# Patient Record
Sex: FEMALE | Race: WHITE | NOT HISPANIC OR LATINO | Employment: OTHER | URBAN - METROPOLITAN AREA
[De-identification: names, ages, dates, MRNs, and addresses within clinical notes are randomized per-mention and may not be internally consistent; named-entity substitution may affect disease eponyms.]

---

## 2021-05-27 ENCOUNTER — RECORDS - HEALTHEAST (OUTPATIENT)
Dept: ADMINISTRATIVE | Facility: CLINIC | Age: 76
End: 2021-05-27

## 2021-05-28 ENCOUNTER — RECORDS - HEALTHEAST (OUTPATIENT)
Dept: ADMINISTRATIVE | Facility: CLINIC | Age: 76
End: 2021-05-28

## 2021-05-29 ENCOUNTER — RECORDS - HEALTHEAST (OUTPATIENT)
Dept: ADMINISTRATIVE | Facility: CLINIC | Age: 76
End: 2021-05-29

## 2021-05-30 ENCOUNTER — RECORDS - HEALTHEAST (OUTPATIENT)
Dept: ADMINISTRATIVE | Facility: CLINIC | Age: 76
End: 2021-05-30

## 2021-05-31 ENCOUNTER — RECORDS - HEALTHEAST (OUTPATIENT)
Dept: ADMINISTRATIVE | Facility: CLINIC | Age: 76
End: 2021-05-31

## 2021-06-01 ENCOUNTER — RECORDS - HEALTHEAST (OUTPATIENT)
Dept: ADMINISTRATIVE | Facility: CLINIC | Age: 76
End: 2021-06-01

## 2022-11-26 ENCOUNTER — APPOINTMENT (OUTPATIENT)
Dept: CT IMAGING | Facility: HOSPITAL | Age: 77
DRG: 385 | End: 2022-11-26
Attending: EMERGENCY MEDICINE
Payer: COMMERCIAL

## 2022-11-26 ENCOUNTER — HOSPITAL ENCOUNTER (INPATIENT)
Facility: HOSPITAL | Age: 77
LOS: 2 days | Discharge: LEFT BEFORE DISCHARGE | DRG: 385 | End: 2022-11-28
Attending: EMERGENCY MEDICINE | Admitting: HOSPITALIST
Payer: COMMERCIAL

## 2022-11-26 DIAGNOSIS — K52.9 NON-SPECIFIC COLITIS: ICD-10-CM

## 2022-11-26 DIAGNOSIS — R07.9 CHEST PAIN, UNSPECIFIED TYPE: ICD-10-CM

## 2022-11-26 DIAGNOSIS — U07.1 INFECTION DUE TO 2019 NOVEL CORONAVIRUS: ICD-10-CM

## 2022-11-26 LAB
ALBUMIN SERPL BCG-MCNC: 4.1 G/DL (ref 3.5–5.2)
ALP SERPL-CCNC: 79 U/L (ref 35–104)
ALT SERPL W P-5'-P-CCNC: 14 U/L (ref 10–35)
ANION GAP SERPL CALCULATED.3IONS-SCNC: 14 MMOL/L (ref 7–15)
AST SERPL W P-5'-P-CCNC: 18 U/L (ref 10–35)
BILIRUB DIRECT SERPL-MCNC: 0.22 MG/DL (ref 0–0.3)
BILIRUB SERPL-MCNC: 0.7 MG/DL
BUN SERPL-MCNC: 24.8 MG/DL (ref 8–23)
CALCIUM SERPL-MCNC: 9.8 MG/DL (ref 8.8–10.2)
CHLORIDE SERPL-SCNC: 98 MMOL/L (ref 98–107)
CREAT SERPL-MCNC: 0.88 MG/DL (ref 0.51–0.95)
DEPRECATED HCO3 PLAS-SCNC: 24 MMOL/L (ref 22–29)
ERYTHROCYTE [DISTWIDTH] IN BLOOD BY AUTOMATED COUNT: 13.6 % (ref 10–15)
GFR SERPL CREATININE-BSD FRML MDRD: 68 ML/MIN/1.73M2
GLUCOSE BLDC GLUCOMTR-MCNC: 180 MG/DL (ref 70–99)
GLUCOSE SERPL-MCNC: 157 MG/DL (ref 70–99)
HCT VFR BLD AUTO: 38.5 % (ref 35–47)
HGB BLD-MCNC: 12 G/DL (ref 11.7–15.7)
LACTATE SERPL-SCNC: 0.8 MMOL/L (ref 0.7–2)
LIPASE SERPL-CCNC: 32 U/L (ref 13–60)
MCH RBC QN AUTO: 29.6 PG (ref 26.5–33)
MCHC RBC AUTO-ENTMCNC: 31.2 G/DL (ref 31.5–36.5)
MCV RBC AUTO: 95 FL (ref 78–100)
PLATELET # BLD AUTO: 268 10E3/UL (ref 150–450)
POTASSIUM SERPL-SCNC: 4 MMOL/L (ref 3.4–5.3)
PROT SERPL-MCNC: 7.6 G/DL (ref 6.4–8.3)
RBC # BLD AUTO: 4.05 10E6/UL (ref 3.8–5.2)
SARS-COV-2 RNA RESP QL NAA+PROBE: POSITIVE
SODIUM SERPL-SCNC: 136 MMOL/L (ref 136–145)
TROPONIN T SERPL HS-MCNC: 22 NG/L
WBC # BLD AUTO: 19.3 10E3/UL (ref 4–11)

## 2022-11-26 PROCEDURE — U0005 INFEC AGEN DETEC AMPLI PROBE: HCPCS | Performed by: EMERGENCY MEDICINE

## 2022-11-26 PROCEDURE — 74174 CTA ABD&PLVS W/CONTRAST: CPT

## 2022-11-26 PROCEDURE — 82248 BILIRUBIN DIRECT: CPT | Performed by: EMERGENCY MEDICINE

## 2022-11-26 PROCEDURE — 250N000011 HC RX IP 250 OP 636: Performed by: EMERGENCY MEDICINE

## 2022-11-26 PROCEDURE — 250N000013 HC RX MED GY IP 250 OP 250 PS 637: Performed by: HOSPITALIST

## 2022-11-26 PROCEDURE — 84484 ASSAY OF TROPONIN QUANT: CPT | Performed by: EMERGENCY MEDICINE

## 2022-11-26 PROCEDURE — 80053 COMPREHEN METABOLIC PANEL: CPT | Performed by: EMERGENCY MEDICINE

## 2022-11-26 PROCEDURE — 250N000013 HC RX MED GY IP 250 OP 250 PS 637: Performed by: EMERGENCY MEDICINE

## 2022-11-26 PROCEDURE — 83605 ASSAY OF LACTIC ACID: CPT | Performed by: EMERGENCY MEDICINE

## 2022-11-26 PROCEDURE — 99285 EMERGENCY DEPT VISIT HI MDM: CPT | Mod: 25

## 2022-11-26 PROCEDURE — 258N000003 HC RX IP 258 OP 636: Performed by: EMERGENCY MEDICINE

## 2022-11-26 PROCEDURE — 85027 COMPLETE CBC AUTOMATED: CPT | Performed by: EMERGENCY MEDICINE

## 2022-11-26 PROCEDURE — 96374 THER/PROPH/DIAG INJ IV PUSH: CPT

## 2022-11-26 PROCEDURE — 93005 ELECTROCARDIOGRAM TRACING: CPT | Performed by: EMERGENCY MEDICINE

## 2022-11-26 PROCEDURE — 71275 CT ANGIOGRAPHY CHEST: CPT

## 2022-11-26 PROCEDURE — 250N000011 HC RX IP 250 OP 636: Performed by: HOSPITALIST

## 2022-11-26 PROCEDURE — C9803 HOPD COVID-19 SPEC COLLECT: HCPCS

## 2022-11-26 PROCEDURE — 83690 ASSAY OF LIPASE: CPT | Performed by: EMERGENCY MEDICINE

## 2022-11-26 PROCEDURE — 120N000001 HC R&B MED SURG/OB

## 2022-11-26 PROCEDURE — 99223 1ST HOSP IP/OBS HIGH 75: CPT | Performed by: HOSPITALIST

## 2022-11-26 PROCEDURE — 36415 COLL VENOUS BLD VENIPUNCTURE: CPT | Performed by: EMERGENCY MEDICINE

## 2022-11-26 RX ORDER — ACETAMINOPHEN 325 MG/1
650 TABLET ORAL EVERY 6 HOURS PRN
Status: DISCONTINUED | OUTPATIENT
Start: 2022-11-26 | End: 2022-11-28 | Stop reason: HOSPADM

## 2022-11-26 RX ORDER — ONDANSETRON 2 MG/ML
4 INJECTION INTRAMUSCULAR; INTRAVENOUS EVERY 6 HOURS PRN
Status: DISCONTINUED | OUTPATIENT
Start: 2022-11-26 | End: 2022-11-28 | Stop reason: HOSPADM

## 2022-11-26 RX ORDER — PIPERACILLIN SODIUM, TAZOBACTAM SODIUM 3; .375 G/15ML; G/15ML
3.38 INJECTION, POWDER, LYOPHILIZED, FOR SOLUTION INTRAVENOUS ONCE
Status: COMPLETED | OUTPATIENT
Start: 2022-11-26 | End: 2022-11-27

## 2022-11-26 RX ORDER — PRAVASTATIN SODIUM 80 MG/1
TABLET ORAL EVERY 24 HOURS
COMMUNITY
Start: 2021-06-04 | End: 2022-11-26

## 2022-11-26 RX ORDER — GLIPIZIDE 10 MG/1
10 TABLET ORAL
Status: ON HOLD | COMMUNITY
Start: 2022-09-26 | End: 2023-02-18

## 2022-11-26 RX ORDER — ACETAMINOPHEN 650 MG/1
650 SUPPOSITORY RECTAL EVERY 6 HOURS PRN
Status: DISCONTINUED | OUTPATIENT
Start: 2022-11-26 | End: 2022-11-28 | Stop reason: HOSPADM

## 2022-11-26 RX ORDER — FLUTICASONE PROPIONATE 50 MCG
1-2 SPRAY, SUSPENSION (ML) NASAL EVERY 24 HOURS
COMMUNITY
Start: 2021-06-04 | End: 2022-11-26

## 2022-11-26 RX ORDER — LOSARTAN POTASSIUM 50 MG/1
50 TABLET ORAL 2 TIMES DAILY
Status: ON HOLD | COMMUNITY
Start: 2021-06-04 | End: 2023-02-18

## 2022-11-26 RX ORDER — OMEGA-3 FATTY ACIDS/FISH OIL 300-1000MG
200-400 CAPSULE ORAL EVERY 8 HOURS PRN
Status: ON HOLD | COMMUNITY
End: 2023-02-18

## 2022-11-26 RX ORDER — OXYBUTYNIN CHLORIDE 10 MG/1
20 TABLET, EXTENDED RELEASE ORAL DAILY
Status: DISCONTINUED | OUTPATIENT
Start: 2022-11-27 | End: 2022-11-26

## 2022-11-26 RX ORDER — ASPIRIN 81 MG/1
81 TABLET ORAL DAILY
Status: DISCONTINUED | OUTPATIENT
Start: 2022-11-27 | End: 2022-11-28 | Stop reason: HOSPADM

## 2022-11-26 RX ORDER — VENLAFAXINE HYDROCHLORIDE 75 MG/1
75 CAPSULE, EXTENDED RELEASE ORAL DAILY
Status: DISCONTINUED | OUTPATIENT
Start: 2022-11-27 | End: 2022-11-28 | Stop reason: HOSPADM

## 2022-11-26 RX ORDER — OXYBUTYNIN CHLORIDE 10 MG/1
10 TABLET, EXTENDED RELEASE ORAL AT BEDTIME
Status: DISCONTINUED | OUTPATIENT
Start: 2022-11-26 | End: 2022-11-26

## 2022-11-26 RX ORDER — LIDOCAINE 40 MG/G
CREAM TOPICAL
Status: DISCONTINUED | OUTPATIENT
Start: 2022-11-26 | End: 2022-11-28 | Stop reason: HOSPADM

## 2022-11-26 RX ORDER — AMLODIPINE BESYLATE 2.5 MG/1
2.5 TABLET ORAL DAILY
Status: DISCONTINUED | OUTPATIENT
Start: 2022-11-27 | End: 2022-11-28 | Stop reason: HOSPADM

## 2022-11-26 RX ORDER — FUROSEMIDE 20 MG
20 TABLET ORAL DAILY
Status: ON HOLD | COMMUNITY
Start: 2021-06-04 | End: 2023-01-29

## 2022-11-26 RX ORDER — ONDANSETRON 4 MG/1
4 TABLET, ORALLY DISINTEGRATING ORAL EVERY 6 HOURS PRN
Status: DISCONTINUED | OUTPATIENT
Start: 2022-11-26 | End: 2022-11-28 | Stop reason: HOSPADM

## 2022-11-26 RX ORDER — METFORMIN HCL 500 MG
1000 TABLET, EXTENDED RELEASE 24 HR ORAL 2 TIMES DAILY WITH MEALS
COMMUNITY
Start: 2021-06-04

## 2022-11-26 RX ORDER — IOPAMIDOL 755 MG/ML
100 INJECTION, SOLUTION INTRAVASCULAR ONCE
Status: COMPLETED | OUTPATIENT
Start: 2022-11-26 | End: 2022-11-26

## 2022-11-26 RX ORDER — ROSUVASTATIN CALCIUM 10 MG/1
20 TABLET, COATED ORAL DAILY
Status: DISCONTINUED | OUTPATIENT
Start: 2022-11-27 | End: 2022-11-28 | Stop reason: HOSPADM

## 2022-11-26 RX ORDER — ENOXAPARIN SODIUM 100 MG/ML
40 INJECTION SUBCUTANEOUS EVERY 24 HOURS
Status: DISCONTINUED | OUTPATIENT
Start: 2022-11-27 | End: 2022-11-28 | Stop reason: HOSPADM

## 2022-11-26 RX ORDER — DEXTROSE MONOHYDRATE 25 G/50ML
25-50 INJECTION, SOLUTION INTRAVENOUS
Status: DISCONTINUED | OUTPATIENT
Start: 2022-11-26 | End: 2022-11-28 | Stop reason: HOSPADM

## 2022-11-26 RX ORDER — LOSARTAN POTASSIUM 50 MG/1
50 TABLET ORAL 2 TIMES DAILY
Status: DISCONTINUED | OUTPATIENT
Start: 2022-11-26 | End: 2022-11-28 | Stop reason: HOSPADM

## 2022-11-26 RX ORDER — LEVOTHYROXINE SODIUM 137 UG/1
137 TABLET ORAL
COMMUNITY
Start: 2022-11-09

## 2022-11-26 RX ORDER — OXYBUTYNIN CHLORIDE 10 MG/1
10 TABLET, EXTENDED RELEASE ORAL DAILY
Status: DISCONTINUED | OUTPATIENT
Start: 2022-11-27 | End: 2022-11-28 | Stop reason: HOSPADM

## 2022-11-26 RX ORDER — AMLODIPINE BESYLATE 2.5 MG/1
2.5 TABLET ORAL DAILY
COMMUNITY
Start: 2022-09-03

## 2022-11-26 RX ORDER — HYDROMORPHONE HYDROCHLORIDE 1 MG/ML
0.2 INJECTION, SOLUTION INTRAMUSCULAR; INTRAVENOUS; SUBCUTANEOUS
Status: DISCONTINUED | OUTPATIENT
Start: 2022-11-26 | End: 2022-11-28 | Stop reason: HOSPADM

## 2022-11-26 RX ORDER — ASPIRIN 81 MG/1
162 TABLET, CHEWABLE ORAL ONCE
Status: COMPLETED | OUTPATIENT
Start: 2022-11-26 | End: 2022-11-26

## 2022-11-26 RX ORDER — MORPHINE SULFATE 2 MG/ML
2 INJECTION, SOLUTION INTRAMUSCULAR; INTRAVENOUS ONCE
Status: COMPLETED | OUTPATIENT
Start: 2022-11-26 | End: 2022-11-26

## 2022-11-26 RX ORDER — SODIUM CHLORIDE 9 MG/ML
INJECTION, SOLUTION INTRAVENOUS ONCE
Status: COMPLETED | OUTPATIENT
Start: 2022-11-26 | End: 2022-11-26

## 2022-11-26 RX ORDER — LOSARTAN POTASSIUM 25 MG/1
25 TABLET ORAL DAILY
Status: DISCONTINUED | OUTPATIENT
Start: 2022-11-27 | End: 2022-11-26

## 2022-11-26 RX ORDER — SODIUM CHLORIDE 9 MG/ML
INJECTION, SOLUTION INTRAVENOUS CONTINUOUS
Status: DISCONTINUED | OUTPATIENT
Start: 2022-11-26 | End: 2022-11-28 | Stop reason: HOSPADM

## 2022-11-26 RX ORDER — EMPAGLIFLOZIN 25 MG/1
25 TABLET, FILM COATED ORAL DAILY
Status: ON HOLD | COMMUNITY
Start: 2022-09-26 | End: 2023-01-29

## 2022-11-26 RX ORDER — LEVOTHYROXINE SODIUM 125 UG/1
125 TABLET ORAL
Status: DISCONTINUED | OUTPATIENT
Start: 2022-11-27 | End: 2022-11-28 | Stop reason: HOSPADM

## 2022-11-26 RX ORDER — VENLAFAXINE HYDROCHLORIDE 75 MG/1
75 CAPSULE, EXTENDED RELEASE ORAL DAILY
Status: ON HOLD | COMMUNITY
Start: 2021-06-04 | End: 2023-02-18

## 2022-11-26 RX ORDER — OXYBUTYNIN CHLORIDE 10 MG/1
20 TABLET, EXTENDED RELEASE ORAL DAILY
Status: ON HOLD | COMMUNITY
Start: 2022-08-27 | End: 2023-04-05

## 2022-11-26 RX ORDER — ROSUVASTATIN CALCIUM 20 MG/1
20 TABLET, COATED ORAL AT BEDTIME
COMMUNITY
Start: 2022-09-16

## 2022-11-26 RX ORDER — NICOTINE POLACRILEX 4 MG
15-30 LOZENGE BUCCAL
Status: DISCONTINUED | OUTPATIENT
Start: 2022-11-26 | End: 2022-11-28 | Stop reason: HOSPADM

## 2022-11-26 RX ORDER — FUROSEMIDE 20 MG
20 TABLET ORAL DAILY
Status: DISCONTINUED | OUTPATIENT
Start: 2022-11-27 | End: 2022-11-28 | Stop reason: HOSPADM

## 2022-11-26 RX ORDER — PIPERACILLIN SODIUM, TAZOBACTAM SODIUM 3; .375 G/15ML; G/15ML
3.38 INJECTION, POWDER, LYOPHILIZED, FOR SOLUTION INTRAVENOUS EVERY 8 HOURS
Status: DISCONTINUED | OUTPATIENT
Start: 2022-11-27 | End: 2022-11-28 | Stop reason: HOSPADM

## 2022-11-26 RX ADMIN — PIPERACILLIN AND TAZOBACTAM 3.38 G: 3; .375 INJECTION, POWDER, LYOPHILIZED, FOR SOLUTION INTRAVENOUS at 21:02

## 2022-11-26 RX ADMIN — IOPAMIDOL 100 ML: 755 INJECTION, SOLUTION INTRAVENOUS at 18:49

## 2022-11-26 RX ADMIN — MORPHINE SULFATE 2 MG: 2 INJECTION, SOLUTION INTRAMUSCULAR; INTRAVENOUS at 18:37

## 2022-11-26 RX ADMIN — ASPIRIN 81 MG CHEWABLE TABLET 162 MG: 81 TABLET CHEWABLE at 21:01

## 2022-11-26 RX ADMIN — LOSARTAN POTASSIUM 50 MG: 50 TABLET, FILM COATED ORAL at 22:07

## 2022-11-26 RX ADMIN — SODIUM CHLORIDE: 9 INJECTION, SOLUTION INTRAVENOUS at 20:19

## 2022-11-26 ASSESSMENT — ENCOUNTER SYMPTOMS
FOCAL WEAKNESS: 0
PSYCHIATRIC NEGATIVE: 1
CONSTITUTIONAL NEGATIVE: 1
EYES NEGATIVE: 1
COUGH: 0
VOMITING: 0
PALPITATIONS: 0
RESPIRATORY NEGATIVE: 1
DYSURIA: 0
FEVER: 0
CONSTIPATION: 1
ABDOMINAL PAIN: 1
MUSCULOSKELETAL NEGATIVE: 1
WEAKNESS: 0
NEUROLOGICAL NEGATIVE: 1
CHILLS: 0
ORTHOPNEA: 0
NAUSEA: 1

## 2022-11-26 ASSESSMENT — ACTIVITIES OF DAILY LIVING (ADL)
ADLS_ACUITY_SCORE: 35

## 2022-11-26 NOTE — ED PROVIDER NOTES
Emergency Department Encounter     Evaluation Date & Time:   No admission date for patient encounter.    CHIEF COMPLAINT:  Abdominal Pain      Triage Note:Patient arrives with complaints of abdominal pain. Unsure when last BM was,  reports about 3-4 days ago. Patient reports passing gas. No abdominal hx. Also reports tender upper back, denies trauma. No neck or chest pain.             Impression and Plan       FINAL IMPRESSION:    ICD-10-CM    1. Non-specific colitis  K52.9     with possible stricture      2. Chest pain, unspecified type  R07.9       3. Infection due to 2019 novel coronavirus  U07.1             ED COURSE & MEDICAL DECISION MAKIN:15 PM I met with the patient for an interview and initial exam. Plans for care were discussed.  8:01 PM I reassessed the patient and updated her regarding lab results. Plans for care were further discussed.   8:30 PM I discussed with Dr. Fournier, Hospitalist, regarding admitting the patient.      77 year old female, history of diabetes and s/p appendectomy, who presents for evaluation of cramping mid-abdominal pain radiating to her chest and mid-upper back pain for the past couple of days. No N/V/D, but no BM for 2-3 days despite OTC medications. No fevers.      On exam, abdomen is soft, non-distended with moderate tenderness to palpation diffusely; no peritoneal signs.    Cardiac etiology considered for which EKG was performed and demonstrated NSR with no ischemic changes.  Troponin slightly above reference range for her sex (22).    Labs otherwise remarkable for leukocytosis (WBC 19.3) with no anemia (Hb 12.0).  No electrolyte derangements or renal impairment.  No laboratory evidence of hepatitis, biliary obstruction or pancreatitis.  UA negative for infection and hematuria.    Given pain radiating to her chest and back, aortic dissection considered.  CTA chest / abdomen / pelvis performed and demonstrated:  1.  No thoracic or abdominal aortic dissection.  2.   Distal colitis, which may be infectious or inflammatory. Possible distal colonic stricture. Consider GI consultation.     Patient without COVID symptoms, however her asymptomatic COVID test returned positive. Patient did have some hypoxia (O2 sats 88% on RA) for which she was placed on 2L via NC with O2 sats remaining >92%.    Patient admitted to hospitalist service for further evaluation and management.  Patient hemodynamically stable throughout ED course.      At the conclusion of the encounter I discussed the results of all the tests and the disposition. The questions were answered. The patient and family acknowledged understanding and were agreeable with the care plan.      MEDICATIONS GIVEN IN THE EMERGENCY DEPARTMENT:  Medications   piperacillin-tazobactam (ZOSYN) 3.375 g vial to attach to  mL bag (0 g Intravenous Stopped 11/27/22 0023)     Followed by   piperacillin-tazobactam (ZOSYN) 3.375 g vial to attach to  mL bag (3.375 g Intravenous Given 11/27/22 0300)   amLODIPine (NORVASC) tablet 2.5 mg (2.5 mg Oral Given 11/27/22 0752)   aspirin EC tablet 81 mg (81 mg Oral Given 11/27/22 0751)   levothyroxine (SYNTHROID/LEVOTHROID) tablet 125 mcg (125 mcg Oral Given 11/27/22 0751)   rosuvastatin (CRESTOR) tablet 20 mg (20 mg Oral Given 11/27/22 0752)   lidocaine 1 % 0.1-1 mL (has no administration in time range)   lidocaine (LMX4) cream (has no administration in time range)   sodium chloride (PF) 0.9% PF flush 3 mL (3 mLs Intracatheter Not Given 11/27/22 0501)   sodium chloride (PF) 0.9% PF flush 3 mL (has no administration in time range)   melatonin tablet 1 mg (has no administration in time range)   glucose gel 15-30 g (has no administration in time range)     Or   dextrose 50 % injection 25-50 mL (has no administration in time range)     Or   glucagon injection 1 mg (has no administration in time range)   enoxaparin ANTICOAGULANT (LOVENOX) injection 40 mg (40 mg Subcutaneous Given 11/27/22 0752)    sodium chloride 0.9% infusion ( Intravenous New Bag 11/27/22 0742)   acetaminophen (TYLENOL) tablet 650 mg (650 mg Oral Given 11/27/22 0459)     Or   acetaminophen (TYLENOL) Suppository 650 mg ( Rectal See Alternative 11/27/22 0459)   HYDROmorphone (PF) (DILAUDID) injection 0.2 mg (has no administration in time range)   ondansetron (ZOFRAN ODT) ODT tab 4 mg (has no administration in time range)     Or   ondansetron (ZOFRAN) injection 4 mg (has no administration in time range)   insulin aspart (NovoLOG) injection (RAPID ACTING) (1 Units Subcutaneous Given 11/27/22 0749)   furosemide (LASIX) tablet 20 mg (has no administration in time range)   losartan (COZAAR) tablet 50 mg (50 mg Oral Given 11/27/22 0751)   venlafaxine (EFFEXOR XR) 24 hr capsule 75 mg (75 mg Oral Given 11/27/22 0752)   oxybutynin ER (DITROPAN XL) 24 hr tablet 10 mg (10 mg Oral Given 11/27/22 0750)   morphine (PF) injection 2 mg (2 mg Intravenous Given 11/26/22 1837)   iopamidol (ISOVUE-370) solution 100 mL (100 mLs Intravenous Given 11/26/22 1849)   sodium chloride 0.9% infusion (0 mLs Intravenous Stopped 11/26/22 2214)   aspirin (ASA) chewable tablet 162 mg (162 mg Oral Given 11/26/22 2101)       NEW PRESCRIPTIONS STARTED AT TODAY'S ED VISIT:  New Prescriptions    No medications on file       HPI     HPI     Suzy Gómez is a 77 year old female with a history of diabetes and s/p appendectomy, who presents to this ED with her family for evaluation of abdominal pain.     The pain started a couple of days ago and has been constant since onset. The pain is located to the mid-abdomen with some radiation to her chest. She also reports pain in the mid-upper back. The pain is cramping in nature without provocative features. No associated N/V/D. She estimates her last bowel movement to be around 2-3 days ago. Denies urinary symptoms and fevers.    Otherwise, she denies shortness of breath, cough or other concerns.    REVIEW OF SYSTEMS:  All other systems  reviewed and are negative.      Medical History     Past Medical History:   Diagnosis Date     Diabetes (H)      Dyslipidemia      Heart disease      Hypertension      KARYN (obstructive sleep apnea)      Thyroid disease        No past surgical history on file.    No family history on file.    Social History     Tobacco Use     Smoking status: Former     Types: Cigarettes     Quit date:      Years since quittin.9     Smokeless tobacco: Never   Vaping Use     Vaping Use: Never used   Substance Use Topics     Alcohol use: Not Currently     Drug use: Never       amLODIPine (NORVASC) 2.5 MG tablet  aspirin (ASA) 81 MG EC tablet  furosemide (LASIX) 20 MG tablet  glipiZIDE (GLUCOTROL) 10 MG tablet  ibuprofen (ADVIL/MOTRIN) 200 MG capsule  JARDIANCE 25 MG TABS tablet  levothyroxine (SYNTHROID/LEVOTHROID) 125 MCG tablet  linagliptin (TRADJENTA) 5 MG TABS tablet  losartan (COZAAR) 50 MG tablet  metFORMIN (GLUCOPHAGE XR) 500 MG 24 hr tablet  oxybutynin ER (DITROPAN XL) 10 MG 24 hr tablet  rosuvastatin (CRESTOR) 20 MG tablet  venlafaxine (EFFEXOR XR) 75 MG 24 hr capsule        Physical Exam     First Vitals:  Patient Vitals for the past 24 hrs:   BP Temp Temp src Pulse Resp SpO2 Height Weight   22 0345 (!) 169/74 -- -- 73 22 94 % -- --   22 0321 (!) 143/65 -- -- 75 23 92 % -- --   22 0306 (!) 154/67 -- -- 75 23 92 % -- --   22 0251 (!) 159/68 -- -- 74 (!) 31 94 % -- --   22 0236 (!) 160/65 -- -- 72 (!) 36 94 % -- --   22 0221 -- -- -- 70 21 95 % -- --   22 0212 127/58 -- -- 68 19 94 % -- --   22 0206 -- -- -- 70 21 96 % -- --   22 0157 (!) 147/64 -- -- 71 22 94 % -- --   22 0146 130/63 -- -- 69 19 95 % -- --   22 0142 -- -- -- 68 21 95 % -- --   22 0127 120/55 -- -- 69 25 94 % -- --   22 0112 126/60 -- -- 69 19 94 % -- --   22 2315 109/54 -- -- 75 22 93 % -- --   22 2205 124/61 -- -- 70 23 92 % -- --   22 -- -- -- 81 21  "96 % -- --   11/26/22 1900 (!) 145/65 -- -- 86 19 97 % -- --   11/26/22 1831 (!) 141/63 -- -- 81 9 (!) 88 % -- --   11/26/22 1726 (!) 142/61 97.8  F (36.6  C) Temporal 87 22 92 % 1.651 m (5' 5\") 99.8 kg (220 lb)       PHYSICAL EXAM:   Physical Exam    GENERAL: Awake, alert.  In mild acute distress.   HEENT: Normocephalic, atraumatic. Pupils equal, round and reactive. Conjunctiva normal.  NECK: No stridor.  PULMONARY: Symmetrical breath sounds without distress.  Lungs clear to auscultation bilaterally without wheezes, rhonchi or rales.  CARDIO: Regular rate and rhythm.  No significant murmur, rub or gallop.  Radial pulses strong and symmetrical.  ABDOMINAL: Abdomen soft, non-distended with moderate tenderness to palpation diffusely; no rebound tenderness or guarding.    EXTREMITIES: No lower extremity swelling or edema.      NEURO: Alert and oriented to person, place and time.  Cranial nerves grossly intact.  No focal motor deficit.  PSYCH: Normal mood and affect.  SKIN: No rashes.     Results     LAB:  All pertinent labs reviewed and interpreted  Labs Ordered and Resulted from Time of ED Arrival to Time of ED Departure   CBC WITH PLATELETS - Abnormal       Result Value    WBC Count 19.3 (*)     RBC Count 4.05      Hemoglobin 12.0      Hematocrit 38.5      MCV 95      MCH 29.6      MCHC 31.2 (*)     RDW 13.6      Platelet Count 268     BASIC METABOLIC PANEL - Abnormal    Sodium 136      Potassium 4.0      Chloride 98      Carbon Dioxide (CO2) 24      Anion Gap 14      Urea Nitrogen 24.8 (*)     Creatinine 0.88      Calcium 9.8      Glucose 157 (*)     GFR Estimate 68     ROUTINE UA WITH MICROSCOPIC REFLEX TO CULTURE - Abnormal    Color Urine Light Yellow      Appearance Urine Clear      Glucose Urine >1000 (*)     Bilirubin Urine Negative      Ketones Urine Negative      Specific Gravity Urine >1.050 (*)     Blood Urine Negative      pH Urine 5.5      Protein Albumin Urine 20 (*)     Urobilinogen Urine <2.0      " Nitrite Urine Negative      Leukocyte Esterase Urine Negative      Bacteria Urine Few (*)     RBC Urine <1      WBC Urine <1      Squamous Epithelials Urine <1     TROPONIN T, HIGH SENSITIVITY - Abnormal    Troponin T, High Sensitivity 22 (*)    COVID-19 VIRUS (CORONAVIRUS) BY PCR - Abnormal    SARS CoV2 PCR Positive (*)    GLUCOSE BY METER - Abnormal    GLUCOSE BY METER POCT 180 (*)    GLUCOSE BY METER - Abnormal    GLUCOSE BY METER POCT 135 (*)    GLUCOSE BY METER - Abnormal    GLUCOSE BY METER POCT 156 (*)    HEPATIC FUNCTION PANEL - Normal    Protein Total 7.6      Albumin 4.1      Bilirubin Total 0.7      Alkaline Phosphatase 79      AST 18      ALT 14      Bilirubin Direct 0.22     LIPASE - Normal    Lipase 32     LACTIC ACID WHOLE BLOOD - Normal    Lactic Acid 0.8         RADIOLOGY:  CTA Chest Abdomen Pelvis w Contrast   Final Result   IMPRESSION:   1.  No thoracic or abdominal aortic dissection.   2.  Distal colitis, which may be infectious or inflammatory. Possible distal colonic stricture. Consider follow-up GI consultation.             EC2022, 18:30; NSR with rate of 89 bpm; normal intervals; normal conduction; no ST-T wave changes consistent with ACS or pericarditis; no previous EKG available for comparison    EKG independently reviewed and interpreted by Brittney Zazueta MD      I, Abiodun Ko, am serving as a scribe to document services personally performed by Brittney Zazueta MD based on my observation and the provider's statements to me. I, Brittney Zazueta MD attest that Abiodun Ko is acting in a scribe capacity, has observed my performance of the services and has documented them in accordance with my direction.    Brittney Zazueta MD  Emergency Medicine  [facility] EMERGENCY DEPARTMENT           Brittney Zazueta MD  22 0751

## 2022-11-26 NOTE — ED TRIAGE NOTES
Patient arrives with complaints of abdominal pain. Unsure when last BM was,  reports about 3-4 days ago. Patient reports passing gas. No abdominal hx. Also reports tender upper back, denies trauma. No neck or chest pain.

## 2022-11-27 VITALS
HEART RATE: 74 BPM | WEIGHT: 220 LBS | OXYGEN SATURATION: 94 % | BODY MASS INDEX: 36.65 KG/M2 | DIASTOLIC BLOOD PRESSURE: 64 MMHG | SYSTOLIC BLOOD PRESSURE: 138 MMHG | HEIGHT: 65 IN | RESPIRATION RATE: 20 BRPM | TEMPERATURE: 98.2 F

## 2022-11-27 LAB
ALBUMIN UR-MCNC: 20 MG/DL
APPEARANCE UR: CLEAR
BACTERIA #/AREA URNS HPF: ABNORMAL /HPF
BILIRUB UR QL STRIP: NEGATIVE
COLOR UR AUTO: ABNORMAL
GLUCOSE BLDC GLUCOMTR-MCNC: 135 MG/DL (ref 70–99)
GLUCOSE BLDC GLUCOMTR-MCNC: 156 MG/DL (ref 70–99)
GLUCOSE BLDC GLUCOMTR-MCNC: 164 MG/DL (ref 70–99)
GLUCOSE UR STRIP-MCNC: >1000 MG/DL
HGB UR QL STRIP: NEGATIVE
KETONES UR STRIP-MCNC: NEGATIVE MG/DL
LEUKOCYTE ESTERASE UR QL STRIP: NEGATIVE
NITRATE UR QL: NEGATIVE
PH UR STRIP: 5.5 [PH] (ref 5–7)
POTASSIUM SERPL-SCNC: 3.7 MMOL/L (ref 3.4–5.3)
RBC URINE: <1 /HPF
SP GR UR STRIP: >1.05 (ref 1–1.03)
SQUAMOUS EPITHELIAL: <1 /HPF
UROBILINOGEN UR STRIP-MCNC: <2 MG/DL
WBC URINE: <1 /HPF

## 2022-11-27 PROCEDURE — 258N000003 HC RX IP 258 OP 636: Performed by: HOSPITALIST

## 2022-11-27 PROCEDURE — 250N000013 HC RX MED GY IP 250 OP 250 PS 637: Performed by: HOSPITALIST

## 2022-11-27 PROCEDURE — 84132 ASSAY OF SERUM POTASSIUM: CPT | Performed by: INTERNAL MEDICINE

## 2022-11-27 PROCEDURE — 250N000012 HC RX MED GY IP 250 OP 636 PS 637: Performed by: HOSPITALIST

## 2022-11-27 PROCEDURE — 99207 PR APP CREDIT; MD BILLING SHARED VISIT: CPT | Performed by: INTERNAL MEDICINE

## 2022-11-27 PROCEDURE — 250N000011 HC RX IP 250 OP 636: Performed by: HOSPITALIST

## 2022-11-27 PROCEDURE — 81001 URINALYSIS AUTO W/SCOPE: CPT | Performed by: EMERGENCY MEDICINE

## 2022-11-27 PROCEDURE — 36415 COLL VENOUS BLD VENIPUNCTURE: CPT | Performed by: INTERNAL MEDICINE

## 2022-11-27 PROCEDURE — 120N000001 HC R&B MED SURG/OB

## 2022-11-27 RX ORDER — LACTULOSE 10 G/15ML
10 SOLUTION ORAL 2 TIMES DAILY
Status: DISCONTINUED | OUTPATIENT
Start: 2022-11-27 | End: 2022-11-28 | Stop reason: HOSPADM

## 2022-11-27 RX ADMIN — ACETAMINOPHEN 650 MG: 325 TABLET, FILM COATED ORAL at 10:05

## 2022-11-27 RX ADMIN — ENOXAPARIN SODIUM 40 MG: 40 INJECTION SUBCUTANEOUS at 07:52

## 2022-11-27 RX ADMIN — LEVOTHYROXINE SODIUM 125 MCG: 0.12 TABLET ORAL at 07:51

## 2022-11-27 RX ADMIN — INSULIN ASPART 1 UNITS: 100 INJECTION, SOLUTION INTRAVENOUS; SUBCUTANEOUS at 12:23

## 2022-11-27 RX ADMIN — AMLODIPINE BESYLATE 2.5 MG: 2.5 TABLET ORAL at 07:52

## 2022-11-27 RX ADMIN — PIPERACILLIN AND TAZOBACTAM 3.38 G: 3; .375 INJECTION, POWDER, LYOPHILIZED, FOR SOLUTION INTRAVENOUS at 03:00

## 2022-11-27 RX ADMIN — ACETAMINOPHEN 650 MG: 325 TABLET, FILM COATED ORAL at 04:59

## 2022-11-27 RX ADMIN — OXYBUTYNIN CHLORIDE 10 MG: 10 TABLET, EXTENDED RELEASE ORAL at 07:50

## 2022-11-27 RX ADMIN — PIPERACILLIN AND TAZOBACTAM 3.38 G: 3; .375 INJECTION, POWDER, LYOPHILIZED, FOR SOLUTION INTRAVENOUS at 10:05

## 2022-11-27 RX ADMIN — Medication 81 MG: at 07:51

## 2022-11-27 RX ADMIN — VENLAFAXINE HYDROCHLORIDE 75 MG: 75 CAPSULE, EXTENDED RELEASE ORAL at 07:52

## 2022-11-27 RX ADMIN — FUROSEMIDE 20 MG: 20 TABLET ORAL at 07:58

## 2022-11-27 RX ADMIN — ROSUVASTATIN CALCIUM 20 MG: 10 TABLET, FILM COATED ORAL at 07:52

## 2022-11-27 RX ADMIN — SODIUM CHLORIDE: 9 INJECTION, SOLUTION INTRAVENOUS at 07:42

## 2022-11-27 RX ADMIN — INSULIN ASPART 1 UNITS: 100 INJECTION, SOLUTION INTRAVENOUS; SUBCUTANEOUS at 07:49

## 2022-11-27 RX ADMIN — LOSARTAN POTASSIUM 50 MG: 50 TABLET, FILM COATED ORAL at 07:51

## 2022-11-27 ASSESSMENT — ACTIVITIES OF DAILY LIVING (ADL)
ADLS_ACUITY_SCORE: 35

## 2022-11-27 NOTE — DISCHARGE SUMMARY
Patient had a big bowel movement and felt better.  She signed AMA papers and left, before I could see her.

## 2022-11-27 NOTE — PHARMACY-ADMISSION MEDICATION HISTORY
"Pharmacy Note - Admission Medication History  Pertinent Provider Information: \"rotigotine 2 mg/24 hr (NEUPRO) 2 mg/24 hour patch Apply 1 Patch on dry, clean, hairless skin once daily. \"  Was on recent clinic list but not fill hx and pt reports not using any patches.    11/3/22 Keflex for 7 days (pt could not recall what she was on it for, appears to be cellulitis from clinic notes).     ______________________________________________________________________    Prior To Admission (PTA) med list completed and updated in EMR.       PTA Med List   Medication Sig Last Dose     amLODIPine (NORVASC) 2.5 MG tablet Take 2.5 mg by mouth daily 11/26/2022     aspirin (ASA) 81 MG EC tablet Take 81 mg by mouth daily 11/26/2022     furosemide (LASIX) 20 MG tablet Take 20 mg by mouth daily 11/26/2022     glipiZIDE (GLUCOTROL) 10 MG tablet Take 10 mg by mouth 2 times daily (before meals) 11/26/2022 at am     ibuprofen (ADVIL/MOTRIN) 200 MG capsule Take 200-400 mg by mouth every 6 hours as needed for fever, inflammatory pain or moderate pain (4-6) 11/26/2022     JARDIANCE 25 MG TABS tablet Take 25 mg by mouth daily 11/26/2022     levothyroxine (SYNTHROID/LEVOTHROID) 125 MCG tablet Take 125 mcg by mouth daily before breakfast 11/26/2022     linagliptin (TRADJENTA) 5 MG TABS tablet Take 5 mg by mouth daily 11/26/2022     losartan (COZAAR) 50 MG tablet Take 50 mg by mouth 2 times daily 11/26/2022 at am     metFORMIN (GLUCOPHAGE XR) 500 MG 24 hr tablet Take 1,000 mg by mouth 2 times daily (with meals) 11/26/2022     oxybutynin ER (DITROPAN XL) 10 MG 24 hr tablet Take 20 mg by mouth daily 11/26/2022     rosuvastatin (CRESTOR) 20 MG tablet Take 20 mg by mouth At Bedtime 11/25/2022     venlafaxine (EFFEXOR XR) 75 MG 24 hr capsule Take 75 mg by mouth daily 11/26/2022       Information source(s): Patient, [facility] [place]  Method of interview communication: in-person    Summary of Changes to PTA Med List  New: " entire list added to [facility] record but only change from clinic list was add Ibu and remove Neupro patch    Patient was asked about OTC/herbal products specifically.  PTA med list reflects this.    In the past week, patient estimated taking medication this percent of the time:  greater than 90%.    Allergies were reviewed, assessed, and updated with the patient.      Patient does not use any multi-dose medications prior to admission.    The information provided in this note is only as accurate as the sources available at the time of the update(s).    Thank you for the opportunity to participate in the care of this patient.    Destiney Sheikh [facility]  11/26/2022 9:10 PM

## 2022-11-27 NOTE — PLAN OF CARE
Had a Bowel Movement and pain has completely resolved. Requesting that we contact the provider. Wants to be released.   Claudia Cobian RN      Problem: Constipation  Goal: Effective Bowel Elimination  11/27/2022 1345 by Claudia Cobian RN  Outcome: Progressing  11/27/2022 1151 by Claudia Cobian RN  Outcome: Progressing

## 2022-11-27 NOTE — H&P
"[facility]    History and Physical - Hospitalist Service       Date of Admission:  11/26/2022    Assessment & Plan     ADMITTING DIAGNOSIS  1. Acute Distal Colitis  2.Possibel Colonic Stricture  3.Hypertension  4.Hyperlipidemia.  5.DM2  6.HYpothyroidism  7.KARYN  8.CAD  9.COVID positive      PLAN  Admit to  Vital signs q 4  Telemetry monitoring  Continuous Pulse OX  Monitor I/O  Weight on admission    1.Principal diagnosis plan  IV Zosyn for colitis q 8  Iv fluids for hydration  Keep NPO  Gi consult    2. CVS  Monitor weight  HTN- continue home medications  Cozaar and Norvasc  CAD-daily ASa    3.GI- continue home PPI  Antiemetics as needed    4.Pulmonary/KARYN  Cpap per home use  Respiratory care per protocol    5.ENDO  Thyroid-continue Levothyroxine 125mcg  DM- Accuchecks Ac/Hs  Novolog sliding scale  Hold on oral agents while NPO  TSH/HGBa1c in am    6. COVID positive  Asymptomatic  No meds needed at this time            Diet: NPO per Anesthesia Guidelines for Procedure/Surgery Except for: MedsNPO  DVT Prophylaxis: Enoxaparin (Lovenox) SQ  Box Catheter: Not present  Central Lines: None  Cardiac Monitoring: None  Code Status:   FULL    Clinically Significant Risk Factors Present on Admission                  # Hypertension: home medication list includes antihypertensive(s)     # Obesity: Estimated body mass index is 36.61 kg/m  as calculated from the following:    Height as of this encounter: 1.651 m (5' 5\").    Weight as of this encounter: 99.8 kg (220 lb).           Disposition Plan   The patient's care was discussed with the Patient and Patient's Family.    Axel Fournier MD  Hospitalist Service  [facility]  Securely message with the Vocera Web Console (learn more here)  Text page via [facility] Paging/Directory         ______________________________________________________________________    Chief Complaint   Abdominal Pain    History is obtained from the " patient    History of Present Illness   Suzy Gómez is a 77 year old female with a hx of Hyperlipidemia,HTN,Arthritis, DM2, Hypothyroidism, KARYN, CAD who comes in with abdominal pain and constipation for 3 days.  She has not been able to have a good Bm in 3 days last stool was small and stringy.  She has pain especially lower abdomen , cramping radiating up to her chest.  No nausea or vomiting.  Her last colonoscopy was about 5yrs ago spouse states a 9mm polyp was removed.  She has not had one since.  She has no prior hx of colitis.    Review of Systems      Review of Systems   Constitutional: Negative.  Negative for chills, fever and malaise/fatigue.   HENT: Negative.    Eyes: Negative.    Respiratory: Negative.  Negative for cough.    Cardiovascular: Positive for chest pain. Negative for palpitations and orthopnea.   Gastrointestinal: Positive for abdominal pain, constipation and nausea. Negative for vomiting.   Genitourinary: Negative.  Negative for dysuria and urgency.   Musculoskeletal: Negative.    Skin: Negative.    Neurological: Negative.  Negative for focal weakness and weakness.   Endo/Heme/Allergies: Negative.    Psychiatric/Behavioral: Negative.       Past Medical History    I have reviewed this patient's medical history and updated it with pertinent information if needed.   Past Medical History:   Diagnosis Date     Diabetes (H)      Dyslipidemia      Heart disease      Hypertension      KARYN (obstructive sleep apnea)      Thyroid disease        Past Surgical History   I have reviewed this patient's surgical history and updated it with pertinent information if needed.  No past surgical history on file.    Social History   I have reviewed this patient's social history and updated it with pertinent information if needed.  Social History     Tobacco Use     Smoking status: Former     Types: Cigarettes     Quit date:      Years since quittin.9     Smokeless tobacco: Never   Vaping Use     Vaping  Use: Never used   Substance Use Topics     Alcohol use: Not Currently     Drug use: Never       Family History       Prior to Admission Medications   Prior to Admission Medications   Prescriptions Last Dose Informant Patient Reported? Taking?   JARDIANCE 25 MG TABS tablet   Yes No   amLODIPine (NORVASC) 2.5 MG tablet   Yes No   Sig: Take 2.5 mg by mouth daily   aspirin (ASA) 81 MG EC tablet   Yes Yes   Sig: Take 81 mg by mouth   fluticasone (FLONASE ALLERGY RELIEF) 50 MCG/ACT nasal spray   Yes Yes   Sig: Spray 1-2 sprays in nostril every 24 hours   furosemide (LASIX) 20 MG tablet   Yes Yes   Sig: Take 20 mg by mouth   glipiZIDE (GLUCOTROL) 10 MG tablet   Yes Yes   Sig: TAKE 1 TABLET BY MOUTH TWICE DAILY BEFORE MEAL(S)   levothyroxine (SYNTHROID/LEVOTHROID) 125 MCG tablet   Yes No   Sig: TAKE 1 TABLET BY MOUTH ONCE DAILY BEFORE BREAKFAST   linagliptin (TRADJENTA) 5 MG TABS tablet   Yes Yes   Sig: Take 5 mg by mouth   losartan (COZAAR) 50 MG tablet   Yes Yes   Sig: Take 1 tablet by mouth 2 times daily   metFORMIN (GLUCOPHAGE XR) 500 MG 24 hr tablet   Yes Yes   Sig: Take 1,000 mg by mouth 2 times daily (with meals)   oxybutynin ER (DITROPAN XL) 10 MG 24 hr tablet   Yes No   Sig: Take 20 mg by mouth daily   pravastatin (PRAVACHOL) 80 MG tablet   Yes Yes   Sig: Take by mouth every 24 hours   rosuvastatin (CRESTOR) 20 MG tablet   Yes No   Sig: Take 20 mg by mouth At Bedtime   rotigotine (NEUPRO) 2 MG/24HR 24 hr patch   Yes Yes   Sig: Place 1 patch onto the skin   venlafaxine (EFFEXOR XR) 75 MG 24 hr capsule   Yes Yes   Sig: Take 75 mg by mouth      Facility-Administered Medications: None     Allergies   Allergies   Allergen Reactions     Macrodantin [Nitrofurantoin] Unknown       Physical Exam   Vital Signs: Temp: 97.8  F (36.6  C) Temp src: Temporal BP: (!) 145/65 Pulse: 81   Resp: 21 SpO2: 96 % O2 Device: None (Room air)    Weight: 220 lbs 0 oz    Physical Exam  Constitutional:       Appearance: Normal appearance. She  is obese. She is not ill-appearing.   HENT:      Head: Normocephalic and atraumatic.      Right Ear: Tympanic membrane normal.      Left Ear: Tympanic membrane normal.      Nose: Nose normal.      Mouth/Throat:      Mouth: Mucous membranes are moist.   Eyes:      Extraocular Movements: Extraocular movements intact.      Conjunctiva/sclera: Conjunctivae normal.      Pupils: Pupils are equal, round, and reactive to light.   Cardiovascular:      Rate and Rhythm: Normal rate and regular rhythm.      Pulses: Normal pulses.      Heart sounds: Normal heart sounds. No murmur heard.  Pulmonary:      Effort: Pulmonary effort is normal.      Breath sounds: Normal breath sounds.   Abdominal:      General: Bowel sounds are normal. There is distension.      Palpations: Abdomen is soft. There is no mass.      Tenderness: There is abdominal tenderness. There is no rebound.      Comments: Tender  LLQ , mild tenderness RLQ   Musculoskeletal:         General: No swelling or tenderness. Normal range of motion.      Cervical back: Normal range of motion and neck supple. No tenderness.   Skin:     General: Skin is warm and dry.      Capillary Refill: Capillary refill takes less than 2 seconds.      Coloration: Skin is not pale.   Neurological:      General: No focal deficit present.      Mental Status: She is alert and oriented to person, place, and time. Mental status is at baseline.   Psychiatric:         Mood and Affect: Mood normal.         Behavior: Behavior normal.            Data   Data reviewed today: I reviewed all medications, new labs and imaging results over the last 24 hours. I personally reviewed the chest CT image(s) showing colitis.    Recent Labs   Lab 11/26/22  1810   WBC 19.3*   HGB 12.0   MCV 95         POTASSIUM 4.0   CHLORIDE 98   CO2 24   BUN 24.8*   CR 0.88   ANIONGAP 14   ALTON 9.8   *   ALBUMIN 4.1   PROTTOTAL 7.6   BILITOTAL 0.7   ALKPHOS 79   ALT 14   AST 18   LIPASE 32     Recent Results  (from the past 24 hour(s))   CTA Chest Abdomen Pelvis w Contrast    Narrative    EXAM: CTA CHEST ABDOMEN PELVIS W CONTRAST  LOCATION: [facility]  DATE/TIME: 11/26/2022 6:59 PM    INDICATION: chest pain, abd pain, pain in upper back  COMPARISON: CT chest 09/09/2020  TECHNIQUE: CT angiogram chest abdomen pelvis during arterial phase of injection of IV contrast. 2D and 3D MIP reconstructions were performed by the CT technologist. Dose reduction techniques were used.   CONTRAST: isovue 370 100ml    FINDINGS:   CT ANGIOGRAM CHEST, ABDOMEN, AND PELVIS: No thoracic aortic dissection. Thoracic aorta normal in caliber. Mild scattered atherosclerotic calcification. Normal three-vessel aortic arch. No pulmonary embolism. Pulmonary arteries are mildly dilated.    Abdominal aorta normal in caliber. No abdominal aortic dissection. Celiac, superior mesenteric, bilateral renal, and inferior mesenteric arteries are patent. There is diffuse atherosclerotic calcification, but no stenosis. Bilateral common, internal,   external, and common femoral arteries are normal in caliber and widely patent.    LUNGS AND PLEURA: No pulmonary mass, consolidation, or suspicious pulmonary nodule. Mild emphysema. Small fat-containing left posterior diaphragmatic hernia incidentally noted. No pleural effusion or pneumothorax.    MEDIASTINUM/AXILLAE: No abnormally enlarged lymph nodes. Cardiac chambers unremarkable. No pericardial effusion.    CORONARY ARTERY CALCIFICATION: Severe.    HEPATOBILIARY: Moderate hepatomegaly. Prior cholecystectomy. No suspicious liver lesion.    PANCREAS: Normal.    SPLEEN: Normal.    ADRENAL GLANDS: Normal.    KIDNEYS/BLADDER: Normal.    BOWEL: Abnormal appearance of the sigmoid colon, with moderate wall thickening and prominent pericolonic inflammatory change. The distal sigmoid and rectum are relatively collapsed. Colonic transition point is suggested on sagittal image 97. There are a   few  scattered distal colonic diverticula. Large amount of stool in the sigmoid colon. No free air or significant free fluid. No obstruction.     LYMPH NODES: Normal.    PELVIC ORGANS: Prior hysterectomy.    MUSCULOSKELETAL: Severe degenerative change in the lumbar spine.      Impression    IMPRESSION:  1.  No thoracic or abdominal aortic dissection.  2.  Distal colitis, which may be infectious or inflammatory. Possible distal colonic stricture. Consider follow-up GI consultation.         Axel Fournier MD on 11/26/2022 at 8:23 PM

## 2022-11-27 NOTE — PLAN OF CARE
"Abdominal pain \"about the same\" as at home - rates it 3-4 with movement.  No BM- thinks it has been at least 4 days.  Has been refusing to get up to commode- states it is hard to get on and off the cart.  Complaining that she is hungry.  Family notified no visitors for COVID positive patients. Standard falls and pressure ulcer prevention plans in place. Claudia Cobian RN      Problem: Pain Acute  Goal: Optimal Pain Control and Function  Outcome: Progressing     Problem: Constipation  Goal: Effective Bowel Elimination  Outcome: Progressing     "

## 2022-11-28 ASSESSMENT — ACTIVITIES OF DAILY LIVING (ADL): ADLS_ACUITY_SCORE: 35

## 2023-01-25 ENCOUNTER — APPOINTMENT (OUTPATIENT)
Dept: CT IMAGING | Facility: HOSPITAL | Age: 78
End: 2023-01-25
Attending: EMERGENCY MEDICINE
Payer: COMMERCIAL

## 2023-01-25 ENCOUNTER — HOSPITAL ENCOUNTER (EMERGENCY)
Facility: HOSPITAL | Age: 78
Discharge: HOME | End: 2023-01-26
Attending: EMERGENCY MEDICINE | Admitting: EMERGENCY MEDICINE
Payer: COMMERCIAL

## 2023-01-25 DIAGNOSIS — K59.00 CONSTIPATION, UNSPECIFIED CONSTIPATION TYPE: ICD-10-CM

## 2023-01-25 DIAGNOSIS — K52.9 COLITIS: ICD-10-CM

## 2023-01-25 LAB
ALBUMIN SERPL BCG-MCNC: 3.8 G/DL (ref 3.5–5.2)
ALBUMIN UR-MCNC: 70 MG/DL
ALP SERPL-CCNC: 87 U/L (ref 35–104)
ALT SERPL W P-5'-P-CCNC: 11 U/L (ref 10–35)
ANION GAP SERPL CALCULATED.3IONS-SCNC: 13 MMOL/L (ref 7–15)
APPEARANCE UR: CLEAR
AST SERPL W P-5'-P-CCNC: 19 U/L (ref 10–35)
BACTERIA #/AREA URNS HPF: ABNORMAL /HPF
BASOPHILS # BLD AUTO: 0 10E3/UL (ref 0–0.2)
BASOPHILS NFR BLD AUTO: 0 %
BILIRUB SERPL-MCNC: 0.4 MG/DL
BILIRUB UR QL STRIP: NEGATIVE
BUN SERPL-MCNC: 20.8 MG/DL (ref 8–23)
CALCIUM SERPL-MCNC: 9.8 MG/DL (ref 8.8–10.2)
CHLORIDE SERPL-SCNC: 101 MMOL/L (ref 98–107)
COLOR UR AUTO: ABNORMAL
CREAT SERPL-MCNC: 0.8 MG/DL (ref 0.51–0.95)
DEPRECATED HCO3 PLAS-SCNC: 20 MMOL/L (ref 22–29)
EOSINOPHIL # BLD AUTO: 0 10E3/UL (ref 0–0.7)
EOSINOPHIL NFR BLD AUTO: 0 %
ERYTHROCYTE [DISTWIDTH] IN BLOOD BY AUTOMATED COUNT: 14.1 % (ref 10–15)
GFR SERPL CREATININE-BSD FRML MDRD: 76 ML/MIN/1.73M2
GLUCOSE SERPL-MCNC: 165 MG/DL (ref 70–99)
GLUCOSE UR STRIP-MCNC: >1000 MG/DL
HCT VFR BLD AUTO: 36.1 % (ref 35–47)
HGB BLD-MCNC: 11.3 G/DL (ref 11.7–15.7)
HGB UR QL STRIP: NEGATIVE
HOLD SPECIMEN: NORMAL
IMM GRANULOCYTES # BLD: 0.1 10E3/UL
IMM GRANULOCYTES NFR BLD: 0 %
KETONES UR STRIP-MCNC: 10 MG/DL
LACTATE SERPL-SCNC: 0.8 MMOL/L (ref 0.7–2)
LEUKOCYTE ESTERASE UR QL STRIP: NEGATIVE
LIPASE SERPL-CCNC: 22 U/L (ref 13–60)
LYMPHOCYTES # BLD AUTO: 0.9 10E3/UL (ref 0.8–5.3)
LYMPHOCYTES NFR BLD AUTO: 6 %
MCH RBC QN AUTO: 29.2 PG (ref 26.5–33)
MCHC RBC AUTO-ENTMCNC: 31.3 G/DL (ref 31.5–36.5)
MCV RBC AUTO: 93 FL (ref 78–100)
MONOCYTES # BLD AUTO: 1.1 10E3/UL (ref 0–1.3)
MONOCYTES NFR BLD AUTO: 8 %
MUCOUS THREADS #/AREA URNS LPF: PRESENT /LPF
NEUTROPHILS # BLD AUTO: 12 10E3/UL (ref 1.6–8.3)
NEUTROPHILS NFR BLD AUTO: 86 %
NITRATE UR QL: NEGATIVE
NRBC # BLD AUTO: 0 10E3/UL
NRBC BLD AUTO-RTO: 0 /100
PH UR STRIP: 5.5 [PH] (ref 5–7)
PLATELET # BLD AUTO: 326 10E3/UL (ref 150–450)
POTASSIUM SERPL-SCNC: 3.7 MMOL/L (ref 3.4–5.3)
PROT SERPL-MCNC: 7.3 G/DL (ref 6.4–8.3)
RBC # BLD AUTO: 3.87 10E6/UL (ref 3.8–5.2)
RBC URINE: 1 /HPF
SODIUM SERPL-SCNC: 134 MMOL/L (ref 136–145)
SP GR UR STRIP: 1.03 (ref 1–1.03)
SQUAMOUS EPITHELIAL: 2 /HPF
TRANSITIONAL EPI: <1 /HPF
UROBILINOGEN UR STRIP-MCNC: <2 MG/DL
WBC # BLD AUTO: 14.1 10E3/UL (ref 4–11)
WBC URINE: 1 /HPF

## 2023-01-25 PROCEDURE — 96361 HYDRATE IV INFUSION ADD-ON: CPT

## 2023-01-25 PROCEDURE — 85004 AUTOMATED DIFF WBC COUNT: CPT | Performed by: EMERGENCY MEDICINE

## 2023-01-25 PROCEDURE — 99285 EMERGENCY DEPT VISIT HI MDM: CPT | Mod: 25

## 2023-01-25 PROCEDURE — 36415 COLL VENOUS BLD VENIPUNCTURE: CPT | Performed by: EMERGENCY MEDICINE

## 2023-01-25 PROCEDURE — 96374 THER/PROPH/DIAG INJ IV PUSH: CPT | Mod: 59

## 2023-01-25 PROCEDURE — 83605 ASSAY OF LACTIC ACID: CPT | Performed by: EMERGENCY MEDICINE

## 2023-01-25 PROCEDURE — 80053 COMPREHEN METABOLIC PANEL: CPT | Performed by: EMERGENCY MEDICINE

## 2023-01-25 PROCEDURE — 83690 ASSAY OF LIPASE: CPT | Performed by: EMERGENCY MEDICINE

## 2023-01-25 PROCEDURE — 96375 TX/PRO/DX INJ NEW DRUG ADDON: CPT

## 2023-01-25 PROCEDURE — 250N000011 HC RX IP 250 OP 636: Performed by: EMERGENCY MEDICINE

## 2023-01-25 PROCEDURE — 258N000003 HC RX IP 258 OP 636: Performed by: EMERGENCY MEDICINE

## 2023-01-25 PROCEDURE — 81001 URINALYSIS AUTO W/SCOPE: CPT | Performed by: EMERGENCY MEDICINE

## 2023-01-25 PROCEDURE — 74177 CT ABD & PELVIS W/CONTRAST: CPT

## 2023-01-25 RX ORDER — ONDANSETRON 2 MG/ML
4 INJECTION INTRAMUSCULAR; INTRAVENOUS ONCE
Status: COMPLETED | OUTPATIENT
Start: 2023-01-25 | End: 2023-01-25

## 2023-01-25 RX ORDER — SODIUM CHLORIDE 9 MG/ML
INJECTION, SOLUTION INTRAVENOUS CONTINUOUS
Status: DISCONTINUED | OUTPATIENT
Start: 2023-01-25 | End: 2023-01-26 | Stop reason: HOSPADM

## 2023-01-25 RX ORDER — HYDROMORPHONE HYDROCHLORIDE 1 MG/ML
0.5 INJECTION, SOLUTION INTRAMUSCULAR; INTRAVENOUS; SUBCUTANEOUS ONCE
Status: COMPLETED | OUTPATIENT
Start: 2023-01-25 | End: 2023-01-25

## 2023-01-25 RX ORDER — IOPAMIDOL 755 MG/ML
100 INJECTION, SOLUTION INTRAVASCULAR ONCE
Status: COMPLETED | OUTPATIENT
Start: 2023-01-25 | End: 2023-01-25

## 2023-01-25 RX ADMIN — IOPAMIDOL 100 ML: 755 INJECTION, SOLUTION INTRAVENOUS at 23:21

## 2023-01-25 RX ADMIN — ONDANSETRON 4 MG: 2 INJECTION INTRAMUSCULAR; INTRAVENOUS at 22:30

## 2023-01-25 RX ADMIN — HYDROMORPHONE HYDROCHLORIDE 0.5 MG: 1 INJECTION, SOLUTION INTRAMUSCULAR; INTRAVENOUS; SUBCUTANEOUS at 22:31

## 2023-01-25 RX ADMIN — SODIUM CHLORIDE 1000 ML: 9 INJECTION, SOLUTION INTRAVENOUS at 21:30

## 2023-01-25 ASSESSMENT — ACTIVITIES OF DAILY LIVING (ADL): ADLS_ACUITY_SCORE: 35

## 2023-01-26 VITALS
DIASTOLIC BLOOD PRESSURE: 56 MMHG | WEIGHT: 220 LBS | SYSTOLIC BLOOD PRESSURE: 117 MMHG | BODY MASS INDEX: 36.65 KG/M2 | HEIGHT: 65 IN | RESPIRATION RATE: 16 BRPM | OXYGEN SATURATION: 91 % | HEART RATE: 84 BPM | TEMPERATURE: 97.6 F

## 2023-01-26 PROCEDURE — 96375 TX/PRO/DX INJ NEW DRUG ADDON: CPT

## 2023-01-26 PROCEDURE — 250N000011 HC RX IP 250 OP 636: Performed by: EMERGENCY MEDICINE

## 2023-01-26 RX ORDER — KETOROLAC TROMETHAMINE 15 MG/ML
15 INJECTION, SOLUTION INTRAMUSCULAR; INTRAVENOUS ONCE
Status: COMPLETED | OUTPATIENT
Start: 2023-01-26 | End: 2023-01-26

## 2023-01-26 RX ORDER — DOCUSATE SODIUM 100 MG/1
100 CAPSULE, LIQUID FILLED ORAL 2 TIMES DAILY
Qty: 30 CAPSULE | Refills: 0 | Status: SHIPPED | OUTPATIENT
Start: 2023-01-26

## 2023-01-26 RX ADMIN — KETOROLAC TROMETHAMINE 15 MG: 15 INJECTION, SOLUTION INTRAMUSCULAR; INTRAVENOUS at 01:02

## 2023-01-26 ASSESSMENT — ACTIVITIES OF DAILY LIVING (ADL): ADLS_ACUITY_SCORE: 35

## 2023-01-26 NOTE — DISCHARGE INSTRUCTIONS
Follow-up with your primary care provider for reevaluation.  Also should follow-up with a GI physician to have colonoscopy to evaluate for possible stricture.    Take 1 capful of MiraLAX daily.  Can also use docusate as prescribed as a stool softener.  Can also take Metamucil.    Return to the ER for any new or worsening concerns.

## 2023-01-26 NOTE — ED NOTES
"Waiting room comments: Pt endorsing that she has cramping abdominal pain 6/10. W/ palpation to abdomen pt very tender. Abdomen appears distended but endorses that this is normal for here. Endorses Some mild constipation. Pt endorsing decreased intake of food and water \"somewhat\" less than normal. Pt vague in descriptors and symptoms and history. Appears uncomfortable  "

## 2023-01-26 NOTE — ED TRIAGE NOTES
Pt comes in with mid to lower abdominal pain. Pt has not had a normal bowel movement for a couple days. Only small partial BM's.  Denies nausea and fevers. Pt last ibuprofen and tylenol appox 3 hours ago.

## 2023-01-26 NOTE — ED PROVIDER NOTES
EMERGENCY DEPARTMENT ENCOUNTER      NAME: Suzy Gómez  AGE: 77 year old female  YOB: 1945  MRN: [id number]  EVALUATION DATE & TIME: 1/25/2023 10:02 PM    PCP: Ko, [facility]    ED PROVIDER: Tiffany Brar MD      Chief Complaint   Patient presents with     Abdominal Pain         FINAL IMPRESSION:  1. Colitis    2. Constipation, unspecified constipation type          ED COURSE & MEDICAL DECISION MAKING:    Pertinent Labs & Imaging studies reviewed. (See chart for details)    10:11 PM I introduced myself to the patient, obtained patient history, performed a physical exam, and discussed plan for ED workup including potential diagnostic laboratory/imaging studies and interventions.  1:20 AM Patient ready for discharge.    77 year old female presents to the Emergency Department for evaluation of lower abdominal pain.  Patient was seen here on 26 November with similar symptoms and was found to have nonspecific colitis in the sigmoid colon at that time as well as a COVID-19 infection.  She had been admitted however left AMA after having a large bowel movement and feeling improved.  It sounds as though she has been having intermittent symptoms similar to current over many months.  She has had issues with constipation.  She did try MiraLAX and has now had small loose bowel movements.  No watery diarrhea.  On exam, she has tenderness more so on the left lower quadrant but with some mild tenderness in the right lower quadrant.  She may be slightly distended however she has an obese abdomen.  No signs of peritonitis.  Vital signs are within normal limits and she is afebrile.  Differential diagnosis includes but is not limited to constipation, bowel obstruction, colitis, diverticulitis, appendicitis unlikely as the patient states she had this removed when she was 7, mesenteric ischemia, UTI, perforation, etc.  She has not had any vomiting.  Will order CT of the abdomen pelvis with  contrast for further evaluation.  Also ordered laboratory studies and urinalysis.  She was given IV Dilaudid with associated IV Zofran to prevent secondary nausea from the pain medication.  After this she did develop some hypoxia to 89% as she was now resting comfortably and sleeping.  She was thus placed on nasal cannula oxygen with improvement.  With her body habitus do question the potential of sleep apnea as well and per chart does have KARYN.  Do feel that this is likely related to the pain medication and her KARYN as she has no chest pain or shortness of breath or cough or fever.  Thus did not feel that this required further imaging of the chest at this time.  She was given IV fluids as well.  She was also later given IV Toradol.    CMP is largely unremarkable.  Creatinine and liver function within normal limits.  CBC does reveal white blood cell count of 14.1.  Hemoglobin 11.3 which is near her baseline and she denies any melena or hematochezia.  White blood cell count was previously 19 during her last visit at the end of November.  This is nonspecific as she is also currently being treated for cellulitis of her left great toe.  I did evaluate the toe and she is missing most of her nail plate which is baseline for her.  She does follow with podiatry.  She is currently on antibiotics.  This is improved per discussion with her  and she only has mild erythema over this without any sign of significant worsening infection currently.  Her white blood cell count may be elevated related to this.  Also possible this has been downtrending since November.  Lipase is within normal limits.  Lactic acid within normal limits.  Her vital signs are within normal limits other than the hypoxia which recovered after the opioid pain medication.  No signs of sepsis at this time.  Urinalysis without evidence of UTI or hematuria.    CT of the abdomen and pelvis reveals sigmoid colitis which is again seen and similar to the prior  CT from the end of November.  She does have collapse of the rectosigmoid junction and rectum distal to the inflamed segment of colon.  Underlying stricture not excluded.  No signs of perforation.  She does have cholelithiasis but no right upper quadrant abdominal tenderness.  She does also have a large amount of colonic stool. Considered C. difficile as a cause of colitis however this is not diffuse and she is not having any watery diarrhea making this less likely.  Also ischemic colitis less likely as lactic acid is within normal limits and she does not have pain out of proportion to exam.    Did reevaluate the patient and she states she is feeling much improved.  We discussed the lab studies and imaging results. In discussion about the results, we discussed admission for further management and GI consultation as we discussed the concern for possible stricture as the cause of her ongoing issues.  She has not had a colonoscopy in 5 years and had a polyp removed at that time.  Patient stated she would very much like to go home and follow-up with GI as an outpatient.  Did discuss the significant importance of close follow-up with her primary care provider as well as the GI doctors.  Did place a referral for her.  We also had a discussion about indications for return to the emergency department with the patient and her  including return or worsening of the abdominal pain, lack of bowel movement, blood in the stool or black tarry stools, fever, vomiting, or any new or worsening concerns. Discussed it was very important she return immediately to the ER if any of these occur. Oxygen had been removed and she is satting at 96% on room air.  She otherwise remained vitally stable.  Again at this time she is requesting discharge.  Discussed the importance of good bowel regimen for the constipation issues as well.  She was prescribed Colace and also advised to continue the MiraLAX they have at home.  Patient and her   were in agreement with this plan.  She was discharged per her request in stable condition.       At the conclusion of the encounter I discussed the results of all of the tests and the disposition. The questions were answered. The patient or family acknowledged understanding and was agreeable with the care plan.         Medical Decision Making    History:    Supplemental history from: Documented in chart, if applicable and Family Member/Significant Other    External Record(s) reviewed: Documented in chart, if applicable.    Work Up:    Chart documentation includes differential considered and any EKGs or imaging independently interpreted by provider.    In additional to work up documented, I considered the following work up: Documented in chart, if applicable.    External consultation:    Discussion of management with another provider: Documented in chart, if applicable    Complicating factors:    Care impacted by chronic illness: Diabetes, Heart Disease, Hyperlipidemia and Hypertension    Care affected by social determinants of health: N/A    Disposition considerations: Discharge. I prescribed additional prescription strength medication(s) as charted. See documentation for any additional details.      MEDICATIONS GIVEN IN THE EMERGENCY:  Medications   sodium chloride (PF) 0.9% PF flush 3 mL (has no administration in time range)   0.9% sodium chloride BOLUS (0 mLs Intravenous Stopped 1/26/23 0002)     Followed by   sodium chloride 0.9% infusion ( Intravenous Not Given 1/26/23 0103)   HYDROmorphone (PF) (DILAUDID) injection 0.5 mg (0.5 mg Intravenous Given 1/25/23 2231)   ondansetron (ZOFRAN) injection 4 mg (4 mg Intravenous Given 1/25/23 2230)   iopamidol (ISOVUE-370) solution 100 mL (100 mLs Intravenous Given 1/25/23 2321)   ketorolac (TORADOL) injection 15 mg (15 mg Intravenous Given 1/26/23 0102)       NEW PRESCRIPTIONS STARTED AT TODAY'S ER VISIT  New Prescriptions    DOCUSATE SODIUM (COLACE) 100 MG  CAPSULE    Take 1 capsule (100 mg) by mouth 2 times daily          =================================================================    HPI    Patient information was obtained from: Patient    Use of : N/A          Suzy Gómez is a 77 year old female with a pertinent history of GERD, HTN, hyperlipidemia, type 2 DM, non specific colitis who presents to this ED for evaluation of abdominal pain.    Patient reports abdominal pain starting 4 days that was initially mild and then progressively worsened into a cramping 6/10 pain today. Pain is across her lower abdomen and is worse with movement. Patient denies experiencing pain like this before however was then reminded by her  that she was seen here a few months ago for similar symptoms and she then states this has occurred intermittently for months. She denies a history of kidney stones and is uncertain about a prior bladder infection. She denies any urinary complaints, chest pain, shortness of breath, cough, fever, nausea, or vomiting. Patient notes some chills and has been having loose stool fairly often she states. She denies a prior bowel obstruction, and her only abdominal surgery was an appendectomy when she was 7.  notes the patient was here for something similar 2-3 months ago, she had a big bowel movement and then asked to be discharged because she felt better. He endorses giving the patient miralax yesterday and tylenol.  also notes the patient has been on antibiotics for three days for cellulitis in her left foot. This is improving per . No numbness, tingling, weakness. No melena or hematochezia. She did have a loose small bowel movement today. Bowel movements aren't watery. No other current complaints. She has not seen a GI doctor recently but had a colonoscopy 5 years ago with polyp removed per . She has had issues with constipation in the past.     Per record review, patient received a course of keflex  initially for left great toe infection and has now been on doxycycline course for the past 3 days. She also was seen in the ER on 23 with similar presentation and was found to have non specific sigmoid colitis and COVID 19 infection with hypoxia and was admitted for further management. At that time, she left AMA after having a large bowel movement and feeling improved.           REVIEW OF SYSTEMS   Review of Systems      Pertinent positives and negatives are documented in the HPI. All other systems reviewed and are negative.      PAST MEDICAL HISTORY:  Past Medical History:   Diagnosis Date     Diabetes (H)      Dyslipidemia      Heart disease      Hypertension      KARYN (obstructive sleep apnea)      Thyroid disease        PAST SURGICAL HISTORY:  No past surgical history on file.        CURRENT MEDICATIONS:    docusate sodium (COLACE) 100 MG capsule  amLODIPine (NORVASC) 2.5 MG tablet  aspirin (ASA) 81 MG EC tablet  furosemide (LASIX) 20 MG tablet  glipiZIDE (GLUCOTROL) 10 MG tablet  ibuprofen (ADVIL/MOTRIN) 200 MG capsule  JARDIANCE 25 MG TABS tablet  levothyroxine (SYNTHROID/LEVOTHROID) 125 MCG tablet  linagliptin (TRADJENTA) 5 MG TABS tablet  losartan (COZAAR) 50 MG tablet  metFORMIN (GLUCOPHAGE XR) 500 MG 24 hr tablet  oxybutynin ER (DITROPAN XL) 10 MG 24 hr tablet  rosuvastatin (CRESTOR) 20 MG tablet  venlafaxine (EFFEXOR XR) 75 MG 24 hr capsule        ALLERGIES:  Allergies   Allergen Reactions     Macrodantin [Nitrofurantoin] Unknown     Pt could not recall       FAMILY HISTORY:  No family history on file.    SOCIAL HISTORY:   Social History     Socioeconomic History     Marital status:    Tobacco Use     Smoking status: Former     Types: Cigarettes     Quit date:      Years since quittin.0     Smokeless tobacco: Never   Vaping Use     Vaping Use: Never used   Substance and Sexual Activity     Alcohol use: Not Currently     Drug use: Never       VITALS:  /56   Pulse 84   Temp 97.6  " F (36.4  C) (Temporal)   Resp 16   Ht 1.651 m (5' 5\")   Wt 99.8 kg (220 lb)   SpO2 91%   BMI 36.61 kg/m      PHYSICAL EXAM    Physical Exam  Vitals reviewed.   Constitutional:       General: She is not in acute distress.     Appearance: She is well-developed.   HENT:      Head: Normocephalic and atraumatic.   Eyes:      Extraocular Movements: Extraocular movements intact.      Conjunctiva/sclera: Conjunctivae normal.      Pupils: Pupils are equal, round, and reactive to light.   Cardiovascular:      Rate and Rhythm: Normal rate and regular rhythm.      Pulses: Normal pulses.      Heart sounds: Normal heart sounds. No murmur heard.  Pulmonary:      Effort: Pulmonary effort is normal. No respiratory distress.      Breath sounds: Normal breath sounds. No wheezing or rales.   Abdominal:      General: Bowel sounds are normal.      Palpations: Abdomen is soft. There is no mass.      Tenderness: There is abdominal tenderness (LLQ, mild in the RLQ). There is no right CVA tenderness, left CVA tenderness, guarding or rebound.      Comments: Slight distention noted, patient reports this is baseline for her. Obese abdomen   Musculoskeletal:         General: Normal range of motion.      Cervical back: Normal range of motion and neck supple. No rigidity.      Comments: Left great toe with nail plate mostly absent. Mild erythema of the distal left great toe ( reports this is improve). No tenderness of the left great toe to palpation and normal range of motion. No induration or fluctuance. No wound. No drainage.    Skin:     General: Skin is warm and dry.      Capillary Refill: Capillary refill takes less than 2 seconds.      Findings: No rash.   Neurological:      General: No focal deficit present.      Mental Status: She is alert and oriented to person, place, and time.      GCS: GCS eye subscore is 4. GCS verbal subscore is 5. GCS motor subscore is 6.      Cranial Nerves: No cranial nerve deficit.      Sensory: No " sensory deficit.      Motor: No weakness.   Psychiatric:         Mood and Affect: Mood normal.            LAB:  All pertinent labs reviewed and interpreted.  Results for orders placed or performed during the hospital encounter of 01/25/23   CT Abdomen Pelvis w Contrast    Impression    IMPRESSION:   1.  Sigmoid colitis is again seen. Collapse of the rectosigmoid junction and rectum distal to the inflamed segment of colon. Underlying stricture not excluded.  2.  Small amount of adjacent free fluid.  3.  Cholelithiasis.   Extra Green Top (Lithium Heparin) Tube   Result Value Ref Range    Hold Specimen JIC    Extra Purple Top Tube   Result Value Ref Range    Hold Specimen JIC    Extra Green Top (Lithium Heparin) ON ICE   Result Value Ref Range    Hold Specimen JIC    UA with Microscopic reflex to Culture    Specimen: Urine, Clean Catch   Result Value Ref Range    Color Urine Light Yellow Colorless, Straw, Light Yellow, Yellow    Appearance Urine Clear Clear    Glucose Urine >1000 (A) Negative mg/dL    Bilirubin Urine Negative Negative    Ketones Urine 10 (A) Negative mg/dL    Specific Gravity Urine 1.027 1.001 - 1.030    Blood Urine Negative Negative    pH Urine 5.5 5.0 - 7.0    Protein Albumin Urine 70 (A) Negative mg/dL    Urobilinogen Urine <2.0 <2.0 mg/dL    Nitrite Urine Negative Negative    Leukocyte Esterase Urine Negative Negative    Bacteria Urine Few (A) None Seen /HPF    Mucus Urine Present (A) None Seen /LPF    RBC Urine 1 <=2 /HPF    WBC Urine 1 <=5 /HPF    Squamous Epithelials Urine 2 (H) <=1 /HPF    Transitional Epithelials Urine <1 <=1 /HPF   Comprehensive metabolic panel   Result Value Ref Range    Sodium 134 (L) 136 - 145 mmol/L    Potassium 3.7 3.4 - 5.3 mmol/L    Chloride 101 98 - 107 mmol/L    Carbon Dioxide (CO2) 20 (L) 22 - 29 mmol/L    Anion Gap 13 7 - 15 mmol/L    Urea Nitrogen 20.8 8.0 - 23.0 mg/dL    Creatinine 0.80 0.51 - 0.95 mg/dL    Calcium 9.8 8.8 - 10.2 mg/dL    Glucose 165 (H) 70 - 99  mg/dL    Alkaline Phosphatase 87 35 - 104 U/L    AST 19 10 - 35 U/L    ALT 11 10 - 35 U/L    Protein Total 7.3 6.4 - 8.3 g/dL    Albumin 3.8 3.5 - 5.2 g/dL    Bilirubin Total 0.4 <=1.2 mg/dL    GFR Estimate 76 >60 mL/min/1.73m2   Result Value Ref Range    Lipase 22 13 - 60 U/L   Lactic acid whole blood   Result Value Ref Range    Lactic Acid 0.8 0.7 - 2.0 mmol/L   CBC with platelets and differential   Result Value Ref Range    WBC Count 14.1 (H) 4.0 - 11.0 10e3/uL    RBC Count 3.87 3.80 - 5.20 10e6/uL    Hemoglobin 11.3 (L) 11.7 - 15.7 g/dL    Hematocrit 36.1 35.0 - 47.0 %    MCV 93 78 - 100 fL    MCH 29.2 26.5 - 33.0 pg    MCHC 31.3 (L) 31.5 - 36.5 g/dL    RDW 14.1 10.0 - 15.0 %    Platelet Count 326 150 - 450 10e3/uL    % Neutrophils 86 %    % Lymphocytes 6 %    % Monocytes 8 %    % Eosinophils 0 %    % Basophils 0 %    % Immature Granulocytes 0 %    NRBCs per 100 WBC 0 <1 /100    Absolute Neutrophils 12.0 (H) 1.6 - 8.3 10e3/uL    Absolute Lymphocytes 0.9 0.8 - 5.3 10e3/uL    Absolute Monocytes 1.1 0.0 - 1.3 10e3/uL    Absolute Eosinophils 0.0 0.0 - 0.7 10e3/uL    Absolute Basophils 0.0 0.0 - 0.2 10e3/uL    Absolute Immature Granulocytes 0.1 <=0.4 10e3/uL    Absolute NRBCs 0.0 10e3/uL       RADIOLOGY:  Reviewed all pertinent imaging. Please see official radiology report.  CT Abdomen Pelvis w Contrast   Final Result   IMPRESSION:    1.  Sigmoid colitis is again seen. Collapse of the rectosigmoid junction and rectum distal to the inflamed segment of colon. Underlying stricture not excluded.   2.  Small amount of adjacent free fluid.   3.  Cholelithiasis.            [facility] Admittor System Documentation:   CMS Diagnoses:               Geneva ARTEAGA, am serving as a scribe to document services personally performed by Tiffany Brar MD based on my observation and the provider's statements to me. I, Tiffany Brar MD, attest that Geneva Grullon is acting in a scribe capacity, has observed my performance of the  services and has documented them in accordance with my direction.    Tiffany Brar MD  [facility] EMERGENCY DEPARTMENT  [facility] [facility] [address] [phone]     Tiffany Brar MD  02/23/23 [id number]

## 2023-01-29 ENCOUNTER — APPOINTMENT (OUTPATIENT)
Dept: CT IMAGING | Facility: HOSPITAL | Age: 78
DRG: 853 | End: 2023-01-29
Attending: FAMILY MEDICINE
Payer: COMMERCIAL

## 2023-01-29 ENCOUNTER — HOSPITAL ENCOUNTER (INPATIENT)
Facility: HOSPITAL | Age: 78
Setting detail: SURGERY ADMIT
End: 2023-01-29
Attending: SURGERY | Admitting: SURGERY
Payer: COMMERCIAL

## 2023-01-29 ENCOUNTER — ANESTHESIA EVENT (OUTPATIENT)
Dept: SURGERY | Facility: HOSPITAL | Age: 78
DRG: 853 | End: 2023-01-29
Payer: COMMERCIAL

## 2023-01-29 ENCOUNTER — HOSPITAL ENCOUNTER (INPATIENT)
Facility: HOSPITAL | Age: 78
LOS: 20 days | Discharge: NURSING, REHAB OR LONG-TERM CARE | DRG: 853 | End: 2023-02-18
Attending: FAMILY MEDICINE | Admitting: INTERNAL MEDICINE
Payer: COMMERCIAL

## 2023-01-29 ENCOUNTER — APPOINTMENT (OUTPATIENT)
Dept: CT IMAGING | Facility: HOSPITAL | Age: 78
DRG: 853 | End: 2023-01-29
Attending: EMERGENCY MEDICINE
Payer: COMMERCIAL

## 2023-01-29 ENCOUNTER — ANESTHESIA (OUTPATIENT)
Dept: SURGERY | Facility: HOSPITAL | Age: 78
DRG: 853 | End: 2023-01-29
Payer: COMMERCIAL

## 2023-01-29 DIAGNOSIS — K63.1 PERFORATION OF COLON (H): ICD-10-CM

## 2023-01-29 DIAGNOSIS — F32.A DEPRESSION, UNSPECIFIED DEPRESSION TYPE: Primary | ICD-10-CM

## 2023-01-29 DIAGNOSIS — G47.33 OSA (OBSTRUCTIVE SLEEP APNEA): ICD-10-CM

## 2023-01-29 PROBLEM — E66.01 OBESITY, MORBID (H): Status: ACTIVE | Noted: 2022-11-23

## 2023-01-29 PROBLEM — K21.9 GERD (GASTROESOPHAGEAL REFLUX DISEASE): Status: ACTIVE | Noted: 2023-01-29

## 2023-01-29 PROBLEM — D12.6 COLON ADENOMA: Status: ACTIVE | Noted: 2021-06-10

## 2023-01-29 PROBLEM — E21.3 HYPERPARATHYROIDISM (H): Status: ACTIVE | Noted: 2021-04-08

## 2023-01-29 PROBLEM — E83.52 HYPERCALCEMIA: Status: ACTIVE | Noted: 2020-11-02

## 2023-01-29 PROBLEM — K55.20 ANGIODYSPLASIA OF COLON: Status: ACTIVE | Noted: 2021-06-08

## 2023-01-29 PROBLEM — R80.9 MICROALBUMINURIA: Status: ACTIVE | Noted: 2022-08-25

## 2023-01-29 PROBLEM — R80.9 TYPE 2 DIABETES MELLITUS WITH MICROALBUMINURIA, WITHOUT LONG-TERM CURRENT USE OF INSULIN (H): Status: ACTIVE | Noted: 2023-01-29

## 2023-01-29 PROBLEM — R89.9 ABNORMAL LABORATORY TEST RESULT: Status: ACTIVE | Noted: 2020-11-03

## 2023-01-29 PROBLEM — E11.29 TYPE 2 DIABETES MELLITUS WITH MICROALBUMINURIA, WITHOUT LONG-TERM CURRENT USE OF INSULIN (H): Status: ACTIVE | Noted: 2023-01-29

## 2023-01-29 PROBLEM — E04.2 MULTINODULAR GOITER: Status: ACTIVE | Noted: 2023-01-29

## 2023-01-29 PROBLEM — H26.9 CATARACT: Status: ACTIVE | Noted: 2023-01-29

## 2023-01-29 PROBLEM — E78.5 HYPERLIPIDEMIA: Status: ACTIVE | Noted: 2023-01-29

## 2023-01-29 PROBLEM — E89.0 POSTABLATIVE HYPOTHYROIDISM: Status: ACTIVE | Noted: 2023-01-29

## 2023-01-29 PROBLEM — I10 HTN (HYPERTENSION): Status: ACTIVE | Noted: 2023-01-29

## 2023-01-29 PROBLEM — M19.90 OA (OSTEOARTHRITIS): Status: ACTIVE | Noted: 2023-01-29

## 2023-01-29 LAB
ALBUMIN SERPL BCG-MCNC: 3.3 G/DL (ref 3.5–5.2)
ALP SERPL-CCNC: 145 U/L (ref 35–104)
ALT SERPL W P-5'-P-CCNC: 18 U/L (ref 10–35)
ANION GAP SERPL CALCULATED.3IONS-SCNC: 14 MMOL/L (ref 7–15)
AST SERPL W P-5'-P-CCNC: 28 U/L (ref 10–35)
B-OH-BUTYR SERPL-MCNC: 0.7 MMOL/L
BASOPHILS # BLD AUTO: 0 10E3/UL (ref 0–0.2)
BASOPHILS NFR BLD AUTO: 0 %
BILIRUB SERPL-MCNC: 0.3 MG/DL
BUN SERPL-MCNC: 19.1 MG/DL (ref 8–23)
CALCIUM SERPL-MCNC: 9.7 MG/DL (ref 8.8–10.2)
CHLORIDE SERPL-SCNC: 103 MMOL/L (ref 98–107)
CREAT SERPL-MCNC: 0.75 MG/DL (ref 0.51–0.95)
CREAT SERPL-MCNC: 0.87 MG/DL (ref 0.51–0.95)
DEPRECATED HCO3 PLAS-SCNC: 19 MMOL/L (ref 22–29)
EOSINOPHIL # BLD AUTO: 0 10E3/UL (ref 0–0.7)
EOSINOPHIL NFR BLD AUTO: 0 %
ERYTHROCYTE [DISTWIDTH] IN BLOOD BY AUTOMATED COUNT: 14.1 % (ref 10–15)
FLUAV RNA SPEC QL NAA+PROBE: NEGATIVE
FLUBV RNA RESP QL NAA+PROBE: NEGATIVE
GFR SERPL CREATININE-BSD FRML MDRD: 69 ML/MIN/1.73M2
GFR SERPL CREATININE-BSD FRML MDRD: 83 ML/MIN/1.73M2
GLUCOSE BLDC GLUCOMTR-MCNC: 168 MG/DL (ref 70–99)
GLUCOSE BLDC GLUCOMTR-MCNC: 204 MG/DL (ref 70–99)
GLUCOSE BLDC GLUCOMTR-MCNC: 224 MG/DL (ref 70–99)
GLUCOSE SERPL-MCNC: 202 MG/DL (ref 70–99)
HCT VFR BLD AUTO: 37.3 % (ref 35–47)
HGB BLD-MCNC: 11.6 G/DL (ref 11.7–15.7)
HOLD SPECIMEN: NORMAL
IMM GRANULOCYTES # BLD: 0.2 10E3/UL
IMM GRANULOCYTES NFR BLD: 2 %
LACTATE SERPL-SCNC: 1.7 MMOL/L (ref 0.7–2)
LIPASE SERPL-CCNC: 45 U/L (ref 13–60)
LYMPHOCYTES # BLD AUTO: 1.9 10E3/UL (ref 0.8–5.3)
LYMPHOCYTES NFR BLD AUTO: 17 %
MCH RBC QN AUTO: 28.7 PG (ref 26.5–33)
MCHC RBC AUTO-ENTMCNC: 31.1 G/DL (ref 31.5–36.5)
MCV RBC AUTO: 92 FL (ref 78–100)
MONOCYTES # BLD AUTO: 1 10E3/UL (ref 0–1.3)
MONOCYTES NFR BLD AUTO: 9 %
NEUTROPHILS # BLD AUTO: 8.2 10E3/UL (ref 1.6–8.3)
NEUTROPHILS NFR BLD AUTO: 72 %
NRBC # BLD AUTO: 0 10E3/UL
NRBC BLD AUTO-RTO: 0 /100
PLATELET # BLD AUTO: 478 10E3/UL (ref 150–450)
POTASSIUM SERPL-SCNC: 3.8 MMOL/L (ref 3.4–5.3)
PROT SERPL-MCNC: 7 G/DL (ref 6.4–8.3)
RADIOLOGIST FLAGS: ABNORMAL
RBC # BLD AUTO: 4.04 10E6/UL (ref 3.8–5.2)
RSV RNA SPEC NAA+PROBE: NEGATIVE
SARS-COV-2 RNA RESP QL NAA+PROBE: NEGATIVE
SODIUM SERPL-SCNC: 136 MMOL/L (ref 136–145)
WBC # BLD AUTO: 11.1 10E3/UL (ref 4–11)

## 2023-01-29 PROCEDURE — 36415 COLL VENOUS BLD VENIPUNCTURE: CPT | Performed by: SURGERY

## 2023-01-29 PROCEDURE — 83605 ASSAY OF LACTIC ACID: CPT | Performed by: EMERGENCY MEDICINE

## 2023-01-29 PROCEDURE — 250N000011 HC RX IP 250 OP 636: Performed by: NURSE ANESTHETIST, CERTIFIED REGISTERED

## 2023-01-29 PROCEDURE — 258N000003 HC RX IP 258 OP 636: Performed by: SURGERY

## 2023-01-29 PROCEDURE — 710N000009 HC RECOVERY PHASE 1, LEVEL 1, PER MIN: Performed by: SURGERY

## 2023-01-29 PROCEDURE — 250N000013 HC RX MED GY IP 250 OP 250 PS 637: Performed by: EMERGENCY MEDICINE

## 2023-01-29 PROCEDURE — 258N000003 HC RX IP 258 OP 636: Performed by: NURSE ANESTHETIST, CERTIFIED REGISTERED

## 2023-01-29 PROCEDURE — 250N000025 HC SEVOFLURANE, PER MIN: Performed by: SURGERY

## 2023-01-29 PROCEDURE — 999N000141 HC STATISTIC PRE-PROCEDURE NURSING ASSESSMENT: Performed by: SURGERY

## 2023-01-29 PROCEDURE — 99221 1ST HOSP IP/OBS SF/LOW 40: CPT | Mod: 57 | Performed by: NURSE PRACTITIONER

## 2023-01-29 PROCEDURE — 250N000011 HC RX IP 250 OP 636: Performed by: INTERNAL MEDICINE

## 2023-01-29 PROCEDURE — 250N000011 HC RX IP 250 OP 636: Performed by: FAMILY MEDICINE

## 2023-01-29 PROCEDURE — 360N000077 HC SURGERY LEVEL 4, PER MIN: Performed by: SURGERY

## 2023-01-29 PROCEDURE — 250N000011 HC RX IP 250 OP 636: Performed by: EMERGENCY MEDICINE

## 2023-01-29 PROCEDURE — C9803 HOPD COVID-19 SPEC COLLECT: HCPCS

## 2023-01-29 PROCEDURE — 87149 DNA/RNA DIRECT PROBE: CPT | Performed by: EMERGENCY MEDICINE

## 2023-01-29 PROCEDURE — 250N000009 HC RX 250: Performed by: NURSE ANESTHETIST, CERTIFIED REGISTERED

## 2023-01-29 PROCEDURE — 82565 ASSAY OF CREATININE: CPT | Performed by: SURGERY

## 2023-01-29 PROCEDURE — 88307 TISSUE EXAM BY PATHOLOGIST: CPT | Mod: 26 | Performed by: PATHOLOGY

## 2023-01-29 PROCEDURE — 99285 EMERGENCY DEPT VISIT HI MDM: CPT | Mod: 25

## 2023-01-29 PROCEDURE — 370N000017 HC ANESTHESIA TECHNICAL FEE, PER MIN: Performed by: SURGERY

## 2023-01-29 PROCEDURE — 74174 CTA ABD&PLVS W/CONTRAST: CPT

## 2023-01-29 PROCEDURE — 250N000012 HC RX MED GY IP 250 OP 636 PS 637: Performed by: SURGERY

## 2023-01-29 PROCEDURE — 71250 CT THORAX DX C-: CPT

## 2023-01-29 PROCEDURE — 120N000001 HC R&B MED SURG/OB

## 2023-01-29 PROCEDURE — 99222 1ST HOSP IP/OBS MODERATE 55: CPT | Mod: AI | Performed by: INTERNAL MEDICINE

## 2023-01-29 PROCEDURE — 82962 GLUCOSE BLOOD TEST: CPT

## 2023-01-29 PROCEDURE — 80053 COMPREHEN METABOLIC PANEL: CPT | Performed by: EMERGENCY MEDICINE

## 2023-01-29 PROCEDURE — 258N000003 HC RX IP 258 OP 636: Performed by: FAMILY MEDICINE

## 2023-01-29 PROCEDURE — 999N000157 HC STATISTIC RCP TIME EA 10 MIN

## 2023-01-29 PROCEDURE — 272N000001 HC OR GENERAL SUPPLY STERILE: Performed by: SURGERY

## 2023-01-29 PROCEDURE — 85048 AUTOMATED LEUKOCYTE COUNT: CPT | Performed by: EMERGENCY MEDICINE

## 2023-01-29 PROCEDURE — 87637 SARSCOV2&INF A&B&RSV AMP PRB: CPT | Performed by: FAMILY MEDICINE

## 2023-01-29 PROCEDURE — 87040 BLOOD CULTURE FOR BACTERIA: CPT | Performed by: EMERGENCY MEDICINE

## 2023-01-29 PROCEDURE — 250N000011 HC RX IP 250 OP 636: Performed by: ANESTHESIOLOGY

## 2023-01-29 PROCEDURE — 88307 TISSUE EXAM BY PATHOLOGIST: CPT | Mod: TC | Performed by: SURGERY

## 2023-01-29 PROCEDURE — 93005 ELECTROCARDIOGRAM TRACING: CPT | Performed by: FAMILY MEDICINE

## 2023-01-29 PROCEDURE — 36415 COLL VENOUS BLD VENIPUNCTURE: CPT | Performed by: EMERGENCY MEDICINE

## 2023-01-29 PROCEDURE — 250N000011 HC RX IP 250 OP 636: Performed by: SURGERY

## 2023-01-29 PROCEDURE — 82010 KETONE BODYS QUAN: CPT | Performed by: EMERGENCY MEDICINE

## 2023-01-29 PROCEDURE — 44207 L COLECTOMY/COLOPROCTOSTOMY: CPT | Performed by: SURGERY

## 2023-01-29 PROCEDURE — 96374 THER/PROPH/DIAG INJ IV PUSH: CPT | Mod: 59

## 2023-01-29 PROCEDURE — 83690 ASSAY OF LIPASE: CPT | Performed by: EMERGENCY MEDICINE

## 2023-01-29 PROCEDURE — 0DTN4ZZ RESECTION OF SIGMOID COLON, PERCUTANEOUS ENDOSCOPIC APPROACH: ICD-10-PCS | Performed by: SURGERY

## 2023-01-29 PROCEDURE — 44207 L COLECTOMY/COLOPROCTOSTOMY: CPT | Mod: AS | Performed by: NURSE PRACTITIONER

## 2023-01-29 RX ORDER — LIDOCAINE HYDROCHLORIDE 20 MG/ML
INJECTION, SOLUTION INFILTRATION; PERINEURAL PRN
Status: DISCONTINUED | OUTPATIENT
Start: 2023-01-29 | End: 2023-01-29

## 2023-01-29 RX ORDER — NALOXONE HYDROCHLORIDE 0.4 MG/ML
0.4 INJECTION, SOLUTION INTRAMUSCULAR; INTRAVENOUS; SUBCUTANEOUS
Status: DISCONTINUED | OUTPATIENT
Start: 2023-01-29 | End: 2023-02-18 | Stop reason: HOSPADM

## 2023-01-29 RX ORDER — DEXTROSE MONOHYDRATE 25 G/50ML
25-50 INJECTION, SOLUTION INTRAVENOUS
Status: DISCONTINUED | OUTPATIENT
Start: 2023-01-29 | End: 2023-02-18 | Stop reason: HOSPADM

## 2023-01-29 RX ORDER — LIDOCAINE 40 MG/G
CREAM TOPICAL
Status: DISCONTINUED | OUTPATIENT
Start: 2023-01-29 | End: 2023-02-02

## 2023-01-29 RX ORDER — IPRATROPIUM BROMIDE AND ALBUTEROL SULFATE 2.5; .5 MG/3ML; MG/3ML
3 SOLUTION RESPIRATORY (INHALATION) ONCE
Status: DISCONTINUED | OUTPATIENT
Start: 2023-01-29 | End: 2023-01-29

## 2023-01-29 RX ORDER — HYDROMORPHONE HYDROCHLORIDE 1 MG/ML
0.2 INJECTION, SOLUTION INTRAMUSCULAR; INTRAVENOUS; SUBCUTANEOUS EVERY 5 MIN PRN
Status: DISCONTINUED | OUTPATIENT
Start: 2023-01-29 | End: 2023-01-29 | Stop reason: HOSPADM

## 2023-01-29 RX ORDER — ONDANSETRON 2 MG/ML
4 INJECTION INTRAMUSCULAR; INTRAVENOUS EVERY 6 HOURS PRN
Status: DISCONTINUED | OUTPATIENT
Start: 2023-01-29 | End: 2023-02-18 | Stop reason: HOSPADM

## 2023-01-29 RX ORDER — DEXAMETHASONE SODIUM PHOSPHATE 10 MG/ML
INJECTION, SOLUTION INTRAMUSCULAR; INTRAVENOUS PRN
Status: DISCONTINUED | OUTPATIENT
Start: 2023-01-29 | End: 2023-01-29

## 2023-01-29 RX ORDER — HYDROMORPHONE HYDROCHLORIDE 1 MG/ML
0.5 INJECTION, SOLUTION INTRAMUSCULAR; INTRAVENOUS; SUBCUTANEOUS
Status: DISCONTINUED | OUTPATIENT
Start: 2023-01-29 | End: 2023-01-29

## 2023-01-29 RX ORDER — FENTANYL CITRATE 50 UG/ML
INJECTION, SOLUTION INTRAMUSCULAR; INTRAVENOUS PRN
Status: DISCONTINUED | OUTPATIENT
Start: 2023-01-29 | End: 2023-01-29

## 2023-01-29 RX ORDER — NICOTINE POLACRILEX 4 MG
15-30 LOZENGE BUCCAL
Status: DISCONTINUED | OUTPATIENT
Start: 2023-01-29 | End: 2023-02-18 | Stop reason: HOSPADM

## 2023-01-29 RX ORDER — HYDROMORPHONE HYDROCHLORIDE 1 MG/ML
0.5 INJECTION, SOLUTION INTRAMUSCULAR; INTRAVENOUS; SUBCUTANEOUS
Status: DISCONTINUED | OUTPATIENT
Start: 2023-01-29 | End: 2023-01-30

## 2023-01-29 RX ORDER — ONDANSETRON 4 MG/1
4 TABLET, ORALLY DISINTEGRATING ORAL EVERY 30 MIN PRN
Status: DISCONTINUED | OUTPATIENT
Start: 2023-01-29 | End: 2023-01-29 | Stop reason: HOSPADM

## 2023-01-29 RX ORDER — ENOXAPARIN SODIUM 100 MG/ML
40 INJECTION SUBCUTANEOUS EVERY 24 HOURS
Status: DISCONTINUED | OUTPATIENT
Start: 2023-01-30 | End: 2023-02-18 | Stop reason: HOSPADM

## 2023-01-29 RX ORDER — FENTANYL CITRATE 50 UG/ML
25 INJECTION, SOLUTION INTRAMUSCULAR; INTRAVENOUS EVERY 5 MIN PRN
Status: DISCONTINUED | OUTPATIENT
Start: 2023-01-29 | End: 2023-01-29 | Stop reason: HOSPADM

## 2023-01-29 RX ORDER — ACETAMINOPHEN 650 MG/1
650 SUPPOSITORY RECTAL ONCE
Status: DISCONTINUED | OUTPATIENT
Start: 2023-01-29 | End: 2023-01-29

## 2023-01-29 RX ORDER — SODIUM CHLORIDE, SODIUM LACTATE, POTASSIUM CHLORIDE, AND CALCIUM CHLORIDE .6; .31; .03; .02 G/100ML; G/100ML; G/100ML; G/100ML
IRRIGANT IRRIGATION PRN
Status: DISCONTINUED | OUTPATIENT
Start: 2023-01-29 | End: 2023-01-29 | Stop reason: HOSPADM

## 2023-01-29 RX ORDER — SODIUM CHLORIDE, SODIUM LACTATE, POTASSIUM CHLORIDE, CALCIUM CHLORIDE 600; 310; 30; 20 MG/100ML; MG/100ML; MG/100ML; MG/100ML
INJECTION, SOLUTION INTRAVENOUS CONTINUOUS PRN
Status: DISCONTINUED | OUTPATIENT
Start: 2023-01-29 | End: 2023-01-29

## 2023-01-29 RX ORDER — PROPOFOL 10 MG/ML
INJECTION, EMULSION INTRAVENOUS CONTINUOUS PRN
Status: DISCONTINUED | OUTPATIENT
Start: 2023-01-29 | End: 2023-01-29

## 2023-01-29 RX ORDER — PIPERACILLIN SODIUM, TAZOBACTAM SODIUM 3; .375 G/15ML; G/15ML
3.38 INJECTION, POWDER, LYOPHILIZED, FOR SOLUTION INTRAVENOUS EVERY 8 HOURS
Status: DISCONTINUED | OUTPATIENT
Start: 2023-01-29 | End: 2023-02-04

## 2023-01-29 RX ORDER — CEFAZOLIN SODIUM/WATER 2 G/20 ML
2 SYRINGE (ML) INTRAVENOUS SEE ADMIN INSTRUCTIONS
Status: DISCONTINUED | OUTPATIENT
Start: 2023-01-29 | End: 2023-01-29 | Stop reason: HOSPADM

## 2023-01-29 RX ORDER — ACETAMINOPHEN 325 MG/1
975 TABLET ORAL ONCE
Status: DISCONTINUED | OUTPATIENT
Start: 2023-01-29 | End: 2023-01-29 | Stop reason: HOSPADM

## 2023-01-29 RX ORDER — NALOXONE HYDROCHLORIDE 0.4 MG/ML
0.2 INJECTION, SOLUTION INTRAMUSCULAR; INTRAVENOUS; SUBCUTANEOUS
Status: DISCONTINUED | OUTPATIENT
Start: 2023-01-29 | End: 2023-02-18 | Stop reason: HOSPADM

## 2023-01-29 RX ORDER — CEFAZOLIN SODIUM/WATER 2 G/20 ML
2 SYRINGE (ML) INTRAVENOUS
Status: DISCONTINUED | OUTPATIENT
Start: 2023-01-29 | End: 2023-01-29 | Stop reason: HOSPADM

## 2023-01-29 RX ORDER — HYDROMORPHONE HYDROCHLORIDE 1 MG/ML
0.3 INJECTION, SOLUTION INTRAMUSCULAR; INTRAVENOUS; SUBCUTANEOUS
Status: DISCONTINUED | OUTPATIENT
Start: 2023-01-29 | End: 2023-01-30

## 2023-01-29 RX ORDER — HYDROMORPHONE HYDROCHLORIDE 1 MG/ML
0.5 INJECTION, SOLUTION INTRAMUSCULAR; INTRAVENOUS; SUBCUTANEOUS ONCE
Status: COMPLETED | OUTPATIENT
Start: 2023-01-29 | End: 2023-01-29

## 2023-01-29 RX ORDER — PROPOFOL 10 MG/ML
INJECTION, EMULSION INTRAVENOUS PRN
Status: DISCONTINUED | OUTPATIENT
Start: 2023-01-29 | End: 2023-01-29

## 2023-01-29 RX ORDER — FENTANYL CITRATE 50 UG/ML
50 INJECTION, SOLUTION INTRAMUSCULAR; INTRAVENOUS EVERY 5 MIN PRN
Status: DISCONTINUED | OUTPATIENT
Start: 2023-01-29 | End: 2023-01-29 | Stop reason: HOSPADM

## 2023-01-29 RX ORDER — IOPAMIDOL 755 MG/ML
100 INJECTION, SOLUTION INTRAVASCULAR ONCE
Status: COMPLETED | OUTPATIENT
Start: 2023-01-29 | End: 2023-01-29

## 2023-01-29 RX ORDER — ASPIRIN 81 MG/1
81 TABLET ORAL DAILY
Status: DISCONTINUED | OUTPATIENT
Start: 2023-01-30 | End: 2023-02-18 | Stop reason: HOSPADM

## 2023-01-29 RX ORDER — GLYCOPYRROLATE 0.2 MG/ML
INJECTION, SOLUTION INTRAMUSCULAR; INTRAVENOUS PRN
Status: DISCONTINUED | OUTPATIENT
Start: 2023-01-29 | End: 2023-01-29

## 2023-01-29 RX ORDER — SODIUM CHLORIDE, SODIUM LACTATE, POTASSIUM CHLORIDE, CALCIUM CHLORIDE 600; 310; 30; 20 MG/100ML; MG/100ML; MG/100ML; MG/100ML
INJECTION, SOLUTION INTRAVENOUS CONTINUOUS
Status: DISCONTINUED | OUTPATIENT
Start: 2023-01-29 | End: 2023-01-29 | Stop reason: HOSPADM

## 2023-01-29 RX ORDER — PIPERACILLIN SODIUM, TAZOBACTAM SODIUM 3; .375 G/15ML; G/15ML
3.38 INJECTION, POWDER, LYOPHILIZED, FOR SOLUTION INTRAVENOUS EVERY 8 HOURS
Status: DISCONTINUED | OUTPATIENT
Start: 2023-01-29 | End: 2023-01-29

## 2023-01-29 RX ORDER — HYDROMORPHONE HYDROCHLORIDE 1 MG/ML
0.3 INJECTION, SOLUTION INTRAMUSCULAR; INTRAVENOUS; SUBCUTANEOUS
Status: DISCONTINUED | OUTPATIENT
Start: 2023-01-29 | End: 2023-01-29

## 2023-01-29 RX ORDER — ONDANSETRON 4 MG/1
4 TABLET, ORALLY DISINTEGRATING ORAL EVERY 6 HOURS PRN
Status: DISCONTINUED | OUTPATIENT
Start: 2023-01-29 | End: 2023-02-18 | Stop reason: HOSPADM

## 2023-01-29 RX ORDER — AMLODIPINE BESYLATE 2.5 MG/1
2.5 TABLET ORAL DAILY
Status: DISCONTINUED | OUTPATIENT
Start: 2023-01-30 | End: 2023-02-03

## 2023-01-29 RX ORDER — CEFAZOLIN SODIUM 1 G/50ML
2000 SOLUTION INTRAVENOUS ONCE
Status: COMPLETED | OUTPATIENT
Start: 2023-01-29 | End: 2023-01-29

## 2023-01-29 RX ORDER — SODIUM CHLORIDE, SODIUM LACTATE, POTASSIUM CHLORIDE, CALCIUM CHLORIDE 600; 310; 30; 20 MG/100ML; MG/100ML; MG/100ML; MG/100ML
INJECTION, SOLUTION INTRAVENOUS CONTINUOUS
Status: DISCONTINUED | OUTPATIENT
Start: 2023-01-29 | End: 2023-01-31

## 2023-01-29 RX ORDER — PIPERACILLIN SODIUM, TAZOBACTAM SODIUM 3; .375 G/15ML; G/15ML
3.38 INJECTION, POWDER, LYOPHILIZED, FOR SOLUTION INTRAVENOUS ONCE
Status: DISCONTINUED | OUTPATIENT
Start: 2023-01-29 | End: 2023-01-29

## 2023-01-29 RX ORDER — LIDOCAINE 40 MG/G
CREAM TOPICAL
Status: DISCONTINUED | OUTPATIENT
Start: 2023-01-29 | End: 2023-02-18 | Stop reason: HOSPADM

## 2023-01-29 RX ORDER — PIPERACILLIN SODIUM, TAZOBACTAM SODIUM 3; .375 G/15ML; G/15ML
3.38 INJECTION, POWDER, LYOPHILIZED, FOR SOLUTION INTRAVENOUS ONCE
Status: COMPLETED | OUTPATIENT
Start: 2023-01-29 | End: 2023-01-29

## 2023-01-29 RX ORDER — ACETAMINOPHEN 325 MG/1
650 TABLET ORAL ONCE
Status: COMPLETED | OUTPATIENT
Start: 2023-01-29 | End: 2023-01-29

## 2023-01-29 RX ORDER — ACETAMINOPHEN 325 MG/1
975 TABLET ORAL ONCE
Status: DISCONTINUED | OUTPATIENT
Start: 2023-01-29 | End: 2023-01-29

## 2023-01-29 RX ORDER — ONDANSETRON 2 MG/ML
4 INJECTION INTRAMUSCULAR; INTRAVENOUS EVERY 30 MIN PRN
Status: DISCONTINUED | OUTPATIENT
Start: 2023-01-29 | End: 2023-01-29 | Stop reason: HOSPADM

## 2023-01-29 RX ORDER — HYDROMORPHONE HYDROCHLORIDE 1 MG/ML
0.4 INJECTION, SOLUTION INTRAMUSCULAR; INTRAVENOUS; SUBCUTANEOUS EVERY 5 MIN PRN
Status: DISCONTINUED | OUTPATIENT
Start: 2023-01-29 | End: 2023-01-29 | Stop reason: HOSPADM

## 2023-01-29 RX ORDER — LIDOCAINE 40 MG/G
CREAM TOPICAL
Status: DISCONTINUED | OUTPATIENT
Start: 2023-01-29 | End: 2023-01-29 | Stop reason: HOSPADM

## 2023-01-29 RX ORDER — BUPIVACAINE HYDROCHLORIDE 2.5 MG/ML
INJECTION, SOLUTION INFILTRATION; PERINEURAL PRN
Status: DISCONTINUED | OUTPATIENT
Start: 2023-01-29 | End: 2023-01-29 | Stop reason: HOSPADM

## 2023-01-29 RX ORDER — AMMONIUM LACTATE 12 G/100G
CREAM TOPICAL AT BEDTIME
Status: ON HOLD | COMMUNITY
End: 2023-02-18

## 2023-01-29 RX ADMIN — FENTANYL CITRATE 100 MCG: 50 INJECTION, SOLUTION INTRAMUSCULAR; INTRAVENOUS at 11:38

## 2023-01-29 RX ADMIN — PROPOFOL 70 MCG/KG/MIN: 10 INJECTION, EMULSION INTRAVENOUS at 12:03

## 2023-01-29 RX ADMIN — PIPERACILLIN AND TAZOBACTAM 3.38 G: 3; .375 INJECTION, POWDER, LYOPHILIZED, FOR SOLUTION INTRAVENOUS at 17:11

## 2023-01-29 RX ADMIN — SODIUM CHLORIDE, POTASSIUM CHLORIDE, SODIUM LACTATE AND CALCIUM CHLORIDE: 600; 310; 30; 20 INJECTION, SOLUTION INTRAVENOUS at 16:57

## 2023-01-29 RX ADMIN — IOPAMIDOL 100 ML: 755 INJECTION, SOLUTION INTRAVENOUS at 06:48

## 2023-01-29 RX ADMIN — PHENYLEPHRINE HYDROCHLORIDE 0.5 MCG/KG/MIN: 10 INJECTION INTRAVENOUS at 12:05

## 2023-01-29 RX ADMIN — SODIUM CHLORIDE, POTASSIUM CHLORIDE, SODIUM LACTATE AND CALCIUM CHLORIDE: 600; 310; 30; 20 INJECTION, SOLUTION INTRAVENOUS at 11:38

## 2023-01-29 RX ADMIN — PHENYLEPHRINE HYDROCHLORIDE 100 MCG: 10 INJECTION INTRAVENOUS at 11:49

## 2023-01-29 RX ADMIN — HYDROMORPHONE HYDROCHLORIDE 1 MG: 1 INJECTION, SOLUTION INTRAMUSCULAR; INTRAVENOUS; SUBCUTANEOUS at 12:25

## 2023-01-29 RX ADMIN — FENTANYL CITRATE 25 MCG: 50 INJECTION, SOLUTION INTRAMUSCULAR; INTRAVENOUS at 15:46

## 2023-01-29 RX ADMIN — GLYCOPYRROLATE 0.2 MG: 0.2 INJECTION, SOLUTION INTRAMUSCULAR; INTRAVENOUS at 12:09

## 2023-01-29 RX ADMIN — FENTANYL CITRATE 25 MCG: 50 INJECTION, SOLUTION INTRAMUSCULAR; INTRAVENOUS at 15:31

## 2023-01-29 RX ADMIN — HYDROMORPHONE HYDROCHLORIDE 0.4 MG: 1 INJECTION, SOLUTION INTRAMUSCULAR; INTRAVENOUS; SUBCUTANEOUS at 15:59

## 2023-01-29 RX ADMIN — PHENYLEPHRINE HYDROCHLORIDE 100 MCG: 10 INJECTION INTRAVENOUS at 14:14

## 2023-01-29 RX ADMIN — DEXAMETHASONE SODIUM PHOSPHATE 10 MG: 10 INJECTION, SOLUTION INTRAMUSCULAR; INTRAVENOUS at 11:49

## 2023-01-29 RX ADMIN — PHENYLEPHRINE HYDROCHLORIDE 100 MCG: 10 INJECTION INTRAVENOUS at 12:04

## 2023-01-29 RX ADMIN — FENTANYL CITRATE 25 MCG: 50 INJECTION, SOLUTION INTRAMUSCULAR; INTRAVENOUS at 15:41

## 2023-01-29 RX ADMIN — ROCURONIUM BROMIDE 20 MG: 50 INJECTION, SOLUTION INTRAVENOUS at 12:26

## 2023-01-29 RX ADMIN — SODIUM CHLORIDE 1000 ML: 9 INJECTION, SOLUTION INTRAVENOUS at 08:49

## 2023-01-29 RX ADMIN — HYDROMORPHONE HYDROCHLORIDE 0.5 MG: 1 INJECTION, SOLUTION INTRAMUSCULAR; INTRAVENOUS; SUBCUTANEOUS at 14:48

## 2023-01-29 RX ADMIN — HYDROMORPHONE HYDROCHLORIDE 0.5 MG: 1 INJECTION, SOLUTION INTRAMUSCULAR; INTRAVENOUS; SUBCUTANEOUS at 06:19

## 2023-01-29 RX ADMIN — HYDROMORPHONE HYDROCHLORIDE 0.3 MG: 1 INJECTION, SOLUTION INTRAMUSCULAR; INTRAVENOUS; SUBCUTANEOUS at 08:48

## 2023-01-29 RX ADMIN — ROCURONIUM BROMIDE 30 MG: 50 INJECTION, SOLUTION INTRAVENOUS at 12:00

## 2023-01-29 RX ADMIN — SUCCINYLCHOLINE CHLORIDE 140 MG: 20 INJECTION, SOLUTION INTRAMUSCULAR; INTRAVENOUS at 11:49

## 2023-01-29 RX ADMIN — LIDOCAINE HYDROCHLORIDE 30 MG: 20 INJECTION, SOLUTION INFILTRATION; PERINEURAL at 11:49

## 2023-01-29 RX ADMIN — ACETAMINOPHEN 650 MG: 325 TABLET ORAL at 06:22

## 2023-01-29 RX ADMIN — PROPOFOL 200 MG: 10 INJECTION, EMULSION INTRAVENOUS at 11:49

## 2023-01-29 RX ADMIN — ROCURONIUM BROMIDE 20 MG: 50 INJECTION, SOLUTION INTRAVENOUS at 13:31

## 2023-01-29 RX ADMIN — FENTANYL CITRATE 25 MCG: 50 INJECTION, SOLUTION INTRAMUSCULAR; INTRAVENOUS at 15:20

## 2023-01-29 RX ADMIN — PHENYLEPHRINE HYDROCHLORIDE 100 MCG: 10 INJECTION INTRAVENOUS at 11:58

## 2023-01-29 RX ADMIN — HYDROMORPHONE HYDROCHLORIDE 0.5 MG: 1 INJECTION, SOLUTION INTRAMUSCULAR; INTRAVENOUS; SUBCUTANEOUS at 10:28

## 2023-01-29 RX ADMIN — HYDROMORPHONE HYDROCHLORIDE 0.5 MG: 1 INJECTION, SOLUTION INTRAMUSCULAR; INTRAVENOUS; SUBCUTANEOUS at 21:48

## 2023-01-29 RX ADMIN — SODIUM CHLORIDE, POTASSIUM CHLORIDE, SODIUM LACTATE AND CALCIUM CHLORIDE: 600; 310; 30; 20 INJECTION, SOLUTION INTRAVENOUS at 12:56

## 2023-01-29 RX ADMIN — PIPERACILLIN AND TAZOBACTAM 3.38 G: 3; .375 INJECTION, POWDER, LYOPHILIZED, FOR SOLUTION INTRAVENOUS at 08:19

## 2023-01-29 RX ADMIN — INSULIN ASPART 1 UNITS: 100 INJECTION, SOLUTION INTRAVENOUS; SUBCUTANEOUS at 20:42

## 2023-01-29 RX ADMIN — HYDROMORPHONE HYDROCHLORIDE 0.3 MG: 1 INJECTION, SOLUTION INTRAMUSCULAR; INTRAVENOUS; SUBCUTANEOUS at 19:53

## 2023-01-29 RX ADMIN — HYDROMORPHONE HYDROCHLORIDE 0.5 MG: 1 INJECTION, SOLUTION INTRAMUSCULAR; INTRAVENOUS; SUBCUTANEOUS at 14:40

## 2023-01-29 RX ADMIN — VANCOMYCIN HYDROCHLORIDE 2000 MG: 5 INJECTION, POWDER, LYOPHILIZED, FOR SOLUTION INTRAVENOUS at 08:16

## 2023-01-29 ASSESSMENT — ENCOUNTER SYMPTOMS
VOMITING: 0
DYSURIA: 0
HEMATURIA: 0
COUGH: 1
SEIZURES: 0
ABDOMINAL PAIN: 1
ABDOMINAL DISTENTION: 1

## 2023-01-29 ASSESSMENT — ACTIVITIES OF DAILY LIVING (ADL)
ADLS_ACUITY_SCORE: 35

## 2023-01-29 NOTE — ED PROVIDER NOTES
EMERGENCY DEPARTMENT ENCOUNTER      NAME: Suzy Gómez  AGE: 77 year old female  YOB: 1945  MRN: [id number]  EVALUATION DATE & TIME: 1/29/2023  5:49 AM    PCP: [facility]    ED PROVIDER: Julio Snell M.D.    Chief Complaint   Patient presents with     Abdominal Pain     FINAL IMPRESSION:  1. Perforation of colon (H)    2. Refusal of blood transfusions as patient is Zoroastrian      ED COURSE & MEDICAL DECISION MAKING:    Pertinent Labs & Imaging studies independently interpreted by me. (See chart for details)  6:12 AM  Patient seen and examined, external records reviewed.  Report from EMS that patient was seen in the emergency department for and diagnosed with bowel obstruction, left AMA.  However review of chart shows the patient was seen for sigmoid colitis and was discharged.  Differential diagnosis includes but not limited to gastritis, cholecystitis, pancreatitis, colitis, enteritis, diverticulitis, urinary tract infection, myocardial infarction, pneumonia appendicitis, AAA, cholelithiasis, ischemic bowel.  Patient returns today with abdominal pain and distention.  On exam, she is febrile with diffuse abdominal tenderness, short shallow respirations possibly due to abdominal distention but also could be secondary to pain, tachycardia.  Concern for possible intra-abdominal infection including cholecystitis, worsening colitis, diverticulitis, or perforation.  Small bowel obstruction also possible but would not be source of fever.  Consider also urinary retention although patient says she has been urinating.  CTA of the abdomen full CT scan of the chest ordered to evaluate for abdominal pain and source of fever.  Labs are ordered.  Consider ultrasound of the abdomen.  CT scan does not demonstrate any acute findings or if there is gallbladder abnormality on CT.  Dilaudid IV ordered for pain.  6:55 AM Labs independently interpreted by me with normal white  blood cell count, normal lactate.  CT scan of the chest independently interpreted by me shows free air in upper abdomen.  CT scan of the abdomen independently interpreted by me demonstrates colonic distention with stool, continued sigmoid diverticulitis with free fluid and small amount of free air, no evidence for obstruction.  Radiology interpretation pending.  Antibiotics initiated.  Surgery paged.  7:01 AM  Care discussed with Dr. Anderson, radiology- free air in upper abdomen, no acute chest findings.  7:05 AM discussed diagnosis and plan with patient, family is now in the room.  Family did tell me the patient is a Moravian and does not want blood products including blood fractions, plasma, platelets.  7:08 AM Care discussed with Dr. Ceballos, radiology- persistent sigmoid colonic inflammation now with extraluminal gas adjacent.  CTA without large vessel occlusion.  7:21 AM Care discussed with Dr. Palencia, general surgery, plan to go to the OR this morning.  7:28 AM care discussed with Dr. Whelan, hospitalist for admission after surgery.  8:11 AM patient remains tachycardic but otherwise stable in the emergency department waiting for transfer to OR.    At the conclusion of the encounter I discussed the results of all of the tests and the disposition. The questions were answered. The patient or family acknowledged understanding and was agreeable with the care plan.     Medical Decision Making    History:    Supplemental history from: EMS    External Record(s) reviewed: Documented in chart, if applicable.    Work Up:    Chart documentation includes differential considered and any EKGs or imaging independently interpreted by provider, where specified.    In additional to work up documented, I considered the following work up: Documented in chart, if applicable.    External consultation:    Discussion of management with another provider: Documented in chart, if applicable    Complicating factors:    Care  impacted by chronic illness: Diabetes and Hyperlipidemia    Care affected by social determinants of health: N/A    Disposition considerations: Admit.    PROCEDURES:     Critical Care   Performed by: Dr Julio Snell  Total critical care time: 100 minutes  Critical care was necessary to treat or prevent imminent or life-threatening deterioration of the following conditions:  Intra-abdominal perforation  Critical care was time spent personally by me on the following activities: development of treatment plan with patient or surrogate, discussions with consultants, examination of patient, evaluation of patient's response to treatment, obtaining history from patient or surrogate, ordering and performing treatments and interventions, ordering and review of laboratory studies, ordering and review of radiographic studies, re-evaluation of patient's condition and monitoring for potential decompensation.  Critical care time was exclusive of separately billable procedures and treating other patients.      MEDICATIONS GIVEN IN THE EMERGENCY:  Medications   piperacillin-tazobactam (ZOSYN) 3.375 g vial to attach to  mL bag (has no administration in time range)   vancomycin (VANCOCIN) 2,000 mg in sodium chloride 0.9 % 500 mL intermittent infusion (has no administration in time range)   0.9% sodium chloride BOLUS (has no administration in time range)   lidocaine 1 % 0.1-1 mL (has no administration in time range)   lidocaine (LMX4) cream (has no administration in time range)   sodium chloride (PF) 0.9% PF flush 3 mL (has no administration in time range)   sodium chloride (PF) 0.9% PF flush 3 mL (has no administration in time range)   melatonin tablet 1 mg (has no administration in time range)   ondansetron (ZOFRAN ODT) ODT tab 4 mg (has no administration in time range)     Or   ondansetron (ZOFRAN) injection 4 mg (has no administration in time range)   piperacillin-tazobactam (ZOSYN) 4.5 g vial to attach to  mL bag  "(has no administration in time range)   glucose gel 15-30 g (has no administration in time range)     Or   dextrose 50 % injection 25-50 mL (has no administration in time range)     Or   glucagon injection 1 mg (has no administration in time range)   insulin aspart (NovoLOG) injection (RAPID ACTING) (has no administration in time range)   HYDROmorphone (PF) (DILAUDID) injection 0.5 mg (0.5 mg Intravenous Given 1/29/23 0619)   acetaminophen (TYLENOL) tablet 650 mg (650 mg Oral Given 1/29/23 0622)   iopamidol (ISOVUE-370) solution 100 mL (100 mLs Intravenous Given 1/29/23 0648)       NEW PRESCRIPTIONS STARTED AT TODAY'S ER VISIT  New Prescriptions    No medications on file       =================================================================    HPI    Patient information was obtained from: patient       Suzy Gómez is a 77 year old female with a pertinent history of colitis, angiodysplasia of colon, GERD, hypertension, hyperparathyroidism, and DM2 who presents to this ED via EMS for evaluation of abdominal pain.    Chart Review:  (1/29/2023): Per chart review, the patient was seen in the [facility] ED on 1/25 for evaluation of abdominal pain. She presented with 4 days of worsening \"cramping\" abdominal pain that was 6/10 upon arrival. CT imaging showed sigmoid colitis and collapse of the rectosigmoid junction and rectum distal to the inflamed segment of colon with adjacent free fluid and cholelithiasis. She was prescribed 100 mg Docusate sodium capsules to take twice daily. She was discharged after 3 hours in the ED on 1/26/2023.     The patient presents via EMS with abdominal pain and distention for the last couple of hours. She also has a slight cough. She had an appendectomy when she was 7 years old and does not have her gall bladder either. She was here 4 days ago and diagnosed with colitis but her  noted at that encounter that a few months ago she was in the ED with similar symptoms and asked to be " discharged after having a large bowel movement that relieved all of her pain.      She denies vomiting, urinary symptoms, or any other complaints at this time.     REVIEW OF SYSTEMS   Review of Systems   Respiratory: Positive for cough.    Gastrointestinal: Positive for abdominal distention and abdominal pain. Negative for vomiting.   Genitourinary: Negative for dysuria, hematuria and urgency.   All other systems reviewed and are negative.     All other systems reviewed and negative    PAST MEDICAL HISTORY:  Past Medical History:   Diagnosis Date     Diabetes (H)      Dyslipidemia      Heart disease      Hypertension      KARYN (obstructive sleep apnea)      Thyroid disease        PAST SURGICAL HISTORY:  History reviewed. No pertinent surgical history.    CURRENT MEDICATIONS:    Current Facility-Administered Medications   Medication     0.9% sodium chloride BOLUS     glucose gel 15-30 g    Or     dextrose 50 % injection 25-50 mL    Or     glucagon injection 1 mg     insulin aspart (NovoLOG) injection (RAPID ACTING)     lidocaine (LMX4) cream     lidocaine 1 % 0.1-1 mL     melatonin tablet 1 mg     ondansetron (ZOFRAN ODT) ODT tab 4 mg    Or     ondansetron (ZOFRAN) injection 4 mg     piperacillin-tazobactam (ZOSYN) 3.375 g vial to attach to  mL bag     piperacillin-tazobactam (ZOSYN) 4.5 g vial to attach to  mL bag     sodium chloride (PF) 0.9% PF flush 3 mL     sodium chloride (PF) 0.9% PF flush 3 mL     vancomycin (VANCOCIN) 2,000 mg in sodium chloride 0.9 % 500 mL intermittent infusion     Current Outpatient Medications   Medication     amLODIPine (NORVASC) 2.5 MG tablet     aspirin (ASA) 81 MG EC tablet     docusate sodium (COLACE) 100 MG capsule     furosemide (LASIX) 20 MG tablet     glipiZIDE (GLUCOTROL) 10 MG tablet     ibuprofen (ADVIL/MOTRIN) 200 MG capsule     JARDIANCE 25 MG TABS tablet     levothyroxine (SYNTHROID/LEVOTHROID) 125 MCG tablet     linagliptin (TRADJENTA) 5 MG TABS tablet      losartan (COZAAR) 50 MG tablet     metFORMIN (GLUCOPHAGE XR) 500 MG 24 hr tablet     oxybutynin ER (DITROPAN XL) 10 MG 24 hr tablet     rosuvastatin (CRESTOR) 20 MG tablet     venlafaxine (EFFEXOR XR) 75 MG 24 hr capsule       ALLERGIES:  Allergies   Allergen Reactions     Macrodantin [Nitrofurantoin] Unknown     Pt could not recall       FAMILY HISTORY:  History reviewed. No pertinent family history.    SOCIAL HISTORY:   Social History     Socioeconomic History     Marital status:    Tobacco Use     Smoking status: Former     Types: Cigarettes     Quit date:      Years since quittin.0     Smokeless tobacco: Never   Vaping Use     Vaping Use: Never used   Substance and Sexual Activity     Alcohol use: Not Currently     Drug use: Never       VITALS:  BP (!) 147/60   Pulse 105   Temp (!) 103.1  F (39.5  C) (Oral)   Resp 28   Wt 99.8 kg (220 lb 0.3 oz)   SpO2 100%   BMI 36.61 kg/m      PHYSICAL EXAM:  Physical Exam  Vitals and nursing note reviewed.   Constitutional:       Appearance: Normal appearance.   HENT:      Head: Normocephalic and atraumatic.      Right Ear: External ear normal.      Left Ear: External ear normal.      Nose: Nose normal.      Mouth/Throat:      Mouth: Mucous membranes are moist.   Eyes:      Extraocular Movements: Extraocular movements intact.      Conjunctiva/sclera: Conjunctivae normal.      Pupils: Pupils are equal, round, and reactive to light.   Cardiovascular:      Rate and Rhythm: Normal rate and regular rhythm.   Pulmonary:      Effort: Pulmonary effort is normal.      Breath sounds: Normal breath sounds. No wheezing or rales.   Abdominal:      General: Abdomen is flat. Bowel sounds are absent. There is distension.      Palpations: Abdomen is soft.      Tenderness: There is generalized abdominal tenderness. There is no guarding.   Musculoskeletal:         General: Normal range of motion.      Cervical back: Normal range of motion and neck supple.      Right lower  leg: No edema.      Left lower leg: No edema.   Lymphadenopathy:      Cervical: No cervical adenopathy.   Skin:     General: Skin is warm and dry.   Neurological:      General: No focal deficit present.      Mental Status: She is alert and oriented to person, place, and time. Mental status is at baseline.      Comments: No gross focal neurologic deficits   Psychiatric:         Mood and Affect: Mood normal.         Behavior: Behavior normal.         Thought Content: Thought content normal.          LAB:  All pertinent labs reviewed and interpreted.  Results for orders placed or performed during the hospital encounter of 01/29/23   CTA Abdomen Pelvis with Contrast   Result Value Ref Range    Radiologist flags (AA)      Sigmoid colonic inflammation with areas of extraluminal gas, new from 1/25/2023 concerning for sigmoid colonic perforation    Impression    IMPRESSION:  1.  Mural thickening of the sigmoid colon with pericolonic inflammation and stranding, slightly worsened from prior exam with punctate foci of gas seen adjacent to the inflamed colon as well as tracking in the anterior abdominal wall concerning for   sigmoid colonic perforation. No organized intra-abdominal fluid collection such as abscess is seen at this time. Given the long-standing inflammation in this region an underlying neoplastic process cannot be excluded.  2.  Scattered air-fluid levels and mildly dilated loops of proximal jejunum which decompresses gradually without a transition point consistent favored to reflect reactive ileus   3.  Hepatic steatosis  4.  Cholelithiasis      [Critical Result: Sigmoid colonic inflammation with areas of extraluminal gas, new from 1/25/2023 concerning for sigmoid colonic perforation]    Finding was identified on 1/29/2023 6:56 AM.     DR. Snell was contacted by me on 1/29/2023 7:08 AM and verbalized understanding of the critical result.    CT Chest w/o Contrast    Impression    IMPRESSION:   1.  Slight  interstitial thickening in bilateral lung bases. This is nonspecific, but most likely relates to interstitial pulmonary edema.  2.  No other findings suspicious for acute pulmonary disease.  3.  A few foci of extraluminal gas scattered within the nondependent aspect of the upper abdomen. In the absence of recent surgery, this is consistent with a bowel perforation. Refer to the report from the CT scan of the abdomen and pelvis performed in   conjunction with this study for additional details.    The findings were called to Dr. Snell by Dr. Anderson on 01/29/2023 at 0700 hours.       Comprehensive metabolic panel   Result Value Ref Range    Sodium 136 136 - 145 mmol/L    Potassium 3.8 3.4 - 5.3 mmol/L    Chloride 103 98 - 107 mmol/L    Carbon Dioxide (CO2) 19 (L) 22 - 29 mmol/L    Anion Gap 14 7 - 15 mmol/L    Urea Nitrogen 19.1 8.0 - 23.0 mg/dL    Creatinine 0.75 0.51 - 0.95 mg/dL    Calcium 9.7 8.8 - 10.2 mg/dL    Glucose 202 (H) 70 - 99 mg/dL    Alkaline Phosphatase 145 (H) 35 - 104 U/L    AST 28 10 - 35 U/L    ALT 18 10 - 35 U/L    Protein Total 7.0 6.4 - 8.3 g/dL    Albumin 3.3 (L) 3.5 - 5.2 g/dL    Bilirubin Total 0.3 <=1.2 mg/dL    GFR Estimate 83 >60 mL/min/1.73m2   Lactic acid whole blood   Result Value Ref Range    Lactic Acid 1.7 0.7 - 2.0 mmol/L   CBC with platelets and differential   Result Value Ref Range    WBC Count 11.1 (H) 4.0 - 11.0 10e3/uL    RBC Count 4.04 3.80 - 5.20 10e6/uL    Hemoglobin 11.6 (L) 11.7 - 15.7 g/dL    Hematocrit 37.3 35.0 - 47.0 %    MCV 92 78 - 100 fL    MCH 28.7 26.5 - 33.0 pg    MCHC 31.1 (L) 31.5 - 36.5 g/dL    RDW 14.1 10.0 - 15.0 %    Platelet Count 478 (H) 150 - 450 10e3/uL    % Neutrophils 72 %    % Lymphocytes 17 %    % Monocytes 9 %    % Eosinophils 0 %    % Basophils 0 %    % Immature Granulocytes 2 %    NRBCs per 100 WBC 0 <1 /100    Absolute Neutrophils 8.2 1.6 - 8.3 10e3/uL    Absolute Lymphocytes 1.9 0.8 - 5.3 10e3/uL    Absolute Monocytes 1.0 0.0 - 1.3 10e3/uL     Absolute Eosinophils 0.0 0.0 - 0.7 10e3/uL    Absolute Basophils 0.0 0.0 - 0.2 10e3/uL    Absolute Immature Granulocytes 0.2 <=0.4 10e3/uL    Absolute NRBCs 0.0 10e3/uL   Result Value Ref Range    Lipase 45 13 - 60 U/L   Extra Red Top Tube   Result Value Ref Range    Hold Specimen JIC    Ketone Beta-Hydroxybutyrate Quantitative   Result Value Ref Range    Ketone (Beta-Hydroxybutyrate) Quantitative 0.70 (H) <=0.30 mmol/L   Asymptomatic Influenza A/B & SARS-CoV2 (COVID-19) Virus PCR Multiplex Nasopharyngeal    Specimen: Nasopharyngeal; Swab   Result Value Ref Range    Influenza A PCR Negative Negative    Influenza B PCR Negative Negative    RSV PCR Negative Negative    SARS CoV2 PCR Negative Negative   Extra Heparinized Syringe   Result Value Ref Range    Hold Specimen JIC    Extra Green Top (Lithium Heparin) ON ICE   Result Value Ref Range    Hold Specimen JIC        RADIOLOGY:  Reviewed all pertinent imaging. Please see official radiology report.  CTA Abdomen Pelvis with Contrast   Final Result   Abnormal   IMPRESSION:   1.  Mural thickening of the sigmoid colon with pericolonic inflammation and stranding, slightly worsened from prior exam with punctate foci of gas seen adjacent to the inflamed colon as well as tracking in the anterior abdominal wall concerning for    sigmoid colonic perforation. No organized intra-abdominal fluid collection such as abscess is seen at this time. Given the long-standing inflammation in this region an underlying neoplastic process cannot be excluded.   2.  Scattered air-fluid levels and mildly dilated loops of proximal jejunum which decompresses gradually without a transition point consistent favored to reflect reactive ileus    3.  Hepatic steatosis   4.  Cholelithiasis         [Critical Result: Sigmoid colonic inflammation with areas of extraluminal gas, new from 1/25/2023 concerning for sigmoid colonic perforation]      Finding was identified on 1/29/2023 6:56 AM.         Alis was contacted by me on 1/29/2023 7:08 AM and verbalized understanding of the critical result.       CT Chest w/o Contrast   Final Result   IMPRESSION:    1.  Slight interstitial thickening in bilateral lung bases. This is nonspecific, but most likely relates to interstitial pulmonary edema.   2.  No other findings suspicious for acute pulmonary disease.   3.  A few foci of extraluminal gas scattered within the nondependent aspect of the upper abdomen. In the absence of recent surgery, this is consistent with a bowel perforation. Refer to the report from the CT scan of the abdomen and pelvis performed in    conjunction with this study for additional details.      The findings were called to Dr. Snell by Dr. Anderson on 01/29/2023 at 0700 hours.                EKG:    Performed at: 7:31 AM  Impression: Sinus tachycardia, no acute ischemic changes  Rate: 109  Rhythm: Sinus tachycardia  Axis: Normal  SC Interval: 182  QRS Interval: 92  QTc Interval: 444  ST Changes: No acute ischemic changes  Comparison: November 2022, no acute changes    I have independently reviewed and interpreted the EKG(s) documented above.    I, Fredrick Chapa, am serving as a scribe to document services personally performed by Dr. Snell based on my observation and the provider's statements to me. I, Julio Snell MD attest that Fredrick Chapa is acting in a scribe capacity, has observed my performance of the services and has documented them in accordance with my direction.    Julio Snell M.D.  Emergency Medicine  [facility] EMERGENCY DEPARTMENT  [address] [place] [id number]  [phone]  Dept: [phone]     Julio Snell MD  01/29/23 0813

## 2023-01-29 NOTE — ANESTHESIA PROCEDURE NOTES
Airway       Patient location during procedure: OR       Procedure Start/Stop Times: 1/29/2023 11:50 AM  Staff -        Anesthesiologist:  Domingo Hsu MD       CRNA: Sanju Rothman APRN CRNA       Performed By: CRNAIndications and Patient Condition       Indications for airway management: andreia-procedural       Induction type:RSI       Mask difficulty assessment: 0 - not attempted    Final Airway Details       Final airway type: endotracheal airway       Successful airway: ETT - single  Endotracheal Airway Details        ETT size (mm): 7.5       Cuffed: yes       Successful intubation technique: direct laryngoscopy       DL Blade Type: Tesfaye 3       Grade View of Cords: 1       Adjucts: stylet       Position: Right       Measured from: lips       Secured at (cm): 23       Bite block used: None    Post intubation assessment        Placement verified by: capnometry, equal breath sounds and chest rise        Number of attempts at approach: 1       Number of other approaches attempted: 0       Secured with: silk tape       Ease of procedure: easy       Dentition: Intact and Unchanged       Dental guard used and removed. Dental Guard Type: Proguard Red.    Medication(s) Administered   Medication Administration Time: 1/29/2023 11:50 AM

## 2023-01-29 NOTE — ED NOTES
Bed: JNED-09  Expected date: 1/29/23  Expected time: 5:43 AM  Means of arrival: Ambulance  Comments:  WBL- 77yof abd pain x 1 week, seen here diagnosed with SBO and prescribed mirilax, not helping

## 2023-01-29 NOTE — ANESTHESIA CARE TRANSFER NOTE
Patient: Suzy Gómez    Procedure: Procedure(s):  SIGMOID COLECTOMY, LAPAROSCOPIC       Diagnosis: Perforation of colon (H) [K63.1]  Diagnosis Additional Information: No value filed.    Anesthesia Type:   General     Note:    Oropharynx: oropharynx clear of all foreign objects and spontaneously breathing  Level of Consciousness: drowsy  Oxygen Supplementation: face mask  Level of Supplemental Oxygen (L/min / FiO2): 6  Independent Airway: airway patency satisfactory and stable  Dentition: dentition unchanged  Vital Signs Stable: post-procedure vital signs reviewed and stable  Report to RN Given: handoff report given  Patient transferred to: PACU    Handoff Report: Identifed the Patient, Identified the Reponsible Provider, Reviewed the pertinent medical history, Discussed the surgical course, Reviewed Intra-OP anesthesia mangement and issues during anesthesia, Set expectations for post-procedure period and Allowed opportunity for questions and acknowledgement of understanding      Vitals:  Vitals Value Taken Time   /72 01/29/23 1448   Temp 97.9 01/29/23 1448   Pulse 88 01/29/23 1448   Resp 21 01/29/23 1448   SpO2 96 % 01/29/23 1448   Vitals shown include unvalidated device data.    Electronically Signed By: HERBIE Youngblood CRNA  January 29, 2023  2:50 PM

## 2023-01-29 NOTE — OP NOTE
[facility]  Operative Note    Pre-operative diagnosis: Perforation of colon (H) [K63.1]   Post-operative diagnosis Perforated stercolic ulcer, gross peritonitis   Procedure: Procedure(s):  SIGMOID COLECTOMY, LAPAROSCOPIC   Surgeon: Wilfredo Palencia MD   Assistants(s):  The assistance of Michela Hoyt CNP was necessary for performance of the stapled colorectal anastomosis   Anesthesia: General    Estimated blood loss: Less than 100 ml    Total IV fluids: (See anesthesia record)   Blood transfusion: No transfusion was given during surgery   Total urine output: (See anesthesia record)   Drains: Sam-Graff   Specimens:  Sigmoid colon   Implants: None   Findings: Feculent peritonitis with gross peritonitis throughout the abdomen..  Modifier 22 secondary to patient body habitus with morbid obesity, gross peritonitis, and significant colonic distention rendering the surgery markedly more difficult than a typical elective laparoscopic sigmoid colectomy   Complications: None.   Condition: Stable       Description of procedure:  The patient was brought to the operating room where after induction of general anesthesia with endotracheal intubation she was positioned in lithotomy with both arms tucked.  She was then prepped and draped in sterile fashion.  After procedural timeout, we began by accessing the abdomen adjacent to the umbilicus with a 5 mm optical trocar.  After insufflation 3 additional trochars were placed across the right side of the abdomen.  On initial survey of the abdomen, we could see purulent fluid throughout the abdomen.  Examining the colon, we are able to see a perforated*colic ulcer on the sigmoid colon which is markedly distended and impacted with stool.  Due to the distention of the colon and small bowel from the colonic obstruction, we were unable to see clearly into the pelvis.  The sigmoid colon was far too large with impacted stool to maneuver this successfully with  laparoscopic instruments.  We therefore elected to place an Asad wound retractor and GelPort in the low midline for a hand-assist.      With the GelPort in place, we were able to pull the sigmoid colon out of the pelvis and feel distal to the impacted stool, we could feel slightly edematous but otherwise healthy rectosigmoid junction.  There was no gross mass to suggest an underlying malignancy.  We proceeded to divide the peritoneum along the white line of Toldt on the left side with electrocautery.  The sigmoid mesentery was then dissected off the underlying retroperitoneum, Gerota's fascia and left ureter with a combination of blunt dissection and LigaSure.  This dissection was carried up to the level of the splenic flexure, again mobilizing the descending colon mesentery off the underlying Gerota's fascia.  Inferiorly this dissection was carried down to the peritoneal reflection, bluntly sweeping posterior to the superior rectum and distal sigmoid colon in the avascular plane until we reached the right side.  With the colonic mesentery this mobilized, we then reflected the colon to the left.  The peritoneum along the right side of the sigmoid mesocolon was divided with electrocautery and then LigaSure extending down into the pelvis.  A site at the level of the peritoneal reflection was isolated, and the adjoining mesocolon was divided with LigaSure.  We then proceeded to divide the proximal rectum at this location with 2 firings of the blue load 60 mm laparoscopic stapler.  With the rectum this divided, we proceeded to mobilize and divide the sigmoid colon, taking care to isolate the splenic artery and vein which were doubly clipped before being divided.  A site on the descending colon was selected that appeared well vascularized and devoid of significant inflammatory changes.  The mesentery to this site was divided intracorporeally with the LigaSure.  At this point we proceeded to size the rectum, with a 33  mm EEA stapler sizer easily making it to the staple line.  At this point we proceeded to towel off around our Asad GelPort.  The GelPort portion was removed and the surgical specimen was removed through the Asad wound retractor.  A colotomy was made just distal to our preselected site on the descending colon for division.  A 31 mm EEA stapler anvil was advanced proximally into the descending colon through the colotomy, and the colon was then stapled with 2 firings of the 60 mm blue load laparoscopic stapler proximal to the colotomy.  The stem of the anvil was then brought out through the anterior tenia 3 cm proximal to the staple line.  A 3-0 silk pursestring was placed around the anvil to keep it from moving.  The colon was then reduced back into the intra-abdominal space and the GelPort reattached and abdomen insufflated.  The EEA stapler was brought in the rectum and the spike advanced through the rectal wall anterior to the staple line.  This was then fitted with the anvil and after ensuring appropriate orientation of the colon the stapler was fired.  A leak test was then performed which was negative.  A 19 round DAVIS drain was then brought in through the right upper quadrant port site and placed posterior to the colorectal anastomosis.  We then proceeded to irrigate the abdomen in all 4 quadrants and pelvis with 2 L of normal saline until the effluent was coming back clear.  The fascia at the right lower quadrant port site was then closed with an 0 Vicryl suture.  All trochars were then removed.  At this point we exchanged instruments and gowns for closing tray and remove the Asad GelPort, and proceeded to close the fascia with 2 running 0 PDS sutures.  The subcutaneous fatty tissues were then irrigated and the skin closed with staples over a blue vessel loop.    Wilfredo Palencia MD, FACS  [phone]  [facility]  General and Bariatric Surgery

## 2023-01-29 NOTE — CONSULTS
"General Surgery Consultation  Suzy Gómez MRN# [id number]   Age/Sex: 77 year old female YOB: 1945     Reason for consult: 1. Perforation of colon (H)    2. Refusal of blood transfusions as patient is [employer]            Requesting physician: Alis                   Assessment and Plan:   Assessment:  Abdominal pain with CT evidence of sigmoid colon perforation. Laparoscopic colectomy scheduled for this afternoon. Family and patient are amendable to the surgical plan. We are aware that patient will not accept any blood components.    I have seen and examined the patient, and agree with or have addended the assessment and plan as outlined above.  I discussed with the patient and her family the different potential options for outcome from surgery including resection with end colostomy, resection with primary anastomosis and diverting ileostomy, and right resection and primary anastomosis.  The latter 2 are less likely given the amount of colonic distention and likely contamination in her abdomen.  They all agree to proceed.    Wilfredo Palencia MD, FACS  [phone]  [facility]  General and Bariatric Surgery    Plan:  NPO  Surgery later today  Please notify surgery immediately if patient worsens or hemodynamically declines          Chief Complaint:     Chief Complaint   Patient presents with     Abdominal Pain        History is obtained from the patient and family    HPI:   Suzy Gómez is a 77 year old female who presents with severe abdominal pain that has been intermittent for \"months\" but increasing for the past few days.  Past medical history significant for colitis, angiodysplasia of colon, GERD, hypertension, hyperparathyroidism and DM2.  Patient was seen in the ED 4 days ago, diagnosed with colitis and went home per patient request after having a large BM. She states that this was her only BM in the last week. She can not remember when she last was able to pass gas.  " Patient states she has been eating normally and denies nausea and vomiting.  She also denies fever and chills at home.  Patient unable to answer further questions as she states the pain is too severe at this time.     CT of the abdomen was performed and shows extraluminal gas that is tracking proximal indicative of an uncontained perforation.  Some free fluid is also present.            Past Medical History:     Past Medical History:   Diagnosis Date     Diabetes (H)      Dyslipidemia      Heart disease      Hypertension      KARYN (obstructive sleep apnea)      Thyroid disease               Past Surgical History:   History reviewed. No pertinent surgical history.          Social History:    reports that she quit smoking about 13 years ago. Her smoking use included cigarettes. She has never used smokeless tobacco. She reports that she does not currently use alcohol. She reports that she does not use drugs.           Family History:   History reviewed. No pertinent family history.           Allergies:     Allergies   Allergen Reactions     Macrodantin [Nitrofurantoin] Unknown     Pt could not recall              Medications:     Prior to Admission medications    Medication Sig Start Date End Date Taking? Authorizing Provider   amLODIPine (NORVASC) 2.5 MG tablet Take 2.5 mg by mouth daily 9/3/22   Reported, Patient   aspirin (ASA) 81 MG EC tablet Take 81 mg by mouth daily 7/28/22   Reported, Patient   docusate sodium (COLACE) 100 MG capsule Take 1 capsule (100 mg) by mouth 2 times daily 1/26/23   Tiffany Brar MD   furosemide (LASIX) 20 MG tablet Take 20 mg by mouth daily 6/4/21   Reported, Patient   glipiZIDE (GLUCOTROL) 10 MG tablet Take 10 mg by mouth 2 times daily (before meals) 9/26/22   Reported, Patient   ibuprofen (ADVIL/MOTRIN) 200 MG capsule Take 200-400 mg by mouth every 8 hours as needed for fever, inflammatory pain or moderate pain (4-6)    Unknown, Entered By History   JARDIANCE 25 MG TABS tablet  Take 25 mg by mouth daily 9/26/22   Reported, Patient   levothyroxine (SYNTHROID/LEVOTHROID) 125 MCG tablet Take 125 mcg by mouth daily before breakfast 11/9/22   Reported, Patient   linagliptin (TRADJENTA) 5 MG TABS tablet Take 5 mg by mouth daily 6/4/21   Reported, Patient   losartan (COZAAR) 50 MG tablet Take 50 mg by mouth 2 times daily 6/4/21   Reported, Patient   metFORMIN (GLUCOPHAGE XR) 500 MG 24 hr tablet Take 1,000 mg by mouth 2 times daily (with meals) 6/4/21   Reported, Patient   oxybutynin ER (DITROPAN XL) 10 MG 24 hr tablet Take 20 mg by mouth daily 8/27/22   Reported, Patient   rosuvastatin (CRESTOR) 20 MG tablet Take 20 mg by mouth At Bedtime 9/16/22   Reported, Patient   venlafaxine (EFFEXOR XR) 75 MG 24 hr capsule Take 75 mg by mouth daily 6/4/21   Reported, Patient              Review of Systems:   The Review of Systems is negative other than noted in the HPI            Physical Exam:     Patient Vitals for the past 24 hrs:   BP Temp Temp src Pulse Resp SpO2 Weight   01/29/23 0800 (!) 148/106 -- -- 103 26 95 % --   01/29/23 0700 (!) 147/60 -- -- 105 28 100 % --   01/29/23 0646 138/61 -- -- -- -- -- --   01/29/23 0600 (!) 171/68 -- -- 111 -- 91 % --   01/29/23 0554 (!) 187/84 (!) 103.1  F (39.5  C) Oral 117 24 91 % 99.8 kg (220 lb 0.3 oz)        No intake or output data in the 24 hours ending 01/29/23 0918   Constitutional:   awake, confused and distressed, visibly uncomfortable, obese       Eyes:   Refuses to open eyes       ENT:   Normocephalic, without obvious abnormality, atraumatic, Lips, mucosa, and tongue dry        Lungs:   Normal respiratory effort, no accessory muscle use, requiring 2 L via nasal cannula with shallow respirations; lung sounds clear bilaterally       Cardiovascular:   S1-S2 with no murmur; sinus tachycardia       Abdomen:   Obese, soft, distended versus baseline body habitus, exquisite tenderness throughout abdomen with voluntary guarding with minimal palpation, this is  increased in the suprapubic and periumbilical regions; no rebound tenderness noted       Musculoskeletal:   No obvious swelling, bruising or deformity       Skin:   Skin color and texture normal for patient, no rashes or lesions              Data:         All imaging studies reviewed by me.  Discussed with Dr. Palencia    Results for orders placed or performed during the hospital encounter of 01/29/23 (from the past 24 hour(s))   CBC with platelets + differential    Narrative    The following orders were created for panel order CBC with platelets + differential.  Procedure                               Abnormality         Status                     ---------                               -----------         ------                     CBC with platelets and d...[[id number]]  Abnormal            Final result                 Please view results for these tests on the individual orders.   Comprehensive metabolic panel   Result Value Ref Range    Sodium 136 136 - 145 mmol/L    Potassium 3.8 3.4 - 5.3 mmol/L    Chloride 103 98 - 107 mmol/L    Carbon Dioxide (CO2) 19 (L) 22 - 29 mmol/L    Anion Gap 14 7 - 15 mmol/L    Urea Nitrogen 19.1 8.0 - 23.0 mg/dL    Creatinine 0.75 0.51 - 0.95 mg/dL    Calcium 9.7 8.8 - 10.2 mg/dL    Glucose 202 (H) 70 - 99 mg/dL    Alkaline Phosphatase 145 (H) 35 - 104 U/L    AST 28 10 - 35 U/L    ALT 18 10 - 35 U/L    Protein Total 7.0 6.4 - 8.3 g/dL    Albumin 3.3 (L) 3.5 - 5.2 g/dL    Bilirubin Total 0.3 <=1.2 mg/dL    GFR Estimate 83 >60 mL/min/1.73m2   Lactic acid whole blood   Result Value Ref Range    Lactic Acid 1.7 0.7 - 2.0 mmol/L   CBC with platelets and differential   Result Value Ref Range    WBC Count 11.1 (H) 4.0 - 11.0 10e3/uL    RBC Count 4.04 3.80 - 5.20 10e6/uL    Hemoglobin 11.6 (L) 11.7 - 15.7 g/dL    Hematocrit 37.3 35.0 - 47.0 %    MCV 92 78 - 100 fL    MCH 28.7 26.5 - 33.0 pg    MCHC 31.1 (L) 31.5 - 36.5 g/dL    RDW 14.1 10.0 - 15.0 %    Platelet Count 478 (H) 150 - 450 10e3/uL     % Neutrophils 72 %    % Lymphocytes 17 %    % Monocytes 9 %    % Eosinophils 0 %    % Basophils 0 %    % Immature Granulocytes 2 %    NRBCs per 100 WBC 0 <1 /100    Absolute Neutrophils 8.2 1.6 - 8.3 10e3/uL    Absolute Lymphocytes 1.9 0.8 - 5.3 10e3/uL    Absolute Monocytes 1.0 0.0 - 1.3 10e3/uL    Absolute Eosinophils 0.0 0.0 - 0.7 10e3/uL    Absolute Basophils 0.0 0.0 - 0.2 10e3/uL    Absolute Immature Granulocytes 0.2 <=0.4 10e3/uL    Absolute NRBCs 0.0 10e3/uL   Lipase   Result Value Ref Range    Lipase 45 13 - 60 U/L   [place] Draw    Narrative    The following orders were created for panel order [place] Draw.  Procedure                               Abnormality         Status                     ---------                               -----------         ------                     Extra Blue Top Tube[[id number]]                              Final result               Extra Red Top Tube[[id number]]                               Final result                 Please view results for these tests on the individual orders.   Extra Blue Top Tube   Result Value Ref Range    Hold Specimen JIC    Extra Red Top Tube   Result Value Ref Range    Hold Specimen JIC    Ketone Beta-Hydroxybutyrate Quantitative   Result Value Ref Range    Ketone (Beta-Hydroxybutyrate) Quantitative 0.70 (H) <=0.30 mmol/L   Extra Tube    Narrative    The following orders were created for panel order Extra Tube.  Procedure                               Abnormality         Status                     ---------                               -----------         ------                     Extra Heparinized Syringe[[id number]]                        Final result               Extra Green Top (Lithium...[[id number]]                      Final result                 Please view results for these tests on the individual orders.   Extra Heparinized Syringe   Result Value Ref Range    Hold Specimen JIC    Extra Green Top (Lithium Heparin) ON ICE   Result  Value Ref Range    Hold Specimen [facility]    CT Chest w/o Contrast    Narrative    EXAM: CT CHEST WITHOUT CONTRAST  LOCATION: [facility]  DATE/TIME: 01/29/2023, 6:46 AM    INDICATION: Fever and cough.  COMPARISON: 11/26/2022 - CT chest, abdomen and pelvis.    TECHNIQUE: Note that this study was performed in conjunction with a CT scan of the abdomen and pelvis. Refer to a separate report for findings from that study. CT chest without IV contrast. Multiplanar reformats were obtained. Dose reduction techniques   were used.  CONTRAST: None.    FINDINGS:    LUNGS AND PLEURA: Minimal emphysematous changes in the lungs. Slight interstitial thickening in bilateral lung bases. A few curvilinear opacities in the periphery of both lungs likely represent atelectasis and/or scarring. The lungs are otherwise clear.   Small left Bochdalek hernia containing fat    MEDIASTINUM/AXILLAE: Atherosclerotic calcification in the thoracic aorta.    CORONARY ARTERY CALCIFICATION: Present.    MUSCULOSKELETAL: No acute findings.    UPPER ABDOMEN: A few foci of extraluminal gas are present in the upper abdomen.      Impression    IMPRESSION:   1.  Slight interstitial thickening in bilateral lung bases. This is nonspecific, but most likely relates to interstitial pulmonary edema.  2.  No other findings suspicious for acute pulmonary disease.  3.  A few foci of extraluminal gas scattered within the nondependent aspect of the upper abdomen. In the absence of recent surgery, this is consistent with a bowel perforation. Refer to the report from the CT scan of the abdomen and pelvis performed in   conjunction with this study for additional details.    The findings were called to Dr. Snell by Dr. Anderson on 01/29/2023 at 0700 hours.       CTA Abdomen Pelvis with Contrast   Result Value Ref Range    Radiologist flags (AA)      Sigmoid colonic inflammation with areas of extraluminal gas, new from 1/25/2023 concerning for sigmoid  colonic perforation    Narrative    EXAM: CTA ABDOMEN PELVIS WITH CONTRAST  LOCATION: [facility]  DATE/TIME: 1/29/2023 6:48 AM    INDICATION: recurrent colitis, distended abdomen pain out of proportion.  COMPARISON: There are study dated 1/25/  TECHNIQUE: CT angiogram abdomen pelvis during arterial phase of injection of IV contrast. 2D and 3D MIP reconstructions were performed by the CT technologist. Dose reduction techniques were used.  CONTRAST: isovue 370 100ml    FINDINGS:  ANGIOGRAM ABDOMEN/PELVIS: The abdominal aorta is normal in size and caliber throughout with scattered atherosclerotic calcifications noted. The iliac and femoral vessels are normal in size and caliber and well-opacified. Celiac and SMA and TONY arise   normally and are well opacified at this time    LOWER CHEST: Lung bases are clear. Heart is enlarged, similar prior exam.    HEPATOBILIARY: Diffuse hepatic steatosis. No significant mass. No bile duct dilatation. Calcified gallstones are again seen within the gallbladder.    PANCREAS: No significant mass, duct dilatation, or inflammatory change.    SPLEEN: Normal size.    ADRENAL GLANDS: No significant nodules.    KIDNEYS/BLADDER: No significant mass, stone, or hydronephrosis.    BOWEL: Mural thickening and pericolonic inflammation of the sigmoid colon is again noted, similar in appearance to prior exam with increased inflammation seen in the proximal sigmoid colon. No pericolonic abscess seen at this time. Stool is seen   throughout the inflamed sigmoid colon as well as scattered throughout the large bowel without evidence of large bowel obstruction. Adjacent to the proximal sigmoid colon is free fluid with foci of gas which are extraluminal and new from the prior study   (image 1:15, series 5). Additional foci of extraluminal gas are seen within the mesenteric fat anteriorly in the upper abdomen (image 57, series 5). There are scattered diverticula seen throughout the  colon. Air-fluid levels are seen scattered through   multiple loops of small bowel with mild dilatation seen in the proximal jejunum in the upper abdomen, these mildly dilated loops of small bowel decompressed gradually distally without a clear transition point.    LYMPH NODES: Increased Number of pericolonic lymph nodes are again seen adjacent to the sigmoid colon.    PELVIC ORGANS: No pelvic masses.    MUSCULOSKELETAL: Unchanged      Impression    IMPRESSION:  1.  Mural thickening of the sigmoid colon with pericolonic inflammation and stranding, slightly worsened from prior exam with punctate foci of gas seen adjacent to the inflamed colon as well as tracking in the anterior abdominal wall concerning for   sigmoid colonic perforation. No organized intra-abdominal fluid collection such as abscess is seen at this time. Given the long-standing inflammation in this region an underlying neoplastic process cannot be excluded.  2.  Scattered air-fluid levels and mildly dilated loops of proximal jejunum which decompresses gradually without a transition point consistent favored to reflect reactive ileus   3.  Hepatic steatosis  4.  Cholelithiasis      [Critical Result: Sigmoid colonic inflammation with areas of extraluminal gas, new from 1/25/2023 concerning for sigmoid colonic perforation]    Finding was identified on 1/29/2023 6:56 AM.     DR. Snlel was contacted by me on 1/29/2023 7:08 AM and verbalized understanding of the critical result.    Asymptomatic Influenza A/B & SARS-CoV2 (COVID-19) Virus PCR Multiplex Nasopharyngeal    Specimen: Nasopharyngeal; Swab   Result Value Ref Range    Influenza A PCR Negative Negative    Influenza B PCR Negative Negative    RSV PCR Negative Negative    SARS CoV2 PCR Negative Negative    Narrative    Testing was performed using the Xpert Xpress CoV2/Flu/RSV Assay on the [employer] GeneXpert Instrument. This test should be ordered for the detection of SARS-CoV-2 and influenza viruses  in individuals who meet clinical and/or epidemiological criteria. Test performance is unknown in asymptomatic patients. This test is for in vitro diagnostic use under the FDA EUA for laboratories certified under CLIA to perform high or moderate complexity testing. This test has not been FDA cleared or approved. A negative result does not rule out the presence of PCR inhibitors in the specimen or target RNA in concentration below the limit of detection for the assay. If only one viral target is positive but coinfection with multiple targets is suspected, the sample should be re-tested with another FDA cleared, approved, or authorized test, if coinfection would change clinical management. This test was validated by the [facility]. These laboratories are certified under the Clinical Laboratory Improvement Amendments of 1988 (CLIA-88) as qualified to perform high complexity laboratory testing.        HERBIE Briggs CNP

## 2023-01-29 NOTE — ED NOTES
"Pt presents NAD. SKIN: NWD.   HEENT: normocephalic, PERRL, clear x 3.   CHEST: symmetrical rise, CEBBS, no CP, pt appears tachypneic.  ABD: Pt c/o generalized abdominal pain, rated \"6\" out of 10, and bloating; denies nausea, vomiting, and diarrhea.  Pt states she hasn't had a BM for \"a long time.\"  EXT: LESTER x 4  Rest of exam unremarkable.     "

## 2023-01-29 NOTE — ANESTHESIA PREPROCEDURE EVALUATION
Anesthesia Pre-Procedure Evaluation    Patient: Suzy Gómez   MRN: [id number] : 1945        Procedure : Procedure(s):  COLECTOMY, LAPAROSCOPIC          Past Medical History:   Diagnosis Date     Diabetes (H)      Dyslipidemia      Heart disease      Hypertension      KARYN (obstructive sleep apnea)      Thyroid disease       History reviewed. No pertinent surgical history.   Allergies   Allergen Reactions     Macrodantin [Nitrofurantoin] Unknown     Pt could not recall      Social History     Tobacco Use     Smoking status: Former     Types: Cigarettes     Quit date:      Years since quittin.0     Smokeless tobacco: Never   Substance Use Topics     Alcohol use: Not Currently      Wt Readings from Last 1 Encounters:   23 99.8 kg (220 lb 0.3 oz)        Anesthesia Evaluation            ROS/MED HX  ENT/Pulmonary:     (+) sleep apnea,     Neurologic:  - neg neurologic ROS  (-) no seizures and no CVA   Cardiovascular:     (+) hypertension-----    METS/Exercise Tolerance:     Hematologic:     (+) anemia,     Musculoskeletal:  - neg musculoskeletal ROS     GI/Hepatic:     (+) GERD, bowel prep,     Renal/Genitourinary:  - neg Renal ROS     Endo:     (+) type II DM, thyroid problem, hypothyroidism, Obesity,     Psychiatric/Substance Use:  - neg psychiatric ROS     Infectious Disease:  - neg infectious disease ROS     Malignancy:       Other:            Physical Exam    Airway  airway exam normal      Mallampati: II   TM distance: > 3 FB   Neck ROM: full   Mouth opening: > 3 cm    Respiratory Devices and Support         Dental           Cardiovascular   cardiovascular exam normal       Rhythm and rate: regular and normal     Pulmonary   pulmonary exam normal        breath sounds clear to auscultation           OUTSIDE LABS:  CBC:   Lab Results   Component Value Date    WBC 11.1 (H) 2023    WBC 14.1 (H) 2023    HGB 11.6 (L) 2023    HGB 11.3 (L) 2023    HCT 37.3 2023    HCT  36.1 01/25/2023     (H) 01/29/2023     01/25/2023     BMP:   Lab Results   Component Value Date     01/29/2023     (L) 01/25/2023    POTASSIUM 3.8 01/29/2023    POTASSIUM 3.7 01/25/2023    CHLORIDE 103 01/29/2023    CHLORIDE 101 01/25/2023    CO2 19 (L) 01/29/2023    CO2 20 (L) 01/25/2023    BUN 19.1 01/29/2023    BUN 20.8 01/25/2023    CR 0.75 01/29/2023    CR 0.80 01/25/2023     (H) 01/29/2023     (H) 01/29/2023     COAGS: No results found for: PTT, INR, FIBR  POC: No results found for: BGM, HCG, HCGS  HEPATIC:   Lab Results   Component Value Date    ALBUMIN 3.3 (L) 01/29/2023    PROTTOTAL 7.0 01/29/2023    ALT 18 01/29/2023    AST 28 01/29/2023    ALKPHOS 145 (H) 01/29/2023    BILITOTAL 0.3 01/29/2023     OTHER:   Lab Results   Component Value Date    LACT 1.7 01/29/2023    ALTON 9.7 01/29/2023    LIPASE 45 01/29/2023       Anesthesia Plan    ASA Status:  3, emergent       Anesthesia Type: General.     - Airway: ETT   Induction: RSI.           Consents    Anesthesia Plan(s) and associated risks, benefits, and realistic alternatives discussed. Questions answered and patient/representative(s) expressed understanding.    - Discussed:     - Discussed with:  Patient         Postoperative Care    Pain management: Multi-modal analgesia.   PONV prophylaxis: Ondansetron (or other 5HT-3), Dexamethasone or Solumedrol     Comments:                Domingo Hsu MD

## 2023-01-29 NOTE — ANESTHESIA POSTPROCEDURE EVALUATION
Patient: Suzy Gómez    Procedure: Procedure(s):  SIGMOID COLECTOMY, LAPAROSCOPIC       Anesthesia Type:  General    Note:  Disposition: Inpatient   Postop Pain Control: Uneventful            Sign Out: Well controlled pain   PONV: No   Neuro/Psych: Uneventful            Sign Out: Acceptable/Baseline neuro status   Airway/Respiratory: Uneventful            Sign Out: Acceptable/Baseline resp. status   CV/Hemodynamics: Uneventful            Sign Out: Acceptable CV status; No obvious hypovolemia; No obvious fluid overload   Other NRE: NONE   DID A NON-ROUTINE EVENT OCCUR? No           Last vitals:  Vitals Value Taken Time   /59 01/29/23 1530   Temp 36.8  C (98.2  F) 01/29/23 1520   Pulse 94 01/29/23 1537   Resp 19 01/29/23 1537   SpO2 93 % 01/29/23 1537   Vitals shown include unvalidated device data.    Electronically Signed By: Domingo Hsu MD  January 29, 2023  3:38 PM

## 2023-01-29 NOTE — H&P
[facility]    History and Physical       Date of Admission:  1/29/2023    Assessment & Plan: SL    Suzy Gómez is a 77 year old female admitted on 1/29/2023. She has history of hypertension, hyperlipidemia, type 2 diabetes, and hypothyroidism.  Patient presented with abdominal pain and distention for the past 2 to 3 days.  She was diagnosed with sigmoid colonic perforation.  Patient was admitted to the hospital for further evaluation/management.    #.  Sepsis 2/2 sigmoid colonic perforation  - CT showed punctate foci of gas seen adjacent to the inflamed colon as well as tracking in the anterior abdominal wall concerning for sigmoid colonic perforation.  - Patient presented to on 11/26/2022, where she was diagnosed with acute distal colitis.  At that time she left AMA before evaluation could be finished.  She presented again on 1/25/2023.  At that time she was diagnosed with colitis and was discharged home.   - General surgery consulted from the ED.  - NPO for now.  - Continue Zosyn every 8 hours.   - Blood cultures collected.  - Telemetry monitoring  - Continuous Pulse OX  - Monitor I/O  - IV fluids for hydration  - No blood products as patient is Taoist (confirmed with the patient).    #.  Type 2 diabetes  - Sliding scale insulin  - Monitor blood glucose every 4 hours  - Hypoglycemia protocol in place    #.  Hypertension  - Hold home meds, given sepsis.    #.  Hypothyroidism  - Continue levothyroxine    #.  Hyperlipidemia  - Continue PTA med, once med rec is complete.      Diet: NPO per Anesthesia Guidelines for Procedure/Surgery Except for: Meds    DVT Prophylaxis: Pneumatic Compression Devices  Box Catheter: Not present  Cardiac Monitoring: None  Code Status: Full Code          Clinically Significant Risk Factors Present on Admission          # Hypercalcemia: corrected calcium is >10.1, will monitor as appropriate    # Hypoalbuminemia: Lowest albumin = 3.3 g/dL at  "1/29/2023  6:02 AM, will monitor as appropriate      # Obesity: Estimated body mass index is 36.61 kg/m  as calculated from the following:    Height as of 1/25/23: 1.651 m (5' 5\").    Weight as of this encounter: 99.8 kg (220 lb 0.3 oz).       Disposition Plan   Expected discharge: 4 - 7 days, recommended to prior living arrangement once abdominal pain improves.     Guerline Whelan MD  [facility]    ______________________________________________________________________    Chief Complaint   Abdominal pain    History of Present Illness   Suzy Gómez is a 77 year old female admitted on 1/29/2023. She has history of hypertension, hyperlipidemia, type 2 diabetes, and hypothyroidism.  Patient presented with abdominal pain and distention for the past 2 to 3 days. Of note, patient presented to on 11/26/2022 with abdominal pain, where she was diagnosed with acute distal colitis.  At that time she left AMA before evaluation could be finished.  She presented again on 1/25/2023 for abdominal pain. She was again diagnosed with colitis and was discharged home.     She presented today with acute severe abdominal pain and distention. No BM for 1 week. No nausea or vomiting. She reported fevers and chills. No chest pain or sob.     In the ED, she had CT abdomen/pelvis which showed punctate foci of gas seen adjacent to the inflamed colon as well as tracking in the anterior abdominal wall concerning for sigmoid colonic perforation.    She was given Vanc/Zosyn. 1L of IV fluids. Pain medications. Gen Surgery was contacted.      Past Medical History    Past Medical History:   Diagnosis Date     Diabetes (H)      Dyslipidemia      Heart disease      Hypertension      KARYN (obstructive sleep apnea)      Thyroid disease        Past Surgical History   History reviewed. No pertinent surgical history.    Prior to Admission Medications   Prior to Admission Medications   Prescriptions Last Dose Informant Patient " Reported? Taking?   JARDIANCE 25 MG TABS tablet   Yes No   Sig: Take 25 mg by mouth daily   amLODIPine (NORVASC) 2.5 MG tablet   Yes No   Sig: Take 2.5 mg by mouth daily   aspirin (ASA) 81 MG EC tablet   Yes No   Sig: Take 81 mg by mouth daily   docusate sodium (COLACE) 100 MG capsule   No No   Sig: Take 1 capsule (100 mg) by mouth 2 times daily   furosemide (LASIX) 20 MG tablet   Yes No   Sig: Take 20 mg by mouth daily   glipiZIDE (GLUCOTROL) 10 MG tablet   Yes No   Sig: Take 10 mg by mouth 2 times daily (before meals)   ibuprofen (ADVIL/MOTRIN) 200 MG capsule   Yes No   Sig: Take 200-400 mg by mouth every 8 hours as needed for fever, inflammatory pain or moderate pain (4-6)   levothyroxine (SYNTHROID/LEVOTHROID) 125 MCG tablet   Yes No   Sig: Take 125 mcg by mouth daily before breakfast   linagliptin (TRADJENTA) 5 MG TABS tablet   Yes No   Sig: Take 5 mg by mouth daily   losartan (COZAAR) 50 MG tablet   Yes No   Sig: Take 50 mg by mouth 2 times daily   metFORMIN (GLUCOPHAGE XR) 500 MG 24 hr tablet   Yes No   Sig: Take 1,000 mg by mouth 2 times daily (with meals)   oxybutynin ER (DITROPAN XL) 10 MG 24 hr tablet   Yes No   Sig: Take 20 mg by mouth daily   rosuvastatin (CRESTOR) 20 MG tablet   Yes No   Sig: Take 20 mg by mouth At Bedtime   venlafaxine (EFFEXOR XR) 75 MG 24 hr capsule   Yes No   Sig: Take 75 mg by mouth daily      Facility-Administered Medications: None        Review of Systems    The 10 point Review of Systems is negative other than noted in the HPI or here.      Physical Exam   Vital Signs: Temp: (!) 103.1  F (39.5  C) Temp src: Oral BP: (!) 147/60 Pulse: 105   Resp: 28 SpO2: 100 % O2 Device: Nasal cannula Oxygen Delivery: 2 LPM  Weight: 220 lbs .31 oz    Constitutional: Awake, alert, cooperative. Mild distress.  HENT: Normocephalic.   Neck: ROM normal.  Pulmonary: No increased work of breathing, good air exchange, clear to auscultation bilaterally.  Cardiovascular: Regular rate and rhythm, normal  S1 and S2.  Abdominal: Soft, Moderate distention. Tenderness to touch.   Musculoskletal: Full range of motion noted.  No LE edema.  Skin: warm.   Neurological: Awake, alert, oriented to name, place and time.     Psych: Appropriate mood and affect.    Data     I have personally reviewed the following data over the past 24 hrs:    11.1 (H)  \   11.6 (L)   / 478 (H)     136 103 19.1 /  202 (H)   3.8 19 (L) 0.75 \       ALT: 18 AST: 28 AP: 145 (H) TBILI: 0.3   ALB: 3.3 (L) TOT PROTEIN: 7.0 LIPASE: 45       Procal: N/A CRP: N/A Lactic Acid: 1.7

## 2023-01-30 LAB
ALBUMIN SERPL BCG-MCNC: 2.5 G/DL (ref 3.5–5.2)
ALP SERPL-CCNC: 92 U/L (ref 35–104)
ALT SERPL W P-5'-P-CCNC: 13 U/L (ref 10–35)
ANION GAP SERPL CALCULATED.3IONS-SCNC: 12 MMOL/L (ref 7–15)
AST SERPL W P-5'-P-CCNC: 20 U/L (ref 10–35)
BASOPHILS # BLD AUTO: 0 10E3/UL (ref 0–0.2)
BASOPHILS NFR BLD AUTO: 0 %
BILIRUB SERPL-MCNC: 0.3 MG/DL
BUN SERPL-MCNC: 21.4 MG/DL (ref 8–23)
CALCIUM SERPL-MCNC: 8.7 MG/DL (ref 8.8–10.2)
CHLORIDE SERPL-SCNC: 107 MMOL/L (ref 98–107)
CREAT SERPL-MCNC: 1.1 MG/DL (ref 0.51–0.95)
DEPRECATED HCO3 PLAS-SCNC: 20 MMOL/L (ref 22–29)
ENTEROCOCCUS FAECALIS: NOT DETECTED
ENTEROCOCCUS FAECIUM: NOT DETECTED
EOSINOPHIL # BLD AUTO: 0 10E3/UL (ref 0–0.7)
EOSINOPHIL NFR BLD AUTO: 0 %
ERYTHROCYTE [DISTWIDTH] IN BLOOD BY AUTOMATED COUNT: 14.8 % (ref 10–15)
GFR SERPL CREATININE-BSD FRML MDRD: 52 ML/MIN/1.73M2
GLUCOSE BLDC GLUCOMTR-MCNC: 163 MG/DL (ref 70–99)
GLUCOSE BLDC GLUCOMTR-MCNC: 166 MG/DL (ref 70–99)
GLUCOSE BLDC GLUCOMTR-MCNC: 182 MG/DL (ref 70–99)
GLUCOSE BLDC GLUCOMTR-MCNC: 190 MG/DL (ref 70–99)
GLUCOSE BLDC GLUCOMTR-MCNC: 197 MG/DL (ref 70–99)
GLUCOSE BLDC GLUCOMTR-MCNC: 200 MG/DL (ref 70–99)
GLUCOSE BLDC GLUCOMTR-MCNC: 231 MG/DL (ref 70–99)
GLUCOSE SERPL-MCNC: 199 MG/DL (ref 70–99)
HCT VFR BLD AUTO: 38.8 % (ref 35–47)
HGB BLD-MCNC: 11.7 G/DL (ref 11.7–15.7)
IMM GRANULOCYTES # BLD: 0.2 10E3/UL
IMM GRANULOCYTES NFR BLD: 1 %
LISTERIA SPECIES (DETECTED/NOT DETECTED): NOT DETECTED
LYMPHOCYTES # BLD AUTO: 0.9 10E3/UL (ref 0.8–5.3)
LYMPHOCYTES NFR BLD AUTO: 6 %
MCH RBC QN AUTO: 28.5 PG (ref 26.5–33)
MCHC RBC AUTO-ENTMCNC: 30.2 G/DL (ref 31.5–36.5)
MCV RBC AUTO: 94 FL (ref 78–100)
MONOCYTES # BLD AUTO: 1 10E3/UL (ref 0–1.3)
MONOCYTES NFR BLD AUTO: 6 %
NEUTROPHILS # BLD AUTO: 13 10E3/UL (ref 1.6–8.3)
NEUTROPHILS NFR BLD AUTO: 87 %
NRBC # BLD AUTO: 0 10E3/UL
NRBC BLD AUTO-RTO: 0 /100
PLATELET # BLD AUTO: 459 10E3/UL (ref 150–450)
POTASSIUM SERPL-SCNC: 4.3 MMOL/L (ref 3.4–5.3)
PROT SERPL-MCNC: 5.7 G/DL (ref 6.4–8.3)
RBC # BLD AUTO: 4.11 10E6/UL (ref 3.8–5.2)
SODIUM SERPL-SCNC: 139 MMOL/L (ref 136–145)
STAPHYLOCOCCUS AUREUS: NOT DETECTED
STAPHYLOCOCCUS EPIDERMIDIS: NOT DETECTED
STAPHYLOCOCCUS LUGDUNENSIS: NOT DETECTED
STAPHYLOCOCCUS SPECIES: NOT DETECTED
STREPTOCOCCUS AGALACTIAE: NOT DETECTED
STREPTOCOCCUS ANGINOSUS GROUP: NOT DETECTED
STREPTOCOCCUS PNEUMONIAE: NOT DETECTED
STREPTOCOCCUS PYOGENES: NOT DETECTED
STREPTOCOCCUS SPECIES: NOT DETECTED
WBC # BLD AUTO: 14.9 10E3/UL (ref 4–11)

## 2023-01-30 PROCEDURE — 258N000003 HC RX IP 258 OP 636: Performed by: SURGERY

## 2023-01-30 PROCEDURE — 36415 COLL VENOUS BLD VENIPUNCTURE: CPT | Performed by: SURGERY

## 2023-01-30 PROCEDURE — 80053 COMPREHEN METABOLIC PANEL: CPT | Performed by: SURGERY

## 2023-01-30 PROCEDURE — 120N000001 HC R&B MED SURG/OB

## 2023-01-30 PROCEDURE — 85004 AUTOMATED DIFF WBC COUNT: CPT | Performed by: SURGERY

## 2023-01-30 PROCEDURE — 250N000011 HC RX IP 250 OP 636: Performed by: SURGERY

## 2023-01-30 PROCEDURE — 250N000013 HC RX MED GY IP 250 OP 250 PS 637: Performed by: INTERNAL MEDICINE

## 2023-01-30 PROCEDURE — 250N000013 HC RX MED GY IP 250 OP 250 PS 637: Performed by: HOSPITALIST

## 2023-01-30 PROCEDURE — 250N000011 HC RX IP 250 OP 636: Performed by: HOSPITALIST

## 2023-01-30 PROCEDURE — 250N000013 HC RX MED GY IP 250 OP 250 PS 637: Performed by: SURGERY

## 2023-01-30 PROCEDURE — 99231 SBSQ HOSP IP/OBS SF/LOW 25: CPT | Performed by: INTERNAL MEDICINE

## 2023-01-30 RX ORDER — HYDROXYZINE HYDROCHLORIDE 10 MG/1
10 TABLET, FILM COATED ORAL 3 TIMES DAILY PRN
Status: DISCONTINUED | OUTPATIENT
Start: 2023-01-30 | End: 2023-02-18 | Stop reason: HOSPADM

## 2023-01-30 RX ORDER — HYDROMORPHONE HYDROCHLORIDE 1 MG/ML
0.5 INJECTION, SOLUTION INTRAMUSCULAR; INTRAVENOUS; SUBCUTANEOUS
Status: DISCONTINUED | OUTPATIENT
Start: 2023-01-30 | End: 2023-02-02

## 2023-01-30 RX ADMIN — INSULIN ASPART 1 UNITS: 100 INJECTION, SOLUTION INTRAVENOUS; SUBCUTANEOUS at 19:59

## 2023-01-30 RX ADMIN — Medication 1 MG: at 01:52

## 2023-01-30 RX ADMIN — HYDROMORPHONE HYDROCHLORIDE 1 MG: 1 INJECTION, SOLUTION INTRAMUSCULAR; INTRAVENOUS; SUBCUTANEOUS at 08:25

## 2023-01-30 RX ADMIN — SODIUM CHLORIDE 1000 ML: 9 INJECTION, SOLUTION INTRAVENOUS at 15:46

## 2023-01-30 RX ADMIN — INSULIN ASPART 1 UNITS: 100 INJECTION, SOLUTION INTRAVENOUS; SUBCUTANEOUS at 16:56

## 2023-01-30 RX ADMIN — PIPERACILLIN AND TAZOBACTAM 3.38 G: 3; .375 INJECTION, POWDER, LYOPHILIZED, FOR SOLUTION INTRAVENOUS at 17:55

## 2023-01-30 RX ADMIN — HYDROMORPHONE HYDROCHLORIDE 0.5 MG: 1 INJECTION, SOLUTION INTRAMUSCULAR; INTRAVENOUS; SUBCUTANEOUS at 02:46

## 2023-01-30 RX ADMIN — INSULIN ASPART 1 UNITS: 100 INJECTION, SOLUTION INTRAVENOUS; SUBCUTANEOUS at 00:33

## 2023-01-30 RX ADMIN — INSULIN ASPART 1 UNITS: 100 INJECTION, SOLUTION INTRAVENOUS; SUBCUTANEOUS at 08:33

## 2023-01-30 RX ADMIN — HYDROMORPHONE HYDROCHLORIDE 1 MG: 1 INJECTION, SOLUTION INTRAMUSCULAR; INTRAVENOUS; SUBCUTANEOUS at 06:31

## 2023-01-30 RX ADMIN — SODIUM CHLORIDE, POTASSIUM CHLORIDE, SODIUM LACTATE AND CALCIUM CHLORIDE: 600; 310; 30; 20 INJECTION, SOLUTION INTRAVENOUS at 08:26

## 2023-01-30 RX ADMIN — ENOXAPARIN SODIUM 40 MG: 40 INJECTION SUBCUTANEOUS at 08:25

## 2023-01-30 RX ADMIN — HYDROMORPHONE HYDROCHLORIDE 1 MG: 1 INJECTION, SOLUTION INTRAMUSCULAR; INTRAVENOUS; SUBCUTANEOUS at 10:28

## 2023-01-30 RX ADMIN — PIPERACILLIN AND TAZOBACTAM 3.38 G: 3; .375 INJECTION, POWDER, LYOPHILIZED, FOR SOLUTION INTRAVENOUS at 08:23

## 2023-01-30 RX ADMIN — ASPIRIN 81 MG: 81 TABLET, COATED ORAL at 08:24

## 2023-01-30 RX ADMIN — HYDROMORPHONE HYDROCHLORIDE 0.5 MG: 1 INJECTION, SOLUTION INTRAMUSCULAR; INTRAVENOUS; SUBCUTANEOUS at 14:45

## 2023-01-30 RX ADMIN — HYDROMORPHONE HYDROCHLORIDE 0.5 MG: 1 INJECTION, SOLUTION INTRAMUSCULAR; INTRAVENOUS; SUBCUTANEOUS at 23:00

## 2023-01-30 RX ADMIN — LEVOTHYROXINE SODIUM 137 MCG: 0.11 TABLET ORAL at 06:31

## 2023-01-30 RX ADMIN — HYDROMORPHONE HYDROCHLORIDE 0.5 MG: 1 INJECTION, SOLUTION INTRAMUSCULAR; INTRAVENOUS; SUBCUTANEOUS at 03:50

## 2023-01-30 RX ADMIN — PIPERACILLIN AND TAZOBACTAM 3.38 G: 3; .375 INJECTION, POWDER, LYOPHILIZED, FOR SOLUTION INTRAVENOUS at 00:37

## 2023-01-30 RX ADMIN — HYDROMORPHONE HYDROCHLORIDE 0.5 MG: 1 INJECTION, SOLUTION INTRAMUSCULAR; INTRAVENOUS; SUBCUTANEOUS at 21:03

## 2023-01-30 RX ADMIN — HYDROMORPHONE HYDROCHLORIDE 0.5 MG: 1 INJECTION, SOLUTION INTRAMUSCULAR; INTRAVENOUS; SUBCUTANEOUS at 00:31

## 2023-01-30 RX ADMIN — HYDROMORPHONE HYDROCHLORIDE 0.5 MG: 1 INJECTION, SOLUTION INTRAMUSCULAR; INTRAVENOUS; SUBCUTANEOUS at 17:42

## 2023-01-30 RX ADMIN — HYDROXYZINE HYDROCHLORIDE 10 MG: 10 TABLET ORAL at 05:00

## 2023-01-30 RX ADMIN — AMLODIPINE BESYLATE 2.5 MG: 2.5 TABLET ORAL at 08:24

## 2023-01-30 RX ADMIN — SODIUM CHLORIDE, POTASSIUM CHLORIDE, SODIUM LACTATE AND CALCIUM CHLORIDE: 600; 310; 30; 20 INJECTION, SOLUTION INTRAVENOUS at 17:55

## 2023-01-30 RX ADMIN — INSULIN ASPART 1 UNITS: 100 INJECTION, SOLUTION INTRAVENOUS; SUBCUTANEOUS at 04:19

## 2023-01-30 RX ADMIN — SODIUM CHLORIDE, POTASSIUM CHLORIDE, SODIUM LACTATE AND CALCIUM CHLORIDE: 600; 310; 30; 20 INJECTION, SOLUTION INTRAVENOUS at 08:24

## 2023-01-30 ASSESSMENT — ACTIVITIES OF DAILY LIVING (ADL)
ADLS_ACUITY_SCORE: 22
ADLS_ACUITY_SCORE: 39
ADLS_ACUITY_SCORE: 35
WEAR_GLASSES_OR_BLIND: NO
FALL_HISTORY_WITHIN_LAST_SIX_MONTHS: NO
DIFFICULTY_EATING/SWALLOWING: NO
ADLS_ACUITY_SCORE: 22
CHANGE_IN_FUNCTIONAL_STATUS_SINCE_ONSET_OF_CURRENT_ILLNESS/INJURY: NO
ADLS_ACUITY_SCORE: 39
DOING_ERRANDS_INDEPENDENTLY_DIFFICULTY: NO
ADLS_ACUITY_SCORE: 39
ADLS_ACUITY_SCORE: 39
ADLS_ACUITY_SCORE: 22
TOILETING_ISSUES: NO
CONCENTRATING,_REMEMBERING_OR_MAKING_DECISIONS_DIFFICULTY: NO
DRESSING/BATHING_DIFFICULTY: NO
ADLS_ACUITY_SCORE: 22
ADLS_ACUITY_SCORE: 22
WALKING_OR_CLIMBING_STAIRS_DIFFICULTY: NO
ADLS_ACUITY_SCORE: 35
ADLS_ACUITY_SCORE: 22

## 2023-01-30 NOTE — PROGRESS NOTES
ASSESSMENT:  1. Perforation of colon (H)    2. Refusal of blood transfusions as patient is Hoahaoism        Suzy Gómez is a 77 year old female who is s/p laparoscopic sigmoid colectomy and washout on January 29, 2023 for perforated stercoral ulcer secondary to a distal sigmoid stricture.  Host of medical issues that are going to delay her recovery.  It was a heredia to put her together primarily without a diversion.  We will require a lot of motivation and encouragement to get moving.  I have a high suspicion that she will need a SNF or TCU upon discharge.    PLAN:  1.  N.p.o. except for ice chips and meds  2.  IV antibiotic coverage for likely 7 to 10 days postoperatively, presuming no complications  3.  As much ambulation as we can muster  4.  Avoiding NSAIDs for analgesia to minimize risk of anastomotic leak      SUBJECTIVE:   No adverse events overnight.  Patient is alternating between what she describes as extreme pain and being unconscious after receiving narcotic medication.  Having some serous oozing from her left laparoscopic trocar site.  Requiring significant nursing and aide support to get out of bed.      Patient Vitals for the past 24 hrs:   BP Temp Temp src Pulse Resp SpO2   01/30/23 0737 129/58 98.2  F (36.8  C) Axillary 99 20 92 %   01/30/23 0448 126/58 97.8  F (36.6  C) Oral 61 22 93 %   01/29/23 2337 126/61 98.2  F (36.8  C) Axillary 99 22 94 %   01/29/23 2257 -- -- -- 99 -- 92 %   01/29/23 2148 (!) 141/65 100.2  F (37.9  C) Axillary 99 22 91 %   01/29/23 2045 -- -- -- -- -- 94 %   01/29/23 2009 -- 99.6  F (37.6  C) Axillary 98 20 92 %   01/29/23 1922 129/57 99  F (37.2  C) Axillary 95 24 95 %   01/29/23 1759 131/61 99.5  F (37.5  C) Axillary 89 24 94 %   01/29/23 1730 126/56 99  F (37.2  C) Axillary 88 24 94 %   01/29/23 1700 129/60 -- -- 90 24 94 %   01/29/23 1630 128/60 97.9  F (36.6  C) -- 93 24 91 %   01/29/23 1615 138/64 -- -- 93 14 93 %   01/29/23 1600 139/63 -- -- 92 16 93 %    01/29/23 1559 139/63 -- -- 93 16 93 %   01/29/23 1546 (!) 149/68 -- -- 95 15 92 %   01/29/23 1545 (!) 149/68 98.2  F (36.8  C) Temporal 95 15 93 %   01/29/23 1541 -- -- -- 96 17 93 %   01/29/23 1530 (!) 155/59 -- -- 94 18 95 %   01/29/23 1520 (!) 142/65 98.2  F (36.8  C) Temporal 92 23 98 %         PHYSICAL EXAM:  GEN: Appears uncomfortable,  LUNGS: CTA bilaterally  CV:RRR  ABD: Obese, not appreciably distended compared to yesterday.  Incision with dressings in place, some serous appearing leakage from the left periumbilical port site.  Drains: DAVIS drain with serosanguineous output, fairly minimal output since placement yesterday.  Output by Drain (mL) 01/28/23 0700 - 01/28/23 1459 01/28/23 1500 - 01/28/23 2259 01/28/23 2300 - 01/29/23 0659 01/29/23 0700 - 01/29/23 1459 01/29/23 1500 - 01/29/23 2259 01/29/23 2300 - 01/30/23 0659 01/30/23 0700 - 01/30/23 1100   Closed/Suction Drain 1 Right RUQ Bulb 19 [place] citizen     45 20       EXT:No cyanosis, edema or obvious abnormalities    01/29 0700 - 01/30 0659  In: 1707 [I.V.:1707]  Out: 1215 [Urine:1050; Drains:65]    Admission on 01/29/2023   Component Date Value     Culture 01/29/2023 No growth after 1 day      Culture 01/29/2023 No growth after 1 day      Sodium 01/29/2023 136      Potassium 01/29/2023 3.8      Chloride 01/29/2023 103      Carbon Dioxide (CO2) 01/29/2023 19 (L)      Anion Gap 01/29/2023 14      Urea Nitrogen 01/29/2023 19.1      Creatinine 01/29/2023 0.75      Calcium 01/29/2023 9.7      Glucose 01/29/2023 202 (H)      Alkaline Phosphatase 01/29/2023 145 (H)      AST 01/29/2023 28      ALT 01/29/2023 18      Protein Total 01/29/2023 7.0      Albumin 01/29/2023 3.3 (L)      Bilirubin Total 01/29/2023 0.3      GFR Estimate 01/29/2023 83      Lactic Acid 01/29/2023 1.7      WBC Count 01/29/2023 11.1 (H)      RBC Count 01/29/2023 4.04      Hemoglobin 01/29/2023 11.6 (L)      Hematocrit 01/29/2023 37.3      MCV 01/29/2023 92      MCH 01/29/2023 28.7      MCHC  01/29/2023 31.1 (L)      RDW 01/29/2023 14.1      Platelet Count 01/29/2023 478 (H)      % Neutrophils 01/29/2023 72      % Lymphocytes 01/29/2023 17      % Monocytes 01/29/2023 9      % Eosinophils 01/29/2023 0      % Basophils 01/29/2023 0      % Immature Granulocytes 01/29/2023 2      NRBCs per 100 WBC 01/29/2023 0      Absolute Neutrophils 01/29/2023 8.2      Absolute Lymphocytes 01/29/2023 1.9      Absolute Monocytes 01/29/2023 1.0      Absolute Eosinophils 01/29/2023 0.0      Absolute Basophils 01/29/2023 0.0      Absolute Immature Granul* 01/29/2023 0.2      Absolute NRBCs 01/29/2023 0.0      Lipase 01/29/2023 45      Hold Specimen 01/29/2023 JIC      Hold Specimen 01/29/2023 JIC      Ketone (Beta-Hydroxybuty* 01/29/2023 0.70 (H)      Radiologist flags 01/29/2023 Sigmoid colonic inflammation with areas of extraluminal gas, new from 1/25/2023 concerning for sigmoid colonic perforation (AA)      Influenza A PCR 01/29/2023 Negative      Influenza B PCR 01/29/2023 Negative      RSV PCR 01/29/2023 Negative      SARS CoV2 PCR 01/29/2023 Negative      Hold Specimen 01/29/2023 JIC      Hold Specimen 01/29/2023 JIC      GLUCOSE BY METER POCT 01/29/2023 168 (H)      GLUCOSE BY METER POCT 01/29/2023 204 (H)      Creatinine 01/29/2023 0.87      GFR Estimate 01/29/2023 69      GLUCOSE BY METER POCT 01/29/2023 224 (H)      Sodium 01/30/2023 139      Potassium 01/30/2023 4.3      Chloride 01/30/2023 107      Carbon Dioxide (CO2) 01/30/2023 20 (L)      Anion Gap 01/30/2023 12      Urea Nitrogen 01/30/2023 21.4      Creatinine 01/30/2023 1.10 (H)      Calcium 01/30/2023 8.7 (L)      Glucose 01/30/2023 199 (H)      Alkaline Phosphatase 01/30/2023 92      AST 01/30/2023 20      ALT 01/30/2023 13      Protein Total 01/30/2023 5.7 (L)      Albumin 01/30/2023 2.5 (L)      Bilirubin Total 01/30/2023 0.3      GFR Estimate 01/30/2023 52 (L)      GLUCOSE BY METER POCT 01/30/2023 231 (H)      GLUCOSE BY METER POCT 01/30/2023 197 (H)       WBC Count 01/30/2023 14.9 (H)      RBC Count 01/30/2023 4.11      Hemoglobin 01/30/2023 11.7      Hematocrit 01/30/2023 38.8      MCV 01/30/2023 94      MCH 01/30/2023 28.5      MCHC 01/30/2023 30.2 (L)      RDW 01/30/2023 14.8      Platelet Count 01/30/2023 459 (H)      % Neutrophils 01/30/2023 87      % Lymphocytes 01/30/2023 6      % Monocytes 01/30/2023 6      % Eosinophils 01/30/2023 0      % Basophils 01/30/2023 0      % Immature Granulocytes 01/30/2023 1      NRBCs per 100 WBC 01/30/2023 0      Absolute Neutrophils 01/30/2023 13.0 (H)      Absolute Lymphocytes 01/30/2023 0.9      Absolute Monocytes 01/30/2023 1.0      Absolute Eosinophils 01/30/2023 0.0      Absolute Basophils 01/30/2023 0.0      Absolute Immature Granul* 01/30/2023 0.2      Absolute NRBCs 01/30/2023 0.0      GLUCOSE BY METER POCT 01/30/2023 182 (H)           Wilfredo Palencia MD

## 2023-01-30 NOTE — PHARMACY-ADMISSION MEDICATION HISTORY
Pharmacy Note - Admission Medication History    Pertinent Provider Information:     -Med list updated best as able at this time based on review of clinic records and pharmacy fill history. Multiple attempts have been made to interview the patient in-person, but she remains somnolent following anesthesia and is unable to provide medication history information.  -Of note, a 90-day supply of empagliflozin and rosuvastatin was last filled in September 2022. Both of these medications remain on her clinic list, but it is unclear if they are being taken routinely due to gap in fill history. All other prescription medications have been filled recently and are consistent with her primary clinic list.   -When patient status improves, pharmacy can follow-up if necessary to ensure accuracy of list.   ______________________________________________________________________    Prior To Admission (PTA) med list completed and updated in EMR.       PTA Med List   Medication Sig Last Dose     amLODIPine (NORVASC) 2.5 MG tablet Take 2.5 mg by mouth daily 1/28/2023     ammonium lactate (AMLACTIN) 12 % external cream Apply topically At Bedtime To bottom of feet Unknown     aspirin (ASA) 81 MG EC tablet Take 81 mg by mouth daily 1/28/2023     docusate sodium (COLACE) 100 MG capsule Take 1 capsule (100 mg) by mouth 2 times daily Unknown     empagliflozin (JARDIANCE) 25 MG TABS tablet Take 25 mg by mouth daily Unknown     glipiZIDE (GLUCOTROL) 10 MG tablet Take 10 mg by mouth 2 times daily (before meals) 1/28/2023     ibuprofen (ADVIL/MOTRIN) 200 MG capsule Take 200-400 mg by mouth every 8 hours as needed for fever, inflammatory pain or moderate pain (4-6)      levothyroxine (SYNTHROID/LEVOTHROID) 137 MCG tablet Take 137 mcg by mouth daily before breakfast 1/28/2023     linagliptin (TRADJENTA) 5 MG TABS tablet Take 5 mg by mouth daily 1/28/2023     losartan (COZAAR) 50 MG tablet Take 50 mg by mouth 2 times daily 1/28/2023     metFORMIN  (GLUCOPHAGE XR) 500 MG 24 hr tablet Take 1,000 mg by mouth 2 times daily (with meals) Past Week     oxybutynin ER (DITROPAN XL) 10 MG 24 hr tablet Take 20 mg by mouth daily 1/28/2023     rosuvastatin (CRESTOR) 20 MG tablet Take 20 mg by mouth At Bedtime Unknown     venlafaxine (EFFEXOR XR) 75 MG 24 hr capsule Take 75 mg by mouth daily 1/28/2023       Information source(s): Clinic records, Hospital records and [facility]  Method of interview communication: N/A    Summary of Changes to PTA Med List  New: Ammonium lactate cream  Discontinued: Furosemide  Changed: Levothyroxine increased 125 mcg -> 137 mcg    Patient was asked about OTC/herbal products specifically.  PTA med list reflects this.    In the past week, patient estimated taking medication this percent of the time:  Unable to assess.    Medication Affordability: Unable to assess.    Patient does not anticipate needing any multi-use medications during admission.    The information provided in this note is only as accurate as the sources available at the time of the update(s).    Thank you for the opportunity to participate in the care of this patient.    Estrella Montemayor [facility]  1/29/2023 8:40 PM

## 2023-01-30 NOTE — PLAN OF CARE
Problem: Plan of Care - These are the overarching goals to be used throughout the patient stay.    Goal: Plan of Care Review  Description: The Plan of Care Review/Shift note should be completed every shift.  The Outcome Evaluation is a brief statement about your assessment that the patient is improving, declining, or no change.  This information will be displayed automatically on your shift note.  Outcome: Progressing  Flowsheets (Taken 1/30/2023 1417)  Plan of Care Reviewed With: patient  Goal: Absence of Hospital-Acquired Illness or Injury  Intervention: Identify and Manage Fall Risk  Recent Flowsheet Documentation  Taken 1/30/2023 0800 by Rhea Vale RN  Safety Promotion/Fall Prevention:   activity supervised   patient and family education   bed alarm on   clutter free environment maintained  Intervention: Prevent Skin Injury  Recent Flowsheet Documentation  Taken 1/30/2023 0900 by Rhea Vale RN  Body Position: semi-prone  Taken 1/30/2023 0800 by Rhea Vale RN  Body Position: semi-prone  Goal: Optimal Comfort and Wellbeing  Intervention: Monitor Pain and Promote Comfort  Recent Flowsheet Documentation  Taken 1/30/2023 1300 by Rhea Vale RN  Pain Management Interventions: repositioned  Taken 1/30/2023 1028 by Rhea Vale RN  Pain Management Interventions: medication (see MAR)  Taken 1/30/2023 0825 by Rhea Vale RN  Pain Management Interventions: medication (see MAR)  Goal: Readiness for Transition of Care  Intervention: Mutually Develop Transition Plan  Recent Flowsheet Documentation  Taken 1/30/2023 1100 by Rhea Vale RN  Equipment Currently Used at Home: none   Goal Outcome Evaluation:      Plan of Care Reviewed With: patient    Patient reporting a lot of pain throughout am.  Dilaudid given every 2 hours.  Some improvement later in the shift and patient declined pain med in the afternoon.  Pain reported was located abdomen surgical area.     Patient heavy assist  of 2 when up to chair with walker and gait belt.  Patient's mobility impacted by pain levels.  Patient unable to continue with further activity.  Kalani placed under patient in chair in case unable to get back to bed.  Discussed patient trying to slowly increase mobility.      Lap site left side oozing large amounts of serous fluid.  Dr. Palencia updated in am when rounding. Abd and 4x4 dressings saturated changed x2 this shift.  Will continue to monitor.  Site WNL except amount of serous drainage.  Continue to monitor site.

## 2023-01-30 NOTE — PLAN OF CARE
Goal Outcome Evaluation:      Plan of Care Reviewed With: patient    Overall Patient Progress: no changeOverall Patient Progress: no change    Outcome Evaluation: increasing O2 needs post op, currently on 5L oxymask to keep sats greater than 90%, pain moderate 7/10, difficult to balnce pain controll with sedation.  Shallow resp, cough is weak and congested.

## 2023-01-30 NOTE — SIGNIFICANT EVENT
Dr. Hylton updated with patient's low urine output 75 ml, DAVIS drain 20 ml.  VS stable.  MD reported will bolus see new orders.  Lap site still draining large amount of serous fluid.  Dressing changed again.  Dr. hylton updated.

## 2023-01-30 NOTE — PLAN OF CARE
Problem: Plan of Care - These are the overarching goals to be used throughout the patient stay.    Goal: Optimal Comfort and Wellbeing  Outcome: Progressing  Intervention: Monitor Pain and Promote Comfort  Recent Flowsheet Documentation  Taken 1/30/2023 0031 by Melissa Zhao RN  Pain Management Interventions:    medication (see MAR)    cold applied   Goal Outcome Evaluation:               Main shift complain has been pain. MD was paged for uncontrol pain. IV Dilaudid order was increased to 1mg q2 hrs and Atarax for anxiety. Blood sugar checks q4hrs were 231 and 197, pt got insulin coverage, Pt complained of feeling hungry, writer explained she is NPO and she did not like it.   Dressing was soiled and ch changed. Will cont to monitor.     Writer spoke to Dr. Palencia from general surgery to update how pt spent the night and regarding to heavy drainage from lap sites. No new orders. Pt will be seen during morning rounds.       O2 fluctuating from 97% to 88% on 4 L per oxymask.Pt constantly removing her mask and keeps complaining of the pulse-oxymeter box beeping.    NSR on telemetry.

## 2023-01-30 NOTE — PROGRESS NOTES
[facility]    Hospitalist Progress Note    Assessment & Plan   77 year old female who was admitted on 1/29/2023 with perf colon.     Impression:   Principal Problem:    Perforation of Colonic Stericolic Ulcer -- S/P Sigmoid Colectomy 1/29/30  Active Problems:    HTN (hypertension)    DM type 2, Hgb A1C 7.6 on 11/23/22    Refusal of blood transfusions as patient is [employer]      Plan:  Continue postop  Care, discussed with surgery, advance diet slowly (?NPO for 2-3 days).     DVT Prophylaxis: Pneumatic Compression Devices  Code Status: Full Code    Disposition: Expected discharge in several days.     Brian Ojeda MD  Pager [phone]  Cell Phone [phone]  Text Page (7am to 6pm)    Interval History   No flatus yet.  Feels distended. Rates her abd pain 2-3 out of 10.     Physical Exam   Temp: 98.2  F (36.8  C) Temp src: Axillary BP: 129/58 Pulse: 99   Resp: 20 SpO2: 92 % O2 Device: Oxymask Oxygen Delivery: 4 LPM  Vitals:    01/29/23 0554   Weight: 99.8 kg (220 lb 0.3 oz)     Vital Signs with Ranges  Temp:  [97.8  F (36.6  C)-100.2  F (37.9  C)] 98.2  F (36.8  C)  Pulse:  [61-99] 99  Resp:  [14-24] 20  BP: (126-155)/(56-68) 129/58  SpO2:  [91 %-98 %] 92 %  I/O last 3 completed shifts:  In: 1707 [I.V.:1707]  Out: 1215 [Urine:1050; Drains:65; Blood:100]    # Pain Assessment:  Current Pain Score 1/30/2023   Patient currently in pain? yes   - Suzy is experiencing pain due to surgery.. Pain management was discussed and the plan was created in a collaborative fashion.  Suzy's response to the current recommendations: engaged  - Please see the plan for pain management as documented above      Constitutional: Awake, alert, cooperative, no apparent distress  Respiratory: Clear to auscultation bilaterally, no crackles or wheezing  Cardiovascular: Regular rate and rhythm, normal S1 and S2, and no murmur noted  GI: No bowel sounds, soft, distended, mild tenderness  Extrem: No calf  tenderness, no ankle edema  Neuro: Ox3, no focal motor or sensory deficits    Medications     lactated ringers 125 mL/hr at 01/30/23 0826       amLODIPine  2.5 mg Oral Daily     aspirin  81 mg Oral Daily     enoxaparin ANTICOAGULANT  40 mg Subcutaneous Q24H     insulin aspart  1-4 Units Subcutaneous Q4H     levothyroxine  137 mcg Oral QAM AC     piperacillin-tazobactam  3.375 g Intravenous Q8H     sodium chloride (PF)  3 mL Intracatheter Q8H     sodium chloride (PF)  3 mL Intracatheter Q8H       Data   Recent Labs   Lab 01/30/23  0832 01/30/23  0730 01/30/23  0417 01/29/23  2020 01/29/23  1702 01/29/23  0943 01/29/23  0602 01/25/23  2102   WBC  --  14.9*  --   --   --   --  11.1* 14.1*   HGB  --  11.7  --   --   --   --  11.6* 11.3*   MCV  --  94  --   --   --   --  92 93   PLT  --  459*  --   --   --   --  478* 326   NA  --  139  --   --   --   --  136 134*   POTASSIUM  --  4.3  --   --   --   --  3.8 3.7   CHLORIDE  --  107  --   --   --   --  103 101   CO2  --  20*  --   --   --   --  19* 20*   BUN  --  21.4  --   --   --   --  19.1 20.8   CR  --  1.10*  --   --  0.87  --  0.75 0.80   ANIONGAP  --  12  --   --   --   --  14 13   ALTON  --  8.7*  --   --   --   --  9.7 9.8   * 199* 197*   < >  --    < > 202* 165*   ALBUMIN  --  2.5*  --   --   --   --  3.3* 3.8   PROTTOTAL  --  5.7*  --   --   --   --  7.0 7.3   BILITOTAL  --  0.3  --   --   --   --  0.3 0.4   ALKPHOS  --  92  --   --   --   --  145* 87   ALT  --  13  --   --   --   --  18 11   AST  --  20  --   --   --   --  28 19   LIPASE  --   --   --   --   --   --  45 22    < > = values in this interval not displayed.       Imaging:   No results found for this or any previous visit (from the past 24 hour(s)).

## 2023-01-31 PROBLEM — G47.33 OSA (OBSTRUCTIVE SLEEP APNEA): Status: ACTIVE | Noted: 2023-01-31

## 2023-01-31 LAB
ACINETOBACTER SPECIES: NOT DETECTED
ANION GAP SERPL CALCULATED.3IONS-SCNC: 12 MMOL/L (ref 7–15)
BUN SERPL-MCNC: 30 MG/DL (ref 8–23)
CALCIUM SERPL-MCNC: 8.8 MG/DL (ref 8.8–10.2)
CHLORIDE SERPL-SCNC: 106 MMOL/L (ref 98–107)
CITROBACTER SPECIES: NOT DETECTED
CREAT SERPL-MCNC: 1.07 MG/DL (ref 0.51–0.95)
CTX-M: NORMAL
DEPRECATED HCO3 PLAS-SCNC: 19 MMOL/L (ref 22–29)
ENTEROBACTER SPECIES: NOT DETECTED
ERYTHROCYTE [DISTWIDTH] IN BLOOD BY AUTOMATED COUNT: 15 % (ref 10–15)
ESCHERICHIA COLI: NOT DETECTED
GFR SERPL CREATININE-BSD FRML MDRD: 54 ML/MIN/1.73M2
GLUCOSE BLDC GLUCOMTR-MCNC: 160 MG/DL (ref 70–99)
GLUCOSE BLDC GLUCOMTR-MCNC: 173 MG/DL (ref 70–99)
GLUCOSE BLDC GLUCOMTR-MCNC: 177 MG/DL (ref 70–99)
GLUCOSE BLDC GLUCOMTR-MCNC: 182 MG/DL (ref 70–99)
GLUCOSE BLDC GLUCOMTR-MCNC: 187 MG/DL (ref 70–99)
GLUCOSE BLDC GLUCOMTR-MCNC: 192 MG/DL (ref 70–99)
GLUCOSE SERPL-MCNC: 191 MG/DL (ref 70–99)
GLUCOSE SERPL-MCNC: 191 MG/DL (ref 70–99)
HCT VFR BLD AUTO: 37.8 % (ref 35–47)
HGB BLD-MCNC: 11.1 G/DL (ref 11.7–15.7)
IMP: NORMAL
KLEBSIELLA OXYTOCA: NOT DETECTED
KLEBSIELLA PNEUMONIAE: NOT DETECTED
KPC: NORMAL
MCH RBC QN AUTO: 28.6 PG (ref 26.5–33)
MCHC RBC AUTO-ENTMCNC: 29.4 G/DL (ref 31.5–36.5)
MCV RBC AUTO: 97 FL (ref 78–100)
NDM: NORMAL
OXA (DETECTED/NOT DETECTED): NORMAL
PLATELET # BLD AUTO: 451 10E3/UL (ref 150–450)
POTASSIUM SERPL-SCNC: 4 MMOL/L (ref 3.4–5.3)
PROTEUS SPECIES: NOT DETECTED
PSEUDOMONAS AERUGINOSA: NOT DETECTED
RBC # BLD AUTO: 3.88 10E6/UL (ref 3.8–5.2)
SODIUM SERPL-SCNC: 137 MMOL/L (ref 136–145)
VIM: NORMAL
WBC # BLD AUTO: 21 10E3/UL (ref 4–11)

## 2023-01-31 PROCEDURE — 250N000013 HC RX MED GY IP 250 OP 250 PS 637: Performed by: SURGERY

## 2023-01-31 PROCEDURE — 250N000013 HC RX MED GY IP 250 OP 250 PS 637: Performed by: INTERNAL MEDICINE

## 2023-01-31 PROCEDURE — 85027 COMPLETE CBC AUTOMATED: CPT | Performed by: INTERNAL MEDICINE

## 2023-01-31 PROCEDURE — 99231 SBSQ HOSP IP/OBS SF/LOW 25: CPT | Performed by: INTERNAL MEDICINE

## 2023-01-31 PROCEDURE — 258N000003 HC RX IP 258 OP 636: Performed by: INTERNAL MEDICINE

## 2023-01-31 PROCEDURE — 250N000013 HC RX MED GY IP 250 OP 250 PS 637

## 2023-01-31 PROCEDURE — 80048 BASIC METABOLIC PNL TOTAL CA: CPT | Performed by: INTERNAL MEDICINE

## 2023-01-31 PROCEDURE — 36415 COLL VENOUS BLD VENIPUNCTURE: CPT | Performed by: INTERNAL MEDICINE

## 2023-01-31 PROCEDURE — 250N000011 HC RX IP 250 OP 636: Performed by: HOSPITALIST

## 2023-01-31 PROCEDURE — 120N000001 HC R&B MED SURG/OB

## 2023-01-31 PROCEDURE — 250N000013 HC RX MED GY IP 250 OP 250 PS 637: Performed by: HOSPITALIST

## 2023-01-31 PROCEDURE — 250N000011 HC RX IP 250 OP 636: Performed by: SURGERY

## 2023-01-31 PROCEDURE — 258N000003 HC RX IP 258 OP 636: Performed by: SURGERY

## 2023-01-31 RX ORDER — SODIUM CHLORIDE 9 MG/ML
INJECTION, SOLUTION INTRAVENOUS CONTINUOUS
Status: DISCONTINUED | OUTPATIENT
Start: 2023-01-31 | End: 2023-02-03

## 2023-01-31 RX ORDER — VENLAFAXINE HYDROCHLORIDE 75 MG/1
75 CAPSULE, EXTENDED RELEASE ORAL DAILY
Status: DISCONTINUED | OUTPATIENT
Start: 2023-01-31 | End: 2023-02-14

## 2023-01-31 RX ADMIN — HYDROXYZINE HYDROCHLORIDE 10 MG: 10 TABLET ORAL at 20:46

## 2023-01-31 RX ADMIN — HYDROXYZINE HYDROCHLORIDE 10 MG: 10 TABLET ORAL at 00:37

## 2023-01-31 RX ADMIN — HYDROMORPHONE HYDROCHLORIDE 0.5 MG: 1 INJECTION, SOLUTION INTRAMUSCULAR; INTRAVENOUS; SUBCUTANEOUS at 04:23

## 2023-01-31 RX ADMIN — Medication 1 MG: at 00:37

## 2023-01-31 RX ADMIN — HYDROMORPHONE HYDROCHLORIDE 0.5 MG: 1 INJECTION, SOLUTION INTRAMUSCULAR; INTRAVENOUS; SUBCUTANEOUS at 18:59

## 2023-01-31 RX ADMIN — PIPERACILLIN AND TAZOBACTAM 3.38 G: 3; .375 INJECTION, POWDER, LYOPHILIZED, FOR SOLUTION INTRAVENOUS at 09:30

## 2023-01-31 RX ADMIN — INSULIN ASPART 1 UNITS: 100 INJECTION, SOLUTION INTRAVENOUS; SUBCUTANEOUS at 00:28

## 2023-01-31 RX ADMIN — PIPERACILLIN AND TAZOBACTAM 3.38 G: 3; .375 INJECTION, POWDER, LYOPHILIZED, FOR SOLUTION INTRAVENOUS at 17:05

## 2023-01-31 RX ADMIN — SODIUM CHLORIDE: 9 INJECTION, SOLUTION INTRAVENOUS at 06:48

## 2023-01-31 RX ADMIN — ASPIRIN 81 MG: 81 TABLET, COATED ORAL at 09:36

## 2023-01-31 RX ADMIN — PIPERACILLIN AND TAZOBACTAM 3.38 G: 3; .375 INJECTION, POWDER, LYOPHILIZED, FOR SOLUTION INTRAVENOUS at 00:18

## 2023-01-31 RX ADMIN — ONDANSETRON 4 MG: 2 INJECTION INTRAMUSCULAR; INTRAVENOUS at 04:42

## 2023-01-31 RX ADMIN — LEVOTHYROXINE SODIUM 137 MCG: 0.11 TABLET ORAL at 06:51

## 2023-01-31 RX ADMIN — ENOXAPARIN SODIUM 40 MG: 40 INJECTION SUBCUTANEOUS at 09:35

## 2023-01-31 RX ADMIN — HYDROMORPHONE HYDROCHLORIDE 0.5 MG: 1 INJECTION, SOLUTION INTRAMUSCULAR; INTRAVENOUS; SUBCUTANEOUS at 02:08

## 2023-01-31 RX ADMIN — INSULIN ASPART 1 UNITS: 100 INJECTION, SOLUTION INTRAVENOUS; SUBCUTANEOUS at 04:27

## 2023-01-31 RX ADMIN — HYDROMORPHONE HYDROCHLORIDE 0.5 MG: 1 INJECTION, SOLUTION INTRAMUSCULAR; INTRAVENOUS; SUBCUTANEOUS at 23:28

## 2023-01-31 RX ADMIN — VENLAFAXINE HYDROCHLORIDE 75 MG: 75 CAPSULE, EXTENDED RELEASE ORAL at 09:36

## 2023-01-31 RX ADMIN — AMLODIPINE BESYLATE 2.5 MG: 2.5 TABLET ORAL at 09:36

## 2023-01-31 RX ADMIN — HYDROMORPHONE HYDROCHLORIDE 0.5 MG: 1 INJECTION, SOLUTION INTRAMUSCULAR; INTRAVENOUS; SUBCUTANEOUS at 16:58

## 2023-01-31 RX ADMIN — HYDROMORPHONE HYDROCHLORIDE 0.5 MG: 1 INJECTION, SOLUTION INTRAMUSCULAR; INTRAVENOUS; SUBCUTANEOUS at 06:50

## 2023-01-31 RX ADMIN — SODIUM CHLORIDE, POTASSIUM CHLORIDE, SODIUM LACTATE AND CALCIUM CHLORIDE: 600; 310; 30; 20 INJECTION, SOLUTION INTRAVENOUS at 02:08

## 2023-01-31 RX ADMIN — SODIUM CHLORIDE: 9 INJECTION, SOLUTION INTRAVENOUS at 22:52

## 2023-01-31 RX ADMIN — SODIUM CHLORIDE: 9 INJECTION, SOLUTION INTRAVENOUS at 14:33

## 2023-01-31 RX ADMIN — HYDROMORPHONE HYDROCHLORIDE 0.5 MG: 1 INJECTION, SOLUTION INTRAMUSCULAR; INTRAVENOUS; SUBCUTANEOUS at 09:30

## 2023-01-31 RX ADMIN — Medication 1 MG: at 23:28

## 2023-01-31 ASSESSMENT — ACTIVITIES OF DAILY LIVING (ADL)
ADLS_ACUITY_SCORE: 24
ADLS_ACUITY_SCORE: 22
ADLS_ACUITY_SCORE: 24
ADLS_ACUITY_SCORE: 22
ADLS_ACUITY_SCORE: 24

## 2023-01-31 NOTE — PROGRESS NOTES
Patient having severe pain. Restless. Given PRN dilaudid. Abdomen is round and distended. Bowel sounds hypoactive.    Box patent. Had 175cc out between 1400 and 2000 after fluid bolus.    Patient on 1L of O2 nasal cannula, goal >88%.    Patient only oriented to self and time. Orientation fluctuates. Able to re-orient patient briefly. Patient is able to verbalize needs. Intermittent disorganized thinking.    Patient frustrated that she cannot eat or drink anything. Frequent requests for food. Educated patient on plan of care and reasons for remaining NPO. Will re-educate as needed. On IVF.

## 2023-01-31 NOTE — PLAN OF CARE
"Goal Outcome Evaluation:      Problem: Bowel Resection  Goal: Absence of Bleeding  Outcome: Progressing  Goal: Effective Bowel Motility and Elimination  Outcome: Progressing  Goal: Fluid and Electrolyte Balance  Outcome: Progressing  Goal: Absence of Infection Signs and Symptoms  Outcome: Progressing  Goal: Fluid, Electrolyte and Nutrition Balance  Outcome: Progressing  Goal: Anesthesia/Sedation Recovery  Outcome: Progressing  Intervention: Optimize Anesthesia Recovery  Recent Flowsheet Documentation  Taken 1/30/2023 1600 by Tiffany Ziegler RN  Safety Promotion/Fall Prevention:   activity supervised   patient and family education   bed alarm on   clutter free environment maintained  Goal: Acceptable Pain Control  Outcome: Progressing  Goal: Nausea and Vomiting Relief  Outcome: Progressing  Goal: Effective Urinary Elimination  Outcome: Progressing    10/10 pain reported. PRN 0.5 mg of dilaudid given. Per report 1mg made patient very confused. Patient remains very confused on 0.5mg. Patient is unable to follow most directions and continually asks why we are \"standing in\" her \"driveway pooping\". Reorientation was attempted numerous times but almost immediately patient was stuck in that mindset again. Dr. Palencia was updated. Scheduled zoysn given. New dressing placed on IV. Kalani lifted from chair to bed.     Tiffany Ziegler RN   "

## 2023-01-31 NOTE — PROVIDER NOTIFICATION
Dr. Ha notified of positive blood cultures for gram positive bacilli. Cultures were from 1/29 @0611. No new orders, pt already on IV abx.

## 2023-01-31 NOTE — PLAN OF CARE
Problem: Bowel Resection  Goal: Effective Bowel Motility and Elimination  Outcome: Progressing  Bowel sounds hypoactive. Not passing flatus. Abdomen is round and distended.    Left lower incision is draining large amounts of serous drainage. Dressing changed x1.     Right DAVIS drain site is draining moderate amount of serous drainage as well, dress changed x1.      Problem: Bowel Resection  Goal: Acceptable Pain Control  Outcome: Progressing  Patient reporting moderate to severe pain consistently overnight. Given PRN dilaudid multiple times. Given PRN hydroxyzine and melatonin as well. Patient restless and anxious. Patient did not sleep overnight. Patient's breaths are shallow due to abdominal pain.     On 1L of O2 nasal cannula, otherwise VSS. Attempted to wean to room air, dropped to 86%, unable to tolerate.    Problem: Bowel Resection  Goal: Nausea and Vomiting Relief  Outcome: Progressing  Intervention: Prevent or Manage Nausea and Vomiting  Recent Flowsheet Documentation  Taken 1/31/2023 0423 by Brynn Britton RN  Nausea/Vomiting Interventions:    antiemetic    cool cloth applied    nausea triggers minimized    slow deep breathing encouraged    stimuli minimized  Patient reported nausea x1 overnight. Gagging but no emesis. Given PRN zofran.    Problem: Risk for Delirium  Goal: Improved Attention and Thought Clarity  Outcome: Progressing  Patient's orientation fluctuates. Intermittent moments of disorganized thinking and confusion but once re-oriented patient is able to verbalize needs. Disoriented to place and situation. Continuing to monitor.

## 2023-01-31 NOTE — PROVIDER NOTIFICATION
Dr. Ha notified of further positive blood cultures from second set yesterday morning (1/29 @0602). Resulted as gram negative bacilli. No new orders, zosyn will cover per provider.

## 2023-01-31 NOTE — PLAN OF CARE
Problem: Plan of Care - These are the overarching goals to be used throughout the patient stay.    Goal: Plan of Care Review  Description: The Plan of Care Review/Shift note should be completed every shift.  The Outcome Evaluation is a brief statement about your assessment that the patient is improving, declining, or no change.  This information will be displayed automatically on your shift note.  Outcome: Progressing   Goal Outcome Evaluation:      Plan of Care Reviewed With: patient      Bowel function slow to improve.  Patient's bowel sounds hypoactive, no flatus or BM.  Patient verbalizing irritation she is NPO, throughout the shift repeatedly asking random staff members to get her food or a menu.  Patient frequently explained why she is NPO.    Patient's pain and mobility improved today.  Patient ambulating with assist of 2 with walker and gait belt.  Steady gait. Tolerated small walk and up to chair most of shift.  Tolerated fairly well, mild SOB with activity.  O2 saturation maintained above 88%.  Poor use of IS.  Needs frequent encouragement to complete activity and IS.      Lap sites still oozing serous fluid.  Dressings changed ABD's to sites.  Surgical sites WNL except drainage.  No significant changes noted.  ABD binder placed on patient with activity.

## 2023-01-31 NOTE — PROGRESS NOTES
ASSESSMENT:  1. Perforation of colon (H)    2. 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  Host of medical issues that are going to delay her recovery.  It was a heredia to put her together primarily without a diversion.  We will require a lot of motivation and encouragement to get moving.  I have a high suspicion that she will need a SNF or TCU upon discharge.    PLAN:  1.  N.p.o. except for ice chips and meds until demonstrating evidence of bowel function  2.  IV antibiotic coverage for likely 7 to 10 days postoperatively, presuming no complications  3.  As much ambulation as we can muster  4.  Avoiding NSAIDs for analgesia to minimize risk of anastomotic leak      SUBJECTIVE:   No adverse events overnight.  Urine output responded appropriately to IV fluid bolus.      Patient Vitals for the past 24 hrs:   BP Temp Temp src Pulse Resp SpO2   01/31/23 0755 132/83 98.8  F (37.1  C) Oral 83 20 92 %   01/31/23 0422 139/63 98.6  F (37  C) Oral 99 20 95 %   01/30/23 2304 130/61 99.6  F (37.6  C) Oral 100 20 93 %   01/30/23 2108 -- -- -- -- -- 90 %   01/30/23 2103 -- -- -- -- -- (!) 89 %   01/30/23 2040 (!) 151/102 98.5  F (36.9  C) Oral 104 22 95 %   01/30/23 1523 135/60 98.3  F (36.8  C) Oral 105 19 93 %   01/30/23 1300 -- -- -- -- -- 92 %   01/30/23 1100 (!) 142/63 99.6  F (37.6  C) Oral 106 20 92 %         PHYSICAL EXAM:  GEN: Appears uncomfortable but less so than yesterday  LUNGS: CTA bilaterally, increased work of breathing at baseline  CV:RRR  ABD: Obese, mild distention not appreciably more so compared to yesterday.  Incision with dressings in place, saturated with serous drainage.    Drains: DAVIS drain with serosanguineous output, scant output  Output by Drain (mL) 01/29/23 0700 - 01/29/23 1459 01/29/23 1500 -  01/29/23 2259 01/29/23 2300 - 01/30/23 0659 01/30/23 0700 - 01/30/23 1459 01/30/23 1500 - 01/30/23 2259 01/30/23 2300 - 01/31/23 0659 01/31/23 0700 - 01/31/23 1036   Closed/Suction Drain 1 Right RUQ Bulb 19 [place]  45 20 20  20       EXT:No cyanosis, edema or obvious abnormalities    01/30 0700 - 01/31 0659  In: 4724.25 [I.V.:4724.25]  Out: 765 [Urine:725; Drains:40]    Admission on 01/29/2023   Component Date Value     Culture 01/29/2023 No growth after 1 day      Culture 01/29/2023 No growth after 1 day      Sodium 01/29/2023 136      Potassium 01/29/2023 3.8      Chloride 01/29/2023 103      Carbon Dioxide (CO2) 01/29/2023 19 (L)      Anion Gap 01/29/2023 14      Urea Nitrogen 01/29/2023 19.1      Creatinine 01/29/2023 0.75      Calcium 01/29/2023 9.7      Glucose 01/29/2023 202 (H)      Alkaline Phosphatase 01/29/2023 145 (H)      AST 01/29/2023 28      ALT 01/29/2023 18      Protein Total 01/29/2023 7.0      Albumin 01/29/2023 3.3 (L)      Bilirubin Total 01/29/2023 0.3      GFR Estimate 01/29/2023 83      Lactic Acid 01/29/2023 1.7      WBC Count 01/29/2023 11.1 (H)      RBC Count 01/29/2023 4.04      Hemoglobin 01/29/2023 11.6 (L)      Hematocrit 01/29/2023 37.3      MCV 01/29/2023 92      MCH 01/29/2023 28.7      MCHC 01/29/2023 31.1 (L)      RDW 01/29/2023 14.1      Platelet Count 01/29/2023 478 (H)      % Neutrophils 01/29/2023 72      % Lymphocytes 01/29/2023 17      % Monocytes 01/29/2023 9      % Eosinophils 01/29/2023 0      % Basophils 01/29/2023 0      % Immature Granulocytes 01/29/2023 2      NRBCs per 100 WBC 01/29/2023 0      Absolute Neutrophils 01/29/2023 8.2      Absolute Lymphocytes 01/29/2023 1.9      Absolute Monocytes 01/29/2023 1.0      Absolute Eosinophils 01/29/2023 0.0      Absolute Basophils 01/29/2023 0.0      Absolute Immature Granul* 01/29/2023 0.2      Absolute NRBCs 01/29/2023 0.0      Lipase 01/29/2023 45      Hold Specimen 01/29/2023 [facility]      Hold Specimen 01/29/2023 [facility]       Ketone (Beta-Hydroxybuty* 01/29/2023 0.70 (H)      Radiologist flags 01/29/2023 Sigmoid colonic inflammation with areas of extraluminal gas, new from 1/25/2023 concerning for sigmoid colonic perforation (AA)      Influenza A PCR 01/29/2023 Negative      Influenza B PCR 01/29/2023 Negative      RSV PCR 01/29/2023 Negative      SARS CoV2 PCR 01/29/2023 Negative      Hold Specimen 01/29/2023 [facility]      Hold Specimen 01/29/2023 [facility]      GLUCOSE BY METER POCT 01/29/2023 168 (H)      GLUCOSE BY METER POCT 01/29/2023 204 (H)      Creatinine 01/29/2023 0.87      GFR Estimate 01/29/2023 69      GLUCOSE BY METER POCT 01/29/2023 224 (H)      Sodium 01/30/2023 139      Potassium 01/30/2023 4.3      Chloride 01/30/2023 107      Carbon Dioxide (CO2) 01/30/2023 20 (L)      Anion Gap 01/30/2023 12      Urea Nitrogen 01/30/2023 21.4      Creatinine 01/30/2023 1.10 (H)      Calcium 01/30/2023 8.7 (L)      Glucose 01/30/2023 199 (H)      Alkaline Phosphatase 01/30/2023 92      AST 01/30/2023 20      ALT 01/30/2023 13      Protein Total 01/30/2023 5.7 (L)      Albumin 01/30/2023 2.5 (L)      Bilirubin Total 01/30/2023 0.3      GFR Estimate 01/30/2023 52 (L)      GLUCOSE BY METER POCT 01/30/2023 231 (H)      GLUCOSE BY METER POCT 01/30/2023 197 (H)      WBC Count 01/30/2023 14.9 (H)      RBC Count 01/30/2023 4.11      Hemoglobin 01/30/2023 11.7      Hematocrit 01/30/2023 38.8      MCV 01/30/2023 94      MCH 01/30/2023 28.5      MCHC 01/30/2023 30.2 (L)      RDW 01/30/2023 14.8      Platelet Count 01/30/2023 459 (H)      % Neutrophils 01/30/2023 87      % Lymphocytes 01/30/2023 6      % Monocytes 01/30/2023 6      % Eosinophils 01/30/2023 0      % Basophils 01/30/2023 0      % Immature Granulocytes 01/30/2023 1      NRBCs per 100 WBC 01/30/2023 0      Absolute Neutrophils 01/30/2023 13.0 (H)      Absolute Lymphocytes 01/30/2023 0.9      Absolute Monocytes 01/30/2023 1.0      Absolute Eosinophils 01/30/2023 0.0      Absolute Basophils  01/30/2023 0.0      Absolute Immature Granul* 01/30/2023 0.2      Absolute NRBCs 01/30/2023 0.0      GLUCOSE BY METER POCT 01/30/2023 182 (H)           Wilfredo Palencia MD

## 2023-01-31 NOTE — PROGRESS NOTES
[facility]    Hospitalist Progress Note    Assessment & Plan   77 year old female who was admitted on 1/29/2023 with perf colon.     Impression:   Principal Problem:    Perforation of Colonic Stericolic Ulcer -- S/P Sigmoid Colectomy 1/29/30  Active Problems:    HTN (hypertension)    DM type 2, Hgb A1C 7.6 on 11/23/22    Refusal of blood transfusions as patient is [employer]    KARYN (obstructive sleep apnea)      Plan:  Continue postop  Care, discussed with surgery, advance diet slowly (?NPO for 2-3 days).     DVT Prophylaxis: Pneumatic Compression Devices  Code Status: Full Code    Disposition: Expected discharge in several days.     Brian Ojeda MD  Pager [phone]  Cell Phone [phone]  Text Page (7am to 6pm)    Interval History   No flatus yet.  Feels distended. Rates her abd pain 2-3 out of 10.     Physical Exam   Temp: 98.6  F (37  C) Temp src: Oral BP: 139/63 Pulse: 99   Resp: 20 SpO2: 95 % O2 Device: Nasal cannula Oxygen Delivery: 1 LPM  Vitals:    01/29/23 0554   Weight: 99.8 kg (220 lb 0.3 oz)     Vital Signs with Ranges  Temp:  [98.2  F (36.8  C)-99.6  F (37.6  C)] 98.6  F (37  C)  Pulse:  [] 99  Resp:  [19-22] 20  BP: (129-151)/() 139/63  SpO2:  [89 %-95 %] 95 %  I/O last 3 completed shifts:  In: 2736.25 [I.V.:2736.25]  Out: 590 [Urine:550; Drains:40]    # Pain Assessment:  Current Pain Score 1/31/2023   Patient currently in pain? yes   - Suzy is experiencing pain due to surgery.. Pain management was discussed and the plan was created in a collaborative fashion.  Suzy's response to the current recommendations: engaged  - Please see the plan for pain management as documented above      Constitutional: Awake, alert, cooperative, no apparent distress  Respiratory: Clear to auscultation bilaterally, no crackles or wheezing  Cardiovascular: Regular rate and rhythm, normal S1 and S2, and no murmur noted  GI: No bowel sounds, soft, distended, mild  tenderness  Extrem: No calf tenderness, no ankle edema  Neuro: Ox3, no focal motor or sensory deficits    Medications     sodium chloride         amLODIPine  2.5 mg Oral Daily     aspirin  81 mg Oral Daily     enoxaparin ANTICOAGULANT  40 mg Subcutaneous Q24H     insulin aspart  2-6 Units Subcutaneous Q8H     levothyroxine  137 mcg Oral QAM AC     piperacillin-tazobactam  3.375 g Intravenous Q8H     sodium chloride (PF)  3 mL Intracatheter Q8H     sodium chloride (PF)  3 mL Intracatheter Q8H     venlafaxine  75 mg Oral Daily       Data   Recent Labs   Lab 01/31/23  0427 01/31/23  0028 01/30/23  1958 01/30/23  0832 01/30/23  0730 01/29/23  2020 01/29/23  1702 01/29/23  0943 01/29/23  0602 01/25/23  2102   WBC  --   --   --   --  14.9*  --   --   --  11.1* 14.1*   HGB  --   --   --   --  11.7  --   --   --  11.6* 11.3*   MCV  --   --   --   --  94  --   --   --  92 93   PLT  --   --   --   --  459*  --   --   --  478* 326   NA  --   --   --   --  139  --   --   --  136 134*   POTASSIUM  --   --   --   --  4.3  --   --   --  3.8 3.7   CHLORIDE  --   --   --   --  107  --   --   --  103 101   CO2  --   --   --   --  20*  --   --   --  19* 20*   BUN  --   --   --   --  21.4  --   --   --  19.1 20.8   CR  --   --   --   --  1.10*  --  0.87  --  0.75 0.80   ANIONGAP  --   --   --   --  12  --   --   --  14 13   ALTON  --   --   --   --  8.7*  --   --   --  9.7 9.8   * 187* 166*   < > 199*   < >  --    < > 202* 165*   ALBUMIN  --   --   --   --  2.5*  --   --   --  3.3* 3.8   PROTTOTAL  --   --   --   --  5.7*  --   --   --  7.0 7.3   BILITOTAL  --   --   --   --  0.3  --   --   --  0.3 0.4   ALKPHOS  --   --   --   --  92  --   --   --  145* 87   ALT  --   --   --   --  13  --   --   --  18 11   AST  --   --   --   --  20  --   --   --  28 19   LIPASE  --   --   --   --   --   --   --   --  45 22    < > = values in this interval not displayed.       Imaging:   No results found for this or any previous visit (from the  past 24 hour(s)).

## 2023-02-01 LAB
ANION GAP SERPL CALCULATED.3IONS-SCNC: 8 MMOL/L (ref 7–15)
BUN SERPL-MCNC: 27.1 MG/DL (ref 8–23)
CALCIUM SERPL-MCNC: 8.6 MG/DL (ref 8.8–10.2)
CHLORIDE SERPL-SCNC: 109 MMOL/L (ref 98–107)
CREAT SERPL-MCNC: 0.83 MG/DL (ref 0.51–0.95)
DEPRECATED HCO3 PLAS-SCNC: 21 MMOL/L (ref 22–29)
ERYTHROCYTE [DISTWIDTH] IN BLOOD BY AUTOMATED COUNT: 15.3 % (ref 10–15)
GFR SERPL CREATININE-BSD FRML MDRD: 73 ML/MIN/1.73M2
GLUCOSE BLDC GLUCOMTR-MCNC: 124 MG/DL (ref 70–99)
GLUCOSE BLDC GLUCOMTR-MCNC: 135 MG/DL (ref 70–99)
GLUCOSE BLDC GLUCOMTR-MCNC: 147 MG/DL (ref 70–99)
GLUCOSE SERPL-MCNC: 139 MG/DL (ref 70–99)
HCT VFR BLD AUTO: 34.5 % (ref 35–47)
HGB BLD-MCNC: 9.9 G/DL (ref 11.7–15.7)
HOLD SPECIMEN: NORMAL
MCH RBC QN AUTO: 28.3 PG (ref 26.5–33)
MCHC RBC AUTO-ENTMCNC: 28.7 G/DL (ref 31.5–36.5)
MCV RBC AUTO: 99 FL (ref 78–100)
PLATELET # BLD AUTO: 461 10E3/UL (ref 150–450)
PLATELET # BLD AUTO: 461 10E3/UL (ref 150–450)
POTASSIUM SERPL-SCNC: 4 MMOL/L (ref 3.4–5.3)
RBC # BLD AUTO: 3.5 10E6/UL (ref 3.8–5.2)
SODIUM SERPL-SCNC: 138 MMOL/L (ref 136–145)
WBC # BLD AUTO: 20.3 10E3/UL (ref 4–11)

## 2023-02-01 PROCEDURE — 120N000001 HC R&B MED SURG/OB

## 2023-02-01 PROCEDURE — 250N000013 HC RX MED GY IP 250 OP 250 PS 637: Performed by: SURGERY

## 2023-02-01 PROCEDURE — 250N000013 HC RX MED GY IP 250 OP 250 PS 637: Performed by: HOSPITALIST

## 2023-02-01 PROCEDURE — 82310 ASSAY OF CALCIUM: CPT | Performed by: SURGERY

## 2023-02-01 PROCEDURE — 258N000003 HC RX IP 258 OP 636: Performed by: INTERNAL MEDICINE

## 2023-02-01 PROCEDURE — 85049 AUTOMATED PLATELET COUNT: CPT | Performed by: SURGERY

## 2023-02-01 PROCEDURE — 250N000011 HC RX IP 250 OP 636: Performed by: SURGERY

## 2023-02-01 PROCEDURE — 99233 SBSQ HOSP IP/OBS HIGH 50: CPT | Performed by: INTERNAL MEDICINE

## 2023-02-01 PROCEDURE — 250N000013 HC RX MED GY IP 250 OP 250 PS 637: Performed by: INTERNAL MEDICINE

## 2023-02-01 PROCEDURE — 99222 1ST HOSP IP/OBS MODERATE 55: CPT | Performed by: INTERNAL MEDICINE

## 2023-02-01 PROCEDURE — 36415 COLL VENOUS BLD VENIPUNCTURE: CPT | Performed by: SURGERY

## 2023-02-01 PROCEDURE — 85027 COMPLETE CBC AUTOMATED: CPT | Performed by: SURGERY

## 2023-02-01 PROCEDURE — 250N000011 HC RX IP 250 OP 636: Performed by: HOSPITALIST

## 2023-02-01 PROCEDURE — 250N000013 HC RX MED GY IP 250 OP 250 PS 637

## 2023-02-01 RX ADMIN — LEVOTHYROXINE SODIUM 137 MCG: 0.11 TABLET ORAL at 08:10

## 2023-02-01 RX ADMIN — PIPERACILLIN AND TAZOBACTAM 3.38 G: 3; .375 INJECTION, POWDER, LYOPHILIZED, FOR SOLUTION INTRAVENOUS at 01:20

## 2023-02-01 RX ADMIN — AMLODIPINE BESYLATE 2.5 MG: 2.5 TABLET ORAL at 09:20

## 2023-02-01 RX ADMIN — PIPERACILLIN AND TAZOBACTAM 3.38 G: 3; .375 INJECTION, POWDER, LYOPHILIZED, FOR SOLUTION INTRAVENOUS at 08:18

## 2023-02-01 RX ADMIN — HYDROMORPHONE HYDROCHLORIDE 0.5 MG: 1 INJECTION, SOLUTION INTRAMUSCULAR; INTRAVENOUS; SUBCUTANEOUS at 20:19

## 2023-02-01 RX ADMIN — SODIUM CHLORIDE: 9 INJECTION, SOLUTION INTRAVENOUS at 16:41

## 2023-02-01 RX ADMIN — HYDROMORPHONE HYDROCHLORIDE 0.5 MG: 1 INJECTION, SOLUTION INTRAMUSCULAR; INTRAVENOUS; SUBCUTANEOUS at 04:30

## 2023-02-01 RX ADMIN — ASPIRIN 81 MG: 81 TABLET, COATED ORAL at 09:20

## 2023-02-01 RX ADMIN — HYDROMORPHONE HYDROCHLORIDE 0.5 MG: 1 INJECTION, SOLUTION INTRAMUSCULAR; INTRAVENOUS; SUBCUTANEOUS at 11:22

## 2023-02-01 RX ADMIN — PIPERACILLIN AND TAZOBACTAM 3.38 G: 3; .375 INJECTION, POWDER, LYOPHILIZED, FOR SOLUTION INTRAVENOUS at 16:38

## 2023-02-01 RX ADMIN — HYDROXYZINE HYDROCHLORIDE 10 MG: 10 TABLET ORAL at 20:12

## 2023-02-01 RX ADMIN — VENLAFAXINE HYDROCHLORIDE 75 MG: 75 CAPSULE, EXTENDED RELEASE ORAL at 09:20

## 2023-02-01 RX ADMIN — Medication 1 MG: at 20:12

## 2023-02-01 RX ADMIN — ENOXAPARIN SODIUM 40 MG: 40 INJECTION SUBCUTANEOUS at 09:19

## 2023-02-01 RX ADMIN — SODIUM CHLORIDE: 9 INJECTION, SOLUTION INTRAVENOUS at 08:18

## 2023-02-01 ASSESSMENT — ACTIVITIES OF DAILY LIVING (ADL)
ADLS_ACUITY_SCORE: 24
ADLS_ACUITY_SCORE: 24
ADLS_ACUITY_SCORE: 28
ADLS_ACUITY_SCORE: 24
ADLS_ACUITY_SCORE: 28
ADLS_ACUITY_SCORE: 24
ADLS_ACUITY_SCORE: 28
ADLS_ACUITY_SCORE: 24
ADLS_ACUITY_SCORE: 28
ADLS_ACUITY_SCORE: 28

## 2023-02-01 NOTE — PLAN OF CARE
Goal Outcome Evaluation:               Moderately oriented. Reported year to be, 1975 or 2075, but was aware of location and procedure. Surgical sites intact with staples, and DAVIS scant drainage in bulb but dressing around site soaking one time per shift serous fluid. Pt did briefly wheeze and appear short of breath, RT was called, lungs clear, RT recommending elevated head of bed for ventilation. BG controlled. Hypertensive in morning but better after meds still 155 sbp though. Box in place and draining. Pt. Did not attempt to remove IV or put self in any danger or take any risks to warrant a 1:1 supervision this shift. Asked for pain med when incisional pain 8/10 and IV dilaudid brought it down to 6/10.

## 2023-02-01 NOTE — PROGRESS NOTES
ASSESSMENT:  1. Perforation of colon (H)    2. Refusal of blood transfusions as patient is Evangel[employer]        Suzy Gómez is a 77 year old female who is s/p laparoscopic sigmoid colectomy and washout on January 29, 2023 for perforated stercoral ulcer secondary to a distal sigmoid stricture.  Host of medical issues that are going to delay her recovery.  It was a heredia to put her together primarily without a diversion.  Does appear to be making progress over the past 48 hours without overt evidence of complication... yet.     PLAN:  1.  Ok for oral meds, ice chips and water, and protein drinks.  Otherwise waiting for resumption of bowel function.   2.  IV antibiotic coverage for likely 7 to 10 days postoperatively, presuming no complications  3.  As much ambulation as we can muster  4.  Avoiding NSAIDs for analgesia to minimize risk of anastomotic leak  5.  Box catheter to remain at least another 24 hours  6.  Strong likelihood of repeating a CT scan of the abdomen/pelvis on POD 4 if no return of bowel function       SUBJECTIVE:   No adverse events overnight.  Actually ambulated a bit yesterday.  Less abdominal pain reported.    Patient Vitals for the past 24 hrs:   BP Temp Temp src Pulse Resp SpO2   02/01/23 0700 127/58 97.8  F (36.6  C) Oral 94 19 93 %   02/01/23 0345 (!) 164/72 97.6  F (36.4  C) Oral 99 20 93 %   01/31/23 2338 (!) 162/70 98.1  F (36.7  C) Oral 102 19 93 %   01/31/23 1548 (!) 147/68 97.8  F (36.6  C) Oral 104 26 92 %   01/31/23 1534 (!) 159/69 98.1  F (36.7  C) Axillary 104 18 95 %   01/31/23 1137 132/61 99  F (37.2  C) Oral 99 18 92 %         PHYSICAL EXAM:  GEN: No acute distress.    LUNGS: CTA bilaterally, work of breathing decreased compared to yesterday.   CV:RRR  ABD: Obese, mild distention not appreciably different from yesterday.  Incision with dressings in place, saturated with serous drainage.  No erythema  Drains: DAVIS drain with serosanguineous output, minimal output.   Output  by Drain (mL) 01/30/23 0700 - 01/30/23 1459 01/30/23 1500 - 01/30/23 2259 01/30/23 2300 - 01/31/23 0659 01/31/23 0700 - 01/31/23 1459 01/31/23 1500 - 01/31/23 2259 01/31/23 2300 - 02/01/23 0659 02/01/23 0700 - 02/01/23 0814   Closed/Suction Drain 1 Right RUQ Bulb 19 Japanese 20  20 5  5       EXT:No cyanosis, edema or obvious abnormalities    01/31 0700 - 02/01 0659  In: 2969.75 [I.V.:2969.75]  Out: 1835 [Urine:1825; Drains:10]    Admission on 01/29/2023   Component Date Value     Culture 01/29/2023 No growth after 1 day      Culture 01/29/2023 No growth after 1 day      Sodium 01/29/2023 136      Potassium 01/29/2023 3.8      Chloride 01/29/2023 103      Carbon Dioxide (CO2) 01/29/2023 19 (L)      Anion Gap 01/29/2023 14      Urea Nitrogen 01/29/2023 19.1      Creatinine 01/29/2023 0.75      Calcium 01/29/2023 9.7      Glucose 01/29/2023 202 (H)      Alkaline Phosphatase 01/29/2023 145 (H)      AST 01/29/2023 28      ALT 01/29/2023 18      Protein Total 01/29/2023 7.0      Albumin 01/29/2023 3.3 (L)      Bilirubin Total 01/29/2023 0.3      GFR Estimate 01/29/2023 83      Lactic Acid 01/29/2023 1.7      WBC Count 01/29/2023 11.1 (H)      RBC Count 01/29/2023 4.04      Hemoglobin 01/29/2023 11.6 (L)      Hematocrit 01/29/2023 37.3      MCV 01/29/2023 92      MCH 01/29/2023 28.7      MCHC 01/29/2023 31.1 (L)      RDW 01/29/2023 14.1      Platelet Count 01/29/2023 478 (H)      % Neutrophils 01/29/2023 72      % Lymphocytes 01/29/2023 17      % Monocytes 01/29/2023 9      % Eosinophils 01/29/2023 0      % Basophils 01/29/2023 0      % Immature Granulocytes 01/29/2023 2      NRBCs per 100 WBC 01/29/2023 0      Absolute Neutrophils 01/29/2023 8.2      Absolute Lymphocytes 01/29/2023 1.9      Absolute Monocytes 01/29/2023 1.0      Absolute Eosinophils 01/29/2023 0.0      Absolute Basophils 01/29/2023 0.0      Absolute Immature Granul* 01/29/2023 0.2      Absolute NRBCs 01/29/2023 0.0      Lipase 01/29/2023 45      Hold  Specimen 01/29/2023 JIC      Hold Specimen 01/29/2023 JIC      Ketone (Beta-Hydroxybuty* 01/29/2023 0.70 (H)      Radiologist flags 01/29/2023 Sigmoid colonic inflammation with areas of extraluminal gas, new from 1/25/2023 concerning for sigmoid colonic perforation (AA)      Influenza A PCR 01/29/2023 Negative      Influenza B PCR 01/29/2023 Negative      RSV PCR 01/29/2023 Negative      SARS CoV2 PCR 01/29/2023 Negative      Hold Specimen 01/29/2023 JIC      Hold Specimen 01/29/2023 JIC      GLUCOSE BY METER POCT 01/29/2023 168 (H)      GLUCOSE BY METER POCT 01/29/2023 204 (H)      Creatinine 01/29/2023 0.87      GFR Estimate 01/29/2023 69      GLUCOSE BY METER POCT 01/29/2023 224 (H)      Sodium 01/30/2023 139      Potassium 01/30/2023 4.3      Chloride 01/30/2023 107      Carbon Dioxide (CO2) 01/30/2023 20 (L)      Anion Gap 01/30/2023 12      Urea Nitrogen 01/30/2023 21.4      Creatinine 01/30/2023 1.10 (H)      Calcium 01/30/2023 8.7 (L)      Glucose 01/30/2023 199 (H)      Alkaline Phosphatase 01/30/2023 92      AST 01/30/2023 20      ALT 01/30/2023 13      Protein Total 01/30/2023 5.7 (L)      Albumin 01/30/2023 2.5 (L)      Bilirubin Total 01/30/2023 0.3      GFR Estimate 01/30/2023 52 (L)      GLUCOSE BY METER POCT 01/30/2023 231 (H)      GLUCOSE BY METER POCT 01/30/2023 197 (H)      WBC Count 01/30/2023 14.9 (H)      RBC Count 01/30/2023 4.11      Hemoglobin 01/30/2023 11.7      Hematocrit 01/30/2023 38.8      MCV 01/30/2023 94      MCH 01/30/2023 28.5      MCHC 01/30/2023 30.2 (L)      RDW 01/30/2023 14.8      Platelet Count 01/30/2023 459 (H)      % Neutrophils 01/30/2023 87      % Lymphocytes 01/30/2023 6      % Monocytes 01/30/2023 6      % Eosinophils 01/30/2023 0      % Basophils 01/30/2023 0      % Immature Granulocytes 01/30/2023 1      NRBCs per 100 WBC 01/30/2023 0      Absolute Neutrophils 01/30/2023 13.0 (H)      Absolute Lymphocytes 01/30/2023 0.9      Absolute Monocytes 01/30/2023 1.0       Absolute Eosinophils 01/30/2023 0.0      Absolute Basophils 01/30/2023 0.0      Absolute Immature Granul* 01/30/2023 0.2      Absolute NRBCs 01/30/2023 0.0      GLUCOSE BY METER POCT 01/30/2023 182 (H)           Wilfredo Palencia MD

## 2023-02-01 NOTE — CONSULTS
Consultation - Infectious Disease  Suzy Alonso,  1945, MRN [id number]    Admitting Dx: Perforation of colon (H) [K63.1]  Refusal of blood transfusions as patient is Worship [Z53.1]    PCP: Ko, [facility], [phone]   Code status:  Full Code       Extended Emergency Contact Information  Primary Emergency Contact: JS ALONSO  Mobile Phone: [phone]  Relation: Spouse  Secondary Emergency Contact: ANJUM ALONSO  Mobile Phone: [phone]  Relation: Daughter       ASSESSMENT   1. Intra-abdominal infection presenting with perforated stercolic ulcer of sigmoid colon, underwent laparoscopic sigmoid colectomy with anastomosis, washout. Findings of diffuse feculent peritonitis. Ongoing pain and leukocytosis.   2. Bacteremia with clostridium species, also gram negative bacteremia. Generally covered with pip/tazo  3. DM  4. Left great toe infection for many months. Has seen podiatry and primary clinic for this. Recent doxycycline. Had MRI negative for osteomyelitis in September. Seems improved.   5. Mosque per chart refusing blood products.     Principal Problem:    Perforation of Colonic Stericolic Ulcer -- S/P Sigmoid Colectomy 30  Active Problems:    HTN (hypertension)    DM type 2, Hgb A1C 7.6 on 22    Refusal of blood transfusions as patient is Worship    KARYN (obstructive sleep apnea)       PLAN   Continue pip/tazo  Agree with plans for CT abd/pelvis over the next day, as there is risk for abscess formation based on extensive infection initially.     Drew Trinh MD  [facility]  [phone] [facility]  [employer] paging  ______________________________________________________________________        Reason For Consult: Clostridium bacteremia     HPI    We have been requested by Dr. Padilla to evaluate Suzy Alonso who is a 77 year old year old female for the above.  She has history of hypertension, hyperlipidemia, type  2 diabetes, and hypothyroidism.  She presented 1/29/23 with abdominal pain and distention for 2 to 3 days. Of note, she had presented to on 11/26/2022 with abdominal pain, where she was diagnosed with acute distal colitis.  At that time she left AMA before evaluation could be finished, as she felt better after a bowel movement.  She presented again on 1/25/2023 for abdominal pain. She was again diagnosed with colitis and was discharged home.      Came back 1/29/23 with acute severe abdominal pain and distention. No BM for 1 week. No nausea or vomiting. She reported fevers and chills.      In the ED, she had CT abdomen/pelvis which showed punctate foci of gas seen adjacent to the inflamed colon as well as tracking in the anterior abdominal wall concerning for sigmoid colonic perforation.     She was given Vanc/Zosyn. Taken to OR 1/29:  Perforated stercolic ulcer, gross peritonitis; findings: Feculent peritonitis with gross peritonitis throughout the abdomen.     Currently has abdominal pain, feels worse compared to yesterday, center of abdomen, feels like gas. Temps better. No BM yet post-op. Passing minimal flatus.     Has had trouble with L great toe for over a year. Had been on antibiotics recently, also topical steroid, last antibiotic was doxycycline shortly prior to admission. Had seen Dr. Kat, podiatry, last in September, and was trying to get seen again. Toe is currently improved prior to admission. MRI 9/2/22 negative for osteomyelitis. Seemed like ingrown toenail at some point.      Medical History  Past Medical History:   Diagnosis Date     Diabetes (H)      Dyslipidemia      Heart disease      Hypertension      KARYN (obstructive sleep apnea)      Thyroid disease     Surgical History  She  has a past surgical history that includes Laparoscopic assisted colectomy (N/A, 1/29/2023).   Social History  Reviewed, and she  reports that she quit smoking about 13 years ago. Her smoking use included cigarettes. She  has never used smokeless tobacco. She reports that she does not currently use alcohol. She reports that she does not use drugs.   Allergies  Allergies   Allergen Reactions     Macrodantin [Nitrofurantoin] Unknown     Pt could not recall   rash Family History  Problem Relation Age of Onset     Stroke Mother     Alzheimer's disease Mother     Cancer Father   lung, prostate    Psychosocial Needs  Social History     Social History Narrative     Not on file     Additional psychosocial needs reviewed per nursing assessment.     Lives with  and daughter    Immunization History   Administered Date(s) Administered     COVID-19 Vaccine 18+ (Moderna) 04/05/2022     COVID-19 Vaccine Bivalent Booster 12+ (Pfizer) 09/28/2022     Influenza Vaccine 65+ (Fluzone HD) 12/01/2022        Prior to Admission Medications   Medications Prior to Admission   Medication Sig Dispense Refill Last Dose     amLODIPine (NORVASC) 2.5 MG tablet Take 2.5 mg by mouth daily   1/28/2023     ammonium lactate (AMLACTIN) 12 % external cream Apply topically At Bedtime To bottom of feet   Unknown     aspirin (ASA) 81 MG EC tablet Take 81 mg by mouth daily   1/28/2023     docusate sodium (COLACE) 100 MG capsule Take 1 capsule (100 mg) by mouth 2 times daily 30 capsule 0 Unknown     empagliflozin (JARDIANCE) 25 MG TABS tablet Take 25 mg by mouth daily   Unknown     glipiZIDE (GLUCOTROL) 10 MG tablet Take 10 mg by mouth 2 times daily (before meals)   1/28/2023     ibuprofen (ADVIL/MOTRIN) 200 MG capsule Take 200-400 mg by mouth every 8 hours as needed for fever, inflammatory pain or moderate pain (4-6)        levothyroxine (SYNTHROID/LEVOTHROID) 137 MCG tablet Take 137 mcg by mouth daily before breakfast   1/28/2023     linagliptin (TRADJENTA) 5 MG TABS tablet Take 5 mg by mouth daily   1/28/2023     losartan (COZAAR) 50 MG tablet Take 50 mg by mouth 2 times daily   1/28/2023     metFORMIN (GLUCOPHAGE XR) 500 MG 24 hr tablet Take 1,000 mg by mouth 2 times  daily (with meals)   Past Week     oxybutynin ER (DITROPAN XL) 10 MG 24 hr tablet Take 20 mg by mouth daily   1/28/2023     rosuvastatin (CRESTOR) 20 MG tablet Take 20 mg by mouth At Bedtime   Unknown     venlafaxine (EFFEXOR XR) 75 MG 24 hr capsule Take 75 mg by mouth daily   1/28/2023          Anti-infectives: pip/tazo 1/29-  Vancomycin 1/29 x1  Cultures: 1/29 one set clostridium one bottle, GNR one bottle (not ID'd by Anjuke), 2nd set anaerobic GPR, one bottle  Imaging: reviewed images          Review of Systems:  All other systems negative in detail except what is noted above. Physical Exam:  Temp:  [97.6  F (36.4  C)-98.1  F (36.7  C)] 98.1  F (36.7  C)  Pulse:  [] 97  Resp:  [18-26] 19  BP: (127-173)/(58-79) 155/79  SpO2:  [90 %-95 %] 90 %    GENERAL:  In mild distress from pain.  EYES: No conjunctival injection, pupils equally reactive to light, extra-ocular movement intact  HEAD, EARS, NOSE, MOUTH, AND THROAT: Nontraumatic, mouth without oral ulcers.   RESPIRATORY: Clear anterior  CARDIOVASCULAR: tachycardia, normal S1 and S2, no murmurs, rubs, or gallops.  ABDOMEN: Soft, distended, lower abd incision covered. DAVIS on right with serous fluid.  Decreased bowel sounds. Tender midline without rebound.   MUSCULOSKELETAL: No synovitis.  LYMPHATIC: No inguinal lymphadenopathy.  SKIN/HAIR/NAILS: No rashes, no signs of peripheral emboli. L great toe erythema, macerated skin, absent toenail, not significantly tender.   NEUROLOGIC: Grossly intact.       Pertinent Labs         Recent Labs   Lab 02/01/23  0856 01/31/23  0632 01/30/23  0730    137 139   CO2 21* 19* 20*   BUN 27.1* 30.0* 21.4       Lab Results   Component Value Date    ALT 13 01/30/2023    AST 20 01/30/2023    ALKPHOS 92 01/30/2023        No results found for: CRP   [unfilled]     Pertinent Radiology  Radiology Results: Reviewed  CT Abdomen Pelvis w Contrast    Result Date: 1/25/2023  EXAM: CT ABDOMEN PELVIS W CONTRAST LOCATION: [facility]  [facility] DATE/TIME: 1/25/2023 11:28 PM INDICATION: abdominal pain worst in lower quadrants, leukocytosis eval for pathology COMPARISON: 11/26/2022 TECHNIQUE: CT scan of the abdomen and pelvis was performed following injection of IV contrast. Multiplanar reformats were obtained. Dose reduction techniques were used. CONTRAST: isovue 370 100ml FINDINGS: LOWER CHEST: Small left Bochdalek hernia. Basilar atelectasis. HEPATOBILIARY: Cholelithiasis and hepatic steatosis. PANCREAS: Normal. SPLEEN: Normal. ADRENAL GLANDS: Normal. KIDNEYS/BLADDER: Normal. BOWEL: Small bowel is normal caliber. Large amount of colonic stool. Sigmoid colonic wall thickening with adjacent inflammation is again seen. The inflamed segment is distended with stool. Small amount of adjacent free fluid. Caliber change with collapse  of the rectosigmoid junction and rectum. Diverticulosis.  LYMPH NODES: Subcentimeter retroperitoneal lymph nodes. VASCULATURE: Atherosclerotic vascular calcification. PELVIC ORGANS: Absent uterus. MUSCULOSKELETAL: Degenerative change osseous structures. Mild compression L4. Slight anterolisthesis L4 on L5.     IMPRESSION: 1.  Sigmoid colitis is again seen. Collapse of the rectosigmoid junction and rectum distal to the inflamed segment of colon. Underlying stricture not excluded. 2.  Small amount of adjacent free fluid. 3.  Cholelithiasis.    CTA Abdomen Pelvis with Contrast    Result Date: 1/29/2023  EXAM: CTA ABDOMEN PELVIS WITH CONTRAST LOCATION: [facility] DATE/TIME: 1/29/2023 6:48 AM INDICATION: recurrent colitis, distended abdomen pain out of proportion. COMPARISON: There are study dated 1/25/ TECHNIQUE: CT angiogram abdomen pelvis during arterial phase of injection of IV contrast. 2D and 3D MIP reconstructions were performed by the CT technologist. Dose reduction techniques were used. CONTRAST: isovue 370 100ml FINDINGS: ANGIOGRAM ABDOMEN/PELVIS: The abdominal aorta is normal  in size and caliber throughout with scattered atherosclerotic calcifications noted. The iliac and femoral vessels are normal in size and caliber and well-opacified. Celiac and SMA and TONY arise normally and are well opacified at this time LOWER CHEST: Lung bases are clear. Heart is enlarged, similar prior exam. HEPATOBILIARY: Diffuse hepatic steatosis. No significant mass. No bile duct dilatation. Calcified gallstones are again seen within the gallbladder. PANCREAS: No significant mass, duct dilatation, or inflammatory change. SPLEEN: Normal size. ADRENAL GLANDS: No significant nodules. KIDNEYS/BLADDER: No significant mass, stone, or hydronephrosis. BOWEL: Mural thickening and pericolonic inflammation of the sigmoid colon is again noted, similar in appearance to prior exam with increased inflammation seen in the proximal sigmoid colon. No pericolonic abscess seen at this time. Stool is seen throughout the inflamed sigmoid colon as well as scattered throughout the large bowel without evidence of large bowel obstruction. Adjacent to the proximal sigmoid colon is free fluid with foci of gas which are extraluminal and new from the prior study (image 1:15, series 5). Additional foci of extraluminal gas are seen within the mesenteric fat anteriorly in the upper abdomen (image 57, series 5). There are scattered diverticula seen throughout the colon. Air-fluid levels are seen scattered through multiple loops of small bowel with mild dilatation seen in the proximal jejunum in the upper abdomen, these mildly dilated loops of small bowel decompressed gradually distally without a clear transition point. LYMPH NODES: Increased Number of pericolonic lymph nodes are again seen adjacent to the sigmoid colon. PELVIC ORGANS: No pelvic masses. MUSCULOSKELETAL: Unchanged     IMPRESSION: 1.  Mural thickening of the sigmoid colon with pericolonic inflammation and stranding, slightly worsened from prior exam with punctate foci of gas seen  adjacent to the inflamed colon as well as tracking in the anterior abdominal wall concerning for sigmoid colonic perforation. No organized intra-abdominal fluid collection such as abscess is seen at this time. Given the long-standing inflammation in this region an underlying neoplastic process cannot be excluded. 2.  Scattered air-fluid levels and mildly dilated loops of proximal jejunum which decompresses gradually without a transition point consistent favored to reflect reactive ileus 3.  Hepatic steatosis 4.  Cholelithiasis [Critical Result: Sigmoid colonic inflammation with areas of extraluminal gas, new from 1/25/2023 concerning for sigmoid colonic perforation] Finding was identified on 1/29/2023 6:56 AM. DR. Snell was contacted by me on 1/29/2023 7:08 AM and verbalized understanding of the critical result.     CT Chest w/o Contrast    Result Date: 1/29/2023  EXAM: CT CHEST WITHOUT CONTRAST LOCATION: [facility] DATE/TIME: 01/29/2023, 6:46 AM INDICATION: Fever and cough. COMPARISON: 11/26/2022 - CT chest, abdomen and pelvis. TECHNIQUE: Note that this study was performed in conjunction with a CT scan of the abdomen and pelvis. Refer to a separate report for findings from that study. CT chest without IV contrast. Multiplanar reformats were obtained. Dose reduction techniques were used. CONTRAST: None. FINDINGS: LUNGS AND PLEURA: Minimal emphysematous changes in the lungs. Slight interstitial thickening in bilateral lung bases. A few curvilinear opacities in the periphery of both lungs likely represent atelectasis and/or scarring. The lungs are otherwise clear. Small left Bochdalek hernia containing fat MEDIASTINUM/AXILLAE: Atherosclerotic calcification in the thoracic aorta. CORONARY ARTERY CALCIFICATION: Present. MUSCULOSKELETAL: No acute findings. UPPER ABDOMEN: A few foci of extraluminal gas are present in the upper abdomen.     IMPRESSION: 1.  Slight interstitial thickening in  bilateral lung bases. This is nonspecific, but most likely relates to interstitial pulmonary edema. 2.  No other findings suspicious for acute pulmonary disease. 3.  A few foci of extraluminal gas scattered within the nondependent aspect of the upper abdomen. In the absence of recent surgery, this is consistent with a bowel perforation. Refer to the report from the CT scan of the abdomen and pelvis performed in conjunction with this study for additional details. The findings were called to Dr. Snell by Dr. Anderson on 01/29/2023 at 0700 hours.

## 2023-02-01 NOTE — PLAN OF CARE
"  Problem: Plan of Care - These are the overarching goals to be used throughout the patient stay.    Goal: Plan of Care Review  Description: The Plan of Care Review/Shift note should be completed every shift.  The Outcome Evaluation is a brief statement about your assessment that the patient is improving, declining, or no change.  This information will be displayed automatically on your shift note.  Outcome: Progressing  Goal: Patient-Specific Goal (Individualized)  Description: You can add care plan individualizations to a care plan. Examples of Individualization might be:  \"Parent requests to be called daily at 9am for status\", \"I have a hard time hearing out of my right ear\", or \"Do not touch me to wake me up as it startles me\".  Outcome: Progressing  Goal: Absence of Hospital-Acquired Illness or Injury  Outcome: Progressing  Intervention: Identify and Manage Fall Risk  Recent Flowsheet Documentation  Taken 1/31/2023 2338 by Drew Gómez RN  Safety Promotion/Fall Prevention:   activity supervised   assistive device/personal items within reach  Intervention: Prevent Skin Injury  Recent Flowsheet Documentation  Taken 2/1/2023 0430 by Drew Gómez RN  Body Position:   turned   left  Taken 1/31/2023 2338 by Drew Gómez RN  Body Position: supine  Goal: Optimal Comfort and Wellbeing  Outcome: Progressing  Intervention: Monitor Pain and Promote Comfort  Recent Flowsheet Documentation  Taken 2/1/2023 0430 by Drew Gómez RN  Pain Management Interventions: medication (see MAR)  Taken 1/31/2023 2328 by Drew Gómez RN  Pain Management Interventions: medication (see MAR)  Goal: Readiness for Transition of Care  Outcome: Progressing     Problem: Risk for Delirium  Goal: Optimal Coping  Outcome: Progressing  Goal: Improved Behavioral Control  Outcome: Progressing  Intervention: Minimize Safety Risk  Recent Flowsheet Documentation  Taken 1/31/2023 2338 by Drew Gómez RN  Enhanced Safety Measures: bed " alarm set  Goal: Improved Attention and Thought Clarity  Outcome: Progressing  Goal: Improved Sleep  Outcome: Progressing     Problem: Bowel Resection  Goal: Absence of Bleeding  Outcome: Progressing  Goal: Effective Bowel Motility and Elimination  Outcome: Progressing  Goal: Fluid and Electrolyte Balance  Outcome: Progressing  Goal: Absence of Infection Signs and Symptoms  Outcome: Progressing  Goal: Fluid, Electrolyte and Nutrition Balance  Outcome: Progressing  Goal: Anesthesia/Sedation Recovery  Outcome: Progressing  Intervention: Optimize Anesthesia Recovery  Recent Flowsheet Documentation  Taken 1/31/2023 2338 by Drew Gómez RN  Safety Promotion/Fall Prevention:   activity supervised   assistive device/personal items within reach  Goal: Acceptable Pain Control  Outcome: Progressing  Intervention: Prevent or Manage Pain  Recent Flowsheet Documentation  Taken 2/1/2023 0430 by Drew Gómez RN  Pain Management Interventions: medication (see MAR)  Taken 1/31/2023 2328 by Drew Gómez RN  Pain Management Interventions: medication (see MAR)  Goal: Nausea and Vomiting Relief  Outcome: Progressing  Goal: Effective Urinary Elimination  Outcome: Progressing  Goal: Effective Oxygenation and Ventilation  Outcome: Progressing     Problem: Diabetes Comorbidity  Goal: Blood Glucose Level Within Targeted Range  Outcome: Progressing     Problem: Hypertension Comorbidity  Goal: Blood Pressure in Desired Range  Outcome: Progressing  Intervention: Maintain Blood Pressure Management  Recent Flowsheet Documentation  Taken 1/31/2023 2338 by Drew Gómez RN  Medication Review/Management: medications reviewed     Problem: Pain Acute  Goal: Optimal Pain Control and Function  Outcome: Progressing  Intervention: Develop Pain Management Plan  Recent Flowsheet Documentation  Taken 2/1/2023 0430 by Drew Gómez RN  Pain Management Interventions: medication (see MAR)  Taken 1/31/2023 2328 by Drew Gómez RN  Pain  Management Interventions: medication (see MAR)  Intervention: Prevent or Manage Pain  Recent Flowsheet Documentation  Taken 1/31/2023 6058 by Faustina Gómez RN  Medication Review/Management: medications reviewed     Problem: Suicide Risk  Goal: Absence of Self-Harm  Outcome: Progressing   Goal Outcome Evaluation:  Patient remained alert and oriented but still with intermittent bouts of confusion. Vital signs remained stable over night, blood pressure slightly elevated. DAVIS drain continues to have minimal output. Dressing around DAVIS was changed once overnight.     BP (!) 162/70   Pulse 102   Temp 98.1  F (36.7  C) (Oral)   Resp 19   Wt 99.8 kg (220 lb 0.3 oz)   SpO2 93%   BMI 36.61 kg/m      FAUSTINA GÓMEZ RN

## 2023-02-01 NOTE — PROGRESS NOTES
Called to room by EBER Johns @ [phone]. Pt was having wheeze type B.S. When I entered room pt was at slight elevation with HOB. B.S. were clear T/O all lung fields with slight forced upper airway wheeze. Pt was on NC 2 LPM Sats 92% Pt had no complaints of being SOB. Stated breathing felt normal. Was not in visible distress. Pt did mention she was +KARYN but stopped wearing CPAP. RN stated breathing sounded worse when Pt was flat. Recommended to RN that pt slept with HOB at incline since Pt is not wanting CPAP. Rn will make note.    Spencer Rouse, RT on 2/1/2023 at 9:45 AM

## 2023-02-01 NOTE — PROVIDER NOTIFICATION
Blood draw on 1/29 with 2 days incubation shows clostridium stecies that is not perfringen and gram negative bacilli according to ID lab. Provider  notified via [employer] and was acknowledged.

## 2023-02-01 NOTE — PLAN OF CARE
Problem: Pain Acute  Goal: Optimal Pain Control and Function  Outcome: Progressing  Intervention: Develop Pain Management Plan  Recent Flowsheet Documentation  Taken 1/31/2023 1859 by Lynsey Silver RN  Pain Management Interventions: medication (see MAR)  Intervention: Prevent or Manage Pain  Recent Flowsheet Documentation  Taken 1/31/2023 1900 by Lynsey Silver RN  Medication Review/Management: medications reviewed     Problem: Hypertension Comorbidity  Goal: Blood Pressure in Desired Range  Outcome: Not Progressing  Intervention: Maintain Blood Pressure Management  Recent Flowsheet Documentation  Taken 1/31/2023 1900 by Lynsey Silver RN  Medication Review/Management: medications reviewed   Goal Outcome Evaluation:       Patient complained of surgical abdominal pain, had prn dilaudid x2, blood pressure 147/68, pulse tachy, remains on 02@2l, sats has been in the lower 90's, needs a receives reminders to keep oxygen on, patient confused, pulled PIV on on the left while iv was running, had blood on bedding and gown, also tried to pull on abdominal dressing, atarax given prn.

## 2023-02-02 ENCOUNTER — APPOINTMENT (OUTPATIENT)
Dept: PHYSICAL THERAPY | Facility: HOSPITAL | Age: 78
DRG: 853 | End: 2023-02-02
Attending: INTERNAL MEDICINE
Payer: COMMERCIAL

## 2023-02-02 ENCOUNTER — APPOINTMENT (OUTPATIENT)
Dept: OCCUPATIONAL THERAPY | Facility: HOSPITAL | Age: 78
DRG: 853 | End: 2023-02-02
Attending: INTERNAL MEDICINE
Payer: COMMERCIAL

## 2023-02-02 LAB
ANION GAP SERPL CALCULATED.3IONS-SCNC: 13 MMOL/L (ref 7–15)
BASOPHILS # BLD MANUAL: 0 10E3/UL (ref 0–0.2)
BASOPHILS NFR BLD MANUAL: 0 %
BUN SERPL-MCNC: 19.1 MG/DL (ref 8–23)
BURR CELLS BLD QL SMEAR: ABNORMAL
CALCIUM SERPL-MCNC: 8.4 MG/DL (ref 8.8–10.2)
CHLORIDE SERPL-SCNC: 113 MMOL/L (ref 98–107)
CREAT SERPL-MCNC: 0.67 MG/DL (ref 0.51–0.95)
CREATININE FLUID SOURCE: NORMAL
CREATININE QUAL FLUID: NORMAL
DEPRECATED HCO3 PLAS-SCNC: 19 MMOL/L (ref 22–29)
EOSINOPHIL # BLD MANUAL: 0.2 10E3/UL (ref 0–0.7)
EOSINOPHIL NFR BLD MANUAL: 1 %
ERYTHROCYTE [DISTWIDTH] IN BLOOD BY AUTOMATED COUNT: 15.1 % (ref 10–15)
GFR SERPL CREATININE-BSD FRML MDRD: >90 ML/MIN/1.73M2
GLUCOSE BLDC GLUCOMTR-MCNC: 124 MG/DL (ref 70–99)
GLUCOSE BLDC GLUCOMTR-MCNC: 132 MG/DL (ref 70–99)
GLUCOSE BLDC GLUCOMTR-MCNC: 150 MG/DL (ref 70–99)
GLUCOSE SERPL-MCNC: 123 MG/DL (ref 70–99)
HCT VFR BLD AUTO: 35 % (ref 35–47)
HGB BLD-MCNC: 10.2 G/DL (ref 11.7–15.7)
LACTATE SERPL-SCNC: 0.5 MMOL/L (ref 0.7–2)
LYMPHOCYTES # BLD MANUAL: 1.1 10E3/UL (ref 0.8–5.3)
LYMPHOCYTES NFR BLD MANUAL: 7 %
MAGNESIUM SERPL-MCNC: 2 MG/DL (ref 1.7–2.3)
MCH RBC QN AUTO: 28.7 PG (ref 26.5–33)
MCHC RBC AUTO-ENTMCNC: 29.1 G/DL (ref 31.5–36.5)
MCV RBC AUTO: 98 FL (ref 78–100)
MONOCYTES # BLD MANUAL: 0.6 10E3/UL (ref 0–1.3)
MONOCYTES NFR BLD MANUAL: 4 %
MYELOCYTES # BLD MANUAL: 0.3 10E3/UL
MYELOCYTES NFR BLD MANUAL: 2 %
NEUTROPHILS # BLD MANUAL: 13.1 10E3/UL (ref 1.6–8.3)
NEUTROPHILS NFR BLD MANUAL: 86 %
PHOSPHATE SERPL-MCNC: 2.2 MG/DL (ref 2.5–4.5)
PLAT MORPH BLD: ABNORMAL
PLATELET # BLD AUTO: 453 10E3/UL (ref 150–450)
POTASSIUM SERPL-SCNC: 4 MMOL/L (ref 3.4–5.3)
RBC # BLD AUTO: 3.56 10E6/UL (ref 3.8–5.2)
RBC MORPH BLD: ABNORMAL
SODIUM SERPL-SCNC: 145 MMOL/L (ref 136–145)
WBC # BLD AUTO: 15.2 10E3/UL (ref 4–11)

## 2023-02-02 PROCEDURE — 97530 THERAPEUTIC ACTIVITIES: CPT | Mod: GP

## 2023-02-02 PROCEDURE — 83735 ASSAY OF MAGNESIUM: CPT | Performed by: INTERNAL MEDICINE

## 2023-02-02 PROCEDURE — 250N000013 HC RX MED GY IP 250 OP 250 PS 637: Performed by: HOSPITALIST

## 2023-02-02 PROCEDURE — 36415 COLL VENOUS BLD VENIPUNCTURE: CPT | Performed by: INTERNAL MEDICINE

## 2023-02-02 PROCEDURE — 84100 ASSAY OF PHOSPHORUS: CPT | Performed by: INTERNAL MEDICINE

## 2023-02-02 PROCEDURE — 85014 HEMATOCRIT: CPT | Performed by: INTERNAL MEDICINE

## 2023-02-02 PROCEDURE — 250N000013 HC RX MED GY IP 250 OP 250 PS 637: Performed by: INTERNAL MEDICINE

## 2023-02-02 PROCEDURE — 99232 SBSQ HOSP IP/OBS MODERATE 35: CPT | Performed by: HOSPITALIST

## 2023-02-02 PROCEDURE — 99232 SBSQ HOSP IP/OBS MODERATE 35: CPT | Performed by: INTERNAL MEDICINE

## 2023-02-02 PROCEDURE — 258N000003 HC RX IP 258 OP 636: Performed by: INTERNAL MEDICINE

## 2023-02-02 PROCEDURE — 97162 PT EVAL MOD COMPLEX 30 MIN: CPT | Mod: GP

## 2023-02-02 PROCEDURE — 97166 OT EVAL MOD COMPLEX 45 MIN: CPT | Mod: GO

## 2023-02-02 PROCEDURE — 250N000013 HC RX MED GY IP 250 OP 250 PS 637: Performed by: SURGERY

## 2023-02-02 PROCEDURE — 36415 COLL VENOUS BLD VENIPUNCTURE: CPT | Performed by: HOSPITALIST

## 2023-02-02 PROCEDURE — 250N000011 HC RX IP 250 OP 636: Performed by: HOSPITALIST

## 2023-02-02 PROCEDURE — 97535 SELF CARE MNGMENT TRAINING: CPT | Mod: GO

## 2023-02-02 PROCEDURE — 82570 ASSAY OF URINE CREATININE: CPT | Performed by: SURGERY

## 2023-02-02 PROCEDURE — 250N000011 HC RX IP 250 OP 636: Performed by: SURGERY

## 2023-02-02 PROCEDURE — 85007 BL SMEAR W/DIFF WBC COUNT: CPT | Performed by: INTERNAL MEDICINE

## 2023-02-02 PROCEDURE — 80048 BASIC METABOLIC PNL TOTAL CA: CPT | Performed by: INTERNAL MEDICINE

## 2023-02-02 PROCEDURE — 120N000001 HC R&B MED SURG/OB

## 2023-02-02 PROCEDURE — 83605 ASSAY OF LACTIC ACID: CPT | Performed by: HOSPITALIST

## 2023-02-02 RX ORDER — LOSARTAN POTASSIUM 50 MG/1
50 TABLET ORAL 2 TIMES DAILY
Status: DISCONTINUED | OUTPATIENT
Start: 2023-02-02 | End: 2023-02-07

## 2023-02-02 RX ORDER — HYDROMORPHONE HYDROCHLORIDE 1 MG/ML
0.25 INJECTION, SOLUTION INTRAMUSCULAR; INTRAVENOUS; SUBCUTANEOUS
Status: DISCONTINUED | OUTPATIENT
Start: 2023-02-02 | End: 2023-02-03

## 2023-02-02 RX ADMIN — POTASSIUM & SODIUM PHOSPHATES POWDER PACK 280-160-250 MG 1 PACKET: 280-160-250 PACK at 10:58

## 2023-02-02 RX ADMIN — POTASSIUM & SODIUM PHOSPHATES POWDER PACK 280-160-250 MG 1 PACKET: 280-160-250 PACK at 17:33

## 2023-02-02 RX ADMIN — HYDROMORPHONE HYDROCHLORIDE 0.5 MG: 1 INJECTION, SOLUTION INTRAMUSCULAR; INTRAVENOUS; SUBCUTANEOUS at 13:29

## 2023-02-02 RX ADMIN — LEVOTHYROXINE SODIUM 137 MCG: 0.11 TABLET ORAL at 08:57

## 2023-02-02 RX ADMIN — HYDROMORPHONE HYDROCHLORIDE 0.5 MG: 1 INJECTION, SOLUTION INTRAMUSCULAR; INTRAVENOUS; SUBCUTANEOUS at 16:34

## 2023-02-02 RX ADMIN — SODIUM CHLORIDE: 9 INJECTION, SOLUTION INTRAVENOUS at 09:50

## 2023-02-02 RX ADMIN — LOSARTAN POTASSIUM 50 MG: 50 TABLET, FILM COATED ORAL at 21:27

## 2023-02-02 RX ADMIN — AMLODIPINE BESYLATE 2.5 MG: 2.5 TABLET ORAL at 08:57

## 2023-02-02 RX ADMIN — SODIUM CHLORIDE: 9 INJECTION, SOLUTION INTRAVENOUS at 17:37

## 2023-02-02 RX ADMIN — ENOXAPARIN SODIUM 40 MG: 40 INJECTION SUBCUTANEOUS at 08:58

## 2023-02-02 RX ADMIN — POTASSIUM & SODIUM PHOSPHATES POWDER PACK 280-160-250 MG 1 PACKET: 280-160-250 PACK at 13:28

## 2023-02-02 RX ADMIN — PIPERACILLIN AND TAZOBACTAM 3.38 G: 3; .375 INJECTION, POWDER, LYOPHILIZED, FOR SOLUTION INTRAVENOUS at 08:59

## 2023-02-02 RX ADMIN — HYDROXYZINE HYDROCHLORIDE 10 MG: 10 TABLET ORAL at 11:09

## 2023-02-02 RX ADMIN — HYDROMORPHONE HYDROCHLORIDE 0.5 MG: 1 INJECTION, SOLUTION INTRAMUSCULAR; INTRAVENOUS; SUBCUTANEOUS at 11:09

## 2023-02-02 RX ADMIN — LOSARTAN POTASSIUM 50 MG: 50 TABLET, FILM COATED ORAL at 08:58

## 2023-02-02 RX ADMIN — SODIUM CHLORIDE: 9 INJECTION, SOLUTION INTRAVENOUS at 01:58

## 2023-02-02 RX ADMIN — PIPERACILLIN AND TAZOBACTAM 3.38 G: 3; .375 INJECTION, POWDER, LYOPHILIZED, FOR SOLUTION INTRAVENOUS at 17:44

## 2023-02-02 RX ADMIN — VENLAFAXINE HYDROCHLORIDE 75 MG: 75 CAPSULE, EXTENDED RELEASE ORAL at 08:58

## 2023-02-02 RX ADMIN — ASPIRIN 81 MG: 81 TABLET, COATED ORAL at 08:58

## 2023-02-02 RX ADMIN — PIPERACILLIN AND TAZOBACTAM 3.38 G: 3; .375 INJECTION, POWDER, LYOPHILIZED, FOR SOLUTION INTRAVENOUS at 01:44

## 2023-02-02 ASSESSMENT — ACTIVITIES OF DAILY LIVING (ADL)
PREVIOUS_RESPONSIBILITIES: MEAL PREP;LAUNDRY;MEDICATION MANAGEMENT;FINANCES
ADLS_ACUITY_SCORE: 28
ADLS_ACUITY_SCORE: 28
ADLS_ACUITY_SCORE: 30
ADLS_ACUITY_SCORE: 28
ADLS_ACUITY_SCORE: 28
ADLS_ACUITY_SCORE: 32
ADLS_ACUITY_SCORE: 32
ADLS_ACUITY_SCORE: 30
ADLS_ACUITY_SCORE: 28

## 2023-02-02 NOTE — PROVIDER NOTIFICATION
Sent message to hospitalist about phosphorus 2.2.  Also, pt is PO day 4 and had a BM last evening and is still NPO.  In addition, pt flagged the sepsis protocol this a.m.

## 2023-02-02 NOTE — PROGRESS NOTES
Afternoon rounds patient seen and she is resting in bed.  She is verbal but seems to be sleepy.  She denies any increase in abdominal pain.  She has had some bowel movements.  She has no other questions or concerns for me.  Reports from nurse that she was in her chair most of the day.  And was tolerating clear liquid diet.    Patient vital signs are stable.  Patient laying in bed and seems mildly sedated.  She is full to answer my questions.  Drains remain serous    -No change from recommendations from Dr. Palencia's note from this morning.  Umang Myles PA-C [phone]    [facility]  [address] [place] [id number]  Phone- [phone]  Fax- [phone]

## 2023-02-02 NOTE — CONSULTS
"CLINICAL NUTRITION SERVICES - ASSESSMENT NOTE     Nutrition Prescription    RECOMMENDATIONS FOR MDs/PROVIDERS TO ORDER:  Advance diet to clear liquids as able    Malnutrition Status:    Does not meet criteria at this time    Recommendations already ordered by Registered Dietitian (RD):  None-will order Ensure Clear and Gelatein 20 once diet advanced    Future/Additional Recommendations:  Monitor diet advancement, po intake, weight, labs.     REASON FOR ASSESSMENT  Suzy Gómez is a/an 77 year old female assessed by the dietitian for Provider Order - npo post abdominal surgery    NUTRITION HISTORY  Met with pt, states appetite and intake were good PTA, no changes in eating pattens/amounts, typically eats 3 meals per day. Has been maintaining weight. Currently NPO. Per surgery note ok for clear liquids today, not ordered, RN in room and states just messaged MD to order if wanting. Pt states has not tried Ensure Clear. States she \"doesn't care\" if send Ensure Clear or Gelatein on meal trays once diet advanced.     S/p sigmoid colectomy 1/29 for perforation of colon    PMH: hypertension, hyperlipidemia, type 2 diabetes, and hypothyroidism    CURRENT NUTRITION ORDERS  Diet: NPO  Intake/Tolerance: na    LABS  Electrolytes  Potassium (mmol/L)   Date Value   02/02/2023 4.0   02/01/2023 4.0   01/31/2023 4.0     Phosphorus (mg/dL)   Date Value   02/02/2023 2.2 (L)    Blood Glucose  Glucose (mg/dL)   Date Value   02/02/2023 123 (H)   02/01/2023 139 (H)     GLUCOSE BY METER POCT (mg/dL)   Date Value   02/02/2023 124 (H)   02/01/2023 135 (H)   02/01/2023 124 (H)   02/01/2023 147 (H)   01/31/2023 160 (H)    Inflammatory Markers  WBC Count (10e3/uL)   Date Value   02/02/2023 15.2 (H)   02/01/2023 20.3 (H)   01/31/2023 21.0 (H)     Albumin (g/dL)   Date Value   01/30/2023 2.5 (L)   01/29/2023 3.3 (L)   01/25/2023 3.8      Magnesium (mg/dL)   Date Value   02/02/2023 2.0     Sodium (mmol/L)   Date Value   02/02/2023 145   02/01/2023 " "138   01/31/2023 137    Renal  Urea Nitrogen (mg/dL)   Date Value   02/02/2023 19.1   02/01/2023 27.1 (H)   01/31/2023 30.0 (H)     Creatinine (mg/dL)   Date Value   02/02/2023 0.67   02/01/2023 0.83   01/31/2023 1.07 (H)     Additional  Ketones Urine (mg/dL)   Date Value   01/25/2023 10 (A)        MEDICATIONS    amLODIPine  2.5 mg Oral Daily     aspirin  81 mg Oral Daily     enoxaparin ANTICOAGULANT  40 mg Subcutaneous Q24H     insulin aspart  2-6 Units Subcutaneous Q8H     levothyroxine  137 mcg Oral QAM AC     losartan  50 mg Oral BID     piperacillin-tazobactam  3.375 g Intravenous Q8H     potassium & sodium phosphates  1 packet Oral or Feeding Tube Q4H     sodium chloride (PF)  3 mL Intracatheter Q8H     sodium chloride (PF)  3 mL Intracatheter Q8H     venlafaxine  75 mg Oral Daily        sodium chloride 125 mL/hr at 02/02/23 0950      ANTHROPOMETRICS  Height: 5'5\"  Most Recent Weight: 99.8 kg (220 lb 0.3 oz)    IBW: 56.8 kg  BMI: Obesity Grade II BMI 35-39.9  Weight History:   Wt Readings from Last 20 Encounters:   01/29/23 99.8 kg (220 lb 0.3 oz)   01/25/23 99.8 kg (220 lb)   11/26/22 99.8 kg (220 lb)     Dosing Weight: 56.8 kg (IBW)    ASSESSED NUTRITION NEEDS  Estimated Energy Needs: 3148-7315 kcals/day (25 - 30 kcals/kg)  Justification: Overweight  Estimated Protein Needs:  grams protein/day (1.5 - 2 grams of pro/kg)  Justification: Post-op  Estimated Fluid Needs: 6238-3427 mL/day (1 mL/kcal)   Justification: Maintenance    PHYSICAL FINDINGS  See malnutrition section below.    MALNUTRITION:  % Weight Loss:  None noted  % Intake:  Decreased intake does not meet criteria for malnutrition (NPO x 4 days)  Subcutaneous Fat Loss:  None observed  Muscle Loss:  None observed  Fluid Retention:  None noted    Malnutrition Diagnosis: Patient does not meet two of the above criteria necessary for diagnosing malnutrition    NUTRITION DIAGNOSIS  Inadequate oral intake related to surgery as evidenced by NPO diet " status.      INTERVENTIONS  Implementation  Diet advancement  Send Ensure Clear and Gelatein 20 once diet advanced     Goals  Diet advancement vs nutrition support within 2-3 days.     Monitoring/Evaluation  Progress toward goals will be monitored and evaluated per protocol.

## 2023-02-02 NOTE — PROGRESS NOTES
[facility]    Hospitalist Progress Note    Assessment & Plan   77 year old female who was admitted on 1/29/2023 with perf colon.       2/1 :     Abdominal pain stable, not passing gas  Continues NPO, on iv fluids  Consult RD  Repeat CT abdomen in am ?    On 2 liters of oxygen  No significant sob    Worship per chart refusing blood products.     Pt/ot orderred      Not medically ready for discharge at this time.  Awaiting return of bowel functions        Impression:   Principal Problem:    Perforation of Colonic Stericolic Ulcer -- S/P Sigmoid Colectomy 1/29/30  Active Problems:    HTN (hypertension)    DM type 2, Hgb A1C 7.6 on 11/23/22    Refusal of blood transfusions as patient is Yazdanism    KARYN (obstructive sleep apnea)        Sepsis 2/2 sigmoid colonic perforation  - CT showed punctate foci of gas seen adjacent to the inflamed colon as well as tracking in the anterior abdominal wall concerning for sigmoid colonic perforation.  - Patient presented to on 11/26/2022, where she was diagnosed with acute distal colitis.  At that time she left AMA before evaluation could be finished.  She presented again on 1/25/2023.  At that time she was diagnosed with colitis and was discharged home.   - General surgery consulted from the ED.  - s/p sigmoid colectomy 1/29/23  - continue post op care  - IV antibiotic coverage : zosyn - for likely 7 to 10 days postoperatively, presuming no complications  - Bacteremia with clostridium species 2/1/23, also gram negative bacteremia.   ID consulted, on  pip/tazoBacteremia with clostridium species, Generally covered with pip/tazo,  also gram negative bacteremia.   - NPO and awaiting return of bowel functions, on iv fluids, consult dietary  - No blood products as patient is Yazdanism (confirmed with the patient).         Acute hypoxic respiratory failure, post op : on 2 liters of oxygen, no significant sob, monitor.      Acute blood loss  anemia : monitor hb       Type 2 diabetes  - on jardiance, glipizide, linagliptin, metformin - at home  - Sliding scale insulin  - Monitor blood glucose every 4 hours  - Hypoglycemia protocol in place  stable        Hypertension : on norvasc 2.5 mg daily, losartan 50 mg bid - at home.       Hypothyroidism : on  Levothyroxine - at home       Hyperlipidemia : on statin - at home      Anxiety/Depression : on effexor : at home              DVT Prophylaxis: Pneumatic Compression Devices  Code Status: Full Code    Disposition: Expected discharge in several days.     Ayush Padilla MD  Pager [phone]  Cell Phone [phone]  Text Page (7am to 6pm)        Physical Exam   Temp: 98.7  F (37.1  C) Temp src: Oral BP: (!) 161/70 Pulse: 97   Resp: 20 SpO2: 92 % O2 Device: Nasal cannula Oxygen Delivery: 2 LPM  Vitals:    01/29/23 0554   Weight: 99.8 kg (220 lb 0.3 oz)     Vital Signs with Ranges  Temp:  [97.6  F (36.4  C)-98.7  F (37.1  C)] 98.7  F (37.1  C)  Pulse:  [] 97  Resp:  [19-20] 20  BP: (127-173)/(58-79) 161/70  SpO2:  [90 %-93 %] 92 %  I/O last 3 completed shifts:  In: 2001 [I.V.:2001]  Out: 2120 [Urine:2100; Drains:20]    # Pain Assessment:  Current Pain Score 1/31/2023   Patient currently in pain? yes   - Suzy is experiencing pain due to surgery.. Pain management was discussed and the plan was created in a collaborative fashion.  Suzy's response to the current recommendations: engaged  - Please see the plan for pain management as documented above      Constitutional: Awake, alert, cooperative, no apparent distress  Respiratory: Clear to auscultation bilaterally, no crackles or wheezing  Cardiovascular: Regular rate and rhythm, normal S1 and S2, and no murmur noted  GI: No bowel sounds, soft, distended, mild tenderness  Extrem: No calf tenderness, no ankle edema  Neuro: Ox3, no focal motor or sensory deficits    Medications     sodium chloride 125 mL/hr at 02/01/23 1641       amLODIPine  2.5 mg Oral Daily      aspirin  81 mg Oral Daily     enoxaparin ANTICOAGULANT  40 mg Subcutaneous Q24H     insulin aspart  2-6 Units Subcutaneous Q8H     levothyroxine  137 mcg Oral QAM AC     piperacillin-tazobactam  3.375 g Intravenous Q8H     sodium chloride (PF)  3 mL Intracatheter Q8H     sodium chloride (PF)  3 mL Intracatheter Q8H     venlafaxine  75 mg Oral Daily       Data   Recent Labs   Lab 02/01/23  1402 02/01/23  0856 02/01/23  0834 02/01/23  0624 01/31/23  0800 01/31/23  0632 01/30/23  0832 01/30/23  0730 01/29/23  0943 01/29/23  0602 01/25/23  2102   WBC  --   --   --  20.3*  --  21.0*  --  14.9*  --  11.1* 14.1*   HGB  --   --   --  9.9*  --  11.1*  --  11.7  --  11.6* 11.3*   MCV  --   --   --  99  --  97  --  94  --  92 93   PLT  --   --   --  461*  461*  --  451*  --  459*  --  478* 326   NA  --  138  --   --   --  137  --  139  --  136 134*   POTASSIUM  --  4.0  --   --   --  4.0  --  4.3  --  3.8 3.7   CHLORIDE  --  109*  --   --   --  106  --  107  --  103 101   CO2  --  21*  --   --   --  19*  --  20*  --  19* 20*   BUN  --  27.1*  --   --   --  30.0*  --  21.4  --  19.1 20.8   CR  --  0.83  --   --   --  1.07*  --  1.10*   < > 0.75 0.80   ANIONGAP  --  8  --   --   --  12  --  12  --  14 13   ALTON  --  8.6*  --   --   --  8.8  --  8.7*  --  9.7 9.8   * 139* 147*  --    < > 191*  191*   < > 199*   < > 202* 165*   ALBUMIN  --   --   --   --   --   --   --  2.5*  --  3.3* 3.8   PROTTOTAL  --   --   --   --   --   --   --  5.7*  --  7.0 7.3   BILITOTAL  --   --   --   --   --   --   --  0.3  --  0.3 0.4   ALKPHOS  --   --   --   --   --   --   --  92  --  145* 87   ALT  --   --   --   --   --   --   --  13  --  18 11   AST  --   --   --   --   --   --   --  20  --  28 19   LIPASE  --   --   --   --   --   --   --   --   --  45 22    < > = values in this interval not displayed.       Imaging:   No results found for this or any previous visit (from the past 24 hour(s)).

## 2023-02-02 NOTE — PROGRESS NOTES
ASSESSMENT:  1. Perforation of colon (H)    2. Refusal of blood transfusions as patient is Mandaeism        Suzy Gómez is a 77 year old female who is s/p laparoscopic sigmoid colectomy and washout on January 29, 2023 for perforated stercoral ulcer secondary to a distal sigmoid stricture.  Host of medical issues that have yet to delay her recovery, with no evidence of complication at this point in time.    PLAN:  1.  Okay for clear liquids today  2.  IV antibiotic coverage for likely 7 to 10 days postoperatively, presuming no complications  3.  As much ambulation as we can muster  4.  Avoiding NSAIDs for analgesia to minimize risk of anastomotic leak  5.  Urostomy bag over the leaking left abdominal wall port site, we will send this for chemistry to ensure no bacterial growth or evidence of elevated creatinine  6.  Will delay on repeating CT imaging today given patient's clinical improvement, drop in leukocyte count, and normal drain output      SUBJECTIVE:   No adverse events overnight.  Is overall improving.    Patient Vitals for the past 24 hrs:   BP Temp Temp src Pulse Resp SpO2   02/02/23 0853 (!) 189/79 97.8  F (36.6  C) Oral 98 22 95 %   02/02/23 0823 (!) 192/82 98  F (36.7  C) Oral 100 24 95 %   02/02/23 0751 (!) 191/80 98  F (36.7  C) Oral 99 24 95 %   02/02/23 0728 (!) 183/78 98.6  F (37  C) Oral 98 24 95 %   02/01/23 2326 (!) 157/71 97.7  F (36.5  C) Oral 98 22 99 %   02/01/23 2019 125/82 98  F (36.7  C) Oral 102 28 97 %   02/01/23 1546 (!) 161/70 98.7  F (37.1  C) Oral 97 20 92 %   02/01/23 1122 (!) 155/79 98.1  F (36.7  C) Oral 97 -- --   02/01/23 1100 -- -- -- -- -- 90 %         PHYSICAL EXAM:  GEN: No acute distress.    LUNGS: CTA bilaterally, work of breathing decreased compared to yesterday.   CV:RRR  ABD: Obese, mild distention not appreciably different from yesterday.  Incision with dressings in place, saturated with serous drainage primarily from the left abdominal port site.  No  erythema  Drains: DAVIS drain with serosanguineous output, minimal output.   Output by Drain (mL) 01/31/23 0700 - 01/31/23 1459 01/31/23 1500 - 01/31/23 2259 01/31/23 2300 - 02/01/23 0659 02/01/23 0700 - 02/01/23 1459 02/01/23 1500 - 02/01/23 2259 02/01/23 2300 - 02/02/23 0659 02/02/23 0700 - 02/02/23 0936   Closed/Suction Drain 1 Right RUQ Bulb 19 Kenyan 5  5 15 10 10 10   Open Drain Ventral Abdomen        800      EXT:No cyanosis, edema or obvious abnormalities    02/01 0700 - 02/02 0659  In: 1469 [P.O.:30; I.V.:1439]  Out: 1335 [Urine:1300; Drains:35]    Admission on 01/29/2023   Component Date Value     Culture 01/29/2023 No growth after 1 day      Culture 01/29/2023 No growth after 1 day      Sodium 01/29/2023 136      Potassium 01/29/2023 3.8      Chloride 01/29/2023 103      Carbon Dioxide (CO2) 01/29/2023 19 (L)      Anion Gap 01/29/2023 14      Urea Nitrogen 01/29/2023 19.1      Creatinine 01/29/2023 0.75      Calcium 01/29/2023 9.7      Glucose 01/29/2023 202 (H)      Alkaline Phosphatase 01/29/2023 145 (H)      AST 01/29/2023 28      ALT 01/29/2023 18      Protein Total 01/29/2023 7.0      Albumin 01/29/2023 3.3 (L)      Bilirubin Total 01/29/2023 0.3      GFR Estimate 01/29/2023 83      Lactic Acid 01/29/2023 1.7      WBC Count 01/29/2023 11.1 (H)      RBC Count 01/29/2023 4.04      Hemoglobin 01/29/2023 11.6 (L)      Hematocrit 01/29/2023 37.3      MCV 01/29/2023 92      MCH 01/29/2023 28.7      MCHC 01/29/2023 31.1 (L)      RDW 01/29/2023 14.1      Platelet Count 01/29/2023 478 (H)      % Neutrophils 01/29/2023 72      % Lymphocytes 01/29/2023 17      % Monocytes 01/29/2023 9      % Eosinophils 01/29/2023 0      % Basophils 01/29/2023 0      % Immature Granulocytes 01/29/2023 2      NRBCs per 100 WBC 01/29/2023 0      Absolute Neutrophils 01/29/2023 8.2      Absolute Lymphocytes 01/29/2023 1.9      Absolute Monocytes 01/29/2023 1.0      Absolute Eosinophils 01/29/2023 0.0      Absolute Basophils  01/29/2023 0.0      Absolute Immature Granul* 01/29/2023 0.2      Absolute NRBCs 01/29/2023 0.0      Lipase 01/29/2023 45      Hold Specimen 01/29/2023 JIC      Hold Specimen 01/29/2023 JIC      Ketone (Beta-Hydroxybuty* 01/29/2023 0.70 (H)      Radiologist flags 01/29/2023 Sigmoid colonic inflammation with areas of extraluminal gas, new from 1/25/2023 concerning for sigmoid colonic perforation (AA)      Influenza A PCR 01/29/2023 Negative      Influenza B PCR 01/29/2023 Negative      RSV PCR 01/29/2023 Negative      SARS CoV2 PCR 01/29/2023 Negative      Hold Specimen 01/29/2023 JIC      Hold Specimen 01/29/2023 JIC      GLUCOSE BY METER POCT 01/29/2023 168 (H)      GLUCOSE BY METER POCT 01/29/2023 204 (H)      Creatinine 01/29/2023 0.87      GFR Estimate 01/29/2023 69      GLUCOSE BY METER POCT 01/29/2023 224 (H)      Sodium 01/30/2023 139      Potassium 01/30/2023 4.3      Chloride 01/30/2023 107      Carbon Dioxide (CO2) 01/30/2023 20 (L)      Anion Gap 01/30/2023 12      Urea Nitrogen 01/30/2023 21.4      Creatinine 01/30/2023 1.10 (H)      Calcium 01/30/2023 8.7 (L)      Glucose 01/30/2023 199 (H)      Alkaline Phosphatase 01/30/2023 92      AST 01/30/2023 20      ALT 01/30/2023 13      Protein Total 01/30/2023 5.7 (L)      Albumin 01/30/2023 2.5 (L)      Bilirubin Total 01/30/2023 0.3      GFR Estimate 01/30/2023 52 (L)      GLUCOSE BY METER POCT 01/30/2023 231 (H)      GLUCOSE BY METER POCT 01/30/2023 197 (H)      WBC Count 01/30/2023 14.9 (H)      RBC Count 01/30/2023 4.11      Hemoglobin 01/30/2023 11.7      Hematocrit 01/30/2023 38.8      MCV 01/30/2023 94      MCH 01/30/2023 28.5      MCHC 01/30/2023 30.2 (L)      RDW 01/30/2023 14.8      Platelet Count 01/30/2023 459 (H)      % Neutrophils 01/30/2023 87      % Lymphocytes 01/30/2023 6      % Monocytes 01/30/2023 6      % Eosinophils 01/30/2023 0      % Basophils 01/30/2023 0      % Immature Granulocytes 01/30/2023 1      NRBCs per 100 WBC 01/30/2023 0       Absolute Neutrophils 01/30/2023 13.0 (H)      Absolute Lymphocytes 01/30/2023 0.9      Absolute Monocytes 01/30/2023 1.0      Absolute Eosinophils 01/30/2023 0.0      Absolute Basophils 01/30/2023 0.0      Absolute Immature Granul* 01/30/2023 0.2      Absolute NRBCs 01/30/2023 0.0      GLUCOSE BY METER POCT 01/30/2023 182 (H)           Wilfredo Palencia MD

## 2023-02-02 NOTE — PROGRESS NOTES
02/02/23 1130   Appointment Info   Signing Clinician's Name / Credentials (PT) Sierra Arroyo PT   Rehab Comments (PT) patient up in chair   Living Environment   People in Home spouse   Current Living Arrangements house   Home Accessibility stairs to enter home   Number of Stairs, Main Entrance 2   Stair Railings, Main Entrance none;other (see comments)  (pt has 2 steps from the front with rails ambreen)   Transportation Anticipated health plan transportation;family or friend will provide   Self-Care   Usual Activity Tolerance moderate   Current Activity Tolerance poor   Equipment Currently Used at Home none  (has 4WW)   Fall history within last six months no   Activity/Exercise/Self-Care Comment patient needed more assist from  spouse with mobility ,stairs and ADL the last 2-3 weeks   General Information   Onset of Illness/Injury or Date of Surgery 01/29/23   Referring Physician Leon Dejesus   Patient/Family Therapy Goals Statement (PT) did not state   Pertinent History of Current Problem (include personal factors and/or comorbidities that impact the POC) colitis,s/p  colectomy 01/29.Patient has a chronic L great toe infection ,HTN,KARYN,obesity   Existing Precautions/Restrictions abdominal;fall   Weight-Bearing Status - LLE full weight-bearing   Weight-Bearing Status - RLE full weight-bearing   Cognition   Affect/Mental Status (Cognition) WFL   Pain Assessment   Patient Currently in Pain Yes, see Vital Sign flowsheet   Posture    Posture Forward head position   Range of Motion (ROM)   Range of Motion ROM is WFL   Strength (Manual Muscle Testing)   Strength (Manual Muscle Testing) Deficits observed during functional mobility   Bed Mobility   Comment, (Bed Mobility) N/A-pt up in chair   Sit-Stand Transfer   Sit-Stand [place] (Transfers) moderate assist (50% patient effort);2 person assist;verbal cues   Assistive Device (Sit-Stand Transfers) walker, front-wheeled   Comment, (Sit-Stand Transfer) cues for safety  /hands placement   Stand-Sit Transfer   Stand-Sit [place] (Transfers) moderate assist (50% patient effort);2 person assist   Assistive Device (Stand-Sit Transfers) walker, front-wheeled   Comment, (Stand-Sit Transfer) cues for safety /hands placement   Gait/Stairs (Locomotion)   Distance in Feet unable   Clinical Impression   Criteria for Skilled Therapeutic Intervention Yes, treatment indicated   PT Diagnosis (PT) impaired functional mobility   Influenced by the following impairments surgery,weakness,pain   Functional limitations due to impairments transfers,gait,strength   Clinical Presentation (PT Evaluation Complexity) Unstable/Unpredictable   Clinical Presentation Rationale pt preesents as med diagnosed   Clinical Decision Making (Complexity) moderate complexity   Planned Therapy Interventions (PT) gait training;strengthening;transfer training   Anticipated Equipment Needs at Discharge (PT) walker, rolling   Risk & Benefits of therapy have been explained evaluation/treatment results reviewed;patient;spouse/significant other   PT Total Evaluation Time   PT Eval, Moderate Complexity Minutes (56280) 10   Plan of Care Review   Plan of Care Reviewed With spouse;patient   Physical Therapy Goals   PT Frequency Daily   PT Goals Transfers;Gait   PT: Transfers Minimal assist;Assistive device;Sit to/from stand   PT: Gait Minimal assist;Rolling walker;50 feet   Therapeutic Activity   Therapeutic Activities: dynamic activities to improve functional performance Minutes (42264) 15   Symptoms Noted During/After Treatment Fatigue;Increased pain   Treatment Detail/Skilled Intervention Noted soiled  with stool depend with standing .patient unable to stand long enough to be cleaned ,stood up x 3 with mod/max assist of 2 to be cleaned .Patient having difficulty to get to full stand,stands on bent knees and flexed forward posture.Needs assist to scoot back in chair.   PT Discharge Planning   PT Plan transfers,strengthening,try  amb with FWW as able   PT Discharge Recommendation (DC Rec) Transitional Care Facility   PT Rationale for DC Rec patient assist of 2 with mobility,Unable to amb today.   PT Brief overview of current status mod /max assist for mobility   Total Session Time   Timed Code Treatment Minutes 15   Total Session Time (sum of timed and untimed services) 25

## 2023-02-02 NOTE — PROGRESS NOTES
"[facility] Hospitalist Progress Note  [facility]  Admission date: 1/29/2023    Summary:    77F with sepsis from sigmoid colonic perforation s/p lap sigmoid     Assessment/Plan    #Sepsis 2/2 sigmoid colonic perforation s/p sigmoid colectom and washout for perforated stercoral ulcer  #Clostridium and gram negative bacteremia  -zosyn  -clears  -may need repeat imaging, timing depends on clinical progress    #Amish - No blood products as patient is Amish (previously confirmed with the patient).      #Acute hypoxic respiratory failure - likely atelectasis related to #1. IC, O2.      #Acute pain - quite somnolent, reduce dose of as needed dilaudid to 0.25mg and space out     #DM2  - home regimen: on jardiance, glipizide, linagliptin, metformin - at home  -inpatient: Sliding scale insulin     #Hypertension: norvasc 2.5 mg daily, losartan 50.   #Hypothyroidism : Levothyroxine  #Hyperlipidemia: crestor  #Anxiety/Depression : effexor.      Resume home meds as oral intake improves.       Checklist:  Code Status: Full Code    Diet: NPO for Medical/Clinical Reasons Except for: Meds, Ice Chips     DVT px:  Enoxaparin (Lovenox) SQ    Disposition and Discharge Planning  Auto-populated from discharge tab:    Expected Discharge Date: 02/04/2023                 System Identified Risk Variables  Auto-populated based on system request - if relevant they will be addressed above:  Clinically Significant Risk Factors              # Hypoalbuminemia: Lowest albumin = 2.5 g/dL at 1/30/2023  7:30 AM, will monitor as appropriate           # Obesity: Estimated body mass index is 36.61 kg/m  as calculated from the following:    Height as of 1/25/23: 1.651 m (5' 5\").    Weight as of this encounter: 99.8 kg (220 lb 0.3 oz).              Interval Events/Subjective/Notable results:    Phos 2.2, WBC 15 from 20, Hb 10  HTNive  O2 up to 4L   Pain controlled    Objective    Vital signs in last 24 hours  Temp:  [97.7 "  F (36.5  C)-98.7  F (37.1  C)] 98  F (36.7  C)  Pulse:  [] 99  Resp:  [20-28] 24  BP: (125-191)/(70-82) 191/80  SpO2:  [90 %-99 %] 95 % O2 Device: Nasal cannula    Weight:   220 lbs .31 oz  Body mass index is 36.61 kg/m .    Intake/Output last 3 shifts  I/O last 3 completed shifts:  In: 1469 [P.O.:30; I.V.:1439]  Out: 1335 [Urine:1300; Drains:35]    Physical Exam  General:  Lethargic after pain meds but able to be awakened and knows she in a hospital   Neurologic:  oriented,    Psych: calm  HEENT:  Anicteric, MMM  CV: RRR no MRG, normal S1 and S2, no edema  Lungs: CTAB.  Easyrespirations  Abd: soft, obese, NT.  Ostomy bag over leakage from incision site  Skin: no rashes or jaundice noted on exposed skin.    Box Catheter: PRESENT, indication: /GI/GYN Pelvic Procedure  Lines: None          Medical Decision Making               Leon Dejesus MD, [facility]  Internal Medicine Hospitalist

## 2023-02-02 NOTE — PROGRESS NOTES
INFECTIOUS DISEASE FOLLOW UP NOTE      ASSESSMENT:  1. Intra-abdominal infection presenting with perforated stercolic ulcer of sigmoid colon, underwent laparoscopic sigmoid colectomy with anastomosis, washout. Findings of diffuse feculent peritonitis. Ongoing pain and leukocytosis. WBC better today.  2. Bacteremia with clostridium species, also gram negative bacteremia (likely anaerobe). Generally covered with pip/tazo  3. DM  4. Left great toe infection for many months. Has seen podiatry and primary clinic for this. Recent doxycycline. Had MRI negative for osteomyelitis in September. Seems improved.   5. Hindu per chart refusing blood products.        PLAN:  Continue pip/tazo  Monitor pain and WBC  Agree with holding on CT abd/pelvis for today, may check next few days depending on progress.     Drew Trinh MD  [facility]  [phone] [facility]    ______________________________________________________________________    SUBJECTIVE / INTERVAL HISTORY: feels about the same today, abdominal pain, dyspnea. Not much flatus. Short of breath.     ROS: All other systems negative except as listed above.        OBJECTIVE:  BP (!) 189/79 (BP Location: Left arm)   Pulse 98   Temp 97.8  F (36.6  C) (Oral)   Resp 22   Wt 99.8 kg (220 lb 0.3 oz)   SpO2 95%   BMI 36.61 kg/m         Vital Signs  Temp: 97.8  F (36.6  C)  Temp src: Oral  Resp: 22  Pulse: 98  Pulse Rate Source: Monitor  BP: (!) 189/79  BP Location: Left arm    Temp (24hrs), Av.1  F (36.7  C), Min:97.7  F (36.5  C), Max:98.7  F (37.1  C)      GEN: No acute distress. Up in chair. On O2. Dyspnea.   RESPIRATORY:  Clear anterior.  CARDIOVASCULAR:  Regular rate and rhythm. Normal S1 and S2. No murmur, click, gallop or rub. No dependent edema. No excess JVD.  ABDOMEN:  Soft, + bowel sounds. Colostomy bag to collect midline serous fluid. Tender abd. DAVIS on right serous fluid.   EXTREMITIES: No  edema.  SKIN/HAIR/NAILS:  No rashes. L great toe erythema.  IV: peripheral        Antibiotics:  pip/tazo 1/29-  Vancomycin 1/29 x1    Pertinent labs:    Recent Labs   Lab 02/02/23  0558 02/01/23  0624 01/31/23  0632   WBC 15.2* 20.3* 21.0*   HGB 10.2* 9.9* 11.1*   HCT 35.0 34.5* 37.8   * 461*  461* 451*        Recent Labs   Lab 02/02/23  0558 02/01/23  0856 01/31/23  0632    138 137   CO2 19* 21* 19*   BUN 19.1 27.1* 30.0*      No results found for: CRP      Lab Results   Component Value Date    ALT 13 01/30/2023    AST 20 01/30/2023    ALKPHOS 92 01/30/2023         MICROBIOLOGY DATA:  1/29 one set clostridium one bottle, GNR anaerobic bottle (not ID'd by [employer]), 2nd set anaerobic GPR, one bottle      RADIOLOGY:  CT Abdomen Pelvis w Contrast    Result Date: 1/25/2023  EXAM: CT ABDOMEN PELVIS W CONTRAST LOCATION: [facility] DATE/TIME: 1/25/2023 11:28 PM INDICATION: abdominal pain worst in lower quadrants, leukocytosis eval for pathology COMPARISON: 11/26/2022 TECHNIQUE: CT scan of the abdomen and pelvis was performed following injection of IV contrast. Multiplanar reformats were obtained. Dose reduction techniques were used. CONTRAST: isovue 370 100ml FINDINGS: LOWER CHEST: Small left Bochdalek hernia. Basilar atelectasis. HEPATOBILIARY: Cholelithiasis and hepatic steatosis. PANCREAS: Normal. SPLEEN: Normal. ADRENAL GLANDS: Normal. KIDNEYS/BLADDER: Normal. BOWEL: Small bowel is normal caliber. Large amount of colonic stool. Sigmoid colonic wall thickening with adjacent inflammation is again seen. The inflamed segment is distended with stool. Small amount of adjacent free fluid. Caliber change with collapse  of the rectosigmoid junction and rectum. Diverticulosis.  LYMPH NODES: Subcentimeter retroperitoneal lymph nodes. VASCULATURE: Atherosclerotic vascular calcification. PELVIC ORGANS: Absent uterus. MUSCULOSKELETAL: Degenerative change osseous structures. Mild compression  L4. Slight anterolisthesis L4 on L5.     IMPRESSION: 1.  Sigmoid colitis is again seen. Collapse of the rectosigmoid junction and rectum distal to the inflamed segment of colon. Underlying stricture not excluded. 2.  Small amount of adjacent free fluid. 3.  Cholelithiasis.    CTA Abdomen Pelvis with Contrast    Result Date: 1/29/2023  EXAM: CTA ABDOMEN PELVIS WITH CONTRAST LOCATION: [facility] DATE/TIME: 1/29/2023 6:48 AM INDICATION: recurrent colitis, distended abdomen pain out of proportion. COMPARISON: There are study dated 1/25/ TECHNIQUE: CT angiogram abdomen pelvis during arterial phase of injection of IV contrast. 2D and 3D MIP reconstructions were performed by the CT technologist. Dose reduction techniques were used. CONTRAST: isovue 370 100ml FINDINGS: ANGIOGRAM ABDOMEN/PELVIS: The abdominal aorta is normal in size and caliber throughout with scattered atherosclerotic calcifications noted. The iliac and femoral vessels are normal in size and caliber and well-opacified. Celiac and SMA and TONY arise normally and are well opacified at this time LOWER CHEST: Lung bases are clear. Heart is enlarged, similar prior exam. HEPATOBILIARY: Diffuse hepatic steatosis. No significant mass. No bile duct dilatation. Calcified gallstones are again seen within the gallbladder. PANCREAS: No significant mass, duct dilatation, or inflammatory change. SPLEEN: Normal size. ADRENAL GLANDS: No significant nodules. KIDNEYS/BLADDER: No significant mass, stone, or hydronephrosis. BOWEL: Mural thickening and pericolonic inflammation of the sigmoid colon is again noted, similar in appearance to prior exam with increased inflammation seen in the proximal sigmoid colon. No pericolonic abscess seen at this time. Stool is seen throughout the inflamed sigmoid colon as well as scattered throughout the large bowel without evidence of large bowel obstruction. Adjacent to the proximal sigmoid colon is free fluid with  foci of gas which are extraluminal and new from the prior study (image 1:15, series 5). Additional foci of extraluminal gas are seen within the mesenteric fat anteriorly in the upper abdomen (image 57, series 5). There are scattered diverticula seen throughout the colon. Air-fluid levels are seen scattered through multiple loops of small bowel with mild dilatation seen in the proximal jejunum in the upper abdomen, these mildly dilated loops of small bowel decompressed gradually distally without a clear transition point. LYMPH NODES: Increased Number of pericolonic lymph nodes are again seen adjacent to the sigmoid colon. PELVIC ORGANS: No pelvic masses. MUSCULOSKELETAL: Unchanged     IMPRESSION: 1.  Mural thickening of the sigmoid colon with pericolonic inflammation and stranding, slightly worsened from prior exam with punctate foci of gas seen adjacent to the inflamed colon as well as tracking in the anterior abdominal wall concerning for sigmoid colonic perforation. No organized intra-abdominal fluid collection such as abscess is seen at this time. Given the long-standing inflammation in this region an underlying neoplastic process cannot be excluded. 2.  Scattered air-fluid levels and mildly dilated loops of proximal jejunum which decompresses gradually without a transition point consistent favored to reflect reactive ileus 3.  Hepatic steatosis 4.  Cholelithiasis [Critical Result: Sigmoid colonic inflammation with areas of extraluminal gas, new from 1/25/2023 concerning for sigmoid colonic perforation] Finding was identified on 1/29/2023 6:56 AM. DR. Snell was contacted by me on 1/29/2023 7:08 AM and verbalized understanding of the critical result.     CT Chest w/o Contrast    Result Date: 1/29/2023  EXAM: CT CHEST WITHOUT CONTRAST LOCATION: [facility] DATE/TIME: 01/29/2023, 6:46 AM INDICATION: Fever and cough. COMPARISON: 11/26/2022 - CT chest, abdomen and pelvis. TECHNIQUE: Note  that this study was performed in conjunction with a CT scan of the abdomen and pelvis. Refer to a separate report for findings from that study. CT chest without IV contrast. Multiplanar reformats were obtained. Dose reduction techniques were used. CONTRAST: None. FINDINGS: LUNGS AND PLEURA: Minimal emphysematous changes in the lungs. Slight interstitial thickening in bilateral lung bases. A few curvilinear opacities in the periphery of both lungs likely represent atelectasis and/or scarring. The lungs are otherwise clear. Small left Bochdalek hernia containing fat MEDIASTINUM/AXILLAE: Atherosclerotic calcification in the thoracic aorta. CORONARY ARTERY CALCIFICATION: Present. MUSCULOSKELETAL: No acute findings. UPPER ABDOMEN: A few foci of extraluminal gas are present in the upper abdomen.     IMPRESSION: 1.  Slight interstitial thickening in bilateral lung bases. This is nonspecific, but most likely relates to interstitial pulmonary edema. 2.  No other findings suspicious for acute pulmonary disease. 3.  A few foci of extraluminal gas scattered within the nondependent aspect of the upper abdomen. In the absence of recent surgery, this is consistent with a bowel perforation. Refer to the report from the CT scan of the abdomen and pelvis performed in conjunction with this study for additional details. The findings were called to Dr. Snell by Dr. Anderson on 01/29/2023 at 0700 hours.

## 2023-02-02 NOTE — PLAN OF CARE
Problem: Risk for Delirium  Goal: Improved Behavioral Control  Intervention: Prevent and Manage Agitation  Recent Flowsheet Documentation  Taken 2/2/2023 0500 by Rg Wasserman RN  Environment Familiarity/Consistency: daily routine followed  Intervention: Minimize Safety Risk  Recent Flowsheet Documentation  Taken 2/2/2023 0500 by Rg Wasserman RN  Enhanced Safety Measures: bed alarm set  Goal: Improved Attention and Thought Clarity  Intervention: Maximize Cognitive Function  Recent Flowsheet Documentation  Taken 2/2/2023 0500 by Rg Wasserman RN  Reorientation Measures:   calendar in view   clock in view   Goal Outcome Evaluation:  EN denied pain upon first assessment. DAVIS emptied with 10 mL of serous fluid. Bandages clean, dry and intact. Responsive to cues. Sitter at bedside. Bowel sounds active.

## 2023-02-02 NOTE — PLAN OF CARE
Problem: Plan of Care - These are the overarching goals to be used throughout the patient stay.    Goal: Plan of Care Review  Description: The Plan of Care Review/Shift note should be completed every shift.  The Outcome Evaluation is a brief statement about your assessment that the patient is improving, declining, or no change.  This information will be displayed automatically on your shift note.  Outcome: Progressing   Goal Outcome Evaluation:  Pt tolerating a clear liquid diet.  Up in chair most of shift.  Pain slightly improved on IV dilaudid.   Dressings changed 2x this shift due to drainage.  VSS trending better.  Will continue to monitor.

## 2023-02-02 NOTE — PROGRESS NOTES
02/02/23 1100   Appointment Info   Signing Clinician's Name / Credentials (OT) Fely Johnson OTR/L   Living Environment   Current Living Arrangements house   Home Accessibility stairs to enter home   Number of Stairs, Main Entrance 2   Living Environment Comments Pt lives in a one level home with a tub/shower combo, no grab bars.   Self-Care   Usual Activity Tolerance good   Current Activity Tolerance poor   Regular Exercise No   Equipment Currently Used at Home walker, rolling   Fall history within last six months no   Activity/Exercise/Self-Care Comment Prior to ED visit ~ 2 wks ago, pt was I with BADLs.   Instrumental Activities of Daily Living (IADL)   Previous Responsibilities meal prep;laundry;medication management;finances   IADL Comments shared responsibility-  has a visual impairment   General Information   Onset of Illness/Injury or Date of Surgery 01/29/23   Referring Physician Ayush Padilla MD   Patient/Family Therapy Goal Statement (OT) to go home, to walk   Existing Precautions/Restrictions abdominal;fall;oxygen therapy device and L/min   Limitations/Impairments safety/cognitive   Heart Disease Risk Factors Medical history   General Observations and Info Pt presents sleepy, needs encouragment to participate   Cognitive Status Examination   Orientation Status orientation to person, place and time   Affect/Mental Status (Cognitive) low arousal/lethargic   Follows Commands follows one-step commands;75-90% accuracy;repetition of directions required   Safety Deficit moderate deficit   Pain Assessment   Patient Currently in Pain Yes, see Vital Sign flowsheet   Range of Motion Comprehensive   General Range of Motion bilateral upper extremity ROM WFL   Transfers   Transfers sit-stand transfer   Transfer Comments max A   Clinical Impression   Criteria for Skilled Therapeutic Interventions Met (OT) Yes, treatment indicated   OT Diagnosis impaired ADLs   Influenced by the following impairments  Perforation of Colonic Stericolic Ulcer -- S/P Sigmoid Colectomy 1/29/30   OT Problem List-Impairments impacting ADL problems related to;activity tolerance impaired;balance;strength;pain;post-surgical precautions   Assessment of Occupational Performance 3-5 Performance Deficits   Planned Therapy Interventions (OT) ADL retraining;balance training;bed mobility training;strengthening;transfer training;home program guidelines;progressive activity/exercise   Clinical Decision Making Complexity (OT) moderate complexity   Anticipated Equipment Needs Upon Discharge (OT) walker, rolling;shower chair;reacher;commode chair   Risk & Benefits of therapy have been explained evaluation/treatment results reviewed;care plan/treatment goals reviewed;participants included;patient;spouse/significant other   Clinical Impression Comments Pt presents with impairment in strength, stamina, balance affecting all BADLs.  Pt will benefit from daily OT as pt is well below baseline.   OT Total Evaluation Time   OT Eval, Moderate Complexity Minutes (18362) 12   OT Goals   Therapy Frequency (OT) Daily   OT Predicted Duration/Target Date for Goal Attainment 02/09/23   OT Goals Upper Body Dressing;Lower Body Dressing;Transfers   OT: Upper Body Dressing Minimal assist   OT: Lower Body Dressing Moderate assist   OT: Transfer Moderate assist   Self-Care/Home Management   Self-Care/Home Mgmt/ADL, Compensatory, Meal Prep Minutes (68732) 12   Symptoms Noted During/After Treatment (Meal Preparation/Planning Training) fatigue   Treatment Detail/Skilled Intervention Pt received education for sequencing standing from bedside chair, needing total A for andreia cares after bowel incontinence.  Standing with max A x 1 with PT, max A for scooting back into chair. Education for LB dressing due to abdominal pain (discussed benefits of long handeled equipment)   OT Discharge Planning   OT Plan LB dressing with AE, monitor cognition, sit to stands- progress transfers as  able   OT Discharge Recommendation (DC Rec) Transitional Care Facility   OT Rationale for DC Rec pt is well below baseline- needing max A for BADLs   OT Brief overview of current status max A BADLs   Total Session Time   Timed Code Treatment Minutes 12   Total Session Time (sum of timed and untimed services) 24

## 2023-02-02 NOTE — CONSULTS
Care Management Initial Consult    General Information  Assessment completed with: Care Team Member, Patient, Spouse or significant other,  (Suzy and )  Type of CM/SW Visit: Initial Assessment    Primary Care Provider verified and updated as needed: Yes   Readmission within the last 30 days:           Advance Care Planning:            Communication Assessment  Patient's communication style: spoken language (English or Bilingual)    Hearing Difficulty or Deaf: no   Wear Glasses or Blind: no    Cognitive  Cognitive/Neuro/Behavioral: WDL  Level of Consciousness: alert  Arousal Level: arouses to voice  Orientation: person, place, situation  Mood/Behavior: cooperative  Best Language: 0 - No aphasia  Speech: clear    Living Environment:   People in home: spouse     Current living Arrangements: house      Able to return to prior arrangements: yes       Family/Social Support:  Care provided by:    Provides care for:    Marital Status:   , Children          Description of Support System:           Current Resources:   Patient receiving home care services:       Community Resources:    Equipment currently used at home: none (has 4WW)  Supplies currently used at home:      Employment/Financial:  Employment Status:          Financial Concerns:             Lifestyle & Psychosocial Needs:  Social Determinants of Health     Tobacco Use: Medium Risk     Smoking Tobacco Use: Former     Smokeless Tobacco Use: Never     Passive Exposure: Not on file   Alcohol Use: Not on file   Financial Resource Strain: Not on file   Food Insecurity: Not on file   Transportation Needs: Not on file   Physical Activity: Not on file   Stress: Not on file   Social Connections: Not on file   Intimate Partner Violence: Not on file   Depression: Not on file   Housing Stability: Not on file       Functional Status:  Prior to admission patient needed assistance:              Mental Health Status:          Chemical Dependency Status:                 Values/Beliefs:  Spiritual, Cultural Beliefs, Jew Practices, Values that affect care:                 Additional Information:    SW met in room with pt and spouse to discuss discharge planning. Pt from home with spouse and is currently assist of 2 in hospital. Pt has 4WW at home but generally doesn't use it.  Awais assisting more in home recently.     Therapy REC is TCU. Discussed this recommendation with pt/spouse. Spouse suggested [facility]. TCU list left in room to be reviewed overnight. Spouse legally blind and unable to review list. Pt with limited ability to participate in conversation/review list at this time. Spouse stated that daughter will be at hospital tomorrow and will be able to assist more with placement discussion.     CM to f/u with pt/daughter tomorrow after family has had time to review list. Anticipate TCU with medical transport, pend mobility and advancement.     CM following.     CATHY Anton

## 2023-02-03 ENCOUNTER — APPOINTMENT (OUTPATIENT)
Dept: OCCUPATIONAL THERAPY | Facility: HOSPITAL | Age: 78
DRG: 853 | End: 2023-02-03
Payer: COMMERCIAL

## 2023-02-03 ENCOUNTER — APPOINTMENT (OUTPATIENT)
Dept: CT IMAGING | Facility: HOSPITAL | Age: 78
DRG: 853 | End: 2023-02-03
Attending: PHYSICIAN ASSISTANT
Payer: COMMERCIAL

## 2023-02-03 ENCOUNTER — APPOINTMENT (OUTPATIENT)
Dept: PHYSICAL THERAPY | Facility: HOSPITAL | Age: 78
DRG: 853 | End: 2023-02-03
Payer: COMMERCIAL

## 2023-02-03 LAB
ANION GAP SERPL CALCULATED.3IONS-SCNC: 12 MMOL/L (ref 7–15)
BUN SERPL-MCNC: 11.8 MG/DL (ref 8–23)
CALCIUM SERPL-MCNC: 8 MG/DL (ref 8.8–10.2)
CHLORIDE SERPL-SCNC: 113 MMOL/L (ref 98–107)
CREAT SERPL-MCNC: 0.56 MG/DL (ref 0.51–0.95)
DEPRECATED HCO3 PLAS-SCNC: 18 MMOL/L (ref 22–29)
ERYTHROCYTE [DISTWIDTH] IN BLOOD BY AUTOMATED COUNT: 15.2 % (ref 10–15)
GFR SERPL CREATININE-BSD FRML MDRD: >90 ML/MIN/1.73M2
GLUCOSE BLDC GLUCOMTR-MCNC: 123 MG/DL (ref 70–99)
GLUCOSE BLDC GLUCOMTR-MCNC: 140 MG/DL (ref 70–99)
GLUCOSE BLDC GLUCOMTR-MCNC: 145 MG/DL (ref 70–99)
GLUCOSE BLDC GLUCOMTR-MCNC: 163 MG/DL (ref 70–99)
GLUCOSE SERPL-MCNC: 123 MG/DL (ref 70–99)
HCT VFR BLD AUTO: 35.4 % (ref 35–47)
HGB BLD-MCNC: 10.2 G/DL (ref 11.7–15.7)
HOLD SPECIMEN: NORMAL
MAGNESIUM SERPL-MCNC: 1.7 MG/DL (ref 1.7–2.3)
MCH RBC QN AUTO: 28.7 PG (ref 26.5–33)
MCHC RBC AUTO-ENTMCNC: 28.8 G/DL (ref 31.5–36.5)
MCV RBC AUTO: 100 FL (ref 78–100)
PHOSPHATE SERPL-MCNC: 1.9 MG/DL (ref 2.5–4.5)
PHOSPHATE SERPL-MCNC: 2.2 MG/DL (ref 2.5–4.5)
PHOSPHATE SERPL-MCNC: 2.5 MG/DL (ref 2.5–4.5)
PLATELET # BLD AUTO: 428 10E3/UL (ref 150–450)
POTASSIUM SERPL-SCNC: 3.8 MMOL/L (ref 3.4–5.3)
POTASSIUM SERPL-SCNC: 3.8 MMOL/L (ref 3.4–5.3)
RBC # BLD AUTO: 3.55 10E6/UL (ref 3.8–5.2)
SODIUM SERPL-SCNC: 143 MMOL/L (ref 136–145)
WBC # BLD AUTO: 17.1 10E3/UL (ref 4–11)

## 2023-02-03 PROCEDURE — 84100 ASSAY OF PHOSPHORUS: CPT | Performed by: HOSPITALIST

## 2023-02-03 PROCEDURE — 80048 BASIC METABOLIC PNL TOTAL CA: CPT | Performed by: HOSPITALIST

## 2023-02-03 PROCEDURE — 97530 THERAPEUTIC ACTIVITIES: CPT | Mod: GO

## 2023-02-03 PROCEDURE — 97110 THERAPEUTIC EXERCISES: CPT | Mod: GO

## 2023-02-03 PROCEDURE — 97110 THERAPEUTIC EXERCISES: CPT | Mod: GP

## 2023-02-03 PROCEDURE — 250N000013 HC RX MED GY IP 250 OP 250 PS 637: Performed by: INTERNAL MEDICINE

## 2023-02-03 PROCEDURE — 250N000011 HC RX IP 250 OP 636: Performed by: HOSPITALIST

## 2023-02-03 PROCEDURE — 999N000127 HC STATISTIC PERIPHERAL IV START W US GUIDANCE

## 2023-02-03 PROCEDURE — 250N000013 HC RX MED GY IP 250 OP 250 PS 637: Performed by: SURGERY

## 2023-02-03 PROCEDURE — 84132 ASSAY OF SERUM POTASSIUM: CPT | Performed by: HOSPITALIST

## 2023-02-03 PROCEDURE — 250N000011 HC RX IP 250 OP 636: Performed by: SURGERY

## 2023-02-03 PROCEDURE — 74177 CT ABD & PELVIS W/CONTRAST: CPT

## 2023-02-03 PROCEDURE — 83735 ASSAY OF MAGNESIUM: CPT | Performed by: HOSPITALIST

## 2023-02-03 PROCEDURE — 85027 COMPLETE CBC AUTOMATED: CPT | Performed by: HOSPITALIST

## 2023-02-03 PROCEDURE — 97530 THERAPEUTIC ACTIVITIES: CPT | Mod: GP

## 2023-02-03 PROCEDURE — 250N000013 HC RX MED GY IP 250 OP 250 PS 637: Performed by: HOSPITALIST

## 2023-02-03 PROCEDURE — 99232 SBSQ HOSP IP/OBS MODERATE 35: CPT | Performed by: INTERNAL MEDICINE

## 2023-02-03 PROCEDURE — 250N000009 HC RX 250: Performed by: HOSPITALIST

## 2023-02-03 PROCEDURE — 36415 COLL VENOUS BLD VENIPUNCTURE: CPT | Performed by: HOSPITALIST

## 2023-02-03 PROCEDURE — 99233 SBSQ HOSP IP/OBS HIGH 50: CPT | Performed by: HOSPITALIST

## 2023-02-03 PROCEDURE — 97116 GAIT TRAINING THERAPY: CPT | Mod: GP

## 2023-02-03 PROCEDURE — 120N000001 HC R&B MED SURG/OB

## 2023-02-03 PROCEDURE — 258N000003 HC RX IP 258 OP 636: Performed by: HOSPITALIST

## 2023-02-03 RX ORDER — ROSUVASTATIN CALCIUM 10 MG/1
20 TABLET, COATED ORAL AT BEDTIME
Status: DISCONTINUED | OUTPATIENT
Start: 2023-02-03 | End: 2023-02-18 | Stop reason: HOSPADM

## 2023-02-03 RX ORDER — AMLODIPINE BESYLATE 5 MG/1
5 TABLET ORAL DAILY
Status: DISCONTINUED | OUTPATIENT
Start: 2023-02-04 | End: 2023-02-05

## 2023-02-03 RX ORDER — FUROSEMIDE 10 MG/ML
20 INJECTION INTRAMUSCULAR; INTRAVENOUS EVERY 8 HOURS
Status: DISCONTINUED | OUTPATIENT
Start: 2023-02-03 | End: 2023-02-04

## 2023-02-03 RX ORDER — IOPAMIDOL 755 MG/ML
100 INJECTION, SOLUTION INTRAVASCULAR ONCE
Status: COMPLETED | OUTPATIENT
Start: 2023-02-04 | End: 2023-02-04

## 2023-02-03 RX ORDER — MAGNESIUM OXIDE 400 MG/1
400 TABLET ORAL 2 TIMES DAILY
Status: DISCONTINUED | OUTPATIENT
Start: 2023-02-03 | End: 2023-02-06

## 2023-02-03 RX ORDER — TRAMADOL HYDROCHLORIDE 50 MG/1
50 TABLET ORAL EVERY 6 HOURS PRN
Status: DISCONTINUED | OUTPATIENT
Start: 2023-02-03 | End: 2023-02-05

## 2023-02-03 RX ADMIN — ASPIRIN 81 MG: 81 TABLET, COATED ORAL at 09:18

## 2023-02-03 RX ADMIN — SODIUM PHOSPHATE, MONOBASIC, MONOHYDRATE AND SODIUM PHOSPHATE, DIBASIC, ANHYDROUS 15 MMOL: 142; 276 INJECTION, SOLUTION INTRAVENOUS at 13:59

## 2023-02-03 RX ADMIN — VENLAFAXINE HYDROCHLORIDE 75 MG: 75 CAPSULE, EXTENDED RELEASE ORAL at 09:18

## 2023-02-03 RX ADMIN — FUROSEMIDE 20 MG: 10 INJECTION, SOLUTION INTRAMUSCULAR; INTRAVENOUS at 20:39

## 2023-02-03 RX ADMIN — TRAMADOL HYDROCHLORIDE 50 MG: 50 TABLET, COATED ORAL at 20:52

## 2023-02-03 RX ADMIN — PIPERACILLIN AND TAZOBACTAM 3.38 G: 3; .375 INJECTION, POWDER, LYOPHILIZED, FOR SOLUTION INTRAVENOUS at 16:24

## 2023-02-03 RX ADMIN — LEVOTHYROXINE SODIUM 137 MCG: 0.11 TABLET ORAL at 09:17

## 2023-02-03 RX ADMIN — ENOXAPARIN SODIUM 40 MG: 40 INJECTION SUBCUTANEOUS at 09:20

## 2023-02-03 RX ADMIN — SODIUM PHOSPHATE, MONOBASIC, MONOHYDRATE 15 MMOL: 276; 142 INJECTION, SOLUTION INTRAVENOUS at 20:35

## 2023-02-03 RX ADMIN — MAGNESIUM OXIDE TAB 400 MG (241.3 MG ELEMENTAL MG) 400 MG: 400 (241.3 MG) TAB at 20:38

## 2023-02-03 RX ADMIN — TRAMADOL HYDROCHLORIDE 50 MG: 50 TABLET, COATED ORAL at 12:28

## 2023-02-03 RX ADMIN — LOSARTAN POTASSIUM 50 MG: 50 TABLET, FILM COATED ORAL at 09:18

## 2023-02-03 RX ADMIN — ROSUVASTATIN CALCIUM 20 MG: 10 TABLET, FILM COATED ORAL at 20:39

## 2023-02-03 RX ADMIN — AMLODIPINE BESYLATE 2.5 MG: 2.5 TABLET ORAL at 09:18

## 2023-02-03 RX ADMIN — PIPERACILLIN AND TAZOBACTAM 3.38 G: 3; .375 INJECTION, POWDER, LYOPHILIZED, FOR SOLUTION INTRAVENOUS at 09:21

## 2023-02-03 RX ADMIN — MAGNESIUM OXIDE TAB 400 MG (241.3 MG ELEMENTAL MG) 400 MG: 400 (241.3 MG) TAB at 09:18

## 2023-02-03 RX ADMIN — FUROSEMIDE 20 MG: 10 INJECTION, SOLUTION INTRAMUSCULAR; INTRAVENOUS at 13:50

## 2023-02-03 RX ADMIN — PIPERACILLIN AND TAZOBACTAM 3.38 G: 3; .375 INJECTION, POWDER, LYOPHILIZED, FOR SOLUTION INTRAVENOUS at 00:36

## 2023-02-03 RX ADMIN — LOSARTAN POTASSIUM 50 MG: 50 TABLET, FILM COATED ORAL at 20:39

## 2023-02-03 RX ADMIN — SODIUM PHOSPHATE, MONOBASIC, MONOHYDRATE AND SODIUM PHOSPHATE, DIBASIC, ANHYDROUS 15 MMOL: 142; 276 INJECTION, SOLUTION INTRAVENOUS at 10:01

## 2023-02-03 ASSESSMENT — ACTIVITIES OF DAILY LIVING (ADL)
ADLS_ACUITY_SCORE: 36
ADLS_ACUITY_SCORE: 36
ADLS_ACUITY_SCORE: 30
ADLS_ACUITY_SCORE: 36
ADLS_ACUITY_SCORE: 30
ADLS_ACUITY_SCORE: 36
ADLS_ACUITY_SCORE: 36
ADLS_ACUITY_SCORE: 30
ADLS_ACUITY_SCORE: 36
ADLS_ACUITY_SCORE: 30

## 2023-02-03 NOTE — PROGRESS NOTES
Care Management Follow Up    Length of Stay (days): 5    Expected Discharge Date: 02/04/2023     Concerns to be Addressed:   Discharge planning    Patient plan of care discussed at interdisciplinary rounds: Yes    Anticipated Discharge Disposition:  Likely TCU, need choices  Anticipated Discharge Services:  Nursing PT OT  Anticipated Discharge DME: walker    Patient/family educated on Medicare website which has current facility and service quality ratings:    Education Provided on the Discharge Plan:  yes  Patient/Family in Agreement with the Plan:  yes    Referrals Placed by CM/SW:    Private pay costs discussed: Not applicable    Additional Information:  Chart reviewed. Seeking TCU, patient is not medically ready.   SW spoke to  Awais over the phone to dicsuss discharge plans. Family agrees with TCU and Awais will provide TCU choices. Discussed that barrier is patient is currently on a 1:1.     At baseline, patient lives at home with her . She is typically independent with mobility, has a 4WW if needed. Awais reports patient is having more memory problems and they are working with primary care MD Dr. Lehman with [facility] on this. He reports patient has had formal cognitive evaluations and there are additional appointments scheduled.   Patient's daughter provides assist with meals, housekeeping and laundry .       Ann-Marie Toney, ADRIANSW

## 2023-02-03 NOTE — PROGRESS NOTES
"[facility] Hospitalist Progress Note  [facility]  Admission date: 1/29/2023    Summary:    77F with sepsis from sigmoid colonic perforation s/p lap sigmoid     Assessment/Plan    #Sepsis 2/2 sigmoid colonic perforation s/p sigmoid colectom and washout for perforated stercoral ulcer  #Clostridium and anaerobic bacteremia (collinsella and eubacterium species)  -zosyn  -clears  -noted repeat CT with worsening leukocytosis    #Volume overload - stop IVF, initiate diuresis    #Confucianist - No blood products as patient is Confucianist (previously confirmed with the patient).      #Acute hypoxic respiratory failure - likely atelectasis related to #1. IC, O2.      #Acute pain - quite somnolent earlier and dilaudid dose reduced to 0.25mg. More alert today     #DM2  - home regimen: on jardiance, glipizide, linagliptin, metformin - at home  -inpatient: Sliding scale insulin     #Hypertension: norvasc increase to 5mg, losartan 50 BID, diuresis  #Hypothyroidism : Levothyroxine  #Hyperlipidemia: crestor  #Anxiety/Depression : effexor.      Checklist:  Code Status: Full Code    Diet: Full Liquid Diet  Snacks/Supplements Adult: Banatrol Plus; Between Meals     DVT px:  Enoxaparin (Lovenox) SQ    Disposition and Discharge Planning  Auto-populated from discharge tab:    Expected Discharge Date: 02/04/2023, 12:00 PM  Discharge Delays: 1:1 Sitter still ordered - MD to assess             System Identified Risk Variables  Auto-populated based on system request - if relevant they will be addressed above:  Clinically Significant Risk Factors              # Hypoalbuminemia: Lowest albumin = 2.5 g/dL at 1/30/2023  7:30 AM, will monitor as appropriate           # Obesity: Estimated body mass index is 36.61 kg/m  as calculated from the following:    Height as of 1/25/23: 1.651 m (5' 5\").    Weight as of this encounter: 99.8 kg (220 lb 0.3 oz).              Interval Events/Subjective/Notable results:  WBC 17, phos 1.9, " Mg 1.7, Creat stable, CO2 18  HTive  O2 up to 4L   Pain controlled    Objective    Vital signs in last 24 hours  Temp:  [98  F (36.7  C)-99.6  F (37.6  C)] 98.3  F (36.8  C)  Pulse:  [] 112  Resp:  [18-22] 20  BP: (137-189)/(63-81) 173/80  SpO2:  [95 %-96 %] 95 % O2 Device: Nasal cannula    Weight:   220 lbs .31 oz  Body mass index is 36.61 kg/m .    Intake/Output last 3 shifts  I/O last 3 completed shifts:  In: 1698 [P.O.:1320; I.V.:378]  Out: 3880 [Urine:2450; Drains:1430]    Physical Exam  General:  More alert   Neurologic:  oriented,    Psych: calm  HEENT:  Anicteric, MMM  CV: RRR no MRG, normal S1 and S2, hypervolemic  Lungs: CTAB.  Easyrespirations  Abd: soft, obese, NT.  Ostomy bag over leakage from incision site, serous output  Skin: no rashes or jaundice noted on exposed skin.    Box Catheter: PRESENT, indication: /GI/GYN Pelvic Procedure  Lines: None          Medical Decision Making           Leon Dejesus MD, [facility]  Internal Medicine Hospitalist

## 2023-02-03 NOTE — PROGRESS NOTES
ASSESSMENT:  1. Perforation of colon (H)    2. Refusal of blood transfusions as patient is Oriental orthodox      Suzy Gómez is a 77 year old female who is s/p laparoscopic sigmoid colectomy and washout on January 29, 2023 for perforated stercoral ulcer secondary to a distal sigmoid stricture.   Leukocytosis trending up most likely pulmonary however patient medically is not healthy and concerning  Fluid overload    PLAN:  -We will get a CT of the abdomen pelvis today to rule out any complications from surgery.  -Box to remain in light of need of diuresis  -Okay full liquid diet  -We will follow-up after CT images done.    SUBJECTIVE:   She is wanting to go home.  She states she has pain and it is the same.  Does not know if the pain medications are helping or not.  Is struggling with breathing and overnight had episodes of confusion.  Currently has one-on-one care.      Patient Vitals for the past 24 hrs:   BP Temp Temp src Pulse Resp SpO2   02/03/23 0720 (!) 164/72 98.5  F (36.9  C) Oral 86 18 96 %   02/03/23 0032 (!) 168/74 -- -- 89 -- --   02/02/23 2300 (!) 180/76 98.4  F (36.9  C) Oral 91 20 95 %   02/02/23 1900 137/73 98  F (36.7  C) Oral 99 -- 95 %   02/02/23 1525 (!) 144/63 98  F (36.7  C) Oral 99 22 95 %         PHYSICAL EXAM:  GEN: No acute distress, comfortable    ABD: Soft and tenderness left lower quadrant and right lower quadrant.  Her wounds are clean and dry with vessel loop in place in the lower incision.  DAVIS serous output    Output by Drain (mL) 02/01/23 0700 - 02/01/23 1459 02/01/23 1500 - 02/01/23 2259 02/01/23 2300 - 02/02/23 0659 02/02/23 0700 - 02/02/23 1459 02/02/23 1500 - 02/02/23 2259 02/02/23 2300 - 02/03/23 0659 02/03/23 0700 - 02/03/23 1022   Closed/Suction Drain 1 Right RUQ Bulb 19 Zimbabwean 15 10 10 30  55    Open Drain Ventral Abdomen     800      Open Drain Left;Superior Abdomen Urostomy Pouch    225 130 250       EXT:No cyanosis, edema or obvious abnormalities    02/02 0700 - 02/03  0659  In: 978 [P.O.:600; I.V.:378]  Out: 3290 [Urine:1800; Drains:1490]    No results displayed because visit has over 200 results.   Recent Results (from the past 24 hour(s))   Creatinine Body Fluid, Qualitative ([facility] Only)    Collection Time: 02/02/23  1:36 PM   Result Value Ref Range    Creatinine Fluid 1+ creatinine present     Creatinine Fluid Source Fluid from urostomy bag over left abdomen    Glucose by meter    Collection Time: 02/02/23  2:47 PM   Result Value Ref Range    GLUCOSE BY METER POCT 132 (H) 70 - 99 mg/dL   Glucose by meter    Collection Time: 02/02/23 11:26 PM   Result Value Ref Range    GLUCOSE BY METER POCT 150 (H) 70 - 99 mg/dL   Potassium    Collection Time: 02/03/23  5:56 AM   Result Value Ref Range    Potassium 3.8 3.4 - 5.3 mmol/L   Magnesium    Collection Time: 02/03/23  5:56 AM   Result Value Ref Range    Magnesium 1.7 1.7 - 2.3 mg/dL   Phosphorus    Collection Time: 02/03/23  5:56 AM   Result Value Ref Range    Phosphorus 1.9 (L) 2.5 - 4.5 mg/dL   Extra Purple Top Tube    Collection Time: 02/03/23  5:56 AM   Result Value Ref Range    Hold Specimen [facility]    Basic metabolic panel    Collection Time: 02/03/23  5:56 AM   Result Value Ref Range    Sodium 143 136 - 145 mmol/L    Potassium 3.8 3.4 - 5.3 mmol/L    Chloride 113 (H) 98 - 107 mmol/L    Carbon Dioxide (CO2) 18 (L) 22 - 29 mmol/L    Anion Gap 12 7 - 15 mmol/L    Urea Nitrogen 11.8 8.0 - 23.0 mg/dL    Creatinine 0.56 0.51 - 0.95 mg/dL    Calcium 8.0 (L) 8.8 - 10.2 mg/dL    Glucose 123 (H) 70 - 99 mg/dL    GFR Estimate >90 >60 mL/min/1.73m2   CBC with platelets    Collection Time: 02/03/23  5:56 AM   Result Value Ref Range    WBC Count 17.1 (H) 4.0 - 11.0 10e3/uL    RBC Count 3.55 (L) 3.80 - 5.20 10e6/uL    Hemoglobin 10.2 (L) 11.7 - 15.7 g/dL    Hematocrit 35.4 35.0 - 47.0 %     78 - 100 fL    MCH 28.7 26.5 - 33.0 pg    MCHC 28.8 (L) 31.5 - 36.5 g/dL    RDW 15.2 (H) 10.0 - 15.0 %    Platelet Count 428 150 - 450 10e3/uL    Glucose by meter    Collection Time: 02/03/23  6:39 AM   Result Value Ref Range    GLUCOSE BY METER POCT 123 (H) 70 - 99 mg/dL   Glucose by meter    Collection Time: 02/03/23  9:16 AM   Result Value Ref Range    GLUCOSE BY METER POCT 145 (H) 70 - 99 mg/dL               FARHANA Rosa  [facility]  [address] [place] [address]  Phone- [phone]  Fax- [phone]

## 2023-02-03 NOTE — PLAN OF CARE
Problem: Risk for Delirium  Goal: Optimal Coping  Outcome: Progressing  Goal: Improved Behavioral Control  Outcome: Progressing  Intervention: Minimize Safety Risk  Recent Flowsheet Documentation  Taken 2/3/2023 1100 by Mode Pascual RN  Enhanced Safety Measures:  at bedside     Problem: Diabetes Comorbidity  Goal: Blood Glucose Level Within Targeted Range  Outcome: Progressing     Problem: Hypertension Comorbidity  Goal: Blood Pressure in Desired Range  Outcome: Progressing  Intervention: Maintain Blood Pressure Management  Recent Flowsheet Documentation  Taken 2/3/2023 1100 by Mode Pascual RN  Medication Review/Management: medications reviewed     Problem: Pain Acute  Goal: Optimal Pain Control and Function  Outcome: Progressing  Intervention: Prevent or Manage Pain  Recent Flowsheet Documentation  Taken 2/3/2023 1100 by Mode Pascual RN  Medication Review/Management: medications reviewed     Problem: Risk for Delirium  Goal: Improved Sleep  Outcome: Adequate for Care Transition     Problem: Plan of Care - These are the overarching goals to be used throughout the patient stay.    Goal: Absence of Hospital-Acquired Illness or Injury  Intervention: Identify and Manage Fall Risk  Recent Flowsheet Documentation  Taken 2/3/2023 1100 by Mode Pascual RN  Safety Promotion/Fall Prevention:   activity supervised   clutter free environment maintained   bedside attendant   sitter at bedside  Intervention: Prevent and Manage VTE (Venous Thromboembolism) Risk  Recent Flowsheet Documentation  Taken 2/3/2023 1100 by Mode Pascual RN  VTE Prevention/Management: SCDs (sequential compression devices) on        Goal Outcome Evaluation:       Patient is A/O x3. BP elevated. MD ordered PRN BP medications for systolic >180. Other VSS. She is disoriented to day. She reports pain 5/10 this afternoon treated with PRN tramadol. Patient slept much of shift. DAVIS drain output serous 40mL. Nephrostomy pouch  output 400mL serous fluid this shift.

## 2023-02-03 NOTE — PLAN OF CARE
Problem: Risk for Delirium  Goal: Optimal Coping  Outcome: Not Progressing     Problem: Plan of Care - These are the overarching goals to be used throughout the patient stay.    Goal: Plan of Care Review  Description: The Plan of Care Review/Shift note should be completed every shift.  The Outcome Evaluation is a brief statement about your assessment that the patient is improving, declining, or no change.  This information will be displayed automatically on your shift note.  Outcome: Progressing   Goal Outcome Evaluation:  Pt still has intermittent episodes of confusion overnight. Continues to have serous drainage from lap signs. Vital signs remained stable. Denies pain. Patient remains on 1:1.     BP (!) 168/74 (BP Location: Right arm)   Pulse 89   Temp 98.4  F (36.9  C) (Oral)   Resp 20   Wt 99.8 kg (220 lb 0.3 oz)   SpO2 95%   BMI 36.61 kg/m      FAUSTINA MOSES RN

## 2023-02-03 NOTE — PROGRESS NOTES
INFECTIOUS DISEASE FOLLOW UP NOTE      ASSESSMENT:  1. Intra-abdominal infection presenting with perforated stercolic ulcer of sigmoid colon, underwent laparoscopic sigmoid colectomy with anastomosis, washout. Findings of diffuse feculent peritonitis. Ongoing pain and leukocytosis. WBC increased. Draining clear fluid from wound -- creatinine level not suggestive of urine leak based on estimate.   2. Bacteremia with clostridium species, Collinsella aerofaciens, also gram negative bacteremia (likely anaerobe). Generally covered with pip/tazo. I found one paper on antibiotic susceptibilities for Collinsella, should be sensitive to pip/tazo.   3. DM  4. Left great toe infection for many months. Has seen podiatry and primary clinic for this. Recent doxycycline. Had MRI negative for osteomyelitis in September. Seems improved.   5. Adventist per chart refusing blood products.        PLAN:  Continue pip/tazo  Monitor pain and WBC  CT abd/pelvis repeat -- if undrained fluid would pursue these for percutaneous drainage, send cultures. Please call if questions over the weekend, we may just check chart if no antibiotic changes anticipated.     Drew Trinh MD  [facility]  [phone] clinic  [facility] paging    ______________________________________________________________________    SUBJECTIVE / INTERVAL HISTORY: feels not so great, still with pain. Temp ok. Had BM today. Wondering when she can go home.     ROS: dyspnea. All other systems negative except as listed above.        OBJECTIVE:  BP (!) 164/72 (BP Location: Right arm)   Pulse 86   Temp 98.5  F (36.9  C) (Oral)   Resp 18   Wt 99.8 kg (220 lb 0.3 oz)   SpO2 96%   BMI 36.61 kg/m           GEN: No acute distress. Up in chair. On O2. Dyspnea.   RESPIRATORY:  Clear anterior.  CARDIOVASCULAR:  Regular rate and rhythm. Normal S1 and S2. No murmur, click, gallop or rub. No dependent edema. No excess JVD.  ABDOMEN:  Soft, + bowel  sounds. Colostomy bag to collect midline serous fluid. Tender abd. DAVIS on right serous fluid.   EXTREMITIES: No edema.  SKIN/HAIR/NAILS:  No rashes. L great toe erythema.  IV: peripheral        Antibiotics:  pip/tazo 1/29-  Vancomycin 1/29 x1    Pertinent labs:    Recent Labs   Lab 02/03/23  0556 02/02/23  0558 02/01/23  0624   WBC 17.1* 15.2* 20.3*   HGB 10.2* 10.2* 9.9*   HCT 35.4 35.0 34.5*    453* 461*  461*        Recent Labs   Lab 02/03/23  0556 02/02/23  0558 02/01/23  0856    145 138   CO2 18* 19* 21*   BUN 11.8 19.1 27.1*      No results found for: CRP      Lab Results   Component Value Date    ALT 13 01/30/2023    AST 20 01/30/2023    ALKPHOS 92 01/30/2023         MICROBIOLOGY DATA:  1/29 one set clostridium one bottle, GNR anaerobic bottle (not ID'd by Zappli), 2nd set anaerobic GPR, one bottle      RADIOLOGY:  CT Abdomen Pelvis w Contrast    Result Date: 1/25/2023  EXAM: CT ABDOMEN PELVIS W CONTRAST LOCATION: [facility] DATE/TIME: 1/25/2023 11:28 PM INDICATION: abdominal pain worst in lower quadrants, leukocytosis eval for pathology COMPARISON: 11/26/2022 TECHNIQUE: CT scan of the abdomen and pelvis was performed following injection of IV contrast. Multiplanar reformats were obtained. Dose reduction techniques were used. CONTRAST: isovue 370 100ml FINDINGS: LOWER CHEST: Small left Bochdalek hernia. Basilar atelectasis. HEPATOBILIARY: Cholelithiasis and hepatic steatosis. PANCREAS: Normal. SPLEEN: Normal. ADRENAL GLANDS: Normal. KIDNEYS/BLADDER: Normal. BOWEL: Small bowel is normal caliber. Large amount of colonic stool. Sigmoid colonic wall thickening with adjacent inflammation is again seen. The inflamed segment is distended with stool. Small amount of adjacent free fluid. Caliber change with collapse  of the rectosigmoid junction and rectum. Diverticulosis.  LYMPH NODES: Subcentimeter retroperitoneal lymph nodes. VASCULATURE: Atherosclerotic vascular  calcification. PELVIC ORGANS: Absent uterus. MUSCULOSKELETAL: Degenerative change osseous structures. Mild compression L4. Slight anterolisthesis L4 on L5.     IMPRESSION: 1.  Sigmoid colitis is again seen. Collapse of the rectosigmoid junction and rectum distal to the inflamed segment of colon. Underlying stricture not excluded. 2.  Small amount of adjacent free fluid. 3.  Cholelithiasis.    CTA Abdomen Pelvis with Contrast    Result Date: 1/29/2023  EXAM: CTA ABDOMEN PELVIS WITH CONTRAST LOCATION: [facility] DATE/TIME: 1/29/2023 6:48 AM INDICATION: recurrent colitis, distended abdomen pain out of proportion. COMPARISON: There are study dated 1/25/ TECHNIQUE: CT angiogram abdomen pelvis during arterial phase of injection of IV contrast. 2D and 3D MIP reconstructions were performed by the CT technologist. Dose reduction techniques were used. CONTRAST: isovue 370 100ml FINDINGS: ANGIOGRAM ABDOMEN/PELVIS: The abdominal aorta is normal in size and caliber throughout with scattered atherosclerotic calcifications noted. The iliac and femoral vessels are normal in size and caliber and well-opacified. Celiac and SMA and TONY arise normally and are well opacified at this time LOWER CHEST: Lung bases are clear. Heart is enlarged, similar prior exam. HEPATOBILIARY: Diffuse hepatic steatosis. No significant mass. No bile duct dilatation. Calcified gallstones are again seen within the gallbladder. PANCREAS: No significant mass, duct dilatation, or inflammatory change. SPLEEN: Normal size. ADRENAL GLANDS: No significant nodules. KIDNEYS/BLADDER: No significant mass, stone, or hydronephrosis. BOWEL: Mural thickening and pericolonic inflammation of the sigmoid colon is again noted, similar in appearance to prior exam with increased inflammation seen in the proximal sigmoid colon. No pericolonic abscess seen at this time. Stool is seen throughout the inflamed sigmoid colon as well as scattered throughout the  large bowel without evidence of large bowel obstruction. Adjacent to the proximal sigmoid colon is free fluid with foci of gas which are extraluminal and new from the prior study (image 1:15, series 5). Additional foci of extraluminal gas are seen within the mesenteric fat anteriorly in the upper abdomen (image 57, series 5). There are scattered diverticula seen throughout the colon. Air-fluid levels are seen scattered through multiple loops of small bowel with mild dilatation seen in the proximal jejunum in the upper abdomen, these mildly dilated loops of small bowel decompressed gradually distally without a clear transition point. LYMPH NODES: Increased Number of pericolonic lymph nodes are again seen adjacent to the sigmoid colon. PELVIC ORGANS: No pelvic masses. MUSCULOSKELETAL: Unchanged     IMPRESSION: 1.  Mural thickening of the sigmoid colon with pericolonic inflammation and stranding, slightly worsened from prior exam with punctate foci of gas seen adjacent to the inflamed colon as well as tracking in the anterior abdominal wall concerning for sigmoid colonic perforation. No organized intra-abdominal fluid collection such as abscess is seen at this time. Given the long-standing inflammation in this region an underlying neoplastic process cannot be excluded. 2.  Scattered air-fluid levels and mildly dilated loops of proximal jejunum which decompresses gradually without a transition point consistent favored to reflect reactive ileus 3.  Hepatic steatosis 4.  Cholelithiasis [Critical Result: Sigmoid colonic inflammation with areas of extraluminal gas, new from 1/25/2023 concerning for sigmoid colonic perforation] Finding was identified on 1/29/2023 6:56 AM. DR. Snell was contacted by me on 1/29/2023 7:08 AM and verbalized understanding of the critical result.     CT Chest w/o Contrast    Result Date: 1/29/2023  EXAM: CT CHEST WITHOUT CONTRAST LOCATION: [facility] DATE/TIME:  01/29/2023, 6:46 AM INDICATION: Fever and cough. COMPARISON: 11/26/2022 - CT chest, abdomen and pelvis. TECHNIQUE: Note that this study was performed in conjunction with a CT scan of the abdomen and pelvis. Refer to a separate report for findings from that study. CT chest without IV contrast. Multiplanar reformats were obtained. Dose reduction techniques were used. CONTRAST: None. FINDINGS: LUNGS AND PLEURA: Minimal emphysematous changes in the lungs. Slight interstitial thickening in bilateral lung bases. A few curvilinear opacities in the periphery of both lungs likely represent atelectasis and/or scarring. The lungs are otherwise clear. Small left Bochdalek hernia containing fat MEDIASTINUM/AXILLAE: Atherosclerotic calcification in the thoracic aorta. CORONARY ARTERY CALCIFICATION: Present. MUSCULOSKELETAL: No acute findings. UPPER ABDOMEN: A few foci of extraluminal gas are present in the upper abdomen.     IMPRESSION: 1.  Slight interstitial thickening in bilateral lung bases. This is nonspecific, but most likely relates to interstitial pulmonary edema. 2.  No other findings suspicious for acute pulmonary disease. 3.  A few foci of extraluminal gas scattered within the nondependent aspect of the upper abdomen. In the absence of recent surgery, this is consistent with a bowel perforation. Refer to the report from the CT scan of the abdomen and pelvis performed in conjunction with this study for additional details. The findings were called to Dr. Snell by Dr. Anderson on 01/29/2023 at 0700 hours.

## 2023-02-03 NOTE — PROGRESS NOTES
Afternoon rounds patient in chair now she just finished physical therapy and Occupational Therapy.  She is little somnolent.  She is tells me that has been no change in her pain.  She continues to be afebrile and hypertensive.  She does have a little tachycardia but that could be from movement.  CT still pending.  Most likely will not be done until later this evening  Tabbie Day EVGENY  [facility]  [address] [address]  Phone- [phone]  Fax- [phone]

## 2023-02-04 ENCOUNTER — APPOINTMENT (OUTPATIENT)
Dept: PHYSICAL THERAPY | Facility: HOSPITAL | Age: 78
DRG: 853 | End: 2023-02-04
Payer: COMMERCIAL

## 2023-02-04 ENCOUNTER — APPOINTMENT (OUTPATIENT)
Dept: CARDIOLOGY | Facility: HOSPITAL | Age: 78
DRG: 853 | End: 2023-02-04
Attending: HOSPITALIST
Payer: COMMERCIAL

## 2023-02-04 LAB
ANION GAP SERPL CALCULATED.3IONS-SCNC: 9 MMOL/L (ref 7–15)
BUN SERPL-MCNC: 7.4 MG/DL (ref 8–23)
CALCIUM SERPL-MCNC: 8 MG/DL (ref 8.8–10.2)
CHLORIDE SERPL-SCNC: 108 MMOL/L (ref 98–107)
CREAT SERPL-MCNC: 0.61 MG/DL (ref 0.51–0.95)
DEPRECATED HCO3 PLAS-SCNC: 24 MMOL/L (ref 22–29)
GFR SERPL CREATININE-BSD FRML MDRD: >90 ML/MIN/1.73M2
GLUCOSE BLDC GLUCOMTR-MCNC: 153 MG/DL (ref 70–99)
GLUCOSE BLDC GLUCOMTR-MCNC: 159 MG/DL (ref 70–99)
GLUCOSE BLDC GLUCOMTR-MCNC: 185 MG/DL (ref 70–99)
GLUCOSE BLDC GLUCOMTR-MCNC: 191 MG/DL (ref 70–99)
GLUCOSE SERPL-MCNC: 206 MG/DL (ref 70–99)
GRAM STAIN RESULT: NORMAL
GRAM STAIN RESULT: NORMAL
MAGNESIUM SERPL-MCNC: 1.5 MG/DL (ref 1.7–2.3)
MAGNESIUM SERPL-MCNC: 2.5 MG/DL (ref 1.7–2.3)
PHOSPHATE SERPL-MCNC: 2.7 MG/DL (ref 2.5–4.5)
PLATELET # BLD AUTO: 449 10E3/UL (ref 150–450)
POTASSIUM SERPL-SCNC: 3.2 MMOL/L (ref 3.4–5.3)
POTASSIUM SERPL-SCNC: 3.2 MMOL/L (ref 3.4–5.3)
POTASSIUM SERPL-SCNC: 3.3 MMOL/L (ref 3.4–5.3)
POTASSIUM SERPL-SCNC: 3.7 MMOL/L (ref 3.4–5.3)
SODIUM SERPL-SCNC: 141 MMOL/L (ref 136–145)
WBC # BLD AUTO: 21.2 10E3/UL (ref 4–11)

## 2023-02-04 PROCEDURE — 250N000013 HC RX MED GY IP 250 OP 250 PS 637: Performed by: SURGERY

## 2023-02-04 PROCEDURE — 87070 CULTURE OTHR SPECIMN AEROBIC: CPT | Performed by: INTERNAL MEDICINE

## 2023-02-04 PROCEDURE — 84100 ASSAY OF PHOSPHORUS: CPT | Performed by: HOSPITALIST

## 2023-02-04 PROCEDURE — 99233 SBSQ HOSP IP/OBS HIGH 50: CPT | Performed by: HOSPITALIST

## 2023-02-04 PROCEDURE — 250N000013 HC RX MED GY IP 250 OP 250 PS 637: Performed by: HOSPITALIST

## 2023-02-04 PROCEDURE — 99232 SBSQ HOSP IP/OBS MODERATE 35: CPT | Performed by: INTERNAL MEDICINE

## 2023-02-04 PROCEDURE — 258N000003 HC RX IP 258 OP 636: Performed by: INTERNAL MEDICINE

## 2023-02-04 PROCEDURE — 250N000011 HC RX IP 250 OP 636: Performed by: HOSPITALIST

## 2023-02-04 PROCEDURE — 250N000011 HC RX IP 250 OP 636: Performed by: SURGERY

## 2023-02-04 PROCEDURE — 250N000011 HC RX IP 250 OP 636: Performed by: INTERNAL MEDICINE

## 2023-02-04 PROCEDURE — 250N000013 HC RX MED GY IP 250 OP 250 PS 637: Performed by: INTERNAL MEDICINE

## 2023-02-04 PROCEDURE — 87205 SMEAR GRAM STAIN: CPT | Performed by: INTERNAL MEDICINE

## 2023-02-04 PROCEDURE — 85048 AUTOMATED LEUKOCYTE COUNT: CPT | Performed by: INTERNAL MEDICINE

## 2023-02-04 PROCEDURE — 120N000001 HC R&B MED SURG/OB

## 2023-02-04 PROCEDURE — 80048 BASIC METABOLIC PNL TOTAL CA: CPT | Performed by: HOSPITALIST

## 2023-02-04 PROCEDURE — 97530 THERAPEUTIC ACTIVITIES: CPT | Mod: GP

## 2023-02-04 PROCEDURE — 83735 ASSAY OF MAGNESIUM: CPT | Performed by: HOSPITALIST

## 2023-02-04 PROCEDURE — 85049 AUTOMATED PLATELET COUNT: CPT | Performed by: SURGERY

## 2023-02-04 PROCEDURE — 84132 ASSAY OF SERUM POTASSIUM: CPT | Performed by: HOSPITALIST

## 2023-02-04 PROCEDURE — 93306 TTE W/DOPPLER COMPLETE: CPT

## 2023-02-04 PROCEDURE — 89050 BODY FLUID CELL COUNT: CPT | Performed by: INTERNAL MEDICINE

## 2023-02-04 PROCEDURE — 93306 TTE W/DOPPLER COMPLETE: CPT | Mod: 26 | Performed by: INTERNAL MEDICINE

## 2023-02-04 PROCEDURE — 36415 COLL VENOUS BLD VENIPUNCTURE: CPT | Performed by: HOSPITALIST

## 2023-02-04 RX ORDER — POTASSIUM CHLORIDE 1500 MG/1
40 TABLET, EXTENDED RELEASE ORAL ONCE
Status: COMPLETED | OUTPATIENT
Start: 2023-02-04 | End: 2023-02-04

## 2023-02-04 RX ORDER — MEROPENEM 1 G/1
1 INJECTION, POWDER, FOR SOLUTION INTRAVENOUS EVERY 8 HOURS
Status: COMPLETED | OUTPATIENT
Start: 2023-02-04 | End: 2023-02-16

## 2023-02-04 RX ORDER — FUROSEMIDE 10 MG/ML
20 INJECTION INTRAMUSCULAR; INTRAVENOUS EVERY 12 HOURS
Status: DISCONTINUED | OUTPATIENT
Start: 2023-02-04 | End: 2023-02-08

## 2023-02-04 RX ORDER — MAGNESIUM SULFATE 4 G/50ML
4 INJECTION INTRAVENOUS ONCE
Status: COMPLETED | OUTPATIENT
Start: 2023-02-04 | End: 2023-02-04

## 2023-02-04 RX ORDER — POTASSIUM CHLORIDE 1.5 G/1.58G
40 POWDER, FOR SOLUTION ORAL ONCE
Status: COMPLETED | OUTPATIENT
Start: 2023-02-04 | End: 2023-02-04

## 2023-02-04 RX ADMIN — MICAFUNGIN SODIUM 100 MG: 50 INJECTION, POWDER, LYOPHILIZED, FOR SOLUTION INTRAVENOUS at 14:05

## 2023-02-04 RX ADMIN — MEROPENEM 1 G: 1 INJECTION INTRAVENOUS at 13:01

## 2023-02-04 RX ADMIN — POTASSIUM CHLORIDE 40 MEQ: 1500 TABLET, EXTENDED RELEASE ORAL at 15:55

## 2023-02-04 RX ADMIN — PIPERACILLIN AND TAZOBACTAM 3.38 G: 3; .375 INJECTION, POWDER, LYOPHILIZED, FOR SOLUTION INTRAVENOUS at 08:52

## 2023-02-04 RX ADMIN — POTASSIUM CHLORIDE 40 MEQ: 1.5 POWDER, FOR SOLUTION ORAL at 08:52

## 2023-02-04 RX ADMIN — MAGNESIUM OXIDE TAB 400 MG (241.3 MG ELEMENTAL MG) 400 MG: 400 (241.3 MG) TAB at 19:48

## 2023-02-04 RX ADMIN — ROSUVASTATIN CALCIUM 20 MG: 10 TABLET, FILM COATED ORAL at 19:47

## 2023-02-04 RX ADMIN — TRAMADOL HYDROCHLORIDE 50 MG: 50 TABLET, COATED ORAL at 21:04

## 2023-02-04 RX ADMIN — AMLODIPINE BESYLATE 5 MG: 5 TABLET ORAL at 08:53

## 2023-02-04 RX ADMIN — TRAMADOL HYDROCHLORIDE 50 MG: 50 TABLET, COATED ORAL at 11:24

## 2023-02-04 RX ADMIN — FUROSEMIDE 20 MG: 10 INJECTION, SOLUTION INTRAMUSCULAR; INTRAVENOUS at 15:55

## 2023-02-04 RX ADMIN — MAGNESIUM SULFATE HEPTAHYDRATE 4 G: 80 INJECTION, SOLUTION INTRAVENOUS at 08:50

## 2023-02-04 RX ADMIN — ENOXAPARIN SODIUM 40 MG: 40 INJECTION SUBCUTANEOUS at 08:52

## 2023-02-04 RX ADMIN — ASPIRIN 81 MG: 81 TABLET, COATED ORAL at 08:53

## 2023-02-04 RX ADMIN — IOPAMIDOL 100 ML: 755 INJECTION, SOLUTION INTRAVENOUS at 00:06

## 2023-02-04 RX ADMIN — MAGNESIUM OXIDE TAB 400 MG (241.3 MG ELEMENTAL MG) 400 MG: 400 (241.3 MG) TAB at 08:54

## 2023-02-04 RX ADMIN — VENLAFAXINE HYDROCHLORIDE 75 MG: 75 CAPSULE, EXTENDED RELEASE ORAL at 08:53

## 2023-02-04 RX ADMIN — LEVOTHYROXINE SODIUM 137 MCG: 0.11 TABLET ORAL at 08:53

## 2023-02-04 RX ADMIN — MEROPENEM 1 G: 1 INJECTION INTRAVENOUS at 19:47

## 2023-02-04 RX ADMIN — FUROSEMIDE 20 MG: 10 INJECTION, SOLUTION INTRAMUSCULAR; INTRAVENOUS at 03:52

## 2023-02-04 RX ADMIN — PIPERACILLIN AND TAZOBACTAM 3.38 G: 3; .375 INJECTION, POWDER, LYOPHILIZED, FOR SOLUTION INTRAVENOUS at 00:23

## 2023-02-04 RX ADMIN — TRAMADOL HYDROCHLORIDE 50 MG: 50 TABLET, COATED ORAL at 02:46

## 2023-02-04 ASSESSMENT — ACTIVITIES OF DAILY LIVING (ADL)
ADLS_ACUITY_SCORE: 38
ADLS_ACUITY_SCORE: 38
ADLS_ACUITY_SCORE: 36
ADLS_ACUITY_SCORE: 40
ADLS_ACUITY_SCORE: 38
ADLS_ACUITY_SCORE: 36
ADLS_ACUITY_SCORE: 38
ADLS_ACUITY_SCORE: 40
ADLS_ACUITY_SCORE: 38
ADLS_ACUITY_SCORE: 36

## 2023-02-04 NOTE — PROGRESS NOTES
[facility] Hospitalist Progress Note  [facility]  Admission date: 1/29/2023    Summary:    77F with sepsis from sigmoid colonic perforation s/p lap sigmoid colectomy and washout.     Assessment/Plan    #Sepsis 2/2 sigmoid colonic perforation s/p sigmoid colectom and washout for perforated stercoral ulcer  #Clostridium and anaerobic bacteremia (collinsella and eubacterium species)  -loculated ascites.  Will need repeat CT in a few days and anticipate will have drainable collection.  Sooner if sicker.  -full liquids  -zosyn.  Given persistent likely infected abdominal fluid maybe should add fluconazole or micafungin.  D/w ID who will add.    #Volume overload - stopped IVF, initiate diuresis (turn down rate).  Check ECHO.  Keep botello for now with significant diuresis.    #Alevism - No blood products as patient is Alevism (previously confirmed with the patient).      #Acute hypoxic respiratory failure - likely atelectasis related to #1. IC, O2, diuresis.      #Acute pain - quite somnolent earlier and dilaudid dose reduced to 0.25mg. More alert today     #DM2  - home regimen: on jardiance, glipizide, linagliptin, metformin - at home  -inpatient: start lantus if oral intake improving, sliding scale insulin    #Hypertension: norvasc increase to 5mg, losartan 50 BID (hold for now with forced diuresis, contrast etc), diuresis  #Hypothyroidism : Levothyroxine  #Hyperlipidemia: crestor   #Anxiety/Depression : effexor.     #HypoK and Mg - replacement    Checklist:  Code Status: Full Code    Diet: Full Liquid Diet  Snacks/Supplements Adult: Banatrol Plus; Between Meals     DVT px:  Enoxaparin (Lovenox) SQ    Disposition and Discharge Planning  Auto-populated from discharge tab:     Expected Discharge Date: 02/13/2023, 12:00 PM  Discharge Delays: 1:1 Sitter still ordered - MD to assess    Discharge Comments: On 1:1         System Identified Risk Variables  Auto-populated based on system request -  "if relevant they will be addressed above:  Clinically Significant Risk Factors        # Hypokalemia: Lowest K = 3.2 mmol/L in last 2 days, will replace as needed     # Hypomagnesemia: Lowest Mg = 1.5 mg/dL in last 2 days, will replace as needed   # Hypoalbuminemia: Lowest albumin = 2.5 g/dL at 1/30/2023  7:30 AM, will monitor as appropriate           # Obesity: Estimated body mass index is 36.61 kg/m  as calculated from the following:    Height as of 1/25/23: 1.651 m (5' 5\").    Weight as of this encounter: 99.8 kg (220 lb 0.3 oz).              Interval Events/Subjective/Notable results:  Resting  CT with loculated ascites with partial rim enhancement with gas but not yet drainable.  Trace pleural effusions, body wall edema  WBC 21 rising, K 3.2, , Mg 1.5, creat stable.  -4.8L  O2 up to 4L       Objective    Vital signs in last 24 hours  Temp:  [97.7  F (36.5  C)-99.9  F (37.7  C)] 97.7  F (36.5  C)  Pulse:  [] 95  Resp:  [18-20] 18  BP: (145-189)/(64-81) 145/64  SpO2:  [94 %-95 %] 94 % O2 Device: Nasal cannula    Weight:   220 lbs .31 oz  Body mass index is 36.61 kg/m .    Intake/Output last 3 shifts  I/O last 3 completed shifts:  In: 973 [P.O.:720; I.V.:253]  Out: 5840 [Urine:4600; Drains:1240]    Physical Exam  General:  Resting when I saw this AM  CV: RRR no MRG, normal S1 and S2, hypervolemic  Lungs: CTAB.  Easyrespirations  Abd: soft, obese, winces with light palpation.  Ostomy bag over leakage from incision site, serous output  Skin: no rashes or jaundice noted on exposed skin.    Box Catheter: PRESENT, indication: /GI/GYN Pelvic Procedure  Lines: None          Medical Decision Making           Leon Dejesus MD, [facility]  Internal Medicine Hospitalist  "

## 2023-02-04 NOTE — PLAN OF CARE
Problem: Bowel Resection  Goal: Acceptable Pain Control  Outcome: Progressing     Problem: Diabetes Comorbidity  Goal: Blood Glucose Level Within Targeted Range  Outcome: Progressing     Problem: Hypertension Comorbidity  Goal: Blood Pressure in Desired Range  Outcome: Progressing     Problem: Pain Acute  Goal: Optimal Pain Control and Function  Outcome: Progressing   Goal Outcome Evaluation:    Pt slept between cares. Disoriented to time. Abdominal pain rated 5/10 prn tramadol given. Iv antibiotics given per orders. Turned in bed for comfort. On 2L oxygen through nasal canula. 1:1 sitter at bedside. Box intact, Passing flatus.

## 2023-02-04 NOTE — PLAN OF CARE
Goal Outcome Evaluation:  Patient has been lethargic, sleeping most of the shift.  Reports abdominal pain 5 when awake. Tramadol given at 2100. Using ice pack as well.  Very little PO intake, poor appetite.   IV Zosyn  Diuresing with lasix- 1900cc urine output from botello catheter. Sating 95% on 2L/NC. Some abdominal retraction noted at times with breathing.  Low grade temps 99.2 and 99.9-instructed again on importance of and how to use IS. Patient is only able to get to 500. Needs reminders to use and encourage cough and deep breathe.  Phos recheck called for additional IV dose. K, Mg and Phos protocols are now rechecks in am.  Still waiting on CT to get done. Delayed d/t short staff and ER patients.  Serous drainage from both DAVIS and midline incisional pouch.  Patient was up in the chair for several hours. Repositioning side to side with pillows when in bed.

## 2023-02-04 NOTE — PLAN OF CARE
Goal Outcome Evaluation:      Plan of Care Reviewed With: patient, family    Overall Patient Progress: no changeOverall Patient Progress: no change    Outcome Evaluation: tolerating full liquids, pain controlled with PRN Tramadol. Arnulfo and incision pouch serous liquid. 1 large loose stool this am. ID changing ABX regime.      Problem: Bowel Resection  Goal: Effective Bowel Motility and Elimination  Outcome: Progressing     Problem: Bowel Resection  Goal: Acceptable Pain Control  Outcome: Progressing

## 2023-02-04 NOTE — PROGRESS NOTES
INFECTIOUS DISEASE FOLLOW UP NOTE      ASSESSMENT:  1. Intra-abdominal infection presenting with perforated stercolic ulcer of sigmoid colon, underwent laparoscopic sigmoid colectomy with anastomosis, washout 1/29. Findings of diffuse feculent peritonitis. Ongoing pain and leukocytosis. WBC increased. Draining clear fluid from wound -- creatinine level not suggestive of urine leak based on estimate.   2. Bacteremia with clostridium species, Collinsella aerofaciens, also gram negative bacteremia (likely anaerobe). Generally covered with pip/tazo. I found one paper on antibiotic susceptibilities for Collinsella, should be sensitive to pip/tazo.   3. DM  4. Left great toe infection for many months. Has seen podiatry and primary clinic for this. Recent doxycycline. Had MRI negative for osteomyelitis in September. Seems improved.   5. Anglican per chart refusing blood products.   Cholelithiasis      Leukocytosis persist  CT reviewed. Some loculated fluid, not drainable       PLAN:  Change zosyn to meropenem and micafungin  Send fluid for cx  LFT   Labs in     Macie Yin M.D.      ______________________________________________________________________    SUBJECTIVE / INTERVAL HISTORY:     Looks very weak    Pain controlled    ROS: dyspnea. All other systems negative except as listed above.        OBJECTIVE:  /63 (BP Location: Left arm)   Pulse 86   Temp 97.9  F (36.6  C) (Oral)   Resp 16   Wt 99.8 kg (220 lb 0.3 oz)   SpO2 94%   BMI 36.61 kg/m           GEN: No acute distress. Up in chair. On O2. Dyspnea.   RESPIRATORY:  Clear anterior.  CARDIOVASCULAR:  Regular rate and rhythm. Normal S1 and S2. No murmur, click, gallop or rub. No dependent edema. No excess JVD.  ABDOMEN:  Soft, + bowel sounds. Colostomy bag to collect midline serous fluid. Tender abd. DAVIS on right serous fluid.   EXTREMITIES: No edema.  SKIN/HAIR/NAILS:  No rashes. L great toe erythema.  IV:  peripheral        Antibiotics:  pip/tazo 1/29-  Vancomycin 1/29 x1    Pertinent labs:    Recent Labs   Lab 02/04/23  0621 02/03/23  0556 02/02/23  0558 02/01/23  0624   WBC 21.2* 17.1* 15.2* 20.3*   HGB  --  10.2* 10.2* 9.9*   HCT  --  35.4 35.0 34.5*    428 453* 461*  461*        Recent Labs   Lab 02/04/23  0621 02/03/23  0556 02/02/23  0558    143 145   CO2 24 18* 19*   BUN 7.4* 11.8 19.1      No results found for: CRP      Lab Results   Component Value Date    ALT 13 01/30/2023    AST 20 01/30/2023    ALKPHOS 92 01/30/2023         MICROBIOLOGY DATA:  1/29 one set clostridium one bottle, GNR anaerobic bottle (not ID'd by verigene), 2nd set anaerobic GPR, one bottle      RADIOLOGY:  CT Abdomen Pelvis w Contrast    Result Date: 2/4/2023  EXAM: CT ABDOMEN PELVIS W CONTRAST LOCATION: [facility] DATE/TIME: 2/4/2023 12:08 AM INDICATION: 1 29 sigmoidectomy with increasing wbc COMPARISON: CT a abdomen/pelvis 01/29/2023 TECHNIQUE: CT scan of the abdomen and pelvis was performed following injection of IV contrast. Multiplanar reformats were obtained. Dose reduction techniques were used. CONTRAST: isovue 370 100ml FINDINGS: LOWER CHEST: Trace bilateral pleural effusions right greater than left with compressive atelectasis. HEPATOBILIARY: Diffuse hepatic steatosis. Cholelithiasis with mild gallbladder distention which may be related to fasting. No biliary ductal dilatation. PANCREAS: Normal. SPLEEN: Normal. ADRENAL GLANDS: Normal. KIDNEYS/BLADDER: No hydronephrosis. Box catheter within a decompressed bladder. BOWEL: Recent sigmoidectomy. Colonic diverticulosis. Trace abdominopelvic free ascites. Additionally, there is some loculated ascites in the anterior abdomen with partial rim enhancement and several foci of gas within, which suggests a degree of peritonitis. No drainable fluid collection currently. Right anterior approach drain terminates in the left pelvis. LYMPH NODES: Normal.  VASCULATURE: Atherosclerotic calcifications of the aortoiliac vessels without evidence of aneurysmal dilatation. PELVIC ORGANS: Hysterectomy. MUSCULOSKELETAL: Body wall edema. Skin staples with recent ventral abdominal incision. Small fat-containing umbilical hernia. Multilevel degenerative changes of the thoracolumbar spine. No acute osseous abnormality.     IMPRESSION: 1.  Recent sigmoidectomy. Trace abdominopelvic free ascites. Additionally, there is some loculated ascites in the anterior abdomen with partial rim enhancement and several foci of gas within, which suggests a degree of peritonitis. No drainable fluid collection currently. 2.  Trace bilateral pleural effusions right greater than left with compressive atelectasis. 3.  Body wall edema. 4.  Diffuse hepatic steatosis. 5.  Cholelithiasis with mild gallbladder distention which may be related to fasting.    CT Abdomen Pelvis w Contrast    Result Date: 1/25/2023  EXAM: CT ABDOMEN PELVIS W CONTRAST LOCATION: [facility] DATE/TIME: 1/25/2023 11:28 PM INDICATION: abdominal pain worst in lower quadrants, leukocytosis eval for pathology COMPARISON: 11/26/2022 TECHNIQUE: CT scan of the abdomen and pelvis was performed following injection of IV contrast. Multiplanar reformats were obtained. Dose reduction techniques were used. CONTRAST: isovue 370 100ml FINDINGS: LOWER CHEST: Small left Bochdalek hernia. Basilar atelectasis. HEPATOBILIARY: Cholelithiasis and hepatic steatosis. PANCREAS: Normal. SPLEEN: Normal. ADRENAL GLANDS: Normal. KIDNEYS/BLADDER: Normal. BOWEL: Small bowel is normal caliber. Large amount of colonic stool. Sigmoid colonic wall thickening with adjacent inflammation is again seen. The inflamed segment is distended with stool. Small amount of adjacent free fluid. Caliber change with collapse  of the rectosigmoid junction and rectum. Diverticulosis.  LYMPH NODES: Subcentimeter retroperitoneal lymph nodes. VASCULATURE:  Atherosclerotic vascular calcification. PELVIC ORGANS: Absent uterus. MUSCULOSKELETAL: Degenerative change osseous structures. Mild compression L4. Slight anterolisthesis L4 on L5.     IMPRESSION: 1.  Sigmoid colitis is again seen. Collapse of the rectosigmoid junction and rectum distal to the inflamed segment of colon. Underlying stricture not excluded. 2.  Small amount of adjacent free fluid. 3.  Cholelithiasis.    CTA Abdomen Pelvis with Contrast    Result Date: 1/29/2023  EXAM: CTA ABDOMEN PELVIS WITH CONTRAST LOCATION: [facility] DATE/TIME: 1/29/2023 6:48 AM INDICATION: recurrent colitis, distended abdomen pain out of proportion. COMPARISON: There are study dated 1/25/ TECHNIQUE: CT angiogram abdomen pelvis during arterial phase of injection of IV contrast. 2D and 3D MIP reconstructions were performed by the CT technologist. Dose reduction techniques were used. CONTRAST: isovue 370 100ml FINDINGS: ANGIOGRAM ABDOMEN/PELVIS: The abdominal aorta is normal in size and caliber throughout with scattered atherosclerotic calcifications noted. The iliac and femoral vessels are normal in size and caliber and well-opacified. Celiac and SMA and TONY arise normally and are well opacified at this time LOWER CHEST: Lung bases are clear. Heart is enlarged, similar prior exam. HEPATOBILIARY: Diffuse hepatic steatosis. No significant mass. No bile duct dilatation. Calcified gallstones are again seen within the gallbladder. PANCREAS: No significant mass, duct dilatation, or inflammatory change. SPLEEN: Normal size. ADRENAL GLANDS: No significant nodules. KIDNEYS/BLADDER: No significant mass, stone, or hydronephrosis. BOWEL: Mural thickening and pericolonic inflammation of the sigmoid colon is again noted, similar in appearance to prior exam with increased inflammation seen in the proximal sigmoid colon. No pericolonic abscess seen at this time. Stool is seen throughout the inflamed sigmoid colon as well as  scattered throughout the large bowel without evidence of large bowel obstruction. Adjacent to the proximal sigmoid colon is free fluid with foci of gas which are extraluminal and new from the prior study (image 1:15, series 5). Additional foci of extraluminal gas are seen within the mesenteric fat anteriorly in the upper abdomen (image 57, series 5). There are scattered diverticula seen throughout the colon. Air-fluid levels are seen scattered through multiple loops of small bowel with mild dilatation seen in the proximal jejunum in the upper abdomen, these mildly dilated loops of small bowel decompressed gradually distally without a clear transition point. LYMPH NODES: Increased Number of pericolonic lymph nodes are again seen adjacent to the sigmoid colon. PELVIC ORGANS: No pelvic masses. MUSCULOSKELETAL: Unchanged     IMPRESSION: 1.  Mural thickening of the sigmoid colon with pericolonic inflammation and stranding, slightly worsened from prior exam with punctate foci of gas seen adjacent to the inflamed colon as well as tracking in the anterior abdominal wall concerning for sigmoid colonic perforation. No organized intra-abdominal fluid collection such as abscess is seen at this time. Given the long-standing inflammation in this region an underlying neoplastic process cannot be excluded. 2.  Scattered air-fluid levels and mildly dilated loops of proximal jejunum which decompresses gradually without a transition point consistent favored to reflect reactive ileus 3.  Hepatic steatosis 4.  Cholelithiasis [Critical Result: Sigmoid colonic inflammation with areas of extraluminal gas, new from 1/25/2023 concerning for sigmoid colonic perforation] Finding was identified on 1/29/2023 6:56 AM. DR. Snell was contacted by me on 1/29/2023 7:08 AM and verbalized understanding of the critical result.     CT Chest w/o Contrast    Result Date: 1/29/2023  EXAM: CT CHEST WITHOUT CONTRAST LOCATION: [facility]  HOSPITAL DATE/TIME: 01/29/2023, 6:46 AM INDICATION: Fever and cough. COMPARISON: 11/26/2022 - CT chest, abdomen and pelvis. TECHNIQUE: Note that this study was performed in conjunction with a CT scan of the abdomen and pelvis. Refer to a separate report for findings from that study. CT chest without IV contrast. Multiplanar reformats were obtained. Dose reduction techniques were used. CONTRAST: None. FINDINGS: LUNGS AND PLEURA: Minimal emphysematous changes in the lungs. Slight interstitial thickening in bilateral lung bases. A few curvilinear opacities in the periphery of both lungs likely represent atelectasis and/or scarring. The lungs are otherwise clear. Small left Bochdalek hernia containing fat MEDIASTINUM/AXILLAE: Atherosclerotic calcification in the thoracic aorta. CORONARY ARTERY CALCIFICATION: Present. MUSCULOSKELETAL: No acute findings. UPPER ABDOMEN: A few foci of extraluminal gas are present in the upper abdomen.     IMPRESSION: 1.  Slight interstitial thickening in bilateral lung bases. This is nonspecific, but most likely relates to interstitial pulmonary edema. 2.  No other findings suspicious for acute pulmonary disease. 3.  A few foci of extraluminal gas scattered within the nondependent aspect of the upper abdomen. In the absence of recent surgery, this is consistent with a bowel perforation. Refer to the report from the CT scan of the abdomen and pelvis performed in conjunction with this study for additional details. The findings were called to Dr. Snell by Dr. Anderson on 01/29/2023 at 0700 hours.

## 2023-02-04 NOTE — PROGRESS NOTES
ASSESSMENT:  1. Perforation of colon (H)    2. Refusal of blood transfusions as patient is Nondenominational        Suzy Gómez is a 77 year old female who is s/p laparoscopic sigmoid colectomy and washout on 1/29/2023.  Leukocytosis continues to increase although patient is looking much better.  No abscess, significant/drainable fluid collection or perforation noted on CT scan yesterday.  Leukocytosis increase is likely secondary to severe peritonitis.  Drains continue to be serous and patient is leaking copious amounts of serous fluid through her left lateral port site.  Bowel function is returning and patient is more engaging and more comfortable than she has been in days.    PLAN:  Continue antibiotics  We will advance to mechanical soft diet  Increase activity  Box removal per [facility] given it has been left in place for diuresis and not for surgical results      SUBJECTIVE:   She is feeling better today.  Patient states pain is under control and is wide-eyed and very interactive this morning.  She ate the majority of her full liquid breakfast and is feeling good.  Patient reports a very large bowel movement this morning and the nursing assistant in the room concurs that it was a large formed BM without signs of blood.      Patient Vitals for the past 24 hrs:   BP Temp Temp src Pulse Resp SpO2   02/04/23 0812 138/63 97.9  F (36.6  C) Oral 86 16 94 %   02/04/23 0351 (!) 145/64 97.7  F (36.5  C) Oral 95 18 94 %   02/04/23 0030 (!) 156/69 98.2  F (36.8  C) Oral 92 19 95 %   02/03/23 1928 (!) 169/72 99.9  F (37.7  C) Oral 96 20 95 %   02/03/23 1536 (!) 153/69 99.2  F (37.3  C) Oral 84 18 95 %   02/03/23 1440 (!) 173/80 -- -- 112 -- --   02/03/23 1228 (!) 189/81 98.3  F (36.8  C) Oral 87 20 95 %         PHYSICAL EXAM:  GEN: No acute distress, comfortable  LUNGS: CTA bilaterally  CV:RRR  ABD: Soft, not distended, expected TTP near incisions; incision CDI; serous fluid being collected in ostomy bag at left lateral lap  site  Drains: Serosanguineous  Output by Drain (mL) 02/02/23 0700 - 02/02/23 1459 02/02/23 1500 - 02/02/23 2259 02/02/23 2300 - 02/03/23 0659 02/03/23 0700 - 02/03/23 1459 02/03/23 1500 - 02/03/23 2259 02/03/23 2300 - 02/04/23 0659 02/04/23 0700 - 02/04/23 1206   Closed/Suction Drain 1 Right RUQ Bulb 19 Estonian 30  55 70 20 40    Open Drain Ventral Abdomen  800         Open Drain Left;Superior Abdomen Urostomy Pouch 225 130 250 925 125 60 225      EXT:No cyanosis, edema or obvious abnormalities    02/03 0700 - 02/04 0659  In: 973 [P.O.:720; I.V.:253]  Out: 5840 [Urine:4600; Drains:1240]    No results displayed because visit has over 200 results.             HERBIE Briggs CNP

## 2023-02-05 ENCOUNTER — APPOINTMENT (OUTPATIENT)
Dept: OCCUPATIONAL THERAPY | Facility: HOSPITAL | Age: 78
DRG: 853 | End: 2023-02-05
Payer: COMMERCIAL

## 2023-02-05 ENCOUNTER — APPOINTMENT (OUTPATIENT)
Dept: PHYSICAL THERAPY | Facility: HOSPITAL | Age: 78
DRG: 853 | End: 2023-02-05
Payer: COMMERCIAL

## 2023-02-05 ENCOUNTER — APPOINTMENT (OUTPATIENT)
Dept: RADIOLOGY | Facility: HOSPITAL | Age: 78
DRG: 853 | End: 2023-02-05
Attending: HOSPITALIST
Payer: COMMERCIAL

## 2023-02-05 LAB
ALBUMIN SERPL BCG-MCNC: 2.2 G/DL (ref 3.5–5.2)
ALP SERPL-CCNC: 87 U/L (ref 35–104)
ALT SERPL W P-5'-P-CCNC: 10 U/L (ref 10–35)
ANION GAP SERPL CALCULATED.3IONS-SCNC: 10 MMOL/L (ref 7–15)
AST SERPL W P-5'-P-CCNC: 21 U/L (ref 10–35)
BACTERIA BLD CULT: ABNORMAL
BILIRUB DIRECT SERPL-MCNC: <0.2 MG/DL (ref 0–0.3)
BILIRUB SERPL-MCNC: 0.2 MG/DL
BUN SERPL-MCNC: 7 MG/DL (ref 8–23)
CALCIUM SERPL-MCNC: 8.3 MG/DL (ref 8.8–10.2)
CHLORIDE SERPL-SCNC: 109 MMOL/L (ref 98–107)
CREAT SERPL-MCNC: 0.6 MG/DL (ref 0.51–0.95)
DEPRECATED HCO3 PLAS-SCNC: 24 MMOL/L (ref 22–29)
ERYTHROCYTE [DISTWIDTH] IN BLOOD BY AUTOMATED COUNT: 15.4 % (ref 10–15)
GFR SERPL CREATININE-BSD FRML MDRD: >90 ML/MIN/1.73M2
GLUCOSE BLDC GLUCOMTR-MCNC: 131 MG/DL (ref 70–99)
GLUCOSE BLDC GLUCOMTR-MCNC: 133 MG/DL (ref 70–99)
GLUCOSE BLDC GLUCOMTR-MCNC: 157 MG/DL (ref 70–99)
GLUCOSE BLDC GLUCOMTR-MCNC: 208 MG/DL (ref 70–99)
GLUCOSE SERPL-MCNC: 141 MG/DL (ref 70–99)
HBA1C MFR BLD: 8.1 %
HCT VFR BLD AUTO: 34.4 % (ref 35–47)
HGB BLD-MCNC: 10.4 G/DL (ref 11.7–15.7)
HOLD SPECIMEN: NORMAL
MAGNESIUM SERPL-MCNC: 2 MG/DL (ref 1.7–2.3)
MCH RBC QN AUTO: 29.1 PG (ref 26.5–33)
MCHC RBC AUTO-ENTMCNC: 30.2 G/DL (ref 31.5–36.5)
MCV RBC AUTO: 96 FL (ref 78–100)
PHOSPHATE SERPL-MCNC: 2 MG/DL (ref 2.5–4.5)
PLATELET # BLD AUTO: 427 10E3/UL (ref 150–450)
POTASSIUM SERPL-SCNC: 3.7 MMOL/L (ref 3.4–5.3)
POTASSIUM SERPL-SCNC: 3.7 MMOL/L (ref 3.4–5.3)
PROT SERPL-MCNC: 5 G/DL (ref 6.4–8.3)
RBC # BLD AUTO: 3.58 10E6/UL (ref 3.8–5.2)
SODIUM SERPL-SCNC: 143 MMOL/L (ref 136–145)
WBC # BLD AUTO: 19.3 10E3/UL (ref 4–11)

## 2023-02-05 PROCEDURE — 120N000001 HC R&B MED SURG/OB

## 2023-02-05 PROCEDURE — 36415 COLL VENOUS BLD VENIPUNCTURE: CPT | Performed by: HOSPITALIST

## 2023-02-05 PROCEDURE — 84100 ASSAY OF PHOSPHORUS: CPT | Performed by: HOSPITALIST

## 2023-02-05 PROCEDURE — 250N000011 HC RX IP 250 OP 636: Performed by: INTERNAL MEDICINE

## 2023-02-05 PROCEDURE — 258N000003 HC RX IP 258 OP 636: Performed by: INTERNAL MEDICINE

## 2023-02-05 PROCEDURE — 250N000013 HC RX MED GY IP 250 OP 250 PS 637: Performed by: INTERNAL MEDICINE

## 2023-02-05 PROCEDURE — 99233 SBSQ HOSP IP/OBS HIGH 50: CPT | Performed by: HOSPITALIST

## 2023-02-05 PROCEDURE — 250N000013 HC RX MED GY IP 250 OP 250 PS 637: Performed by: HOSPITALIST

## 2023-02-05 PROCEDURE — 73610 X-RAY EXAM OF ANKLE: CPT | Mod: LT

## 2023-02-05 PROCEDURE — 80053 COMPREHEN METABOLIC PANEL: CPT | Performed by: HOSPITALIST

## 2023-02-05 PROCEDURE — 250N000011 HC RX IP 250 OP 636: Performed by: HOSPITALIST

## 2023-02-05 PROCEDURE — 84132 ASSAY OF SERUM POTASSIUM: CPT | Performed by: HOSPITALIST

## 2023-02-05 PROCEDURE — 250N000013 HC RX MED GY IP 250 OP 250 PS 637: Performed by: SURGERY

## 2023-02-05 PROCEDURE — 82248 BILIRUBIN DIRECT: CPT | Performed by: HOSPITALIST

## 2023-02-05 PROCEDURE — 250N000011 HC RX IP 250 OP 636: Performed by: SURGERY

## 2023-02-05 PROCEDURE — 83735 ASSAY OF MAGNESIUM: CPT | Performed by: HOSPITALIST

## 2023-02-05 PROCEDURE — 97530 THERAPEUTIC ACTIVITIES: CPT | Mod: GP

## 2023-02-05 PROCEDURE — 83036 HEMOGLOBIN GLYCOSYLATED A1C: CPT | Performed by: HOSPITALIST

## 2023-02-05 PROCEDURE — 97535 SELF CARE MNGMENT TRAINING: CPT | Mod: GO

## 2023-02-05 PROCEDURE — 85027 COMPLETE CBC AUTOMATED: CPT | Performed by: HOSPITALIST

## 2023-02-05 PROCEDURE — 99232 SBSQ HOSP IP/OBS MODERATE 35: CPT | Performed by: INTERNAL MEDICINE

## 2023-02-05 RX ORDER — OXYCODONE HYDROCHLORIDE 5 MG/1
5 TABLET ORAL EVERY 4 HOURS PRN
Status: DISCONTINUED | OUTPATIENT
Start: 2023-02-05 | End: 2023-02-18 | Stop reason: HOSPADM

## 2023-02-05 RX ORDER — ACETAMINOPHEN 325 MG/1
650 TABLET ORAL 3 TIMES DAILY
Status: DISCONTINUED | OUTPATIENT
Start: 2023-02-05 | End: 2023-02-18 | Stop reason: HOSPADM

## 2023-02-05 RX ADMIN — MICAFUNGIN SODIUM 100 MG: 50 INJECTION, POWDER, LYOPHILIZED, FOR SOLUTION INTRAVENOUS at 13:51

## 2023-02-05 RX ADMIN — POTASSIUM & SODIUM PHOSPHATES POWDER PACK 280-160-250 MG 1 PACKET: 280-160-250 PACK at 16:37

## 2023-02-05 RX ADMIN — ROSUVASTATIN CALCIUM 20 MG: 10 TABLET, FILM COATED ORAL at 20:11

## 2023-02-05 RX ADMIN — ASPIRIN 81 MG: 81 TABLET, COATED ORAL at 08:26

## 2023-02-05 RX ADMIN — FUROSEMIDE 20 MG: 10 INJECTION, SOLUTION INTRAMUSCULAR; INTRAVENOUS at 04:02

## 2023-02-05 RX ADMIN — MEROPENEM 1 G: 1 INJECTION INTRAVENOUS at 03:59

## 2023-02-05 RX ADMIN — MAGNESIUM OXIDE TAB 400 MG (241.3 MG ELEMENTAL MG) 400 MG: 400 (241.3 MG) TAB at 08:27

## 2023-02-05 RX ADMIN — OXYCODONE HYDROCHLORIDE 5 MG: 5 TABLET ORAL at 15:07

## 2023-02-05 RX ADMIN — POTASSIUM & SODIUM PHOSPHATES POWDER PACK 280-160-250 MG 1 PACKET: 280-160-250 PACK at 09:24

## 2023-02-05 RX ADMIN — ACETAMINOPHEN 650 MG: 325 TABLET ORAL at 15:07

## 2023-02-05 RX ADMIN — MEROPENEM 1 G: 1 INJECTION INTRAVENOUS at 12:33

## 2023-02-05 RX ADMIN — ACETAMINOPHEN 650 MG: 325 TABLET ORAL at 20:11

## 2023-02-05 RX ADMIN — ENOXAPARIN SODIUM 40 MG: 40 INJECTION SUBCUTANEOUS at 08:26

## 2023-02-05 RX ADMIN — VENLAFAXINE HYDROCHLORIDE 75 MG: 75 CAPSULE, EXTENDED RELEASE ORAL at 08:26

## 2023-02-05 RX ADMIN — OXYCODONE HYDROCHLORIDE 5 MG: 5 TABLET ORAL at 20:11

## 2023-02-05 RX ADMIN — MEROPENEM 1 G: 1 INJECTION INTRAVENOUS at 20:15

## 2023-02-05 RX ADMIN — LEVOTHYROXINE SODIUM 137 MCG: 0.11 TABLET ORAL at 06:15

## 2023-02-05 RX ADMIN — FUROSEMIDE 20 MG: 10 INJECTION, SOLUTION INTRAMUSCULAR; INTRAVENOUS at 15:08

## 2023-02-05 RX ADMIN — POTASSIUM & SODIUM PHOSPHATES POWDER PACK 280-160-250 MG 1 PACKET: 280-160-250 PACK at 12:33

## 2023-02-05 ASSESSMENT — ACTIVITIES OF DAILY LIVING (ADL)
ADLS_ACUITY_SCORE: 40
ADLS_ACUITY_SCORE: 38
ADLS_ACUITY_SCORE: 40
ADLS_ACUITY_SCORE: 38
ADLS_ACUITY_SCORE: 38
ADLS_ACUITY_SCORE: 40
ADLS_ACUITY_SCORE: 40
ADLS_ACUITY_SCORE: 38
ADLS_ACUITY_SCORE: 40
ADLS_ACUITY_SCORE: 40
ADLS_ACUITY_SCORE: 38
ADLS_ACUITY_SCORE: 38

## 2023-02-05 NOTE — PROGRESS NOTES
Got patient upt to [facility] and ambulate in [facility]. Patient did well sitting at edge of bed. Transferred to [facility] with gait belt and walker. Patient had small loose brown BM. Patient was cleaned up and then with assist of 3 patient ambulated approximately 20-30 feet and then turned around. We got just inside doorway of patients room and patient stopped and stated she needed to rest and just couldn't go any further. Patient started bending knees. Staff called for chair but anish's legs were buckling. The 3 staff members slowly lowered her to her knees. Sling was secured and patient raised up with ceiling lift and returned to bed. Patient is now resting. Denies injury, reports legs are just tender from the edema. Abdominal pain 5. Tramadol given.   VS post activity-  T98.2  RR20   P99   /63 and sating 94% on 2L/NC

## 2023-02-05 NOTE — PROGRESS NOTES
[facility]    Medicine Progress Note - Hospitalist Service    Date of Admission:  1/29/2023    Assessment & Plan   77 year old female who is a Episcopal with diabetes mellitus type 2, HTN, hypothyroidism, hyperlipidemia, anxiety/depression who presented on 1/29/2023 with sepsis from sigmoid colonic perforation s/p laparoscopic sigmoid colectomy and washout on 1/29/2023 by Dr. Palencia.  Patient had a complicated postop course with persistent leukocytosis  and CT A/P on 2/4 which noted some areas of loculated ascites with signs of peritonitis, and body wall edema and pleural effusions.  Was started on IV diuresis for the edematous bowels.  ID was also consulted to help with management of infection.  Patient does have a polymicrobial bacteremia from admission.  Initially was on Zosyn then was switched over to meropenem and micafungin on 2/4 per ID recs.    -Continue IV antibiotics and antifungals.  Continue monitor WBC and clinical response; may get repeat CT imaging in few days to assess if any worsening of the loculation.  Continue with IV diuresis.     #Sepsis (Resolved)  #Sigmoid Colonic Perforation s/p Sigmoid Colectomy and washout for perforated stercoral ulcer (1/29/2023)  #Polymicrobial Bacteremia (Clostridium clostridioforme, Bacteroides vulgatus, Collinsella Aerofaciens, Eubacterium species)  Blood cultures from 1/29/2023 positive as mentioned above.  -Clinically appears okay but persistent leukocytosis  -General surgery and ID following, appreciate recs  -Due to persistent leukocytosis we will potentially repeat CT in a few days to assess for any drainable collection  -Meropenem (start date 2/4); prior to that was on Zosyn  -Micafungin (start date 2/4)  -Diet: Snacks/Supplements Adult: Banatrol Plus; Between Meals  Low Fiber Diet      #Volume Overload   TTE on 2/4 shows normal EF and no valvular disease; small pericardial effusion.  -IV Lasix 20 mg twice daily  -Given low  "albumin state will need to monitor sugar and has good nutrition intake    #Acute hypoxic respiratory failure   Likely atelectasis related to #1  -Encourage IS  -Continue diuresis  -Goal sat greater than 90%    #Anabaptist   No blood products as patient is Anabaptist (previously confirmed with the patient).      #Acute pain   Quite somnolent earlier after Dilaudid  -Tylenol 650 mg 3 times daily  -Oxycodone as needed (would avoid Tramadol given significantly variable metabolism in individuals)  -Reports left ankle pain, will do XR to assess     #Diabetes Mellitus type 2  Home Regimen:Jardiance, Glipizide, Linagliptin, Metformin.  A1c: Unknown  -Check A1c  -ISS q6H    Chronic Problems  #Hypertension: Hold Norvasc, losartan 50 BID while diuresing. If hypertensive would treat pain or other symptoms  #Hypothyroidism : Levothyroxine  #Hyperlipidemia: Crestor   #Anxiety/Depression: Effexor  #Morbid Obesity: Body mass index is 40.54 kg/m . Clinically significant and associated with HTN, DM 2.    Resolved Problems     #HypoK and Mg        Diet: Snacks/Supplements Adult: Banatrol Plus; Between Meals  Low Fiber Diet    DVT Prophylaxis: Enoxaparin (Lovenox) SQ  Box Catheter: PRESENT, indication: /GI/GYN Pelvic Procedure  Lines: None     Cardiac Monitoring: None  Code Status: Full Code      Clinically Significant Risk Factors        # Hypokalemia: Lowest K = 3.2 mmol/L in last 2 days, will replace as needed     # Hypomagnesemia: Lowest Mg = 1.5 mg/dL in last 2 days, will replace as needed   # Hypoalbuminemia: Lowest albumin = 2.2 g/dL at 2/5/2023  6:41 AM, will monitor as appropriate           # Severe Obesity: Estimated body mass index is 40.54 kg/m  as calculated from the following:    Height as of this encounter: 1.651 m (5' 5\").    Weight as of this encounter: 110.5 kg (243 lb 9.7 oz).          Disposition Plan     Expected Discharge Date: 02/13/2023, 12:00 PM  Discharge Delays: 1:1 Sitter still ordered - MD " to assess    Discharge Comments: On 1:1          Rodney Davis MD  Hospitalist Service  [facility]  Securely message with [employer] (more info)  Text page via [employer] Paging/Directory   ______________________________________________________________________    Interval History   No acute events overnight.     Patient seen and evaluated at bedside.  Reports left ankle pain, denies any trauma today.  Unclear inciting cause.  Denies any chest pain or shortness of breath.  Still has some abdominal pain but not excruciating.    Physical Exam   Vital Signs: Temp: 98.4  F (36.9  C) Temp src: Oral BP: 138/63 Pulse: 89   Resp: 18 SpO2: 94 % O2 Device: Nasal cannula Oxygen Delivery: 2 LPM  Weight: 243 lbs 9.73 oz    General: appears relatively comfortable in bed  CV: +S1/S2, no m/r/g, no pitting edema  Respiratory: clear to auscultation bilaterally anteriorly  GI: soft, NT, ND, ostomy in place  Neuro: alert, cranial nerves grossly intact    Medical Decision Making       55 MINUTES SPENT BY ME on the date of service doing chart review, history, exam, documentation & further activities per the note.      Data     I have personally reviewed the following data over the past 24 hrs:    19.3 (H)  \   10.4 (L)   / 427     143 109 (H) 7.0 (L) /  131 (H)   3.7; 3.7 24 0.60 \       ALT: 10 AST: 21 AP: 87 TBILI: 0.2   ALB: 2.2 (L) TOT PROTEIN: 5.0 (L) LIPASE: N/A       Imaging results reviewed over the past 24 hrs:   No results found for this or any previous visit (from the past 24 hour(s)).

## 2023-02-05 NOTE — PROGRESS NOTES
"ASSESSMENT:  1. Perforation of colon (H)    2. Refusal of blood transfusions as patient is Caodaism        Suzy Gómez is a 77 year old female who is s/p laparoscopic sigmoid colectomy and washout on 1/29/2023.   Leukocytosis noted with a decrease today.  Patient continues to have bowel movements and tolerated a mechanical soft diet yesterday.     PLAN:  Increase activity  Continue antibiotics  Advance to a low fiber diet  Box removal per [facility] given it has been left in place for diuresis and not for surgical results    SUBJECTIVE:   She is upset at the moment as physical therapy is currently working with her and making her get out of bed to the chair.  Patient is not answering questions but reports she has been eating with no nausea or vomiting.  When asked if she continues to have bowel movements she states \"why would I have any idea\".       Patient Vitals for the past 24 hrs:   BP Temp Temp src Pulse Resp SpO2 Height Weight   02/05/23 0726 138/63 98.4  F (36.9  C) Oral 89 18 94 % 1.651 m (5' 5\") 110.5 kg (243 lb 9.7 oz)   02/05/23 0401 (!) 148/65 -- -- -- -- -- -- --   02/04/23 2332 137/63 97.7  F (36.5  C) Oral 91 20 93 % -- --   02/04/23 2108 (!) 143/63 98.2  F (36.8  C) Oral 99 20 94 % -- --   02/04/23 1521 (!) 142/64 99  F (37.2  C) Oral 91 18 95 % -- --         PHYSICAL EXAM:  GEN: No acute distress, comfortable  LUNGS: CTA bilaterally  CV:RRR  ABD: Soft, not distended, expected TTP near incisions; incision CDI; serous fluid being collected in ostomy bag at left lateral lap site  Drains:  Serous  Output by Drain (mL) 02/03/23 0700 - 02/03/23 1459 02/03/23 1500 - 02/03/23 2259 02/03/23 2300 - 02/04/23 0659 02/04/23 0700 - 02/04/23 1459 02/04/23 1500 - 02/04/23 2259 02/04/23 2300 - 02/05/23 0659 02/05/23 0700 - 02/05/23 1042   Closed/Suction Drain 1 Right RUQ Bulb 19 [place] 70 20 40 40 45 55    Open Drain Ventral Abdomen           Open Drain Left;Superior Abdomen Urostomy Pouch  125 60 225 425 50 "       EXT:No cyanosis, edema or obvious abnormalities    02/04 0700 - 02/05 0659  In: 450 [P.O.:340; I.V.:110]  Out: 3290 [Urine:2450; Drains:840]    No results displayed because visit has over 200 results.             HERBIE Briggs CNP

## 2023-02-05 NOTE — PROGRESS NOTES
"INFECTIOUS DISEASE FOLLOW UP NOTE      ASSESSMENT:  1. Intra-abdominal infection presenting with perforated stercolic ulcer of sigmoid colon, underwent laparoscopic sigmoid colectomy with anastomosis, washout 1/29. Findings of diffuse feculent peritonitis. Ongoing pain and leukocytosis. WBC increased. Draining clear fluid from wound -- creatinine level not suggestive of urine leak based on estimate.   2. Bacteremia with clostridium species, Collinsella aerofaciens, also gram negative bacteremia (likely anaerobe). Generally covered with pip/tazo. I found one paper on antibiotic susceptibilities for Collinsella, should be sensitive to pip/tazo.   3. DM  4. Left great toe infection for many months. Has seen podiatry and primary clinic for this. Recent doxycycline. Had MRI negative for osteomyelitis in September. Seems improved.   5. Yarsani per chart refusing blood products.   Cholelithiasis      Leukocytosis persisted, abx changed 2/4  CT reviewed. Some loculated fluid, not drainable       PLAN:  Change zosyn to meropenem and micafungin 2/4  Sent fluid for cx  LFT OK  Labs in     Macie Yin M.D.      ______________________________________________________________________    SUBJECTIVE / INTERVAL HISTORY:     Looks more comfortable  Full diet    Pain controlled    ROS: dyspnea. All other systems negative except as listed above.        OBJECTIVE:  /63 (BP Location: Left arm)   Pulse 89   Temp 98.4  F (36.9  C) (Oral)   Resp 18   Ht 1.651 m (5' 5\")   Wt 110.5 kg (243 lb 9.7 oz)   SpO2 94%   BMI 40.54 kg/m           GEN: No acute distress. Up in chair. On O2. Dyspnea.   RESPIRATORY:  Clear anterior.  CARDIOVASCULAR:  Regular rate and rhythm. Normal S1 and S2. No murmur, click, gallop or rub. No dependent edema. No excess JVD.  ABDOMEN:  Soft, + bowel sounds. Colostomy bag to collect midline serous fluid. Tender abd. DAVIS on right serous fluid.   EXTREMITIES: No edema.  SKIN/HAIR/NAILS:  No " rashes. L great toe erythema.  IV: peripheral        Antibiotics:  pip/tazo -  Vancomycin 1/29 x1    Pertinent labs:    Recent Labs   Lab 23  0641 23  0621 23  0556 23  0558   WBC 19.3* 21.2* 17.1* 15.2*   HGB 10.4*  --  10.2* 10.2*   HCT 34.4*  --  35.4 35.0    449 428 453*        Recent Labs   Lab 23  0641 23  0621 23  0556    141 143   CO2 24 24 18*   BUN 7.0* 7.4* 11.8      No results found for: CRP      Lab Results   Component Value Date    ALT 10 2023    AST 21 2023    ALKPHOS 87 2023         MICROBIOLOGY DATA:   one set clostridium one bottle, GNR anaerobic bottle (not ID'd by verigene), 2nd set anaerobic GPR, one bottle      RADIOLOGY:  Echocardiogram Complete    Result Date: 2023  [id number]^[place] [address]  Name: SRIDHAR ALONSO MRN: [id number] : 1945 Study Date: 2023 12:44 PM Age: 77 yrs Gender: Female Patient Location: [facility] Reason For Study: CHF Ordering Physician: PAYAL OCAMPO Performed By: KATHARINE  BSA: 2.1 m2 Height: 65 in Weight: 220 lb HR: 87 BP: 138/63 mmHg ______________________________________________________________________________ Procedure Complete Echo Adult. ______________________________________________________________________________ Interpretation Summary  1. Normal left ventricular size and systolic performance with a visually estimated ejection fraction of 60%. 2. No significant valvular heart disease is identified on this study. 3. Normal right ventricular size with borderline reduction right ventricular systolic performance. 4. There is mild biatrial enlargement. 5. There is a very small pericardial effusion. There is no evidence of significant intraventricular dependence/tamponade physiology. ______________________________________________________________________________ Left ventricle: Normal left ventricular  size and systolic performance with a visually estimated ejection fraction of 60%. There is normal regional wall motion. Left ventricular wall thickness is normal.  Assessment of LV Diastolic Function: The cumulative findings suggest impaired diastolic filling [The septal e' velocity is < 7 cm/s & lateral e' velocity is < 10 cm/s. The average E/e' is >14. TR velocity is 2.8 m/s. Left atrial volume index is greater than 34 mL/mÂ ].  Assessment of left atrial pressure (LAP): The cumulative findings suggest moderately elevated left atrial pressure (the E/A is > 0.8 and <2.0 plus 2 or 3 of 3 of the following present: Average E/e' > 14, TRvel > 2.8 m/s, and/or LA vol. index > 34 ml/mÂ  ).  Right ventricle: Normal right ventricular size with borderline reduction right ventricular systolic performance.  Left atrium: There is mild left atrial enlargement.  Right atrium: There is mild right atrial enlargement.  IVC: The IVC is borderline dilated.  Aortic valve: The aortic valve is comprised of three cusps. No significant aortic stenosis or aortic insufficiency is detected on this study.  Mitral valve: The mitral valve appears morphologically normal. There is trace mitral insufficiency.  Tricuspid valve: The tricuspid valve is grossly morphologically normal. There is trace tricuspid insufficiency.  Pulmonic valve: The pulmonic valve is grossly morphologically normal.  Thoracic aorta: The aortic root and proximal ascending aorta are of normal dimension.  Pericardium: There is a very small pericardial effusion. There is no evidence of significant intraventricular dependence/tamponade physiology. ______________________________________________________________________________ ______________________________________________________________________________ MMode/2D Measurements & Calculations IVSd: 1.3 cm LVIDd: 4.1 cm LVIDs: 2.6 cm LVPWd: 1.2 cm FS: 36.6 % LV mass(C)d: 185.2 grams LV mass(C)dI: 89.9 grams/m2 Ao root diam: 3.2 cm LA  dimension: 4.6 cm asc Aorta Diam: 3.4 cm LA/Ao: 1.4 LVOT diam: 2.3 cm LVOT area: 4.1 cm2 LA Volume Indexed (AL/bp): 36.7 ml/m2  RWT: 0.58  Time Measurements MM HR: 87.0 BPM  Doppler Measurements & Calculations MV E max ayden: 123.0 cm/sec MV A max ayden: 124.2 cm/sec MV E/A: 0.99 MV dec time: 0.24 sec Ao V2 max: 189.4 cm/sec Ao max P.0 mmHg Ao V2 mean: 123.9 cm/sec Ao mean P.8 mmHg Ao V2 VTI: 32.7 cm QUAN(I,D): 2.8 cm2 QUAN(V,D): 2.9 cm2 LV V1 max P.9 mmHg LV V1 max: 131.3 cm/sec LV V1 VTI: 22.2 cm SV(LVOT): 92.1 ml SI(LVOT): 44.7 ml/m2 PA acc time: 0.09 sec TR max ayden: 279.6 cm/sec TR max P.3 mmHg AV Ayden Ratio (DI): 0.69 QUAN Index (cm2/m2): 1.4 E/E': 13.6  E/E' avg: 15.6 Lateral E/e': 13.6 Medial E/e': 17.7 Peak E' Ayden: 9.0 cm/sec  ______________________________________________________________________________ Report approved by: Leonid Hamilton 2023 02:47 PM       CT Abdomen Pelvis w Contrast    Result Date: 2023  EXAM: CT ABDOMEN PELVIS W CONTRAST LOCATION: [facility] DATE/TIME: 2023 12:08 AM INDICATION: 1 29 sigmoidectomy with increasing wbc COMPARISON: CT a abdomen/pelvis 2023 TECHNIQUE: CT scan of the abdomen and pelvis was performed following injection of IV contrast. Multiplanar reformats were obtained. Dose reduction techniques were used. CONTRAST: isovue 370 100ml FINDINGS: LOWER CHEST: Trace bilateral pleural effusions right greater than left with compressive atelectasis. HEPATOBILIARY: Diffuse hepatic steatosis. Cholelithiasis with mild gallbladder distention which may be related to fasting. No biliary ductal dilatation. PANCREAS: Normal. SPLEEN: Normal. ADRENAL GLANDS: Normal. KIDNEYS/BLADDER: No hydronephrosis. Box catheter within a decompressed bladder. BOWEL: Recent sigmoidectomy. Colonic diverticulosis. Trace abdominopelvic free ascites. Additionally, there is some loculated ascites in the anterior abdomen with partial rim enhancement  and several foci of gas within, which suggests a degree of peritonitis. No drainable fluid collection currently. Right anterior approach drain terminates in the left pelvis. LYMPH NODES: Normal. VASCULATURE: Atherosclerotic calcifications of the aortoiliac vessels without evidence of aneurysmal dilatation. PELVIC ORGANS: Hysterectomy. MUSCULOSKELETAL: Body wall edema. Skin staples with recent ventral abdominal incision. Small fat-containing umbilical hernia. Multilevel degenerative changes of the thoracolumbar spine. No acute osseous abnormality.     IMPRESSION: 1.  Recent sigmoidectomy. Trace abdominopelvic free ascites. Additionally, there is some loculated ascites in the anterior abdomen with partial rim enhancement and several foci of gas within, which suggests a degree of peritonitis. No drainable fluid collection currently. 2.  Trace bilateral pleural effusions right greater than left with compressive atelectasis. 3.  Body wall edema. 4.  Diffuse hepatic steatosis. 5.  Cholelithiasis with mild gallbladder distention which may be related to fasting.    CT Abdomen Pelvis w Contrast    Result Date: 1/25/2023  EXAM: CT ABDOMEN PELVIS W CONTRAST LOCATION: [facility] DATE/TIME: 1/25/2023 11:28 PM INDICATION: abdominal pain worst in lower quadrants, leukocytosis eval for pathology COMPARISON: 11/26/2022 TECHNIQUE: CT scan of the abdomen and pelvis was performed following injection of IV contrast. Multiplanar reformats were obtained. Dose reduction techniques were used. CONTRAST: isovue 370 100ml FINDINGS: LOWER CHEST: Small left Bochdalek hernia. Basilar atelectasis. HEPATOBILIARY: Cholelithiasis and hepatic steatosis. PANCREAS: Normal. SPLEEN: Normal. ADRENAL GLANDS: Normal. KIDNEYS/BLADDER: Normal. BOWEL: Small bowel is normal caliber. Large amount of colonic stool. Sigmoid colonic wall thickening with adjacent inflammation is again seen. The inflamed segment is distended with stool. Small  amount of adjacent free fluid. Caliber change with collapse  of the rectosigmoid junction and rectum. Diverticulosis.  LYMPH NODES: Subcentimeter retroperitoneal lymph nodes. VASCULATURE: Atherosclerotic vascular calcification. PELVIC ORGANS: Absent uterus. MUSCULOSKELETAL: Degenerative change osseous structures. Mild compression L4. Slight anterolisthesis L4 on L5.     IMPRESSION: 1.  Sigmoid colitis is again seen. Collapse of the rectosigmoid junction and rectum distal to the inflamed segment of colon. Underlying stricture not excluded. 2.  Small amount of adjacent free fluid. 3.  Cholelithiasis.    CTA Abdomen Pelvis with Contrast    Result Date: 1/29/2023  EXAM: CTA ABDOMEN PELVIS WITH CONTRAST LOCATION: [facility] DATE/TIME: 1/29/2023 6:48 AM INDICATION: recurrent colitis, distended abdomen pain out of proportion. COMPARISON: There are study dated 1/25/ TECHNIQUE: CT angiogram abdomen pelvis during arterial phase of injection of IV contrast. 2D and 3D MIP reconstructions were performed by the CT technologist. Dose reduction techniques were used. CONTRAST: isovue 370 100ml FINDINGS: ANGIOGRAM ABDOMEN/PELVIS: The abdominal aorta is normal in size and caliber throughout with scattered atherosclerotic calcifications noted. The iliac and femoral vessels are normal in size and caliber and well-opacified. Celiac and SMA and TONY arise normally and are well opacified at this time LOWER CHEST: Lung bases are clear. Heart is enlarged, similar prior exam. HEPATOBILIARY: Diffuse hepatic steatosis. No significant mass. No bile duct dilatation. Calcified gallstones are again seen within the gallbladder. PANCREAS: No significant mass, duct dilatation, or inflammatory change. SPLEEN: Normal size. ADRENAL GLANDS: No significant nodules. KIDNEYS/BLADDER: No significant mass, stone, or hydronephrosis. BOWEL: Mural thickening and pericolonic inflammation of the sigmoid colon is again noted, similar in  appearance to prior exam with increased inflammation seen in the proximal sigmoid colon. No pericolonic abscess seen at this time. Stool is seen throughout the inflamed sigmoid colon as well as scattered throughout the large bowel without evidence of large bowel obstruction. Adjacent to the proximal sigmoid colon is free fluid with foci of gas which are extraluminal and new from the prior study (image 1:15, series 5). Additional foci of extraluminal gas are seen within the mesenteric fat anteriorly in the upper abdomen (image 57, series 5). There are scattered diverticula seen throughout the colon. Air-fluid levels are seen scattered through multiple loops of small bowel with mild dilatation seen in the proximal jejunum in the upper abdomen, these mildly dilated loops of small bowel decompressed gradually distally without a clear transition point. LYMPH NODES: Increased Number of pericolonic lymph nodes are again seen adjacent to the sigmoid colon. PELVIC ORGANS: No pelvic masses. MUSCULOSKELETAL: Unchanged     IMPRESSION: 1.  Mural thickening of the sigmoid colon with pericolonic inflammation and stranding, slightly worsened from prior exam with punctate foci of gas seen adjacent to the inflamed colon as well as tracking in the anterior abdominal wall concerning for sigmoid colonic perforation. No organized intra-abdominal fluid collection such as abscess is seen at this time. Given the long-standing inflammation in this region an underlying neoplastic process cannot be excluded. 2.  Scattered air-fluid levels and mildly dilated loops of proximal jejunum which decompresses gradually without a transition point consistent favored to reflect reactive ileus 3.  Hepatic steatosis 4.  Cholelithiasis [Critical Result: Sigmoid colonic inflammation with areas of extraluminal gas, new from 1/25/2023 concerning for sigmoid colonic perforation] Finding was identified on 1/29/2023 6:56 AM. DR. Snell was contacted by me on  1/29/2023 7:08 AM and verbalized understanding of the critical result.     CT Chest w/o Contrast    Result Date: 1/29/2023  EXAM: CT CHEST WITHOUT CONTRAST LOCATION: [facility] DATE/TIME: 01/29/2023, 6:46 AM INDICATION: Fever and cough. COMPARISON: 11/26/2022 - CT chest, abdomen and pelvis. TECHNIQUE: Note that this study was performed in conjunction with a CT scan of the abdomen and pelvis. Refer to a separate report for findings from that study. CT chest without IV contrast. Multiplanar reformats were obtained. Dose reduction techniques were used. CONTRAST: None. FINDINGS: LUNGS AND PLEURA: Minimal emphysematous changes in the lungs. Slight interstitial thickening in bilateral lung bases. A few curvilinear opacities in the periphery of both lungs likely represent atelectasis and/or scarring. The lungs are otherwise clear. Small left Bochdalek hernia containing fat MEDIASTINUM/AXILLAE: Atherosclerotic calcification in the thoracic aorta. CORONARY ARTERY CALCIFICATION: Present. MUSCULOSKELETAL: No acute findings. UPPER ABDOMEN: A few foci of extraluminal gas are present in the upper abdomen.     IMPRESSION: 1.  Slight interstitial thickening in bilateral lung bases. This is nonspecific, but most likely relates to interstitial pulmonary edema. 2.  No other findings suspicious for acute pulmonary disease. 3.  A few foci of extraluminal gas scattered within the nondependent aspect of the upper abdomen. In the absence of recent surgery, this is consistent with a bowel perforation. Refer to the report from the CT scan of the abdomen and pelvis performed in conjunction with this study for additional details. The findings were called to Dr. Snell by Dr. Anderson on 01/29/2023 at 0700 hours.

## 2023-02-05 NOTE — PLAN OF CARE
Problem: Pain Acute  Goal: Optimal Pain Control and Function  Outcome: Progressing  Intervention: Prevent or Manage Pain  Recent Flowsheet Documentation  Taken 2/5/2023 0137 by Savannah Marks RN  Medication Review/Management: medications reviewed     Problem: Bowel Resection  Goal: Absence of Bleeding  Outcome: Progressing   Goal Outcome Evaluation:       Pt is alert, disoriented to time. Denies chest pain, numbness, tingling. On 2L NC w/ O2 in low 90s. Pt is on a one to one. Incision site, draining site WNL. Vital signs stable. All due meds given. Uneventful night.

## 2023-02-05 NOTE — PLAN OF CARE
Goal Outcome Evaluation:  Patient rates pain 4-5 out of 10. Tramadol prn. Last dose given around 2100.  Now on mechanical soft diet-did not touch supper tray. Drinking sips of liquids only.  On IV Micafungin and Meropenem.  K recheck was 3.7 so next recheck in am along with Mg and Phos.  Box with light colored urine-750 ml out this shift.  DAVIS site really draining at site. Dressings changed. 225cc serous output from Ostomy pouch.  2 loose incontinent stools this shift.  Patient was up in chair at start of shift and then attempted to ambulate later this evening. Patient did fairly well until we got back to room and just a few steps from the bed and her legs slowly buckled. See previous note.   Patient is now in bed, sleeping. Patient sleeps most of the day. Needs reminders to open eyes when we are talking to her. Oriented to place, situation, person but disoriented to time.

## 2023-02-05 NOTE — PLAN OF CARE
Problem: Plan of Care - These are the overarching goals to be used throughout the patient stay.    Goal: Optimal Comfort and Wellbeing  Outcome: Progressing  Intervention: Monitor Pain and Promote Comfort  Recent Flowsheet Documentation  Taken 2/5/2023 1129 by Farzaneh Jaquez RN  Pain Management Interventions:   distraction   emotional support  Taken 2/5/2023 0726 by Farzaneh Jaquez RN  Pain Management Interventions: medication (see MAR)     Problem: Plan of Care - These are the overarching goals to be used throughout the patient stay.    Goal: Optimal Comfort and Wellbeing  Intervention: Monitor Pain and Promote Comfort  Recent Flowsheet Documentation  Taken 2/5/2023 1129 by Farzaneh Jaquez RN  Pain Management Interventions:   distraction   emotional support  Taken 2/5/2023 0726 by Farzaneh Jaquez RN  Pain Management Interventions: medication (see MAR)     Problem: Risk for Delirium  Goal: Optimal Coping  Outcome: Progressing   Goal Outcome Evaluation:         Patient was 1:1 for impulsive pulling on lines. Was trial ed off [facility] through shift while family here and therapy sessions. Patient unable to stand on left side. Reports pain left ankle. Xray done. Results pending. Continues IV antibiotic. Drains having serous drainage. Box.

## 2023-02-05 NOTE — PROGRESS NOTES
Care Management Follow Up    Length of Stay (days): 7    Expected Discharge Date: 02/13/2023     Concerns to be Addressed: POD#7 s/p lap sigmoid colectomy and washout. Cont ABX, increase activity, advance diet, remove Box,TCU Placement      Patient plan of care discussed at interdisciplinary rounds: Yes    Anticipated Discharge Disposition:  TCu     Anticipated Discharge Services:    Anticipated Discharge DME:      Patient/family educated on Medicare website which has current facility and service quality ratings:    Education Provided on the Discharge Plan:    Patient/Family in Agreement with the Plan:      Referrals Placed by CM/SW:  TCu  Private pay costs discussed: Not applicable    Additional Information:  Chart review:  Pt lives at home with her . She is typically independent with cares and mobility, has a walker as needed.  reports that pt having increased memory loss issues and they are working with pt primary MD on this. Pt daughter provides assist with meals, housekeeping and laundry.    Therapy recommednation is TCU, however pt is on a 1:1. Family TCU preference [facility] or [facility]. Told pt daughter that we will need to send more referrals and she was understanding and was going to look into and call me back.    1:46 PM  Trial off 1:1 started at 1300 today.    Shayy León RN

## 2023-02-06 ENCOUNTER — APPOINTMENT (OUTPATIENT)
Dept: OCCUPATIONAL THERAPY | Facility: HOSPITAL | Age: 78
DRG: 853 | End: 2023-02-06
Payer: COMMERCIAL

## 2023-02-06 ENCOUNTER — APPOINTMENT (OUTPATIENT)
Dept: PHYSICAL THERAPY | Facility: HOSPITAL | Age: 78
DRG: 853 | End: 2023-02-06
Payer: COMMERCIAL

## 2023-02-06 LAB
ALBUMIN SERPL BCG-MCNC: 2.4 G/DL (ref 3.5–5.2)
ALP SERPL-CCNC: 104 U/L (ref 35–104)
ALT SERPL W P-5'-P-CCNC: 10 U/L (ref 10–35)
ANION GAP SERPL CALCULATED.3IONS-SCNC: 7 MMOL/L (ref 7–15)
AST SERPL W P-5'-P-CCNC: 22 U/L (ref 10–35)
BILIRUB DIRECT SERPL-MCNC: <0.2 MG/DL (ref 0–0.3)
BILIRUB SERPL-MCNC: 0.2 MG/DL
BUN SERPL-MCNC: 7 MG/DL (ref 8–23)
CALCIUM SERPL-MCNC: 8.2 MG/DL (ref 8.8–10.2)
CHLORIDE SERPL-SCNC: 108 MMOL/L (ref 98–107)
CREAT SERPL-MCNC: 0.58 MG/DL (ref 0.51–0.95)
DEPRECATED HCO3 PLAS-SCNC: 28 MMOL/L (ref 22–29)
ERYTHROCYTE [DISTWIDTH] IN BLOOD BY AUTOMATED COUNT: 15.2 % (ref 10–15)
GFR SERPL CREATININE-BSD FRML MDRD: >90 ML/MIN/1.73M2
GLUCOSE BLDC GLUCOMTR-MCNC: 159 MG/DL (ref 70–99)
GLUCOSE BLDC GLUCOMTR-MCNC: 188 MG/DL (ref 70–99)
GLUCOSE BLDC GLUCOMTR-MCNC: 198 MG/DL (ref 70–99)
GLUCOSE BLDC GLUCOMTR-MCNC: 222 MG/DL (ref 70–99)
GLUCOSE SERPL-MCNC: 174 MG/DL (ref 70–99)
HCT VFR BLD AUTO: 34.9 % (ref 35–47)
HGB BLD-MCNC: 10.5 G/DL (ref 11.7–15.7)
HOLD SPECIMEN: NORMAL
HOLD SPECIMEN: NORMAL
MAGNESIUM SERPL-MCNC: 1.5 MG/DL (ref 1.7–2.3)
MCH RBC QN AUTO: 28.7 PG (ref 26.5–33)
MCHC RBC AUTO-ENTMCNC: 30.1 G/DL (ref 31.5–36.5)
MCV RBC AUTO: 95 FL (ref 78–100)
PATH REPORT.COMMENTS IMP SPEC: NORMAL
PATH REPORT.COMMENTS IMP SPEC: NORMAL
PATH REPORT.FINAL DX SPEC: NORMAL
PATH REPORT.GROSS SPEC: NORMAL
PATH REPORT.MICROSCOPIC SPEC OTHER STN: NORMAL
PATH REPORT.RELEVANT HX SPEC: NORMAL
PHOSPHATE SERPL-MCNC: 2.2 MG/DL (ref 2.5–4.5)
PHOTO IMAGE: NORMAL
PLATELET # BLD AUTO: 395 10E3/UL (ref 150–450)
POTASSIUM SERPL-SCNC: 3.5 MMOL/L (ref 3.4–5.3)
PROT SERPL-MCNC: 5.1 G/DL (ref 6.4–8.3)
RBC # BLD AUTO: 3.66 10E6/UL (ref 3.8–5.2)
SODIUM SERPL-SCNC: 143 MMOL/L (ref 136–145)
WBC # BLD AUTO: 17.7 10E3/UL (ref 4–11)

## 2023-02-06 PROCEDURE — 97530 THERAPEUTIC ACTIVITIES: CPT | Mod: GP

## 2023-02-06 PROCEDURE — 250N000013 HC RX MED GY IP 250 OP 250 PS 637: Performed by: HOSPITALIST

## 2023-02-06 PROCEDURE — 120N000001 HC R&B MED SURG/OB

## 2023-02-06 PROCEDURE — 97535 SELF CARE MNGMENT TRAINING: CPT | Mod: GO

## 2023-02-06 PROCEDURE — 250N000011 HC RX IP 250 OP 636: Performed by: HOSPITALIST

## 2023-02-06 PROCEDURE — 36415 COLL VENOUS BLD VENIPUNCTURE: CPT | Performed by: HOSPITALIST

## 2023-02-06 PROCEDURE — 250N000011 HC RX IP 250 OP 636: Performed by: INTERNAL MEDICINE

## 2023-02-06 PROCEDURE — 250N000013 HC RX MED GY IP 250 OP 250 PS 637: Performed by: INTERNAL MEDICINE

## 2023-02-06 PROCEDURE — 83735 ASSAY OF MAGNESIUM: CPT | Performed by: HOSPITALIST

## 2023-02-06 PROCEDURE — 250N000011 HC RX IP 250 OP 636: Performed by: SURGERY

## 2023-02-06 PROCEDURE — 36415 COLL VENOUS BLD VENIPUNCTURE: CPT | Performed by: STUDENT IN AN ORGANIZED HEALTH CARE EDUCATION/TRAINING PROGRAM

## 2023-02-06 PROCEDURE — 99232 SBSQ HOSP IP/OBS MODERATE 35: CPT | Performed by: HOSPITALIST

## 2023-02-06 PROCEDURE — 250N000013 HC RX MED GY IP 250 OP 250 PS 637

## 2023-02-06 PROCEDURE — 85027 COMPLETE CBC AUTOMATED: CPT | Performed by: HOSPITALIST

## 2023-02-06 PROCEDURE — 84100 ASSAY OF PHOSPHORUS: CPT | Performed by: HOSPITALIST

## 2023-02-06 PROCEDURE — 258N000003 HC RX IP 258 OP 636: Performed by: INTERNAL MEDICINE

## 2023-02-06 PROCEDURE — 250N000013 HC RX MED GY IP 250 OP 250 PS 637: Performed by: SURGERY

## 2023-02-06 PROCEDURE — 99232 SBSQ HOSP IP/OBS MODERATE 35: CPT | Performed by: INTERNAL MEDICINE

## 2023-02-06 PROCEDURE — 99207 PR CDG-CUT & PASTE-POTENTIAL IMPACT ON LEVEL: CPT | Performed by: HOSPITALIST

## 2023-02-06 PROCEDURE — 87040 BLOOD CULTURE FOR BACTERIA: CPT | Performed by: STUDENT IN AN ORGANIZED HEALTH CARE EDUCATION/TRAINING PROGRAM

## 2023-02-06 PROCEDURE — 82248 BILIRUBIN DIRECT: CPT | Performed by: HOSPITALIST

## 2023-02-06 RX ORDER — POTASSIUM CHLORIDE 1500 MG/1
40 TABLET, EXTENDED RELEASE ORAL ONCE
Status: COMPLETED | OUTPATIENT
Start: 2023-02-06 | End: 2023-02-06

## 2023-02-06 RX ORDER — AMLODIPINE BESYLATE 2.5 MG/1
2.5 TABLET ORAL DAILY
Status: DISCONTINUED | OUTPATIENT
Start: 2023-02-06 | End: 2023-02-18 | Stop reason: HOSPADM

## 2023-02-06 RX ORDER — MAGNESIUM SULFATE 4 G/50ML
4 INJECTION INTRAVENOUS ONCE
Status: COMPLETED | OUTPATIENT
Start: 2023-02-06 | End: 2023-02-06

## 2023-02-06 RX ADMIN — POTASSIUM & SODIUM PHOSPHATES POWDER PACK 280-160-250 MG 1 PACKET: 280-160-250 PACK at 15:03

## 2023-02-06 RX ADMIN — LEVOTHYROXINE SODIUM 137 MCG: 0.11 TABLET ORAL at 06:42

## 2023-02-06 RX ADMIN — FUROSEMIDE 20 MG: 10 INJECTION, SOLUTION INTRAMUSCULAR; INTRAVENOUS at 03:30

## 2023-02-06 RX ADMIN — ROSUVASTATIN CALCIUM 20 MG: 10 TABLET, FILM COATED ORAL at 20:55

## 2023-02-06 RX ADMIN — MEROPENEM 1 G: 1 INJECTION INTRAVENOUS at 20:55

## 2023-02-06 RX ADMIN — MEROPENEM 1 G: 1 INJECTION INTRAVENOUS at 11:40

## 2023-02-06 RX ADMIN — ACETAMINOPHEN 650 MG: 160 LIQUID ORAL at 19:36

## 2023-02-06 RX ADMIN — ACETAMINOPHEN 650 MG: 325 TABLET ORAL at 13:31

## 2023-02-06 RX ADMIN — MAGNESIUM SULFATE HEPTAHYDRATE 4 G: 80 INJECTION, SOLUTION INTRAVENOUS at 09:38

## 2023-02-06 RX ADMIN — FUROSEMIDE 20 MG: 10 INJECTION, SOLUTION INTRAMUSCULAR; INTRAVENOUS at 16:12

## 2023-02-06 RX ADMIN — POTASSIUM & SODIUM PHOSPHATES POWDER PACK 280-160-250 MG 1 PACKET: 280-160-250 PACK at 11:13

## 2023-02-06 RX ADMIN — ASPIRIN 81 MG: 81 TABLET, COATED ORAL at 09:39

## 2023-02-06 RX ADMIN — POTASSIUM CHLORIDE 40 MEQ: 1500 TABLET, EXTENDED RELEASE ORAL at 09:40

## 2023-02-06 RX ADMIN — VENLAFAXINE HYDROCHLORIDE 75 MG: 75 CAPSULE, EXTENDED RELEASE ORAL at 09:39

## 2023-02-06 RX ADMIN — AMLODIPINE BESYLATE 2.5 MG: 2.5 TABLET ORAL at 13:31

## 2023-02-06 RX ADMIN — MEROPENEM 1 G: 1 INJECTION INTRAVENOUS at 03:31

## 2023-02-06 RX ADMIN — ENOXAPARIN SODIUM 40 MG: 40 INJECTION SUBCUTANEOUS at 09:39

## 2023-02-06 RX ADMIN — OXYCODONE HYDROCHLORIDE 2.5 MG: 5 TABLET ORAL at 16:24

## 2023-02-06 RX ADMIN — ACETAMINOPHEN 650 MG: 325 TABLET ORAL at 22:42

## 2023-02-06 RX ADMIN — POTASSIUM & SODIUM PHOSPHATES POWDER PACK 280-160-250 MG 1 PACKET: 280-160-250 PACK at 20:56

## 2023-02-06 RX ADMIN — MICAFUNGIN SODIUM 100 MG: 50 INJECTION, POWDER, LYOPHILIZED, FOR SOLUTION INTRAVENOUS at 13:33

## 2023-02-06 RX ADMIN — ACETAMINOPHEN 650 MG: 325 TABLET ORAL at 09:39

## 2023-02-06 ASSESSMENT — ACTIVITIES OF DAILY LIVING (ADL)
ADLS_ACUITY_SCORE: 38
ADLS_ACUITY_SCORE: 36
ADLS_ACUITY_SCORE: 36
ADLS_ACUITY_SCORE: 38
ADLS_ACUITY_SCORE: 36
ADLS_ACUITY_SCORE: 36
ADLS_ACUITY_SCORE: 38
ADLS_ACUITY_SCORE: 38

## 2023-02-06 NOTE — PLAN OF CARE
Goal Outcome Evaluation:  Patient's pain seems better controlled today on the oxycodone. Denies pain following the oxy doses. In addition to abdominal pain pt was also complaining of left ankle discomfort. No pain at rest only with movement.  On low fiber diet-appetite poor. Only ate mashed potatoes tonight and one bite of hamburger.  Patient having multiple loose incontinent stools daily. Decided to hold mag ox tonight to see if that is contributing.  Electrolyte protocols are all rechecks in am.  On micafungin and meropenem IV.   Very little activity again today. Repositioning side to side in bed.

## 2023-02-06 NOTE — PROGRESS NOTES
"CLINICAL NUTRITION SERVICES - BRIEF NOTE     Nutrition Prescription      Recommendations already ordered by Registered Dietitian (RD):  - ordered strawberry Ensure Enlive with breakfast  - ordered cherry gel+ with lunch  - ordered chocolate Magic Cup with diner   - RD provided encouragement for PO intake. Encouraged protein intake for preservation of muscle mass and post-op healing       Diet: Low fiber  + Banatrol BID    Poor appetite documented   % intakes per nursing flow sheet  Pt receiving TID meals   Yesterday received 1824 kcal and 79 g protein = meeting energy needs and >75% protein needs.     Wt up from admit- may be fluid-related     Visited with pt this morning. Pt reported her PO intake is improving. Her appetite is \"good.\" She only eats food from hospital. RD discussed importance of protein intake for preservation of muscle mass and healing. Pt would like to try strawberry Ensure Enlive, chocolate magic cup, and cherry gel+.     Monitoring/Evaluation  Will continue to monitor and evaluate per protocol.    Savanna Grossman RD, LD      "

## 2023-02-06 NOTE — PROGRESS NOTES
"INFECTIOUS DISEASE FOLLOW UP NOTE      ASSESSMENT:  1. Intra-abdominal infection presenting with perforated stercolic ulcer of sigmoid colon, underwent laparoscopic sigmoid colectomy with anastomosis, washout 1/29. Findings of diffuse feculent peritonitis. Ongoing pain and leukocytosis. WBC increased. Draining clear fluid from wound -- creatinine level not suggestive of urine leak based on estimate.   2. Bacteremia with clostridium species, Collinsella aerofaciens, also gram negative bacteremia (likely anaerobe). Generally covered with pip/tazo. Note, one paper on antibiotic susceptibilities for Collinsella reviewed, should be sensitive to pip/tazo.   3. DM  4. Left great toe infection for many months. Has seen podiatry and primary clinic for this. Recent doxycycline. Had MRI negative for osteomyelitis in September. Seems improved.   5. Rastafarian per chart refusing blood products.   Cholelithiasis      PLAN:  -Continue meropenem and micafungin   -cultures collected from drain (unsure if DAVIS or urostomy bag), pending  -watch drainage outputs  -trend wbc and CRP  -will likely need re-imaging in a few days    Lexx Zuniga MD  [facility]  Direct messaging: [facility] Paging  On-Call ID provider: [phone], option: [phone]      ______________________________________________________________________    SUBJECTIVE / INTERVAL HISTORY:     First visit by me. No acute complaints. Ate breakfast. Having bowel movements. Antibiotics changed over the weekend. Wbc improving       OBJECTIVE:  BP (!) 145/64 (BP Location: Right arm)   Pulse 83   Temp 97.9  F (36.6  C) (Oral)   Resp 22   Ht 1.651 m (5' 5\")   Wt 110.5 kg (243 lb 9.7 oz)   SpO2 94%   BMI 40.54 kg/m           GEN: No acute distress. Up in chair. On O2. Dyspnea.   RESPIRATORY:  Clear anterior.  CARDIOVASCULAR:  Regular rate and rhythm. Normal S1 and S2. No murmur, click, gallop or rub. No dependent edema. No excess JVD.  ABDOMEN:  " Soft, generalized tenderness. Colostomy bag to collect midline serous fluid. DAVIS on right serous fluid.   EXTREMITIES: No edema.  SKIN/HAIR/NAILS:  No rashes. L great toe erythema.  IV: peripheral        Antibiotics:  Meropenem /-  Micafungin -    Previous:  pip/tazo -2/  Vancomycin 1/29 x1      Pertinent labs:    Recent Labs   Lab 23  0646 23  0641 23  0621 23  0556   WBC 17.7* 19.3* 21.2* 17.1*   HGB 10.5* 10.4*  --  10.2*   HCT 34.9* 34.4*  --  35.4    427 449 428        Recent Labs   Lab 23  0646 23  0641 23  0621    143 141   CO2 28 24 24   BUN 7.0* 7.0* 7.4*      No results found for: CRP      Lab Results   Component Value Date    ALT 10 2023    AST 22 2023    ALKPHOS 104 2023         MICROBIOLOGY DATA:   one set clostridium one bottle, GNR anaerobic bottle (not ID'd by verigene), 2nd set anaerobic GPR, one bottle      RADIOLOGY:  Echocardiogram Complete    Result Date: 2023  [id number]^TERRENCE^[place] [address]  Name: SRIDHAR ALONSO MRN: [id number] : 1945 Study Date: 2023 12:44 PM Age: 77 yrs Gender: Female Patient Location: [facility] Reason For Study: CHF Ordering Physician: PAYAL OCAMPO Performed By: KATHARINE  BSA: 2.1 m2 Height: 65 in Weight: 220 lb HR: 87 BP: 138/63 mmHg ______________________________________________________________________________ Procedure Complete Echo Adult. ______________________________________________________________________________ Interpretation Summary  1. Normal left ventricular size and systolic performance with a visually estimated ejection fraction of 60%. 2. No significant valvular heart disease is identified on this study. 3. Normal right ventricular size with borderline reduction right ventricular systolic performance. 4. There is mild biatrial enlargement. 5. There is a very small pericardial effusion. There  is no evidence of significant intraventricular dependence/tamponade physiology. ______________________________________________________________________________ Left ventricle: Normal left ventricular size and systolic performance with a visually estimated ejection fraction of 60%. There is normal regional wall motion. Left ventricular wall thickness is normal.  Assessment of LV Diastolic Function: The cumulative findings suggest impaired diastolic filling [The septal e' velocity is < 7 cm/s & lateral e' velocity is < 10 cm/s. The average E/e' is >14. TR velocity is 2.8 m/s. Left atrial volume index is greater than 34 mL/mÂ ].  Assessment of left atrial pressure (LAP): The cumulative findings suggest moderately elevated left atrial pressure (the E/A is > 0.8 and <2.0 plus 2 or 3 of 3 of the following present: Average E/e' > 14, TRvel > 2.8 m/s, and/or LA vol. index > 34 ml/mÂ  ).  Right ventricle: Normal right ventricular size with borderline reduction right ventricular systolic performance.  Left atrium: There is mild left atrial enlargement.  Right atrium: There is mild right atrial enlargement.  IVC: The IVC is borderline dilated.  Aortic valve: The aortic valve is comprised of three cusps. No significant aortic stenosis or aortic insufficiency is detected on this study.  Mitral valve: The mitral valve appears morphologically normal. There is trace mitral insufficiency.  Tricuspid valve: The tricuspid valve is grossly morphologically normal. There is trace tricuspid insufficiency.  Pulmonic valve: The pulmonic valve is grossly morphologically normal.  Thoracic aorta: The aortic root and proximal ascending aorta are of normal dimension.  Pericardium: There is a very small pericardial effusion. There is no evidence of significant intraventricular dependence/tamponade physiology. ______________________________________________________________________________  ______________________________________________________________________________ MMode/2D Measurements & Calculations IVSd: 1.3 cm LVIDd: 4.1 cm LVIDs: 2.6 cm LVPWd: 1.2 cm FS: 36.6 % LV mass(C)d: 185.2 grams LV mass(C)dI: 89.9 grams/m2 Ao root diam: 3.2 cm LA dimension: 4.6 cm asc Aorta Diam: 3.4 cm LA/Ao: 1.4 LVOT diam: 2.3 cm LVOT area: 4.1 cm2 LA Volume Indexed (AL/bp): 36.7 ml/m2  RWT: 0.58  Time Measurements MM HR: 87.0 BPM  Doppler Measurements & Calculations MV E max ayden: 123.0 cm/sec MV A max ayden: 124.2 cm/sec MV E/A: 0.99 MV dec time: 0.24 sec Ao V2 max: 189.4 cm/sec Ao max P.0 mmHg Ao V2 mean: 123.9 cm/sec Ao mean P.8 mmHg Ao V2 VTI: 32.7 cm QUAN(I,D): 2.8 cm2 QUAN(V,D): 2.9 cm2 LV V1 max P.9 mmHg LV V1 max: 131.3 cm/sec LV V1 VTI: 22.2 cm SV(LVOT): 92.1 ml SI(LVOT): 44.7 ml/m2 PA acc time: 0.09 sec TR max ayden: 279.6 cm/sec TR max P.3 mmHg AV Ayden Ratio (DI): 0.69 QUAN Index (cm2/m2): 1.4 E/E': 13.6  E/E' avg: 15.6 Lateral E/e': 13.6 Medial E/e': 17.7 Peak E' Ayden: 9.0 cm/sec  ______________________________________________________________________________ Report approved by: Leonid Hamilton 2023 02:47 PM       XR Ankle Port Left G/E 3 Views    Result Date: 2023  EXAM: XR ANKLE PORT LEFT G/E 3 VIEWS LOCATION: [facility] DATE/TIME: 2023 2:22 PM INDICATION: left ankle pain, no trauma COMPARISON: None.     IMPRESSION: Bones are demineralized. Mild tibiotalar joint degenerative change. No evidence of an acute fracture. Tiny bone fragment adjacent to the medial talus and mild bony proliferation along the tip of the lateral malleolus is likely chronic. Ankle mortise is intact. Calcaneal heel spur.    CT Abdomen Pelvis w Contrast    Result Date: 2023  EXAM: CT ABDOMEN PELVIS W CONTRAST LOCATION: [facility] DATE/TIME: 2023 12:08 AM INDICATION: 1 29 sigmoidectomy with increasing wbc COMPARISON: CT a abdomen/pelvis 2023  TECHNIQUE: CT scan of the abdomen and pelvis was performed following injection of IV contrast. Multiplanar reformats were obtained. Dose reduction techniques were used. CONTRAST: isovue 370 100ml FINDINGS: LOWER CHEST: Trace bilateral pleural effusions right greater than left with compressive atelectasis. HEPATOBILIARY: Diffuse hepatic steatosis. Cholelithiasis with mild gallbladder distention which may be related to fasting. No biliary ductal dilatation. PANCREAS: Normal. SPLEEN: Normal. ADRENAL GLANDS: Normal. KIDNEYS/BLADDER: No hydronephrosis. Box catheter within a decompressed bladder. BOWEL: Recent sigmoidectomy. Colonic diverticulosis. Trace abdominopelvic free ascites. Additionally, there is some loculated ascites in the anterior abdomen with partial rim enhancement and several foci of gas within, which suggests a degree of peritonitis. No drainable fluid collection currently. Right anterior approach drain terminates in the left pelvis. LYMPH NODES: Normal. VASCULATURE: Atherosclerotic calcifications of the aortoiliac vessels without evidence of aneurysmal dilatation. PELVIC ORGANS: Hysterectomy. MUSCULOSKELETAL: Body wall edema. Skin staples with recent ventral abdominal incision. Small fat-containing umbilical hernia. Multilevel degenerative changes of the thoracolumbar spine. No acute osseous abnormality.     IMPRESSION: 1.  Recent sigmoidectomy. Trace abdominopelvic free ascites. Additionally, there is some loculated ascites in the anterior abdomen with partial rim enhancement and several foci of gas within, which suggests a degree of peritonitis. No drainable fluid collection currently. 2.  Trace bilateral pleural effusions right greater than left with compressive atelectasis. 3.  Body wall edema. 4.  Diffuse hepatic steatosis. 5.  Cholelithiasis with mild gallbladder distention which may be related to fasting.    CT Abdomen Pelvis w Contrast    Result Date: 1/25/2023  EXAM: CT ABDOMEN PELVIS W  CONTRAST LOCATION: [facility] DATE/TIME: 1/25/2023 11:28 PM INDICATION: abdominal pain worst in lower quadrants, leukocytosis eval for pathology COMPARISON: 11/26/2022 TECHNIQUE: CT scan of the abdomen and pelvis was performed following injection of IV contrast. Multiplanar reformats were obtained. Dose reduction techniques were used. CONTRAST: isovue 370 100ml FINDINGS: LOWER CHEST: Small left Bochdalek hernia. Basilar atelectasis. HEPATOBILIARY: Cholelithiasis and hepatic steatosis. PANCREAS: Normal. SPLEEN: Normal. ADRENAL GLANDS: Normal. KIDNEYS/BLADDER: Normal. BOWEL: Small bowel is normal caliber. Large amount of colonic stool. Sigmoid colonic wall thickening with adjacent inflammation is again seen. The inflamed segment is distended with stool. Small amount of adjacent free fluid. Caliber change with collapse  of the rectosigmoid junction and rectum. Diverticulosis.  LYMPH NODES: Subcentimeter retroperitoneal lymph nodes. VASCULATURE: Atherosclerotic vascular calcification. PELVIC ORGANS: Absent uterus. MUSCULOSKELETAL: Degenerative change osseous structures. Mild compression L4. Slight anterolisthesis L4 on L5.     IMPRESSION: 1.  Sigmoid colitis is again seen. Collapse of the rectosigmoid junction and rectum distal to the inflamed segment of colon. Underlying stricture not excluded. 2.  Small amount of adjacent free fluid. 3.  Cholelithiasis.    CTA Abdomen Pelvis with Contrast    Result Date: 1/29/2023  EXAM: CTA ABDOMEN PELVIS WITH CONTRAST LOCATION: [facility] DATE/TIME: 1/29/2023 6:48 AM INDICATION: recurrent colitis, distended abdomen pain out of proportion. COMPARISON: There are study dated 1/25/ TECHNIQUE: CT angiogram abdomen pelvis during arterial phase of injection of IV contrast. 2D and 3D MIP reconstructions were performed by the CT technologist. Dose reduction techniques were used. CONTRAST: isovue 370 100ml FINDINGS: ANGIOGRAM ABDOMEN/PELVIS:  The abdominal aorta is normal in size and caliber throughout with scattered atherosclerotic calcifications noted. The iliac and femoral vessels are normal in size and caliber and well-opacified. Celiac and SMA and TONY arise normally and are well opacified at this time LOWER CHEST: Lung bases are clear. Heart is enlarged, similar prior exam. HEPATOBILIARY: Diffuse hepatic steatosis. No significant mass. No bile duct dilatation. Calcified gallstones are again seen within the gallbladder. PANCREAS: No significant mass, duct dilatation, or inflammatory change. SPLEEN: Normal size. ADRENAL GLANDS: No significant nodules. KIDNEYS/BLADDER: No significant mass, stone, or hydronephrosis. BOWEL: Mural thickening and pericolonic inflammation of the sigmoid colon is again noted, similar in appearance to prior exam with increased inflammation seen in the proximal sigmoid colon. No pericolonic abscess seen at this time. Stool is seen throughout the inflamed sigmoid colon as well as scattered throughout the large bowel without evidence of large bowel obstruction. Adjacent to the proximal sigmoid colon is free fluid with foci of gas which are extraluminal and new from the prior study (image 1:15, series 5). Additional foci of extraluminal gas are seen within the mesenteric fat anteriorly in the upper abdomen (image 57, series 5). There are scattered diverticula seen throughout the colon. Air-fluid levels are seen scattered through multiple loops of small bowel with mild dilatation seen in the proximal jejunum in the upper abdomen, these mildly dilated loops of small bowel decompressed gradually distally without a clear transition point. LYMPH NODES: Increased Number of pericolonic lymph nodes are again seen adjacent to the sigmoid colon. PELVIC ORGANS: No pelvic masses. MUSCULOSKELETAL: Unchanged     IMPRESSION: 1.  Mural thickening of the sigmoid colon with pericolonic inflammation and stranding, slightly worsened from prior exam  with punctate foci of gas seen adjacent to the inflamed colon as well as tracking in the anterior abdominal wall concerning for sigmoid colonic perforation. No organized intra-abdominal fluid collection such as abscess is seen at this time. Given the long-standing inflammation in this region an underlying neoplastic process cannot be excluded. 2.  Scattered air-fluid levels and mildly dilated loops of proximal jejunum which decompresses gradually without a transition point consistent favored to reflect reactive ileus 3.  Hepatic steatosis 4.  Cholelithiasis [Critical Result: Sigmoid colonic inflammation with areas of extraluminal gas, new from 1/25/2023 concerning for sigmoid colonic perforation] Finding was identified on 1/29/2023 6:56 AM. DR. Snell was contacted by me on 1/29/2023 7:08 AM and verbalized understanding of the critical result.     CT Chest w/o Contrast    Result Date: 1/29/2023  EXAM: CT CHEST WITHOUT CONTRAST LOCATION: [facility] DATE/TIME: 01/29/2023, 6:46 AM INDICATION: Fever and cough. COMPARISON: 11/26/2022 - CT chest, abdomen and pelvis. TECHNIQUE: Note that this study was performed in conjunction with a CT scan of the abdomen and pelvis. Refer to a separate report for findings from that study. CT chest without IV contrast. Multiplanar reformats were obtained. Dose reduction techniques were used. CONTRAST: None. FINDINGS: LUNGS AND PLEURA: Minimal emphysematous changes in the lungs. Slight interstitial thickening in bilateral lung bases. A few curvilinear opacities in the periphery of both lungs likely represent atelectasis and/or scarring. The lungs are otherwise clear. Small left Bochdalek hernia containing fat MEDIASTINUM/AXILLAE: Atherosclerotic calcification in the thoracic aorta. CORONARY ARTERY CALCIFICATION: Present. MUSCULOSKELETAL: No acute findings. UPPER ABDOMEN: A few foci of extraluminal gas are present in the upper abdomen.     IMPRESSION: 1.  Slight  interstitial thickening in bilateral lung bases. This is nonspecific, but most likely relates to interstitial pulmonary edema. 2.  No other findings suspicious for acute pulmonary disease. 3.  A few foci of extraluminal gas scattered within the nondependent aspect of the upper abdomen. In the absence of recent surgery, this is consistent with a bowel perforation. Refer to the report from the CT scan of the abdomen and pelvis performed in conjunction with this study for additional details. The findings were called to Dr. Snell by Dr. Anderson on 01/29/2023 at 0700 hours.      Attestation:  I have reviewed today's Medications, Vital Signs, and Labs. Cultures and previous notes were reviewed and summarized above.

## 2023-02-06 NOTE — PROGRESS NOTES
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 Palencia.  Patient had a complicated postop course with persistent leukocytosis and CT A/P on 2/4 which noted some areas of loculated ascites with signs of peritonitis, and body wall edema and pleural effusions.  Was started on IV diuresis for the edematous state.  ID was also consulted to help with management of infection.  Patient did have a polymicrobial bacteremia from blood cultures done on admission.  Initially was on Zosyn then was switched over to meropenem and micafungin on 2/4 per ID recs.    -Continue IV antibiotics and antifungals.  Continue monitor WBC and clinical response; may have repeat CT imaging in few days to assess if any worsening of the loculation.  Continue with IV diuresis.     #Sepsis (Resolved)  #Sigmoid Colonic Perforation s/p Sigmoid Colectomy and washout for perforated stercoral ulcer (1/29/2023)  #Polymicrobial Bacteremia (Clostridium clostridioforme, Bacteroides vulgatus, Collinsella Aerofaciens, Eubacterium species)  Blood cultures from 1/29/2023 positive as mentioned above.  -Clinically appears stable but persistent leukocytosis that is starting to improve slightly, WBC 19 -> 17  -General surgery and ID following, appreciate recs  -Due to persistent leukocytosis may potentially repeat CT in a few days to assess for any drainable collection  -Plan to remove DAVIS drain today  -Meropenem (start date 2/4); prior to that was on Zosyn  -Micafungin (start date 2/4)  -Diet: Snacks/Supplements Adult: Banatrol Plus; Between Meals  Low Fiber Diet      #Volume Overload   TTE on 2/4 shows normal  EF and no valvular disease; small pericardial effusion.  -IV Lasix 20 mg twice daily  -Given low albumin state will need to monitor sugar and has good nutrition intake  -Plan to remove Box today, can do wick catheter if needed    #Acute hypoxic respiratory failure   Likely atelectasis related to #1  -Encourage IS  -Continue diuresis  -Goal sat greater than 90%    #Rastafari   No blood products as patient is Rastafari (previously confirmed with the patient).      #Acute pain   Quite somnolent earlier after Dilaudid.  Reports left ankle pain and x-ray was done which did not reveal any acute findings.  -Tylenol 650 mg 3 times daily  -Oxycodone as needed (would avoid Tramadol given significantly variable metabolism in individuals)     #Diabetes Mellitus type 2  Home Regimen:Jardiance, Glipizide, Linagliptin, Metformin.  A1c: 8.1% (2/5/23)  -ISS q6H    Chronic Problems  #Hypertension: Continue Norvasc 2.5 mg Daily. Hold losartan 50 BID while diuresing, will restart after better diuresed  #Hypothyroidism: Levothyroxine  #Hyperlipidemia: Crestor   #Anxiety/Depression: Effexor  #Morbid Obesity: Body mass index is 40.54 kg/m . Clinically significant and associated with HTN, DM 2.    Resolved Problems     #HypoK and Mg        Diet: Snacks/Supplements Adult: Banatrol Plus; Between Meals  Low Fiber Diet    DVT Prophylaxis: Enoxaparin (Lovenox) SQ  Box Catheter: PRESENT, indication: /GI/GYN Pelvic Procedure  Lines: None     Cardiac Monitoring: None  Code Status: Full Code      Clinically Significant Risk Factors        # Hypokalemia: Lowest K = 3.3 mmol/L in last 2 days, will replace as needed     # Hypomagnesemia: Lowest Mg = 1.5 mg/dL in last 2 days, will replace as needed   # Hypoalbuminemia: Lowest albumin = 2.2 g/dL at 2/5/2023  6:41 AM, will monitor as appropriate          # DMII: A1C = 8.1 % (Ref range: <5.7 %) within past 3 months   # Severe Obesity: Estimated body mass index is 40.54 kg/m  as  "calculated from the following:    Height as of this encounter: 1.651 m (5' 5\").    Weight as of this encounter: 110.5 kg (243 lb 9.7 oz).          Disposition Plan      Expected Discharge Date: 02/16/2023    Discharge Delays: Placement - TCU    Discharge Comments: On 1:1          Rodney Davis MD  Hospitalist Service  [facility]  Securely message with [employer] (more info)  Text page via [employer] Paging/Directory   ______________________________________________________________________    Interval History   No acute events overnight.     Patient seen and evaluated at bedside.  States left ankle pain is better today, better controlled with pain medicine.  Denies chest pain, shortness of breath.  Does still have some mild to moderate abdominal discomfort but overall seems to be controlled with pain.    Physical Exam   Vital Signs: Temp: 97.9  F (36.6  C) Temp src: Oral BP: (!) 145/64 Pulse: 83   Resp: 22 SpO2: 94 % O2 Device: Nasal cannula Oxygen Delivery: 1 LPM  Weight: 243 lbs 9.73 oz    General: Lying in bed, NAD  CV: +S1/S2, no m/r/g, no significant pitting edema  Respiratory: CTA BL anteriorly  GI: soft, NT, ND, ostomy in place, DAVIS drain in place  MSK: left ankle without any deformity  Neuro: alert    Medical Decision Making       45 MINUTES SPENT BY ME on the date of service doing chart review, history, exam, documentation & further activities per the note.      Data     I have personally reviewed the following data over the past 24 hrs:    17.7 (H)  \   10.5 (L)   / 395     143 108 (H) 7.0 (L) /  198 (H)   3.5 28 0.58 \       ALT: 10 AST: 22 AP: 104 TBILI: 0.2   ALB: 2.4 (L) TOT PROTEIN: 5.1 (L) LIPASE: N/A       TSH: N/A T4: N/A A1C: N/A       Imaging results reviewed over the past 24 hrs:   Recent Results (from the past 24 hour(s))   XR Ankle Port Left G/E 3 Views    Narrative    EXAM: XR ANKLE PORT LEFT G/E 3 VIEWS  LOCATION: [facility]  DATE/TIME: 2/5/2023 2:22 " PM    INDICATION: left ankle pain, no trauma  COMPARISON: None.      Impression    IMPRESSION: Bones are demineralized. Mild tibiotalar joint degenerative change. No evidence of an acute fracture. Tiny bone fragment adjacent to the medial talus and mild bony proliferation along the tip of the lateral malleolus is likely chronic. Ankle   mortise is intact. Calcaneal heel spur.

## 2023-02-06 NOTE — PLAN OF CARE
Problem: Bowel Resection  Goal: Effective Bowel Motility and Elimination  Outcome: Progressing     Problem: Bowel Resection  Goal: Effective Urinary Elimination  Outcome: Progressing     Problem: Pain Acute  Goal: Optimal Pain Control and Function  Outcome: Progressing     Pt slept well overnight - denies abdominal or ankle pain.  Attempted to wean O2 but desat to 88-89% on RA.  Pt continues on iv lasix with good urine output.

## 2023-02-06 NOTE — PLAN OF CARE
Patient alert and oriented x3, disoriented to time.   Complaining of surgical abdominal pain and left ankle pain (MD aware). Scheduled tylenol given with some relief per patient report.     Flat affect. Tried to get to chair with assist x 3 (2 RN and OT), walker and gait belt, unable to walk all the way to the chair. Had to use maddy back to bed.   Walking encouraged. Patient not very motivated.     Patient had two incontinent loose stools today.     DAVIS with serous output. Removed per order.   Midline incision intact with vessel loop drain. Upper incision with pouch, good amount of serous output in ostomy bag.     Botello removed per order @14:00 pm. Waiting for first void after botello removal. Evening RN updated.    Mg, K and Phos protocol. All replaced per orders. To be recheck next morning.     Problem: Bowel Resection  Goal: Effective Bowel Motility and Elimination  Outcome: Progressing     Problem: Bowel Resection  Goal: Acceptable Pain Control  Outcome: Progressing  Intervention: Prevent or Manage Pain  Recent Flowsheet Documentation  Taken 2/6/2023 1049 by Raven Olivas, RN  Pain Management Interventions: medication (see MAR)     Problem: Bowel Resection  Goal: Effective Urinary Elimination  Outcome: Progressing     Problem: Diabetes Comorbidity  Goal: Blood Glucose Level Within Targeted Range  Outcome: Progressing   Goal Outcome Evaluation:

## 2023-02-06 NOTE — PROGRESS NOTES
ASSESSMENT:  1. Perforation of colon (H)    2. 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   Clinically appears to still be improving.  Labs downtrending.  I think we can start looking toward discharge and barriers preventing her from it.  Explained to the patient that her mobility and motivation to move will be likely the greatest hindrance to her going home versus a TCU.    PLAN:  Increase activity  Continue antibiotics  Advance to a low fiber diet  DAVIS drain removal today  Strong consideration for Box catheter removal in spite of active diuresis    SUBJECTIVE: Currently eating breakfast.  Denies any active pain.        Patient Vitals for the past 24 hrs:   BP Temp Temp src Pulse Resp SpO2   02/06/23 0737 (!) 145/64 97.9  F (36.6  C) Oral 83 22 94 %   02/06/23 0330 -- -- -- -- -- (!) 89 %   02/06/23 0314 (!) 162/72 97.8  F (36.6  C) Oral 86 24 93 %   02/06/23 0025 (!) 154/67 98.1  F (36.7  C) Oral 83 24 94 %   02/05/23 1638 (!) 158/74 -- -- 102 -- --   02/05/23 1505 (!) 180/87 98.6  F (37  C) Oral 110 24 90 %         PHYSICAL EXAM:  GEN: No acute distress, comfortable  LUNGS: CTA bilaterally  CV:RRR  ABD: Soft, not distended, incisions with the exception of the left mid abdominal incision dry and intact without erythema; serous fluid being collected in ostomy bag at left lateral lap site DAVIS drain with low volume serous output.  Drains:  Serous  Output by Drain (mL) 02/04/23 0700 - 02/04/23 1459 02/04/23 1500 - 02/04/23 2259 02/04/23 2300 - 02/05/23 0659 02/05/23 0700 - 02/05/23 1459 02/05/23 1500 - 02/05/23 2259 02/05/23 2300 - 02/06/23 0659 02/06/23 0700 - 02/06/23 0948   Closed/Suction Drain 1 Right RUQ Bulb 19 [place] 40 45 55 25 30 20    Open Drain Ventral Abdomen           Open Drain Left;Superior Abdomen Urostomy Pouch 225 425 50 300 45 25       EXT:No cyanosis, edema or obvious  abnormalities    02/05 0700 - 02/06 0659  In: 810 [P.O.:600; I.V.:210]  Out: 2745 [Urine:2300; Drains:445]    No results displayed because visit has over 200 results.             Wilfredo Palencia MD

## 2023-02-07 ENCOUNTER — APPOINTMENT (OUTPATIENT)
Dept: PHYSICAL THERAPY | Facility: HOSPITAL | Age: 78
DRG: 853 | End: 2023-02-07
Payer: COMMERCIAL

## 2023-02-07 ENCOUNTER — APPOINTMENT (OUTPATIENT)
Dept: CT IMAGING | Facility: HOSPITAL | Age: 78
DRG: 853 | End: 2023-02-07
Attending: PHYSICIAN ASSISTANT
Payer: COMMERCIAL

## 2023-02-07 PROBLEM — B96.89 BACTEREMIA DUE TO CLOSTRIDIUM SPECIES: Status: ACTIVE | Noted: 2023-02-07

## 2023-02-07 PROBLEM — R78.81 BACTEREMIA DUE TO CLOSTRIDIUM SPECIES: Status: ACTIVE | Noted: 2023-02-07

## 2023-02-07 LAB
ALBUMIN UR-MCNC: 50 MG/DL
ANION GAP SERPL CALCULATED.3IONS-SCNC: 6 MMOL/L (ref 7–15)
APPEARANCE UR: CLEAR
BILIRUB UR QL STRIP: NEGATIVE
BUN SERPL-MCNC: 7.4 MG/DL (ref 8–23)
CALCIUM SERPL-MCNC: 8.2 MG/DL (ref 8.8–10.2)
CHLORIDE SERPL-SCNC: 102 MMOL/L (ref 98–107)
COLOR UR AUTO: YELLOW
CREAT SERPL-MCNC: 0.6 MG/DL (ref 0.51–0.95)
CRP SERPL-MCNC: 150.8 MG/L
DEPRECATED HCO3 PLAS-SCNC: 31 MMOL/L (ref 22–29)
ERYTHROCYTE [DISTWIDTH] IN BLOOD BY AUTOMATED COUNT: 15.3 % (ref 10–15)
GFR SERPL CREATININE-BSD FRML MDRD: >90 ML/MIN/1.73M2
GLUCOSE BLDC GLUCOMTR-MCNC: 166 MG/DL (ref 70–99)
GLUCOSE BLDC GLUCOMTR-MCNC: 201 MG/DL (ref 70–99)
GLUCOSE BLDC GLUCOMTR-MCNC: 202 MG/DL (ref 70–99)
GLUCOSE BLDC GLUCOMTR-MCNC: 232 MG/DL (ref 70–99)
GLUCOSE SERPL-MCNC: 192 MG/DL (ref 70–99)
GLUCOSE UR STRIP-MCNC: 30 MG/DL
HCT VFR BLD AUTO: 35.6 % (ref 35–47)
HGB BLD-MCNC: 10.6 G/DL (ref 11.7–15.7)
HGB UR QL STRIP: NEGATIVE
KETONES UR STRIP-MCNC: ABNORMAL MG/DL
LACTATE SERPL-SCNC: 1 MMOL/L (ref 0.7–2)
LEUKOCYTE ESTERASE UR QL STRIP: ABNORMAL
MAGNESIUM SERPL-MCNC: 1.8 MG/DL (ref 1.7–2.3)
MCH RBC QN AUTO: 28.2 PG (ref 26.5–33)
MCHC RBC AUTO-ENTMCNC: 29.8 G/DL (ref 31.5–36.5)
MCV RBC AUTO: 95 FL (ref 78–100)
MUCOUS THREADS #/AREA URNS LPF: PRESENT /LPF
NITRATE UR QL: NEGATIVE
PH UR STRIP: 5.5 [PH] (ref 5–7)
PHOSPHATE SERPL-MCNC: 2.4 MG/DL (ref 2.5–4.5)
PLATELET # BLD AUTO: 436 10E3/UL (ref 150–450)
POTASSIUM SERPL-SCNC: 3.7 MMOL/L (ref 3.4–5.3)
RBC # BLD AUTO: 3.76 10E6/UL (ref 3.8–5.2)
RBC URINE: 2 /HPF
SODIUM SERPL-SCNC: 139 MMOL/L (ref 136–145)
SP GR UR STRIP: 1.02 (ref 1–1.03)
SQUAMOUS EPITHELIAL: 2 /HPF
UROBILINOGEN UR STRIP-MCNC: <2 MG/DL
WBC # BLD AUTO: 20.7 10E3/UL (ref 4–11)
WBC URINE: 6 /HPF

## 2023-02-07 PROCEDURE — 83605 ASSAY OF LACTIC ACID: CPT | Performed by: STUDENT IN AN ORGANIZED HEALTH CARE EDUCATION/TRAINING PROGRAM

## 2023-02-07 PROCEDURE — 99232 SBSQ HOSP IP/OBS MODERATE 35: CPT | Performed by: INTERNAL MEDICINE

## 2023-02-07 PROCEDURE — 84100 ASSAY OF PHOSPHORUS: CPT | Performed by: HOSPITALIST

## 2023-02-07 PROCEDURE — 85027 COMPLETE CBC AUTOMATED: CPT | Performed by: HOSPITALIST

## 2023-02-07 PROCEDURE — 86140 C-REACTIVE PROTEIN: CPT | Performed by: INTERNAL MEDICINE

## 2023-02-07 PROCEDURE — 36415 COLL VENOUS BLD VENIPUNCTURE: CPT | Performed by: HOSPITALIST

## 2023-02-07 PROCEDURE — 250N000013 HC RX MED GY IP 250 OP 250 PS 637: Performed by: SURGERY

## 2023-02-07 PROCEDURE — 74177 CT ABD & PELVIS W/CONTRAST: CPT

## 2023-02-07 PROCEDURE — 250N000011 HC RX IP 250 OP 636: Performed by: HOSPITALIST

## 2023-02-07 PROCEDURE — 258N000003 HC RX IP 258 OP 636: Performed by: INTERNAL MEDICINE

## 2023-02-07 PROCEDURE — 250N000013 HC RX MED GY IP 250 OP 250 PS 637: Performed by: HOSPITALIST

## 2023-02-07 PROCEDURE — 81001 URINALYSIS AUTO W/SCOPE: CPT | Performed by: PHYSICIAN ASSISTANT

## 2023-02-07 PROCEDURE — 83735 ASSAY OF MAGNESIUM: CPT | Performed by: HOSPITALIST

## 2023-02-07 PROCEDURE — 250N000011 HC RX IP 250 OP 636: Performed by: INTERNAL MEDICINE

## 2023-02-07 PROCEDURE — 250N000012 HC RX MED GY IP 250 OP 636 PS 637: Performed by: INTERNAL MEDICINE

## 2023-02-07 PROCEDURE — 80048 BASIC METABOLIC PNL TOTAL CA: CPT | Performed by: HOSPITALIST

## 2023-02-07 PROCEDURE — 120N000001 HC R&B MED SURG/OB

## 2023-02-07 PROCEDURE — 250N000013 HC RX MED GY IP 250 OP 250 PS 637

## 2023-02-07 PROCEDURE — 36415 COLL VENOUS BLD VENIPUNCTURE: CPT | Performed by: STUDENT IN AN ORGANIZED HEALTH CARE EDUCATION/TRAINING PROGRAM

## 2023-02-07 PROCEDURE — 250N000013 HC RX MED GY IP 250 OP 250 PS 637: Performed by: INTERNAL MEDICINE

## 2023-02-07 PROCEDURE — 250N000011 HC RX IP 250 OP 636: Performed by: SURGERY

## 2023-02-07 PROCEDURE — 250N000011 HC RX IP 250 OP 636: Performed by: PHYSICIAN ASSISTANT

## 2023-02-07 PROCEDURE — 97530 THERAPEUTIC ACTIVITIES: CPT | Mod: GP

## 2023-02-07 PROCEDURE — 97110 THERAPEUTIC EXERCISES: CPT | Mod: GP

## 2023-02-07 RX ORDER — IOPAMIDOL 755 MG/ML
100 INJECTION, SOLUTION INTRAVASCULAR ONCE
Status: COMPLETED | OUTPATIENT
Start: 2023-02-07 | End: 2023-02-07

## 2023-02-07 RX ADMIN — OXYCODONE HYDROCHLORIDE 5 MG: 5 TABLET ORAL at 12:57

## 2023-02-07 RX ADMIN — MICAFUNGIN SODIUM 100 MG: 50 INJECTION, POWDER, LYOPHILIZED, FOR SOLUTION INTRAVENOUS at 12:20

## 2023-02-07 RX ADMIN — POTASSIUM, SODIUM PHOSPHATES 280 MG-160 MG-250 MG ORAL POWDER PACKET 1 PACKET: POWDER IN PACKET at 20:46

## 2023-02-07 RX ADMIN — ACETAMINOPHEN 650 MG: 160 LIQUID ORAL at 19:38

## 2023-02-07 RX ADMIN — ENOXAPARIN SODIUM 40 MG: 40 INJECTION SUBCUTANEOUS at 08:22

## 2023-02-07 RX ADMIN — ACETAMINOPHEN 650 MG: 325 TABLET ORAL at 22:04

## 2023-02-07 RX ADMIN — ASPIRIN 81 MG: 81 TABLET, COATED ORAL at 08:23

## 2023-02-07 RX ADMIN — ROSUVASTATIN CALCIUM 20 MG: 10 TABLET, FILM COATED ORAL at 20:46

## 2023-02-07 RX ADMIN — FUROSEMIDE 20 MG: 10 INJECTION, SOLUTION INTRAMUSCULAR; INTRAVENOUS at 03:56

## 2023-02-07 RX ADMIN — FUROSEMIDE 20 MG: 10 INJECTION, SOLUTION INTRAMUSCULAR; INTRAVENOUS at 16:12

## 2023-02-07 RX ADMIN — IOPAMIDOL 100 ML: 755 INJECTION, SOLUTION INTRAVENOUS at 20:22

## 2023-02-07 RX ADMIN — MEROPENEM 1 G: 1 INJECTION INTRAVENOUS at 20:46

## 2023-02-07 RX ADMIN — ACETAMINOPHEN 650 MG: 325 TABLET ORAL at 08:22

## 2023-02-07 RX ADMIN — INSULIN GLARGINE 20 UNITS: 100 INJECTION, SOLUTION SUBCUTANEOUS at 22:04

## 2023-02-07 RX ADMIN — POTASSIUM & SODIUM PHOSPHATES POWDER PACK 280-160-250 MG 1 PACKET: 280-160-250 PACK at 08:22

## 2023-02-07 RX ADMIN — POTASSIUM & SODIUM PHOSPHATES POWDER PACK 280-160-250 MG 1 PACKET: 280-160-250 PACK at 11:38

## 2023-02-07 RX ADMIN — MEROPENEM 1 G: 1 INJECTION INTRAVENOUS at 04:01

## 2023-02-07 RX ADMIN — MEROPENEM 1 G: 1 INJECTION INTRAVENOUS at 11:38

## 2023-02-07 RX ADMIN — POTASSIUM & SODIUM PHOSPHATES POWDER PACK 280-160-250 MG 1 PACKET: 280-160-250 PACK at 16:12

## 2023-02-07 RX ADMIN — AMLODIPINE BESYLATE 2.5 MG: 2.5 TABLET ORAL at 08:23

## 2023-02-07 RX ADMIN — VENLAFAXINE HYDROCHLORIDE 75 MG: 75 CAPSULE, EXTENDED RELEASE ORAL at 08:23

## 2023-02-07 RX ADMIN — LEVOTHYROXINE SODIUM 137 MCG: 0.11 TABLET ORAL at 06:48

## 2023-02-07 ASSESSMENT — ACTIVITIES OF DAILY LIVING (ADL)
ADLS_ACUITY_SCORE: 36
ADLS_ACUITY_SCORE: 40
ADLS_ACUITY_SCORE: 36
ADLS_ACUITY_SCORE: 40
ADLS_ACUITY_SCORE: 40

## 2023-02-07 NOTE — PROGRESS NOTES
[facility]    Hospitalist Progress Note    Assessment & Plan   77 year old female who was admitted on 1/29/2023 with bowel perforation and found to have impacted stool in sigmoid colon with associated perforation.     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  Has follow-up abd CT today.      DVT Prophylaxis: Enoxaparin (Lovenox) SQ  Code Status: Full Code    Disposition: Expected discharge in 2 days, probably to TCU.     Brian Ojeda MD  Pager [phone]  Cell Phone [phone]  Text Page (7am to 6pm)    Interval History   Reports abd pain is 3-4 out of 10.  Passing gas, ?no BM yet.    Has urostomy bag.     Physical Exam   Temp: (!) (P) 100.9  F (38.3  C) Temp src: (P) Oral BP: (!) (P) 146/63 Pulse: (P) 105   Resp: (P) 24 SpO2: (P) 92 % O2 Device: (P) None (Room air) Oxygen Delivery: 1 LPM  Vitals:    01/29/23 0554 02/05/23 0726   Weight: 99.8 kg (220 lb 0.3 oz) 110.5 kg (243 lb 9.7 oz)     Vital Signs with Ranges  Temp:  [98.4  F (36.9  C)-100.9  F (38.3  C)] (P) 100.9  F (38.3  C)  Pulse:  [] (P) 105  Resp:  [20-24] (P) 24  BP: (148-167)/(65-72) (P) 146/63  SpO2:  [88 %-93 %] (P) 92 %  I/O last 3 completed shifts:  In: 240 [P.O.:240]  Out: 1150 [Urine:950; Drains:200]    # Pain Assessment:  Current Pain Score 2/7/2023   Patient currently in pain? yes   - Suzy is experiencing pain due to surgery. Pain management was discussed and the plan was created in a collaborative fashion.  Suzy's response to  the current recommendations: engaged  - Please see the plan for pain management as documented above    Constitutional: Awake, alert, cooperative, no apparent distress  Respiratory: Clear to auscultation bilaterally, no crackles or wheezing  Cardiovascular: Regular rate and rhythm, normal S1 and S2, and no murmur noted  GI: some bowel sounds, soft, non-distended, mild tenderness  Extrem: No calf tenderness, no ankle edema  Neuro: Ox3, no focal motor or sensory deficits    Medications       acetaminophen  650 mg Oral TID     amLODIPine  2.5 mg Oral Daily     aspirin  81 mg Oral Daily     enoxaparin ANTICOAGULANT  40 mg Subcutaneous Q24H     furosemide  20 mg Intravenous Q12H     insulin aspart  2-6 Units Subcutaneous TID w/meals     insulin glargine  20 Units Subcutaneous At Bedtime     levothyroxine  137 mcg Oral QAM AC     meropenem  1 g Intravenous Q8H     micafungin  100 mg Intravenous Q24H     rosuvastatin  20 mg Oral At Bedtime     sodium chloride (PF)  3 mL Intracatheter Q8H     sodium chloride (PF)  3 mL Intracatheter Q8H     venlafaxine  75 mg Oral Daily       Data   Recent Labs   Lab 02/07/23  1701 02/07/23  1126 02/07/23  0821 02/07/23  0604 02/06/23  0822 02/06/23  0646 02/05/23  0722 02/05/23  0641   WBC  --   --   --  20.7*  --  17.7*  --  19.3*   HGB  --   --   --  10.6*  --  10.5*  --  10.4*   MCV  --   --   --  95  --  95  --  96   PLT  --   --   --  436  --  395  --  427   NA  --   --   --  139  --  143  --  143   POTASSIUM  --   --   --  3.7  --  3.5  --  3.7  3.7   CHLORIDE  --   --   --  102  --  108*  --  109*   CO2  --   --   --  31*  --  28  --  24   BUN  --   --   --  7.4*  --  7.0*  --  7.0*   CR  --   --   --  0.60  --  0.58  --  0.60   ANIONGAP  --   --   --  6*  --  7  --  10   ALTON  --   --   --  8.2*  --  8.2*  --  8.3*   * 202* 166* 192*   < > 174*   < > 141*   ALBUMIN  --   --   --   --   --  2.4*  --  2.2*   PROTTOTAL  --   --   --   --   --  5.1*  --  5.0*   BILITOTAL  --   --    --   --   --  0.2  --  0.2   ALKPHOS  --   --   --   --   --  104  --  87   ALT  --   --   --   --   --  10  --  10   AST  --   --   --   --   --  22  --  21    < > = values in this interval not displayed.       Imaging:   No results found for this or any previous visit (from the past 24 hour(s)).

## 2023-02-07 NOTE — PLAN OF CARE
Problem: Diabetes Comorbidity  Goal: Blood Glucose Level Within Targeted Range  Outcome: Not Progressing     Problem: Hypertension Comorbidity  Goal: Blood Pressure in Desired Range  Outcome: Progressing  Intervention: Maintain Blood Pressure Management  Recent Flowsheet Documentation  Taken 2/6/2023 1750 by Lynsey Silver RN  Medication Review/Management: medications reviewed     Problem: Pain Acute  Goal: Optimal Pain Control and Function  Outcome: Progressing  Intervention: Develop Pain Management Plan  Recent Flowsheet Documentation  Taken 2/6/2023 1624 by Lynsey Silver RN  Pain Management Interventions: medication (see MAR)  Intervention: Prevent or Manage Pain  Recent Flowsheet Documentation  Taken 2/6/2023 1750 by Lynsey Silver RN  Medication Review/Management: medications reviewed   Goal Outcome Evaluation:       Blood sugars were 188 and 222, blood pressure has been within normal limits, complained of surgical abdominal pain, dilaudid prn helpful, complained of sore throat and left ear pain, temp 100.4, House Officer was updated, Blood culture ordered, had tylenol prn, rechecked temp for 99.6, disoriented to time, was incontinent of large amount of urine,

## 2023-02-07 NOTE — PLAN OF CARE
Problem: Plan of Care - These are the overarching goals to be used throughout the patient stay.    Goal: Optimal Comfort and Wellbeing  Outcome: Progressing     Problem: Pain Acute  Goal: Optimal Pain Control and Function  Outcome: Progressing  Intervention: Prevent or Manage Pain  Recent Flowsheet Documentation  Taken 2/7/2023 0900 by Josette Forde, RN  Medication Review/Management: medications reviewed   Goal Outcome Evaluation:    Pt requested for pain medication.  Gave prn Oxycodone 5 mg.  Pt is on 1 L NC sating 93-94% Pure wick in place.  Pt have a Urostomy pouch over a leaking incision site on left side of abdomen.   Vessel loop drain on midline abdomen.  Pure wick systems and canister was changed, will need UA. Waiting for CT chest abd/pelvis.

## 2023-02-07 NOTE — PROGRESS NOTES
ASSESSMENT:  1. Perforation of colon (H)    2. 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  Bacteremia Collinsella aerofaciens, also gram negative bacteremia .  Clinically developed fever last night and leukocytosis increasing again.  Wounds look good and having bowel movements without diarrhea.  No change in abdominal pain or motivation.    PLAN:  -We will check and repeat CT of the abdomen today.  -We need to continue to encourage ambulation and diet  -We will also check a UA in image of the chest as well.  -Diet as tolerated  -Appreciate ID input on antibiotics    SUBJECTIVE:   She is stating that she does not feel well.  She denies any increase in abdominal pain, chest pain, shortness of breath but does have a cough.  Denies any increase in swelling in legs or pain.  Continues to be not motivated for movement.  Had a couple of loose incontinent stools.  DAVIS drain removed yesterday.  Box was also removed yesterday.  Has had large amounts of incontinent urination.      Patient Vitals for the past 24 hrs:   BP Temp Temp src Pulse Resp SpO2   02/07/23 0834 -- -- -- -- -- 91 %   02/07/23 0822 -- -- -- -- -- 91 %   02/07/23 0730 (!) 167/72 99.5  F (37.5  C) Oral 93 20 93 %   02/06/23 2312 (!) 150/67 99.6  F (37.6  C) Oral 85 20 93 %   02/06/23 2100 -- 99.6  F (37.6  C) -- -- -- --   02/06/23 1935 -- 100.4  F (38  C) -- -- -- --   02/06/23 1530 132/62 99  F (37.2  C) Oral 95 24 95 %   02/06/23 1330 (!) 158/71 -- -- 85 -- --         PHYSICAL EXAM:  GEN: No acute distress, comfortable    ABD: Obese soft and exquisite tenderness all 4 quadrants.  No rebound and wounds look clean and dry without any stigmata of infection.  Urostomy pouch in place.    Output by Drain (mL) 02/05/23 0700 - 02/05/23 1459 02/05/23 1500 - 02/05/23 2259 02/05/23 2300 - 02/06/23 0659 02/06/23 0700 - 02/06/23 1459  02/06/23 1500 - 02/06/23 2259 02/06/23 2300 - 02/07/23 0659 02/07/23 0700 - 02/07/23 0840   Open Drain Ventral Abdomen           Open Drain Left;Superior Abdomen Urostomy Pouch 300 45 25 350 200        EXT: No increase in swelling or pain with dry erythema of the left greater toe which has been followed in chronic    02/06 0700 - 02/07 0659  In: 600 [P.O.:600]  Out: 2000 [Urine:1400; Drains:600]    No results displayed because visit has over 200 results.   Recent Results (from the past 24 hour(s))   Glucose by meter    Collection Time: 02/06/23  1:41 PM   Result Value Ref Range    GLUCOSE BY METER POCT 159 (H) 70 - 99 mg/dL   Glucose by meter    Collection Time: 02/06/23  5:53 PM   Result Value Ref Range    GLUCOSE BY METER POCT 188 (H) 70 - 99 mg/dL   Glucose by meter    Collection Time: 02/06/23  9:38 PM   Result Value Ref Range    GLUCOSE BY METER POCT 222 (H) 70 - 99 mg/dL   Extra Green Top (Lithium Heparin) Tube    Collection Time: 02/06/23  9:56 PM   Result Value Ref Range    Hold Specimen JIC    Extra Purple Top Tube    Collection Time: 02/06/23  9:56 PM   Result Value Ref Range    Hold Specimen JIC    CBC with platelets    Collection Time: 02/07/23  6:04 AM   Result Value Ref Range    WBC Count 20.7 (H) 4.0 - 11.0 10e3/uL    RBC Count 3.76 (L) 3.80 - 5.20 10e6/uL    Hemoglobin 10.6 (L) 11.7 - 15.7 g/dL    Hematocrit 35.6 35.0 - 47.0 %    MCV 95 78 - 100 fL    MCH 28.2 26.5 - 33.0 pg    MCHC 29.8 (L) 31.5 - 36.5 g/dL    RDW 15.3 (H) 10.0 - 15.0 %    Platelet Count 436 150 - 450 10e3/uL   Basic metabolic panel    Collection Time: 02/07/23  6:04 AM   Result Value Ref Range    Sodium 139 136 - 145 mmol/L    Potassium 3.7 3.4 - 5.3 mmol/L    Chloride 102 98 - 107 mmol/L    Carbon Dioxide (CO2) 31 (H) 22 - 29 mmol/L    Anion Gap 6 (L) 7 - 15 mmol/L    Urea Nitrogen 7.4 (L) 8.0 - 23.0 mg/dL    Creatinine 0.60 0.51 - 0.95 mg/dL    Calcium 8.2 (L) 8.8 - 10.2 mg/dL    Glucose 192 (H) 70 - 99 mg/dL    GFR Estimate >90  >60 mL/min/1.73m2   Phosphorus    Collection Time: 02/07/23  6:04 AM   Result Value Ref Range    Phosphorus 2.4 (L) 2.5 - 4.5 mg/dL   Magnesium    Collection Time: 02/07/23  6:04 AM   Result Value Ref Range    Magnesium 1.8 1.7 - 2.3 mg/dL   CRP inflammation    Collection Time: 02/07/23  6:04 AM   Result Value Ref Range    CRP Inflammation 150.80 (H) <5.00 mg/L   Glucose by meter    Collection Time: 02/07/23  8:21 AM   Result Value Ref Range    GLUCOSE BY METER POCT 166 (H) 70 - 99 mg/dL               FARHANA Rosa  [facility]  [address] [place], [place] [id number]  Phone- [phone]  Fax- [phone]

## 2023-02-07 NOTE — PLAN OF CARE
Problem: Plan of Care - These are the overarching goals to be used throughout the patient stay.    Goal: Optimal Comfort and Wellbeing  Outcome: Progressing  Intervention: Monitor Pain and Promote Comfort  Recent Flowsheet Documentation  Taken 2/7/2023 0002 by Sujey Forde RN  Pain Management Interventions:   quiet environment facilitated   repositioned     Problem: Bowel Resection  Goal: Effective Urinary Elimination  Outcome: Progressing     Problem: Bowel Resection  Goal: Effective Oxygenation and Ventilation  Outcome: Progressing  Intervention: Optimize Oxygenation and Ventilation  Recent Flowsheet Documentation  Taken 2/7/2023 0400 by Sujey Forde RN  Head of Bed (HOB) Positioning: HOB at 20-30 degrees     Goal Outcome Evaluation:         Pt reported discomfort in back and abdomen. Repositioned for comfort. On 1L NC. Incontinent of urine. Provided andreia cares and applied external catheter with good urine output. Urostomy pouch intact.

## 2023-02-07 NOTE — PROGRESS NOTES
"Care Management Follow Up    Length of Stay (days): 9    Expected Discharge Date: 02/09/2023     Concerns to be Addressed:   Lasix IV every hours. Plan for Antibiotics (ID following): currently receiving Meropenem and Micafungin via peripheral IV.   Patient plan of care discussed at interdisciplinary rounds: Yes   Follow up from rounds/notes:   Per ID notes, \"Continue meropenem and micafungin. (check) CT abd today. If no signs of infection, consider change to po regimen.\"    Anticipated Discharge Disposition:  Transitional care (TCU) is recommended for continued therapy and skilled nursing.     Anticipated Discharge Services:  Continued therapy, skilled nursing and medical management.   Anticipated Discharge DME:  Per therapy (if indicated).    Patient/family educated on Medicare website which has current facility and service quality ratings:  Yes  Education Provided on the Discharge Plan:  Per team  Patient/Family in Agreement with the Plan:  Yes    Referrals Placed by CM/SW:  See previous CM notes;   Private pay costs discussed: Not applicable    Additional Information:  Patient needing assist of two and Transitional care (TCU) is recommended for continued therapy and skilled nursing. [facility] and [facility] have both declined patient for TCU at this time (due to lack of beds. Previous RNCM received phone call from patient's daughter Sheba regarding follow up on TCU referrals.   12:18 PM:  Update provided to patient and her  at the bedside. Update provided to patient's daughter Sheba by telephone.     Additional referrals sent per request.   Referrals pending at:  [facility] to continue to follow care progression and aide in discharge planning as needed.     Social History:   Patient lives at home with her spouse Awais and has 4WW at home (although she generally doesn't use it).  Awais has been assisting more in home " recently.    Jana Mcfarlane RN

## 2023-02-07 NOTE — PROGRESS NOTES
"INFECTIOUS DISEASE FOLLOW UP NOTE      ASSESSMENT:  1. Intra-abdominal infection presenting with perforated stercolic ulcer of sigmoid colon, underwent laparoscopic sigmoid colectomy with anastomosis, washout 1/29. Findings of diffuse feculent peritonitis. Ongoing pain and leukocytosis. Draining clear fluid from wound -- creatinine level not suggestive of urine leak based on estimate. Bowel function returning to normal, but wbc remains high. No improvement with change in antibiotics   2. Bacteremia with clostridium species, Collinsella aerofaciens, bacteroides vulgatus, and Eubacterium spp. Likely secondary to perforation  3. DM  4. Hypothyroidism. Mildly elevated TSH prior to admission, on treatment  5. Left great toe infection for many months. Has seen podiatry and primary clinic for this. Recent doxycycline. Had MRI negative for osteomyelitis in September. No signs of lower ext infection currently  6. [employer] per chart refusing blood products.   7. Cholelithiasis      PLAN:  -Continue meropenem and micafungin   -CT abd today. If no signs of infection, consider change to po regimen  -blood smear  -trend wbc and CRP    Lexx Zuniga MD  [facility]  Direct messaging: [employer]  On-Call ID provider: [phone], option: 9      ______________________________________________________________________    SUBJECTIVE / INTERVAL HISTORY:     No events overnight. Plan for CT today. No acute complaints. DAVIS removed yesterday.    OBJECTIVE:  BP (!) 167/72   Pulse 93   Temp 99.5  F (37.5  C) (Oral)   Resp 20   Ht 1.651 m (5' 5\")   Wt 110.5 kg (243 lb 9.7 oz)   SpO2 91%   BMI 40.54 kg/m           GEN: No acute distress.   RESPIRATORY:  Clear anterior.  CARDIOVASCULAR:  Regular rate and rhythm. Normal S1 and S2. No murmur, click, gallop or rub. No dependent edema. No excess JVD.  ABDOMEN:  Soft, generalized tenderness. Colostomy bag to collect midline serous fluid.   EXTREMITIES: " bilateral lower ext edema  SKIN/HAIR/NAILS:  No rashes. Erythema on multiple toes, without lesions or tenderness  IV: peripheral        Antibiotics:  Meropenem /-  Micafungin -    Previous:  pip/tazo -2  Vancomycin 1/29 x1      Pertinent labs:    Recent Labs   Lab 23  0604 23  0646 23  0641   WBC 20.7* 17.7* 19.3*   HGB 10.6* 10.5* 10.4*   HCT 35.6 34.9* 34.4*    395 427        Recent Labs   Lab 23  0604 23  0646 23  0641    143 143   CO2 31* 28 24   BUN 7.4* 7.0* 7.0*      No results found for: CRP      Lab Results   Component Value Date    ALT 10 2023    AST 22 2023    ALKPHOS 104 2023         MICROBIOLOGY DATA:   blood culture: C.clostridioforme and B.vulgatus   blood culture: Eubacterium spp and Collinsella aerofaciens      RADIOLOGY:  Echocardiogram Complete    Result Date: 2023  [id number]^[place] [address]  Name: SRIDHAR ALONSO MRN: [id number] : 1945 Study Date: 2023 12:44 PM Age: 77 yrs Gender: Female Patient Location: [facility] [address] Reason For Study: CHF Ordering Physician: PAYAL OCAMPO Performed By: KATHARINE  BSA: 2.1 m2 Height: 65 in Weight: 220 lb HR: 87 BP: 138/63 mmHg ______________________________________________________________________________ Procedure Complete Echo Adult. ______________________________________________________________________________ Interpretation Summary  1. Normal left ventricular size and systolic performance with a visually estimated ejection fraction of 60%. 2. No significant valvular heart disease is identified on this study. 3. Normal right ventricular size with borderline reduction right ventricular systolic performance. 4. There is mild biatrial enlargement. 5. There is a very small pericardial effusion. There is no evidence of significant intraventricular dependence/tamponade physiology.  ______________________________________________________________________________ Left ventricle: Normal left ventricular size and systolic performance with a visually estimated ejection fraction of 60%. There is normal regional wall motion. Left ventricular wall thickness is normal.  Assessment of LV Diastolic Function: The cumulative findings suggest impaired diastolic filling [The septal e' velocity is < 7 cm/s & lateral e' velocity is < 10 cm/s. The average E/e' is >14. TR velocity is 2.8 m/s. Left atrial volume index is greater than 34 mL/mÂ ].  Assessment of left atrial pressure (LAP): The cumulative findings suggest moderately elevated left atrial pressure (the E/A is > 0.8 and <2.0 plus 2 or 3 of 3 of the following present: Average E/e' > 14, TRvel > 2.8 m/s, and/or LA vol. index > 34 ml/mÂ  ).  Right ventricle: Normal right ventricular size with borderline reduction right ventricular systolic performance.  Left atrium: There is mild left atrial enlargement.  Right atrium: There is mild right atrial enlargement.  IVC: The IVC is borderline dilated.  Aortic valve: The aortic valve is comprised of three cusps. No significant aortic stenosis or aortic insufficiency is detected on this study.  Mitral valve: The mitral valve appears morphologically normal. There is trace mitral insufficiency.  Tricuspid valve: The tricuspid valve is grossly morphologically normal. There is trace tricuspid insufficiency.  Pulmonic valve: The pulmonic valve is grossly morphologically normal.  Thoracic aorta: The aortic root and proximal ascending aorta are of normal dimension.  Pericardium: There is a very small pericardial effusion. There is no evidence of significant intraventricular dependence/tamponade physiology. ______________________________________________________________________________ ______________________________________________________________________________ MMode/2D Measurements & Calculations IVSd: 1.3 cm LVIDd: 4.1  cm LVIDs: 2.6 cm LVPWd: 1.2 cm FS: 36.6 % LV mass(C)d: 185.2 grams LV mass(C)dI: 89.9 grams/m2 Ao root diam: 3.2 cm LA dimension: 4.6 cm asc Aorta Diam: 3.4 cm LA/Ao: 1.4 LVOT diam: 2.3 cm LVOT area: 4.1 cm2 LA Volume Indexed (AL/bp): 36.7 ml/m2  RWT: 0.58  Time Measurements MM HR: 87.0 BPM  Doppler Measurements & Calculations MV E max ayden: 123.0 cm/sec MV A max ayden: 124.2 cm/sec MV E/A: 0.99 MV dec time: 0.24 sec Ao V2 max: 189.4 cm/sec Ao max P.0 mmHg Ao V2 mean: 123.9 cm/sec Ao mean P.8 mmHg Ao V2 VTI: 32.7 cm QUAN(I,D): 2.8 cm2 QUAN(V,D): 2.9 cm2 LV V1 max P.9 mmHg LV V1 max: 131.3 cm/sec LV V1 VTI: 22.2 cm SV(LVOT): 92.1 ml SI(LVOT): 44.7 ml/m2 PA acc time: 0.09 sec TR max ayden: 279.6 cm/sec TR max P.3 mmHg AV Ayden Ratio (DI): 0.69 QUAN Index (cm2/m2): 1.4 E/E': 13.6  E/E' avg: 15.6 Lateral E/e': 13.6 Medial E/e': 17.7 Peak E' Ayden: 9.0 cm/sec  ______________________________________________________________________________ Report approved by: Leonid Hamilton 2023 02:47 PM       XR Ankle Port Left G/E 3 Views    Result Date: 2023  EXAM: XR ANKLE PORT LEFT G/E 3 VIEWS LOCATION: [facility] DATE/TIME: 2023 2:22 PM INDICATION: left ankle pain, no trauma COMPARISON: None.     IMPRESSION: Bones are demineralized. Mild tibiotalar joint degenerative change. No evidence of an acute fracture. Tiny bone fragment adjacent to the medial talus and mild bony proliferation along the tip of the lateral malleolus is likely chronic. Ankle mortise is intact. Calcaneal heel spur.    CT Abdomen Pelvis w Contrast    Result Date: 2023  EXAM: CT ABDOMEN PELVIS W CONTRAST LOCATION: [facility] DATE/TIME: 2023 12:08 AM INDICATION: 1 29 sigmoidectomy with increasing wbc COMPARISON: CT a abdomen/pelvis 2023 TECHNIQUE: CT scan of the abdomen and pelvis was performed following injection of IV contrast. Multiplanar reformats were obtained. Dose  reduction techniques were used. CONTRAST: isovue 370 100ml FINDINGS: LOWER CHEST: Trace bilateral pleural effusions right greater than left with compressive atelectasis. HEPATOBILIARY: Diffuse hepatic steatosis. Cholelithiasis with mild gallbladder distention which may be related to fasting. No biliary ductal dilatation. PANCREAS: Normal. SPLEEN: Normal. ADRENAL GLANDS: Normal. KIDNEYS/BLADDER: No hydronephrosis. Box catheter within a decompressed bladder. BOWEL: Recent sigmoidectomy. Colonic diverticulosis. Trace abdominopelvic free ascites. Additionally, there is some loculated ascites in the anterior abdomen with partial rim enhancement and several foci of gas within, which suggests a degree of peritonitis. No drainable fluid collection currently. Right anterior approach drain terminates in the left pelvis. LYMPH NODES: Normal. VASCULATURE: Atherosclerotic calcifications of the aortoiliac vessels without evidence of aneurysmal dilatation. PELVIC ORGANS: Hysterectomy. MUSCULOSKELETAL: Body wall edema. Skin staples with recent ventral abdominal incision. Small fat-containing umbilical hernia. Multilevel degenerative changes of the thoracolumbar spine. No acute osseous abnormality.     IMPRESSION: 1.  Recent sigmoidectomy. Trace abdominopelvic free ascites. Additionally, there is some loculated ascites in the anterior abdomen with partial rim enhancement and several foci of gas within, which suggests a degree of peritonitis. No drainable fluid collection currently. 2.  Trace bilateral pleural effusions right greater than left with compressive atelectasis. 3.  Body wall edema. 4.  Diffuse hepatic steatosis. 5.  Cholelithiasis with mild gallbladder distention which may be related to fasting.    CT Abdomen Pelvis w Contrast    Result Date: 1/25/2023  EXAM: CT ABDOMEN PELVIS W CONTRAST LOCATION: [facility] DATE/TIME: 1/25/2023 11:28 PM INDICATION: abdominal pain worst in lower quadrants,  leukocytosis eval for pathology COMPARISON: 11/26/2022 TECHNIQUE: CT scan of the abdomen and pelvis was performed following injection of IV contrast. Multiplanar reformats were obtained. Dose reduction techniques were used. CONTRAST: isovue 370 100ml FINDINGS: LOWER CHEST: Small left Bochdalek hernia. Basilar atelectasis. HEPATOBILIARY: Cholelithiasis and hepatic steatosis. PANCREAS: Normal. SPLEEN: Normal. ADRENAL GLANDS: Normal. KIDNEYS/BLADDER: Normal. BOWEL: Small bowel is normal caliber. Large amount of colonic stool. Sigmoid colonic wall thickening with adjacent inflammation is again seen. The inflamed segment is distended with stool. Small amount of adjacent free fluid. Caliber change with collapse  of the rectosigmoid junction and rectum. Diverticulosis.  LYMPH NODES: Subcentimeter retroperitoneal lymph nodes. VASCULATURE: Atherosclerotic vascular calcification. PELVIC ORGANS: Absent uterus. MUSCULOSKELETAL: Degenerative change osseous structures. Mild compression L4. Slight anterolisthesis L4 on L5.     IMPRESSION: 1.  Sigmoid colitis is again seen. Collapse of the rectosigmoid junction and rectum distal to the inflamed segment of colon. Underlying stricture not excluded. 2.  Small amount of adjacent free fluid. 3.  Cholelithiasis.    CTA Abdomen Pelvis with Contrast    Result Date: 1/29/2023  EXAM: CTA ABDOMEN PELVIS WITH CONTRAST LOCATION: [facility] DATE/TIME: 1/29/2023 6:48 AM INDICATION: recurrent colitis, distended abdomen pain out of proportion. COMPARISON: There are study dated 1/25/ TECHNIQUE: CT angiogram abdomen pelvis during arterial phase of injection of IV contrast. 2D and 3D MIP reconstructions were performed by the CT technologist. Dose reduction techniques were used. CONTRAST: isovue 370 100ml FINDINGS: ANGIOGRAM ABDOMEN/PELVIS: The abdominal aorta is normal in size and caliber throughout with scattered atherosclerotic calcifications noted. The iliac and femoral  vessels are normal in size and caliber and well-opacified. Celiac and SMA and TONY arise normally and are well opacified at this time LOWER CHEST: Lung bases are clear. Heart is enlarged, similar prior exam. HEPATOBILIARY: Diffuse hepatic steatosis. No significant mass. No bile duct dilatation. Calcified gallstones are again seen within the gallbladder. PANCREAS: No significant mass, duct dilatation, or inflammatory change. SPLEEN: Normal size. ADRENAL GLANDS: No significant nodules. KIDNEYS/BLADDER: No significant mass, stone, or hydronephrosis. BOWEL: Mural thickening and pericolonic inflammation of the sigmoid colon is again noted, similar in appearance to prior exam with increased inflammation seen in the proximal sigmoid colon. No pericolonic abscess seen at this time. Stool is seen throughout the inflamed sigmoid colon as well as scattered throughout the large bowel without evidence of large bowel obstruction. Adjacent to the proximal sigmoid colon is free fluid with foci of gas which are extraluminal and new from the prior study (image 1:15, series 5). Additional foci of extraluminal gas are seen within the mesenteric fat anteriorly in the upper abdomen (image 57, series 5). There are scattered diverticula seen throughout the colon. Air-fluid levels are seen scattered through multiple loops of small bowel with mild dilatation seen in the proximal jejunum in the upper abdomen, these mildly dilated loops of small bowel decompressed gradually distally without a clear transition point. LYMPH NODES: Increased Number of pericolonic lymph nodes are again seen adjacent to the sigmoid colon. PELVIC ORGANS: No pelvic masses. MUSCULOSKELETAL: Unchanged     IMPRESSION: 1.  Mural thickening of the sigmoid colon with pericolonic inflammation and stranding, slightly worsened from prior exam with punctate foci of gas seen adjacent to the inflamed colon as well as tracking in the anterior abdominal wall concerning for sigmoid  colonic perforation. No organized intra-abdominal fluid collection such as abscess is seen at this time. Given the long-standing inflammation in this region an underlying neoplastic process cannot be excluded. 2.  Scattered air-fluid levels and mildly dilated loops of proximal jejunum which decompresses gradually without a transition point consistent favored to reflect reactive ileus 3.  Hepatic steatosis 4.  Cholelithiasis [Critical Result: Sigmoid colonic inflammation with areas of extraluminal gas, new from 1/25/2023 concerning for sigmoid colonic perforation] Finding was identified on 1/29/2023 6:56 AM. DR. Snell was contacted by me on 1/29/2023 7:08 AM and verbalized understanding of the critical result.     CT Chest w/o Contrast    Result Date: 1/29/2023  EXAM: CT CHEST WITHOUT CONTRAST LOCATION: [facility] DATE/TIME: 01/29/2023, 6:46 AM INDICATION: Fever and cough. COMPARISON: 11/26/2022 - CT chest, abdomen and pelvis. TECHNIQUE: Note that this study was performed in conjunction with a CT scan of the abdomen and pelvis. Refer to a separate report for findings from that study. CT chest without IV contrast. Multiplanar reformats were obtained. Dose reduction techniques were used. CONTRAST: None. FINDINGS: LUNGS AND PLEURA: Minimal emphysematous changes in the lungs. Slight interstitial thickening in bilateral lung bases. A few curvilinear opacities in the periphery of both lungs likely represent atelectasis and/or scarring. The lungs are otherwise clear. Small left Bochdalek hernia containing fat MEDIASTINUM/AXILLAE: Atherosclerotic calcification in the thoracic aorta. CORONARY ARTERY CALCIFICATION: Present. MUSCULOSKELETAL: No acute findings. UPPER ABDOMEN: A few foci of extraluminal gas are present in the upper abdomen.     IMPRESSION: 1.  Slight interstitial thickening in bilateral lung bases. This is nonspecific, but most likely relates to interstitial pulmonary edema. 2.  No  other findings suspicious for acute pulmonary disease. 3.  A few foci of extraluminal gas scattered within the nondependent aspect of the upper abdomen. In the absence of recent surgery, this is consistent with a bowel perforation. Refer to the report from the CT scan of the abdomen and pelvis performed in conjunction with this study for additional details. The findings were called to Dr. Snell by Dr. Anderson on 01/29/2023 at 0700 hours.      Attestation:  I have reviewed today's Medications, Vital Signs, and Labs. Cultures and previous notes were reviewed and summarized above. Recommendations discussed with surgical service.

## 2023-02-08 ENCOUNTER — APPOINTMENT (OUTPATIENT)
Dept: RADIOLOGY | Facility: HOSPITAL | Age: 78
DRG: 853 | End: 2023-02-08
Payer: COMMERCIAL

## 2023-02-08 LAB
ALBUMIN SERPL BCG-MCNC: 2.3 G/DL (ref 3.5–5.2)
ALP SERPL-CCNC: 142 U/L (ref 35–104)
ALT SERPL W P-5'-P-CCNC: 14 U/L (ref 10–35)
ANION GAP SERPL CALCULATED.3IONS-SCNC: 7 MMOL/L (ref 7–15)
AST SERPL W P-5'-P-CCNC: 29 U/L (ref 10–35)
BASOPHILS # BLD AUTO: 0 10E3/UL (ref 0–0.2)
BASOPHILS NFR BLD AUTO: 0 %
BILIRUB DIRECT SERPL-MCNC: <0.2 MG/DL (ref 0–0.3)
BILIRUB SERPL-MCNC: 0.3 MG/DL
BUN SERPL-MCNC: 9.9 MG/DL (ref 8–23)
CALCIUM SERPL-MCNC: 8.3 MG/DL (ref 8.8–10.2)
CHLORIDE SERPL-SCNC: 100 MMOL/L (ref 98–107)
CREAT SERPL-MCNC: 0.6 MG/DL (ref 0.51–0.95)
CRP SERPL-MCNC: 215.5 MG/L
DEPRECATED HCO3 PLAS-SCNC: 31 MMOL/L (ref 22–29)
EOSINOPHIL # BLD AUTO: 0 10E3/UL (ref 0–0.7)
EOSINOPHIL NFR BLD AUTO: 0 %
ERYTHROCYTE [DISTWIDTH] IN BLOOD BY AUTOMATED COUNT: 15.3 % (ref 10–15)
FLUAV RNA SPEC QL NAA+PROBE: NEGATIVE
FLUBV RNA RESP QL NAA+PROBE: NEGATIVE
GFR SERPL CREATININE-BSD FRML MDRD: >90 ML/MIN/1.73M2
GLUCOSE BLDC GLUCOMTR-MCNC: 149 MG/DL (ref 70–99)
GLUCOSE BLDC GLUCOMTR-MCNC: 213 MG/DL (ref 70–99)
GLUCOSE BLDC GLUCOMTR-MCNC: 227 MG/DL (ref 70–99)
GLUCOSE BLDC GLUCOMTR-MCNC: 239 MG/DL (ref 70–99)
GLUCOSE SERPL-MCNC: 151 MG/DL (ref 70–99)
HCT VFR BLD AUTO: 36.3 % (ref 35–47)
HGB BLD-MCNC: 10.9 G/DL (ref 11.7–15.7)
HOLD SPECIMEN: NORMAL
IMM GRANULOCYTES # BLD: 0.2 10E3/UL
IMM GRANULOCYTES NFR BLD: 1 %
LACTATE SERPL-SCNC: 1.3 MMOL/L (ref 0.7–2)
LYMPHOCYTES # BLD AUTO: 0.7 10E3/UL (ref 0.8–5.3)
LYMPHOCYTES NFR BLD AUTO: 3 %
MCH RBC QN AUTO: 28 PG (ref 26.5–33)
MCHC RBC AUTO-ENTMCNC: 30 G/DL (ref 31.5–36.5)
MCV RBC AUTO: 93 FL (ref 78–100)
MONOCYTES # BLD AUTO: 1.1 10E3/UL (ref 0–1.3)
MONOCYTES NFR BLD AUTO: 4 %
NEUTROPHILS # BLD AUTO: 23.2 10E3/UL (ref 1.6–8.3)
NEUTROPHILS NFR BLD AUTO: 92 %
NRBC # BLD AUTO: 0 10E3/UL
NRBC BLD AUTO-RTO: 0 /100
PLATELET # BLD AUTO: 449 10E3/UL (ref 150–450)
POTASSIUM SERPL-SCNC: 3.6 MMOL/L (ref 3.4–5.3)
PROT SERPL-MCNC: 5.4 G/DL (ref 6.4–8.3)
RBC # BLD AUTO: 3.89 10E6/UL (ref 3.8–5.2)
RETICS # AUTO: 0.06 10E6/UL (ref 0.01–0.11)
RETICS/RBC NFR AUTO: 1.6 % (ref 0.8–2.7)
RSV RNA SPEC NAA+PROBE: NEGATIVE
SARS-COV-2 RNA RESP QL NAA+PROBE: NEGATIVE
SODIUM SERPL-SCNC: 138 MMOL/L (ref 136–145)
WBC # BLD AUTO: 24.8 10E3/UL (ref 4–11)
WBC # BLD AUTO: 25.1 10E3/UL (ref 4–11)

## 2023-02-08 PROCEDURE — 999N000127 HC STATISTIC PERIPHERAL IV START W US GUIDANCE

## 2023-02-08 PROCEDURE — 87637 SARSCOV2&INF A&B&RSV AMP PRB: CPT

## 2023-02-08 PROCEDURE — 80053 COMPREHEN METABOLIC PANEL: CPT | Performed by: HOSPITALIST

## 2023-02-08 PROCEDURE — 85027 COMPLETE CBC AUTOMATED: CPT | Performed by: HOSPITALIST

## 2023-02-08 PROCEDURE — 250N000013 HC RX MED GY IP 250 OP 250 PS 637: Performed by: INTERNAL MEDICINE

## 2023-02-08 PROCEDURE — 71045 X-RAY EXAM CHEST 1 VIEW: CPT

## 2023-02-08 PROCEDURE — 250N000011 HC RX IP 250 OP 636: Performed by: INTERNAL MEDICINE

## 2023-02-08 PROCEDURE — 85045 AUTOMATED RETICULOCYTE COUNT: CPT | Performed by: INTERNAL MEDICINE

## 2023-02-08 PROCEDURE — 86140 C-REACTIVE PROTEIN: CPT | Performed by: INTERNAL MEDICINE

## 2023-02-08 PROCEDURE — 250N000011 HC RX IP 250 OP 636: Performed by: SURGERY

## 2023-02-08 PROCEDURE — 120N000001 HC R&B MED SURG/OB

## 2023-02-08 PROCEDURE — 99233 SBSQ HOSP IP/OBS HIGH 50: CPT | Performed by: INTERNAL MEDICINE

## 2023-02-08 PROCEDURE — 36415 COLL VENOUS BLD VENIPUNCTURE: CPT | Performed by: HOSPITALIST

## 2023-02-08 PROCEDURE — 99232 SBSQ HOSP IP/OBS MODERATE 35: CPT | Performed by: INTERNAL MEDICINE

## 2023-02-08 PROCEDURE — 36415 COLL VENOUS BLD VENIPUNCTURE: CPT | Performed by: INTERNAL MEDICINE

## 2023-02-08 PROCEDURE — 250N000013 HC RX MED GY IP 250 OP 250 PS 637: Performed by: SURGERY

## 2023-02-08 PROCEDURE — 258N000003 HC RX IP 258 OP 636: Performed by: INTERNAL MEDICINE

## 2023-02-08 PROCEDURE — 85048 AUTOMATED LEUKOCYTE COUNT: CPT | Performed by: INTERNAL MEDICINE

## 2023-02-08 PROCEDURE — 250N000011 HC RX IP 250 OP 636: Performed by: HOSPITALIST

## 2023-02-08 PROCEDURE — 250N000013 HC RX MED GY IP 250 OP 250 PS 637

## 2023-02-08 PROCEDURE — 250N000013 HC RX MED GY IP 250 OP 250 PS 637: Performed by: HOSPITALIST

## 2023-02-08 PROCEDURE — 83605 ASSAY OF LACTIC ACID: CPT | Performed by: INTERNAL MEDICINE

## 2023-02-08 PROCEDURE — 82248 BILIRUBIN DIRECT: CPT | Performed by: PHYSICIAN ASSISTANT

## 2023-02-08 RX ORDER — SODIUM CHLORIDE 9 MG/ML
INJECTION, SOLUTION INTRAVENOUS CONTINUOUS
Status: DISCONTINUED | OUTPATIENT
Start: 2023-02-08 | End: 2023-02-10

## 2023-02-08 RX ORDER — ACETAMINOPHEN 650 MG/1
650 SUPPOSITORY RECTAL EVERY 4 HOURS PRN
Status: DISCONTINUED | OUTPATIENT
Start: 2023-02-08 | End: 2023-02-08

## 2023-02-08 RX ORDER — VANCOMYCIN HYDROCHLORIDE 1 G/200ML
1000 INJECTION, SOLUTION INTRAVENOUS EVERY 12 HOURS
Status: COMPLETED | OUTPATIENT
Start: 2023-02-09 | End: 2023-02-16

## 2023-02-08 RX ORDER — ACETAMINOPHEN 325 MG/1
650 TABLET ORAL
Status: COMPLETED | OUTPATIENT
Start: 2023-02-08 | End: 2023-02-08

## 2023-02-08 RX ORDER — ACETAMINOPHEN 325 MG/1
650 TABLET ORAL EVERY 4 HOURS PRN
Status: DISCONTINUED | OUTPATIENT
Start: 2023-02-08 | End: 2023-02-08

## 2023-02-08 RX ORDER — FUROSEMIDE 10 MG/ML
20 INJECTION INTRAMUSCULAR; INTRAVENOUS DAILY
Status: DISCONTINUED | OUTPATIENT
Start: 2023-02-09 | End: 2023-02-15

## 2023-02-08 RX ORDER — CEFAZOLIN SODIUM 1 G/50ML
2000 SOLUTION INTRAVENOUS ONCE
Status: COMPLETED | OUTPATIENT
Start: 2023-02-08 | End: 2023-02-08

## 2023-02-08 RX ADMIN — VENLAFAXINE HYDROCHLORIDE 75 MG: 75 CAPSULE, EXTENDED RELEASE ORAL at 08:55

## 2023-02-08 RX ADMIN — ROSUVASTATIN CALCIUM 20 MG: 10 TABLET, FILM COATED ORAL at 20:40

## 2023-02-08 RX ADMIN — MEROPENEM 1 G: 1 INJECTION INTRAVENOUS at 14:26

## 2023-02-08 RX ADMIN — ENOXAPARIN SODIUM 40 MG: 40 INJECTION SUBCUTANEOUS at 08:53

## 2023-02-08 RX ADMIN — MEROPENEM 1 G: 1 INJECTION INTRAVENOUS at 04:47

## 2023-02-08 RX ADMIN — LEVOTHYROXINE SODIUM 137 MCG: 0.11 TABLET ORAL at 08:54

## 2023-02-08 RX ADMIN — AMLODIPINE BESYLATE 2.5 MG: 2.5 TABLET ORAL at 08:55

## 2023-02-08 RX ADMIN — ACETAMINOPHEN 650 MG: 325 TABLET ORAL at 21:38

## 2023-02-08 RX ADMIN — ACETAMINOPHEN 650 MG: 325 TABLET ORAL at 18:55

## 2023-02-08 RX ADMIN — FUROSEMIDE 20 MG: 10 INJECTION, SOLUTION INTRAMUSCULAR; INTRAVENOUS at 04:46

## 2023-02-08 RX ADMIN — SODIUM CHLORIDE: 9 INJECTION, SOLUTION INTRAVENOUS at 20:38

## 2023-02-08 RX ADMIN — MEROPENEM 1 G: 1 INJECTION INTRAVENOUS at 14:27

## 2023-02-08 RX ADMIN — FUROSEMIDE 20 MG: 10 INJECTION, SOLUTION INTRAMUSCULAR; INTRAVENOUS at 16:30

## 2023-02-08 RX ADMIN — POTASSIUM, SODIUM PHOSPHATES 280 MG-160 MG-250 MG ORAL POWDER PACKET 1 PACKET: POWDER IN PACKET at 09:07

## 2023-02-08 RX ADMIN — VANCOMYCIN HYDROCHLORIDE 2000 MG: 5 INJECTION, POWDER, LYOPHILIZED, FOR SOLUTION INTRAVENOUS at 12:20

## 2023-02-08 RX ADMIN — MICAFUNGIN SODIUM 100 MG: 50 INJECTION, POWDER, LYOPHILIZED, FOR SOLUTION INTRAVENOUS at 15:06

## 2023-02-08 RX ADMIN — ACETAMINOPHEN 650 MG: 325 TABLET ORAL at 14:30

## 2023-02-08 RX ADMIN — ASPIRIN 81 MG: 81 TABLET, COATED ORAL at 08:54

## 2023-02-08 RX ADMIN — MEROPENEM 1 G: 1 INJECTION INTRAVENOUS at 21:38

## 2023-02-08 RX ADMIN — POTASSIUM, SODIUM PHOSPHATES 280 MG-160 MG-250 MG ORAL POWDER PACKET 1 PACKET: POWDER IN PACKET at 20:40

## 2023-02-08 RX ADMIN — ACETAMINOPHEN 650 MG: 325 TABLET ORAL at 08:54

## 2023-02-08 ASSESSMENT — ACTIVITIES OF DAILY LIVING (ADL)
ADLS_ACUITY_SCORE: 40
ADLS_ACUITY_SCORE: 34
ADLS_ACUITY_SCORE: 34
ADLS_ACUITY_SCORE: 40
ADLS_ACUITY_SCORE: 40
ADLS_ACUITY_SCORE: 34
ADLS_ACUITY_SCORE: 40
ADLS_ACUITY_SCORE: 36
ADLS_ACUITY_SCORE: 34

## 2023-02-08 NOTE — PHARMACY-VANCOMYCIN DOSING SERVICE
Pharmacy Vancomycin Note  Date of Service 2023  Patient's  1945   77 year old, female    Indication: Intra-abdominal infection  Day of Therapy: 1  Loading dose of 2000 mg given 23 at 12:20  Current vancomycin monitoring method: AUC  Current vancomycin therapeutic monitoring goal: 400-600 mg*h/L        Current estimated CrCl = Estimated Creatinine Clearance: 100.3 mL/min (based on SCr of 0.6 mg/dL).    Creatinine for last 3 days  2023:  6:46 AM Creatinine 0.58 mg/dL  2023:  6:04 AM Creatinine 0.60 mg/dL  2023:  6:46 AM Creatinine 0.60 mg/dL    Recent Vancomycin Levels (past 3 days)  No results found for requested labs within last 72 hours.    Vancomycin IV Administrations (past 72 hours)                   vancomycin (VANCOCIN) 2,000 mg in sodium chloride 0.9 % 500 mL intermittent infusion (mg) 2,000 mg New Bag 23 1220                Nephrotoxins and other renal medications (From now, onward)    Start     Dose/Rate Route Frequency Ordered Stop    23 1552  furosemide (LASIX) injection 20 mg         20 mg  over 1-3 Minutes Intravenous EVERY 12 HOURS 23 0810               Contrast Orders - past 72 hours (72h ago, onward)    Start     Dose/Rate Route Frequency Stop    23 2030  iopamidol (ISOVUE-370) solution 100 mL         100 mL Intravenous ONCE 23          Interpretation of levels and current regimen:      Has serum creatinine changed greater than 50% in last 72 hours: No    Urine output:  good urine output    Renal Function: Stable    Insight[employer] Prediction of Planned New Vancomycin Regimen  Loading dose: 2000 mg  Regimen: 1000 mg IV every 12 hours.  Start time: 00:20 on 2023  Exposure target: AUC24 (range)400-600 mg/L.hr   AUC24,ss: 531 mg/L.hr  Probability of AUC24 > 400: 70 %  Ctrough,ss: 15.6 mg/L  Probability of Ctrough,ss > 20: 37 %  Probability of nephrotoxicity (Lodise GERMAN ): 11 %      Plan:  1. Begin Vancomycin 1000 mg IV q 12  hours at 00:00 on 2/9/23  2. Vancomycin monitoring method: AUC  3. Vancomycin therapeutic monitoring goal: 400-600 mg*h/L  4. Pharmacy will check vancomycin levels as appropriate in 1-3 Days.  5. Serum creatinine levels will be ordered daily for the first week of therapy and at least twice weekly for subsequent weeks.    MARIELA BUCKLEY, RPH

## 2023-02-08 NOTE — PROGRESS NOTES
Care Management Follow Up    Length of Stay (days): 10    Expected Discharge Date: 02/09/2023     Concerns to be Addressed:   Lasix IV every hours. Plan for Antibiotics (ID following): currently receiving Meropenem and Micafungin via peripheral IV (CT results will help determine plan).   Patient plan of care discussed at interdisciplinary rounds: Yes     Anticipated Discharge Disposition:  Transitional care (TCU) is recommended for continued therapy and skilled nursing.     Anticipated Discharge Services:  Continued therapy, skilled nursing and medical management.   Anticipated Discharge DME:  Per therapy (if indicated).    Patient/family educated on Medicare website which has current facility and service quality ratings:  Yes  Education Provided on the Discharge Plan:  Per team  Patient/Family in Agreement with the Plan:  Yes    Referrals Placed by CM/SW:  See previous CM notes;   Private pay costs discussed: Not applicable    Additional Information:  Patient needing assist of two and Transitional care (TCU) is recommended for continued therapy and skilled nursing. [facility] and [facility] have both declined patient for TCU at this time (due to lack of beds. Previous RNCM received phone call from patient's daughter Sheba regarding follow up on TCU referrals. On 2/7/23, writer provided an update to patient and her  at the bedside and patient's daughter Sheba by telephone.     Additional referrals sent per request.   Referrals pending at:  [facility] to continue to follow care progression and aide in discharge planning as needed.     Social History:   Patient lives at home with her spouse Awais and has 4WW at home (although she generally doesn't use it).  Awais has been assisting more in home recently.    Jana Mcfarlane RN

## 2023-02-08 NOTE — PROGRESS NOTES
"INFECTIOUS DISEASE FOLLOW UP NOTE      ASSESSMENT:  1. Intra-abdominal infection presenting with perforated stercolic ulcer of sigmoid colon, underwent laparoscopic sigmoid colectomy with anastomosis, washout 1/29. Findings of diffuse feculent peritonitis. Ongoing pain and leukocytosis. Draining clear fluid from wound -- creatinine level not suggestive of urine leak based on estimate. Bowel function returning to normal, but wbc remains high and increasing. No improvement with change in antibiotics. Repeat CT without significant findings for infection. CRP increasing  2. Bacteremia with clostridium species, Collinsella aerofaciens, bacteroides vulgatus, and Eubacterium spp. Likely secondary to perforation  3. DM  4. Hypothyroidism. Mildly elevated TSH prior to admission, on treatment  5. Left great toe infection for many months. Has seen podiatry and primary clinic for this. Recent doxycycline. Had MRI negative for osteomyelitis in September. No signs of lower ext infection currently  6. Buddhist per chart refusing blood products.   7. Cholelithiasis      PLAN:  -Continue meropenem and micafungin   -add vancomycin for enterococcal coverage (empiric)  -blood smear  -trend wbc and CRP    Lexx Zuniga MD  [facility]  Direct messaging: [employer]  On-Call ID provider: [phone], option: [phone]      ______________________________________________________________________    SUBJECTIVE / INTERVAL HISTORY:     No events. Abdomen . Poor appetite this morning. Low energy.    OBJECTIVE:  BP (!) 157/65 (BP Location: Right arm)   Pulse 94   Temp 98.4  F (36.9  C) (Oral)   Resp 20   Ht 1.651 m (5' 5\")   Wt 110.5 kg (243 lb 9.7 oz)   SpO2 95%   BMI 40.54 kg/m       GEN: No acute distress.   RESPIRATORY:  Clear anterior.  CARDIOVASCULAR:  Regular rate and rhythm. Normal S1 and S2. No murmur, click, gallop or rub.   ABDOMEN:  Soft, generalized tenderness. Bowel sounds " present  EXTREMITIES: bilateral lower ext edema  SKIN/HAIR/NAILS:  No rashes. Erythema on multiple toes, without lesions or tenderness  IV: peripheral        Antibiotics:  Meropenem 2/4-  Micafungin 2/4-    Previous:  pip/tazo 1/29-2/4  Vancomycin 1/29 x1      Pertinent labs:    Recent Labs   Lab 02/08/23  0646 02/07/23  0604 02/06/23  0646   WBC 24.8* 20.7* 17.7*   HGB 10.9* 10.6* 10.5*   HCT 36.3 35.6 34.9*    436 395        Recent Labs   Lab 02/08/23  0646 02/07/23  0604 02/06/23  0646    139 143   CO2 31* 31* 28   BUN 9.9 7.4* 7.0*      No results found for: CRP      Lab Results   Component Value Date    ALT 10 02/06/2023    AST 22 02/06/2023    ALKPHOS 104 02/06/2023         MICROBIOLOGY DATA:  1/29 blood culture: C.clostridioforme and B.vulgatus  1/29 blood culture: Eubacterium spp and Collinsella aerofaciens      RADIOLOGY:  CT Chest/Abdomen/Pelvis w Contrast    Result Date: 2/7/2023  EXAM: CT CHEST/ABDOMEN/PELVIS W CONTRAST LOCATION: [facility] DATE/TIME: 2/7/2023 8:21 PM INDICATION: ongoing leukocytosis. new fever. 1/29 sigmoidectomy with wash out. COMPARISON: CT of the abdomen and pelvis 2/4/2023 TECHNIQUE: CT scan of the chest, abdomen, and pelvis was performed following injection of IV contrast. Multiplanar reformats were obtained. Dose reduction techniques were used. CONTRAST: 100 mL Isovue-370 FINDINGS: LUNGS AND PLEURA: Small bilateral pleural effusions layer into the posterior costophrenic sulci. Bands of atelectasis along the posterior pleura of the lower lobes. No lung edema or consolidation. Trachea and central airways are patent and normal caliber. MEDIASTINUM: Cardiac chambers are normal in size. No pericardial effusion. Normal caliber thoracic aorta and main pulmonary artery. Arch, proximal great vessel and descending aorta atheromatous calcification is patchy. There are no enlarged mediastinal or hilar lymph nodes. Esophagus is decompressed. A few small  lower paraesophageal varices are present. CORONARY ARTERY CALCIFICATION: Mild to moderate, three-vessel disease. HEPATOBILIARY: Slight geographic liver attenuation but no focal enhancement or parenchymal lesion. There is a calcified stone layering in the gallbladder. No gallbladder wall thickening or pericholecystic inflammation. No bile duct enlargement. PANCREAS: Normal. SPLEEN: Normal. ADRENAL GLANDS: Normal. KIDNEYS/BLADDER: Trace amount of air in the nondependent bladder from recent instrumentation. Bladder wall is smooth with uniform thickness. Kidneys are normal in size with symmetric enhancement and normal cortex thickness. No nephrolithiasis or hydronephrosis. BOWEL: Nondistended stomach. Proximal small bowel in the left upper quadrant is mildly dilated and fluid-filled, however no clear caliber transition and this is unchanged from 2/4/2023. There is hazy attenuation in the mesentery consistent with edema. Minimal perihepatic and perisplenic ascites. Similar ascites layering in the left lower quadrant with small amount of anterior linear enhancement (series 3, image 205). Status post sigmoid colon resection with colocolonic anastomosis in the upper left pelvis. There are multiple diverticula arising from the ascending colon. No pneumatosis or pneumoperitoneum. LYMPH NODES: Mesenteric, aortocaval and iliac chain lymph nodes are normal by size criteria. VASCULATURE: Diffuse atheromatous calcification of the infrarenal abdominal aorta and common iliac arteries. No aneurysm. PELVIC ORGANS: Post hysterectomy. MUSCULOSKELETAL: Grade 1 degenerative anterolisthesis of L4 on L5 and lumbar facet arthropathy. Vacuum disc phenomenon from L1-2 to L3-L4. Thoracic vertebra are maintained in height with small diffuse osteophytes and mild disc space narrowing. No aggressive or destructive bone lesions. Infraumbilical skin staples are present from recent laparotomy. No body wall hernia. Mild body wall anasarca.      IMPRESSION: 1.  Status post segmental sigmoid colon resection with colocolonic anastomosis in the upper left pelvis. No findings to suggest a surgical complication. 2.  Diverticula arising from the ascending colon but no acute diverticulitis. 3.  Unchanged ascites and mesenteric edema. No drainable intra-abdominal fluid collection. 4.  Minimal pleural effusions and atelectasis in the posterior sulci. 5.  Cholelithiasis.     Echocardiogram Complete    Result Date: 2023  [id number]^TERRENCE^[place] [address]  Name: SRIDHAR ALONSO MRN: [id number] : 1945 Study Date: 2023 12:44 PM Age: 77 yrs Gender: Female Patient Location: [facility] Reason For Study: CHF Ordering Physician: PAYAL OCAMPO Performed By: KATHARINE  BSA: 2.1 m2 Height: 65 in Weight: 220 lb HR: 87 BP: 138/63 mmHg ______________________________________________________________________________ Procedure Complete Echo Adult. ______________________________________________________________________________ Interpretation Summary  1. Normal left ventricular size and systolic performance with a visually estimated ejection fraction of 60%. 2. No significant valvular heart disease is identified on this study. 3. Normal right ventricular size with borderline reduction right ventricular systolic performance. 4. There is mild biatrial enlargement. 5. There is a very small pericardial effusion. There is no evidence of significant intraventricular dependence/tamponade physiology. ______________________________________________________________________________ Left ventricle: Normal left ventricular size and systolic performance with a visually estimated ejection fraction of 60%. There is normal regional wall motion. Left ventricular wall thickness is normal.  Assessment of LV Diastolic Function: The cumulative findings suggest impaired diastolic filling [The septal e' velocity is < 7 cm/s & lateral  e' velocity is < 10 cm/s. The average E/e' is >14. TR velocity is 2.8 m/s. Left atrial volume index is greater than 34 mL/mÂ ].  Assessment of left atrial pressure (LAP): The cumulative findings suggest moderately elevated left atrial pressure (the E/A is > 0.8 and <2.0 plus 2 or 3 of 3 of the following present: Average E/e' > 14, TRvel > 2.8 m/s, and/or LA vol. index > 34 ml/mÂ  ).  Right ventricle: Normal right ventricular size with borderline reduction right ventricular systolic performance.  Left atrium: There is mild left atrial enlargement.  Right atrium: There is mild right atrial enlargement.  IVC: The IVC is borderline dilated.  Aortic valve: The aortic valve is comprised of three cusps. No significant aortic stenosis or aortic insufficiency is detected on this study.  Mitral valve: The mitral valve appears morphologically normal. There is trace mitral insufficiency.  Tricuspid valve: The tricuspid valve is grossly morphologically normal. There is trace tricuspid insufficiency.  Pulmonic valve: The pulmonic valve is grossly morphologically normal.  Thoracic aorta: The aortic root and proximal ascending aorta are of normal dimension.  Pericardium: There is a very small pericardial effusion. There is no evidence of significant intraventricular dependence/tamponade physiology. ______________________________________________________________________________ ______________________________________________________________________________ MMode/2D Measurements & Calculations IVSd: 1.3 cm LVIDd: 4.1 cm LVIDs: 2.6 cm LVPWd: 1.2 cm FS: 36.6 % LV mass(C)d: 185.2 grams LV mass(C)dI: 89.9 grams/m2 Ao root diam: 3.2 cm LA dimension: 4.6 cm asc Aorta Diam: 3.4 cm LA/Ao: 1.4 LVOT diam: 2.3 cm LVOT area: 4.1 cm2 LA Volume Indexed (AL/bp): 36.7 ml/m2  RWT: 0.58  Time Measurements MM HR: 87.0 BPM  Doppler Measurements & Calculations MV E max tino: 123.0 cm/sec MV A max tino: 124.2 cm/sec MV E/A: 0.99 MV dec time: 0.24 sec Ao V2  max: 189.4 cm/sec Ao max P.0 mmHg Ao V2 mean: 123.9 cm/sec Ao mean P.8 mmHg Ao V2 VTI: 32.7 cm QUAN(I,D): 2.8 cm2 QUAN(V,D): 2.9 cm2 LV V1 max P.9 mmHg LV V1 max: 131.3 cm/sec LV V1 VTI: 22.2 cm SV(LVOT): 92.1 ml SI(LVOT): 44.7 ml/m2 PA acc time: 0.09 sec TR max ayden: 279.6 cm/sec TR max P.3 mmHg AV Ayden Ratio (DI): 0.69 QUAN Index (cm2/m2): 1.4 E/E': 13.6  E/E' avg: 15.6 Lateral E/e': 13.6 Medial E/e': 17.7 Peak E' Ayden: 9.0 cm/sec  ______________________________________________________________________________ Report approved by: Leonid Hamilton 2023 02:47 PM       XR Ankle Port Left G/E 3 Views    Result Date: 2023  EXAM: XR ANKLE PORT LEFT G/E 3 VIEWS LOCATION: [facility] DATE/TIME: 2023 2:22 PM INDICATION: left ankle pain, no trauma COMPARISON: None.     IMPRESSION: Bones are demineralized. Mild tibiotalar joint degenerative change. No evidence of an acute fracture. Tiny bone fragment adjacent to the medial talus and mild bony proliferation along the tip of the lateral malleolus is likely chronic. Ankle mortise is intact. Calcaneal heel spur.    CT Abdomen Pelvis w Contrast    Result Date: 2023  EXAM: CT ABDOMEN PELVIS W CONTRAST LOCATION: [facility] DATE/TIME: 2023 12:08 AM INDICATION: 1 29 sigmoidectomy with increasing wbc COMPARISON: CT a abdomen/pelvis 2023 TECHNIQUE: CT scan of the abdomen and pelvis was performed following injection of IV contrast. Multiplanar reformats were obtained. Dose reduction techniques were used. CONTRAST: isovue 370 100ml FINDINGS: LOWER CHEST: Trace bilateral pleural effusions right greater than left with compressive atelectasis. HEPATOBILIARY: Diffuse hepatic steatosis. Cholelithiasis with mild gallbladder distention which may be related to fasting. No biliary ductal dilatation. PANCREAS: Normal. SPLEEN: Normal. ADRENAL GLANDS: Normal. KIDNEYS/BLADDER: No hydronephrosis. Box  catheter within a decompressed bladder. BOWEL: Recent sigmoidectomy. Colonic diverticulosis. Trace abdominopelvic free ascites. Additionally, there is some loculated ascites in the anterior abdomen with partial rim enhancement and several foci of gas within, which suggests a degree of peritonitis. No drainable fluid collection currently. Right anterior approach drain terminates in the left pelvis. LYMPH NODES: Normal. VASCULATURE: Atherosclerotic calcifications of the aortoiliac vessels without evidence of aneurysmal dilatation. PELVIC ORGANS: Hysterectomy. MUSCULOSKELETAL: Body wall edema. Skin staples with recent ventral abdominal incision. Small fat-containing umbilical hernia. Multilevel degenerative changes of the thoracolumbar spine. No acute osseous abnormality.     IMPRESSION: 1.  Recent sigmoidectomy. Trace abdominopelvic free ascites. Additionally, there is some loculated ascites in the anterior abdomen with partial rim enhancement and several foci of gas within, which suggests a degree of peritonitis. No drainable fluid collection currently. 2.  Trace bilateral pleural effusions right greater than left with compressive atelectasis. 3.  Body wall edema. 4.  Diffuse hepatic steatosis. 5.  Cholelithiasis with mild gallbladder distention which may be related to fasting.    CT Abdomen Pelvis w Contrast    Result Date: 1/25/2023  EXAM: CT ABDOMEN PELVIS W CONTRAST LOCATION: [facility] DATE/TIME: 1/25/2023 11:28 PM INDICATION: abdominal pain worst in lower quadrants, leukocytosis eval for pathology COMPARISON: 11/26/2022 TECHNIQUE: CT scan of the abdomen and pelvis was performed following injection of IV contrast. Multiplanar reformats were obtained. Dose reduction techniques were used. CONTRAST: isovue 370 100ml FINDINGS: LOWER CHEST: Small left Bochdalek hernia. Basilar atelectasis. HEPATOBILIARY: Cholelithiasis and hepatic steatosis. PANCREAS: Normal. SPLEEN: Normal. ADRENAL GLANDS:  Normal. KIDNEYS/BLADDER: Normal. BOWEL: Small bowel is normal caliber. Large amount of colonic stool. Sigmoid colonic wall thickening with adjacent inflammation is again seen. The inflamed segment is distended with stool. Small amount of adjacent free fluid. Caliber change with collapse  of the rectosigmoid junction and rectum. Diverticulosis.  LYMPH NODES: Subcentimeter retroperitoneal lymph nodes. VASCULATURE: Atherosclerotic vascular calcification. PELVIC ORGANS: Absent uterus. MUSCULOSKELETAL: Degenerative change osseous structures. Mild compression L4. Slight anterolisthesis L4 on L5.     IMPRESSION: 1.  Sigmoid colitis is again seen. Collapse of the rectosigmoid junction and rectum distal to the inflamed segment of colon. Underlying stricture not excluded. 2.  Small amount of adjacent free fluid. 3.  Cholelithiasis.    CTA Abdomen Pelvis with Contrast    Result Date: 1/29/2023  EXAM: CTA ABDOMEN PELVIS WITH CONTRAST LOCATION: [facility] DATE/TIME: 1/29/2023 6:48 AM INDICATION: recurrent colitis, distended abdomen pain out of proportion. COMPARISON: There are study dated 1/25/ TECHNIQUE: CT angiogram abdomen pelvis during arterial phase of injection of IV contrast. 2D and 3D MIP reconstructions were performed by the CT technologist. Dose reduction techniques were used. CONTRAST: isovue 370 100ml FINDINGS: ANGIOGRAM ABDOMEN/PELVIS: The abdominal aorta is normal in size and caliber throughout with scattered atherosclerotic calcifications noted. The iliac and femoral vessels are normal in size and caliber and well-opacified. Celiac and SMA and TONY arise normally and are well opacified at this time LOWER CHEST: Lung bases are clear. Heart is enlarged, similar prior exam. HEPATOBILIARY: Diffuse hepatic steatosis. No significant mass. No bile duct dilatation. Calcified gallstones are again seen within the gallbladder. PANCREAS: No significant mass, duct dilatation, or inflammatory change.  SPLEEN: Normal size. ADRENAL GLANDS: No significant nodules. KIDNEYS/BLADDER: No significant mass, stone, or hydronephrosis. BOWEL: Mural thickening and pericolonic inflammation of the sigmoid colon is again noted, similar in appearance to prior exam with increased inflammation seen in the proximal sigmoid colon. No pericolonic abscess seen at this time. Stool is seen throughout the inflamed sigmoid colon as well as scattered throughout the large bowel without evidence of large bowel obstruction. Adjacent to the proximal sigmoid colon is free fluid with foci of gas which are extraluminal and new from the prior study (image 1:15, series 5). Additional foci of extraluminal gas are seen within the mesenteric fat anteriorly in the upper abdomen (image 57, series 5). There are scattered diverticula seen throughout the colon. Air-fluid levels are seen scattered through multiple loops of small bowel with mild dilatation seen in the proximal jejunum in the upper abdomen, these mildly dilated loops of small bowel decompressed gradually distally without a clear transition point. LYMPH NODES: Increased Number of pericolonic lymph nodes are again seen adjacent to the sigmoid colon. PELVIC ORGANS: No pelvic masses. MUSCULOSKELETAL: Unchanged     IMPRESSION: 1.  Mural thickening of the sigmoid colon with pericolonic inflammation and stranding, slightly worsened from prior exam with punctate foci of gas seen adjacent to the inflamed colon as well as tracking in the anterior abdominal wall concerning for sigmoid colonic perforation. No organized intra-abdominal fluid collection such as abscess is seen at this time. Given the long-standing inflammation in this region an underlying neoplastic process cannot be excluded. 2.  Scattered air-fluid levels and mildly dilated loops of proximal jejunum which decompresses gradually without a transition point consistent favored to reflect reactive ileus 3.  Hepatic steatosis 4.  Cholelithiasis  [Critical Result: Sigmoid colonic inflammation with areas of extraluminal gas, new from 1/25/2023 concerning for sigmoid colonic perforation] Finding was identified on 1/29/2023 6:56 AM. DR. Snell was contacted by me on 1/29/2023 7:08 AM and verbalized understanding of the critical result.     CT Chest w/o Contrast    Result Date: 1/29/2023  EXAM: CT CHEST WITHOUT CONTRAST LOCATION: [facility] DATE/TIME: 01/29/2023, 6:46 AM INDICATION: Fever and cough. COMPARISON: 11/26/2022 - CT chest, abdomen and pelvis. TECHNIQUE: Note that this study was performed in conjunction with a CT scan of the abdomen and pelvis. Refer to a separate report for findings from that study. CT chest without IV contrast. Multiplanar reformats were obtained. Dose reduction techniques were used. CONTRAST: None. FINDINGS: LUNGS AND PLEURA: Minimal emphysematous changes in the lungs. Slight interstitial thickening in bilateral lung bases. A few curvilinear opacities in the periphery of both lungs likely represent atelectasis and/or scarring. The lungs are otherwise clear. Small left Bochdalek hernia containing fat MEDIASTINUM/AXILLAE: Atherosclerotic calcification in the thoracic aorta. CORONARY ARTERY CALCIFICATION: Present. MUSCULOSKELETAL: No acute findings. UPPER ABDOMEN: A few foci of extraluminal gas are present in the upper abdomen.     IMPRESSION: 1.  Slight interstitial thickening in bilateral lung bases. This is nonspecific, but most likely relates to interstitial pulmonary edema. 2.  No other findings suspicious for acute pulmonary disease. 3.  A few foci of extraluminal gas scattered within the nondependent aspect of the upper abdomen. In the absence of recent surgery, this is consistent with a bowel perforation. Refer to the report from the CT scan of the abdomen and pelvis performed in conjunction with this study for additional details. The findings were called to Dr. Snell by Dr. Anderson on  01/29/2023 at 0700 hours.      Attestation:  I have reviewed today's Medications, Vital Signs, Imaging, and Labs. Cultures and previous notes were reviewed and summarized above. Recommendations discussed with primary service.

## 2023-02-08 NOTE — PLAN OF CARE
Problem: Bowel Resection  Goal: Acceptable Pain Control  Outcome: Progressing  Intervention: Prevent or Manage Pain  Flowsheets (Taken 2/8/2023 9881)  Pain Management Interventions:   emotional support   quiet environment facilitated   repositioned   rest  Goal: Effective Urinary Elimination  Outcome: Progressing  Goal: Effective Oxygenation and Ventilation  Outcome: Progressing     Problem: Diabetes Comorbidity  Goal: Blood Glucose Level Within Targeted Range  Outcome: Progressing      Goal Outcome Evaluation:         Pt reported discomfort in back and abdomen. Repositioned for comfort. On 1L NC.  Incontinent of urine and stool. Had medium loose bm. Provided incontinence cares and applied external catheter. Urostomy pouch intact.

## 2023-02-08 NOTE — PHARMACY-VANCOMYCIN DOSING SERVICE
Pharmacy Vancomycin Initial Note  Date of Service 2023  Patient's  1945  77 year old, female    Indication: Intra-abdominal infection    Current estimated CrCl = Estimated Creatinine Clearance: 100.3 mL/min (based on SCr of 0.6 mg/dL).    Creatinine for last 3 days  2023:  6:46 AM Creatinine 0.58 mg/dL  2023:  6:04 AM Creatinine 0.60 mg/dL  2023:  6:46 AM Creatinine 0.60 mg/dL    Recent Vancomycin Level(s) for last 3 days  No results found for requested labs within last 72 hours.      Vancomycin IV Administrations (past 72 hours)      No vancomycin orders with administrations in past 72 hours.                Nephrotoxins and other renal medications (From now, onward)    Start     Dose/Rate Route Frequency Ordered Stop    23 1100  vancomycin (VANCOCIN) 2,000 mg in sodium chloride 0.9 % 500 mL intermittent infusion         2,000 mg  over 2 Hours Intravenous ONCE 23 1055      23 1552  furosemide (LASIX) injection 20 mg         20 mg  over 1-3 Minutes Intravenous EVERY 12 HOURS 23 0810            Contrast Orders - past 72 hours (72h ago, onward)    Start     Dose/Rate Route Frequency Stop    23 2030  iopamidol (ISOVUE-370) solution 100 mL         100 mL Intravenous ONCE 23              Plan:  1. Start vancomycin 2000 mg IV once.   2. Will follow up later today for subsequent dosing    Eleno Johnson, [employer]on[employer]

## 2023-02-08 NOTE — PROGRESS NOTES
ASSESSMENT:  1. Perforation of colon (H)    2. Refusal of blood transfusions as patient is Scientology        Suzy Gómez is a 77 year old female who is s/p laparoscopic sigmoid colectomy and washout on 1/29/2023. POD 10.  Increasing CBC and CRP without any surgical complications or explanation.   What is being reported as urostomy or external catheter is not any of these 2.  Patient had a previous incision with a staple in place that had been leaking serous fluid.  Currently wound is macerated and closed and there is no drainage.  Patient significantly unmotivated with low appetite and depression    PLAN:  -I removed the ostomy bag off of this wound.  Covered with dry gauze and paper tape.  Please change this every shift and hopefully this will dry out.  -No further input from a surgical perspective.  -Appreciate infectious disease and [facility] cares.  -TCU when medically ready.    SUBJECTIVE:   She is stating she is the same.  No significant events overnight.  She states that she is sore and this is unchanged.  Continues to have bowel movements.  Denies any chest pain or shortness of breath.  When asked if there is anything I can do for her she states she would like to be killed.  Patient states that she is tired of being here.  She has no appetite and is laying in bed with a full tray of food in front of her.      Patient Vitals for the past 24 hrs:   BP Temp Temp src Pulse Resp SpO2   02/08/23 0700 (!) 157/65 98.4  F (36.9  C) Oral 94 20 95 %   02/08/23 0401 -- -- -- -- -- 90 %   02/08/23 0400 -- -- -- -- -- (!) 87 %   02/08/23 0138 -- -- -- -- -- 90 %   02/08/23 0020 -- -- -- -- -- 91 %   02/07/23 2340 108/53 99.2  F (37.3  C) Axillary 77 22 94 %   02/07/23 2145 (!) 151/65 99.6  F (37.6  C) Oral 89 24 93 %   02/07/23 2106 (!) 154/66 100  F (37.8  C) Oral 96 22 92 %   02/07/23 1900 (!) 146/63 (!) 100.9  F (38.3  C) Oral 105 24 92 %   02/07/23 1537 (!) 151/65 99.7  F (37.6  C) Oral 103 24 (!) 88 %   02/07/23  1130 (!) 148/65 98.4  F (36.9  C) Oral 91 20 92 %   02/07/23 1056 -- -- -- -- -- 91 %         PHYSICAL EXAM:  GEN: No acute distress, comfortable    ABD: Soft no specific tenderness upon palpitation.  Her lower incision is dry with a vessel loop in place.  I removed this ostomy bag off of her previous incision.  This left lower incision has a staple in place and is closed with some localized skin maceration.  No erythema or stigma of infection.  Her previous DAVIS wound has dried and no longer needs to be covered.     Output by Drain (mL) 02/06/23 0700 - 02/06/23 1459 02/06/23 1500 - 02/06/23 2259 02/06/23 2300 - 02/07/23 0659 02/07/23 0700 - 02/07/23 1459 02/07/23 1500 - 02/07/23 2259 02/07/23 2300 - 02/08/23 0659 02/08/23 0700 - 02/08/23 1002   Open Drain Ventral Abdomen           Open Drain Left;Superior Abdomen Urostomy Pouch 350 200    200       EXT:No cyanosis, edema or obvious abnormalities    02/07 0700 - 02/08 0659  In: 240 [P.O.:240]  Out: 600 [Urine:400; Drains:200]    No results displayed because visit has over 200 results.   Recent Results (from the past 24 hour(s))   Glucose by meter    Collection Time: 02/07/23 11:26 AM   Result Value Ref Range    GLUCOSE BY METER POCT 202 (H) 70 - 99 mg/dL   UA reflex to Microscopic and Culture    Collection Time: 02/07/23  4:00 PM    Specimen: Urine, Midstream   Result Value Ref Range    Color Urine Yellow Colorless, Straw, Light Yellow, Yellow    Appearance Urine Clear Clear    Glucose Urine 30 (A) Negative mg/dL    Bilirubin Urine Negative Negative    Ketones Urine Trace (A) Negative mg/dL    Specific Gravity Urine 1.020 1.001 - 1.030    Blood Urine Negative Negative    pH Urine 5.5 5.0 - 7.0    Protein Albumin Urine 50 (A) Negative mg/dL    Urobilinogen Urine <2.0 <2.0 mg/dL    Nitrite Urine Negative Negative    Leukocyte Esterase Urine 75 Robert/uL (A) Negative    Mucus Urine Present (A) None Seen /LPF    RBC Urine 2 <=2 /HPF    WBC Urine 6 (H) <=5 /HPF    Squamous  Epithelials Urine 2 (H) <=1 /HPF   Glucose by meter    Collection Time: 02/07/23  5:01 PM   Result Value Ref Range    GLUCOSE BY METER POCT 232 (H) 70 - 99 mg/dL   Lactic Acid STAT    Collection Time: 02/07/23  9:04 PM   Result Value Ref Range    Lactic Acid 1.0 0.7 - 2.0 mmol/L   Glucose by meter    Collection Time: 02/07/23  9:48 PM   Result Value Ref Range    GLUCOSE BY METER POCT 201 (H) 70 - 99 mg/dL   CBC with platelets    Collection Time: 02/08/23  6:46 AM   Result Value Ref Range    WBC Count 24.8 (H) 4.0 - 11.0 10e3/uL    RBC Count 3.89 3.80 - 5.20 10e6/uL    Hemoglobin 10.9 (L) 11.7 - 15.7 g/dL    Hematocrit 36.3 35.0 - 47.0 %    MCV 93 78 - 100 fL    MCH 28.0 26.5 - 33.0 pg    MCHC 30.0 (L) 31.5 - 36.5 g/dL    RDW 15.3 (H) 10.0 - 15.0 %    Platelet Count 449 150 - 450 10e3/uL   Basic metabolic panel    Collection Time: 02/08/23  6:46 AM   Result Value Ref Range    Sodium 138 136 - 145 mmol/L    Potassium 3.6 3.4 - 5.3 mmol/L    Chloride 100 98 - 107 mmol/L    Carbon Dioxide (CO2) 31 (H) 22 - 29 mmol/L    Anion Gap 7 7 - 15 mmol/L    Urea Nitrogen 9.9 8.0 - 23.0 mg/dL    Creatinine 0.60 0.51 - 0.95 mg/dL    Calcium 8.3 (L) 8.8 - 10.2 mg/dL    Glucose 151 (H) 70 - 99 mg/dL    GFR Estimate >90 >60 mL/min/1.73m2   CRP inflammation    Collection Time: 02/08/23  6:46 AM   Result Value Ref Range    CRP Inflammation 215.50 (H) <5.00 mg/L   Glucose by meter    Collection Time: 02/08/23  8:35 AM   Result Value Ref Range    GLUCOSE BY METER POCT 149 (H) 70 - 99 mg/dL               FARHANA Rosa  [facility]  [address] [place], [place] [id number]  Phone- [phone]  Fax- [phone]

## 2023-02-08 NOTE — PLAN OF CARE
"  Problem: Plan of Care - These are the overarching goals to be used throughout the patient stay.    Goal: Plan of Care Review  Description: The Plan of Care Review/Shift note should be completed every shift.  The Outcome Evaluation is a brief statement about your assessment that the patient is improving, declining, or no change.  This information will be displayed automatically on your shift note.  Outcome: Progressing  Goal: Patient-Specific Goal (Individualized)  Description: You can add care plan individualizations to a care plan. Examples of Individualization might be:  \"Parent requests to be called daily at 9am for status\", \"I have a hard time hearing out of my right ear\", or \"Do not touch me to wake me up as it startles me\".  Outcome: Progressing  Goal: Absence of Hospital-Acquired Illness or Injury  Outcome: Progressing  Intervention: Identify and Manage Fall Risk  Recent Flowsheet Documentation  Taken 2/8/2023 0930 by Dusty Smyth RN  Safety Promotion/Fall Prevention: activity supervised  Intervention: Prevent Skin Injury  Recent Flowsheet Documentation  Taken 2/8/2023 1515 by Dusty Smyth RN  Body Position: supine, legs elevated  Taken 2/8/2023 0930 by Dusty Smyth RN  Body Position: supine, head elevated  Intervention: Prevent and Manage VTE (Venous Thromboembolism) Risk  Recent Flowsheet Documentation  Taken 2/8/2023 0930 by Dusty Smyth RN  VTE Prevention/Management: SCDs (sequential compression devices) on  Goal: Optimal Comfort and Wellbeing  Outcome: Progressing   Goal Outcome Evaluation:    Pt alert, able to request need, VSS, Lung sounds clear and no distress observed. Pt turned and repositioned as needed, refused to get out of bed. Incision, drains intact and Antibiotics infusing.                 "

## 2023-02-08 NOTE — PLAN OF CARE
Problem: Diabetes Comorbidity  Goal: Blood Glucose Level Within Targeted Range  Outcome: Not Progressing     Problem: Hypertension Comorbidity  Goal: Blood Pressure in Desired Range  Outcome: Not Progressing  Intervention: Maintain Blood Pressure Management  Recent Flowsheet Documentation  Taken 2/7/2023 1737 by Lynsey Silver RN  Medication Review/Management: medications reviewed     Problem: Pain Acute  Goal: Optimal Pain Control and Function  Outcome: Progressing  Intervention: Prevent or Manage Pain  Recent Flowsheet Documentation  Taken 2/7/2023 1737 by Lynsey Silver RN  Medication Review/Management: medications reviewed   Goal Outcome Evaluation:       Patient's blood sugars were 232 and 201, had coverage, blood pressure was 146/63, rechecked for 154/66 and 151/65, rated abdominal 2/10, had scheduled tylenol, triggered sepsis protocol, temp was 100.9, had tylenol prn, rechecked for 100.0 and 99.6, had scheduled tylenol, lactic was 1.0, went down for CT, urine specimen sent, House Officer paged re lab result, awaiting call back, remains on 02 @1l, has [place], disoriented to time.

## 2023-02-09 ENCOUNTER — APPOINTMENT (OUTPATIENT)
Dept: OCCUPATIONAL THERAPY | Facility: HOSPITAL | Age: 78
DRG: 853 | End: 2023-02-09
Payer: COMMERCIAL

## 2023-02-09 ENCOUNTER — APPOINTMENT (OUTPATIENT)
Dept: PHYSICAL THERAPY | Facility: HOSPITAL | Age: 78
DRG: 853 | End: 2023-02-09
Payer: COMMERCIAL

## 2023-02-09 LAB
BACTERIA PRT CULT: NO GROWTH
CREAT SERPL-MCNC: 0.55 MG/DL (ref 0.51–0.95)
CRP SERPL-MCNC: 244 MG/L
ERYTHROCYTE [DISTWIDTH] IN BLOOD BY AUTOMATED COUNT: 14.8 % (ref 10–15)
GFR SERPL CREATININE-BSD FRML MDRD: >90 ML/MIN/1.73M2
GLUCOSE BLDC GLUCOMTR-MCNC: 126 MG/DL (ref 70–99)
GLUCOSE BLDC GLUCOMTR-MCNC: 135 MG/DL (ref 70–99)
GLUCOSE BLDC GLUCOMTR-MCNC: 156 MG/DL (ref 70–99)
GLUCOSE BLDC GLUCOMTR-MCNC: 164 MG/DL (ref 70–99)
HCT VFR BLD AUTO: 33.4 % (ref 35–47)
HGB BLD-MCNC: 10.2 G/DL (ref 11.7–15.7)
LACTATE SERPL-SCNC: 1.3 MMOL/L (ref 0.7–2)
MCH RBC QN AUTO: 28.5 PG (ref 26.5–33)
MCHC RBC AUTO-ENTMCNC: 30.5 G/DL (ref 31.5–36.5)
MCV RBC AUTO: 93 FL (ref 78–100)
PATH REPORT.COMMENTS IMP SPEC: NORMAL
PATH REPORT.COMMENTS IMP SPEC: NORMAL
PATH REPORT.FINAL DX SPEC: NORMAL
PATH REPORT.RELEVANT HX SPEC: NORMAL
PLATELET # BLD AUTO: 445 10E3/UL (ref 150–450)
RBC # BLD AUTO: 3.58 10E6/UL (ref 3.8–5.2)
URATE SERPL-MCNC: 3.2 MG/DL (ref 2.4–5.7)
WBC # BLD AUTO: 21.4 10E3/UL (ref 4–11)

## 2023-02-09 PROCEDURE — 87040 BLOOD CULTURE FOR BACTERIA: CPT | Performed by: INTERNAL MEDICINE

## 2023-02-09 PROCEDURE — 36415 COLL VENOUS BLD VENIPUNCTURE: CPT | Performed by: INTERNAL MEDICINE

## 2023-02-09 PROCEDURE — 250N000013 HC RX MED GY IP 250 OP 250 PS 637: Performed by: INTERNAL MEDICINE

## 2023-02-09 PROCEDURE — 250N000011 HC RX IP 250 OP 636: Performed by: INTERNAL MEDICINE

## 2023-02-09 PROCEDURE — 250N000013 HC RX MED GY IP 250 OP 250 PS 637: Performed by: HOSPITALIST

## 2023-02-09 PROCEDURE — 250N000011 HC RX IP 250 OP 636: Performed by: SURGERY

## 2023-02-09 PROCEDURE — 99232 SBSQ HOSP IP/OBS MODERATE 35: CPT | Performed by: INTERNAL MEDICINE

## 2023-02-09 PROCEDURE — 97110 THERAPEUTIC EXERCISES: CPT | Mod: GO

## 2023-02-09 PROCEDURE — 97530 THERAPEUTIC ACTIVITIES: CPT | Mod: GO

## 2023-02-09 PROCEDURE — 250N000013 HC RX MED GY IP 250 OP 250 PS 637: Performed by: SURGERY

## 2023-02-09 PROCEDURE — 84550 ASSAY OF BLOOD/URIC ACID: CPT | Performed by: INTERNAL MEDICINE

## 2023-02-09 PROCEDURE — 120N000001 HC R&B MED SURG/OB

## 2023-02-09 PROCEDURE — 258N000003 HC RX IP 258 OP 636: Performed by: INTERNAL MEDICINE

## 2023-02-09 PROCEDURE — 85027 COMPLETE CBC AUTOMATED: CPT | Performed by: INTERNAL MEDICINE

## 2023-02-09 PROCEDURE — 97535 SELF CARE MNGMENT TRAINING: CPT | Mod: GO

## 2023-02-09 PROCEDURE — 82565 ASSAY OF CREATININE: CPT | Performed by: INTERNAL MEDICINE

## 2023-02-09 PROCEDURE — 97530 THERAPEUTIC ACTIVITIES: CPT | Mod: GP

## 2023-02-09 PROCEDURE — 85060 BLOOD SMEAR INTERPRETATION: CPT | Performed by: PATHOLOGY

## 2023-02-09 PROCEDURE — 83605 ASSAY OF LACTIC ACID: CPT | Performed by: INTERNAL MEDICINE

## 2023-02-09 PROCEDURE — 99233 SBSQ HOSP IP/OBS HIGH 50: CPT | Performed by: INTERNAL MEDICINE

## 2023-02-09 PROCEDURE — 86140 C-REACTIVE PROTEIN: CPT | Performed by: INTERNAL MEDICINE

## 2023-02-09 RX ADMIN — SODIUM CHLORIDE: 9 INJECTION, SOLUTION INTRAVENOUS at 15:47

## 2023-02-09 RX ADMIN — VANCOMYCIN HYDROCHLORIDE 1000 MG: 1 INJECTION, SOLUTION INTRAVENOUS at 12:22

## 2023-02-09 RX ADMIN — VANCOMYCIN HYDROCHLORIDE 1000 MG: 1 INJECTION, SOLUTION INTRAVENOUS at 00:31

## 2023-02-09 RX ADMIN — MEROPENEM 1 G: 1 INJECTION INTRAVENOUS at 14:44

## 2023-02-09 RX ADMIN — MICAFUNGIN SODIUM 100 MG: 50 INJECTION, POWDER, LYOPHILIZED, FOR SOLUTION INTRAVENOUS at 15:46

## 2023-02-09 RX ADMIN — MEROPENEM 1 G: 1 INJECTION INTRAVENOUS at 06:14

## 2023-02-09 RX ADMIN — FUROSEMIDE 20 MG: 10 INJECTION, SOLUTION INTRAMUSCULAR; INTRAVENOUS at 08:51

## 2023-02-09 RX ADMIN — VENLAFAXINE HYDROCHLORIDE 75 MG: 75 CAPSULE, EXTENDED RELEASE ORAL at 08:53

## 2023-02-09 RX ADMIN — ROSUVASTATIN CALCIUM 20 MG: 10 TABLET, FILM COATED ORAL at 20:03

## 2023-02-09 RX ADMIN — ACETAMINOPHEN 650 MG: 325 TABLET ORAL at 13:39

## 2023-02-09 RX ADMIN — ASPIRIN 81 MG: 81 TABLET, COATED ORAL at 08:53

## 2023-02-09 RX ADMIN — LEVOTHYROXINE SODIUM 137 MCG: 0.11 TABLET ORAL at 06:25

## 2023-02-09 RX ADMIN — ACETAMINOPHEN 650 MG: 325 TABLET ORAL at 20:03

## 2023-02-09 RX ADMIN — ENOXAPARIN SODIUM 40 MG: 40 INJECTION SUBCUTANEOUS at 08:53

## 2023-02-09 RX ADMIN — MEROPENEM 1 G: 1 INJECTION INTRAVENOUS at 22:03

## 2023-02-09 RX ADMIN — AMLODIPINE BESYLATE 2.5 MG: 2.5 TABLET ORAL at 08:53

## 2023-02-09 RX ADMIN — ACETAMINOPHEN 650 MG: 325 TABLET ORAL at 08:53

## 2023-02-09 RX ADMIN — SODIUM CHLORIDE: 9 INJECTION, SOLUTION INTRAVENOUS at 06:14

## 2023-02-09 ASSESSMENT — ACTIVITIES OF DAILY LIVING (ADL)
ADLS_ACUITY_SCORE: 44
ADLS_ACUITY_SCORE: 34
ADLS_ACUITY_SCORE: 44
ADLS_ACUITY_SCORE: 34
ADLS_ACUITY_SCORE: 38
ADLS_ACUITY_SCORE: 34
ADLS_ACUITY_SCORE: 44
ADLS_ACUITY_SCORE: 38
ADLS_ACUITY_SCORE: 43
ADLS_ACUITY_SCORE: 43

## 2023-02-09 NOTE — PROGRESS NOTES
[facility]    Hospitalist Progress Note    Assessment & Plan   77 year old female who was admitted on 1/29/2023 with bowel perforation and found to have impacted stool in sigmoid colon with associated perforation.     Impression:   Principal Problem:    Perforation of Colonic Stericolic Ulcer -- S/P Sigmoid Colectomy 1/29/30   -- on IV Meropenum and Micafungin per ID   -- WBC up to 25.1, and CRP rising to 215, but Abd CT with no fluid collection, but Vanco IV added for enterococcus coverage.       Sepsis -- WBC up, temp 102.8, , but BP good and already with 2+ edema   -- will add NS at 100 ml/hour, and decrease Lasix to 20 mg IV daily   -- CBC and CRP in AM        Bacteremia due to Clostridium and Bacteroides -- 1/29/23      HTN (hypertension)      DM type 2, Hgb A1C 7.6 on 11/23/22   -- will add Lantus 20 units at bedtime (increasing glucose now that eating)      Refusal of blood transfusions as patient is Spiritism      KARYN (obstructive sleep apnea)      Plan:  Discussed with patient and , and surgery.  Has follow-up abd CT today.      DVT Prophylaxis: Enoxaparin (Lovenox) SQ  Code Status: Full Code    Disposition: Expected discharge in 2 days, probably to TCU.     Brian Ojeda MD  Pager [phone]  Cell Phone [phone]  Text Page (7am to 6pm)  (50 min total)    Interval History   Reports abd pain is 2-3 out of 10, but vague on all her answers, she thinks she is passing gas but is not sure.  She has an Urostomy bag in left lower abdomin but she doesn't know how long she has had that.  More confused today.      Physical Exam   Temp: (!) 102  F (38.9  C) Temp src: Oral BP: 138/78 Pulse: 103   Resp: 20 SpO2: 93 % O2 Device: Nasal cannula Oxygen Delivery: 2 LPM  Vitals:    01/29/23 0554 02/05/23 0726   Weight: 99.8 kg (220 lb 0.3 oz) 110.5 kg (243 lb 9.7 oz)     Vital Signs with Ranges  Temp:  [98.4  F (36.9  C)-102.8  F (39.3  C)] 102  F (38.9  C)  Pulse:   [] 103  Resp:  [20-24] 20  BP: (108-173)/(53-78) 138/78  SpO2:  [87 %-95 %] 93 %  I/O last 3 completed shifts:  In: 360 [P.O.:360]  Out: 1600 [Urine:400; Drains:1200]    # Pain Assessment:  Current Pain Score 2/8/2023   Patient currently in pain? denies   - Suzy is experiencing pain due to surgery. Pain management was discussed and the plan was created in a collaborative fashion.  Suzy's response to the current recommendations: engaged  - Please see the plan for pain management as documented above    Constitutional: Awake, alert, cooperative, no apparent distress  Respiratory: Clear to auscultation bilaterally, no crackles or wheezing  Cardiovascular: Regular rate and rhythm, normal S1 and S2, and no murmur noted  GI: some bowel sounds, soft, non-distended, mild tenderness  Extrem: No calf tenderness, 2+ bilateral ankle edema  Neuro: Ox2 (vague with all her answers), no focal motor or sensory deficits    Medications       acetaminophen  650 mg Oral TID     amLODIPine  2.5 mg Oral Daily     aspirin  81 mg Oral Daily     enoxaparin ANTICOAGULANT  40 mg Subcutaneous Q24H     furosemide  20 mg Intravenous Q12H     [START ON 2/9/2023] insulin aspart  1-10 Units Subcutaneous TID AC     insulin aspart  1-7 Units Subcutaneous At Bedtime     insulin glargine  30 Units Subcutaneous At Bedtime     levothyroxine  137 mcg Oral QAM AC     meropenem  1 g Intravenous Q8H     micafungin  100 mg Intravenous Q24H     potassium & sodium phosphates  1 packet Oral or Feeding Tube BID     rosuvastatin  20 mg Oral At Bedtime     sodium chloride (PF)  3 mL Intracatheter Q8H     sodium chloride (PF)  3 mL Intracatheter Q8H     [START ON 2/9/2023] vancomycin  1,000 mg Intravenous Q12H     venlafaxine  75 mg Oral Daily       Data   Recent Labs   Lab 02/08/23  1724 02/08/23  1157 02/08/23  0835 02/08/23  0646 02/07/23  0821 02/07/23  0604 02/06/23  0822 02/06/23  0646   WBC  --   --   --  25.1*  24.8*  --  20.7*  --  17.7*   HGB  --    --   --  10.9*  --  10.6*  --  10.5*   MCV  --   --   --  93  --  95  --  95   PLT  --   --   --  449  --  436  --  395   NA  --   --   --  138  --  139  --  143   POTASSIUM  --   --   --  3.6  --  3.7  --  3.5   CHLORIDE  --   --   --  100  --  102  --  108*   CO2  --   --   --  31*  --  31*  --  28   BUN  --   --   --  9.9  --  7.4*  --  7.0*   CR  --   --   --  0.60  --  0.60  --  0.58   ANIONGAP  --   --   --  7  --  6*  --  7   ALTON  --   --   --  8.3*  --  8.2*  --  8.2*   * 213* 149* 151*   < > 192*   < > 174*   ALBUMIN  --   --   --  2.3*  --   --   --  2.4*   PROTTOTAL  --   --   --  5.4*  --   --   --  5.1*   BILITOTAL  --   --   --  0.3  --   --   --  0.2   ALKPHOS  --   --   --  142*  --   --   --  104   ALT  --   --   --  14  --   --   --  10   AST  --   --   --  29  --   --   --  22    < > = values in this interval not displayed.       Imaging:   Recent Results (from the past 24 hour(s))   CT Chest/Abdomen/Pelvis w Contrast    Narrative    EXAM: CT CHEST/ABDOMEN/PELVIS W CONTRAST  LOCATION: [facility]  DATE/TIME: 2/7/2023 8:21 PM    INDICATION: ongoing leukocytosis. new fever. 1/29 sigmoidectomy with wash out.  COMPARISON: CT of the abdomen and pelvis 2/4/2023  TECHNIQUE: CT scan of the chest, abdomen, and pelvis was performed following injection of IV contrast. Multiplanar reformats were obtained. Dose reduction techniques were used.   CONTRAST: 100 mL [id number]    FINDINGS:   LUNGS AND PLEURA: Small bilateral pleural effusions layer into the posterior costophrenic sulci. Bands of atelectasis along the posterior pleura of the lower lobes. No lung edema or consolidation. Trachea and central airways are patent and normal   caliber.    MEDIASTINUM: Cardiac chambers are normal in size. No pericardial effusion. Normal caliber thoracic aorta and main pulmonary artery. Arch, proximal great vessel and descending aorta atheromatous calcification is patchy. There are no enlarged  mediastinal   or hilar lymph nodes. Esophagus is decompressed. A few small lower paraesophageal varices are present.    CORONARY ARTERY CALCIFICATION: Mild to moderate, three-vessel disease.    HEPATOBILIARY: Slight geographic liver attenuation but no focal enhancement or parenchymal lesion. There is a calcified stone layering in the gallbladder. No gallbladder wall thickening or pericholecystic inflammation. No bile duct enlargement.    PANCREAS: Normal.    SPLEEN: Normal.    ADRENAL GLANDS: Normal.    KIDNEYS/BLADDER: Trace amount of air in the nondependent bladder from recent instrumentation. Bladder wall is smooth with uniform thickness. Kidneys are normal in size with symmetric enhancement and normal cortex thickness. No nephrolithiasis or   hydronephrosis.    BOWEL: Nondistended stomach. Proximal small bowel in the left upper quadrant is mildly dilated and fluid-filled, however no clear caliber transition and this is unchanged from 2/4/2023. There is hazy attenuation in the mesentery consistent with edema.   Minimal perihepatic and perisplenic ascites. Similar ascites layering in the left lower quadrant with small amount of anterior linear enhancement (series 3, image 205). Status post sigmoid colon resection with colocolonic anastomosis in the upper left   pelvis. There are multiple diverticula arising from the ascending colon. No pneumatosis or pneumoperitoneum.    LYMPH NODES: Mesenteric, aortocaval and iliac chain lymph nodes are normal by size criteria.    VASCULATURE: Diffuse atheromatous calcification of the infrarenal abdominal aorta and common iliac arteries. No aneurysm.    PELVIC ORGANS: Post hysterectomy.    MUSCULOSKELETAL: Grade 1 degenerative anterolisthesis of L4 on L5 and lumbar facet arthropathy. Vacuum disc phenomenon from L1-2 to L3-L4. Thoracic vertebra are maintained in height with small diffuse osteophytes and mild disc space narrowing. No   aggressive or destructive bone lesions.  Infraumbilical skin staples are present from recent laparotomy. No body wall hernia. Mild body wall anasarca.      Impression    IMPRESSION:    1.  Status post segmental sigmoid colon resection with colocolonic anastomosis in the upper left pelvis. No findings to suggest a surgical complication.  2.  Diverticula arising from the ascending colon but no acute diverticulitis.  3.  Unchanged ascites and mesenteric edema. No drainable intra-abdominal fluid collection.  4.  Minimal pleural effusions and atelectasis in the posterior sulci.  5.  Cholelithiasis.

## 2023-02-09 NOTE — PLAN OF CARE
Problem: Oral Intake Inadequate  Goal: Improved Oral Intake  Outcome: Progressing     Problem: Pain Acute  Goal: Optimal Pain Control and Function  Intervention: Prevent or Manage Pain  Recent Flowsheet Documentation  Taken 2/9/2023 0100 by Rg Wasserman RN  Medication Review/Management: medications reviewed     Problem: Bowel Resection  Goal: Acceptable Pain Control  Outcome: Progressing   Goal Outcome Evaluation:  EN denied pain upon first assessment. R IV flushing and patent. When the left IV was flushed, this writer noticed droplets coming out from the IV site. IV flush stopped. Left IV removed due to leaking. Slept in between care. Incisions WDL. Bowel sounds active. Speech clear and logical. Care clustered to promote rest.

## 2023-02-09 NOTE — PROGRESS NOTES
"CLINICAL NUTRITION SERVICES - REASSESSMENT NOTE     Nutrition Prescription    RECOMMENDATIONS FOR MDs/PROVIDERS TO ORDER:    Malnutrition Status:    Does not meet criteria at this time    Recommendations already ordered by Registered Dietitian (RD):  Continue current supplements    Future/Additional Recommendations:  Follow po intake, labs, weight, poc     EVALUATION OF THE PROGRESS TOWARD GOALS   Diet: Low fiber  Nutrition Supplements: B- strawberry ensure, L-cherry gelatein plus, S-chocolate magic cup  Intake: 0-75% (2/8) pt consumed 1284 Calories and 60 g protein (this is without supplements as unsure if pt consumed or not)  Pt does not wake up when name called x 3.  Pt met 90+ % Calorie needs and 71%+ protein needs on 2/8/2023     ANTHROPOMETRICS  Height: 165.1 cm (5' 5\")  Most Recent Weight: 110.5 kg (243 lb 9.7 oz)    Weight History:   Wt Readings from Last 10 Encounters:   02/05/23 110.5 kg (243 lb 9.7 oz)   01/25/23 99.8 kg (220 lb)   11/26/22 99.8 kg (220 lb)   Weight up    GI CONCERNS  Abdominal discomfort  Abdominal tenderness  Last BM 2/8/2023 (large, loose, green)    LABS  Reviewed: Glu 164    Estimated needs:   Dosing weight: 56.8 Kg (IBW)    Estimated energy needs: 4970-1135 Kcal/day (25-30 Kcal/Kg)  Justification: Overweight  Estimated protein needs:  g/day (1.5-2 g/Kg)  Justification: post op  Estimated fluid needs: 8306-9855 ml/day ( 1 ml/Kcal)  Justification: maintenance    MEDICATIONS  Reviewed: lasix, novolog, lantus pen, levothyroxine, merrem, vancocin    Malnutrition Diagnosis: Patient does not meet two of the above criteria necessary for diagnosing malnutrition    CURRENT NUTRITION DIAGNOSIS  Altered nutrition-related laboratory values related to DM as evidenced by elevated BG      INTERVENTIONS  Implementation  Will continue current supplements  Encourage po intake    Goals  Diet advancement vs nutrition support within 2-3 days.-met  Consume 75% meals tid or equivalent with " supplements-progressing    Monitoring/Evaluation  Progress toward goals will be monitored and evaluated per protocol.

## 2023-02-09 NOTE — PROGRESS NOTES
"INFECTIOUS DISEASE FOLLOW UP NOTE      ASSESSMENT:  1. Intra-abdominal infection presenting with perforated stercolic ulcer of sigmoid colon, underwent laparoscopic sigmoid colectomy with anastomosis, washout 1/29. Findings of diffuse feculent peritonitis. Ongoing pain and leukocytosis. Draining clear fluid from wound -- creatinine level not suggestive of urine leak based on estimate. Bowel function returning to normal, but wbc remains high and increasing. No improvement with change in antibiotics. Repeat CT without significant findings for infection. CRP increasing, now with fever  2. Bacteremia with clostridium species, Collinsella aerofaciens, bacteroides vulgatus, and Eubacterium spp. Likely secondary to perforation. Susceptible to meropenem and metronidazole   3. DM  4. Hypothyroidism. Mildly elevated TSH prior to admission, on treatment  5. Left great toe infection for many months. Has seen podiatry and primary clinic for this. Recent doxycycline. Had MRI negative for osteomyelitis in September. No signs of lower ext infection currently  6. Sikhism per chart refusing blood products.   7. Cholelithiasis      PLAN:  -Continue vancomycin, meropenem and micafungin   -repeat blood culture  -bilateral lower ext u/s   -uric acid  -trend wbc and CRP  -watch for new diarrhea     Lexx Zuniga MD  [facility]  Direct messaging: [employer]  On-Call ID provider: [phone], option: [phone]      ______________________________________________________________________    SUBJECTIVE / INTERVAL HISTORY:     Fever overnight with some confusion. No acute complaints. Complains of fullness/pain in abdomen. Did not notice she had a recent BM.    Wbc down some today. Fever without obvious infiltrate     OBJECTIVE:  BP (!) 154/75   Pulse 110   Temp (!) 102.9  F (39.4  C) (Oral)   Resp (!) 32   Ht 1.651 m (5' 5\")   Wt 110.5 kg (243 lb 9.7 oz)   SpO2 93%   BMI 40.54 kg/m       GEN: No acute " distress.   RESPIRATORY:  Clear bilaterally   CARDIOVASCULAR:  Tachy, no murmur  ABDOMEN:  Soft, generalized tenderness. Bowel sounds present. Incision site intact without drainage  EXTREMITIES: mild edema, tender in both calves  SKIN/HAIR/NAILS:  No rashes. Erythema on multiple toes, without lesions or tenderness  Psych: ambivalent responses, flat affect   IV: peripheral        Antibiotics:  Meropenem 2/4-  Micafungin 2/4-  Vancomycin 2/8-    Previous:  pip/tazo 1/29-2/4  Vancomycin 1/29 x1      Pertinent labs:    Recent Labs   Lab 02/09/23  0642 02/08/23  0646 02/07/23  0604   WBC 21.4* 25.1*  24.8* 20.7*   HGB 10.2* 10.9* 10.6*   HCT 33.4* 36.3 35.6    449 436        Recent Labs   Lab 02/08/23  0646 02/07/23  0604 02/06/23  0646    139 143   CO2 31* 31* 28   BUN 9.9 7.4* 7.0*      No results found for: CRP      Lab Results   Component Value Date    ALT 14 02/08/2023    AST 29 02/08/2023    ALKPHOS 142 (H) 02/08/2023         MICROBIOLOGY DATA:  1/29 blood culture: C.clostridioforme and B.vulgatus  1/29 blood culture: Eubacterium spp and Collinsella aerofaciens      RADIOLOGY:  CT Chest/Abdomen/Pelvis w Contrast    Result Date: 2/7/2023  EXAM: CT CHEST/ABDOMEN/PELVIS W CONTRAST LOCATION: [facility] DATE/TIME: 2/7/2023 8:21 PM INDICATION: ongoing leukocytosis. new fever. 1/29 sigmoidectomy with wash out. COMPARISON: CT of the abdomen and pelvis 2/4/2023 TECHNIQUE: CT scan of the chest, abdomen, and pelvis was performed following injection of IV contrast. Multiplanar reformats were obtained. Dose reduction techniques were used. CONTRAST: 100 mL Isovue-370 FINDINGS: LUNGS AND PLEURA: Small bilateral pleural effusions layer into the posterior costophrenic sulci. Bands of atelectasis along the posterior pleura of the lower lobes. No lung edema or consolidation. Trachea and central airways are patent and normal caliber. MEDIASTINUM: Cardiac chambers are normal in size. No  pericardial effusion. Normal caliber thoracic aorta and main pulmonary artery. Arch, proximal great vessel and descending aorta atheromatous calcification is patchy. There are no enlarged mediastinal or hilar lymph nodes. Esophagus is decompressed. A few small lower paraesophageal varices are present. CORONARY ARTERY CALCIFICATION: Mild to moderate, three-vessel disease. HEPATOBILIARY: Slight geographic liver attenuation but no focal enhancement or parenchymal lesion. There is a calcified stone layering in the gallbladder. No gallbladder wall thickening or pericholecystic inflammation. No bile duct enlargement. PANCREAS: Normal. SPLEEN: Normal. ADRENAL GLANDS: Normal. KIDNEYS/BLADDER: Trace amount of air in the nondependent bladder from recent instrumentation. Bladder wall is smooth with uniform thickness. Kidneys are normal in size with symmetric enhancement and normal cortex thickness. No nephrolithiasis or hydronephrosis. BOWEL: Nondistended stomach. Proximal small bowel in the left upper quadrant is mildly dilated and fluid-filled, however no clear caliber transition and this is unchanged from 2/4/2023. There is hazy attenuation in the mesentery consistent with edema. Minimal perihepatic and perisplenic ascites. Similar ascites layering in the left lower quadrant with small amount of anterior linear enhancement (series 3, image 205). Status post sigmoid colon resection with colocolonic anastomosis in the upper left pelvis. There are multiple diverticula arising from the ascending colon. No pneumatosis or pneumoperitoneum. LYMPH NODES: Mesenteric, aortocaval and iliac chain lymph nodes are normal by size criteria. VASCULATURE: Diffuse atheromatous calcification of the infrarenal abdominal aorta and common iliac arteries. No aneurysm. PELVIC ORGANS: Post hysterectomy. MUSCULOSKELETAL: Grade 1 degenerative anterolisthesis of L4 on L5 and lumbar facet arthropathy. Vacuum disc phenomenon from L1-2 to L3-L4. Thoracic  vertebra are maintained in height with small diffuse osteophytes and mild disc space narrowing. No aggressive or destructive bone lesions. Infraumbilical skin staples are present from recent laparotomy. No body wall hernia. Mild body wall anasarca.     IMPRESSION: 1.  Status post segmental sigmoid colon resection with colocolonic anastomosis in the upper left pelvis. No findings to suggest a surgical complication. 2.  Diverticula arising from the ascending colon but no acute diverticulitis. 3.  Unchanged ascites and mesenteric edema. No drainable intra-abdominal fluid collection. 4.  Minimal pleural effusions and atelectasis in the posterior sulci. 5.  Cholelithiasis.     Echocardiogram Complete    Result Date: 2023  [id number]^[address] [address]  Name: SRIDHAR ALONSO MRN: [id number] : 1945 Study Date: 2023 12:44 PM Age: 77 yrs Gender: Female Patient Location: [facility] Reason For Study: CHF Ordering Physician: PAYAL OCAMPO Performed By: KATHARINE  BSA: 2.1 m2 Height: 65 in Weight: 220 lb HR: 87 BP: 138/63 mmHg ______________________________________________________________________________ Procedure Complete Echo Adult. ______________________________________________________________________________ Interpretation Summary  1. Normal left ventricular size and systolic performance with a visually estimated ejection fraction of 60%. 2. No significant valvular heart disease is identified on this study. 3. Normal right ventricular size with borderline reduction right ventricular systolic performance. 4. There is mild biatrial enlargement. 5. There is a very small pericardial effusion. There is no evidence of significant intraventricular dependence/tamponade physiology. ______________________________________________________________________________ Left ventricle: Normal left ventricular size and systolic performance with a visually estimated  ejection fraction of 60%. There is normal regional wall motion. Left ventricular wall thickness is normal.  Assessment of LV Diastolic Function: The cumulative findings suggest impaired diastolic filling [The septal e' velocity is < 7 cm/s & lateral e' velocity is < 10 cm/s. The average E/e' is >14. TR velocity is 2.8 m/s. Left atrial volume index is greater than 34 mL/mÂ ].  Assessment of left atrial pressure (LAP): The cumulative findings suggest moderately elevated left atrial pressure (the E/A is > 0.8 and <2.0 plus 2 or 3 of 3 of the following present: Average E/e' > 14, TRvel > 2.8 m/s, and/or LA vol. index > 34 ml/mÂ  ).  Right ventricle: Normal right ventricular size with borderline reduction right ventricular systolic performance.  Left atrium: There is mild left atrial enlargement.  Right atrium: There is mild right atrial enlargement.  IVC: The IVC is borderline dilated.  Aortic valve: The aortic valve is comprised of three cusps. No significant aortic stenosis or aortic insufficiency is detected on this study.  Mitral valve: The mitral valve appears morphologically normal. There is trace mitral insufficiency.  Tricuspid valve: The tricuspid valve is grossly morphologically normal. There is trace tricuspid insufficiency.  Pulmonic valve: The pulmonic valve is grossly morphologically normal.  Thoracic aorta: The aortic root and proximal ascending aorta are of normal dimension.  Pericardium: There is a very small pericardial effusion. There is no evidence of significant intraventricular dependence/tamponade physiology. ______________________________________________________________________________ ______________________________________________________________________________ MMode/2D Measurements & Calculations IVSd: 1.3 cm LVIDd: 4.1 cm LVIDs: 2.6 cm LVPWd: 1.2 cm FS: 36.6 % LV mass(C)d: 185.2 grams LV mass(C)dI: 89.9 grams/m2 Ao root diam: 3.2 cm LA dimension: 4.6 cm asc Aorta Diam: 3.4 cm LA/Ao: 1.4 LVOT  diam: 2.3 cm LVOT area: 4.1 cm2 LA Volume Indexed (AL/bp): 36.7 ml/m2  RWT: 0.58  Time Measurements MM HR: 87.0 BPM  Doppler Measurements & Calculations MV E max ayden: 123.0 cm/sec MV A max ayden: 124.2 cm/sec MV E/A: 0.99 MV dec time: 0.24 sec Ao V2 max: 189.4 cm/sec Ao max P.0 mmHg Ao V2 mean: 123.9 cm/sec Ao mean P.8 mmHg Ao V2 VTI: 32.7 cm QUAN(I,D): 2.8 cm2 QUAN(V,D): 2.9 cm2 LV V1 max P.9 mmHg LV V1 max: 131.3 cm/sec LV V1 VTI: 22.2 cm SV(LVOT): 92.1 ml SI(LVOT): 44.7 ml/m2 PA acc time: 0.09 sec TR max ayden: 279.6 cm/sec TR max P.3 mmHg AV Ayden Ratio (DI): 0.69 QUAN Index (cm2/m2): 1.4 E/E': 13.6  E/E' avg: 15.6 Lateral E/e': 13.6 Medial E/e': 17.7 Peak E' Ayden: 9.0 cm/sec  ______________________________________________________________________________ Report approved by: Leonid Hamilton 2023 02:47 PM       XR Chest Port 1 View    Result Date: 2023  EXAM: XR CHEST PORT 1 VIEW LOCATION: [facility] DATE/TIME: 2023 7:42 PM INDICATION: fever, new cough COMPARISON: 2021     IMPRESSION: Lung volumes are lower on this portable study. Borderline cardiomegaly unchanged. Pulmonary vessels normal. Questionable subtle right infrahilar infiltrate though this may relate to the lower lung volumes. Lungs otherwise clear.    XR Ankle Port Left G/E 3 Views    Result Date: 2023  EXAM: XR ANKLE PORT LEFT G/E 3 VIEWS LOCATION: [facility] DATE/TIME: 2023 2:22 PM INDICATION: left ankle pain, no trauma COMPARISON: None.     IMPRESSION: Bones are demineralized. Mild tibiotalar joint degenerative change. No evidence of an acute fracture. Tiny bone fragment adjacent to the medial talus and mild bony proliferation along the tip of the lateral malleolus is likely chronic. Ankle mortise is intact. Calcaneal heel spur.    CT Abdomen Pelvis w Contrast    Result Date: 2023  EXAM: CT ABDOMEN PELVIS W CONTRAST LOCATION: [facility]  HOSPITAL DATE/TIME: 2/4/2023 12:08 AM INDICATION: 1 29 sigmoidectomy with increasing wbc COMPARISON: CT a abdomen/pelvis 01/29/2023 TECHNIQUE: CT scan of the abdomen and pelvis was performed following injection of IV contrast. Multiplanar reformats were obtained. Dose reduction techniques were used. CONTRAST: isovue 370 100ml FINDINGS: LOWER CHEST: Trace bilateral pleural effusions right greater than left with compressive atelectasis. HEPATOBILIARY: Diffuse hepatic steatosis. Cholelithiasis with mild gallbladder distention which may be related to fasting. No biliary ductal dilatation. PANCREAS: Normal. SPLEEN: Normal. ADRENAL GLANDS: Normal. KIDNEYS/BLADDER: No hydronephrosis. Box catheter within a decompressed bladder. BOWEL: Recent sigmoidectomy. Colonic diverticulosis. Trace abdominopelvic free ascites. Additionally, there is some loculated ascites in the anterior abdomen with partial rim enhancement and several foci of gas within, which suggests a degree of peritonitis. No drainable fluid collection currently. Right anterior approach drain terminates in the left pelvis. LYMPH NODES: Normal. VASCULATURE: Atherosclerotic calcifications of the aortoiliac vessels without evidence of aneurysmal dilatation. PELVIC ORGANS: Hysterectomy. MUSCULOSKELETAL: Body wall edema. Skin staples with recent ventral abdominal incision. Small fat-containing umbilical hernia. Multilevel degenerative changes of the thoracolumbar spine. No acute osseous abnormality.     IMPRESSION: 1.  Recent sigmoidectomy. Trace abdominopelvic free ascites. Additionally, there is some loculated ascites in the anterior abdomen with partial rim enhancement and several foci of gas within, which suggests a degree of peritonitis. No drainable fluid collection currently. 2.  Trace bilateral pleural effusions right greater than left with compressive atelectasis. 3.  Body wall edema. 4.  Diffuse hepatic steatosis. 5.  Cholelithiasis with mild gallbladder  distention which may be related to fasting.    CT Abdomen Pelvis w Contrast    Result Date: 1/25/2023  EXAM: CT ABDOMEN PELVIS W CONTRAST LOCATION: [facility] DATE/TIME: 1/25/2023 11:28 PM INDICATION: abdominal pain worst in lower quadrants, leukocytosis eval for pathology COMPARISON: 11/26/2022 TECHNIQUE: CT scan of the abdomen and pelvis was performed following injection of IV contrast. Multiplanar reformats were obtained. Dose reduction techniques were used. CONTRAST: isovue 370 100ml FINDINGS: LOWER CHEST: Small left Bochdalek hernia. Basilar atelectasis. HEPATOBILIARY: Cholelithiasis and hepatic steatosis. PANCREAS: Normal. SPLEEN: Normal. ADRENAL GLANDS: Normal. KIDNEYS/BLADDER: Normal. BOWEL: Small bowel is normal caliber. Large amount of colonic stool. Sigmoid colonic wall thickening with adjacent inflammation is again seen. The inflamed segment is distended with stool. Small amount of adjacent free fluid. Caliber change with collapse  of the rectosigmoid junction and rectum. Diverticulosis.  LYMPH NODES: Subcentimeter retroperitoneal lymph nodes. VASCULATURE: Atherosclerotic vascular calcification. PELVIC ORGANS: Absent uterus. MUSCULOSKELETAL: Degenerative change osseous structures. Mild compression L4. Slight anterolisthesis L4 on L5.     IMPRESSION: 1.  Sigmoid colitis is again seen. Collapse of the rectosigmoid junction and rectum distal to the inflamed segment of colon. Underlying stricture not excluded. 2.  Small amount of adjacent free fluid. 3.  Cholelithiasis.    CTA Abdomen Pelvis with Contrast    Result Date: 1/29/2023  EXAM: CTA ABDOMEN PELVIS WITH CONTRAST LOCATION: [facility] DATE/TIME: 1/29/2023 6:48 AM INDICATION: recurrent colitis, distended abdomen pain out of proportion. COMPARISON: There are study dated 1/25/ TECHNIQUE: CT angiogram abdomen pelvis during arterial phase of injection of IV contrast. 2D and 3D MIP reconstructions were  performed by the CT technologist. Dose reduction techniques were used. CONTRAST: isovue 370 100ml FINDINGS: ANGIOGRAM ABDOMEN/PELVIS: The abdominal aorta is normal in size and caliber throughout with scattered atherosclerotic calcifications noted. The iliac and femoral vessels are normal in size and caliber and well-opacified. Celiac and SMA and TONY arise normally and are well opacified at this time LOWER CHEST: Lung bases are clear. Heart is enlarged, similar prior exam. HEPATOBILIARY: Diffuse hepatic steatosis. No significant mass. No bile duct dilatation. Calcified gallstones are again seen within the gallbladder. PANCREAS: No significant mass, duct dilatation, or inflammatory change. SPLEEN: Normal size. ADRENAL GLANDS: No significant nodules. KIDNEYS/BLADDER: No significant mass, stone, or hydronephrosis. BOWEL: Mural thickening and pericolonic inflammation of the sigmoid colon is again noted, similar in appearance to prior exam with increased inflammation seen in the proximal sigmoid colon. No pericolonic abscess seen at this time. Stool is seen throughout the inflamed sigmoid colon as well as scattered throughout the large bowel without evidence of large bowel obstruction. Adjacent to the proximal sigmoid colon is free fluid with foci of gas which are extraluminal and new from the prior study (image 1:15, series 5). Additional foci of extraluminal gas are seen within the mesenteric fat anteriorly in the upper abdomen (image 57, series 5). There are scattered diverticula seen throughout the colon. Air-fluid levels are seen scattered through multiple loops of small bowel with mild dilatation seen in the proximal jejunum in the upper abdomen, these mildly dilated loops of small bowel decompressed gradually distally without a clear transition point. LYMPH NODES: Increased Number of pericolonic lymph nodes are again seen adjacent to the sigmoid colon. PELVIC ORGANS: No pelvic masses. MUSCULOSKELETAL: Unchanged      IMPRESSION: 1.  Mural thickening of the sigmoid colon with pericolonic inflammation and stranding, slightly worsened from prior exam with punctate foci of gas seen adjacent to the inflamed colon as well as tracking in the anterior abdominal wall concerning for sigmoid colonic perforation. No organized intra-abdominal fluid collection such as abscess is seen at this time. Given the long-standing inflammation in this region an underlying neoplastic process cannot be excluded. 2.  Scattered air-fluid levels and mildly dilated loops of proximal jejunum which decompresses gradually without a transition point consistent favored to reflect reactive ileus 3.  Hepatic steatosis 4.  Cholelithiasis [Critical Result: Sigmoid colonic inflammation with areas of extraluminal gas, new from 1/25/2023 concerning for sigmoid colonic perforation] Finding was identified on 1/29/2023 6:56 AM. DR. nSell was contacted by me on 1/29/2023 7:08 AM and verbalized understanding of the critical result.     CT Chest w/o Contrast    Result Date: 1/29/2023  EXAM: CT CHEST WITHOUT CONTRAST LOCATION: [facility] DATE/TIME: 01/29/2023, 6:46 AM INDICATION: Fever and cough. COMPARISON: 11/26/2022 - CT chest, abdomen and pelvis. TECHNIQUE: Note that this study was performed in conjunction with a CT scan of the abdomen and pelvis. Refer to a separate report for findings from that study. CT chest without IV contrast. Multiplanar reformats were obtained. Dose reduction techniques were used. CONTRAST: None. FINDINGS: LUNGS AND PLEURA: Minimal emphysematous changes in the lungs. Slight interstitial thickening in bilateral lung bases. A few curvilinear opacities in the periphery of both lungs likely represent atelectasis and/or scarring. The lungs are otherwise clear. Small left Bochdalek hernia containing fat MEDIASTINUM/AXILLAE: Atherosclerotic calcification in the thoracic aorta. CORONARY ARTERY CALCIFICATION: Present.  MUSCULOSKELETAL: No acute findings. UPPER ABDOMEN: A few foci of extraluminal gas are present in the upper abdomen.     IMPRESSION: 1.  Slight interstitial thickening in bilateral lung bases. This is nonspecific, but most likely relates to interstitial pulmonary edema. 2.  No other findings suspicious for acute pulmonary disease. 3.  A few foci of extraluminal gas scattered within the nondependent aspect of the upper abdomen. In the absence of recent surgery, this is consistent with a bowel perforation. Refer to the report from the CT scan of the abdomen and pelvis performed in conjunction with this study for additional details. The findings were called to Dr. Snell by Dr. Anderson on 01/29/2023 at 0700 hours.      Attestation:  I have reviewed today's Medications, Vital Signs, Imaging, and Labs. Cultures and previous notes were reviewed and summarized above. Recommendations discussed with surgery service.

## 2023-02-09 NOTE — PLAN OF CARE
Problem: Diabetes Comorbidity  Goal: Blood Glucose Level Within Targeted Range  Outcome: Not Progressing     Problem: Hypertension Comorbidity  Goal: Blood Pressure in Desired Range  Outcome: Progressing  Intervention: Maintain Blood Pressure Management  Recent Flowsheet Documentation  Taken 2/8/2023 1649 by Lynsey Silver RN  Medication Review/Management: medications reviewed     Problem: Pain Acute  Goal: Optimal Pain Control and Function  Outcome: Progressing  Intervention: Prevent or Manage Pain  Recent Flowsheet Documentation  Taken 2/8/2023 1649 by Lynsey Silver RN  Medication Review/Management: medications reviewed   Goal Outcome Evaluation:       Blood sugars were 227 and 239, had coverage, blood pressure was 173/75, was rechecked for 133/77, complained of abdominal pain, scheduled tylenol was helpful, had a temp of 102.4, was rechecked for 102.8, MD was called for prn tylenol order, was given prn, was rechecked for 102 prior to scheduled tylenol, was coughing, had nasal congestion, House Officer was updated, Covid, flu A and B and Resp Syncytial virus were ordered, all came back negative, had a large loose stool.

## 2023-02-09 NOTE — PLAN OF CARE
Problem: Bowel Resection  Goal: Effective Bowel Motility and Elimination  Outcome: Progressing     Problem: Bowel Resection  Goal: Absence of Infection Signs and Symptoms  Outcome: Not Progressing     Problem: Bowel Resection  Goal: Acceptable Pain Control  Outcome: Progressing     Patient temp, BP, and respirations elevated this AM, MD updated, temp trending down after scheduled APAP, BP and RR down upon recheck this afternoon. Denies pain this AM, endorsing headache/sore throat 3/10 this afternoon. Denies nausea, appetite fair. Up to chair this morning, Purewick removed and encouraging patient up to commode.  and 164 this shift.  reporting concern about depression in patient, MD updated and psych consult placed. Patient working with therapy on this shift, remains up in recliner at this time.     Jakub Chandler RN 2/9/2023  [id number]

## 2023-02-09 NOTE — PROGRESS NOTES
ASSESSMENT:  1. Perforation of colon (H)    2. Refusal of blood transfusions as patient is Jew        Suzy Gómez is a 77 year old female who is s/p laparoscopic sigmoid colectomy and washout on 1/29/2023. Increasing CBC and CRP without any overt evidence for active infection or source for underlying infectious markers.  Patient's mobility and motivation are quite poor.  I do not think this can be allowed to continue.     PLAN:  -Up to that toilet or commode every 2 hours.  No excuses.  There is no reason for her not to be using a toilet or commode at this point  -Continuing broad-spectrum antibiotics as outlined by infectious disease  -Diet as tolerated  -Not appropriate for TCU until leukocytosis and CRP are downtrending    SUBJECTIVE: Febrile yesterday afternoon and overnight.  Currently lying in bed having soiled herself.  When asked if she has any interest in walking she is emphatic saying no.    Patient Vitals for the past 24 hrs:   BP Temp Temp src Pulse Resp SpO2   02/09/23 0806 (!) 154/75 (!) 102.9  F (39.4  C) Oral 110 (!) 32 93 %   02/08/23 2316 (!) 147/65 98.7  F (37.1  C) Axillary 88 20 98 %   02/08/23 1700 -- (!) 102  F (38.9  C) -- -- -- --   02/08/23 1649 138/78 (!) 102.2  F (39  C) Oral 103 20 93 %   02/08/23 1551 133/77 (!) 102.8  F (39.3  C) Oral 106 20 95 %   02/08/23 1520 (!) 173/75 (!) 102.4  F (39.1  C) Oral 102 20 95 %   02/08/23 1119 133/60 99.8  F (37.7  C) Oral 88 20 92 %         PHYSICAL EXAM:  GEN: No acute distress, comfortable    ABD: Soft no specific tenderness upon palpitation.  All incisions are now dry with no leakage.  All incisions clean and otherwise intact.   Output by Drain (mL) 02/07/23 0700 - 02/07/23 1459 02/07/23 1500 - 02/07/23 2259 02/07/23 2300 - 02/08/23 0659 02/08/23 0700 - 02/08/23 1459 02/08/23 1500 - 02/08/23 2259 02/08/23 2300 - 02/09/23 0659 02/09/23 0700 - 02/09/23 0859   Open Drain Ventral Abdomen           Open Drain Left;Superior Abdomen  Urostomy Pouch   200 1000         EXT:No cyanosis, edema or obvious abnormalities    02/08 0700 - 02/09 0659  In: 1655 [P.O.:600; I.V.:1055]  Out: 1275 [Urine:275; Drains:1000]    No results displayed because visit has over 200 results.   Recent Results (from the past 24 hour(s))   Glucose by meter    Collection Time: 02/08/23 11:57 AM   Result Value Ref Range    GLUCOSE BY METER POCT 213 (H) 70 - 99 mg/dL   Lactic Acid STAT    Collection Time: 02/08/23  5:21 PM   Result Value Ref Range    Lactic Acid 1.3 0.7 - 2.0 mmol/L   Extra Purple Top Tube    Collection Time: 02/08/23  5:21 PM   Result Value Ref Range    Hold Specimen JIC    Glucose by meter    Collection Time: 02/08/23  5:24 PM   Result Value Ref Range    GLUCOSE BY METER POCT 227 (H) 70 - 99 mg/dL   Glucose by meter    Collection Time: 02/08/23  8:50 PM   Result Value Ref Range    GLUCOSE BY METER POCT 239 (H) 70 - 99 mg/dL   Symptomatic Influenza A/B & SARS-CoV2 (COVID-19) Virus PCR Multiplex Nasopharyngeal    Collection Time: 02/08/23  9:02 PM    Specimen: Nasopharyngeal; Swab   Result Value Ref Range    Influenza A PCR Negative Negative    Influenza B PCR Negative Negative    RSV PCR Negative Negative    SARS CoV2 PCR Negative Negative   CRP inflammation    Collection Time: 02/09/23  6:42 AM   Result Value Ref Range    CRP Inflammation 244.00 (H) <5.00 mg/L   Creatinine    Collection Time: 02/09/23  6:42 AM   Result Value Ref Range    Creatinine 0.55 0.51 - 0.95 mg/dL    GFR Estimate >90 >60 mL/min/1.73m2   CBC with platelets    Collection Time: 02/09/23  6:42 AM   Result Value Ref Range    WBC Count 21.4 (H) 4.0 - 11.0 10e3/uL    RBC Count 3.58 (L) 3.80 - 5.20 10e6/uL    Hemoglobin 10.2 (L) 11.7 - 15.7 g/dL    Hematocrit 33.4 (L) 35.0 - 47.0 %    MCV 93 78 - 100 fL    MCH 28.5 26.5 - 33.0 pg    MCHC 30.5 (L) 31.5 - 36.5 g/dL    RDW 14.8 10.0 - 15.0 %    Platelet Count 445 150 - 450 10e3/uL   Glucose by meter    Collection Time: 02/09/23  8:18 AM   Result  Value Ref Range    GLUCOSE BY METER POCT 135 (H) 70 - 99 mg/dL               Wilfredo Palencia MD

## 2023-02-09 NOTE — PROGRESS NOTES
Daily Progress Note        CODE STATUS:  Full Code    02/09/23  Assessment/Plan:  77 year old female who was admitted on 1/29/2023 with bowel perforation and found to have impacted stool in sigmoid colon with associated perforation and feculent peritonitis.      Perforation of Colonic Stercolic Ulcer   -- S/P Sigmoid Colectomy 1/29/30. Diffuse feculent peritonitis noted intra-op  -- On IV Meropenum and Micafungin per ID. IV vancomycin added 2/8/23  -- Appreciate ID consult  -- Follow up CT abd/pelvis on 2/7/23 showed unchanged ascites and mesenteric edema. No drainable intra-abdominal fluid collection     Sepsis   -- WBC up, temp 102.8, , but BP stable  -- Cont NS at 100 ml/hour, and decreased Lasix to 20 mg IV daily  -- Bacteremia with clostridium species, Collinsella aerofaciens, bacteroides vulgatus, and Eubacterium spp    HTN (hypertension)  -- Amlodipine with hold parameters. Losartan on hold    DM type 2, Hgb A1C 7.6 on 11/23/22  -- Cont lantus 30 units and sliding scale insulin. Will adjust as needed     Refusal of blood transfusions as patient is [employer]     KARYN (obstructive sleep apnea)     Depression:  -- On effexor at home. Patient looks more depressed and withdrawn.  requests psychiatry consult    DVT Prophylaxis: Enoxaparin (Lovenox) SQ  Code Status: Full Code     Disposition: Expected discharge in 2 days, probably to TCU.      Disposition; Few more days  Barrier to discharge; Ongoing fever     LOS: 11 days     Subjective:  Interval History: Patient seen and examined. Notes, labs, imaging reports personally reviewed. Patient is new to me. Patient sitting up in a chair. Patient reports some abdominal distention, some nausea. Loss of appetite. No vomiting. Passing stools.     Review of Systems:   As mentioned in subjective.    Patient Active Problem List   Diagnosis     Non-specific colitis     Chest pain, unspecified type     Abnormal laboratory test result     Angiodysplasia of  colon     Cataract     Colon adenoma     Depression     GERD (gastroesophageal reflux disease)     HTN (hypertension)     Hypercalcemia     Hyperlipidemia     Hyperparathyroidism (H)     Hypervitaminosis D     Microalbuminuria     Multinodular goiter     OA (osteoarthritis)     Obesity, morbid (H)     Postablative hypothyroidism     DM type 2, Hgb A1C 7.6 on 11/23/22     Perforation of Colonic Stericolic Ulcer -- S/P Sigmoid Colectomy 1/29/30     Refusal of blood transfusions as patient is Religious     KARYN (obstructive sleep apnea)     Bacteremia due to Clostridium and Bacteroides -- 1/29/23       Scheduled Meds:    acetaminophen  650 mg Oral TID     amLODIPine  2.5 mg Oral Daily     aspirin  81 mg Oral Daily     enoxaparin ANTICOAGULANT  40 mg Subcutaneous Q24H     furosemide  20 mg Intravenous Daily     insulin aspart  1-10 Units Subcutaneous TID AC     insulin aspart  1-7 Units Subcutaneous At Bedtime     insulin glargine  30 Units Subcutaneous At Bedtime     levothyroxine  137 mcg Oral QAM AC     meropenem  1 g Intravenous Q8H     micafungin  100 mg Intravenous Q24H     rosuvastatin  20 mg Oral At Bedtime     sodium chloride (PF)  3 mL Intracatheter Q8H     sodium chloride (PF)  3 mL Intracatheter Q8H     vancomycin  1,000 mg Intravenous Q12H     venlafaxine  75 mg Oral Daily     Continuous Infusions:    sodium chloride 100 mL/hr at 02/09/23 0854     PRN Meds:.glucose **OR** dextrose **OR** glucagon, hydrOXYzine, lidocaine 4%, lidocaine (buffered or not buffered), melatonin, menthol-zinc oxide, naloxone **OR** naloxone **OR** naloxone **OR** naloxone, ondansetron **OR** ondansetron, oxyCODONE, oxyCODONE IR, sodium chloride (PF), sodium chloride (PF)    Objective:  Vital signs in last 24 hours:  Temp:  [98.6  F (37  C)-102.9  F (39.4  C)] 98.6  F (37  C)  Pulse:  [] 110  Resp:  [20-32] 20  BP: (125-154)/(60-81) 125/60  SpO2:  [92 %-98 %] 93 %        Intake/Output Summary (Last 24 hours) at 2/9/2023  1537  Last data filed at 2/9/2023 1450  Gross per 24 hour   Intake 2258 ml   Output 275 ml   Net 1983 ml       Physical Exam:    General: Not in obvious distress. Flat afect  HEENT: NC, AT   Chest: Clear to auscultation bilaterally  Heart: S1S2 normal, regular. No M/R/G  Abdomen: Soft. Mild generalized tenderness. Distended.   Extremities: 1+ legs swelling  Neuro: Awake, grossly non-focal      Lab Results:(I have personally reviewed the results)    Recent Results (from the past 24 hour(s))   Lactic Acid STAT    Collection Time: 02/08/23  5:21 PM   Result Value Ref Range    Lactic Acid 1.3 0.7 - 2.0 mmol/L   Extra Purple Top Tube    Collection Time: 02/08/23  5:21 PM   Result Value Ref Range    Hold Specimen JIC    Glucose by meter    Collection Time: 02/08/23  5:24 PM   Result Value Ref Range    GLUCOSE BY METER POCT 227 (H) 70 - 99 mg/dL   Glucose by meter    Collection Time: 02/08/23  8:50 PM   Result Value Ref Range    GLUCOSE BY METER POCT 239 (H) 70 - 99 mg/dL   Symptomatic Influenza A/B & SARS-CoV2 (COVID-19) Virus PCR Multiplex Nasopharyngeal    Collection Time: 02/08/23  9:02 PM    Specimen: Nasopharyngeal; Swab   Result Value Ref Range    Influenza A PCR Negative Negative    Influenza B PCR Negative Negative    RSV PCR Negative Negative    SARS CoV2 PCR Negative Negative   CRP inflammation    Collection Time: 02/09/23  6:42 AM   Result Value Ref Range    CRP Inflammation 244.00 (H) <5.00 mg/L   Creatinine    Collection Time: 02/09/23  6:42 AM   Result Value Ref Range    Creatinine 0.55 0.51 - 0.95 mg/dL    GFR Estimate >90 >60 mL/min/1.73m2   CBC with platelets    Collection Time: 02/09/23  6:42 AM   Result Value Ref Range    WBC Count 21.4 (H) 4.0 - 11.0 10e3/uL    RBC Count 3.58 (L) 3.80 - 5.20 10e6/uL    Hemoglobin 10.2 (L) 11.7 - 15.7 g/dL    Hematocrit 33.4 (L) 35.0 - 47.0 %    MCV 93 78 - 100 fL    MCH 28.5 26.5 - 33.0 pg    MCHC 30.5 (L) 31.5 - 36.5 g/dL    RDW 14.8 10.0 - 15.0 %    Platelet Count  445 150 - 450 10e3/uL   Uric acid    Collection Time: 02/09/23  6:42 AM   Result Value Ref Range    Uric Acid 3.2 2.4 - 5.7 mg/dL   Glucose by meter    Collection Time: 02/09/23  8:18 AM   Result Value Ref Range    GLUCOSE BY METER POCT 135 (H) 70 - 99 mg/dL   Lactic Acid STAT    Collection Time: 02/09/23 10:00 AM   Result Value Ref Range    Lactic Acid 1.3 0.7 - 2.0 mmol/L   Glucose by meter    Collection Time: 02/09/23 12:05 PM   Result Value Ref Range    GLUCOSE BY METER POCT 164 (H) 70 - 99 mg/dL       All laboratory and imaging data in the past 24 hours reviewed  Serum Glucose range:   Recent Labs   Lab 02/09/23  1205 02/09/23  0818 02/08/23 2050 02/08/23  1724   * 135* 239* 227*     ABG: No lab results found in last 7 days.  CBC:   Recent Labs   Lab 02/09/23  0642 02/08/23  0646 02/07/23  0604   WBC 21.4* 25.1*  24.8* 20.7*   HGB 10.2* 10.9* 10.6*   HCT 33.4* 36.3 35.6   MCV 93 93 95    449 436   NEUTROPHIL  --  92  --    LYMPH  --  3  --    MONOCYTE  --  4  --    EOSINOPHIL  --  0  --      Chemistry:   Recent Labs   Lab 02/09/23  0642 02/08/23  0646 02/07/23  0604 02/06/23  0646 02/05/23  0641   NA  --  138 139 143 143   POTASSIUM  --  3.6 3.7 3.5 3.7  3.7   CHLORIDE  --  100 102 108* 109*   CO2  --  31* 31* 28 24   BUN  --  9.9 7.4* 7.0* 7.0*   CR 0.55 0.60 0.60 0.58 0.60   GFRESTIMATED >90 >90 >90 >90 >90   ALTON  --  8.3* 8.2* 8.2* 8.3*   MAG  --   --  1.8 1.5* 2.0   PROTTOTAL  --  5.4*  --  5.1* 5.0*   ALBUMIN  --  2.3*  --  2.4* 2.2*   AST  --  29  --  22 21   ALT  --  14  --  10 10   ALKPHOS  --  142*  --  104 87   BILITOTAL  --  0.3  --  0.2 0.2     Coags:  No results for input(s): INR, PROTIME, PTT in the last 168 hours.    Invalid input(s): APTT  Cardiac Markers:  No results for input(s): CKTOTAL, TROPONINI in the last 168 hours.       CT Chest/Abdomen/Pelvis w Contrast    Result Date: 2/7/2023  EXAM: CT CHEST/ABDOMEN/PELVIS W CONTRAST LOCATION: [facility]  DATE/TIME: 2/7/2023 8:21 PM INDICATION: ongoing leukocytosis. new fever. 1/29 sigmoidectomy with wash out. COMPARISON: CT of the abdomen and pelvis 2/4/2023 TECHNIQUE: CT scan of the chest, abdomen, and pelvis was performed following injection of IV contrast. Multiplanar reformats were obtained. Dose reduction techniques were used. CONTRAST: 100 mL Isovue-370 FINDINGS: LUNGS AND PLEURA: Small bilateral pleural effusions layer into the posterior costophrenic sulci. Bands of atelectasis along the posterior pleura of the lower lobes. No lung edema or consolidation. Trachea and central airways are patent and normal caliber. MEDIASTINUM: Cardiac chambers are normal in size. No pericardial effusion. Normal caliber thoracic aorta and main pulmonary artery. Arch, proximal great vessel and descending aorta atheromatous calcification is patchy. There are no enlarged mediastinal or hilar lymph nodes. Esophagus is decompressed. A few small lower paraesophageal varices are present. CORONARY ARTERY CALCIFICATION: Mild to moderate, three-vessel disease. HEPATOBILIARY: Slight geographic liver attenuation but no focal enhancement or parenchymal lesion. There is a calcified stone layering in the gallbladder. No gallbladder wall thickening or pericholecystic inflammation. No bile duct enlargement. PANCREAS: Normal. SPLEEN: Normal. ADRENAL GLANDS: Normal. KIDNEYS/BLADDER: Trace amount of air in the nondependent bladder from recent instrumentation. Bladder wall is smooth with uniform thickness. Kidneys are normal in size with symmetric enhancement and normal cortex thickness. No nephrolithiasis or hydronephrosis. BOWEL: Nondistended stomach. Proximal small bowel in the left upper quadrant is mildly dilated and fluid-filled, however no clear caliber transition and this is unchanged from 2/4/2023. There is hazy attenuation in the mesentery consistent with edema. Minimal perihepatic and perisplenic ascites. Similar ascites layering in the  left lower quadrant with small amount of anterior linear enhancement (series 3, image 205). Status post sigmoid colon resection with colocolonic anastomosis in the upper left pelvis. There are multiple diverticula arising from the ascending colon. No pneumatosis or pneumoperitoneum. LYMPH NODES: Mesenteric, aortocaval and iliac chain lymph nodes are normal by size criteria. VASCULATURE: Diffuse atheromatous calcification of the infrarenal abdominal aorta and common iliac arteries. No aneurysm. PELVIC ORGANS: Post hysterectomy. MUSCULOSKELETAL: Grade 1 degenerative anterolisthesis of L4 on L5 and lumbar facet arthropathy. Vacuum disc phenomenon from L1-2 to L3-L4. Thoracic vertebra are maintained in height with small diffuse osteophytes and mild disc space narrowing. No aggressive or destructive bone lesions. Infraumbilical skin staples are present from recent laparotomy. No body wall hernia. Mild body wall anasarca.     IMPRESSION: 1.  Status post segmental sigmoid colon resection with colocolonic anastomosis in the upper left pelvis. No findings to suggest a surgical complication. 2.  Diverticula arising from the ascending colon but no acute diverticulitis. 3.  Unchanged ascites and mesenteric edema. No drainable intra-abdominal fluid collection. 4.  Minimal pleural effusions and atelectasis in the posterior sulci. 5.  Cholelithiasis.     Echocardiogram Complete    Result Date: 2023  [id number]^[address] [address]  Name: SRIDHAR ALONSO MRN: [id number] : 1945 Study Date: 2023 12:44 PM Age: 77 yrs Gender: Female Patient Location: [facility] Reason For Study: CHF Ordering Physician: PAYAL OCAMPO Performed By: KATHARINE  BSA: 2.1 m2 Height: 65 in Weight: 220 lb HR: 87 BP: 138/63 mmHg ______________________________________________________________________________ Procedure Complete Echo Adult.  ______________________________________________________________________________ Interpretation Summary  1. Normal left ventricular size and systolic performance with a visually estimated ejection fraction of 60%. 2. No significant valvular heart disease is identified on this study. 3. Normal right ventricular size with borderline reduction right ventricular systolic performance. 4. There is mild biatrial enlargement. 5. There is a very small pericardial effusion. There is no evidence of significant intraventricular dependence/tamponade physiology. ______________________________________________________________________________ Left ventricle: Normal left ventricular size and systolic performance with a visually estimated ejection fraction of 60%. There is normal regional wall motion. Left ventricular wall thickness is normal.  Assessment of LV Diastolic Function: The cumulative findings suggest impaired diastolic filling [The septal e' velocity is < 7 cm/s & lateral e' velocity is < 10 cm/s. The average E/e' is >14. TR velocity is 2.8 m/s. Left atrial volume index is greater than 34 mL/mÂ ].  Assessment of left atrial pressure (LAP): The cumulative findings suggest moderately elevated left atrial pressure (the E/A is > 0.8 and <2.0 plus 2 or 3 of 3 of the following present: Average E/e' > 14, TRvel > 2.8 m/s, and/or LA vol. index > 34 ml/mÂ  ).  Right ventricle: Normal right ventricular size with borderline reduction right ventricular systolic performance.  Left atrium: There is mild left atrial enlargement.  Right atrium: There is mild right atrial enlargement.  IVC: The IVC is borderline dilated.  Aortic valve: The aortic valve is comprised of three cusps. No significant aortic stenosis or aortic insufficiency is detected on this study.  Mitral valve: The mitral valve appears morphologically normal. There is trace mitral insufficiency.  Tricuspid valve: The tricuspid valve is grossly morphologically normal. There is  trace tricuspid insufficiency.  Pulmonic valve: The pulmonic valve is grossly morphologically normal.  Thoracic aorta: The aortic root and proximal ascending aorta are of normal dimension.  Pericardium: There is a very small pericardial effusion. There is no evidence of significant intraventricular dependence/tamponade physiology. ______________________________________________________________________________ ______________________________________________________________________________ MMode/2D Measurements & Calculations IVSd: 1.3 cm LVIDd: 4.1 cm LVIDs: 2.6 cm LVPWd: 1.2 cm FS: 36.6 % LV mass(C)d: 185.2 grams LV mass(C)dI: 89.9 grams/m2 Ao root diam: 3.2 cm LA dimension: 4.6 cm asc Aorta Diam: 3.4 cm LA/Ao: 1.4 LVOT diam: 2.3 cm LVOT area: 4.1 cm2 LA Volume Indexed (AL/bp): 36.7 ml/m2  RWT: 0.58  Time Measurements MM HR: 87.0 BPM  Doppler Measurements & Calculations MV E max ayden: 123.0 cm/sec MV A max ayden: 124.2 cm/sec MV E/A: 0.99 MV dec time: 0.24 sec Ao V2 max: 189.4 cm/sec Ao max P.0 mmHg Ao V2 mean: 123.9 cm/sec Ao mean P.8 mmHg Ao V2 VTI: 32.7 cm QUAN(I,D): 2.8 cm2 QUAN(V,D): 2.9 cm2 LV V1 max P.9 mmHg LV V1 max: 131.3 cm/sec LV V1 VTI: 22.2 cm SV(LVOT): 92.1 ml SI(LVOT): 44.7 ml/m2 PA acc time: 0.09 sec TR max ayden: 279.6 cm/sec TR max P.3 mmHg AV Ayden Ratio (DI): 0.69 QUAN Index (cm2/m2): 1.4 E/E': 13.6  E/E' avg: 15.6 Lateral E/e': 13.6 Medial E/e': 17.7 Peak E' Ayden: 9.0 cm/sec  ______________________________________________________________________________ Report approved by: Leonid Hamilton 2023 02:47 PM       XR Chest Port 1 View    Result Date: 2023  EXAM: XR CHEST PORT 1 VIEW LOCATION: [facility] DATE/TIME: 2023 7:42 PM INDICATION: fever, new cough COMPARISON: 2021     IMPRESSION: Lung volumes are lower on this portable study. Borderline cardiomegaly unchanged. Pulmonary vessels normal. Questionable subtle right infrahilar infiltrate  though this may relate to the lower lung volumes. Lungs otherwise clear.    XR Ankle Port Left G/E 3 Views    Result Date: 2/5/2023  EXAM: XR ANKLE PORT LEFT G/E 3 VIEWS LOCATION: [facility] DATE/TIME: 2/5/2023 2:22 PM INDICATION: left ankle pain, no trauma COMPARISON: None.     IMPRESSION: Bones are demineralized. Mild tibiotalar joint degenerative change. No evidence of an acute fracture. Tiny bone fragment adjacent to the medial talus and mild bony proliferation along the tip of the lateral malleolus is likely chronic. Ankle mortise is intact. Calcaneal heel spur.    CT Abdomen Pelvis w Contrast    Result Date: 2/4/2023  EXAM: CT ABDOMEN PELVIS W CONTRAST LOCATION: [facility] DATE/TIME: 2/4/2023 12:08 AM INDICATION: 1 29 sigmoidectomy with increasing wbc COMPARISON: CT a abdomen/pelvis 01/29/2023 TECHNIQUE: CT scan of the abdomen and pelvis was performed following injection of IV contrast. Multiplanar reformats were obtained. Dose reduction techniques were used. CONTRAST: isovue 370 100ml FINDINGS: LOWER CHEST: Trace bilateral pleural effusions right greater than left with compressive atelectasis. HEPATOBILIARY: Diffuse hepatic steatosis. Cholelithiasis with mild gallbladder distention which may be related to fasting. No biliary ductal dilatation. PANCREAS: Normal. SPLEEN: Normal. ADRENAL GLANDS: Normal. KIDNEYS/BLADDER: No hydronephrosis. Box catheter within a decompressed bladder. BOWEL: Recent sigmoidectomy. Colonic diverticulosis. Trace abdominopelvic free ascites. Additionally, there is some loculated ascites in the anterior abdomen with partial rim enhancement and several foci of gas within, which suggests a degree of peritonitis. No drainable fluid collection currently. Right anterior approach drain terminates in the left pelvis. LYMPH NODES: Normal. VASCULATURE: Atherosclerotic calcifications of the aortoiliac vessels without evidence of aneurysmal  dilatation. PELVIC ORGANS: Hysterectomy. MUSCULOSKELETAL: Body wall edema. Skin staples with recent ventral abdominal incision. Small fat-containing umbilical hernia. Multilevel degenerative changes of the thoracolumbar spine. No acute osseous abnormality.     IMPRESSION: 1.  Recent sigmoidectomy. Trace abdominopelvic free ascites. Additionally, there is some loculated ascites in the anterior abdomen with partial rim enhancement and several foci of gas within, which suggests a degree of peritonitis. No drainable fluid collection currently. 2.  Trace bilateral pleural effusions right greater than left with compressive atelectasis. 3.  Body wall edema. 4.  Diffuse hepatic steatosis. 5.  Cholelithiasis with mild gallbladder distention which may be related to fasting.    CT Abdomen Pelvis w Contrast    Result Date: 1/25/2023  EXAM: CT ABDOMEN PELVIS W CONTRAST LOCATION: [facility] DATE/TIME: 1/25/2023 11:28 PM INDICATION: abdominal pain worst in lower quadrants, leukocytosis eval for pathology COMPARISON: 11/26/2022 TECHNIQUE: CT scan of the abdomen and pelvis was performed following injection of IV contrast. Multiplanar reformats were obtained. Dose reduction techniques were used. CONTRAST: isovue 370 100ml FINDINGS: LOWER CHEST: Small left Bochdalek hernia. Basilar atelectasis. HEPATOBILIARY: Cholelithiasis and hepatic steatosis. PANCREAS: Normal. SPLEEN: Normal. ADRENAL GLANDS: Normal. KIDNEYS/BLADDER: Normal. BOWEL: Small bowel is normal caliber. Large amount of colonic stool. Sigmoid colonic wall thickening with adjacent inflammation is again seen. The inflamed segment is distended with stool. Small amount of adjacent free fluid. Caliber change with collapse  of the rectosigmoid junction and rectum. Diverticulosis.  LYMPH NODES: Subcentimeter retroperitoneal lymph nodes. VASCULATURE: Atherosclerotic vascular calcification. PELVIC ORGANS: Absent uterus. MUSCULOSKELETAL: Degenerative change  osseous structures. Mild compression L4. Slight anterolisthesis L4 on L5.     IMPRESSION: 1.  Sigmoid colitis is again seen. Collapse of the rectosigmoid junction and rectum distal to the inflamed segment of colon. Underlying stricture not excluded. 2.  Small amount of adjacent free fluid. 3.  Cholelithiasis.    CTA Abdomen Pelvis with Contrast    Result Date: 1/29/2023  EXAM: CTA ABDOMEN PELVIS WITH CONTRAST LOCATION: [facility] DATE/TIME: 1/29/2023 6:48 AM INDICATION: recurrent colitis, distended abdomen pain out of proportion. COMPARISON: There are study dated 1/25/ TECHNIQUE: CT angiogram abdomen pelvis during arterial phase of injection of IV contrast. 2D and 3D MIP reconstructions were performed by the CT technologist. Dose reduction techniques were used. CONTRAST: isovue 370 100ml FINDINGS: ANGIOGRAM ABDOMEN/PELVIS: The abdominal aorta is normal in size and caliber throughout with scattered atherosclerotic calcifications noted. The iliac and femoral vessels are normal in size and caliber and well-opacified. Celiac and SMA and TONY arise normally and are well opacified at this time LOWER CHEST: Lung bases are clear. Heart is enlarged, similar prior exam. HEPATOBILIARY: Diffuse hepatic steatosis. No significant mass. No bile duct dilatation. Calcified gallstones are again seen within the gallbladder. PANCREAS: No significant mass, duct dilatation, or inflammatory change. SPLEEN: Normal size. ADRENAL GLANDS: No significant nodules. KIDNEYS/BLADDER: No significant mass, stone, or hydronephrosis. BOWEL: Mural thickening and pericolonic inflammation of the sigmoid colon is again noted, similar in appearance to prior exam with increased inflammation seen in the proximal sigmoid colon. No pericolonic abscess seen at this time. Stool is seen throughout the inflamed sigmoid colon as well as scattered throughout the large bowel without evidence of large bowel obstruction. Adjacent to the proximal  sigmoid colon is free fluid with foci of gas which are extraluminal and new from the prior study (image 1:15, series 5). Additional foci of extraluminal gas are seen within the mesenteric fat anteriorly in the upper abdomen (image 57, series 5). There are scattered diverticula seen throughout the colon. Air-fluid levels are seen scattered through multiple loops of small bowel with mild dilatation seen in the proximal jejunum in the upper abdomen, these mildly dilated loops of small bowel decompressed gradually distally without a clear transition point. LYMPH NODES: Increased Number of pericolonic lymph nodes are again seen adjacent to the sigmoid colon. PELVIC ORGANS: No pelvic masses. MUSCULOSKELETAL: Unchanged     IMPRESSION: 1.  Mural thickening of the sigmoid colon with pericolonic inflammation and stranding, slightly worsened from prior exam with punctate foci of gas seen adjacent to the inflamed colon as well as tracking in the anterior abdominal wall concerning for sigmoid colonic perforation. No organized intra-abdominal fluid collection such as abscess is seen at this time. Given the long-standing inflammation in this region an underlying neoplastic process cannot be excluded. 2.  Scattered air-fluid levels and mildly dilated loops of proximal jejunum which decompresses gradually without a transition point consistent favored to reflect reactive ileus 3.  Hepatic steatosis 4.  Cholelithiasis [Critical Result: Sigmoid colonic inflammation with areas of extraluminal gas, new from 1/25/2023 concerning for sigmoid colonic perforation] Finding was identified on 1/29/2023 6:56 AM. DR. Snell was contacted by me on 1/29/2023 7:08 AM and verbalized understanding of the critical result.     CT Chest w/o Contrast    Result Date: 1/29/2023  EXAM: CT CHEST WITHOUT CONTRAST LOCATION: [facility] DATE/TIME: 01/29/2023, 6:46 AM INDICATION: Fever and cough. COMPARISON: 11/26/2022 - CT chest,  abdomen and pelvis. TECHNIQUE: Note that this study was performed in conjunction with a CT scan of the abdomen and pelvis. Refer to a separate report for findings from that study. CT chest without IV contrast. Multiplanar reformats were obtained. Dose reduction techniques were used. CONTRAST: None. FINDINGS: LUNGS AND PLEURA: Minimal emphysematous changes in the lungs. Slight interstitial thickening in bilateral lung bases. A few curvilinear opacities in the periphery of both lungs likely represent atelectasis and/or scarring. The lungs are otherwise clear. Small left Bochdalek hernia containing fat MEDIASTINUM/AXILLAE: Atherosclerotic calcification in the thoracic aorta. CORONARY ARTERY CALCIFICATION: Present. MUSCULOSKELETAL: No acute findings. UPPER ABDOMEN: A few foci of extraluminal gas are present in the upper abdomen.     IMPRESSION: 1.  Slight interstitial thickening in bilateral lung bases. This is nonspecific, but most likely relates to interstitial pulmonary edema. 2.  No other findings suspicious for acute pulmonary disease. 3.  A few foci of extraluminal gas scattered within the nondependent aspect of the upper abdomen. In the absence of recent surgery, this is consistent with a bowel perforation. Refer to the report from the CT scan of the abdomen and pelvis performed in conjunction with this study for additional details. The findings were called to Dr. Snell by Dr. Anderson on 01/29/2023 at 0700 hours.       Latest radiology report personally reviewed.    Note created using dragon voice recognition software so sounds alike errors may have escaped editing.      02/09/2023   Matthew Anna MD  Hospitalist, [facility]  Pager: [phone]

## 2023-02-09 NOTE — PROGRESS NOTES
Care Management Follow Up    Length of Stay (days): 11    Expected Discharge Date: 02/11/2023     Concerns to be Addressed:       Patient plan of care discussed at interdisciplinary rounds: Yes    Anticipated Discharge Disposition:  TCU     Anticipated Discharge Services:  Therapy services  Anticipated Discharge DME:  Per therapy    Patient/family educated on Medicare website which has current facility and service quality ratings:  Yes  Education Provided on the Discharge Plan:  Yes  Patient/Family in Agreement with the Plan:  Yes    Referrals Placed by CM/SW:  Post acute facilities   Private pay costs discussed: Not applicable    Additional Information:  Therapy recommends TCU. [facility], and [facility] have all declined. Referral to [facility] pending. SW to follow up with patient and family to get more choices and send additional referrals.     11:07 AM  SW left VM for patient's daughter asking for more TCU choices.    12:05 PM  Sheba called back and would like additional referrals sent to [facility], [facility] [place] [facility] [place], and [facility]. Referrals sent.     Social History:  Patient from home with spouse Awais and has [employer] at home (although she generally doesn't use it).  Awais has been assisting more in home recently.      Silva Cardenas

## 2023-02-09 NOTE — SIGNIFICANT EVENT
Significant Event Note    Time of event: 7:30PM     Description of event:  HO page regarding persistent fever at 102F.     Patient overall reports feeling okay. Does not feel feverish despite temp of 102F. Does endorse new productive cough, sore throat, and congestion that began yesterday. No increasing abdominal pain. Vitals otherwise stable. Lungs sound congested on exam with decreased air movement but no focal crackles or wheezes. Reviewed chart and patient is being worked up by ID for increasing very elevated CRP. Does have intra-abdominal infection and had bacteremia with various organisms secondary to bowel perforation. Most recent blood cultures with NGTD. On meropenem, vancomycin, micofungin.     Plan:  - CXR   - COVID/influenza swab   - Continue broad spectrum antibiotics as ordered by ID     Discussed with: bedside nurse    Ariana Perdue MD      Addendum 10:14 PM:   CXR with low lung volumes and questionable subtle right infrahilar infiltrate though this may relate to the lower lung volumes. Otherwise clear. Seems unlikely pneumonia given CXR, stable O2 needs, and broad antibiotics.

## 2023-02-10 ENCOUNTER — APPOINTMENT (OUTPATIENT)
Dept: ULTRASOUND IMAGING | Facility: HOSPITAL | Age: 78
DRG: 853 | End: 2023-02-10
Attending: INTERNAL MEDICINE
Payer: COMMERCIAL

## 2023-02-10 ENCOUNTER — APPOINTMENT (OUTPATIENT)
Dept: PHYSICAL THERAPY | Facility: HOSPITAL | Age: 78
DRG: 853 | End: 2023-02-10
Payer: COMMERCIAL

## 2023-02-10 ENCOUNTER — APPOINTMENT (OUTPATIENT)
Dept: OCCUPATIONAL THERAPY | Facility: HOSPITAL | Age: 78
DRG: 853 | End: 2023-02-10
Payer: COMMERCIAL

## 2023-02-10 LAB
ANION GAP SERPL CALCULATED.3IONS-SCNC: 10 MMOL/L (ref 7–15)
BUN SERPL-MCNC: 10.9 MG/DL (ref 8–23)
CALCIUM SERPL-MCNC: 7.8 MG/DL (ref 8.8–10.2)
CHLORIDE SERPL-SCNC: 101 MMOL/L (ref 98–107)
CREAT SERPL-MCNC: 0.57 MG/DL (ref 0.51–0.95)
DEPRECATED HCO3 PLAS-SCNC: 30 MMOL/L (ref 22–29)
ERYTHROCYTE [DISTWIDTH] IN BLOOD BY AUTOMATED COUNT: 15 % (ref 10–15)
GFR SERPL CREATININE-BSD FRML MDRD: >90 ML/MIN/1.73M2
GLUCOSE BLDC GLUCOMTR-MCNC: 135 MG/DL (ref 70–99)
GLUCOSE BLDC GLUCOMTR-MCNC: 148 MG/DL (ref 70–99)
GLUCOSE BLDC GLUCOMTR-MCNC: 90 MG/DL (ref 70–99)
GLUCOSE BLDC GLUCOMTR-MCNC: 94 MG/DL (ref 70–99)
GLUCOSE SERPL-MCNC: 90 MG/DL (ref 70–99)
HCT VFR BLD AUTO: 32.3 % (ref 35–47)
HGB BLD-MCNC: 9.7 G/DL (ref 11.7–15.7)
MCH RBC QN AUTO: 28 PG (ref 26.5–33)
MCHC RBC AUTO-ENTMCNC: 30 G/DL (ref 31.5–36.5)
MCV RBC AUTO: 93 FL (ref 78–100)
PLATELET # BLD AUTO: 415 10E3/UL (ref 150–450)
POTASSIUM SERPL-SCNC: 3.2 MMOL/L (ref 3.4–5.3)
RBC # BLD AUTO: 3.47 10E6/UL (ref 3.8–5.2)
SODIUM SERPL-SCNC: 141 MMOL/L (ref 136–145)
VANCOMYCIN SERPL-MCNC: 16.5 UG/ML
WBC # BLD AUTO: 15 10E3/UL (ref 4–11)

## 2023-02-10 PROCEDURE — 250N000011 HC RX IP 250 OP 636: Performed by: INTERNAL MEDICINE

## 2023-02-10 PROCEDURE — 82310 ASSAY OF CALCIUM: CPT | Performed by: INTERNAL MEDICINE

## 2023-02-10 PROCEDURE — 250N000013 HC RX MED GY IP 250 OP 250 PS 637: Performed by: SURGERY

## 2023-02-10 PROCEDURE — 97535 SELF CARE MNGMENT TRAINING: CPT | Mod: GO

## 2023-02-10 PROCEDURE — 93970 EXTREMITY STUDY: CPT

## 2023-02-10 PROCEDURE — 85027 COMPLETE CBC AUTOMATED: CPT | Performed by: INTERNAL MEDICINE

## 2023-02-10 PROCEDURE — 97116 GAIT TRAINING THERAPY: CPT | Mod: GP

## 2023-02-10 PROCEDURE — 250N000013 HC RX MED GY IP 250 OP 250 PS 637: Performed by: INTERNAL MEDICINE

## 2023-02-10 PROCEDURE — 258N000003 HC RX IP 258 OP 636: Performed by: INTERNAL MEDICINE

## 2023-02-10 PROCEDURE — 250N000011 HC RX IP 250 OP 636: Performed by: SURGERY

## 2023-02-10 PROCEDURE — 250N000013 HC RX MED GY IP 250 OP 250 PS 637: Performed by: HOSPITALIST

## 2023-02-10 PROCEDURE — 97110 THERAPEUTIC EXERCISES: CPT | Mod: GP

## 2023-02-10 PROCEDURE — 82374 ASSAY BLOOD CARBON DIOXIDE: CPT | Performed by: INTERNAL MEDICINE

## 2023-02-10 PROCEDURE — 120N000001 HC R&B MED SURG/OB

## 2023-02-10 PROCEDURE — 99232 SBSQ HOSP IP/OBS MODERATE 35: CPT | Performed by: INTERNAL MEDICINE

## 2023-02-10 PROCEDURE — 36415 COLL VENOUS BLD VENIPUNCTURE: CPT | Performed by: INTERNAL MEDICINE

## 2023-02-10 PROCEDURE — 97530 THERAPEUTIC ACTIVITIES: CPT | Mod: GP

## 2023-02-10 PROCEDURE — 80202 ASSAY OF VANCOMYCIN: CPT | Performed by: INTERNAL MEDICINE

## 2023-02-10 RX ORDER — POTASSIUM CHLORIDE 1500 MG/1
40 TABLET, EXTENDED RELEASE ORAL DAILY
Status: COMPLETED | OUTPATIENT
Start: 2023-02-10 | End: 2023-02-11

## 2023-02-10 RX ADMIN — FUROSEMIDE 20 MG: 10 INJECTION, SOLUTION INTRAMUSCULAR; INTRAVENOUS at 09:27

## 2023-02-10 RX ADMIN — MICAFUNGIN SODIUM 100 MG: 50 INJECTION, POWDER, LYOPHILIZED, FOR SOLUTION INTRAVENOUS at 16:53

## 2023-02-10 RX ADMIN — MEROPENEM 1 G: 1 INJECTION INTRAVENOUS at 06:36

## 2023-02-10 RX ADMIN — ACETAMINOPHEN 650 MG: 325 TABLET ORAL at 20:17

## 2023-02-10 RX ADMIN — VANCOMYCIN HYDROCHLORIDE 1000 MG: 1 INJECTION, SOLUTION INTRAVENOUS at 00:27

## 2023-02-10 RX ADMIN — BENZOCAINE 6 MG-MENTHOL 10 MG LOZENGES 1 LOZENGE: at 14:55

## 2023-02-10 RX ADMIN — POTASSIUM CHLORIDE 40 MEQ: 1500 TABLET, EXTENDED RELEASE ORAL at 16:53

## 2023-02-10 RX ADMIN — VENLAFAXINE HYDROCHLORIDE 75 MG: 75 CAPSULE, EXTENDED RELEASE ORAL at 09:27

## 2023-02-10 RX ADMIN — MEROPENEM 1 G: 1 INJECTION INTRAVENOUS at 13:08

## 2023-02-10 RX ADMIN — ENOXAPARIN SODIUM 40 MG: 40 INJECTION SUBCUTANEOUS at 09:27

## 2023-02-10 RX ADMIN — ASPIRIN 81 MG: 81 TABLET, COATED ORAL at 09:27

## 2023-02-10 RX ADMIN — VANCOMYCIN HYDROCHLORIDE 1000 MG: 1 INJECTION, SOLUTION INTRAVENOUS at 12:37

## 2023-02-10 RX ADMIN — ACETAMINOPHEN 650 MG: 325 TABLET ORAL at 09:27

## 2023-02-10 RX ADMIN — LEVOTHYROXINE SODIUM 137 MCG: 0.11 TABLET ORAL at 06:37

## 2023-02-10 RX ADMIN — ACETAMINOPHEN 650 MG: 325 TABLET ORAL at 13:06

## 2023-02-10 RX ADMIN — ROSUVASTATIN CALCIUM 20 MG: 10 TABLET, FILM COATED ORAL at 20:17

## 2023-02-10 RX ADMIN — MEROPENEM 1 G: 1 INJECTION INTRAVENOUS at 21:24

## 2023-02-10 RX ADMIN — AMLODIPINE BESYLATE 2.5 MG: 2.5 TABLET ORAL at 09:27

## 2023-02-10 ASSESSMENT — COLUMBIA-SUICIDE SEVERITY RATING SCALE - C-SSRS
TOTAL  NUMBER OF ABORTED OR SELF INTERRUPTED ATTEMPTS LIFETIME: NO
6. HAVE YOU EVER DONE ANYTHING, STARTED TO DO ANYTHING, OR PREPARED TO DO ANYTHING TO END YOUR LIFE?: NO
2. HAVE YOU ACTUALLY HAD ANY THOUGHTS OF KILLING YOURSELF?: NO
TOTAL  NUMBER OF INTERRUPTED ATTEMPTS LIFETIME: NO
REASONS FOR IDEATION LIFETIME: MOSTLY TO END OR STOP THE PAIN (YOU COULDN'T GO ON LIVING WITH THE PAIN OR HOW YOU WERE FEELING)
1. IN THE PAST MONTH, HAVE YOU WISHED YOU WERE DEAD OR WISHED YOU COULD GO TO SLEEP AND NOT WAKE UP?: YES
ATTEMPT LIFETIME: NO
1. HAVE YOU WISHED YOU WERE DEAD OR WISHED YOU COULD GO TO SLEEP AND NOT WAKE UP?: YES
REASONS FOR IDEATION PAST MONTH: MOSTLY TO END OR STOP THE PAIN (YOU COULDN'T GO ON LIVING WITH THE PAIN OR HOW YOU WERE FEELING)

## 2023-02-10 ASSESSMENT — ACTIVITIES OF DAILY LIVING (ADL)
ADLS_ACUITY_SCORE: 44
ADLS_ACUITY_SCORE: 44
ADLS_ACUITY_SCORE: 43
ADLS_ACUITY_SCORE: 40
ADLS_ACUITY_SCORE: 40
ADLS_ACUITY_SCORE: 44
ADLS_ACUITY_SCORE: 43
ADLS_ACUITY_SCORE: 44
ADLS_ACUITY_SCORE: 40
ADLS_ACUITY_SCORE: 40
ADLS_ACUITY_SCORE: 43
ADLS_ACUITY_SCORE: 40

## 2023-02-10 NOTE — PLAN OF CARE
Problem: Risk for Delirium  Goal: Optimal Coping  Outcome: Progressing  Goal: Improved Behavioral Control  Outcome: Progressing  Intervention: Minimize Safety Risk  Recent Flowsheet Documentation  Taken 2/9/2023 1600 by Lissy Hermosillo RN  Enhanced Safety Measures: chair alarm set  Goal: Improved Attention and Thought Clarity  Outcome: Progressing   Pt has been alert but fluctuates with orientation.   Activity like toileting every 2 hrs but refused.  Problem: Bowel Resection  Goal: Effective Bowel Motility and Elimination  Outcome: Progressing   Surgical incision cdi.  Problem: Diabetes Comorbidity  Goal: Blood Glucose Level Within Targeted Range  Outcome: Progressing   DA=471 and 126. MD notified for hs BG subsequently made orders. Latus dosage has been modified.  Problem: Pain Acute  Goal: Optimal Pain Control and Function  Outcome: Progressing  Intervention: Develop Pain Management Plan  Recent Flowsheet Documentation  Taken 2/9/2023 2003 by Lissy Hermosillo RN  Pain Management Interventions:   emotional support   quiet environment facilitated   repositioned   rest  Intervention: Prevent or Manage Pain  Recent Flowsheet Documentation  Taken 2/9/2023 1600 by Lissy Hermosillo RN  Medication Review/Management: medications reviewed   Scheduled TY given for headache and sore throat.  Problem: Hypertension Comorbidity  Goal: Blood Pressure in Desired Range  Outcome: Progressing  Intervention: Maintain Blood Pressure Management  Recent Flowsheet Documentation  Taken 2/9/2023 1600 by Lissy Hermosillo RN  Medication Review/Management: medications reviewed   GF=[id number].  Problem: Infection  Goal: Absence of Infection Signs and Symptoms  Outcome: Progressing   Scheduled iv abx given.

## 2023-02-10 NOTE — PROGRESS NOTES
Daily Progress Note        CODE STATUS:  Full Code    02/09/23  Assessment/Plan:  77 year old female who was admitted on 1/29/2023 with bowel perforation and found to have impacted stool in sigmoid colon with associated perforation and feculent peritonitis.      Perforation of Colonic Stercolic Ulcer   -- S/P Sigmoid Colectomy 1/29/30. Diffuse feculent peritonitis noted intra-op  -- On IV Meropenum and Micafungin per ID. IV vancomycin added 2/8/23  -- Appreciate ID consult  -- Follow up CT abd/pelvis on 2/7/23 showed unchanged ascites and mesenteric edema. No drainable intra-abdominal fluid collection     Sepsis   -- WBC up, temp 102.8, , but BP stable  -- Patient is very edematous. Stopped IVF 2/9/23. Cont IV lasix  -- Bacteremia with clostridium species, Collinsella aerofaciens, bacteroides vulgatus, and Eubacterium spp    HTN (hypertension)  -- Amlodipine with hold parameters. Losartan on hold    DM type 2, Hgb A1C 7.6 on 11/23/22  -- Cont lantus 30 units and sliding scale insulin. Will adjust as needed     Leg swelling  Fluid overload  -- I/O charting is incorrect. Stopped IVF. Cont lasix. Monitor renal function  -- US legs negative for DVT    Refusal of blood transfusions as patient is Hinduism     KARYN (obstructive sleep apnea)     Depression:  -- On effexor at home. Patient looks more depressed and withdrawn.  requests psychiatry consult    DVT Prophylaxis: Enoxaparin (Lovenox) SQ  Code Status: Full Code     Disposition: Expected discharge in 2 days, probably to TCU.      Disposition; Few more days  Barrier to discharge; Ongoing fever     LOS: 11 days     Subjective:  Interval History: Patient seen and examined this morning. States she is doing so-so. No fevers overnight. Appetite remains poor.     Review of Systems:   As mentioned in subjective.    Patient Active Problem List   Diagnosis     Non-specific colitis     Chest pain, unspecified type     Abnormal laboratory test result      Angiodysplasia of colon     Cataract     Colon adenoma     Depression     GERD (gastroesophageal reflux disease)     HTN (hypertension)     Hypercalcemia     Hyperlipidemia     Hyperparathyroidism (H)     Hypervitaminosis D     Microalbuminuria     Multinodular goiter     OA (osteoarthritis)     Obesity, morbid (H)     Postablative hypothyroidism     DM type 2, Hgb A1C 7.6 on 11/23/22     Perforation of Colonic Stericolic Ulcer -- S/P Sigmoid Colectomy 1/29/30     Refusal of blood transfusions as patient is Caodaism     KARYN (obstructive sleep apnea)     Bacteremia due to Clostridium and Bacteroides -- 1/29/23       Scheduled Meds:    acetaminophen  650 mg Oral TID     amLODIPine  2.5 mg Oral Daily     aspirin  81 mg Oral Daily     enoxaparin ANTICOAGULANT  40 mg Subcutaneous Q24H     furosemide  20 mg Intravenous Daily     insulin aspart  1-10 Units Subcutaneous TID AC     insulin aspart  1-7 Units Subcutaneous At Bedtime     [Held by provider] insulin glargine  30 Units Subcutaneous At Bedtime     levothyroxine  137 mcg Oral QAM AC     meropenem  1 g Intravenous Q8H     micafungin  100 mg Intravenous Q24H     potassium chloride  40 mEq Oral Daily     rosuvastatin  20 mg Oral At Bedtime     sodium chloride (PF)  3 mL Intracatheter Q8H     sodium chloride (PF)  3 mL Intracatheter Q8H     vancomycin  1,000 mg Intravenous Q12H     venlafaxine  75 mg Oral Daily     Continuous Infusions:    sodium chloride 100 mL/hr at 02/10/23 1132     PRN Meds:.sore throat, glucose **OR** dextrose **OR** glucagon, hydrOXYzine, lidocaine 4%, lidocaine (buffered or not buffered), melatonin, menthol-zinc oxide, naloxone **OR** naloxone **OR** naloxone **OR** naloxone, ondansetron **OR** ondansetron, oxyCODONE, oxyCODONE IR, sodium chloride (PF), sodium chloride (PF)    Objective:  Vital signs in last 24 hours:  Temp:  [98.5  F (36.9  C)-99.4  F (37.4  C)] 98.5  F (36.9  C)  Pulse:  [] 101  Resp:  [20] 20  BP:  (129-175)/(60-70) 175/70  SpO2:  [97 %] 97 %        Intake/Output Summary (Last 24 hours) at 2/9/2023 1537  Last data filed at 2/9/2023 1450  Gross per 24 hour   Intake 2258 ml   Output 275 ml   Net 1983 ml       Physical Exam:    General: Not in obvious distress. Flat afect  HEENT: NC, AT   Chest: Clear to auscultation bilaterally  Heart: S1S2 normal, regular. No M/R/G  Abdomen: Soft. Mild generalized tenderness. Distended.   Extremities: 2+ legs swelling  Neuro: Awake, grossly non-focal      Lab Results:(I have personally reviewed the results)    Recent Results (from the past 24 hour(s))   Glucose by meter    Collection Time: 02/09/23  4:48 PM   Result Value Ref Range    GLUCOSE BY METER POCT 156 (H) 70 - 99 mg/dL   Glucose by meter    Collection Time: 02/09/23  9:00 PM   Result Value Ref Range    GLUCOSE BY METER POCT 126 (H) 70 - 99 mg/dL   Vancomycin level    Collection Time: 02/10/23  7:01 AM   Result Value Ref Range    Vancomycin 16.5   ug/mL   CBC with platelets    Collection Time: 02/10/23  7:01 AM   Result Value Ref Range    WBC Count 15.0 (H) 4.0 - 11.0 10e3/uL    RBC Count 3.47 (L) 3.80 - 5.20 10e6/uL    Hemoglobin 9.7 (L) 11.7 - 15.7 g/dL    Hematocrit 32.3 (L) 35.0 - 47.0 %    MCV 93 78 - 100 fL    MCH 28.0 26.5 - 33.0 pg    MCHC 30.0 (L) 31.5 - 36.5 g/dL    RDW 15.0 10.0 - 15.0 %    Platelet Count 415 150 - 450 10e3/uL   Basic metabolic panel    Collection Time: 02/10/23  7:01 AM   Result Value Ref Range    Sodium 141 136 - 145 mmol/L    Potassium 3.2 (L) 3.4 - 5.3 mmol/L    Chloride 101 98 - 107 mmol/L    Carbon Dioxide (CO2) 30 (H) 22 - 29 mmol/L    Anion Gap 10 7 - 15 mmol/L    Urea Nitrogen 10.9 8.0 - 23.0 mg/dL    Creatinine 0.57 0.51 - 0.95 mg/dL    Calcium 7.8 (L) 8.8 - 10.2 mg/dL    Glucose 90 70 - 99 mg/dL    GFR Estimate >90 >60 mL/min/1.73m2   Glucose by meter    Collection Time: 02/10/23  8:55 AM   Result Value Ref Range    GLUCOSE BY METER POCT 94 70 - 99 mg/dL   Glucose by meter     Collection Time: 02/10/23 11:47 AM   Result Value Ref Range    GLUCOSE BY METER POCT 90 70 - 99 mg/dL       All laboratory and imaging data in the past 24 hours reviewed  Serum Glucose range:   Recent Labs   Lab 02/10/23  1147 02/10/23  0855 02/10/23  0701 02/09/23  2100   GLC 90 94 90 126*     ABG: No lab results found in last 7 days.  CBC:   Recent Labs   Lab 02/10/23  0701 02/09/23  0642 02/08/23  0646   WBC 15.0* 21.4* 25.1*  24.8*   HGB 9.7* 10.2* 10.9*   HCT 32.3* 33.4* 36.3   MCV 93 93 93    445 449   NEUTROPHIL  --   --  92   LYMPH  --   --  3   MONOCYTE  --   --  4   EOSINOPHIL  --   --  0     Chemistry:   Recent Labs   Lab 02/10/23  0701 02/09/23  0642 02/08/23  0646 02/07/23  0604 02/06/23  0646 02/05/23  0641     --  138 139 143 143   POTASSIUM 3.2*  --  3.6 3.7 3.5 3.7  3.7   CHLORIDE 101  --  100 102 108* 109*   CO2 30*  --  31* 31* 28 24   BUN 10.9  --  9.9 7.4* 7.0* 7.0*   CR 0.57 0.55 0.60 0.60 0.58 0.60   GFRESTIMATED >90 >90 >90 >90 >90 >90   ALTON 7.8*  --  8.3* 8.2* 8.2* 8.3*   MAG  --   --   --  1.8 1.5* 2.0   PROTTOTAL  --   --  5.4*  --  5.1* 5.0*   ALBUMIN  --   --  2.3*  --  2.4* 2.2*   AST  --   --  29  --  22 21   ALT  --   --  14  --  10 10   ALKPHOS  --   --  142*  --  104 87   BILITOTAL  --   --  0.3  --  0.2 0.2     Coags:  No results for input(s): INR, PROTIME, PTT in the last 168 hours.    Invalid input(s): APTT  Cardiac Markers:  No results for input(s): CKTOTAL, TROPONINI in the last 168 hours.       CT Chest/Abdomen/Pelvis w Contrast    Result Date: 2/7/2023  EXAM: CT CHEST/ABDOMEN/PELVIS W CONTRAST LOCATION: [facility] DATE/TIME: 2/7/2023 8:21 PM INDICATION: ongoing leukocytosis. new fever. 1/29 sigmoidectomy with wash out. COMPARISON: CT of the abdomen and pelvis 2/4/2023 TECHNIQUE: CT scan of the chest, abdomen, and pelvis was performed following injection of IV contrast. Multiplanar reformats were obtained. Dose reduction techniques were  used. CONTRAST: 100 mL Isovue-370 FINDINGS: LUNGS AND PLEURA: Small bilateral pleural effusions layer into the posterior costophrenic sulci. Bands of atelectasis along the posterior pleura of the lower lobes. No lung edema or consolidation. Trachea and central airways are patent and normal caliber. MEDIASTINUM: Cardiac chambers are normal in size. No pericardial effusion. Normal caliber thoracic aorta and main pulmonary artery. Arch, proximal great vessel and descending aorta atheromatous calcification is patchy. There are no enlarged mediastinal or hilar lymph nodes. Esophagus is decompressed. A few small lower paraesophageal varices are present. CORONARY ARTERY CALCIFICATION: Mild to moderate, three-vessel disease. HEPATOBILIARY: Slight geographic liver attenuation but no focal enhancement or parenchymal lesion. There is a calcified stone layering in the gallbladder. No gallbladder wall thickening or pericholecystic inflammation. No bile duct enlargement. PANCREAS: Normal. SPLEEN: Normal. ADRENAL GLANDS: Normal. KIDNEYS/BLADDER: Trace amount of air in the nondependent bladder from recent instrumentation. Bladder wall is smooth with uniform thickness. Kidneys are normal in size with symmetric enhancement and normal cortex thickness. No nephrolithiasis or hydronephrosis. BOWEL: Nondistended stomach. Proximal small bowel in the left upper quadrant is mildly dilated and fluid-filled, however no clear caliber transition and this is unchanged from 2/4/2023. There is hazy attenuation in the mesentery consistent with edema. Minimal perihepatic and perisplenic ascites. Similar ascites layering in the left lower quadrant with small amount of anterior linear enhancement (series 3, image 205). Status post sigmoid colon resection with colocolonic anastomosis in the upper left pelvis. There are multiple diverticula arising from the ascending colon. No pneumatosis or pneumoperitoneum. LYMPH NODES: Mesenteric, aortocaval and  iliac chain lymph nodes are normal by size criteria. VASCULATURE: Diffuse atheromatous calcification of the infrarenal abdominal aorta and common iliac arteries. No aneurysm. PELVIC ORGANS: Post hysterectomy. MUSCULOSKELETAL: Grade 1 degenerative anterolisthesis of L4 on L5 and lumbar facet arthropathy. Vacuum disc phenomenon from L1-2 to L3-L4. Thoracic vertebra are maintained in height with small diffuse osteophytes and mild disc space narrowing. No aggressive or destructive bone lesions. Infraumbilical skin staples are present from recent laparotomy. No body wall hernia. Mild body wall anasarca.     IMPRESSION: 1.  Status post segmental sigmoid colon resection with colocolonic anastomosis in the upper left pelvis. No findings to suggest a surgical complication. 2.  Diverticula arising from the ascending colon but no acute diverticulitis. 3.  Unchanged ascites and mesenteric edema. No drainable intra-abdominal fluid collection. 4.  Minimal pleural effusions and atelectasis in the posterior sulci. 5.  Cholelithiasis.     Echocardiogram Complete    Result Date: 2023  [id number]^TERRENCE^[place] [address]  Name: SRIDHAR ALONSO MRN: [id number] : 1945 Study Date: 2023 12:44 PM Age: 77 yrs Gender: Female Patient Location: [facility] Reason For Study: CHF Ordering Physician: PAYAL OCAMPO Performed By: KATHARINE  BSA: 2.1 m2 Height: 65 in Weight: 220 lb HR: 87 BP: 138/63 mmHg ______________________________________________________________________________ Procedure Complete Echo Adult. ______________________________________________________________________________ Interpretation Summary  1. Normal left ventricular size and systolic performance with a visually estimated ejection fraction of 60%. 2. No significant valvular heart disease is identified on this study. 3. Normal right ventricular size with borderline reduction right ventricular systolic  performance. 4. There is mild biatrial enlargement. 5. There is a very small pericardial effusion. There is no evidence of significant intraventricular dependence/tamponade physiology. ______________________________________________________________________________ Left ventricle: Normal left ventricular size and systolic performance with a visually estimated ejection fraction of 60%. There is normal regional wall motion. Left ventricular wall thickness is normal.  Assessment of LV Diastolic Function: The cumulative findings suggest impaired diastolic filling [The septal e' velocity is < 7 cm/s & lateral e' velocity is < 10 cm/s. The average E/e' is >14. TR velocity is 2.8 m/s. Left atrial volume index is greater than 34 mL/mÂ ].  Assessment of left atrial pressure (LAP): The cumulative findings suggest moderately elevated left atrial pressure (the E/A is > 0.8 and <2.0 plus 2 or 3 of 3 of the following present: Average E/e' > 14, TRvel > 2.8 m/s, and/or LA vol. index > 34 ml/mÂ  ).  Right ventricle: Normal right ventricular size with borderline reduction right ventricular systolic performance.  Left atrium: There is mild left atrial enlargement.  Right atrium: There is mild right atrial enlargement.  IVC: The IVC is borderline dilated.  Aortic valve: The aortic valve is comprised of three cusps. No significant aortic stenosis or aortic insufficiency is detected on this study.  Mitral valve: The mitral valve appears morphologically normal. There is trace mitral insufficiency.  Tricuspid valve: The tricuspid valve is grossly morphologically normal. There is trace tricuspid insufficiency.  Pulmonic valve: The pulmonic valve is grossly morphologically normal.  Thoracic aorta: The aortic root and proximal ascending aorta are of normal dimension.  Pericardium: There is a very small pericardial effusion. There is no evidence of significant intraventricular dependence/tamponade physiology.  ______________________________________________________________________________ ______________________________________________________________________________ MMode/2D Measurements & Calculations IVSd: 1.3 cm LVIDd: 4.1 cm LVIDs: 2.6 cm LVPWd: 1.2 cm FS: 36.6 % LV mass(C)d: 185.2 grams LV mass(C)dI: 89.9 grams/m2 Ao root diam: 3.2 cm LA dimension: 4.6 cm asc Aorta Diam: 3.4 cm LA/Ao: 1.4 LVOT diam: 2.3 cm LVOT area: 4.1 cm2 LA Volume Indexed (AL/bp): 36.7 ml/m2  RWT: 0.58  Time Measurements MM HR: 87.0 BPM  Doppler Measurements & Calculations MV E max ayden: 123.0 cm/sec MV A max ayden: 124.2 cm/sec MV E/A: 0.99 MV dec time: 0.24 sec Ao V2 max: 189.4 cm/sec Ao max P.0 mmHg Ao V2 mean: 123.9 cm/sec Ao mean P.8 mmHg Ao V2 VTI: 32.7 cm QUAN(I,D): 2.8 cm2 QUAN(V,D): 2.9 cm2 LV V1 max P.9 mmHg LV V1 max: 131.3 cm/sec LV V1 VTI: 22.2 cm SV(LVOT): 92.1 ml SI(LVOT): 44.7 ml/m2 PA acc time: 0.09 sec TR max ayden: 279.6 cm/sec TR max P.3 mmHg AV Ayden Ratio (DI): 0.69 QUAN Index (cm2/m2): 1.4 E/E': 13.6  E/E' avg: 15.6 Lateral E/e': 13.6 Medial E/e': 17.7 Peak E' Ayden: 9.0 cm/sec  ______________________________________________________________________________ Report approved by: Leonid Hamilton 2023 02:47 PM       XR Chest Port 1 View    Result Date: 2023  EXAM: XR CHEST PORT 1 VIEW LOCATION: [facility] DATE/TIME: 2023 7:42 PM INDICATION: fever, new cough COMPARISON: 2021     IMPRESSION: Lung volumes are lower on this portable study. Borderline cardiomegaly unchanged. Pulmonary vessels normal. Questionable subtle right infrahilar infiltrate though this may relate to the lower lung volumes. Lungs otherwise clear.    XR Ankle Port Left G/E 3 Views    Result Date: 2023  EXAM: XR ANKLE PORT LEFT G/E 3 VIEWS LOCATION: [facility] DATE/TIME: 2023 2:22 PM INDICATION: left ankle pain, no trauma COMPARISON: None.     IMPRESSION: Bones are  demineralized. Mild tibiotalar joint degenerative change. No evidence of an acute fracture. Tiny bone fragment adjacent to the medial talus and mild bony proliferation along the tip of the lateral malleolus is likely chronic. Ankle mortise is intact. Calcaneal heel spur.    CT Abdomen Pelvis w Contrast    Result Date: 2/4/2023  EXAM: CT ABDOMEN PELVIS W CONTRAST LOCATION: [facility] DATE/TIME: 2/4/2023 12:08 AM INDICATION: 1 29 sigmoidectomy with increasing wbc COMPARISON: CT a abdomen/pelvis 01/29/2023 TECHNIQUE: CT scan of the abdomen and pelvis was performed following injection of IV contrast. Multiplanar reformats were obtained. Dose reduction techniques were used. CONTRAST: isovue 370 100ml FINDINGS: LOWER CHEST: Trace bilateral pleural effusions right greater than left with compressive atelectasis. HEPATOBILIARY: Diffuse hepatic steatosis. Cholelithiasis with mild gallbladder distention which may be related to fasting. No biliary ductal dilatation. PANCREAS: Normal. SPLEEN: Normal. ADRENAL GLANDS: Normal. KIDNEYS/BLADDER: No hydronephrosis. Box catheter within a decompressed bladder. BOWEL: Recent sigmoidectomy. Colonic diverticulosis. Trace abdominopelvic free ascites. Additionally, there is some loculated ascites in the anterior abdomen with partial rim enhancement and several foci of gas within, which suggests a degree of peritonitis. No drainable fluid collection currently. Right anterior approach drain terminates in the left pelvis. LYMPH NODES: Normal. VASCULATURE: Atherosclerotic calcifications of the aortoiliac vessels without evidence of aneurysmal dilatation. PELVIC ORGANS: Hysterectomy. MUSCULOSKELETAL: Body wall edema. Skin staples with recent ventral abdominal incision. Small fat-containing umbilical hernia. Multilevel degenerative changes of the thoracolumbar spine. No acute osseous abnormality.     IMPRESSION: 1.  Recent sigmoidectomy. Trace abdominopelvic free  ascites. Additionally, there is some loculated ascites in the anterior abdomen with partial rim enhancement and several foci of gas within, which suggests a degree of peritonitis. No drainable fluid collection currently. 2.  Trace bilateral pleural effusions right greater than left with compressive atelectasis. 3.  Body wall edema. 4.  Diffuse hepatic steatosis. 5.  Cholelithiasis with mild gallbladder distention which may be related to fasting.    CT Abdomen Pelvis w Contrast    Result Date: 1/25/2023  EXAM: CT ABDOMEN PELVIS W CONTRAST LOCATION: [facility] DATE/TIME: 1/25/2023 11:28 PM INDICATION: abdominal pain worst in lower quadrants, leukocytosis eval for pathology COMPARISON: 11/26/2022 TECHNIQUE: CT scan of the abdomen and pelvis was performed following injection of IV contrast. Multiplanar reformats were obtained. Dose reduction techniques were used. CONTRAST: isovue 370 100ml FINDINGS: LOWER CHEST: Small left Bochdalek hernia. Basilar atelectasis. HEPATOBILIARY: Cholelithiasis and hepatic steatosis. PANCREAS: Normal. SPLEEN: Normal. ADRENAL GLANDS: Normal. KIDNEYS/BLADDER: Normal. BOWEL: Small bowel is normal caliber. Large amount of colonic stool. Sigmoid colonic wall thickening with adjacent inflammation is again seen. The inflamed segment is distended with stool. Small amount of adjacent free fluid. Caliber change with collapse  of the rectosigmoid junction and rectum. Diverticulosis.  LYMPH NODES: Subcentimeter retroperitoneal lymph nodes. VASCULATURE: Atherosclerotic vascular calcification. PELVIC ORGANS: Absent uterus. MUSCULOSKELETAL: Degenerative change osseous structures. Mild compression L4. Slight anterolisthesis L4 on L5.     IMPRESSION: 1.  Sigmoid colitis is again seen. Collapse of the rectosigmoid junction and rectum distal to the inflamed segment of colon. Underlying stricture not excluded. 2.  Small amount of adjacent free fluid. 3.  Cholelithiasis.    CTA Abdomen  Pelvis with Contrast    Result Date: 1/29/2023  EXAM: CTA ABDOMEN PELVIS WITH CONTRAST LOCATION: [facility] DATE/TIME: 1/29/2023 6:48 AM INDICATION: recurrent colitis, distended abdomen pain out of proportion. COMPARISON: There are study dated 1/25/ TECHNIQUE: CT angiogram abdomen pelvis during arterial phase of injection of IV contrast. 2D and 3D MIP reconstructions were performed by the CT technologist. Dose reduction techniques were used. CONTRAST: isovue 370 100ml FINDINGS: ANGIOGRAM ABDOMEN/PELVIS: The abdominal aorta is normal in size and caliber throughout with scattered atherosclerotic calcifications noted. The iliac and femoral vessels are normal in size and caliber and well-opacified. Celiac and SMA and TONY arise normally and are well opacified at this time LOWER CHEST: Lung bases are clear. Heart is enlarged, similar prior exam. HEPATOBILIARY: Diffuse hepatic steatosis. No significant mass. No bile duct dilatation. Calcified gallstones are again seen within the gallbladder. PANCREAS: No significant mass, duct dilatation, or inflammatory change. SPLEEN: Normal size. ADRENAL GLANDS: No significant nodules. KIDNEYS/BLADDER: No significant mass, stone, or hydronephrosis. BOWEL: Mural thickening and pericolonic inflammation of the sigmoid colon is again noted, similar in appearance to prior exam with increased inflammation seen in the proximal sigmoid colon. No pericolonic abscess seen at this time. Stool is seen throughout the inflamed sigmoid colon as well as scattered throughout the large bowel without evidence of large bowel obstruction. Adjacent to the proximal sigmoid colon is free fluid with foci of gas which are extraluminal and new from the prior study (image 1:15, series 5). Additional foci of extraluminal gas are seen within the mesenteric fat anteriorly in the upper abdomen (image 57, series 5). There are scattered diverticula seen throughout the colon. Air-fluid levels are  seen scattered through multiple loops of small bowel with mild dilatation seen in the proximal jejunum in the upper abdomen, these mildly dilated loops of small bowel decompressed gradually distally without a clear transition point. LYMPH NODES: Increased Number of pericolonic lymph nodes are again seen adjacent to the sigmoid colon. PELVIC ORGANS: No pelvic masses. MUSCULOSKELETAL: Unchanged     IMPRESSION: 1.  Mural thickening of the sigmoid colon with pericolonic inflammation and stranding, slightly worsened from prior exam with punctate foci of gas seen adjacent to the inflamed colon as well as tracking in the anterior abdominal wall concerning for sigmoid colonic perforation. No organized intra-abdominal fluid collection such as abscess is seen at this time. Given the long-standing inflammation in this region an underlying neoplastic process cannot be excluded. 2.  Scattered air-fluid levels and mildly dilated loops of proximal jejunum which decompresses gradually without a transition point consistent favored to reflect reactive ileus 3.  Hepatic steatosis 4.  Cholelithiasis [Critical Result: Sigmoid colonic inflammation with areas of extraluminal gas, new from 1/25/2023 concerning for sigmoid colonic perforation] Finding was identified on 1/29/2023 6:56 AM. DR. Snell was contacted by me on 1/29/2023 7:08 AM and verbalized understanding of the critical result.     CT Chest w/o Contrast    Result Date: 1/29/2023  EXAM: CT CHEST WITHOUT CONTRAST LOCATION: [facility] DATE/TIME: 01/29/2023, 6:46 AM INDICATION: Fever and cough. COMPARISON: 11/26/2022 - CT chest, abdomen and pelvis. TECHNIQUE: Note that this study was performed in conjunction with a CT scan of the abdomen and pelvis. Refer to a separate report for findings from that study. CT chest without IV contrast. Multiplanar reformats were obtained. Dose reduction techniques were used. CONTRAST: None. FINDINGS: LUNGS AND PLEURA:  Minimal emphysematous changes in the lungs. Slight interstitial thickening in bilateral lung bases. A few curvilinear opacities in the periphery of both lungs likely represent atelectasis and/or scarring. The lungs are otherwise clear. Small left Bochdalek hernia containing fat MEDIASTINUM/AXILLAE: Atherosclerotic calcification in the thoracic aorta. CORONARY ARTERY CALCIFICATION: Present. MUSCULOSKELETAL: No acute findings. UPPER ABDOMEN: A few foci of extraluminal gas are present in the upper abdomen.     IMPRESSION: 1.  Slight interstitial thickening in bilateral lung bases. This is nonspecific, but most likely relates to interstitial pulmonary edema. 2.  No other findings suspicious for acute pulmonary disease. 3.  A few foci of extraluminal gas scattered within the nondependent aspect of the upper abdomen. In the absence of recent surgery, this is consistent with a bowel perforation. Refer to the report from the CT scan of the abdomen and pelvis performed in conjunction with this study for additional details. The findings were called to Dr. Snell by Dr. Anderson on 01/29/2023 at 0700 hours.       Latest radiology report personally reviewed.    Note created using dragon voice recognition software so sounds alike errors may have escaped editing.      02/10/2023   Matthew Anna MD  Hospitalist, [facility]  Pager: [phone]

## 2023-02-10 NOTE — PLAN OF CARE
Problem: Bowel Resection  Goal: Effective Bowel Motility and Elimination  Outcome: Progressing     Problem: Bowel Resection  Goal: Absence of Infection Signs and Symptoms  Outcome: Progressing     Problem: Diabetes Comorbidity  Goal: Blood Glucose Level Within Targeted Range  Outcome: Progressing     Patient VSS on 2 L O2 this shift, alert, disoriented intermittently to time, denies nausea but appetite poor, endorsing sore throat and mild abdominal pain today, given scheduled APAP and prn throat lozenge. BLE US performed this AM, no acute findings. Encouraging activity, patient up in chair most of shift, encouraging up to commode every 2 hours. Continues to have loose stools. Ambulated in hallway with therapy. BG 94 and 90 this shift. Fever and WBC improving from yesterday. IV abx per orders.    Jakub Chandler RN 2/10/2023  [phone]

## 2023-02-10 NOTE — PHARMACY-VANCOMYCIN DOSING SERVICE
Pharmacy Vancomycin Note  Date of Service February 10, 2023  Patient's  1945   77 year old, female    Indication: Intra-abdominal infection  Day of Therapy: 3  Current vancomycin regimen:  1000 mg IV q12h  Current vancomycin monitoring method: AUC  Current vancomycin therapeutic monitoring goal: 400-600 mg*h/L    InsightRX Prediction of Current Vancomycin Regimen    Loading dose: 2000 mg  Regimen: 1000 mg IV every 12 hours.  Start time: 00:20 on 2023  Exposure target: AUC24 (range)400-600 mg/L.hr   AUC24,ss: 531 mg/L.hr  Probability of AUC24 > 400: 70 %  Ctrough,ss: 15.6 mg/L  Probability of Ctrough,ss > 20: 37 %  Probability of nephrotoxicity (Lodise GERMAN ): 11 %    Current estimated CrCl = Estimated Creatinine Clearance: 105.5 mL/min (based on SCr of 0.57 mg/dL).    Creatinine for last 3 days  2023:  6:46 AM Creatinine 0.60 mg/dL  2023:  6:42 AM Creatinine 0.55 mg/dL  2/10/2023:  7:01 AM Creatinine 0.57 mg/dL    Recent Vancomycin Levels (past 3 days)  2/10/2023:  7:01 AM Vancomycin 16.5 ug/mL    Vancomycin IV Administrations (past 72 hours)                   vancomycin (VANCOCIN) 1000 mg in dextrose 5% 200 mL PREMIX (mg) 1,000 mg New Bag 02/10/23 0027     1,000 mg New Bag 23 1222     1,000 mg New Bag  0031    vancomycin (VANCOCIN) 2,000 mg in sodium chloride 0.9 % 500 mL intermittent infusion (mg) 2,000 mg New Bag 23 1220                Nephrotoxins and other renal medications (From now, onward)    Start     Dose/Rate Route Frequency Ordered Stop    23 0900  furosemide (LASIX) injection 20 mg         20 mg  over 1-3 Minutes Intravenous DAILY 23 1937      23 0000  vancomycin (VANCOCIN) 1000 mg in dextrose 5% 200 mL PREMIX         1,000 mg  200 mL/hr over 1 Hours Intravenous EVERY 12 HOURS 23 1528               Contrast Orders - past 72 hours (72h ago, onward)    Start     Dose/Rate Route Frequency Stop    02/07/23 2030  iopamidol (ISOVUE-370) solution  100 mL         100 mL Intravenous ONCE 02/07/23 2022          Interpretation of levels and current regimen:  Vancomycin level is reflective of -600    Has serum creatinine changed greater than 50% in last 72 hours: No    Urine output:  unable to determine    Renal Function: Stable    InsightRX Prediction of Planned New Vancomycin Regimen    Loading dose: 2000mg IV once 2/8/2023 at 12:20  Regimen: 1000 mg IV every 12 hours.  Start time: 12:27 on 02/10/2023  Exposure target: AUC24 (range)400-600 mg/L.hr   AUC24,ss: 421 mg/L.hr  Probability of AUC24 > 400: 76 %  Ctrough,ss: 11.2 mg/L  Probability of Ctrough,ss > 20: 3 %  Probability of nephrotoxicity (Lodise GERMAN 2009): 7 %    Plan:  1. Continue Current Dose  2. Vancomycin monitoring method: AUC  3. Vancomycin therapeutic monitoring goal: 400-600 mg*h/L  4. Pharmacy will check vancomycin levels as appropriate in 3-5 Days.  5. Serum creatinine levels will be ordered daily for the first week of therapy and at least twice weekly for subsequent weeks.    Keely Still, PharmD

## 2023-02-10 NOTE — PROGRESS NOTES
ASSESSMENT:  1. Perforation of colon (H)    2. Refusal of blood transfusions as patient is [employer]        Suzy Gómez is a 77 year old female who is s/p laparoscopic sigmoid colectomy and washout on 1/29/2023.  White blood cell count now downtrending for the past 48 hours, coinciding with initiation of vancomycin.  Patient's mobility and motivation are quite poor but better yesterday.  If her markers of infection continued to downtrend, discharges back on the table    PLAN:  -Up to that toilet or commode every 2 hours.  No excuses.  There is no reason for her not to be using a toilet or commode.  -As much ambulation and time in the chair as possible  -Continuing broad-spectrum antibiotics as outlined by infectious disease  -Diet as tolerated  -With her poor nutrition, and likely impaired healing abilities, we will plan to leave skin staples in for 3 weeks  -If leukocyte count trend in absolute value reached a point the infectious disease team is comfortable with, TCU discharge is reasonable in the next several days I think      SUBJECTIVE: Afebrile over the past 24 hours.  Otherwise no significant events.    Patient Vitals for the past 24 hrs:   BP Temp Temp src Pulse Resp SpO2   02/10/23 0754 (!) 175/70 98.5  F (36.9  C) Oral 101 20 97 %   02/09/23 2300 137/60 99.1  F (37.3  C) Oral 85 20 97 %   02/09/23 1553 129/61 99.4  F (37.4  C) Oral 77 20 97 %   02/09/23 1039 125/60 98.6  F (37  C) Oral 110 20 93 %   02/09/23 0956 127/81 100.3  F (37.9  C) Oral 113 25 92 %         PHYSICAL EXAM:  GEN: No acute distress, comfortable    ABD: Soft no specific tenderness upon palpitation.  Incisions clean, dry, intact.  Output by Drain (mL) 02/08/23 0700 - 02/08/23 1459 02/08/23 1500 - 02/08/23 2259 02/08/23 2300 - 02/09/23 0659 02/09/23 0700 - 02/09/23 1459 02/09/23 1500 - 02/09/23 2259 02/09/23 2300 - 02/10/23 0659 02/10/23 0700 - 02/10/23 0819   Open Drain Ventral Abdomen              EXT:No cyanosis, edema or  obvious abnormalities    02/09 0700 - 02/10 0659  In: 2138.3 [P.O.:240; I.V.:1898.3]  Out: -     No results displayed because visit has over 200 results.   Recent Results (from the past 24 hour(s))   Blood Culture Peripheral Blood    Collection Time: 02/09/23  9:40 AM    Specimen: Peripheral Blood   Result Value Ref Range    Culture No growth after 12 hours    Blood Culture Peripheral Blood    Collection Time: 02/09/23  9:40 AM    Specimen: Peripheral Blood   Result Value Ref Range    Culture No growth after 12 hours    Lactic Acid STAT    Collection Time: 02/09/23 10:00 AM   Result Value Ref Range    Lactic Acid 1.3 0.7 - 2.0 mmol/L   Glucose by meter    Collection Time: 02/09/23 12:05 PM   Result Value Ref Range    GLUCOSE BY METER POCT 164 (H) 70 - 99 mg/dL   Glucose by meter    Collection Time: 02/09/23  4:48 PM   Result Value Ref Range    GLUCOSE BY METER POCT 156 (H) 70 - 99 mg/dL   Glucose by meter    Collection Time: 02/09/23  9:00 PM   Result Value Ref Range    GLUCOSE BY METER POCT 126 (H) 70 - 99 mg/dL   CBC with platelets    Collection Time: 02/10/23  7:01 AM   Result Value Ref Range    WBC Count 15.0 (H) 4.0 - 11.0 10e3/uL    RBC Count 3.47 (L) 3.80 - 5.20 10e6/uL    Hemoglobin 9.7 (L) 11.7 - 15.7 g/dL    Hematocrit 32.3 (L) 35.0 - 47.0 %    MCV 93 78 - 100 fL    MCH 28.0 26.5 - 33.0 pg    MCHC 30.0 (L) 31.5 - 36.5 g/dL    RDW 15.0 10.0 - 15.0 %    Platelet Count 415 150 - 450 10e3/uL   Basic metabolic panel    Collection Time: 02/10/23  7:01 AM   Result Value Ref Range    Sodium 141 136 - 145 mmol/L    Potassium 3.2 (L) 3.4 - 5.3 mmol/L    Chloride 101 98 - 107 mmol/L    Carbon Dioxide (CO2) 30 (H) 22 - 29 mmol/L    Anion Gap 10 7 - 15 mmol/L    Urea Nitrogen 10.9 8.0 - 23.0 mg/dL    Creatinine 0.57 0.51 - 0.95 mg/dL    Calcium 7.8 (L) 8.8 - 10.2 mg/dL    Glucose 90 70 - 99 mg/dL    GFR Estimate >90 >60 mL/min/1.73m2               Wilfredo Palencia MD

## 2023-02-10 NOTE — CONSULTS
"Triage and Transition - Consult and Liaison     Suzy Gómez  February 10, 2023    Session start: 2:05 PM  Session end: 2:17 PM  Session duration in minutes: 12 minutes  CPT utilized: 26450 - Brief diagnostic assessment (modifier 52)  Patient was seen virtually ([employer] cart or other teleconferencing device).    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     Maintain current transition plan. Next steps include: Meet with provider to assist with medication/ anti-depressants.     Patient would like to have C&L provide therapeutic check-ins/therapy session with her to assist with depression and adjustment with her health until she is medically cleared.    Recommend to re-evaluate/provide outpatient mental health resources prior to discharging from TCU.    Please enter another psychiatry if further visits are needed. Patients are not followed by Psychiatry C&L Service unless otherwise indicated.     Reason for consult: Psychiatry consult was requested due to \"Depression. Family's request\". Patient was seen by Triage and Transition Consult & Liaison team.     Identifying information: Suzy is 77 year old White  female   followed related to s/p laparoscopic sigmoid colectomy and washout on 1/29/2023.  White blood cell count now downtrending for the past 48 hours, coinciding with initiation of vancomycin.  Patient's mobility and motivation are quite poor but better yesterday.  If her markers of infection continued to downtrend, discharges back on the table.    Summary of Patient Situation     Per patient's report: Not sure what brought her into the hospital. Bad pain, next thing in hospital getting surgery. Not coping well with things going on. Struggling mentally and emotionally. No energy, not sleeping well, terrible appetite. Worst since being in the hospital. " "Would like to have psychiatry/meds to assist with depression and sleep. Patient is agreeable to receive therapeutic check-ins/sessions with C&L team while waiting to transition to TCU and to be medically cleared.    Patient presents with hopelessness and responded minimally. Patient reported without her physical/medical issues, she does not need support for her mental health. Patient reported she has given up on everything, passive suicidal ideations. Patient stated, \"What is the point of all these questions\".    Brief Psychosocial History     Per patient's report: Patient is , 3 kids. Hobbies: \"not being in the hospital\". No spiritual or Latter day beliefs.  and family are her supports. Patient did not want to answer anymore questions.    Significant Clinical History     Per patient's report: No mental health services before. No depression prior to hospitalization. Depression, hopelessness, and anger started after medical issues. Patient stated, \"What is the point of all these questions\".    Lee Suicide Severity Rating Scale Full Clinical Version:  Suicidal Ideation  1. Wish to be Dead (Lifetime): Yes  1. Wish to be Dead (Past 1 Month): Yes  2. Non-Specific Active Suicidal Thoughts (Lifetime): No  Intensity of Ideation  Most Severe Ideation Rating (Lifetime): 1  Description of Most Severe Ideation (Lifetime): Go to sleep and not wake up  Most Severe Ideation Rating (Past 1 Month): 1  Frequency (Lifetime): Less than once a week  Frequency (Past 1 Month): Once a week  Duration (Lifetime): Fleeting, few seconds or minutes  Duration (Past 1 Month): Less than 1 hour/some of the time  Controllability (Lifetime): Easily able to control thoughts  Controllability (Past 1 Month): Easily able to control thoughts  Deterrents (Lifetime): Does not apply  Deterrents (Past 1 Month): Does not apply  Reasons for Ideation (Lifetime): Mostly to end or stop the pain (You couldn't go on living with the pain or how you " "were feeling)  Reasons for Ideation (Past 1 Month): Mostly to end or stop the pain (You couldn't go on living with the pain or how you were feeling)  Suicidal Behavior  Actual Attempt (Lifetime): No  Has subject engaged in non-suicidal self-injurious behavior? (Lifetime): No  Interrupted Attempts (Lifetime): No  Aborted or Self-Interrupted Attempt (Lifetime): No  Preparatory Acts or Behavior (Lifetime): No  C-SSRS Risk (Lifetime/Recent)  Calculated C-SSRS Risk Score (Lifetime/Recent): Low Risk    Validity of evaluation is impacted by presenting factors during interview AEB engagement in assessment.     Environmental or Psychosocial Events: helplessness/hopelessness, new diagnosis of major illness and other: digestive concerns/medical condition  Chronic Risk Factors: chronic and ongoing sleep difficulties   Warning Signs: hopelessness, pain (new or worsening), feeling trapped, like there is no way out, withdrawing from friends, family, and society, anxiety, agitation, unable to sleep, sleeping all the time and dramatic changes in mood  Protective Factors: strong bond to family unit, community support, or employment  Interpretation of Risk Scoring, Risk Mitigation Interventions and Safety Plan:  Patient is at a low risk of harm to themselves. Patient does not endorse having recent / active suicidal ideation. No history of suicidal behavior. Patient does endorse hopelessness and passive suicidal ideations of \"giving up and going to sleep to get this all over with\".      Current Providers  Primary Care Provider: Yes Carie   Psychiatrist: No   Therapist: No   : No   [facility]: No   ACT Team: No   Other: No    Collateral information:   Reviewed chart and coordinated with RN. Per RN: Patient is not medically cleared for discharge. Plan is for patient to go to TCU. Family would like to have a counselor meet with patient. Patient has been sleeping off and on all day. Withdrawn, seems more depressed.     This writer " spoke with patient's  Awais ([phone]). Per Awais's report: Two weeks prior to being admitted to the hospital, patient was having many medical concerns and she started to withdrawn mentally and emotionally. Keep feeling down, withdrawn, sleeping a lot, negative, hopeless, down on herself and everyone else.    Mental Status Exam   Affect: Flat  Appearance: Disheveled   Attention Span/Concentration: Inattentive    Eye Contact: Avoidant  Fund of Knowledge: Delayed   Language /Speech Content: Fluent  Language /Speech Volume: Normal   Language /Speech Rate/Productions: Slow   Recent Memory: Variable  Remote Memory: Variable  Mood: Apathetic   Orientation:   Person: Yes   Place: Yes  Time of Day: Yes   Date: Yes   Situation (Do they understand why they are here?): Yes   Psychomotor Behavior: Underactive   Thought Content: Suicidal  Thought Form: Intact    Current medications:   Current Facility-Administered Medications   Medication     acetaminophen (TYLENOL) tablet 650 mg     amLODIPine (NORVASC) tablet 2.5 mg     aspirin EC tablet 81 mg     benzocaine-menthol (CHLORASEPTIC) 6-10 MG lozenge 1 lozenge     glucose gel 15-30 g    Or     dextrose 50 % injection 25-50 mL    Or     glucagon injection 1 mg     enoxaparin ANTICOAGULANT (LOVENOX) injection 40 mg     furosemide (LASIX) injection 20 mg     hydrOXYzine (ATARAX) tablet 10 mg     insulin aspart (NovoLOG) injection (RAPID ACTING)     insulin aspart (NovoLOG) injection (RAPID ACTING)     [Held by provider] insulin glargine (LANTUS PEN) injection 30 Units     levothyroxine (SYNTHROID/LEVOTHROID) tablet 137 mcg     lidocaine (LMX4) cream     lidocaine 1 % 0.1-1 mL     melatonin tablet 1 mg     menthol-zinc oxide (CALMOSEPTINE) 0.44-20.6 % ointment OINT     meropenem (MERREM) 1 g vial to attach to  mL bag     micafungin (MYCAMINE) 100 mg in sodium chloride 0.9 % 100 mL intermittent infusion     naloxone (NARCAN) injection 0.2 mg    Or     naloxone (NARCAN)  injection 0.4 mg    Or     naloxone (NARCAN) injection 0.2 mg    Or     naloxone (NARCAN) injection 0.4 mg     ondansetron (ZOFRAN ODT) ODT tab 4 mg    Or     ondansetron (ZOFRAN) injection 4 mg     oxyCODONE (ROXICODONE) tablet 5 mg     oxyCODONE IR (ROXICODONE) half-tab 2.5 mg     rosuvastatin (CRESTOR) tablet 20 mg     sodium chloride (PF) 0.9% PF flush 3 mL     sodium chloride (PF) 0.9% PF flush 3 mL     sodium chloride (PF) 0.9% PF flush 3 mL     sodium chloride (PF) 0.9% PF flush 3 mL     sodium chloride 0.9% infusion     vancomycin (VANCOCIN) 1000 mg in dextrose 5% 200 mL PREMIX     venlafaxine (EFFEXOR XR) 24 hr capsule 75 mg        Therapeutic intervention and progress:  Therapeutic intervention consisted of building therapeutic rapport, active listening, validation, thought reframing, engaging in learning/practicing coping skills and active problem solving. Patient is making progress towards treatment goals as evidenced by lack of engagement in session aside from the [place] assessment.      JAZMIN Ansari, LICSW  Triage and Transition - Consult and Liaison   [phone]

## 2023-02-10 NOTE — PLAN OF CARE
Problem: Pain Acute  Goal: Optimal Pain Control and Function  Intervention: Prevent or Manage Pain  Recent Flowsheet Documentation  Taken 2/10/2023 0100 by Rg Wasserman RN  Sensory Stimulation Regulation:   care clustered   quiet environment promoted  Medication Review/Management: medications reviewed     Problem: Plan of Care - These are the overarching goals to be used throughout the patient stay.    Goal: Absence of Hospital-Acquired Illness or Injury  Intervention: Identify and Manage Fall Risk  Recent Flowsheet Documentation  Taken 2/10/2023 0100 by Rg Wasserman RN  Safety Promotion/Fall Prevention:   activity supervised   assistive device/personal items within reach   bed alarm on   chair alarm on   clutter free environment maintained   fall prevention program maintained   mobility aid in reach   nonskid shoes/slippers when out of bed   patient and family education   room door open   room organization consistent   safety round/check completed  Intervention: Prevent and Manage VTE (Venous Thromboembolism) Risk  Recent Flowsheet Documentation  Taken 2/10/2023 0100 by Rg Wasserman RN  VTE Prevention/Management: SCDs (sequential compression devices) on   Goal Outcome Evaluation:  EN denied pain upon when first assessed. IV on R flushing and patent. Responded appropriately to questions. Continuous IV fluids running. Clustered care to promote rest.

## 2023-02-10 NOTE — PROGRESS NOTES
"INFECTIOUS DISEASE FOLLOW UP NOTE      ASSESSMENT:  1. Intra-abdominal infection presenting with perforated stercolic ulcer of sigmoid colon, underwent laparoscopic sigmoid colectomy with anastomosis, washout 1/29. Findings of diffuse feculent peritonitis. Ongoing pain and leukocytosis. Draining clear fluid from wound -- creatinine level not suggestive of urine leak based on estimate. Bowel function returning to normal, but wbc remains high and increasing. No improvement with change in antibiotics. Repeat CT without significant findings for infection. CRP increasing. Fever and wbc improving after starting vancomycin   2. Bacteremia with clostridium species, Collinsella aerofaciens, bacteroides vulgatus, and Eubacterium spp. Likely secondary to perforation. Susceptible to meropenem and metronidazole   3. DM  4. Hypothyroidism. Mildly elevated TSH prior to admission, on treatment  5. Left great toe infection for many months. Has seen podiatry and primary clinic for this. Recent doxycycline. Had MRI negative for osteomyelitis in September. No signs of lower ext infection currently  6. Zoroastrianism per chart refusing blood products.   7. Cholelithiasis      PLAN:  -Continue vancomycin, meropenem and micafungin   -bilateral lower ext u/s - no DVTs  -trend wbc and CRP    Lexx Zuniga MD  [facility]  Direct messaging: [employer] Paging  On-Call ID provider: [phone], option: [phone]      ______________________________________________________________________    SUBJECTIVE / INTERVAL HISTORY:     No acute events. Wbc down for 2nd day. Up in chair this morning, Says she is, \"hanging in there\" and she \"wants to get better\"      OBJECTIVE:  BP (!) 175/70 (BP Location: Left arm)   Pulse 101   Temp 98.5  F (36.9  C) (Oral)   Resp 20   Ht 1.651 m (5' 5\")   Wt 110.5 kg (243 lb 9.7 oz)   SpO2 97%   BMI 40.54 kg/m       GEN: No acute distress.   RESPIRATORY:  Clear bilaterally   CARDIOVASCULAR:  " Tachy, no murmur  ABDOMEN:  Soft, generalized tenderness. Incision site intact without drainage  EXTREMITIES: mild edema  SKIN/HAIR/NAILS:  No rashes. Erythema on multiple toes, without lesions or tenderness  Psych: more conversant today  IV: peripheral        Antibiotics:  Meropenem 2/4-  Micafungin 2/4-  Vancomycin 2/8-    Previous:  pip/tazo 1/29-2/4  Vancomycin 1/29 x1      Pertinent labs:    Recent Labs   Lab 02/10/23  0701 02/09/23  0642 02/08/23  0646   WBC 15.0* 21.4* 25.1*  24.8*   HGB 9.7* 10.2* 10.9*   HCT 32.3* 33.4* 36.3    445 449        Recent Labs   Lab 02/10/23  0701 02/08/23  0646 02/07/23  0604    138 139   CO2 30* 31* 31*   BUN 10.9 9.9 7.4*      No results found for: CRP      Lab Results   Component Value Date    ALT 14 02/08/2023    AST 29 02/08/2023    ALKPHOS 142 (H) 02/08/2023         MICROBIOLOGY DATA:  1/29 blood culture: C.clostridioforme and B.vulgatus  1/29 blood culture: Eubacterium spp and Collinsella aerofaciens      RADIOLOGY:  CT Chest/Abdomen/Pelvis w Contrast    Result Date: 2/7/2023  EXAM: CT CHEST/ABDOMEN/PELVIS W CONTRAST LOCATION: [facility] DATE/TIME: 2/7/2023 8:21 PM INDICATION: ongoing leukocytosis. new fever. 1/29 sigmoidectomy with wash out. COMPARISON: CT of the abdomen and pelvis 2/4/2023 TECHNIQUE: CT scan of the chest, abdomen, and pelvis was performed following injection of IV contrast. Multiplanar reformats were obtained. Dose reduction techniques were used. CONTRAST: 100 mL Isovue-370 FINDINGS: LUNGS AND PLEURA: Small bilateral pleural effusions layer into the posterior costophrenic sulci. Bands of atelectasis along the posterior pleura of the lower lobes. No lung edema or consolidation. Trachea and central airways are patent and normal caliber. MEDIASTINUM: Cardiac chambers are normal in size. No pericardial effusion. Normal caliber thoracic aorta and main pulmonary artery. Arch, proximal great vessel and descending aorta  atheromatous calcification is patchy. There are no enlarged mediastinal or hilar lymph nodes. Esophagus is decompressed. A few small lower paraesophageal varices are present. CORONARY ARTERY CALCIFICATION: Mild to moderate, three-vessel disease. HEPATOBILIARY: Slight geographic liver attenuation but no focal enhancement or parenchymal lesion. There is a calcified stone layering in the gallbladder. No gallbladder wall thickening or pericholecystic inflammation. No bile duct enlargement. PANCREAS: Normal. SPLEEN: Normal. ADRENAL GLANDS: Normal. KIDNEYS/BLADDER: Trace amount of air in the nondependent bladder from recent instrumentation. Bladder wall is smooth with uniform thickness. Kidneys are normal in size with symmetric enhancement and normal cortex thickness. No nephrolithiasis or hydronephrosis. BOWEL: Nondistended stomach. Proximal small bowel in the left upper quadrant is mildly dilated and fluid-filled, however no clear caliber transition and this is unchanged from 2/4/2023. There is hazy attenuation in the mesentery consistent with edema. Minimal perihepatic and perisplenic ascites. Similar ascites layering in the left lower quadrant with small amount of anterior linear enhancement (series 3, image 205). Status post sigmoid colon resection with colocolonic anastomosis in the upper left pelvis. There are multiple diverticula arising from the ascending colon. No pneumatosis or pneumoperitoneum. LYMPH NODES: Mesenteric, aortocaval and iliac chain lymph nodes are normal by size criteria. VASCULATURE: Diffuse atheromatous calcification of the infrarenal abdominal aorta and common iliac arteries. No aneurysm. PELVIC ORGANS: Post hysterectomy. MUSCULOSKELETAL: Grade 1 degenerative anterolisthesis of L4 on L5 and lumbar facet arthropathy. Vacuum disc phenomenon from L1-2 to L3-L4. Thoracic vertebra are maintained in height with small diffuse osteophytes and mild disc space narrowing. No aggressive or destructive  bone lesions. Infraumbilical skin staples are present from recent laparotomy. No body wall hernia. Mild body wall anasarca.     IMPRESSION: 1.  Status post segmental sigmoid colon resection with colocolonic anastomosis in the upper left pelvis. No findings to suggest a surgical complication. 2.  Diverticula arising from the ascending colon but no acute diverticulitis. 3.  Unchanged ascites and mesenteric edema. No drainable intra-abdominal fluid collection. 4.  Minimal pleural effusions and atelectasis in the posterior sulci. 5.  Cholelithiasis.     Echocardiogram Complete    Result Date: 2023  [id number]^[place] [id number]  Name: SRIDHAR ALONSO MRN: [id number] : 1945 Study Date: 2023 12:44 PM Age: 77 yrs Gender: Female Patient Location: [facility] Reason For Study: CHF Ordering Physician: PAYAL OCAMPO Performed By: KATHARINE  BSA: 2.1 m2 Height: 65 in Weight: 220 lb HR: 87 BP: 138/63 mmHg ______________________________________________________________________________ Procedure Complete Echo Adult. ______________________________________________________________________________ Interpretation Summary  1. Normal left ventricular size and systolic performance with a visually estimated ejection fraction of 60%. 2. No significant valvular heart disease is identified on this study. 3. Normal right ventricular size with borderline reduction right ventricular systolic performance. 4. There is mild biatrial enlargement. 5. There is a very small pericardial effusion. There is no evidence of significant intraventricular dependence/tamponade physiology. ______________________________________________________________________________ Left ventricle: Normal left ventricular size and systolic performance with a visually estimated ejection fraction of 60%. There is normal regional wall motion. Left ventricular wall thickness is normal.  Assessment of LV  Diastolic Function: The cumulative findings suggest impaired diastolic filling [The septal e' velocity is < 7 cm/s & lateral e' velocity is < 10 cm/s. The average E/e' is >14. TR velocity is 2.8 m/s. Left atrial volume index is greater than 34 mL/mÂ ].  Assessment of left atrial pressure (LAP): The cumulative findings suggest moderately elevated left atrial pressure (the E/A is > 0.8 and <2.0 plus 2 or 3 of 3 of the following present: Average E/e' > 14, TRvel > 2.8 m/s, and/or LA vol. index > 34 ml/mÂ  ).  Right ventricle: Normal right ventricular size with borderline reduction right ventricular systolic performance.  Left atrium: There is mild left atrial enlargement.  Right atrium: There is mild right atrial enlargement.  IVC: The IVC is borderline dilated.  Aortic valve: The aortic valve is comprised of three cusps. No significant aortic stenosis or aortic insufficiency is detected on this study.  Mitral valve: The mitral valve appears morphologically normal. There is trace mitral insufficiency.  Tricuspid valve: The tricuspid valve is grossly morphologically normal. There is trace tricuspid insufficiency.  Pulmonic valve: The pulmonic valve is grossly morphologically normal.  Thoracic aorta: The aortic root and proximal ascending aorta are of normal dimension.  Pericardium: There is a very small pericardial effusion. There is no evidence of significant intraventricular dependence/tamponade physiology. ______________________________________________________________________________ ______________________________________________________________________________ MMode/2D Measurements & Calculations IVSd: 1.3 cm LVIDd: 4.1 cm LVIDs: 2.6 cm LVPWd: 1.2 cm FS: 36.6 % LV mass(C)d: 185.2 grams LV mass(C)dI: 89.9 grams/m2 Ao root diam: 3.2 cm LA dimension: 4.6 cm asc Aorta Diam: 3.4 cm LA/Ao: 1.4 LVOT diam: 2.3 cm LVOT area: 4.1 cm2 LA Volume Indexed (AL/bp): 36.7 ml/m2  RWT: 0.58  Time Measurements MM HR: 87.0 BPM  Doppler  Measurements & Calculations MV E max ayden: 123.0 cm/sec MV A max ayden: 124.2 cm/sec MV E/A: 0.99 MV dec time: 0.24 sec Ao V2 max: 189.4 cm/sec Ao max P.0 mmHg Ao V2 mean: 123.9 cm/sec Ao mean P.8 mmHg Ao V2 VTI: 32.7 cm QUAN(I,D): 2.8 cm2 QUAN(V,D): 2.9 cm2 LV V1 max P.9 mmHg LV V1 max: 131.3 cm/sec LV V1 VTI: 22.2 cm SV(LVOT): 92.1 ml SI(LVOT): 44.7 ml/m2 PA acc time: 0.09 sec TR max ayden: 279.6 cm/sec TR max P.3 mmHg AV Ayden Ratio (DI): 0.69 QUAN Index (cm2/m2): 1.4 E/E': 13.6  E/E' avg: 15.6 Lateral E/e': 13.6 Medial E/e': 17.7 Peak E' Ayden: 9.0 cm/sec  ______________________________________________________________________________ Report approved by: Leonid Hamilton 2023 02:47 PM       US Lower Extremity Venous Duplex Bilateral    Result Date: 2/10/2023  EXAM: US LOWER EXTREMITY VENOUS DUPLEX BILATERAL LOCATION: [facility] DATE/TIME: 2/10/2023 10:35 AM INDICATION: Bilateral lower extremity pain, swelling and edema. Fever. COMPARISON: None. TECHNIQUE: Venous Duplex ultrasound of bilateral lower extremities with and without compression, augmentation and duplex. Color flow and spectral Doppler with waveform analysis performed. FINDINGS: Exam includes the common femoral, femoral, popliteal veins as well as segmentally visualized deep calf veins and greater saphenous vein. RIGHT: No deep vein thrombosis. No superficial thrombophlebitis. Anechoic avascular cyst in the right popliteal fossa measuring 4.3 x 3.1 x 0.7 cm. LEFT: No deep vein thrombosis. No superficial thrombophlebitis. Anechoic avascular cyst in the left popliteal fossa measuring 3.5 x 2.0 x 0.5 cm     IMPRESSION: 1.  No deep venous thrombosis in the bilateral lower extremities. 2.  Bilateral popliteal fossa Baker's cysts, slightly larger on the right.    XR Chest Port 1 View    Result Date: 2023  EXAM: XR CHEST PORT 1 VIEW LOCATION: [facility] DATE/TIME: 2023 7:42 PM  INDICATION: fever, new cough COMPARISON: 4/28/2021     IMPRESSION: Lung volumes are lower on this portable study. Borderline cardiomegaly unchanged. Pulmonary vessels normal. Questionable subtle right infrahilar infiltrate though this may relate to the lower lung volumes. Lungs otherwise clear.    XR Ankle Port Left G/E 3 Views    Result Date: 2/5/2023  EXAM: XR ANKLE PORT LEFT G/E 3 VIEWS LOCATION: [facility] DATE/TIME: 2/5/2023 2:22 PM INDICATION: left ankle pain, no trauma COMPARISON: None.     IMPRESSION: Bones are demineralized. Mild tibiotalar joint degenerative change. No evidence of an acute fracture. Tiny bone fragment adjacent to the medial talus and mild bony proliferation along the tip of the lateral malleolus is likely chronic. Ankle mortise is intact. Calcaneal heel spur.    CT Abdomen Pelvis w Contrast    Result Date: 2/4/2023  EXAM: CT ABDOMEN PELVIS W CONTRAST LOCATION: [facility] DATE/TIME: 2/4/2023 12:08 AM INDICATION: 1 29 sigmoidectomy with increasing wbc COMPARISON: CT a abdomen/pelvis 01/29/2023 TECHNIQUE: CT scan of the abdomen and pelvis was performed following injection of IV contrast. Multiplanar reformats were obtained. Dose reduction techniques were used. CONTRAST: isovue 370 100ml FINDINGS: LOWER CHEST: Trace bilateral pleural effusions right greater than left with compressive atelectasis. HEPATOBILIARY: Diffuse hepatic steatosis. Cholelithiasis with mild gallbladder distention which may be related to fasting. No biliary ductal dilatation. PANCREAS: Normal. SPLEEN: Normal. ADRENAL GLANDS: Normal. KIDNEYS/BLADDER: No hydronephrosis. Box catheter within a decompressed bladder. BOWEL: Recent sigmoidectomy. Colonic diverticulosis. Trace abdominopelvic free ascites. Additionally, there is some loculated ascites in the anterior abdomen with partial rim enhancement and several foci of gas within, which suggests a degree of peritonitis. No  drainable fluid collection currently. Right anterior approach drain terminates in the left pelvis. LYMPH NODES: Normal. VASCULATURE: Atherosclerotic calcifications of the aortoiliac vessels without evidence of aneurysmal dilatation. PELVIC ORGANS: Hysterectomy. MUSCULOSKELETAL: Body wall edema. Skin staples with recent ventral abdominal incision. Small fat-containing umbilical hernia. Multilevel degenerative changes of the thoracolumbar spine. No acute osseous abnormality.     IMPRESSION: 1.  Recent sigmoidectomy. Trace abdominopelvic free ascites. Additionally, there is some loculated ascites in the anterior abdomen with partial rim enhancement and several foci of gas within, which suggests a degree of peritonitis. No drainable fluid collection currently. 2.  Trace bilateral pleural effusions right greater than left with compressive atelectasis. 3.  Body wall edema. 4.  Diffuse hepatic steatosis. 5.  Cholelithiasis with mild gallbladder distention which may be related to fasting.    CT Abdomen Pelvis w Contrast    Result Date: 1/25/2023  EXAM: CT ABDOMEN PELVIS W CONTRAST LOCATION: [facility] DATE/TIME: 1/25/2023 11:28 PM INDICATION: abdominal pain worst in lower quadrants, leukocytosis eval for pathology COMPARISON: 11/26/2022 TECHNIQUE: CT scan of the abdomen and pelvis was performed following injection of IV contrast. Multiplanar reformats were obtained. Dose reduction techniques were used. CONTRAST: isovue 370 100ml FINDINGS: LOWER CHEST: Small left Bochdalek hernia. Basilar atelectasis. HEPATOBILIARY: Cholelithiasis and hepatic steatosis. PANCREAS: Normal. SPLEEN: Normal. ADRENAL GLANDS: Normal. KIDNEYS/BLADDER: Normal. BOWEL: Small bowel is normal caliber. Large amount of colonic stool. Sigmoid colonic wall thickening with adjacent inflammation is again seen. The inflamed segment is distended with stool. Small amount of adjacent free fluid. Caliber change with collapse  of the  rectosigmoid junction and rectum. Diverticulosis.  LYMPH NODES: Subcentimeter retroperitoneal lymph nodes. VASCULATURE: Atherosclerotic vascular calcification. PELVIC ORGANS: Absent uterus. MUSCULOSKELETAL: Degenerative change osseous structures. Mild compression L4. Slight anterolisthesis L4 on L5.     IMPRESSION: 1.  Sigmoid colitis is again seen. Collapse of the rectosigmoid junction and rectum distal to the inflamed segment of colon. Underlying stricture not excluded. 2.  Small amount of adjacent free fluid. 3.  Cholelithiasis.    CTA Abdomen Pelvis with Contrast    Result Date: 1/29/2023  EXAM: CTA ABDOMEN PELVIS WITH CONTRAST LOCATION: [facility] DATE/TIME: 1/29/2023 6:48 AM INDICATION: recurrent colitis, distended abdomen pain out of proportion. COMPARISON: There are study dated 1/25/ TECHNIQUE: CT angiogram abdomen pelvis during arterial phase of injection of IV contrast. 2D and 3D MIP reconstructions were performed by the CT technologist. Dose reduction techniques were used. CONTRAST: isovue 370 100ml FINDINGS: ANGIOGRAM ABDOMEN/PELVIS: The abdominal aorta is normal in size and caliber throughout with scattered atherosclerotic calcifications noted. The iliac and femoral vessels are normal in size and caliber and well-opacified. Celiac and SMA and TONY arise normally and are well opacified at this time LOWER CHEST: Lung bases are clear. Heart is enlarged, similar prior exam. HEPATOBILIARY: Diffuse hepatic steatosis. No significant mass. No bile duct dilatation. Calcified gallstones are again seen within the gallbladder. PANCREAS: No significant mass, duct dilatation, or inflammatory change. SPLEEN: Normal size. ADRENAL GLANDS: No significant nodules. KIDNEYS/BLADDER: No significant mass, stone, or hydronephrosis. BOWEL: Mural thickening and pericolonic inflammation of the sigmoid colon is again noted, similar in appearance to prior exam with increased inflammation seen in the proximal  sigmoid colon. No pericolonic abscess seen at this time. Stool is seen throughout the inflamed sigmoid colon as well as scattered throughout the large bowel without evidence of large bowel obstruction. Adjacent to the proximal sigmoid colon is free fluid with foci of gas which are extraluminal and new from the prior study (image 1:15, series 5). Additional foci of extraluminal gas are seen within the mesenteric fat anteriorly in the upper abdomen (image 57, series 5). There are scattered diverticula seen throughout the colon. Air-fluid levels are seen scattered through multiple loops of small bowel with mild dilatation seen in the proximal jejunum in the upper abdomen, these mildly dilated loops of small bowel decompressed gradually distally without a clear transition point. LYMPH NODES: Increased Number of pericolonic lymph nodes are again seen adjacent to the sigmoid colon. PELVIC ORGANS: No pelvic masses. MUSCULOSKELETAL: Unchanged     IMPRESSION: 1.  Mural thickening of the sigmoid colon with pericolonic inflammation and stranding, slightly worsened from prior exam with punctate foci of gas seen adjacent to the inflamed colon as well as tracking in the anterior abdominal wall concerning for sigmoid colonic perforation. No organized intra-abdominal fluid collection such as abscess is seen at this time. Given the long-standing inflammation in this region an underlying neoplastic process cannot be excluded. 2.  Scattered air-fluid levels and mildly dilated loops of proximal jejunum which decompresses gradually without a transition point consistent favored to reflect reactive ileus 3.  Hepatic steatosis 4.  Cholelithiasis [Critical Result: Sigmoid colonic inflammation with areas of extraluminal gas, new from 1/25/2023 concerning for sigmoid colonic perforation] Finding was identified on 1/29/2023 6:56 AM. DR. Snell was contacted by me on 1/29/2023 7:08 AM and verbalized understanding of the critical result.      CT Chest w/o Contrast    Result Date: 1/29/2023  EXAM: CT CHEST WITHOUT CONTRAST LOCATION: [facility] DATE/TIME: 01/29/2023, 6:46 AM INDICATION: Fever and cough. COMPARISON: 11/26/2022 - CT chest, abdomen and pelvis. TECHNIQUE: Note that this study was performed in conjunction with a CT scan of the abdomen and pelvis. Refer to a separate report for findings from that study. CT chest without IV contrast. Multiplanar reformats were obtained. Dose reduction techniques were used. CONTRAST: None. FINDINGS: LUNGS AND PLEURA: Minimal emphysematous changes in the lungs. Slight interstitial thickening in bilateral lung bases. A few curvilinear opacities in the periphery of both lungs likely represent atelectasis and/or scarring. The lungs are otherwise clear. Small left Bochdalek hernia containing fat MEDIASTINUM/AXILLAE: Atherosclerotic calcification in the thoracic aorta. CORONARY ARTERY CALCIFICATION: Present. MUSCULOSKELETAL: No acute findings. UPPER ABDOMEN: A few foci of extraluminal gas are present in the upper abdomen.     IMPRESSION: 1.  Slight interstitial thickening in bilateral lung bases. This is nonspecific, but most likely relates to interstitial pulmonary edema. 2.  No other findings suspicious for acute pulmonary disease. 3.  A few foci of extraluminal gas scattered within the nondependent aspect of the upper abdomen. In the absence of recent surgery, this is consistent with a bowel perforation. Refer to the report from the CT scan of the abdomen and pelvis performed in conjunction with this study for additional details. The findings were called to Dr. Snell by Dr. Anderson on 01/29/2023 at 0700 hours.      Attestation:  I have reviewed today's Medications, Vital Signs, and Labs. Cultures and previous notes were reviewed and summarized above. Recommendations discussed with RN.

## 2023-02-11 LAB
ANION GAP SERPL CALCULATED.3IONS-SCNC: 9 MMOL/L (ref 7–15)
BACTERIA BLD CULT: NO GROWTH
BUN SERPL-MCNC: 10.8 MG/DL (ref 8–23)
CALCIUM SERPL-MCNC: 7.9 MG/DL (ref 8.8–10.2)
CHLORIDE SERPL-SCNC: 102 MMOL/L (ref 98–107)
CREAT SERPL-MCNC: 0.53 MG/DL (ref 0.51–0.95)
CRP SERPL-MCNC: 158.9 MG/L
DEPRECATED HCO3 PLAS-SCNC: 28 MMOL/L (ref 22–29)
ERYTHROCYTE [DISTWIDTH] IN BLOOD BY AUTOMATED COUNT: 15.3 % (ref 10–15)
GFR SERPL CREATININE-BSD FRML MDRD: >90 ML/MIN/1.73M2
GLUCOSE BLDC GLUCOMTR-MCNC: 130 MG/DL (ref 70–99)
GLUCOSE BLDC GLUCOMTR-MCNC: 147 MG/DL (ref 70–99)
GLUCOSE BLDC GLUCOMTR-MCNC: 164 MG/DL (ref 70–99)
GLUCOSE BLDC GLUCOMTR-MCNC: 170 MG/DL (ref 70–99)
GLUCOSE SERPL-MCNC: 142 MG/DL (ref 70–99)
HCT VFR BLD AUTO: 32.1 % (ref 35–47)
HGB BLD-MCNC: 9.6 G/DL (ref 11.7–15.7)
MCH RBC QN AUTO: 28.4 PG (ref 26.5–33)
MCHC RBC AUTO-ENTMCNC: 29.9 G/DL (ref 31.5–36.5)
MCV RBC AUTO: 95 FL (ref 78–100)
PLATELET # BLD AUTO: 393 10E3/UL (ref 150–450)
POTASSIUM SERPL-SCNC: 3.6 MMOL/L (ref 3.4–5.3)
RBC # BLD AUTO: 3.38 10E6/UL (ref 3.8–5.2)
SODIUM SERPL-SCNC: 139 MMOL/L (ref 136–145)
WBC # BLD AUTO: 11.3 10E3/UL (ref 4–11)

## 2023-02-11 PROCEDURE — 120N000001 HC R&B MED SURG/OB

## 2023-02-11 PROCEDURE — 250N000013 HC RX MED GY IP 250 OP 250 PS 637: Performed by: SURGERY

## 2023-02-11 PROCEDURE — 86140 C-REACTIVE PROTEIN: CPT | Performed by: INTERNAL MEDICINE

## 2023-02-11 PROCEDURE — 250N000011 HC RX IP 250 OP 636: Performed by: INTERNAL MEDICINE

## 2023-02-11 PROCEDURE — 36415 COLL VENOUS BLD VENIPUNCTURE: CPT | Performed by: INTERNAL MEDICINE

## 2023-02-11 PROCEDURE — 250N000013 HC RX MED GY IP 250 OP 250 PS 637: Performed by: INTERNAL MEDICINE

## 2023-02-11 PROCEDURE — 250N000013 HC RX MED GY IP 250 OP 250 PS 637: Performed by: HOSPITALIST

## 2023-02-11 PROCEDURE — 250N000011 HC RX IP 250 OP 636: Performed by: SURGERY

## 2023-02-11 PROCEDURE — 85027 COMPLETE CBC AUTOMATED: CPT | Performed by: INTERNAL MEDICINE

## 2023-02-11 PROCEDURE — 99207 PR CDG-CUT & PASTE-POTENTIAL IMPACT ON LEVEL: CPT | Performed by: INTERNAL MEDICINE

## 2023-02-11 PROCEDURE — 258N000003 HC RX IP 258 OP 636: Performed by: INTERNAL MEDICINE

## 2023-02-11 PROCEDURE — 99232 SBSQ HOSP IP/OBS MODERATE 35: CPT | Performed by: INTERNAL MEDICINE

## 2023-02-11 PROCEDURE — 999N000127 HC STATISTIC PERIPHERAL IV START W US GUIDANCE

## 2023-02-11 PROCEDURE — 99232 SBSQ HOSP IP/OBS MODERATE 35: CPT | Performed by: STUDENT IN AN ORGANIZED HEALTH CARE EDUCATION/TRAINING PROGRAM

## 2023-02-11 PROCEDURE — 80048 BASIC METABOLIC PNL TOTAL CA: CPT | Performed by: INTERNAL MEDICINE

## 2023-02-11 RX ADMIN — ROSUVASTATIN CALCIUM 20 MG: 10 TABLET, FILM COATED ORAL at 20:28

## 2023-02-11 RX ADMIN — POTASSIUM CHLORIDE 40 MEQ: 1500 TABLET, EXTENDED RELEASE ORAL at 16:11

## 2023-02-11 RX ADMIN — ACETAMINOPHEN 650 MG: 325 TABLET ORAL at 14:52

## 2023-02-11 RX ADMIN — VENLAFAXINE HYDROCHLORIDE 75 MG: 75 CAPSULE, EXTENDED RELEASE ORAL at 09:19

## 2023-02-11 RX ADMIN — BENZOCAINE 6 MG-MENTHOL 10 MG LOZENGES 1 LOZENGE: at 09:20

## 2023-02-11 RX ADMIN — MEROPENEM 1 G: 1 INJECTION INTRAVENOUS at 16:10

## 2023-02-11 RX ADMIN — VANCOMYCIN HYDROCHLORIDE 1000 MG: 1 INJECTION, SOLUTION INTRAVENOUS at 00:55

## 2023-02-11 RX ADMIN — OXYCODONE HYDROCHLORIDE 5 MG: 5 TABLET ORAL at 18:36

## 2023-02-11 RX ADMIN — MICAFUNGIN SODIUM 100 MG: 50 INJECTION, POWDER, LYOPHILIZED, FOR SOLUTION INTRAVENOUS at 18:03

## 2023-02-11 RX ADMIN — AMLODIPINE BESYLATE 2.5 MG: 2.5 TABLET ORAL at 09:19

## 2023-02-11 RX ADMIN — MEROPENEM 1 G: 1 INJECTION INTRAVENOUS at 05:45

## 2023-02-11 RX ADMIN — ENOXAPARIN SODIUM 40 MG: 40 INJECTION SUBCUTANEOUS at 09:18

## 2023-02-11 RX ADMIN — ACETAMINOPHEN 650 MG: 325 TABLET ORAL at 20:28

## 2023-02-11 RX ADMIN — ANORECTAL OINTMENT: 15.7; .44; 24; 20.6 OINTMENT TOPICAL at 12:53

## 2023-02-11 RX ADMIN — ONDANSETRON 4 MG: 4 TABLET, ORALLY DISINTEGRATING ORAL at 09:19

## 2023-02-11 RX ADMIN — ACETAMINOPHEN 650 MG: 325 TABLET ORAL at 09:19

## 2023-02-11 RX ADMIN — FUROSEMIDE 20 MG: 10 INJECTION, SOLUTION INTRAMUSCULAR; INTRAVENOUS at 09:19

## 2023-02-11 RX ADMIN — BENZOCAINE 6 MG-MENTHOL 10 MG LOZENGES 1 LOZENGE: at 14:59

## 2023-02-11 RX ADMIN — LEVOTHYROXINE SODIUM 137 MCG: 0.11 TABLET ORAL at 05:45

## 2023-02-11 RX ADMIN — BENZOCAINE 6 MG-MENTHOL 10 MG LOZENGES 1 LOZENGE: at 18:39

## 2023-02-11 RX ADMIN — VANCOMYCIN HYDROCHLORIDE 1000 MG: 1 INJECTION, SOLUTION INTRAVENOUS at 14:52

## 2023-02-11 RX ADMIN — ASPIRIN 81 MG: 81 TABLET, COATED ORAL at 09:19

## 2023-02-11 ASSESSMENT — ACTIVITIES OF DAILY LIVING (ADL)
ADLS_ACUITY_SCORE: 40
ADLS_ACUITY_SCORE: 43
ADLS_ACUITY_SCORE: 43
ADLS_ACUITY_SCORE: 39
ADLS_ACUITY_SCORE: 39
ADLS_ACUITY_SCORE: 43
ADLS_ACUITY_SCORE: 39
ADLS_ACUITY_SCORE: 39
ADLS_ACUITY_SCORE: 40
ADLS_ACUITY_SCORE: 40

## 2023-02-11 NOTE — PROGRESS NOTES
ASSESSMENT:  1. Perforation of colon (H)    2. Refusal of blood transfusions as patient is [facility]        Suzy Gómez is a 77 year old female who is s/p laparoscopic sigmoid colectomy and washout on 1/29/2023.  WBC and CRP trending down    PLAN:  -Up to that toilet or commode every 2 hours.  No excuses.  There is no reason for her not to be using a toilet or commode.  -As much ambulation and time in the chair as possible  -ABX per ID  -Diet as tolerated  -With her poor nutrition, and likely impaired healing abilities, we will plan to leave skin staples in for 3 weeks        SUBJECTIVE: Fever this AM, feeling better than yesterday, trying to move more, passing gas and BM, trying tin increase PO intake    Patient Vitals for the past 24 hrs:   BP Temp Temp src Pulse Resp SpO2   02/11/23 0820 (!) 149/66 (!) 100.6  F (38.1  C) Oral 97 20 95 %   02/11/23 0012 139/63 97.9  F (36.6  C) Oral 94 22 95 %   02/10/23 1609 116/54 98.3  F (36.8  C) Oral 78 20 96 %         PHYSICAL EXAM:  GEN: No acute distress, comfortable  ABD: Soft, nondistended, mild ttp, no rebound or guarding, incision c/d/i  Output by Drain (mL) 02/09/23 0700 - 02/09/23 1459 02/09/23 1500 - 02/09/23 2259 02/09/23 2300 - 02/10/23 0659 02/10/23 0700 - 02/10/23 1459 02/10/23 1500 - 02/10/23 2259 02/10/23 2300 - 02/11/23 0659 02/11/23 0700 - 02/11/23 1135   Open Drain Ventral Abdomen              EXT:No cyanosis, edema or obvious abnormalities    02/10 0700 - 02/11 0659  In: 1372 [P.O.:240; I.V.:1132]  Out: -     No results displayed because visit has over 200 results.   Recent Results (from the past 24 hour(s))   Glucose by meter    Collection Time: 02/10/23 11:47 AM   Result Value Ref Range    GLUCOSE BY METER POCT 90 70 - 99 mg/dL   Glucose by meter    Collection Time: 02/10/23  5:09 PM   Result Value Ref Range    GLUCOSE BY METER POCT 135 (H) 70 - 99 mg/dL   Glucose by meter    Collection Time: 02/10/23  9:22 PM   Result Value Ref Range     GLUCOSE BY METER POCT 148 (H) 70 - 99 mg/dL   CBC with platelets    Collection Time: 02/11/23  6:34 AM   Result Value Ref Range    WBC Count 11.3 (H) 4.0 - 11.0 10e3/uL    RBC Count 3.38 (L) 3.80 - 5.20 10e6/uL    Hemoglobin 9.6 (L) 11.7 - 15.7 g/dL    Hematocrit 32.1 (L) 35.0 - 47.0 %    MCV 95 78 - 100 fL    MCH 28.4 26.5 - 33.0 pg    MCHC 29.9 (L) 31.5 - 36.5 g/dL    RDW 15.3 (H) 10.0 - 15.0 %    Platelet Count 393 150 - 450 10e3/uL   Basic metabolic panel    Collection Time: 02/11/23  6:34 AM   Result Value Ref Range    Sodium 139 136 - 145 mmol/L    Potassium 3.6 3.4 - 5.3 mmol/L    Chloride 102 98 - 107 mmol/L    Carbon Dioxide (CO2) 28 22 - 29 mmol/L    Anion Gap 9 7 - 15 mmol/L    Urea Nitrogen 10.8 8.0 - 23.0 mg/dL    Creatinine 0.53 0.51 - 0.95 mg/dL    Calcium 7.9 (L) 8.8 - 10.2 mg/dL    Glucose 142 (H) 70 - 99 mg/dL    GFR Estimate >90 >60 mL/min/1.73m2   CRP inflammation    Collection Time: 02/11/23  6:34 AM   Result Value Ref Range    CRP Inflammation 158.90 (H) <5.00 mg/L   Glucose by meter    Collection Time: 02/11/23  8:23 AM   Result Value Ref Range    GLUCOSE BY METER POCT 130 (H) 70 - 99 mg/dL               Willie Ware PA-C

## 2023-02-11 NOTE — PLAN OF CARE
Problem: Bowel Resection  Goal: Effective Bowel Motility and Elimination  Outcome: Progressing     Problem: Bowel Resection  Goal: Absence of Infection Signs and Symptoms  Outcome: Progressing     Problem: Oral Intake Inadequate  Goal: Improved Oral Intake  Outcome: Not Progressing     Patient VSS on 2L O2 this shift, endorsing sore throat and abd pain this AM, denies pain after scheduled APAP. A+Ox3-4, orientation to time fluctuates. C/o nausea this AM, given prn Zofran, nausea persisted and MD updated, prn compazine ordered but patient denied nausea upon follow up. Prn lozenges for sore throat.  and 147 on this shift. Appetite poor. Encouraging intake. Continues to have urine and fecal incontinence. Up to chair and commode per orders. IV abx per orders, WBC trending down.     Jakub Chandler, RN 2/11/2023  [phone]

## 2023-02-11 NOTE — PROGRESS NOTES
Daily Progress Note        CODE STATUS:  Full Code    02/09/23  Assessment/Plan:  77 year old female who was admitted on 1/29/2023 with bowel perforation and found to have impacted stool in sigmoid colon with associated perforation and feculent peritonitis.      Perforation of Colonic Stercolic Ulcer   -- S/P Sigmoid Colectomy 1/29/30. Diffuse feculent peritonitis noted intra-op  -- On IV Meropenum and Micafungin per ID. IV vancomycin added 2/8/23  -- Appreciate ID consult  -- Follow up CT abd/pelvis on 2/7/23 showed unchanged ascites and mesenteric edema. No drainable intra-abdominal fluid collection     Sepsis   -- WBC up, temp 102.8, , but BP stable  -- Patient is very edematous. Stopped IVF 2/9/23. Cont IV lasix  -- Bacteremia with clostridium species, Collinsella aerofaciens, bacteroides vulgatus, and Eubacterium spp  -- Leucocytosis improving. CRP coming down    HTN (hypertension)  -- Amlodipine with hold parameters. Losartan on hold    DM type 2, Hgb A1C 7.6 on 11/23/22  -- Cont lantus 30 units and sliding scale insulin. Will adjust as needed     Leg swelling  Fluid overload  -- I/O charting is incorrect. Stopped IVF. Cont lasix. Monitor renal function  -- US legs negative for DVT    Refusal of blood transfusions as patient is [employer]     KARYN (obstructive sleep apnea)     Depression:  -- On effexor at home. Patient looks more depressed and withdrawn.  requests psychiatry consult    DVT Prophylaxis: Enoxaparin (Lovenox) SQ  Code Status: Full Code      Disposition; Few more days  Barrier to discharge; Clinical progress     LOS: 11 days     Subjective:  Interval History: No acute issues overnight. Continues to have loose stools. Reports some nausea.     Review of Systems:   As mentioned in subjective.    Patient Active Problem List   Diagnosis     Non-specific colitis     Chest pain, unspecified type     Abnormal laboratory test result     Angiodysplasia of colon     Cataract     Colon  adenoma     Depression     GERD (gastroesophageal reflux disease)     HTN (hypertension)     Hypercalcemia     Hyperlipidemia     Hyperparathyroidism (H)     Hypervitaminosis D     Microalbuminuria     Multinodular goiter     OA (osteoarthritis)     Obesity, morbid (H)     Postablative hypothyroidism     DM type 2, Hgb A1C 7.6 on 11/23/22     Perforation of Colonic Stericolic Ulcer -- S/P Sigmoid Colectomy 1/29/30     Refusal of blood transfusions as patient is [employer]     KARYN (obstructive sleep apnea)     Bacteremia due to Clostridium and Bacteroides -- 1/29/23       Scheduled Meds:    acetaminophen  650 mg Oral TID     amLODIPine  2.5 mg Oral Daily     aspirin  81 mg Oral Daily     enoxaparin ANTICOAGULANT  40 mg Subcutaneous Q24H     furosemide  20 mg Intravenous Daily     insulin aspart  1-10 Units Subcutaneous TID AC     insulin aspart  1-7 Units Subcutaneous At Bedtime     [Held by provider] insulin glargine  30 Units Subcutaneous At Bedtime     levothyroxine  137 mcg Oral QAM AC     meropenem  1 g Intravenous Q8H     micafungin  100 mg Intravenous Q24H     potassium chloride  40 mEq Oral Daily     rosuvastatin  20 mg Oral At Bedtime     sodium chloride (PF)  3 mL Intracatheter Q8H     sodium chloride (PF)  3 mL Intracatheter Q8H     vancomycin  1,000 mg Intravenous Q12H     venlafaxine  75 mg Oral Daily     Continuous Infusions:    PRN Meds:.sore throat, glucose **OR** dextrose **OR** glucagon, hydrOXYzine, lidocaine 4%, lidocaine (buffered or not buffered), melatonin, menthol-zinc oxide, naloxone **OR** naloxone **OR** naloxone **OR** naloxone, ondansetron **OR** ondansetron, oxyCODONE, oxyCODONE IR, prochlorperazine, sodium chloride (PF), sodium chloride (PF)    Objective:  Vital signs in last 24 hours:  Temp:  [97.9  F (36.6  C)-100.6  F (38.1  C)] 100.6  F (38.1  C)  Pulse:  [78-97] 97  Resp:  [20-22] 20  BP: (116-149)/(54-66) 149/66  SpO2:  [95 %-96 %] 95 %        Intake/Output Summary (Last 24  hours) at 2/9/2023 1537  Last data filed at 2/9/2023 1450  Gross per 24 hour   Intake 2258 ml   Output 275 ml   Net 1983 ml       Physical Exam:    General: Not in obvious distress. Flat afect  HEENT: NC, AT   Chest: Clear to auscultation bilaterally  Heart: S1S2 normal, regular. No M/R/G  Abdomen: Soft. Mild generalized tenderness. Distended.   Extremities: 2+ legs swelling  Neuro: Awake, grossly non-focal      Lab Results:(I have personally reviewed the results)    Recent Results (from the past 24 hour(s))   Glucose by meter    Collection Time: 02/10/23 11:47 AM   Result Value Ref Range    GLUCOSE BY METER POCT 90 70 - 99 mg/dL   Glucose by meter    Collection Time: 02/10/23  5:09 PM   Result Value Ref Range    GLUCOSE BY METER POCT 135 (H) 70 - 99 mg/dL   Glucose by meter    Collection Time: 02/10/23  9:22 PM   Result Value Ref Range    GLUCOSE BY METER POCT 148 (H) 70 - 99 mg/dL   CBC with platelets    Collection Time: 02/11/23  6:34 AM   Result Value Ref Range    WBC Count 11.3 (H) 4.0 - 11.0 10e3/uL    RBC Count 3.38 (L) 3.80 - 5.20 10e6/uL    Hemoglobin 9.6 (L) 11.7 - 15.7 g/dL    Hematocrit 32.1 (L) 35.0 - 47.0 %    MCV 95 78 - 100 fL    MCH 28.4 26.5 - 33.0 pg    MCHC 29.9 (L) 31.5 - 36.5 g/dL    RDW 15.3 (H) 10.0 - 15.0 %    Platelet Count 393 150 - 450 10e3/uL   Basic metabolic panel    Collection Time: 02/11/23  6:34 AM   Result Value Ref Range    Sodium 139 136 - 145 mmol/L    Potassium 3.6 3.4 - 5.3 mmol/L    Chloride 102 98 - 107 mmol/L    Carbon Dioxide (CO2) 28 22 - 29 mmol/L    Anion Gap 9 7 - 15 mmol/L    Urea Nitrogen 10.8 8.0 - 23.0 mg/dL    Creatinine 0.53 0.51 - 0.95 mg/dL    Calcium 7.9 (L) 8.8 - 10.2 mg/dL    Glucose 142 (H) 70 - 99 mg/dL    GFR Estimate >90 >60 mL/min/1.73m2   CRP inflammation    Collection Time: 02/11/23  6:34 AM   Result Value Ref Range    CRP Inflammation 158.90 (H) <5.00 mg/L   Glucose by meter    Collection Time: 02/11/23  8:23 AM   Result Value Ref Range    GLUCOSE BY  METER POCT 130 (H) 70 - 99 mg/dL       All laboratory and imaging data in the past 24 hours reviewed  Serum Glucose range:   Recent Labs   Lab 02/11/23  0823 02/11/23 0634 02/10/23  2122 02/10/23  1709   * 142* 148* 135*     ABG: No lab results found in last 7 days.  CBC:   Recent Labs   Lab 02/11/23  0634 02/10/23  0701 02/09/23  0642 02/08/23  0646   WBC 11.3* 15.0* 21.4* 25.1*  24.8*   HGB 9.6* 9.7* 10.2* 10.9*   HCT 32.1* 32.3* 33.4* 36.3   MCV 95 93 93 93    415 445 449   NEUTROPHIL  --   --   --  92   LYMPH  --   --   --  3   MONOCYTE  --   --   --  4   EOSINOPHIL  --   --   --  0     Chemistry:   Recent Labs   Lab 02/11/23  0634 02/10/23  0701 02/09/23  0642 02/08/23  0646 02/07/23  0604 02/06/23  0646 02/05/23  0641    141  --  138 139 143 143   POTASSIUM 3.6 3.2*  --  3.6 3.7 3.5 3.7  3.7   CHLORIDE 102 101  --  100 102 108* 109*   CO2 28 30*  --  31* 31* 28 24   BUN 10.8 10.9  --  9.9 7.4* 7.0* 7.0*   CR 0.53 0.57 0.55 0.60 0.60 0.58 0.60   GFRESTIMATED >90 >90 >90 >90 >90 >90 >90   ALTON 7.9* 7.8*  --  8.3* 8.2* 8.2* 8.3*   MAG  --   --   --   --  1.8 1.5* 2.0   PROTTOTAL  --   --   --  5.4*  --  5.1* 5.0*   ALBUMIN  --   --   --  2.3*  --  2.4* 2.2*   AST  --   --   --  29  --  22 21   ALT  --   --   --  14  --  10 10   ALKPHOS  --   --   --  142*  --  104 87   BILITOTAL  --   --   --  0.3  --  0.2 0.2     Coags:  No results for input(s): INR, PROTIME, PTT in the last 168 hours.    Invalid input(s): APTT  Cardiac Markers:  No results for input(s): CKTOTAL, TROPONINI in the last 168 hours.       CT Chest/Abdomen/Pelvis w Contrast    Result Date: 2/7/2023  EXAM: CT CHEST/ABDOMEN/PELVIS W CONTRAST LOCATION: [facility] DATE/TIME: 2/7/2023 8:21 PM INDICATION: ongoing leukocytosis. new fever. 1/29 sigmoidectomy with wash out. COMPARISON: CT of the abdomen and pelvis 2/4/2023 TECHNIQUE: CT scan of the chest, abdomen, and pelvis was performed following injection of  IV contrast. Multiplanar reformats were obtained. Dose reduction techniques were used. CONTRAST: 100 mL Isovue-370 FINDINGS: LUNGS AND PLEURA: Small bilateral pleural effusions layer into the posterior costophrenic sulci. Bands of atelectasis along the posterior pleura of the lower lobes. No lung edema or consolidation. Trachea and central airways are patent and normal caliber. MEDIASTINUM: Cardiac chambers are normal in size. No pericardial effusion. Normal caliber thoracic aorta and main pulmonary artery. Arch, proximal great vessel and descending aorta atheromatous calcification is patchy. There are no enlarged mediastinal or hilar lymph nodes. Esophagus is decompressed. A few small lower paraesophageal varices are present. CORONARY ARTERY CALCIFICATION: Mild to moderate, three-vessel disease. HEPATOBILIARY: Slight geographic liver attenuation but no focal enhancement or parenchymal lesion. There is a calcified stone layering in the gallbladder. No gallbladder wall thickening or pericholecystic inflammation. No bile duct enlargement. PANCREAS: Normal. SPLEEN: Normal. ADRENAL GLANDS: Normal. KIDNEYS/BLADDER: Trace amount of air in the nondependent bladder from recent instrumentation. Bladder wall is smooth with uniform thickness. Kidneys are normal in size with symmetric enhancement and normal cortex thickness. No nephrolithiasis or hydronephrosis. BOWEL: Nondistended stomach. Proximal small bowel in the left upper quadrant is mildly dilated and fluid-filled, however no clear caliber transition and this is unchanged from 2/4/2023. There is hazy attenuation in the mesentery consistent with edema. Minimal perihepatic and perisplenic ascites. Similar ascites layering in the left lower quadrant with small amount of anterior linear enhancement (series 3, image 205). Status post sigmoid colon resection with colocolonic anastomosis in the upper left pelvis. There are multiple diverticula arising from the ascending colon.  No pneumatosis or pneumoperitoneum. LYMPH NODES: Mesenteric, aortocaval and iliac chain lymph nodes are normal by size criteria. VASCULATURE: Diffuse atheromatous calcification of the infrarenal abdominal aorta and common iliac arteries. No aneurysm. PELVIC ORGANS: Post hysterectomy. MUSCULOSKELETAL: Grade 1 degenerative anterolisthesis of L4 on L5 and lumbar facet arthropathy. Vacuum disc phenomenon from L1-2 to L3-L4. Thoracic vertebra are maintained in height with small diffuse osteophytes and mild disc space narrowing. No aggressive or destructive bone lesions. Infraumbilical skin staples are present from recent laparotomy. No body wall hernia. Mild body wall anasarca.     IMPRESSION: 1.  Status post segmental sigmoid colon resection with colocolonic anastomosis in the upper left pelvis. No findings to suggest a surgical complication. 2.  Diverticula arising from the ascending colon but no acute diverticulitis. 3.  Unchanged ascites and mesenteric edema. No drainable intra-abdominal fluid collection. 4.  Minimal pleural effusions and atelectasis in the posterior sulci. 5.  Cholelithiasis.     Echocardiogram Complete    Result Date: 2023  [id number]^TERRENCE^[place] [address]  Name: SRIDHAR ALONSO MRN: [id number] : 1945 Study Date: 2023 12:44 PM Age: 77 yrs Gender: Female Patient Location: [facility] Reason For Study: CHF Ordering Physician: PAYAL OCAMPO Performed By: KATHARINE  BSA: 2.1 m2 Height: 65 in Weight: 220 lb HR: 87 BP: 138/63 mmHg ______________________________________________________________________________ Procedure Complete Echo Adult. ______________________________________________________________________________ Interpretation Summary  1. Normal left ventricular size and systolic performance with a visually estimated ejection fraction of 60%. 2. No significant valvular heart disease is identified on this study. 3. Normal  right ventricular size with borderline reduction right ventricular systolic performance. 4. There is mild biatrial enlargement. 5. There is a very small pericardial effusion. There is no evidence of significant intraventricular dependence/tamponade physiology. ______________________________________________________________________________ Left ventricle: Normal left ventricular size and systolic performance with a visually estimated ejection fraction of 60%. There is normal regional wall motion. Left ventricular wall thickness is normal.  Assessment of LV Diastolic Function: The cumulative findings suggest impaired diastolic filling [The septal e' velocity is < 7 cm/s & lateral e' velocity is < 10 cm/s. The average E/e' is >14. TR velocity is 2.8 m/s. Left atrial volume index is greater than 34 mL/mÂ ].  Assessment of left atrial pressure (LAP): The cumulative findings suggest moderately elevated left atrial pressure (the E/A is > 0.8 and <2.0 plus 2 or 3 of 3 of the following present: Average E/e' > 14, TRvel > 2.8 m/s, and/or LA vol. index > 34 ml/mÂ  ).  Right ventricle: Normal right ventricular size with borderline reduction right ventricular systolic performance.  Left atrium: There is mild left atrial enlargement.  Right atrium: There is mild right atrial enlargement.  IVC: The IVC is borderline dilated.  Aortic valve: The aortic valve is comprised of three cusps. No significant aortic stenosis or aortic insufficiency is detected on this study.  Mitral valve: The mitral valve appears morphologically normal. There is trace mitral insufficiency.  Tricuspid valve: The tricuspid valve is grossly morphologically normal. There is trace tricuspid insufficiency.  Pulmonic valve: The pulmonic valve is grossly morphologically normal.  Thoracic aorta: The aortic root and proximal ascending aorta are of normal dimension.  Pericardium: There is a very small pericardial effusion. There is no evidence of significant  intraventricular dependence/tamponade physiology. ______________________________________________________________________________ ______________________________________________________________________________ MMode/2D Measurements & Calculations IVSd: 1.3 cm LVIDd: 4.1 cm LVIDs: 2.6 cm LVPWd: 1.2 cm FS: 36.6 % LV mass(C)d: 185.2 grams LV mass(C)dI: 89.9 grams/m2 Ao root diam: 3.2 cm LA dimension: 4.6 cm asc Aorta Diam: 3.4 cm LA/Ao: 1.4 LVOT diam: 2.3 cm LVOT area: 4.1 cm2 LA Volume Indexed (AL/bp): 36.7 ml/m2  RWT: 0.58  Time Measurements MM HR: 87.0 BPM  Doppler Measurements & Calculations MV E max ayden: 123.0 cm/sec MV A max ayden: 124.2 cm/sec MV E/A: 0.99 MV dec time: 0.24 sec Ao V2 max: 189.4 cm/sec Ao max P.0 mmHg Ao V2 mean: 123.9 cm/sec Ao mean P.8 mmHg Ao V2 VTI: 32.7 cm QUAN(I,D): 2.8 cm2 QUAN(V,D): 2.9 cm2 LV V1 max P.9 mmHg LV V1 max: 131.3 cm/sec LV V1 VTI: 22.2 cm SV(LVOT): 92.1 ml SI(LVOT): 44.7 ml/m2 PA acc time: 0.09 sec TR max ayden: 279.6 cm/sec TR max P.3 mmHg AV Ayden Ratio (DI): 0.69 QUAN Index (cm2/m2): 1.4 E/E': 13.6  E/E' avg: 15.6 Lateral E/e': 13.6 Medial E/e': 17.7 Peak E' Ayden: 9.0 cm/sec  ______________________________________________________________________________ Report approved by: Leonid Hamilton 2023 02:47 PM       XR Chest Port 1 View    Result Date: 2023  EXAM: XR CHEST PORT 1 VIEW LOCATION: [facility] DATE/TIME: 2023 7:42 PM INDICATION: fever, new cough COMPARISON: 2021     IMPRESSION: Lung volumes are lower on this portable study. Borderline cardiomegaly unchanged. Pulmonary vessels normal. Questionable subtle right infrahilar infiltrate though this may relate to the lower lung volumes. Lungs otherwise clear.    XR Ankle Port Left G/E 3 Views    Result Date: 2023  EXAM: XR ANKLE PORT LEFT G/E 3 VIEWS LOCATION: [facility] DATE/TIME: 2023 2:22 PM INDICATION: left ankle pain, no trauma  COMPARISON: None.     IMPRESSION: Bones are demineralized. Mild tibiotalar joint degenerative change. No evidence of an acute fracture. Tiny bone fragment adjacent to the medial talus and mild bony proliferation along the tip of the lateral malleolus is likely chronic. Ankle mortise is intact. Calcaneal heel spur.    CT Abdomen Pelvis w Contrast    Result Date: 2/4/2023  EXAM: CT ABDOMEN PELVIS W CONTRAST LOCATION: [facility] DATE/TIME: 2/4/2023 12:08 AM INDICATION: 1 29 sigmoidectomy with increasing wbc COMPARISON: CT a abdomen/pelvis 01/29/2023 TECHNIQUE: CT scan of the abdomen and pelvis was performed following injection of IV contrast. Multiplanar reformats were obtained. Dose reduction techniques were used. CONTRAST: isovue 370 100ml FINDINGS: LOWER CHEST: Trace bilateral pleural effusions right greater than left with compressive atelectasis. HEPATOBILIARY: Diffuse hepatic steatosis. Cholelithiasis with mild gallbladder distention which may be related to fasting. No biliary ductal dilatation. PANCREAS: Normal. SPLEEN: Normal. ADRENAL GLANDS: Normal. KIDNEYS/BLADDER: No hydronephrosis. Box catheter within a decompressed bladder. BOWEL: Recent sigmoidectomy. Colonic diverticulosis. Trace abdominopelvic free ascites. Additionally, there is some loculated ascites in the anterior abdomen with partial rim enhancement and several foci of gas within, which suggests a degree of peritonitis. No drainable fluid collection currently. Right anterior approach drain terminates in the left pelvis. LYMPH NODES: Normal. VASCULATURE: Atherosclerotic calcifications of the aortoiliac vessels without evidence of aneurysmal dilatation. PELVIC ORGANS: Hysterectomy. MUSCULOSKELETAL: Body wall edema. Skin staples with recent ventral abdominal incision. Small fat-containing umbilical hernia. Multilevel degenerative changes of the thoracolumbar spine. No acute osseous abnormality.     IMPRESSION: 1.  Recent  sigmoidectomy. Trace abdominopelvic free ascites. Additionally, there is some loculated ascites in the anterior abdomen with partial rim enhancement and several foci of gas within, which suggests a degree of peritonitis. No drainable fluid collection currently. 2.  Trace bilateral pleural effusions right greater than left with compressive atelectasis. 3.  Body wall edema. 4.  Diffuse hepatic steatosis. 5.  Cholelithiasis with mild gallbladder distention which may be related to fasting.    CT Abdomen Pelvis w Contrast    Result Date: 1/25/2023  EXAM: CT ABDOMEN PELVIS W CONTRAST LOCATION: [facility] DATE/TIME: 1/25/2023 11:28 PM INDICATION: abdominal pain worst in lower quadrants, leukocytosis eval for pathology COMPARISON: 11/26/2022 TECHNIQUE: CT scan of the abdomen and pelvis was performed following injection of IV contrast. Multiplanar reformats were obtained. Dose reduction techniques were used. CONTRAST: isovue 370 100ml FINDINGS: LOWER CHEST: Small left Bochdalek hernia. Basilar atelectasis. HEPATOBILIARY: Cholelithiasis and hepatic steatosis. PANCREAS: Normal. SPLEEN: Normal. ADRENAL GLANDS: Normal. KIDNEYS/BLADDER: Normal. BOWEL: Small bowel is normal caliber. Large amount of colonic stool. Sigmoid colonic wall thickening with adjacent inflammation is again seen. The inflamed segment is distended with stool. Small amount of adjacent free fluid. Caliber change with collapse  of the rectosigmoid junction and rectum. Diverticulosis.  LYMPH NODES: Subcentimeter retroperitoneal lymph nodes. VASCULATURE: Atherosclerotic vascular calcification. PELVIC ORGANS: Absent uterus. MUSCULOSKELETAL: Degenerative change osseous structures. Mild compression L4. Slight anterolisthesis L4 on L5.     IMPRESSION: 1.  Sigmoid colitis is again seen. Collapse of the rectosigmoid junction and rectum distal to the inflamed segment of colon. Underlying stricture not excluded. 2.  Small amount of adjacent free  fluid. 3.  Cholelithiasis.    CTA Abdomen Pelvis with Contrast    Result Date: 1/29/2023  EXAM: CTA ABDOMEN PELVIS WITH CONTRAST LOCATION: [facility] DATE/TIME: 1/29/2023 6:48 AM INDICATION: recurrent colitis, distended abdomen pain out of proportion. COMPARISON: There are study dated 1/25/ TECHNIQUE: CT angiogram abdomen pelvis during arterial phase of injection of IV contrast. 2D and 3D MIP reconstructions were performed by the CT technologist. Dose reduction techniques were used. CONTRAST: isovue 370 100ml FINDINGS: ANGIOGRAM ABDOMEN/PELVIS: The abdominal aorta is normal in size and caliber throughout with scattered atherosclerotic calcifications noted. The iliac and femoral vessels are normal in size and caliber and well-opacified. Celiac and SMA and TONY arise normally and are well opacified at this time LOWER CHEST: Lung bases are clear. Heart is enlarged, similar prior exam. HEPATOBILIARY: Diffuse hepatic steatosis. No significant mass. No bile duct dilatation. Calcified gallstones are again seen within the gallbladder. PANCREAS: No significant mass, duct dilatation, or inflammatory change. SPLEEN: Normal size. ADRENAL GLANDS: No significant nodules. KIDNEYS/BLADDER: No significant mass, stone, or hydronephrosis. BOWEL: Mural thickening and pericolonic inflammation of the sigmoid colon is again noted, similar in appearance to prior exam with increased inflammation seen in the proximal sigmoid colon. No pericolonic abscess seen at this time. Stool is seen throughout the inflamed sigmoid colon as well as scattered throughout the large bowel without evidence of large bowel obstruction. Adjacent to the proximal sigmoid colon is free fluid with foci of gas which are extraluminal and new from the prior study (image 1:15, series 5). Additional foci of extraluminal gas are seen within the mesenteric fat anteriorly in the upper abdomen (image 57, series 5). There are scattered diverticula seen  throughout the colon. Air-fluid levels are seen scattered through multiple loops of small bowel with mild dilatation seen in the proximal jejunum in the upper abdomen, these mildly dilated loops of small bowel decompressed gradually distally without a clear transition point. LYMPH NODES: Increased Number of pericolonic lymph nodes are again seen adjacent to the sigmoid colon. PELVIC ORGANS: No pelvic masses. MUSCULOSKELETAL: Unchanged     IMPRESSION: 1.  Mural thickening of the sigmoid colon with pericolonic inflammation and stranding, slightly worsened from prior exam with punctate foci of gas seen adjacent to the inflamed colon as well as tracking in the anterior abdominal wall concerning for sigmoid colonic perforation. No organized intra-abdominal fluid collection such as abscess is seen at this time. Given the long-standing inflammation in this region an underlying neoplastic process cannot be excluded. 2.  Scattered air-fluid levels and mildly dilated loops of proximal jejunum which decompresses gradually without a transition point consistent favored to reflect reactive ileus 3.  Hepatic steatosis 4.  Cholelithiasis [Critical Result: Sigmoid colonic inflammation with areas of extraluminal gas, new from 1/25/2023 concerning for sigmoid colonic perforation] Finding was identified on 1/29/2023 6:56 AM. DR. Snell was contacted by me on 1/29/2023 7:08 AM and verbalized understanding of the critical result.     CT Chest w/o Contrast    Result Date: 1/29/2023  EXAM: CT CHEST WITHOUT CONTRAST LOCATION: [facility] DATE/TIME: 01/29/2023, 6:46 AM INDICATION: Fever and cough. COMPARISON: 11/26/2022 - CT chest, abdomen and pelvis. TECHNIQUE: Note that this study was performed in conjunction with a CT scan of the abdomen and pelvis. Refer to a separate report for findings from that study. CT chest without IV contrast. Multiplanar reformats were obtained. Dose reduction techniques were used.  CONTRAST: None. FINDINGS: LUNGS AND PLEURA: Minimal emphysematous changes in the lungs. Slight interstitial thickening in bilateral lung bases. A few curvilinear opacities in the periphery of both lungs likely represent atelectasis and/or scarring. The lungs are otherwise clear. Small left Bochdalek hernia containing fat MEDIASTINUM/AXILLAE: Atherosclerotic calcification in the thoracic aorta. CORONARY ARTERY CALCIFICATION: Present. MUSCULOSKELETAL: No acute findings. UPPER ABDOMEN: A few foci of extraluminal gas are present in the upper abdomen.     IMPRESSION: 1.  Slight interstitial thickening in bilateral lung bases. This is nonspecific, but most likely relates to interstitial pulmonary edema. 2.  No other findings suspicious for acute pulmonary disease. 3.  A few foci of extraluminal gas scattered within the nondependent aspect of the upper abdomen. In the absence of recent surgery, this is consistent with a bowel perforation. Refer to the report from the CT scan of the abdomen and pelvis performed in conjunction with this study for additional details. The findings were called to Dr. Snell by Dr. Anderson on 01/29/2023 at 0700 hours.       Latest radiology report personally reviewed.    Note created using dragon voice recognition software so sounds alike errors may have escaped editing.      02/11/2023   Matthew Anna MD  Hospitalist, [facility]  Pager: [phone]

## 2023-02-11 NOTE — PLAN OF CARE
Problem: Bowel Resection  Goal: Anesthesia/Sedation Recovery  Intervention: Optimize Anesthesia Recovery  Recent Flowsheet Documentation  Safety Promotion/Fall Prevention:    activity supervised    assistive device/personal items within reach     Problem: Pain Acute  Goal: Optimal Pain Control and Function  Intervention: Prevent or Manage Pain  Recent Flowsheet Documentation  Medication Review/Management: medications reviewed   Goal Outcome Evaluation:    Alert and oriented but forgetful.  Somewhat unmotivated.  Needs strong encouragement to move/participate in activities.   Up in chair for a while. A2 using walker and gait belt.  Voiding in good amounts.   Scheduled Tylenol given for mild abdominal discomfort.    Poor appetite.  Blood glucose levels were 136 and 148.  Purewick in place.  Temp: 98.3  F (36.8  C) Temp src: Oral BP: 116/54 Pulse: 78   Resp: 20 SpO2: 96 % O2 Device: Nasal cannula

## 2023-02-11 NOTE — PROGRESS NOTES
"INFECTIOUS DISEASE FOLLOW UP NOTE      ASSESSMENT:  1. Intra-abdominal infection presenting with perforated stercolic ulcer of sigmoid colon, underwent laparoscopic sigmoid colectomy with anastomosis, washout 1/29. Findings of diffuse feculent peritonitis. Ongoing pain and leukocytosis. Draining clear fluid from wound -- creatinine level not suggestive of urine leak based on estimate. Bowel function returning to normal, but wbc remains high and increasing. No improvement with change in antibiotics. Repeat CT without significant findings for infection. CRP increasing. Fever and wbc improving after starting vancomycin.  Improving.  2. Bacteremia with clostridium species, Collinsella aerofaciens, bacteroides vulgatus, and Eubacterium spp. Likely secondary to perforation. Susceptible to meropenem and metronidazole   3. DM  4. Hypothyroidism. Mildly elevated TSH prior to admission, on treatment  5. Left great toe infection for many months. Has seen podiatry and primary clinic for this. Recent doxycycline. Had MRI negative for osteomyelitis in September. 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   7. Cholelithiasis      PLAN:  -Continue vancomycin, meropenem and micafungin   -bilateral lower ext u/s - no DVTs  -trend wbc and CRP    Discussed with the patient, nursing staff.    ID will follow.    Sergio Noel MD  [facility]  Direct messaging: [employer] Paging  On-Call ID provider: [phone], option: 9      ______________________________________________________________________    SUBJECTIVE / INTERVAL HISTORY:   Doing better today.  No new concerns.    OBJECTIVE:  BP (!) 149/66 (BP Location: Right arm)   Pulse 97   Temp (!) 100.6  F (38.1  C) (Oral)   Resp 20   Ht 1.651 m (5' 5\")   Wt 110.5 kg (243 lb 9.7 oz)   SpO2 95%   BMI 40.54 kg/m       GEN: No acute distress.   RESPIRATORY:  Clear bilaterally   CARDIOVASCULAR:  Tachy, " no murmur  ABDOMEN:  Soft, generalized tenderness. Incision site intact without drainage  EXTREMITIES: mild edema  SKIN/HAIR/NAILS:  No rashes. Erythema on multiple toes, without lesions or tenderness  Psych: more conversant today  IV: peripheral        Antibiotics:  Meropenem 2/4-  Micafungin 2/4-  Vancomycin 2/8-    Previous:  pip/tazo 1/29-2/4  Vancomycin 1/29 x1      Pertinent labs:    Recent Labs   Lab 02/11/23  0634 02/10/23  0701 02/09/23  0642   WBC 11.3* 15.0* 21.4*   HGB 9.6* 9.7* 10.2*   HCT 32.1* 32.3* 33.4*    415 445        Recent Labs   Lab 02/11/23  0634 02/10/23  0701 02/08/23  0646    141 138   CO2 28 30* 31*   BUN 10.8 10.9 9.9      No results found for: CRP      Lab Results   Component Value Date    ALT 14 02/08/2023    AST 29 02/08/2023    ALKPHOS 142 (H) 02/08/2023         MICROBIOLOGY DATA:  1/29 blood culture: C.clostridioforme and B.vulgatus  1/29 blood culture: Eubacterium spp and Collinsella aerofaciens      RADIOLOGY:  CT Chest/Abdomen/Pelvis w Contrast    Result Date: 2/7/2023  EXAM: CT CHEST/ABDOMEN/PELVIS W CONTRAST LOCATION: [facility] DATE/TIME: 2/7/2023 8:21 PM INDICATION: ongoing leukocytosis. new fever. 1/29 sigmoidectomy with wash out. COMPARISON: CT of the abdomen and pelvis 2/4/2023 TECHNIQUE: CT scan of the chest, abdomen, and pelvis was performed following injection of IV contrast. Multiplanar reformats were obtained. Dose reduction techniques were used. CONTRAST: 100 mL Isovue-370 FINDINGS: LUNGS AND PLEURA: Small bilateral pleural effusions layer into the posterior costophrenic sulci. Bands of atelectasis along the posterior pleura of the lower lobes. No lung edema or consolidation. Trachea and central airways are patent and normal caliber. MEDIASTINUM: Cardiac chambers are normal in size. No pericardial effusion. Normal caliber thoracic aorta and main pulmonary artery. Arch, proximal great vessel and descending aorta atheromatous  calcification is patchy. There are no enlarged mediastinal or hilar lymph nodes. Esophagus is decompressed. A few small lower paraesophageal varices are present. CORONARY ARTERY CALCIFICATION: Mild to moderate, three-vessel disease. HEPATOBILIARY: Slight geographic liver attenuation but no focal enhancement or parenchymal lesion. There is a calcified stone layering in the gallbladder. No gallbladder wall thickening or pericholecystic inflammation. No bile duct enlargement. PANCREAS: Normal. SPLEEN: Normal. ADRENAL GLANDS: Normal. KIDNEYS/BLADDER: Trace amount of air in the nondependent bladder from recent instrumentation. Bladder wall is smooth with uniform thickness. Kidneys are normal in size with symmetric enhancement and normal cortex thickness. No nephrolithiasis or hydronephrosis. BOWEL: Nondistended stomach. Proximal small bowel in the left upper quadrant is mildly dilated and fluid-filled, however no clear caliber transition and this is unchanged from 2/4/2023. There is hazy attenuation in the mesentery consistent with edema. Minimal perihepatic and perisplenic ascites. Similar ascites layering in the left lower quadrant with small amount of anterior linear enhancement (series 3, image 205). Status post sigmoid colon resection with colocolonic anastomosis in the upper left pelvis. There are multiple diverticula arising from the ascending colon. No pneumatosis or pneumoperitoneum. LYMPH NODES: Mesenteric, aortocaval and iliac chain lymph nodes are normal by size criteria. VASCULATURE: Diffuse atheromatous calcification of the infrarenal abdominal aorta and common iliac arteries. No aneurysm. PELVIC ORGANS: Post hysterectomy. MUSCULOSKELETAL: Grade 1 degenerative anterolisthesis of L4 on L5 and lumbar facet arthropathy. Vacuum disc phenomenon from L1-2 to L3-L4. Thoracic vertebra are maintained in height with small diffuse osteophytes and mild disc space narrowing. No aggressive or destructive bone lesions.  Infraumbilical skin staples are present from recent laparotomy. No body wall hernia. Mild body wall anasarca.     IMPRESSION: 1.  Status post segmental sigmoid colon resection with colocolonic anastomosis in the upper left pelvis. No findings to suggest a surgical complication. 2.  Diverticula arising from the ascending colon but no acute diverticulitis. 3.  Unchanged ascites and mesenteric edema. No drainable intra-abdominal fluid collection. 4.  Minimal pleural effusions and atelectasis in the posterior sulci. 5.  Cholelithiasis.     Echocardiogram Complete    Result Date: 2023  [id number]^TERRENCE^[place] [address]  Name: SRIDHAR ALONSO MRN: [id number] : 1945 Study Date: 2023 12:44 PM Age: 77 yrs Gender: Female Patient Location: [facility] Reason For Study: CHF Ordering Physician: PAYAL OCAMPO Performed By: KATHARINE  BSA: 2.1 m2 Height: 65 in Weight: 220 lb HR: 87 BP: 138/63 mmHg ______________________________________________________________________________ Procedure Complete Echo Adult. ______________________________________________________________________________ Interpretation Summary  1. Normal left ventricular size and systolic performance with a visually estimated ejection fraction of 60%. 2. No significant valvular heart disease is identified on this study. 3. Normal right ventricular size with borderline reduction right ventricular systolic performance. 4. There is mild biatrial enlargement. 5. There is a very small pericardial effusion. There is no evidence of significant intraventricular dependence/tamponade physiology. ______________________________________________________________________________ Left ventricle: Normal left ventricular size and systolic performance with a visually estimated ejection fraction of 60%. There is normal regional wall motion. Left ventricular wall thickness is normal.  Assessment of LV Diastolic  Function: The cumulative findings suggest impaired diastolic filling [The septal e' velocity is < 7 cm/s & lateral e' velocity is < 10 cm/s. The average E/e' is >14. TR velocity is 2.8 m/s. Left atrial volume index is greater than 34 mL/mÂ ].  Assessment of left atrial pressure (LAP): The cumulative findings suggest moderately elevated left atrial pressure (the E/A is > 0.8 and <2.0 plus 2 or 3 of 3 of the following present: Average E/e' > 14, TRvel > 2.8 m/s, and/or LA vol. index > 34 ml/mÂ  ).  Right ventricle: Normal right ventricular size with borderline reduction right ventricular systolic performance.  Left atrium: There is mild left atrial enlargement.  Right atrium: There is mild right atrial enlargement.  IVC: The IVC is borderline dilated.  Aortic valve: The aortic valve is comprised of three cusps. No significant aortic stenosis or aortic insufficiency is detected on this study.  Mitral valve: The mitral valve appears morphologically normal. There is trace mitral insufficiency.  Tricuspid valve: The tricuspid valve is grossly morphologically normal. There is trace tricuspid insufficiency.  Pulmonic valve: The pulmonic valve is grossly morphologically normal.  Thoracic aorta: The aortic root and proximal ascending aorta are of normal dimension.  Pericardium: There is a very small pericardial effusion. There is no evidence of significant intraventricular dependence/tamponade physiology. ______________________________________________________________________________ ______________________________________________________________________________ MMode/2D Measurements & Calculations IVSd: 1.3 cm LVIDd: 4.1 cm LVIDs: 2.6 cm LVPWd: 1.2 cm FS: 36.6 % LV mass(C)d: 185.2 grams LV mass(C)dI: 89.9 grams/m2 Ao root diam: 3.2 cm LA dimension: 4.6 cm asc Aorta Diam: 3.4 cm LA/Ao: 1.4 LVOT diam: 2.3 cm LVOT area: 4.1 cm2 LA Volume Indexed (AL/bp): 36.7 ml/m2  RWT: 0.58  Time Measurements MM HR: 87.0 BPM  Doppler  Measurements & Calculations MV E max ayden: 123.0 cm/sec MV A max ayden: 124.2 cm/sec MV E/A: 0.99 MV dec time: 0.24 sec Ao V2 max: 189.4 cm/sec Ao max P.0 mmHg Ao V2 mean: 123.9 cm/sec Ao mean P.8 mmHg Ao V2 VTI: 32.7 cm QUAN(I,D): 2.8 cm2 QUAN(V,D): 2.9 cm2 LV V1 max P.9 mmHg LV V1 max: 131.3 cm/sec LV V1 VTI: 22.2 cm SV(LVOT): 92.1 ml SI(LVOT): 44.7 ml/m2 PA acc time: 0.09 sec TR max ayden: 279.6 cm/sec TR max P.3 mmHg AV Ayden Ratio (DI): 0.69 QUAN Index (cm2/m2): 1.4 E/E': 13.6  E/E' avg: 15.6 Lateral E/e': 13.6 Medial E/e': 17.7 Peak E' Ayden: 9.0 cm/sec  ______________________________________________________________________________ Report approved by: Leonid Hamilton 2023 02:47 PM       US Lower Extremity Venous Duplex Bilateral    Result Date: 2/10/2023  EXAM: US LOWER EXTREMITY VENOUS DUPLEX BILATERAL LOCATION: [facility] DATE/TIME: 2/10/2023 10:35 AM INDICATION: Bilateral lower extremity pain, swelling and edema. Fever. COMPARISON: None. TECHNIQUE: Venous Duplex ultrasound of bilateral lower extremities with and without compression, augmentation and duplex. Color flow and spectral Doppler with waveform analysis performed. FINDINGS: Exam includes the common femoral, femoral, popliteal veins as well as segmentally visualized deep calf veins and greater saphenous vein. RIGHT: No deep vein thrombosis. No superficial thrombophlebitis. Anechoic avascular cyst in the right popliteal fossa measuring 4.3 x 3.1 x 0.7 cm. LEFT: No deep vein thrombosis. No superficial thrombophlebitis. Anechoic avascular cyst in the left popliteal fossa measuring 3.5 x 2.0 x 0.5 cm     IMPRESSION: 1.  No deep venous thrombosis in the bilateral lower extremities. 2.  Bilateral popliteal fossa Baker's cysts, slightly larger on the right.    XR Chest Port 1 View    Result Date: 2023  EXAM: XR CHEST PORT 1 VIEW LOCATION: [facility] DATE/TIME: 2023 7:42 PM  INDICATION: fever, new cough COMPARISON: 4/28/2021     IMPRESSION: Lung volumes are lower on this portable study. Borderline cardiomegaly unchanged. Pulmonary vessels normal. Questionable subtle right infrahilar infiltrate though this may relate to the lower lung volumes. Lungs otherwise clear.    XR Ankle Port Left G/E 3 Views    Result Date: 2/5/2023  EXAM: XR ANKLE PORT LEFT G/E 3 VIEWS LOCATION: [facility] DATE/TIME: 2/5/2023 2:22 PM INDICATION: left ankle pain, no trauma COMPARISON: None.     IMPRESSION: Bones are demineralized. Mild tibiotalar joint degenerative change. No evidence of an acute fracture. Tiny bone fragment adjacent to the medial talus and mild bony proliferation along the tip of the lateral malleolus is likely chronic. Ankle mortise is intact. Calcaneal heel spur.    CT Abdomen Pelvis w Contrast    Result Date: 2/4/2023  EXAM: CT ABDOMEN PELVIS W CONTRAST LOCATION: [facility] DATE/TIME: 2/4/2023 12:08 AM INDICATION: 1 29 sigmoidectomy with increasing wbc COMPARISON: CT a abdomen/pelvis 01/29/2023 TECHNIQUE: CT scan of the abdomen and pelvis was performed following injection of IV contrast. Multiplanar reformats were obtained. Dose reduction techniques were used. CONTRAST: isovue 370 100ml FINDINGS: LOWER CHEST: Trace bilateral pleural effusions right greater than left with compressive atelectasis. HEPATOBILIARY: Diffuse hepatic steatosis. Cholelithiasis with mild gallbladder distention which may be related to fasting. No biliary ductal dilatation. PANCREAS: Normal. SPLEEN: Normal. ADRENAL GLANDS: Normal. KIDNEYS/BLADDER: No hydronephrosis. Box catheter within a decompressed bladder. BOWEL: Recent sigmoidectomy. Colonic diverticulosis. Trace abdominopelvic free ascites. Additionally, there is some loculated ascites in the anterior abdomen with partial rim enhancement and several foci of gas within, which suggests a degree of peritonitis. No  drainable fluid collection currently. Right anterior approach drain terminates in the left pelvis. LYMPH NODES: Normal. VASCULATURE: Atherosclerotic calcifications of the aortoiliac vessels without evidence of aneurysmal dilatation. PELVIC ORGANS: Hysterectomy. MUSCULOSKELETAL: Body wall edema. Skin staples with recent ventral abdominal incision. Small fat-containing umbilical hernia. Multilevel degenerative changes of the thoracolumbar spine. No acute osseous abnormality.     IMPRESSION: 1.  Recent sigmoidectomy. Trace abdominopelvic free ascites. Additionally, there is some loculated ascites in the anterior abdomen with partial rim enhancement and several foci of gas within, which suggests a degree of peritonitis. No drainable fluid collection currently. 2.  Trace bilateral pleural effusions right greater than left with compressive atelectasis. 3.  Body wall edema. 4.  Diffuse hepatic steatosis. 5.  Cholelithiasis with mild gallbladder distention which may be related to fasting.    CT Abdomen Pelvis w Contrast    Result Date: 1/25/2023  EXAM: CT ABDOMEN PELVIS W CONTRAST LOCATION: [facility] DATE/TIME: 1/25/2023 11:28 PM INDICATION: abdominal pain worst in lower quadrants, leukocytosis eval for pathology COMPARISON: 11/26/2022 TECHNIQUE: CT scan of the abdomen and pelvis was performed following injection of IV contrast. Multiplanar reformats were obtained. Dose reduction techniques were used. CONTRAST: isovue 370 100ml FINDINGS: LOWER CHEST: Small left Bochdalek hernia. Basilar atelectasis. HEPATOBILIARY: Cholelithiasis and hepatic steatosis. PANCREAS: Normal. SPLEEN: Normal. ADRENAL GLANDS: Normal. KIDNEYS/BLADDER: Normal. BOWEL: Small bowel is normal caliber. Large amount of colonic stool. Sigmoid colonic wall thickening with adjacent inflammation is again seen. The inflamed segment is distended with stool. Small amount of adjacent free fluid. Caliber change with collapse  of the  rectosigmoid junction and rectum. Diverticulosis.  LYMPH NODES: Subcentimeter retroperitoneal lymph nodes. VASCULATURE: Atherosclerotic vascular calcification. PELVIC ORGANS: Absent uterus. MUSCULOSKELETAL: Degenerative change osseous structures. Mild compression L4. Slight anterolisthesis L4 on L5.     IMPRESSION: 1.  Sigmoid colitis is again seen. Collapse of the rectosigmoid junction and rectum distal to the inflamed segment of colon. Underlying stricture not excluded. 2.  Small amount of adjacent free fluid. 3.  Cholelithiasis.    CTA Abdomen Pelvis with Contrast    Result Date: 1/29/2023  EXAM: CTA ABDOMEN PELVIS WITH CONTRAST LOCATION: [facility] DATE/TIME: 1/29/2023 6:48 AM INDICATION: recurrent colitis, distended abdomen pain out of proportion. COMPARISON: There are study dated 1/25/ TECHNIQUE: CT angiogram abdomen pelvis during arterial phase of injection of IV contrast. 2D and 3D MIP reconstructions were performed by the CT technologist. Dose reduction techniques were used. CONTRAST: isovue 370 100ml FINDINGS: ANGIOGRAM ABDOMEN/PELVIS: The abdominal aorta is normal in size and caliber throughout with scattered atherosclerotic calcifications noted. The iliac and femoral vessels are normal in size and caliber and well-opacified. Celiac and SMA and TONY arise normally and are well opacified at this time LOWER CHEST: Lung bases are clear. Heart is enlarged, similar prior exam. HEPATOBILIARY: Diffuse hepatic steatosis. No significant mass. No bile duct dilatation. Calcified gallstones are again seen within the gallbladder. PANCREAS: No significant mass, duct dilatation, or inflammatory change. SPLEEN: Normal size. ADRENAL GLANDS: No significant nodules. KIDNEYS/BLADDER: No significant mass, stone, or hydronephrosis. BOWEL: Mural thickening and pericolonic inflammation of the sigmoid colon is again noted, similar in appearance to prior exam with increased inflammation seen in the proximal  sigmoid colon. No pericolonic abscess seen at this time. Stool is seen throughout the inflamed sigmoid colon as well as scattered throughout the large bowel without evidence of large bowel obstruction. Adjacent to the proximal sigmoid colon is free fluid with foci of gas which are extraluminal and new from the prior study (image 1:15, series 5). Additional foci of extraluminal gas are seen within the mesenteric fat anteriorly in the upper abdomen (image 57, series 5). There are scattered diverticula seen throughout the colon. Air-fluid levels are seen scattered through multiple loops of small bowel with mild dilatation seen in the proximal jejunum in the upper abdomen, these mildly dilated loops of small bowel decompressed gradually distally without a clear transition point. LYMPH NODES: Increased Number of pericolonic lymph nodes are again seen adjacent to the sigmoid colon. PELVIC ORGANS: No pelvic masses. MUSCULOSKELETAL: Unchanged     IMPRESSION: 1.  Mural thickening of the sigmoid colon with pericolonic inflammation and stranding, slightly worsened from prior exam with punctate foci of gas seen adjacent to the inflamed colon as well as tracking in the anterior abdominal wall concerning for sigmoid colonic perforation. No organized intra-abdominal fluid collection such as abscess is seen at this time. Given the long-standing inflammation in this region an underlying neoplastic process cannot be excluded. 2.  Scattered air-fluid levels and mildly dilated loops of proximal jejunum which decompresses gradually without a transition point consistent favored to reflect reactive ileus 3.  Hepatic steatosis 4.  Cholelithiasis [Critical Result: Sigmoid colonic inflammation with areas of extraluminal gas, new from 1/25/2023 concerning for sigmoid colonic perforation] Finding was identified on 1/29/2023 6:56 AM. DR. Snell was contacted by me on 1/29/2023 7:08 AM and verbalized understanding of the critical result.      CT Chest w/o Contrast    Result Date: 1/29/2023  EXAM: CT CHEST WITHOUT CONTRAST LOCATION: [facility] DATE/TIME: 01/29/2023, 6:46 AM INDICATION: Fever and cough. COMPARISON: 11/26/2022 - CT chest, abdomen and pelvis. TECHNIQUE: Note that this study was performed in conjunction with a CT scan of the abdomen and pelvis. Refer to a separate report for findings from that study. CT chest without IV contrast. Multiplanar reformats were obtained. Dose reduction techniques were used. CONTRAST: None. FINDINGS: LUNGS AND PLEURA: Minimal emphysematous changes in the lungs. Slight interstitial thickening in bilateral lung bases. A few curvilinear opacities in the periphery of both lungs likely represent atelectasis and/or scarring. The lungs are otherwise clear. Small left Bochdalek hernia containing fat MEDIASTINUM/AXILLAE: Atherosclerotic calcification in the thoracic aorta. CORONARY ARTERY CALCIFICATION: Present. MUSCULOSKELETAL: No acute findings. UPPER ABDOMEN: A few foci of extraluminal gas are present in the upper abdomen.     IMPRESSION: 1.  Slight interstitial thickening in bilateral lung bases. This is nonspecific, but most likely relates to interstitial pulmonary edema. 2.  No other findings suspicious for acute pulmonary disease. 3.  A few foci of extraluminal gas scattered within the nondependent aspect of the upper abdomen. In the absence of recent surgery, this is consistent with a bowel perforation. Refer to the report from the CT scan of the abdomen and pelvis performed in conjunction with this study for additional details. The findings were called to Dr. Snell by Dr. Anderson on 01/29/2023 at 0700 hours.      Attestation:  I have reviewed today's Medications, Vital Signs, and Labs. Cultures and previous notes were reviewed and summarized above. Recommendations discussed with RN.

## 2023-02-11 NOTE — PLAN OF CARE
"  Problem: Plan of Care - These are the overarching goals to be used throughout the patient stay.    Goal: Plan of Care Review  Description: The Plan of Care Review/Shift note should be completed every shift.  The Outcome Evaluation is a brief statement about your assessment that the patient is improving, declining, or no change.  This information will be displayed automatically on your shift note.  Outcome: Progressing  Goal: Patient-Specific Goal (Individualized)  Description: You can add care plan individualizations to a care plan. Examples of Individualization might be:  \"Parent requests to be called daily at 9am for status\", \"I have a hard time hearing out of my right ear\", or \"Do not touch me to wake me up as it startles me\".  Outcome: Progressing  Goal: Absence of Hospital-Acquired Illness or Injury  Outcome: Progressing  Intervention: Identify and Manage Fall Risk  Recent Flowsheet Documentation  Taken 2/11/2023 0500 by Bart Vazquez, RN  Safety Promotion/Fall Prevention: activity supervised  Taken 2/11/2023 0056 by Bart Vazquez, RN  Safety Promotion/Fall Prevention: activity supervised  Intervention: Prevent Skin Injury  Recent Flowsheet Documentation  Taken 2/11/2023 0056 by Bart Vazquez, RN  Body Position: turned  Goal: Optimal Comfort and Wellbeing  Outcome: Progressing  Goal: Readiness for Transition of Care  Outcome: Progressing   Goal Outcome Evaluation:       Denied pain. Vital signs are stable. Slept most of the night, Abdominal incision intact. Continue to monitor.               "

## 2023-02-12 ENCOUNTER — APPOINTMENT (OUTPATIENT)
Dept: OCCUPATIONAL THERAPY | Facility: HOSPITAL | Age: 78
DRG: 853 | End: 2023-02-12
Payer: COMMERCIAL

## 2023-02-12 LAB
ANION GAP SERPL CALCULATED.3IONS-SCNC: 7 MMOL/L (ref 7–15)
BUN SERPL-MCNC: 13.4 MG/DL (ref 8–23)
CALCIUM SERPL-MCNC: 8.2 MG/DL (ref 8.8–10.2)
CHLORIDE SERPL-SCNC: 101 MMOL/L (ref 98–107)
CREAT SERPL-MCNC: 0.62 MG/DL (ref 0.51–0.95)
DEPRECATED HCO3 PLAS-SCNC: 32 MMOL/L (ref 22–29)
ERYTHROCYTE [DISTWIDTH] IN BLOOD BY AUTOMATED COUNT: 15.1 % (ref 10–15)
GFR SERPL CREATININE-BSD FRML MDRD: >90 ML/MIN/1.73M2
GLUCOSE BLDC GLUCOMTR-MCNC: 154 MG/DL (ref 70–99)
GLUCOSE BLDC GLUCOMTR-MCNC: 158 MG/DL (ref 70–99)
GLUCOSE BLDC GLUCOMTR-MCNC: 192 MG/DL (ref 70–99)
GLUCOSE BLDC GLUCOMTR-MCNC: 195 MG/DL (ref 70–99)
GLUCOSE SERPL-MCNC: 170 MG/DL (ref 70–99)
HCT VFR BLD AUTO: 33.8 % (ref 35–47)
HGB BLD-MCNC: 9.9 G/DL (ref 11.7–15.7)
MCH RBC QN AUTO: 28 PG (ref 26.5–33)
MCHC RBC AUTO-ENTMCNC: 29.3 G/DL (ref 31.5–36.5)
MCV RBC AUTO: 96 FL (ref 78–100)
PLATELET # BLD AUTO: 463 10E3/UL (ref 150–450)
POTASSIUM SERPL-SCNC: 3.7 MMOL/L (ref 3.4–5.3)
RBC # BLD AUTO: 3.54 10E6/UL (ref 3.8–5.2)
SODIUM SERPL-SCNC: 140 MMOL/L (ref 136–145)
WBC # BLD AUTO: 10.9 10E3/UL (ref 4–11)

## 2023-02-12 PROCEDURE — 250N000013 HC RX MED GY IP 250 OP 250 PS 637: Performed by: INTERNAL MEDICINE

## 2023-02-12 PROCEDURE — 120N000001 HC R&B MED SURG/OB

## 2023-02-12 PROCEDURE — 250N000011 HC RX IP 250 OP 636: Performed by: SURGERY

## 2023-02-12 PROCEDURE — 97530 THERAPEUTIC ACTIVITIES: CPT | Mod: GO

## 2023-02-12 PROCEDURE — 250N000011 HC RX IP 250 OP 636: Performed by: INTERNAL MEDICINE

## 2023-02-12 PROCEDURE — 97535 SELF CARE MNGMENT TRAINING: CPT | Mod: GO

## 2023-02-12 PROCEDURE — 250N000013 HC RX MED GY IP 250 OP 250 PS 637: Performed by: SURGERY

## 2023-02-12 PROCEDURE — 99231 SBSQ HOSP IP/OBS SF/LOW 25: CPT | Performed by: INTERNAL MEDICINE

## 2023-02-12 PROCEDURE — 36415 COLL VENOUS BLD VENIPUNCTURE: CPT | Performed by: INTERNAL MEDICINE

## 2023-02-12 PROCEDURE — 82310 ASSAY OF CALCIUM: CPT | Performed by: INTERNAL MEDICINE

## 2023-02-12 PROCEDURE — 97110 THERAPEUTIC EXERCISES: CPT | Mod: GO

## 2023-02-12 PROCEDURE — 99207 PR CDG-CUT & PASTE-POTENTIAL IMPACT ON LEVEL: CPT | Performed by: INTERNAL MEDICINE

## 2023-02-12 PROCEDURE — 258N000003 HC RX IP 258 OP 636: Performed by: INTERNAL MEDICINE

## 2023-02-12 PROCEDURE — 250N000013 HC RX MED GY IP 250 OP 250 PS 637: Performed by: HOSPITALIST

## 2023-02-12 PROCEDURE — 99232 SBSQ HOSP IP/OBS MODERATE 35: CPT | Performed by: STUDENT IN AN ORGANIZED HEALTH CARE EDUCATION/TRAINING PROGRAM

## 2023-02-12 PROCEDURE — 85027 COMPLETE CBC AUTOMATED: CPT | Performed by: INTERNAL MEDICINE

## 2023-02-12 RX ADMIN — ACETAMINOPHEN 650 MG: 325 TABLET ORAL at 09:25

## 2023-02-12 RX ADMIN — FUROSEMIDE 20 MG: 10 INJECTION, SOLUTION INTRAMUSCULAR; INTRAVENOUS at 09:25

## 2023-02-12 RX ADMIN — MICAFUNGIN SODIUM 100 MG: 50 INJECTION, POWDER, LYOPHILIZED, FOR SOLUTION INTRAVENOUS at 19:01

## 2023-02-12 RX ADMIN — BENZOCAINE 6 MG-MENTHOL 10 MG LOZENGES 1 LOZENGE: at 14:46

## 2023-02-12 RX ADMIN — OXYCODONE HYDROCHLORIDE 5 MG: 5 TABLET ORAL at 18:23

## 2023-02-12 RX ADMIN — VENLAFAXINE HYDROCHLORIDE 75 MG: 75 CAPSULE, EXTENDED RELEASE ORAL at 09:25

## 2023-02-12 RX ADMIN — VANCOMYCIN HYDROCHLORIDE 1000 MG: 1 INJECTION, SOLUTION INTRAVENOUS at 04:54

## 2023-02-12 RX ADMIN — ROSUVASTATIN CALCIUM 20 MG: 10 TABLET, FILM COATED ORAL at 20:43

## 2023-02-12 RX ADMIN — MEROPENEM 1 G: 1 INJECTION INTRAVENOUS at 00:20

## 2023-02-12 RX ADMIN — MEROPENEM 1 G: 1 INJECTION INTRAVENOUS at 09:24

## 2023-02-12 RX ADMIN — ENOXAPARIN SODIUM 40 MG: 40 INJECTION SUBCUTANEOUS at 09:24

## 2023-02-12 RX ADMIN — AMLODIPINE BESYLATE 2.5 MG: 2.5 TABLET ORAL at 09:25

## 2023-02-12 RX ADMIN — ACETAMINOPHEN 650 MG: 325 TABLET ORAL at 13:34

## 2023-02-12 RX ADMIN — ASPIRIN 81 MG: 81 TABLET, COATED ORAL at 09:25

## 2023-02-12 RX ADMIN — VANCOMYCIN HYDROCHLORIDE 1000 MG: 1 INJECTION, SOLUTION INTRAVENOUS at 16:20

## 2023-02-12 RX ADMIN — MEROPENEM 1 G: 1 INJECTION INTRAVENOUS at 17:40

## 2023-02-12 RX ADMIN — LEVOTHYROXINE SODIUM 137 MCG: 0.11 TABLET ORAL at 05:36

## 2023-02-12 RX ADMIN — ANORECTAL OINTMENT: 15.7; .44; 24; 20.6 OINTMENT TOPICAL at 13:37

## 2023-02-12 RX ADMIN — OXYCODONE HYDROCHLORIDE 2.5 MG: 5 TABLET ORAL at 13:34

## 2023-02-12 RX ADMIN — BENZOCAINE 6 MG-MENTHOL 10 MG LOZENGES 1 LOZENGE: at 17:41

## 2023-02-12 RX ADMIN — BENZOCAINE 6 MG-MENTHOL 10 MG LOZENGES 1 LOZENGE: at 19:01

## 2023-02-12 RX ADMIN — ACETAMINOPHEN 650 MG: 325 TABLET ORAL at 20:43

## 2023-02-12 ASSESSMENT — ACTIVITIES OF DAILY LIVING (ADL)
ADLS_ACUITY_SCORE: 39
ADLS_ACUITY_SCORE: 35
ADLS_ACUITY_SCORE: 36
ADLS_ACUITY_SCORE: 39
ADLS_ACUITY_SCORE: 40
ADLS_ACUITY_SCORE: 39
ADLS_ACUITY_SCORE: 36
ADLS_ACUITY_SCORE: 39
ADLS_ACUITY_SCORE: 39
ADLS_ACUITY_SCORE: 36
ADLS_ACUITY_SCORE: 35
ADLS_ACUITY_SCORE: 39

## 2023-02-12 NOTE — PLAN OF CARE
Problem: Bowel Resection     Problem: Bowel Resection  Goal: Acceptable Pain Control  Intervention: Prevent or Manage Pain  Recent Flowsheet Documentation  Pain Management Interventions: medication (see MAR)     Problem: Bowel Resection  Goal: Nausea and Vomiting Relief  Intervention: Prevent or Manage Nausea and Vomiting  Recent Flowsheet Documentation  Nausea/Vomiting Interventions: antiemetic   Goal Outcome Evaluation:    Up in chair from shift start until after dinner.  Appetite remains poor.  Up to commode wit A2.  Had medium soft bowel movement.    Reported feeling nauseated after returning to bed but started nausea resolved soon after.  Has denied nausea since.  Oxycodone given once and was effective.  Patient stated no pain on re-assessment.  Now resting comfortably.

## 2023-02-12 NOTE — PLAN OF CARE
Problem: Bowel Resection  Goal: Absence of Infection Signs and Symptoms  Outcome: Progressing     Problem: Bowel Resection  Goal: Acceptable Pain Control  Outcome: Progressing  Intervention: Prevent or Manage Pain  Recent Flowsheet Documentation  Taken 2/12/2023 1341 by Jakub Chandler, RN  Pain Management Interventions:   medication (see MAR)   emotional support   repositioned   pillow support provided  Taken 2/12/2023 0922 by Jakub Chandler, RN  Pain Management Interventions:   medication (see MAR)   care clustered     Problem: Oral Intake Inadequate  Goal: Improved Oral Intake  2/12/2023 1533 by Jakub Chandler, RN  Outcome: Not Progressing     Patient VSS on 2 L O2 this shift, A+Ox4 but forgetful, denying nausea, endorsing pain 2-3/10 at rest on this shift but higher w/ activity, given scheduled APAP and prn oxycodone x 1, patient continues to endorse pain 2-3/10 at rest. Up to chair and bedside commode on this shift.  and 192, appetite poor but improving from yesterday. IV abx per orders.    Jakub Chandler RN 2/12/2023  [id number]

## 2023-02-12 NOTE — PROGRESS NOTES
ASSESSMENT:  1. Perforation of colon (H)    2. Refusal of blood transfusions as patient is [employer]        Suzy Gómez is a 77 year old female who is s/p laparoscopic sigmoid colectomy and washout on 1/29/2023.  WBC and CRP trending down    PLAN:  -Up to that toilet or commode every 2 hours.  No excuses.  There is no reason for her not to be using a toilet or commode.  -As much ambulation and time in the chair as possible  -ABX per ID  -Diet as tolerated  -With her poor nutrition, and likely impaired healing abilities, we will plan to leave skin staples in for 3 weeks  -Ok with discharge to TCU at anytime given that her WBC has normalized and VS have remained stable        SUBJECTIVE: Doing fine, tired, not much of an appetite    Patient Vitals for the past 24 hrs:   BP Temp Temp src Pulse Resp SpO2   02/12/23 0837 (!) 156/66 98.7  F (37.1  C) Oral 87 22 94 %   02/11/23 2346 -- -- -- -- -- 98 %   02/11/23 2318 122/58 98.7  F (37.1  C) Oral 81 18 95 %   02/11/23 1516 105/60 98.5  F (36.9  C) Oral 85 18 92 %         PHYSICAL EXAM:  GEN: No acute distress, comfortable  ABD: Soft, nondistended, mild ttp, no rebound or guarding, incision c/d/i with staples and vessel loop in place  Output by Drain (mL) 02/10/23 0700 - 02/10/23 1459 02/10/23 1500 - 02/10/23 2259 02/10/23 2300 - 02/11/23 0659 02/11/23 0700 - 02/11/23 1459 02/11/23 1500 - 02/11/23 2259 02/11/23 2300 - 02/12/23 0659 02/12/23 0700 - 02/12/23 1054   Open Drain Ventral Abdomen              EXT:No cyanosis, edema or obvious abnormalities    02/11 0700 - 02/12 0659  In: 118 [P.O.:118]  Out: -     No results displayed because visit has over 200 results.   Recent Results (from the past 24 hour(s))   Glucose by meter    Collection Time: 02/11/23 12:30 PM   Result Value Ref Range    GLUCOSE BY METER POCT 147 (H) 70 - 99 mg/dL   Glucose by meter    Collection Time: 02/11/23  4:21 PM   Result Value Ref Range    GLUCOSE BY METER POCT 164 (H) 70 - 99 mg/dL    Glucose by meter    Collection Time: 02/11/23  8:24 PM   Result Value Ref Range    GLUCOSE BY METER POCT 170 (H) 70 - 99 mg/dL   CBC with platelets    Collection Time: 02/12/23  6:41 AM   Result Value Ref Range    WBC Count 10.9 4.0 - 11.0 10e3/uL    RBC Count 3.54 (L) 3.80 - 5.20 10e6/uL    Hemoglobin 9.9 (L) 11.7 - 15.7 g/dL    Hematocrit 33.8 (L) 35.0 - 47.0 %    MCV 96 78 - 100 fL    MCH 28.0 26.5 - 33.0 pg    MCHC 29.3 (L) 31.5 - 36.5 g/dL    RDW 15.1 (H) 10.0 - 15.0 %    Platelet Count 463 (H) 150 - 450 10e3/uL   Basic metabolic panel    Collection Time: 02/12/23  6:41 AM   Result Value Ref Range    Sodium 140 136 - 145 mmol/L    Potassium 3.7 3.4 - 5.3 mmol/L    Chloride 101 98 - 107 mmol/L    Carbon Dioxide (CO2) 32 (H) 22 - 29 mmol/L    Anion Gap 7 7 - 15 mmol/L    Urea Nitrogen 13.4 8.0 - 23.0 mg/dL    Creatinine 0.62 0.51 - 0.95 mg/dL    Calcium 8.2 (L) 8.8 - 10.2 mg/dL    Glucose 170 (H) 70 - 99 mg/dL    GFR Estimate >90 >60 mL/min/1.73m2   Glucose by meter    Collection Time: 02/12/23  8:43 AM   Result Value Ref Range    GLUCOSE BY METER POCT 154 (H) 70 - 99 mg/dL               Willie Ware PA-C

## 2023-02-12 NOTE — PLAN OF CARE
Problem: Plan of Care - These are the overarching goals to be used throughout the patient stay.    Goal: Plan of Care Review  Description: The Plan of Care Review/Shift note should be completed every shift.  The Outcome Evaluation is a brief statement about your assessment that the patient is improving, declining, or no change.  This information will be displayed automatically on your shift note.  Outcome: Progressing     Problem: Plan of Care - These are the overarching goals to be used throughout the patient stay.    Goal: Absence of Hospital-Acquired Illness or Injury  Outcome: Progressing  Intervention: Identify and Manage Fall Risk  Recent Flowsheet Documentation  Taken 2/11/2023 2345 by Comfort Kenney, RN  Safety Promotion/Fall Prevention: activity supervised  Intervention: Prevent Skin Injury  Recent Flowsheet Documentation  Taken 2/11/2023 2345 by Comfort Kenney RN  Body Position: weight shifting     Problem: Bowel Resection  Goal: Absence of Bleeding  Outcome: Progressing     Problem: Pain Acute  Goal: Optimal Pain Control and Function  Outcome: Progressing  Intervention: Prevent or Manage Pain  Recent Flowsheet Documentation  Taken 2/11/2023 2345 by Comfort Kenney RN  Sensory Stimulation Regulation:   care clustered   quiet environment promoted   Goal Outcome Evaluation:       Pt drowsy/alert throughout the night, oriented x4 can be forgetful. Patient abdomen soft, nontender. Incisions intact with staples with the exception of RUQ, no redness or drainage noted. Midline incision with staples and drain, no redness or drainage noted, all open to air. Bowel sounds present. No bm on noc. Patient has not had appetite and very poor intake. Urine output adequate. Large incontinence. Redness and excoriation to vagina and buttock from incontinence. Patient strongly encouraged to get out of bed to commode, no purewick used to enforce getting up to use bathroom- unsuccessful. VSS. Call light within reach.

## 2023-02-12 NOTE — PROGRESS NOTES
Daily Progress Note        CODE STATUS:  Full Code    02/09/23  Assessment/Plan:  77 year old female who was admitted on 1/29/2023 with bowel perforation and found to have impacted stool in sigmoid colon with associated perforation and feculent peritonitis.      Perforation of Colonic Stercolic Ulcer   -- S/P Sigmoid Colectomy 1/29/30. Diffuse feculent peritonitis noted intra-op  -- On IV Meropenum and Micafungin per ID. IV vancomycin added 2/8/23  -- Appreciate ID consult  -- Follow up CT abd/pelvis on 2/7/23 showed unchanged ascites and mesenteric edema. No drainable intra-abdominal fluid collection     Sepsis   -- WBC up, temp 102.8, , but BP stable  -- Patient is very edematous. Stopped IVF 2/9/23. Cont IV lasix  -- Bacteremia with clostridium species, Collinsella aerofaciens, bacteroides vulgatus, and Eubacterium spp  -- Clinically better. Leucocytosis improved. CRP coming down    HTN (hypertension)  -- Amlodipine with hold parameters. Losartan on hold    DM type 2, Hgb A1C 7.6 on 11/23/22  -- Cont lantus 30 units and sliding scale insulin. Will adjust as needed     Leg swelling  Fluid overload  -- I/O charting is incorrect. Stopped IVF. Cont lasix. Monitor renal function  -- US legs negative for DVT    Refusal of blood transfusions as patient is Faith     KARYN (obstructive sleep apnea)     Depression:  -- On effexor at home. Patient looks more depressed and withdrawn.  requests psychiatry consult    DVT Prophylaxis: Enoxaparin (Lovenox) SQ  Code Status: Full Code      Disposition; TCU when ok with ID and surgery, likely in next couple of days.   Barrier to discharge; Clinical progress     LOS: 11 days     Subjective:  Interval History: No acute issues overnight. Slept well. She is more alert and engaged today. No nausea.     Review of Systems:   As mentioned in subjective.    Patient Active Problem List   Diagnosis     Non-specific colitis     Chest pain, unspecified type     Abnormal  laboratory test result     Angiodysplasia of colon     Cataract     Colon adenoma     Depression     GERD (gastroesophageal reflux disease)     HTN (hypertension)     Hypercalcemia     Hyperlipidemia     Hyperparathyroidism (H)     Hypervitaminosis D     Microalbuminuria     Multinodular goiter     OA (osteoarthritis)     Obesity, morbid (H)     Postablative hypothyroidism     DM type 2, Hgb A1C 7.6 on 11/23/22     Perforation of Colonic Stericolic Ulcer -- S/P Sigmoid Colectomy 1/29/30     Refusal of blood transfusions as patient is Gnosticism     KARYN (obstructive sleep apnea)     Bacteremia due to Clostridium and Bacteroides -- 1/29/23       Scheduled Meds:    acetaminophen  650 mg Oral TID     amLODIPine  2.5 mg Oral Daily     aspirin  81 mg Oral Daily     enoxaparin ANTICOAGULANT  40 mg Subcutaneous Q24H     furosemide  20 mg Intravenous Daily     insulin aspart  1-10 Units Subcutaneous TID AC     insulin aspart  1-7 Units Subcutaneous At Bedtime     [Held by provider] insulin glargine  30 Units Subcutaneous At Bedtime     levothyroxine  137 mcg Oral QAM AC     meropenem  1 g Intravenous Q8H     micafungin  100 mg Intravenous Q24H     rosuvastatin  20 mg Oral At Bedtime     sodium chloride (PF)  3 mL Intracatheter Q8H     sodium chloride (PF)  3 mL Intracatheter Q8H     vancomycin  1,000 mg Intravenous Q12H     venlafaxine  75 mg Oral Daily     Continuous Infusions:    PRN Meds:.sore throat, glucose **OR** dextrose **OR** glucagon, hydrOXYzine, lidocaine 4%, lidocaine (buffered or not buffered), melatonin, menthol-zinc oxide, naloxone **OR** naloxone **OR** naloxone **OR** naloxone, ondansetron **OR** ondansetron, oxyCODONE, oxyCODONE IR, prochlorperazine, sodium chloride (PF), sodium chloride (PF)    Objective:  Vital signs in last 24 hours:  Temp:  [98.5  F (36.9  C)-98.7  F (37.1  C)] 98.7  F (37.1  C)  Pulse:  [81-87] 87  Resp:  [18-22] 22  BP: (105-156)/(58-66) 156/66  SpO2:  [92 %-98 %] 94  %        Intake/Output Summary (Last 24 hours) at 2/9/2023 1537  Last data filed at 2/9/2023 1450  Gross per 24 hour   Intake 2258 ml   Output 275 ml   Net 1983 ml       Physical Exam:    General: Not in obvious distress. Flat afect  HEENT: NC, AT   Chest: Clear to auscultation bilaterally  Heart: S1S2 normal, regular. No M/R/G  Abdomen: Soft. Mild generalized tenderness. Distended.   Extremities: 2+ legs swelling  Neuro: Awake, grossly non-focal      Lab Results:(I have personally reviewed the results)    Recent Results (from the past 24 hour(s))   Glucose by meter    Collection Time: 02/11/23 12:30 PM   Result Value Ref Range    GLUCOSE BY METER POCT 147 (H) 70 - 99 mg/dL   Glucose by meter    Collection Time: 02/11/23  4:21 PM   Result Value Ref Range    GLUCOSE BY METER POCT 164 (H) 70 - 99 mg/dL   Glucose by meter    Collection Time: 02/11/23  8:24 PM   Result Value Ref Range    GLUCOSE BY METER POCT 170 (H) 70 - 99 mg/dL   CBC with platelets    Collection Time: 02/12/23  6:41 AM   Result Value Ref Range    WBC Count 10.9 4.0 - 11.0 10e3/uL    RBC Count 3.54 (L) 3.80 - 5.20 10e6/uL    Hemoglobin 9.9 (L) 11.7 - 15.7 g/dL    Hematocrit 33.8 (L) 35.0 - 47.0 %    MCV 96 78 - 100 fL    MCH 28.0 26.5 - 33.0 pg    MCHC 29.3 (L) 31.5 - 36.5 g/dL    RDW 15.1 (H) 10.0 - 15.0 %    Platelet Count 463 (H) 150 - 450 10e3/uL   Basic metabolic panel    Collection Time: 02/12/23  6:41 AM   Result Value Ref Range    Sodium 140 136 - 145 mmol/L    Potassium 3.7 3.4 - 5.3 mmol/L    Chloride 101 98 - 107 mmol/L    Carbon Dioxide (CO2) 32 (H) 22 - 29 mmol/L    Anion Gap 7 7 - 15 mmol/L    Urea Nitrogen 13.4 8.0 - 23.0 mg/dL    Creatinine 0.62 0.51 - 0.95 mg/dL    Calcium 8.2 (L) 8.8 - 10.2 mg/dL    Glucose 170 (H) 70 - 99 mg/dL    GFR Estimate >90 >60 mL/min/1.73m2   Glucose by meter    Collection Time: 02/12/23  8:43 AM   Result Value Ref Range    GLUCOSE BY METER POCT 154 (H) 70 - 99 mg/dL       All laboratory and imaging data in  the past 24 hours reviewed  Serum Glucose range:   Recent Labs   Lab 02/12/23  0843 02/12/23  0641 02/11/23 2024 02/11/23  1621   * 170* 170* 164*     ABG: No lab results found in last 7 days.  CBC:   Recent Labs   Lab 02/12/23  0641 02/11/23  0634 02/10/23  0701 02/09/23  0642 02/08/23  0646   WBC 10.9 11.3* 15.0*   < > 25.1*  24.8*   HGB 9.9* 9.6* 9.7*   < > 10.9*   HCT 33.8* 32.1* 32.3*   < > 36.3   MCV 96 95 93   < > 93   * 393 415   < > 449   NEUTROPHIL  --   --   --   --  92   LYMPH  --   --   --   --  3   MONOCYTE  --   --   --   --  4   EOSINOPHIL  --   --   --   --  0    < > = values in this interval not displayed.     Chemistry:   Recent Labs   Lab 02/12/23  0641 02/11/23  0634 02/10/23  0701 02/09/23  0642 02/08/23  0646 02/07/23  0604 02/06/23  0646    139 141  --  138 139 143   POTASSIUM 3.7 3.6 3.2*  --  3.6 3.7 3.5   CHLORIDE 101 102 101  --  100 102 108*   CO2 32* 28 30*  --  31* 31* 28   BUN 13.4 10.8 10.9  --  9.9 7.4* 7.0*   CR 0.62 0.53 0.57   < > 0.60 0.60 0.58   GFRESTIMATED >90 >90 >90   < > >90 >90 >90   ALTON 8.2* 7.9* 7.8*  --  8.3* 8.2* 8.2*   MAG  --   --   --   --   --  1.8 1.5*   PROTTOTAL  --   --   --   --  5.4*  --  5.1*   ALBUMIN  --   --   --   --  2.3*  --  2.4*   AST  --   --   --   --  29  --  22   ALT  --   --   --   --  14  --  10   ALKPHOS  --   --   --   --  142*  --  104   BILITOTAL  --   --   --   --  0.3  --  0.2    < > = values in this interval not displayed.     Coags:  No results for input(s): INR, PROTIME, PTT in the last 168 hours.    Invalid input(s): APTT  Cardiac Markers:  No results for input(s): CKTOTAL, TROPONINI in the last 168 hours.       CT Chest/Abdomen/Pelvis w Contrast    Result Date: 2/7/2023  EXAM: CT CHEST/ABDOMEN/PELVIS W CONTRAST LOCATION: [facility] DATE/TIME: 2/7/2023 8:21 PM INDICATION: ongoing leukocytosis. new fever. 1/29 sigmoidectomy with wash out. COMPARISON: CT of the abdomen and pelvis 2/4/2023  TECHNIQUE: CT scan of the chest, abdomen, and pelvis was performed following injection of IV contrast. Multiplanar reformats were obtained. Dose reduction techniques were used. CONTRAST: 100 mL Isovue-370 FINDINGS: LUNGS AND PLEURA: Small bilateral pleural effusions layer into the posterior costophrenic sulci. Bands of atelectasis along the posterior pleura of the lower lobes. No lung edema or consolidation. Trachea and central airways are patent and normal caliber. MEDIASTINUM: Cardiac chambers are normal in size. No pericardial effusion. Normal caliber thoracic aorta and main pulmonary artery. Arch, proximal great vessel and descending aorta atheromatous calcification is patchy. There are no enlarged mediastinal or hilar lymph nodes. Esophagus is decompressed. A few small lower paraesophageal varices are present. CORONARY ARTERY CALCIFICATION: Mild to moderate, three-vessel disease. HEPATOBILIARY: Slight geographic liver attenuation but no focal enhancement or parenchymal lesion. There is a calcified stone layering in the gallbladder. No gallbladder wall thickening or pericholecystic inflammation. No bile duct enlargement. PANCREAS: Normal. SPLEEN: Normal. ADRENAL GLANDS: Normal. KIDNEYS/BLADDER: Trace amount of air in the nondependent bladder from recent instrumentation. Bladder wall is smooth with uniform thickness. Kidneys are normal in size with symmetric enhancement and normal cortex thickness. No nephrolithiasis or hydronephrosis. BOWEL: Nondistended stomach. Proximal small bowel in the left upper quadrant is mildly dilated and fluid-filled, however no clear caliber transition and this is unchanged from 2/4/2023. There is hazy attenuation in the mesentery consistent with edema. Minimal perihepatic and perisplenic ascites. Similar ascites layering in the left lower quadrant with small amount of anterior linear enhancement (series 3, image 205). Status post sigmoid colon resection with colocolonic anastomosis  in the upper left pelvis. There are multiple diverticula arising from the ascending colon. No pneumatosis or pneumoperitoneum. LYMPH NODES: Mesenteric, aortocaval and iliac chain lymph nodes are normal by size criteria. VASCULATURE: Diffuse atheromatous calcification of the infrarenal abdominal aorta and common iliac arteries. No aneurysm. PELVIC ORGANS: Post hysterectomy. MUSCULOSKELETAL: Grade 1 degenerative anterolisthesis of L4 on L5 and lumbar facet arthropathy. Vacuum disc phenomenon from L1-2 to L3-L4. Thoracic vertebra are maintained in height with small diffuse osteophytes and mild disc space narrowing. No aggressive or destructive bone lesions. Infraumbilical skin staples are present from recent laparotomy. No body wall hernia. Mild body wall anasarca.     IMPRESSION: 1.  Status post segmental sigmoid colon resection with colocolonic anastomosis in the upper left pelvis. No findings to suggest a surgical complication. 2.  Diverticula arising from the ascending colon but no acute diverticulitis. 3.  Unchanged ascites and mesenteric edema. No drainable intra-abdominal fluid collection. 4.  Minimal pleural effusions and atelectasis in the posterior sulci. 5.  Cholelithiasis.     Echocardiogram Complete    Result Date: 2023  [id number]^TER[place] [address]  Name: SRIDHAR ALONSO MRN: [id number] : 1945 Study Date: 2023 12:44 PM Age: 77 yrs Gender: Female Patient Location: [facility] Reason For Study: CHF Ordering Physician: PAYAL OCAMPO Performed By: KATHARINE  BSA: 2.1 m2 Height: 65 in Weight: 220 lb HR: 87 BP: 138/63 mmHg ______________________________________________________________________________ Procedure Complete Echo Adult. ______________________________________________________________________________ Interpretation Summary  1. Normal left ventricular size and systolic performance with a visually estimated ejection fraction  of 60%. 2. No significant valvular heart disease is identified on this study. 3. Normal right ventricular size with borderline reduction right ventricular systolic performance. 4. There is mild biatrial enlargement. 5. There is a very small pericardial effusion. There is no evidence of significant intraventricular dependence/tamponade physiology. ______________________________________________________________________________ Left ventricle: Normal left ventricular size and systolic performance with a visually estimated ejection fraction of 60%. There is normal regional wall motion. Left ventricular wall thickness is normal.  Assessment of LV Diastolic Function: The cumulative findings suggest impaired diastolic filling [The septal e' velocity is < 7 cm/s & lateral e' velocity is < 10 cm/s. The average E/e' is >14. TR velocity is 2.8 m/s. Left atrial volume index is greater than 34 mL/mÂ ].  Assessment of left atrial pressure (LAP): The cumulative findings suggest moderately elevated left atrial pressure (the E/A is > 0.8 and <2.0 plus 2 or 3 of 3 of the following present: Average E/e' > 14, TRvel > 2.8 m/s, and/or LA vol. index > 34 ml/mÂ  ).  Right ventricle: Normal right ventricular size with borderline reduction right ventricular systolic performance.  Left atrium: There is mild left atrial enlargement.  Right atrium: There is mild right atrial enlargement.  IVC: The IVC is borderline dilated.  Aortic valve: The aortic valve is comprised of three cusps. No significant aortic stenosis or aortic insufficiency is detected on this study.  Mitral valve: The mitral valve appears morphologically normal. There is trace mitral insufficiency.  Tricuspid valve: The tricuspid valve is grossly morphologically normal. There is trace tricuspid insufficiency.  Pulmonic valve: The pulmonic valve is grossly morphologically normal.  Thoracic aorta: The aortic root and proximal ascending aorta are of normal dimension.  Pericardium:  There is a very small pericardial effusion. There is no evidence of significant intraventricular dependence/tamponade physiology. ______________________________________________________________________________ ______________________________________________________________________________ MMode/2D Measurements & Calculations IVSd: 1.3 cm LVIDd: 4.1 cm LVIDs: 2.6 cm LVPWd: 1.2 cm FS: 36.6 % LV mass(C)d: 185.2 grams LV mass(C)dI: 89.9 grams/m2 Ao root diam: 3.2 cm LA dimension: 4.6 cm asc Aorta Diam: 3.4 cm LA/Ao: 1.4 LVOT diam: 2.3 cm LVOT area: 4.1 cm2 LA Volume Indexed (AL/bp): 36.7 ml/m2  RWT: 0.58  Time Measurements MM HR: 87.0 BPM  Doppler Measurements & Calculations MV E max ayden: 123.0 cm/sec MV A max ayden: 124.2 cm/sec MV E/A: 0.99 MV dec time: 0.24 sec Ao V2 max: 189.4 cm/sec Ao max P.0 mmHg Ao V2 mean: 123.9 cm/sec Ao mean P.8 mmHg Ao V2 VTI: 32.7 cm QUAN(I,D): 2.8 cm2 QUAN(V,D): 2.9 cm2 LV V1 max P.9 mmHg LV V1 max: 131.3 cm/sec LV V1 VTI: 22.2 cm SV(LVOT): 92.1 ml SI(LVOT): 44.7 ml/m2 PA acc time: 0.09 sec TR max ayden: 279.6 cm/sec TR max P.3 mmHg AV Ayden Ratio (DI): 0.69 QUAN Index (cm2/m2): 1.4 E/E': 13.6  E/E' avg: 15.6 Lateral E/e': 13.6 Medial E/e': 17.7 Peak E' Ayden: 9.0 cm/sec  ______________________________________________________________________________ Report approved by: Leonid Hamilton 2023 02:47 PM       XR Chest Port 1 View    Result Date: 2023  EXAM: XR CHEST PORT 1 VIEW LOCATION: [facility] DATE/TIME: 2023 7:42 PM INDICATION: fever, new cough COMPARISON: 2021     IMPRESSION: Lung volumes are lower on this portable study. Borderline cardiomegaly unchanged. Pulmonary vessels normal. Questionable subtle right infrahilar infiltrate though this may relate to the lower lung volumes. Lungs otherwise clear.    XR Ankle Port Left G/E 3 Views    Result Date: 2023  EXAM: XR ANKLE PORT LEFT G/E 3 VIEWS LOCATION: [facility]  HOSPITAL DATE/TIME: 2/5/2023 2:22 PM INDICATION: left ankle pain, no trauma COMPARISON: None.     IMPRESSION: Bones are demineralized. Mild tibiotalar joint degenerative change. No evidence of an acute fracture. Tiny bone fragment adjacent to the medial talus and mild bony proliferation along the tip of the lateral malleolus is likely chronic. Ankle mortise is intact. Calcaneal heel spur.    CT Abdomen Pelvis w Contrast    Result Date: 2/4/2023  EXAM: CT ABDOMEN PELVIS W CONTRAST LOCATION: [facility] DATE/TIME: 2/4/2023 12:08 AM INDICATION: 1 29 sigmoidectomy with increasing wbc COMPARISON: CT a abdomen/pelvis 01/29/2023 TECHNIQUE: CT scan of the abdomen and pelvis was performed following injection of IV contrast. Multiplanar reformats were obtained. Dose reduction techniques were used. CONTRAST: isovue 370 100ml FINDINGS: LOWER CHEST: Trace bilateral pleural effusions right greater than left with compressive atelectasis. HEPATOBILIARY: Diffuse hepatic steatosis. Cholelithiasis with mild gallbladder distention which may be related to fasting. No biliary ductal dilatation. PANCREAS: Normal. SPLEEN: Normal. ADRENAL GLANDS: Normal. KIDNEYS/BLADDER: No hydronephrosis. Box catheter within a decompressed bladder. BOWEL: Recent sigmoidectomy. Colonic diverticulosis. Trace abdominopelvic free ascites. Additionally, there is some loculated ascites in the anterior abdomen with partial rim enhancement and several foci of gas within, which suggests a degree of peritonitis. No drainable fluid collection currently. Right anterior approach drain terminates in the left pelvis. LYMPH NODES: Normal. VASCULATURE: Atherosclerotic calcifications of the aortoiliac vessels without evidence of aneurysmal dilatation. PELVIC ORGANS: Hysterectomy. MUSCULOSKELETAL: Body wall edema. Skin staples with recent ventral abdominal incision. Small fat-containing umbilical hernia. Multilevel degenerative changes of the  thoracolumbar spine. No acute osseous abnormality.     IMPRESSION: 1.  Recent sigmoidectomy. Trace abdominopelvic free ascites. Additionally, there is some loculated ascites in the anterior abdomen with partial rim enhancement and several foci of gas within, which suggests a degree of peritonitis. No drainable fluid collection currently. 2.  Trace bilateral pleural effusions right greater than left with compressive atelectasis. 3.  Body wall edema. 4.  Diffuse hepatic steatosis. 5.  Cholelithiasis with mild gallbladder distention which may be related to fasting.    CT Abdomen Pelvis w Contrast    Result Date: 1/25/2023  EXAM: CT ABDOMEN PELVIS W CONTRAST LOCATION: [facility] DATE/TIME: 1/25/2023 11:28 PM INDICATION: abdominal pain worst in lower quadrants, leukocytosis eval for pathology COMPARISON: 11/26/2022 TECHNIQUE: CT scan of the abdomen and pelvis was performed following injection of IV contrast. Multiplanar reformats were obtained. Dose reduction techniques were used. CONTRAST: isovue 370 100ml FINDINGS: LOWER CHEST: Small left Bochdalek hernia. Basilar atelectasis. HEPATOBILIARY: Cholelithiasis and hepatic steatosis. PANCREAS: Normal. SPLEEN: Normal. ADRENAL GLANDS: Normal. KIDNEYS/BLADDER: Normal. BOWEL: Small bowel is normal caliber. Large amount of colonic stool. Sigmoid colonic wall thickening with adjacent inflammation is again seen. The inflamed segment is distended with stool. Small amount of adjacent free fluid. Caliber change with collapse  of the rectosigmoid junction and rectum. Diverticulosis.  LYMPH NODES: Subcentimeter retroperitoneal lymph nodes. VASCULATURE: Atherosclerotic vascular calcification. PELVIC ORGANS: Absent uterus. MUSCULOSKELETAL: Degenerative change osseous structures. Mild compression L4. Slight anterolisthesis L4 on L5.     IMPRESSION: 1.  Sigmoid colitis is again seen. Collapse of the rectosigmoid junction and rectum distal to the inflamed segment of  colon. Underlying stricture not excluded. 2.  Small amount of adjacent free fluid. 3.  Cholelithiasis.    CTA Abdomen Pelvis with Contrast    Result Date: 1/29/2023  EXAM: CTA ABDOMEN PELVIS WITH CONTRAST LOCATION: [facility] DATE/TIME: 1/29/2023 6:48 AM INDICATION: recurrent colitis, distended abdomen pain out of proportion. COMPARISON: There are study dated 1/25/ TECHNIQUE: CT angiogram abdomen pelvis during arterial phase of injection of IV contrast. 2D and 3D MIP reconstructions were performed by the CT technologist. Dose reduction techniques were used. CONTRAST: isovue 370 100ml FINDINGS: ANGIOGRAM ABDOMEN/PELVIS: The abdominal aorta is normal in size and caliber throughout with scattered atherosclerotic calcifications noted. The iliac and femoral vessels are normal in size and caliber and well-opacified. Celiac and SMA and TONY arise normally and are well opacified at this time LOWER CHEST: Lung bases are clear. Heart is enlarged, similar prior exam. HEPATOBILIARY: Diffuse hepatic steatosis. No significant mass. No bile duct dilatation. Calcified gallstones are again seen within the gallbladder. PANCREAS: No significant mass, duct dilatation, or inflammatory change. SPLEEN: Normal size. ADRENAL GLANDS: No significant nodules. KIDNEYS/BLADDER: No significant mass, stone, or hydronephrosis. BOWEL: Mural thickening and pericolonic inflammation of the sigmoid colon is again noted, similar in appearance to prior exam with increased inflammation seen in the proximal sigmoid colon. No pericolonic abscess seen at this time. Stool is seen throughout the inflamed sigmoid colon as well as scattered throughout the large bowel without evidence of large bowel obstruction. Adjacent to the proximal sigmoid colon is free fluid with foci of gas which are extraluminal and new from the prior study (image 1:15, series 5). Additional foci of extraluminal gas are seen within the mesenteric fat anteriorly in  the upper abdomen (image 57, series 5). There are scattered diverticula seen throughout the colon. Air-fluid levels are seen scattered through multiple loops of small bowel with mild dilatation seen in the proximal jejunum in the upper abdomen, these mildly dilated loops of small bowel decompressed gradually distally without a clear transition point. LYMPH NODES: Increased Number of pericolonic lymph nodes are again seen adjacent to the sigmoid colon. PELVIC ORGANS: No pelvic masses. MUSCULOSKELETAL: Unchanged     IMPRESSION: 1.  Mural thickening of the sigmoid colon with pericolonic inflammation and stranding, slightly worsened from prior exam with punctate foci of gas seen adjacent to the inflamed colon as well as tracking in the anterior abdominal wall concerning for sigmoid colonic perforation. No organized intra-abdominal fluid collection such as abscess is seen at this time. Given the long-standing inflammation in this region an underlying neoplastic process cannot be excluded. 2.  Scattered air-fluid levels and mildly dilated loops of proximal jejunum which decompresses gradually without a transition point consistent favored to reflect reactive ileus 3.  Hepatic steatosis 4.  Cholelithiasis [Critical Result: Sigmoid colonic inflammation with areas of extraluminal gas, new from 1/25/2023 concerning for sigmoid colonic perforation] Finding was identified on 1/29/2023 6:56 AM. DR. Snell was contacted by me on 1/29/2023 7:08 AM and verbalized understanding of the critical result.     CT Chest w/o Contrast    Result Date: 1/29/2023  EXAM: CT CHEST WITHOUT CONTRAST LOCATION: [facility] DATE/TIME: 01/29/2023, 6:46 AM INDICATION: Fever and cough. COMPARISON: 11/26/2022 - CT chest, abdomen and pelvis. TECHNIQUE: Note that this study was performed in conjunction with a CT scan of the abdomen and pelvis. Refer to a separate report for findings from that study. CT chest without IV contrast.  Multiplanar reformats were obtained. Dose reduction techniques were used. CONTRAST: None. FINDINGS: LUNGS AND PLEURA: Minimal emphysematous changes in the lungs. Slight interstitial thickening in bilateral lung bases. A few curvilinear opacities in the periphery of both lungs likely represent atelectasis and/or scarring. The lungs are otherwise clear. Small left Bochdalek hernia containing fat MEDIASTINUM/AXILLAE: Atherosclerotic calcification in the thoracic aorta. CORONARY ARTERY CALCIFICATION: Present. MUSCULOSKELETAL: No acute findings. UPPER ABDOMEN: A few foci of extraluminal gas are present in the upper abdomen.     IMPRESSION: 1.  Slight interstitial thickening in bilateral lung bases. This is nonspecific, but most likely relates to interstitial pulmonary edema. 2.  No other findings suspicious for acute pulmonary disease. 3.  A few foci of extraluminal gas scattered within the nondependent aspect of the upper abdomen. In the absence of recent surgery, this is consistent with a bowel perforation. Refer to the report from the CT scan of the abdomen and pelvis performed in conjunction with this study for additional details. The findings were called to Dr. Snell by Dr. Anderson on 01/29/2023 at 0700 hours.       Latest radiology report personally reviewed.    Note created using dragon voice recognition software so sounds alike errors may have escaped editing.      02/12/2023   Matthew Anna MD  Hospitalist, [facility]  Pager: [phone]

## 2023-02-12 NOTE — PROGRESS NOTES
"INFECTIOUS DISEASE FOLLOW UP NOTE      ASSESSMENT:  1. Intra-abdominal infection presenting with perforated stercolic ulcer of sigmoid colon, underwent laparoscopic sigmoid colectomy with anastomosis, washout 1/29. Findings of diffuse feculent peritonitis. Ongoing pain and leukocytosis. Draining clear fluid from wound -- creatinine level not suggestive of urine leak based on estimate. Bowel function returning to normal, but wbc remains high and increasing. No improvement with change in antibiotics. Repeat CT without significant findings for infection. CRP increasing.  Leukocytosis resolved and fever improving since addition of vancomycin.  2. Bacteremia with clostridium species, Collinsella aerofaciens, bacteroides vulgatus, and Eubacterium spp. Likely secondary to perforation. Susceptible to meropenem and metronidazole   3. DM  4. Hypothyroidism. Mildly elevated TSH prior to admission, on treatment  5. Left great toe infection for many months. Has seen podiatry and primary clinic for this. Recent doxycycline. Had MRI negative for osteomyelitis in September. No signs of lower ext infection currently  6. Islam per chart refusing blood products.   7. Cholelithiasis      PLAN:  -Continue vancomycin, meropenem and micafungin   -bilateral lower ext u/s - no DVTs  -trend wbc and CRP.  -Will need IV antibiotic outpatient.    Discussed with the patient, nursing staff.    ID will follow.    Sergio Noel MD  [facility]  Direct messaging: [employer] Paging  On-Call ID provider: [phone], option: 9      ______________________________________________________________________    SUBJECTIVE / INTERVAL HISTORY:   Continues to have some abdominal pain although improving.  No side effect from antibiotics.    OBJECTIVE:  /58 (BP Location: Right arm)   Pulse 81   Temp 98.7  F (37.1  C) (Oral)   Resp 18   Ht 1.651 m (5' 5\")   Wt 110.5 kg (243 lb 9.7 oz)   SpO2 98%   BMI 40.54 " kg/m       GEN: No acute distress.   RESPIRATORY:  Clear bilaterally   CARDIOVASCULAR:  Tachy, no murmur  ABDOMEN:  Soft, generalized tenderness. Incision site intact without drainage  EXTREMITIES: mild edema  SKIN/HAIR/NAILS:  No rashes. Erythema on multiple toes, without lesions or tenderness  Psych: more conversant today  IV: peripheral        Antibiotics:  Meropenem 2/4-  Micafungin 2/4-  Vancomycin 2/8-    Previous:  pip/tazo 1/29-2/4  Vancomycin 1/29 x1      Pertinent labs:    Recent Labs   Lab 02/12/23 0641 02/11/23 0634 02/10/23  0701   WBC 10.9 11.3* 15.0*   HGB 9.9* 9.6* 9.7*   HCT 33.8* 32.1* 32.3*   * 393 415        Recent Labs   Lab 02/12/23 0641 02/11/23 0634 02/10/23  0701    139 141   CO2 32* 28 30*   BUN 13.4 10.8 10.9      No results found for: CRP      Lab Results   Component Value Date    ALT 14 02/08/2023    AST 29 02/08/2023    ALKPHOS 142 (H) 02/08/2023         MICROBIOLOGY DATA:  1/29 blood culture: C.clostridioforme and B.vulgatus  1/29 blood culture: Eubacterium spp and Collinsella aerofaciens      RADIOLOGY:  CT Chest/Abdomen/Pelvis w Contrast    Result Date: 2/7/2023  EXAM: CT CHEST/ABDOMEN/PELVIS W CONTRAST LOCATION: [facility] DATE/TIME: 2/7/2023 8:21 PM INDICATION: ongoing leukocytosis. new fever. 1/29 sigmoidectomy with wash out. COMPARISON: CT of the abdomen and pelvis 2/4/2023 TECHNIQUE: CT scan of the chest, abdomen, and pelvis was performed following injection of IV contrast. Multiplanar reformats were obtained. Dose reduction techniques were used. CONTRAST: 100 mL Isovue-370 FINDINGS: LUNGS AND PLEURA: Small bilateral pleural effusions layer into the posterior costophrenic sulci. Bands of atelectasis along the posterior pleura of the lower lobes. No lung edema or consolidation. Trachea and central airways are patent and normal caliber. MEDIASTINUM: Cardiac chambers are normal in size. No pericardial effusion. Normal caliber thoracic aorta  and main pulmonary artery. Arch, proximal great vessel and descending aorta atheromatous calcification is patchy. There are no enlarged mediastinal or hilar lymph nodes. Esophagus is decompressed. A few small lower paraesophageal varices are present. CORONARY ARTERY CALCIFICATION: Mild to moderate, three-vessel disease. HEPATOBILIARY: Slight geographic liver attenuation but no focal enhancement or parenchymal lesion. There is a calcified stone layering in the gallbladder. No gallbladder wall thickening or pericholecystic inflammation. No bile duct enlargement. PANCREAS: Normal. SPLEEN: Normal. ADRENAL GLANDS: Normal. KIDNEYS/BLADDER: Trace amount of air in the nondependent bladder from recent instrumentation. Bladder wall is smooth with uniform thickness. Kidneys are normal in size with symmetric enhancement and normal cortex thickness. No nephrolithiasis or hydronephrosis. BOWEL: Nondistended stomach. Proximal small bowel in the left upper quadrant is mildly dilated and fluid-filled, however no clear caliber transition and this is unchanged from 2/4/2023. There is hazy attenuation in the mesentery consistent with edema. Minimal perihepatic and perisplenic ascites. Similar ascites layering in the left lower quadrant with small amount of anterior linear enhancement (series 3, image 205). Status post sigmoid colon resection with colocolonic anastomosis in the upper left pelvis. There are multiple diverticula arising from the ascending colon. No pneumatosis or pneumoperitoneum. LYMPH NODES: Mesenteric, aortocaval and iliac chain lymph nodes are normal by size criteria. VASCULATURE: Diffuse atheromatous calcification of the infrarenal abdominal aorta and common iliac arteries. No aneurysm. PELVIC ORGANS: Post hysterectomy. MUSCULOSKELETAL: Grade 1 degenerative anterolisthesis of L4 on L5 and lumbar facet arthropathy. Vacuum disc phenomenon from L1-2 to L3-L4. Thoracic vertebra are maintained in height with small diffuse  osteophytes and mild disc space narrowing. No aggressive or destructive bone lesions. Infraumbilical skin staples are present from recent laparotomy. No body wall hernia. Mild body wall anasarca.     IMPRESSION: 1.  Status post segmental sigmoid colon resection with colocolonic anastomosis in the upper left pelvis. No findings to suggest a surgical complication. 2.  Diverticula arising from the ascending colon but no acute diverticulitis. 3.  Unchanged ascites and mesenteric edema. No drainable intra-abdominal fluid collection. 4.  Minimal pleural effusions and atelectasis in the posterior sulci. 5.  Cholelithiasis.     Echocardiogram Complete    Result Date: 2023  [id number]^[address] [address]  Name: SRIDHAR ALONSO MRN: [id number] : 1945 Study Date: 2023 12:44 PM Age: 77 yrs Gender: Female Patient Location: [facility] Reason For Study: CHF Ordering Physician: PAYAL OCAMPO Performed By: KATHARINE  BSA: 2.1 m2 Height: 65 in Weight: 220 lb HR: 87 BP: 138/63 mmHg ______________________________________________________________________________ Procedure Complete Echo Adult. ______________________________________________________________________________ Interpretation Summary  1. Normal left ventricular size and systolic performance with a visually estimated ejection fraction of 60%. 2. No significant valvular heart disease is identified on this study. 3. Normal right ventricular size with borderline reduction right ventricular systolic performance. 4. There is mild biatrial enlargement. 5. There is a very small pericardial effusion. There is no evidence of significant intraventricular dependence/tamponade physiology. ______________________________________________________________________________ Left ventricle: Normal left ventricular size and systolic performance with a visually estimated ejection fraction of 60%. There is normal regional  wall motion. Left ventricular wall thickness is normal.  Assessment of LV Diastolic Function: The cumulative findings suggest impaired diastolic filling [The septal e' velocity is < 7 cm/s & lateral e' velocity is < 10 cm/s. The average E/e' is >14. TR velocity is 2.8 m/s. Left atrial volume index is greater than 34 mL/mÂ ].  Assessment of left atrial pressure (LAP): The cumulative findings suggest moderately elevated left atrial pressure (the E/A is > 0.8 and <2.0 plus 2 or 3 of 3 of the following present: Average E/e' > 14, TRvel > 2.8 m/s, and/or LA vol. index > 34 ml/mÂ  ).  Right ventricle: Normal right ventricular size with borderline reduction right ventricular systolic performance.  Left atrium: There is mild left atrial enlargement.  Right atrium: There is mild right atrial enlargement.  IVC: The IVC is borderline dilated.  Aortic valve: The aortic valve is comprised of three cusps. No significant aortic stenosis or aortic insufficiency is detected on this study.  Mitral valve: The mitral valve appears morphologically normal. There is trace mitral insufficiency.  Tricuspid valve: The tricuspid valve is grossly morphologically normal. There is trace tricuspid insufficiency.  Pulmonic valve: The pulmonic valve is grossly morphologically normal.  Thoracic aorta: The aortic root and proximal ascending aorta are of normal dimension.  Pericardium: There is a very small pericardial effusion. There is no evidence of significant intraventricular dependence/tamponade physiology. ______________________________________________________________________________ ______________________________________________________________________________ MMode/2D Measurements & Calculations IVSd: 1.3 cm LVIDd: 4.1 cm LVIDs: 2.6 cm LVPWd: 1.2 cm FS: 36.6 % LV mass(C)d: 185.2 grams LV mass(C)dI: 89.9 grams/m2 Ao root diam: 3.2 cm LA dimension: 4.6 cm asc Aorta Diam: 3.4 cm LA/Ao: 1.4 LVOT diam: 2.3 cm LVOT area: 4.1 cm2 LA Volume Indexed  (AL/bp): 36.7 ml/m2  RWT: 0.58  Time Measurements MM HR: 87.0 BPM  Doppler Measurements & Calculations MV E max ayden: 123.0 cm/sec MV A max ayden: 124.2 cm/sec MV E/A: 0.99 MV dec time: 0.24 sec Ao V2 max: 189.4 cm/sec Ao max P.0 mmHg Ao V2 mean: 123.9 cm/sec Ao mean P.8 mmHg Ao V2 VTI: 32.7 cm QUAN(I,D): 2.8 cm2 QUAN(V,D): 2.9 cm2 LV V1 max P.9 mmHg LV V1 max: 131.3 cm/sec LV V1 VTI: 22.2 cm SV(LVOT): 92.1 ml SI(LVOT): 44.7 ml/m2 PA acc time: 0.09 sec TR max ayden: 279.6 cm/sec TR max P.3 mmHg AV Ayden Ratio (DI): 0.69 QUAN Index (cm2/m2): 1.4 E/E': 13.6  E/E' avg: 15.6 Lateral E/e': 13.6 Medial E/e': 17.7 Peak E' Ayden: 9.0 cm/sec  ______________________________________________________________________________ Report approved by: Leonid Hamilton 2023 02:47 PM       US Lower Extremity Venous Duplex Bilateral    Result Date: 2/10/2023  EXAM: US LOWER EXTREMITY VENOUS DUPLEX BILATERAL LOCATION: [facility] DATE/TIME: 2/10/2023 10:35 AM INDICATION: Bilateral lower extremity pain, swelling and edema. Fever. COMPARISON: None. TECHNIQUE: Venous Duplex ultrasound of bilateral lower extremities with and without compression, augmentation and duplex. Color flow and spectral Doppler with waveform analysis performed. FINDINGS: Exam includes the common femoral, femoral, popliteal veins as well as segmentally visualized deep calf veins and greater saphenous vein. RIGHT: No deep vein thrombosis. No superficial thrombophlebitis. Anechoic avascular cyst in the right popliteal fossa measuring 4.3 x 3.1 x 0.7 cm. LEFT: No deep vein thrombosis. No superficial thrombophlebitis. Anechoic avascular cyst in the left popliteal fossa measuring 3.5 x 2.0 x 0.5 cm     IMPRESSION: 1.  No deep venous thrombosis in the bilateral lower extremities. 2.  Bilateral popliteal fossa Baker's cysts, slightly larger on the right.    XR Chest Port 1 View    Result Date: 2023  EXAM: XR CHEST PORT 1 VIEW  LOCATION: [facility] DATE/TIME: 2/8/2023 7:42 PM INDICATION: fever, new cough COMPARISON: 4/28/2021     IMPRESSION: Lung volumes are lower on this portable study. Borderline cardiomegaly unchanged. Pulmonary vessels normal. Questionable subtle right infrahilar infiltrate though this may relate to the lower lung volumes. Lungs otherwise clear.    XR Ankle Port Left G/E 3 Views    Result Date: 2/5/2023  EXAM: XR ANKLE PORT LEFT G/E 3 VIEWS LOCATION: [facility] DATE/TIME: 2/5/2023 2:22 PM INDICATION: left ankle pain, no trauma COMPARISON: None.     IMPRESSION: Bones are demineralized. Mild tibiotalar joint degenerative change. No evidence of an acute fracture. Tiny bone fragment adjacent to the medial talus and mild bony proliferation along the tip of the lateral malleolus is likely chronic. Ankle mortise is intact. Calcaneal heel spur.    CT Abdomen Pelvis w Contrast    Result Date: 2/4/2023  EXAM: CT ABDOMEN PELVIS W CONTRAST LOCATION: [facility] DATE/TIME: 2/4/2023 12:08 AM INDICATION: 1 29 sigmoidectomy with increasing wbc COMPARISON: CT a abdomen/pelvis 01/29/2023 TECHNIQUE: CT scan of the abdomen and pelvis was performed following injection of IV contrast. Multiplanar reformats were obtained. Dose reduction techniques were used. CONTRAST: isovue 370 100ml FINDINGS: LOWER CHEST: Trace bilateral pleural effusions right greater than left with compressive atelectasis. HEPATOBILIARY: Diffuse hepatic steatosis. Cholelithiasis with mild gallbladder distention which may be related to fasting. No biliary ductal dilatation. PANCREAS: Normal. SPLEEN: Normal. ADRENAL GLANDS: Normal. KIDNEYS/BLADDER: No hydronephrosis. Box catheter within a decompressed bladder. BOWEL: Recent sigmoidectomy. Colonic diverticulosis. Trace abdominopelvic free ascites. Additionally, there is some loculated ascites in the anterior abdomen with partial rim enhancement and  several foci of gas within, which suggests a degree of peritonitis. No drainable fluid collection currently. Right anterior approach drain terminates in the left pelvis. LYMPH NODES: Normal. VASCULATURE: Atherosclerotic calcifications of the aortoiliac vessels without evidence of aneurysmal dilatation. PELVIC ORGANS: Hysterectomy. MUSCULOSKELETAL: Body wall edema. Skin staples with recent ventral abdominal incision. Small fat-containing umbilical hernia. Multilevel degenerative changes of the thoracolumbar spine. No acute osseous abnormality.     IMPRESSION: 1.  Recent sigmoidectomy. Trace abdominopelvic free ascites. Additionally, there is some loculated ascites in the anterior abdomen with partial rim enhancement and several foci of gas within, which suggests a degree of peritonitis. No drainable fluid collection currently. 2.  Trace bilateral pleural effusions right greater than left with compressive atelectasis. 3.  Body wall edema. 4.  Diffuse hepatic steatosis. 5.  Cholelithiasis with mild gallbladder distention which may be related to fasting.    CT Abdomen Pelvis w Contrast    Result Date: 1/25/2023  EXAM: CT ABDOMEN PELVIS W CONTRAST LOCATION: [facility] DATE/TIME: 1/25/2023 11:28 PM INDICATION: abdominal pain worst in lower quadrants, leukocytosis eval for pathology COMPARISON: 11/26/2022 TECHNIQUE: CT scan of the abdomen and pelvis was performed following injection of IV contrast. Multiplanar reformats were obtained. Dose reduction techniques were used. CONTRAST: isovue 370 100ml FINDINGS: LOWER CHEST: Small left Bochdalek hernia. Basilar atelectasis. HEPATOBILIARY: Cholelithiasis and hepatic steatosis. PANCREAS: Normal. SPLEEN: Normal. ADRENAL GLANDS: Normal. KIDNEYS/BLADDER: Normal. BOWEL: Small bowel is normal caliber. Large amount of colonic stool. Sigmoid colonic wall thickening with adjacent inflammation is again seen. The inflamed segment is distended with stool. Small  amount of adjacent free fluid. Caliber change with collapse  of the rectosigmoid junction and rectum. Diverticulosis.  LYMPH NODES: Subcentimeter retroperitoneal lymph nodes. VASCULATURE: Atherosclerotic vascular calcification. PELVIC ORGANS: Absent uterus. MUSCULOSKELETAL: Degenerative change osseous structures. Mild compression L4. Slight anterolisthesis L4 on L5.     IMPRESSION: 1.  Sigmoid colitis is again seen. Collapse of the rectosigmoid junction and rectum distal to the inflamed segment of colon. Underlying stricture not excluded. 2.  Small amount of adjacent free fluid. 3.  Cholelithiasis.    CTA Abdomen Pelvis with Contrast    Result Date: 1/29/2023  EXAM: CTA ABDOMEN PELVIS WITH CONTRAST LOCATION: [facility] DATE/TIME: 1/29/2023 6:48 AM INDICATION: recurrent colitis, distended abdomen pain out of proportion. COMPARISON: There are study dated 1/25/ TECHNIQUE: CT angiogram abdomen pelvis during arterial phase of injection of IV contrast. 2D and 3D MIP reconstructions were performed by the CT technologist. Dose reduction techniques were used. CONTRAST: isovue 370 100ml FINDINGS: ANGIOGRAM ABDOMEN/PELVIS: The abdominal aorta is normal in size and caliber throughout with scattered atherosclerotic calcifications noted. The iliac and femoral vessels are normal in size and caliber and well-opacified. Celiac and SMA and TONY arise normally and are well opacified at this time LOWER CHEST: Lung bases are clear. Heart is enlarged, similar prior exam. HEPATOBILIARY: Diffuse hepatic steatosis. No significant mass. No bile duct dilatation. Calcified gallstones are again seen within the gallbladder. PANCREAS: No significant mass, duct dilatation, or inflammatory change. SPLEEN: Normal size. ADRENAL GLANDS: No significant nodules. KIDNEYS/BLADDER: No significant mass, stone, or hydronephrosis. BOWEL: Mural thickening and pericolonic inflammation of the sigmoid colon is again noted, similar in  appearance to prior exam with increased inflammation seen in the proximal sigmoid colon. No pericolonic abscess seen at this time. Stool is seen throughout the inflamed sigmoid colon as well as scattered throughout the large bowel without evidence of large bowel obstruction. Adjacent to the proximal sigmoid colon is free fluid with foci of gas which are extraluminal and new from the prior study (image 1:15, series 5). Additional foci of extraluminal gas are seen within the mesenteric fat anteriorly in the upper abdomen (image 57, series 5). There are scattered diverticula seen throughout the colon. Air-fluid levels are seen scattered through multiple loops of small bowel with mild dilatation seen in the proximal jejunum in the upper abdomen, these mildly dilated loops of small bowel decompressed gradually distally without a clear transition point. LYMPH NODES: Increased Number of pericolonic lymph nodes are again seen adjacent to the sigmoid colon. PELVIC ORGANS: No pelvic masses. MUSCULOSKELETAL: Unchanged     IMPRESSION: 1.  Mural thickening of the sigmoid colon with pericolonic inflammation and stranding, slightly worsened from prior exam with punctate foci of gas seen adjacent to the inflamed colon as well as tracking in the anterior abdominal wall concerning for sigmoid colonic perforation. No organized intra-abdominal fluid collection such as abscess is seen at this time. Given the long-standing inflammation in this region an underlying neoplastic process cannot be excluded. 2.  Scattered air-fluid levels and mildly dilated loops of proximal jejunum which decompresses gradually without a transition point consistent favored to reflect reactive ileus 3.  Hepatic steatosis 4.  Cholelithiasis [Critical Result: Sigmoid colonic inflammation with areas of extraluminal gas, new from 1/25/2023 concerning for sigmoid colonic perforation] Finding was identified on 1/29/2023 6:56 AM. DR. Snell was contacted by me on  1/29/2023 7:08 AM and verbalized understanding of the critical result.     CT Chest w/o Contrast    Result Date: 1/29/2023  EXAM: CT CHEST WITHOUT CONTRAST LOCATION: [facility] DATE/TIME: 01/29/2023, 6:46 AM INDICATION: Fever and cough. COMPARISON: 11/26/2022 - CT chest, abdomen and pelvis. TECHNIQUE: Note that this study was performed in conjunction with a CT scan of the abdomen and pelvis. Refer to a separate report for findings from that study. CT chest without IV contrast. Multiplanar reformats were obtained. Dose reduction techniques were used. CONTRAST: None. FINDINGS: LUNGS AND PLEURA: Minimal emphysematous changes in the lungs. Slight interstitial thickening in bilateral lung bases. A few curvilinear opacities in the periphery of both lungs likely represent atelectasis and/or scarring. The lungs are otherwise clear. Small left Bochdalek hernia containing fat MEDIASTINUM/AXILLAE: Atherosclerotic calcification in the thoracic aorta. CORONARY ARTERY CALCIFICATION: Present. MUSCULOSKELETAL: No acute findings. UPPER ABDOMEN: A few foci of extraluminal gas are present in the upper abdomen.     IMPRESSION: 1.  Slight interstitial thickening in bilateral lung bases. This is nonspecific, but most likely relates to interstitial pulmonary edema. 2.  No other findings suspicious for acute pulmonary disease. 3.  A few foci of extraluminal gas scattered within the nondependent aspect of the upper abdomen. In the absence of recent surgery, this is consistent with a bowel perforation. Refer to the report from the CT scan of the abdomen and pelvis performed in conjunction with this study for additional details. The findings were called to Dr. Snlel by Dr. Anderson on 01/29/2023 at 0700 hours.      Attestation:  I have reviewed today's Medications, Vital Signs, and Labs. Cultures and previous notes were reviewed and summarized above. Recommendations discussed with RN.

## 2023-02-13 ENCOUNTER — APPOINTMENT (OUTPATIENT)
Dept: PHYSICAL THERAPY | Facility: HOSPITAL | Age: 78
DRG: 853 | End: 2023-02-13
Payer: COMMERCIAL

## 2023-02-13 LAB
ANION GAP SERPL CALCULATED.3IONS-SCNC: 8 MMOL/L (ref 7–15)
BUN SERPL-MCNC: 14 MG/DL (ref 8–23)
CALCIUM SERPL-MCNC: 8.1 MG/DL (ref 8.8–10.2)
CHLORIDE SERPL-SCNC: 100 MMOL/L (ref 98–107)
CREAT SERPL-MCNC: 0.58 MG/DL (ref 0.51–0.95)
DEPRECATED HCO3 PLAS-SCNC: 29 MMOL/L (ref 22–29)
ERYTHROCYTE [DISTWIDTH] IN BLOOD BY AUTOMATED COUNT: 15.1 % (ref 10–15)
GFR SERPL CREATININE-BSD FRML MDRD: >90 ML/MIN/1.73M2
GLUCOSE BLDC GLUCOMTR-MCNC: 156 MG/DL (ref 70–99)
GLUCOSE BLDC GLUCOMTR-MCNC: 162 MG/DL (ref 70–99)
GLUCOSE BLDC GLUCOMTR-MCNC: 167 MG/DL (ref 70–99)
GLUCOSE BLDC GLUCOMTR-MCNC: 199 MG/DL (ref 70–99)
GLUCOSE SERPL-MCNC: 158 MG/DL (ref 70–99)
HCT VFR BLD AUTO: 31.6 % (ref 35–47)
HGB BLD-MCNC: 9.3 G/DL (ref 11.7–15.7)
MCH RBC QN AUTO: 28.3 PG (ref 26.5–33)
MCHC RBC AUTO-ENTMCNC: 29.4 G/DL (ref 31.5–36.5)
MCV RBC AUTO: 96 FL (ref 78–100)
PLATELET # BLD AUTO: 426 10E3/UL (ref 150–450)
POTASSIUM SERPL-SCNC: 3.9 MMOL/L (ref 3.4–5.3)
RBC # BLD AUTO: 3.29 10E6/UL (ref 3.8–5.2)
SODIUM SERPL-SCNC: 137 MMOL/L (ref 136–145)
WBC # BLD AUTO: 10.5 10E3/UL (ref 4–11)

## 2023-02-13 PROCEDURE — 97530 THERAPEUTIC ACTIVITIES: CPT | Mod: GP

## 2023-02-13 PROCEDURE — 250N000011 HC RX IP 250 OP 636: Performed by: SURGERY

## 2023-02-13 PROCEDURE — 250N000013 HC RX MED GY IP 250 OP 250 PS 637: Performed by: INTERNAL MEDICINE

## 2023-02-13 PROCEDURE — 250N000011 HC RX IP 250 OP 636: Performed by: INTERNAL MEDICINE

## 2023-02-13 PROCEDURE — 250N000013 HC RX MED GY IP 250 OP 250 PS 637: Performed by: SURGERY

## 2023-02-13 PROCEDURE — 250N000013 HC RX MED GY IP 250 OP 250 PS 637: Performed by: HOSPITALIST

## 2023-02-13 PROCEDURE — 99232 SBSQ HOSP IP/OBS MODERATE 35: CPT | Performed by: INTERNAL MEDICINE

## 2023-02-13 PROCEDURE — 85027 COMPLETE CBC AUTOMATED: CPT | Performed by: INTERNAL MEDICINE

## 2023-02-13 PROCEDURE — 120N000001 HC R&B MED SURG/OB

## 2023-02-13 PROCEDURE — 97116 GAIT TRAINING THERAPY: CPT | Mod: GP

## 2023-02-13 PROCEDURE — 97110 THERAPEUTIC EXERCISES: CPT | Mod: GP

## 2023-02-13 PROCEDURE — 258N000003 HC RX IP 258 OP 636: Performed by: INTERNAL MEDICINE

## 2023-02-13 PROCEDURE — 99207 PR CDG-CUT & PASTE-POTENTIAL IMPACT ON LEVEL: CPT | Performed by: INTERNAL MEDICINE

## 2023-02-13 PROCEDURE — 80048 BASIC METABOLIC PNL TOTAL CA: CPT | Performed by: INTERNAL MEDICINE

## 2023-02-13 PROCEDURE — 36415 COLL VENOUS BLD VENIPUNCTURE: CPT | Performed by: INTERNAL MEDICINE

## 2023-02-13 RX ADMIN — ACETAMINOPHEN 650 MG: 325 TABLET ORAL at 20:27

## 2023-02-13 RX ADMIN — MEROPENEM 1 G: 1 INJECTION INTRAVENOUS at 08:29

## 2023-02-13 RX ADMIN — VANCOMYCIN HYDROCHLORIDE 1000 MG: 1 INJECTION, SOLUTION INTRAVENOUS at 19:10

## 2023-02-13 RX ADMIN — ROSUVASTATIN CALCIUM 20 MG: 10 TABLET, FILM COATED ORAL at 20:27

## 2023-02-13 RX ADMIN — FUROSEMIDE 20 MG: 10 INJECTION, SOLUTION INTRAMUSCULAR; INTRAVENOUS at 08:32

## 2023-02-13 RX ADMIN — ASPIRIN 81 MG: 81 TABLET, COATED ORAL at 08:32

## 2023-02-13 RX ADMIN — AMLODIPINE BESYLATE 2.5 MG: 2.5 TABLET ORAL at 08:32

## 2023-02-13 RX ADMIN — VENLAFAXINE HYDROCHLORIDE 75 MG: 75 CAPSULE, EXTENDED RELEASE ORAL at 08:32

## 2023-02-13 RX ADMIN — MEROPENEM 1 G: 1 INJECTION INTRAVENOUS at 00:15

## 2023-02-13 RX ADMIN — LEVOTHYROXINE SODIUM 137 MCG: 0.11 TABLET ORAL at 06:52

## 2023-02-13 RX ADMIN — MEROPENEM 1 G: 1 INJECTION INTRAVENOUS at 16:03

## 2023-02-13 RX ADMIN — OXYCODONE HYDROCHLORIDE 5 MG: 5 TABLET ORAL at 19:08

## 2023-02-13 RX ADMIN — BENZOCAINE 6 MG-MENTHOL 10 MG LOZENGES 1 LOZENGE: at 17:12

## 2023-02-13 RX ADMIN — MICAFUNGIN SODIUM 100 MG: 50 INJECTION, POWDER, LYOPHILIZED, FOR SOLUTION INTRAVENOUS at 17:15

## 2023-02-13 RX ADMIN — BENZOCAINE 6 MG-MENTHOL 10 MG LOZENGES 1 LOZENGE: at 07:00

## 2023-02-13 RX ADMIN — BENZOCAINE 6 MG-MENTHOL 10 MG LOZENGES 1 LOZENGE: at 20:27

## 2023-02-13 RX ADMIN — ACETAMINOPHEN 650 MG: 325 TABLET ORAL at 08:32

## 2023-02-13 RX ADMIN — ANORECTAL OINTMENT: 15.7; .44; 24; 20.6 OINTMENT TOPICAL at 20:32

## 2023-02-13 RX ADMIN — VANCOMYCIN HYDROCHLORIDE 1000 MG: 1 INJECTION, SOLUTION INTRAVENOUS at 04:23

## 2023-02-13 RX ADMIN — OXYCODONE HYDROCHLORIDE 5 MG: 5 TABLET ORAL at 09:28

## 2023-02-13 RX ADMIN — ENOXAPARIN SODIUM 40 MG: 40 INJECTION SUBCUTANEOUS at 08:33

## 2023-02-13 RX ADMIN — OXYCODONE HYDROCHLORIDE 2.5 MG: 5 TABLET ORAL at 04:22

## 2023-02-13 RX ADMIN — ACETAMINOPHEN 650 MG: 325 TABLET ORAL at 14:30

## 2023-02-13 ASSESSMENT — ACTIVITIES OF DAILY LIVING (ADL)
ADLS_ACUITY_SCORE: 35

## 2023-02-13 NOTE — PROGRESS NOTES
"CLINICAL NUTRITION SERVICES - REASSESSMENT NOTE     Nutrition Prescription    RECOMMENDATIONS FOR MDs/PROVIDERS TO ORDER:    Malnutrition Status:    Not noted    Recommendations already ordered by Registered Dietitian (RD):  Will discontinue the ensure as pt has 3 unopened containers in her room  Continue the gelatein plus and the magic cup    Future/Additional Recommendations:  Continue to follow po intake, weight, labs, poc     EVALUATION OF THE PROGRESS TOWARD GOALS   Diet: Low fiber  Nutrition Supplements: strawberry ensure at B, cherry gelatein plus with lunch and chocolate magic cup with supper meals  Intake: pt does not remember if she has been taking the supplements or not. She does have 3 ensure sitting at bedside (unopened). Per documentation pt consuming 0-75% of meals ( 2/8-2/12) very little most of the time.        ANTHROPOMETRICS  Height: 165.1 cm (5' 5\")  Most Recent Weight: 107 kg (236 lb)    Weight History:   Wt Readings from Last 10 Encounters:   02/12/23 107 kg (236 lb)   01/25/23 99.8 kg (220 lb)   11/26/22 99.8 kg (220 lb)     GI CONCERNS  Abdomen rounded, non distended  Passing flatus  Last BM 2/13/2023  Nausea intermittent    LABS  Reviewed: Na 137, K 3.9, Glu 158    MEDICATIONS  Reviewed: lasix, novolog, levothyroxine, merrem, vancocin    MALNUTRITION:  % Weight Loss:  None noted  % Intake:  Does not meet criteria for malnutrition  Subcutaneous Fat Loss:  None observed  Muscle Loss:  None observed  Fluid Retention:  Trace to mild    CURRENT NUTRITION DIAGNOSIS  Inadequate oral intake related to acute illness as evidenced by consuming 0-75% of meals     INTERVENTIONS  Implementation  Will discontinue ensure at this time as pt has 3 unopened containers in her room.  Continue the cherry gelatein plus and the magic cup  Encouraged pt to try and consume 75% meals    Goals  Diet advancement vs nutrition support within 2-3 days-met.  Patient to consume % of nutritionally adequate meals three " times per day, or the equivalent with supplements/snacks.-not met    Monitoring/Evaluation  Progress toward goals will be monitored and evaluated per protocol.

## 2023-02-13 NOTE — PROGRESS NOTES
Patient is on TCU lists.   And daughter, Sheba are main decision makers. IVAB plan per ID. Not medically ready for discharge. Transport TBD. PAS needed.

## 2023-02-13 NOTE — PROGRESS NOTES
Daily Progress Note        CODE STATUS:  Full Code    02/09/23  Assessment/Plan:  77 year old female who was admitted on 1/29/2023 with bowel perforation and found to have impacted stool in sigmoid colon with associated perforation and feculent peritonitis.      Perforation of Colonic Stercolic Ulcer   -- S/P Sigmoid Colectomy 1/29/30. Diffuse feculent peritonitis noted intra-op  -- On IV Meropenum and Micafungin per ID. IV vancomycin added 2/8/23  -- Appreciate ID consult  -- Follow up CT abd/pelvis on 2/7/23 showed unchanged ascites and mesenteric edema. No drainable intra-abdominal fluid collection     Sepsis   -- WBC up, temp 102.8, , but BP stable  -- Patient is very edematous. Stopped IVF 2/9/23. Cont IV lasix  -- Bacteremia with clostridium species, Collinsella aerofaciens, bacteroides vulgatus, and Eubacterium spp  -- Clinically better. Leucocytosis improved. CRP coming down  -- ID recommending outpatient IV antibiotics. Will place a piccline when ok with ID.    HTN (hypertension)  -- Amlodipine with hold parameters. Losartan on hold    DM type 2, Hgb A1C 7.6 on 11/23/22  -- Cont lantus 30 units and sliding scale insulin. Will adjust as needed     Leg swelling  Fluid overload  -- I/O charting is incorrect. Stopped IVF. Cont lasix. Monitor renal function  -- US legs negative for DVT    Refusal of blood transfusions as patient is Zoroastrianism     KARYN (obstructive sleep apnea)     Depression:  -- On effexor at home. Patient looks more depressed and withdrawn.  requests psychiatry consult    DVT Prophylaxis: Enoxaparin (Lovenox) SQ  Code Status: Full Code      Disposition; TCU when ok with ID and surgery, likely in next couple of days.   Barrier to discharge; Clinical progress     LOS: 11 days     Subjective:  Interval History: Patient is doing better, slowly. Passing gas and BMs. More alert and engaged. Reports having some sore throat.     Review of Systems:   As mentioned in  subjective.    Patient Active Problem List   Diagnosis     Non-specific colitis     Chest pain, unspecified type     Abnormal laboratory test result     Angiodysplasia of colon     Cataract     Colon adenoma     Depression     GERD (gastroesophageal reflux disease)     HTN (hypertension)     Hypercalcemia     Hyperlipidemia     Hyperparathyroidism (H)     Hypervitaminosis D     Microalbuminuria     Multinodular goiter     OA (osteoarthritis)     Obesity, morbid (H)     Postablative hypothyroidism     DM type 2, Hgb A1C 7.6 on 11/23/22     Perforation of Colonic Stericolic Ulcer -- S/P Sigmoid Colectomy 1/29/30     Refusal of blood transfusions as patient is Moravian     KARYN (obstructive sleep apnea)     Bacteremia due to Clostridium and Bacteroides -- 1/29/23       Scheduled Meds:    acetaminophen  650 mg Oral TID     amLODIPine  2.5 mg Oral Daily     aspirin  81 mg Oral Daily     enoxaparin ANTICOAGULANT  40 mg Subcutaneous Q24H     furosemide  20 mg Intravenous Daily     insulin aspart  1-10 Units Subcutaneous TID AC     insulin aspart  1-7 Units Subcutaneous At Bedtime     [Held by provider] insulin glargine  30 Units Subcutaneous At Bedtime     levothyroxine  137 mcg Oral QAM AC     meropenem  1 g Intravenous Q8H     micafungin  100 mg Intravenous Q24H     rosuvastatin  20 mg Oral At Bedtime     sodium chloride (PF)  3 mL Intracatheter Q8H     sodium chloride (PF)  3 mL Intracatheter Q8H     vancomycin  1,000 mg Intravenous Q12H     venlafaxine  75 mg Oral Daily     Continuous Infusions:    PRN Meds:.sore throat, glucose **OR** dextrose **OR** glucagon, hydrOXYzine, lidocaine 4%, lidocaine (buffered or not buffered), melatonin, menthol-zinc oxide, naloxone **OR** naloxone **OR** naloxone **OR** naloxone, ondansetron **OR** ondansetron, oxyCODONE, oxyCODONE IR, prochlorperazine, sodium chloride (PF), sodium chloride (PF)    Objective:  Vital signs in last 24 hours:  Temp:  [98  F (36.7  C)-99.4  F (37.4   C)] 99.4  F (37.4  C)  Pulse:  [72-86] 78  Resp:  [17-18] 18  BP: (108-139)/(53-65) 139/65  SpO2:  [94 %-97 %] 97 %        Intake/Output Summary (Last 24 hours) at 2/9/2023 1537  Last data filed at 2/9/2023 1450  Gross per 24 hour   Intake 2258 ml   Output 275 ml   Net 1983 ml       Physical Exam:    General: Not in obvious distress. Flat afect  HEENT: NC, AT   Chest: Clear to auscultation bilaterally  Heart: S1S2 normal, regular. No M/R/G  Abdomen: Soft. Mild generalized tenderness. Distended.   Extremities: 2+ legs swelling  Neuro: Awake, grossly non-focal      Lab Results:(I have personally reviewed the results)    Recent Results (from the past 24 hour(s))   Glucose by meter    Collection Time: 02/12/23  1:36 PM   Result Value Ref Range    GLUCOSE BY METER POCT 192 (H) 70 - 99 mg/dL   Glucose by meter    Collection Time: 02/12/23  4:27 PM   Result Value Ref Range    GLUCOSE BY METER POCT 158 (H) 70 - 99 mg/dL   Glucose by meter    Collection Time: 02/12/23  8:25 PM   Result Value Ref Range    GLUCOSE BY METER POCT 195 (H) 70 - 99 mg/dL   CBC with platelets    Collection Time: 02/13/23  6:17 AM   Result Value Ref Range    WBC Count 10.5 4.0 - 11.0 10e3/uL    RBC Count 3.29 (L) 3.80 - 5.20 10e6/uL    Hemoglobin 9.3 (L) 11.7 - 15.7 g/dL    Hematocrit 31.6 (L) 35.0 - 47.0 %    MCV 96 78 - 100 fL    MCH 28.3 26.5 - 33.0 pg    MCHC 29.4 (L) 31.5 - 36.5 g/dL    RDW 15.1 (H) 10.0 - 15.0 %    Platelet Count 426 150 - 450 10e3/uL   Basic metabolic panel    Collection Time: 02/13/23  6:17 AM   Result Value Ref Range    Sodium 137 136 - 145 mmol/L    Potassium 3.9 3.4 - 5.3 mmol/L    Chloride 100 98 - 107 mmol/L    Carbon Dioxide (CO2) 29 22 - 29 mmol/L    Anion Gap 8 7 - 15 mmol/L    Urea Nitrogen 14.0 8.0 - 23.0 mg/dL    Creatinine 0.58 0.51 - 0.95 mg/dL    Calcium 8.1 (L) 8.8 - 10.2 mg/dL    Glucose 158 (H) 70 - 99 mg/dL    GFR Estimate >90 >60 mL/min/1.73m2   Glucose by meter    Collection Time: 02/13/23  7:20 AM    Result Value Ref Range    GLUCOSE BY METER POCT 156 (H) 70 - 99 mg/dL       All laboratory and imaging data in the past 24 hours reviewed  Serum Glucose range:   Recent Labs   Lab 02/13/23 0720 02/13/23 0617 02/12/23 2025 02/12/23  1627   * 158* 195* 158*     ABG: No lab results found in last 7 days.  CBC:   Recent Labs   Lab 02/13/23 0617 02/12/23  0641 02/11/23  0634 02/09/23  0642 02/08/23  0646   WBC 10.5 10.9 11.3*   < > 25.1*  24.8*   HGB 9.3* 9.9* 9.6*   < > 10.9*   HCT 31.6* 33.8* 32.1*   < > 36.3   MCV 96 96 95   < > 93    463* 393   < > 449   NEUTROPHIL  --   --   --   --  92   LYMPH  --   --   --   --  3   MONOCYTE  --   --   --   --  4   EOSINOPHIL  --   --   --   --  0    < > = values in this interval not displayed.     Chemistry:   Recent Labs   Lab 02/13/23  0617 02/12/23  0641 02/11/23  0634 02/09/23  0642 02/08/23  0646 02/07/23  0604    140 139   < > 138 139   POTASSIUM 3.9 3.7 3.6   < > 3.6 3.7   CHLORIDE 100 101 102   < > 100 102   CO2 29 32* 28   < > 31* 31*   BUN 14.0 13.4 10.8   < > 9.9 7.4*   CR 0.58 0.62 0.53   < > 0.60 0.60   GFRESTIMATED >90 >90 >90   < > >90 >90   ALTON 8.1* 8.2* 7.9*   < > 8.3* 8.2*   MAG  --   --   --   --   --  1.8   PROTTOTAL  --   --   --   --  5.4*  --    ALBUMIN  --   --   --   --  2.3*  --    AST  --   --   --   --  29  --    ALT  --   --   --   --  14  --    ALKPHOS  --   --   --   --  142*  --    BILITOTAL  --   --   --   --  0.3  --     < > = values in this interval not displayed.     Coags:  No results for input(s): INR, PROTIME, PTT in the last 168 hours.    Invalid input(s): APTT  Cardiac Markers:  No results for input(s): CKTOTAL, TROPONINI in the last 168 hours.       CT Chest/Abdomen/Pelvis w Contrast    Result Date: 2/7/2023  EXAM: CT CHEST/ABDOMEN/PELVIS W CONTRAST LOCATION: [facility] DATE/TIME: 2/7/2023 8:21 PM INDICATION: ongoing leukocytosis. new fever. 1/29 sigmoidectomy with wash out. COMPARISON:  CT of the abdomen and pelvis 2/4/2023 TECHNIQUE: CT scan of the chest, abdomen, and pelvis was performed following injection of IV contrast. Multiplanar reformats were obtained. Dose reduction techniques were used. CONTRAST: 100 mL Isovue-370 FINDINGS: LUNGS AND PLEURA: Small bilateral pleural effusions layer into the posterior costophrenic sulci. Bands of atelectasis along the posterior pleura of the lower lobes. No lung edema or consolidation. Trachea and central airways are patent and normal caliber. MEDIASTINUM: Cardiac chambers are normal in size. No pericardial effusion. Normal caliber thoracic aorta and main pulmonary artery. Arch, proximal great vessel and descending aorta atheromatous calcification is patchy. There are no enlarged mediastinal or hilar lymph nodes. Esophagus is decompressed. A few small lower paraesophageal varices are present. CORONARY ARTERY CALCIFICATION: Mild to moderate, three-vessel disease. HEPATOBILIARY: Slight geographic liver attenuation but no focal enhancement or parenchymal lesion. There is a calcified stone layering in the gallbladder. No gallbladder wall thickening or pericholecystic inflammation. No bile duct enlargement. PANCREAS: Normal. SPLEEN: Normal. ADRENAL GLANDS: Normal. KIDNEYS/BLADDER: Trace amount of air in the nondependent bladder from recent instrumentation. Bladder wall is smooth with uniform thickness. Kidneys are normal in size with symmetric enhancement and normal cortex thickness. No nephrolithiasis or hydronephrosis. BOWEL: Nondistended stomach. Proximal small bowel in the left upper quadrant is mildly dilated and fluid-filled, however no clear caliber transition and this is unchanged from 2/4/2023. There is hazy attenuation in the mesentery consistent with edema. Minimal perihepatic and perisplenic ascites. Similar ascites layering in the left lower quadrant with small amount of anterior linear enhancement (series 3, image 205). Status post sigmoid colon  resection with colocolonic anastomosis in the upper left pelvis. There are multiple diverticula arising from the ascending colon. No pneumatosis or pneumoperitoneum. LYMPH NODES: Mesenteric, aortocaval and iliac chain lymph nodes are normal by size criteria. VASCULATURE: Diffuse atheromatous calcification of the infrarenal abdominal aorta and common iliac arteries. No aneurysm. PELVIC ORGANS: Post hysterectomy. MUSCULOSKELETAL: Grade 1 degenerative anterolisthesis of L4 on L5 and lumbar facet arthropathy. Vacuum disc phenomenon from L1-2 to L3-L4. Thoracic vertebra are maintained in height with small diffuse osteophytes and mild disc space narrowing. No aggressive or destructive bone lesions. Infraumbilical skin staples are present from recent laparotomy. No body wall hernia. Mild body wall anasarca.     IMPRESSION: 1.  Status post segmental sigmoid colon resection with colocolonic anastomosis in the upper left pelvis. No findings to suggest a surgical complication. 2.  Diverticula arising from the ascending colon but no acute diverticulitis. 3.  Unchanged ascites and mesenteric edema. No drainable intra-abdominal fluid collection. 4.  Minimal pleural effusions and atelectasis in the posterior sulci. 5.  Cholelithiasis.     Echocardiogram Complete    Result Date: 2023  [id number]^[place] [address]  Name: SRIDHAR ALONSO MRN: [id number] : 1945 Study Date: 2023 12:44 PM Age: 77 yrs Gender: Female Patient Location: [facility] Reason For Study: CHF Ordering Physician: PAYAL OCAMPO Performed By: KATHARINE  BSA: 2.1 m2 Height: 65 in Weight: 220 lb HR: 87 BP: 138/63 mmHg ______________________________________________________________________________ Procedure Complete Echo Adult. ______________________________________________________________________________ Interpretation Summary  1. Normal left ventricular size and systolic performance with  a visually estimated ejection fraction of 60%. 2. No significant valvular heart disease is identified on this study. 3. Normal right ventricular size with borderline reduction right ventricular systolic performance. 4. There is mild biatrial enlargement. 5. There is a very small pericardial effusion. There is no evidence of significant intraventricular dependence/tamponade physiology. ______________________________________________________________________________ Left ventricle: Normal left ventricular size and systolic performance with a visually estimated ejection fraction of 60%. There is normal regional wall motion. Left ventricular wall thickness is normal.  Assessment of LV Diastolic Function: The cumulative findings suggest impaired diastolic filling [The septal e' velocity is < 7 cm/s & lateral e' velocity is < 10 cm/s. The average E/e' is >14. TR velocity is 2.8 m/s. Left atrial volume index is greater than 34 mL/mÂ ].  Assessment of left atrial pressure (LAP): The cumulative findings suggest moderately elevated left atrial pressure (the E/A is > 0.8 and <2.0 plus 2 or 3 of 3 of the following present: Average E/e' > 14, TRvel > 2.8 m/s, and/or LA vol. index > 34 ml/mÂ  ).  Right ventricle: Normal right ventricular size with borderline reduction right ventricular systolic performance.  Left atrium: There is mild left atrial enlargement.  Right atrium: There is mild right atrial enlargement.  IVC: The IVC is borderline dilated.  Aortic valve: The aortic valve is comprised of three cusps. No significant aortic stenosis or aortic insufficiency is detected on this study.  Mitral valve: The mitral valve appears morphologically normal. There is trace mitral insufficiency.  Tricuspid valve: The tricuspid valve is grossly morphologically normal. There is trace tricuspid insufficiency.  Pulmonic valve: The pulmonic valve is grossly morphologically normal.  Thoracic aorta: The aortic root and proximal ascending aorta  are of normal dimension.  Pericardium: There is a very small pericardial effusion. There is no evidence of significant intraventricular dependence/tamponade physiology. ______________________________________________________________________________ ______________________________________________________________________________ MMode/2D Measurements & Calculations IVSd: 1.3 cm LVIDd: 4.1 cm LVIDs: 2.6 cm LVPWd: 1.2 cm FS: 36.6 % LV mass(C)d: 185.2 grams LV mass(C)dI: 89.9 grams/m2 Ao root diam: 3.2 cm LA dimension: 4.6 cm asc Aorta Diam: 3.4 cm LA/Ao: 1.4 LVOT diam: 2.3 cm LVOT area: 4.1 cm2 LA Volume Indexed (AL/bp): 36.7 ml/m2  RWT: 0.58  Time Measurements MM HR: 87.0 BPM  Doppler Measurements & Calculations MV E max ayden: 123.0 cm/sec MV A max ayden: 124.2 cm/sec MV E/A: 0.99 MV dec time: 0.24 sec Ao V2 max: 189.4 cm/sec Ao max P.0 mmHg Ao V2 mean: 123.9 cm/sec Ao mean P.8 mmHg Ao V2 VTI: 32.7 cm QUAN(I,D): 2.8 cm2 QUAN(V,D): 2.9 cm2 LV V1 max P.9 mmHg LV V1 max: 131.3 cm/sec LV V1 VTI: 22.2 cm SV(LVOT): 92.1 ml SI(LVOT): 44.7 ml/m2 PA acc time: 0.09 sec TR max ayden: 279.6 cm/sec TR max P.3 mmHg AV Ayden Ratio (DI): 0.69 QUAN Index (cm2/m2): 1.4 E/E': 13.6  E/E' avg: 15.6 Lateral E/e': 13.6 Medial E/e': 17.7 Peak E' Ayden: 9.0 cm/sec  ______________________________________________________________________________ Report approved by: Leonid Hamilton 2023 02:47 PM       XR Chest Port 1 View    Result Date: 2023  EXAM: XR CHEST PORT 1 VIEW LOCATION: [facility] DATE/TIME: 2023 7:42 PM INDICATION: fever, new cough COMPARISON: 2021     IMPRESSION: Lung volumes are lower on this portable study. Borderline cardiomegaly unchanged. Pulmonary vessels normal. Questionable subtle right infrahilar infiltrate though this may relate to the lower lung volumes. Lungs otherwise clear.    XR Ankle Port Left G/E 3 Views    Result Date: 2023  EXAM: XR ANKLE PORT LEFT G/E 3  VIEWS LOCATION: [facility] DATE/TIME: 2/5/2023 2:22 PM INDICATION: left ankle pain, no trauma COMPARISON: None.     IMPRESSION: Bones are demineralized. Mild tibiotalar joint degenerative change. No evidence of an acute fracture. Tiny bone fragment adjacent to the medial talus and mild bony proliferation along the tip of the lateral malleolus is likely chronic. Ankle mortise is intact. Calcaneal heel spur.    CT Abdomen Pelvis w Contrast    Result Date: 2/4/2023  EXAM: CT ABDOMEN PELVIS W CONTRAST LOCATION: [facility] DATE/TIME: 2/4/2023 12:08 AM INDICATION: 1 29 sigmoidectomy with increasing wbc COMPARISON: CT a abdomen/pelvis 01/29/2023 TECHNIQUE: CT scan of the abdomen and pelvis was performed following injection of IV contrast. Multiplanar reformats were obtained. Dose reduction techniques were used. CONTRAST: isovue 370 100ml FINDINGS: LOWER CHEST: Trace bilateral pleural effusions right greater than left with compressive atelectasis. HEPATOBILIARY: Diffuse hepatic steatosis. Cholelithiasis with mild gallbladder distention which may be related to fasting. No biliary ductal dilatation. PANCREAS: Normal. SPLEEN: Normal. ADRENAL GLANDS: Normal. KIDNEYS/BLADDER: No hydronephrosis. Box catheter within a decompressed bladder. BOWEL: Recent sigmoidectomy. Colonic diverticulosis. Trace abdominopelvic free ascites. Additionally, there is some loculated ascites in the anterior abdomen with partial rim enhancement and several foci of gas within, which suggests a degree of peritonitis. No drainable fluid collection currently. Right anterior approach drain terminates in the left pelvis. LYMPH NODES: Normal. VASCULATURE: Atherosclerotic calcifications of the aortoiliac vessels without evidence of aneurysmal dilatation. PELVIC ORGANS: Hysterectomy. MUSCULOSKELETAL: Body wall edema. Skin staples with recent ventral abdominal incision. Small fat-containing umbilical hernia.  Multilevel degenerative changes of the thoracolumbar spine. No acute osseous abnormality.     IMPRESSION: 1.  Recent sigmoidectomy. Trace abdominopelvic free ascites. Additionally, there is some loculated ascites in the anterior abdomen with partial rim enhancement and several foci of gas within, which suggests a degree of peritonitis. No drainable fluid collection currently. 2.  Trace bilateral pleural effusions right greater than left with compressive atelectasis. 3.  Body wall edema. 4.  Diffuse hepatic steatosis. 5.  Cholelithiasis with mild gallbladder distention which may be related to fasting.    CT Abdomen Pelvis w Contrast    Result Date: 1/25/2023  EXAM: CT ABDOMEN PELVIS W CONTRAST LOCATION: [facility] DATE/TIME: 1/25/2023 11:28 PM INDICATION: abdominal pain worst in lower quadrants, leukocytosis eval for pathology COMPARISON: 11/26/2022 TECHNIQUE: CT scan of the abdomen and pelvis was performed following injection of IV contrast. Multiplanar reformats were obtained. Dose reduction techniques were used. CONTRAST: isovue 370 100ml FINDINGS: LOWER CHEST: Small left Bochdalek hernia. Basilar atelectasis. HEPATOBILIARY: Cholelithiasis and hepatic steatosis. PANCREAS: Normal. SPLEEN: Normal. ADRENAL GLANDS: Normal. KIDNEYS/BLADDER: Normal. BOWEL: Small bowel is normal caliber. Large amount of colonic stool. Sigmoid colonic wall thickening with adjacent inflammation is again seen. The inflamed segment is distended with stool. Small amount of adjacent free fluid. Caliber change with collapse  of the rectosigmoid junction and rectum. Diverticulosis.  LYMPH NODES: Subcentimeter retroperitoneal lymph nodes. VASCULATURE: Atherosclerotic vascular calcification. PELVIC ORGANS: Absent uterus. MUSCULOSKELETAL: Degenerative change osseous structures. Mild compression L4. Slight anterolisthesis L4 on L5.     IMPRESSION: 1.  Sigmoid colitis is again seen. Collapse of the rectosigmoid junction and  rectum distal to the inflamed segment of colon. Underlying stricture not excluded. 2.  Small amount of adjacent free fluid. 3.  Cholelithiasis.    CTA Abdomen Pelvis with Contrast    Result Date: 1/29/2023  EXAM: CTA ABDOMEN PELVIS WITH CONTRAST LOCATION: [facility] DATE/TIME: 1/29/2023 6:48 AM INDICATION: recurrent colitis, distended abdomen pain out of proportion. COMPARISON: There are study dated 1/25/ TECHNIQUE: CT angiogram abdomen pelvis during arterial phase of injection of IV contrast. 2D and 3D MIP reconstructions were performed by the CT technologist. Dose reduction techniques were used. CONTRAST: isovue 370 100ml FINDINGS: ANGIOGRAM ABDOMEN/PELVIS: The abdominal aorta is normal in size and caliber throughout with scattered atherosclerotic calcifications noted. The iliac and femoral vessels are normal in size and caliber and well-opacified. Celiac and SMA and TONY arise normally and are well opacified at this time LOWER CHEST: Lung bases are clear. Heart is enlarged, similar prior exam. HEPATOBILIARY: Diffuse hepatic steatosis. No significant mass. No bile duct dilatation. Calcified gallstones are again seen within the gallbladder. PANCREAS: No significant mass, duct dilatation, or inflammatory change. SPLEEN: Normal size. ADRENAL GLANDS: No significant nodules. KIDNEYS/BLADDER: No significant mass, stone, or hydronephrosis. BOWEL: Mural thickening and pericolonic inflammation of the sigmoid colon is again noted, similar in appearance to prior exam with increased inflammation seen in the proximal sigmoid colon. No pericolonic abscess seen at this time. Stool is seen throughout the inflamed sigmoid colon as well as scattered throughout the large bowel without evidence of large bowel obstruction. Adjacent to the proximal sigmoid colon is free fluid with foci of gas which are extraluminal and new from the prior study (image 1:15, series 5). Additional foci of extraluminal gas are seen  within the mesenteric fat anteriorly in the upper abdomen (image 57, series 5). There are scattered diverticula seen throughout the colon. Air-fluid levels are seen scattered through multiple loops of small bowel with mild dilatation seen in the proximal jejunum in the upper abdomen, these mildly dilated loops of small bowel decompressed gradually distally without a clear transition point. LYMPH NODES: Increased Number of pericolonic lymph nodes are again seen adjacent to the sigmoid colon. PELVIC ORGANS: No pelvic masses. MUSCULOSKELETAL: Unchanged     IMPRESSION: 1.  Mural thickening of the sigmoid colon with pericolonic inflammation and stranding, slightly worsened from prior exam with punctate foci of gas seen adjacent to the inflamed colon as well as tracking in the anterior abdominal wall concerning for sigmoid colonic perforation. No organized intra-abdominal fluid collection such as abscess is seen at this time. Given the long-standing inflammation in this region an underlying neoplastic process cannot be excluded. 2.  Scattered air-fluid levels and mildly dilated loops of proximal jejunum which decompresses gradually without a transition point consistent favored to reflect reactive ileus 3.  Hepatic steatosis 4.  Cholelithiasis [Critical Result: Sigmoid colonic inflammation with areas of extraluminal gas, new from 1/25/2023 concerning for sigmoid colonic perforation] Finding was identified on 1/29/2023 6:56 AM. DR. Snell was contacted by me on 1/29/2023 7:08 AM and verbalized understanding of the critical result.     CT Chest w/o Contrast    Result Date: 1/29/2023  EXAM: CT CHEST WITHOUT CONTRAST LOCATION: [facility] DATE/TIME: 01/29/2023, 6:46 AM INDICATION: Fever and cough. COMPARISON: 11/26/2022 - CT chest, abdomen and pelvis. TECHNIQUE: Note that this study was performed in conjunction with a CT scan of the abdomen and pelvis. Refer to a separate report for findings from  that study. CT chest without IV contrast. Multiplanar reformats were obtained. Dose reduction techniques were used. CONTRAST: None. FINDINGS: LUNGS AND PLEURA: Minimal emphysematous changes in the lungs. Slight interstitial thickening in bilateral lung bases. A few curvilinear opacities in the periphery of both lungs likely represent atelectasis and/or scarring. The lungs are otherwise clear. Small left Bochdalek hernia containing fat MEDIASTINUM/AXILLAE: Atherosclerotic calcification in the thoracic aorta. CORONARY ARTERY CALCIFICATION: Present. MUSCULOSKELETAL: No acute findings. UPPER ABDOMEN: A few foci of extraluminal gas are present in the upper abdomen.     IMPRESSION: 1.  Slight interstitial thickening in bilateral lung bases. This is nonspecific, but most likely relates to interstitial pulmonary edema. 2.  No other findings suspicious for acute pulmonary disease. 3.  A few foci of extraluminal gas scattered within the nondependent aspect of the upper abdomen. In the absence of recent surgery, this is consistent with a bowel perforation. Refer to the report from the CT scan of the abdomen and pelvis performed in conjunction with this study for additional details. The findings were called to Dr. Snell by Dr. Anderson on 01/29/2023 at 0700 hours.       Latest radiology report personally reviewed.    Note created using dragon voice recognition software so sounds alike errors may have escaped editing.      02/13/2023   Matthew Anna MD  Hospitalist, [facility]  Pager: [phone]

## 2023-02-13 NOTE — PROGRESS NOTES
ASSESSMENT:  1. Perforation of colon (H)    2. Refusal of blood transfusions as patient is Religious        Suzy Gómez is a 77 year old female who is s/p laparoscopic sigmoid colectomy and washout on 1/29/2023.  From the standpoint of her sigmoid colectomy, she has recovered with no remaining concern for leak.  Her leukocyte count has normalized, with her most recent CT scan showing no drainable abscess collection or even fluid collection.  Remaining issues keeping her inpatient are her deconditioning and medical comorbidities.  I think she would be fit for discharge to a TCU whenever 1 can be found.    PLAN:  -Up to that toilet or commode every 2 hours.  No excuses.  There is no reason for her not to be using a toilet or commode.  -As much ambulation and time in the chair as possible  -ABX per ID  -Diet as tolerated  -Staple removal in 1 week's time  -Ok with discharge to TCU at anytime given that her WBC has normalized and VS have remained stable        SUBJECTIVE: Doing fine, cranky, wants to get out of the hospital    Patient Vitals for the past 24 hrs:   BP Temp Temp src Pulse Resp SpO2 Weight   02/13/23 0740 139/65 99.4  F (37.4  C) Oral 78 18 97 % --   02/12/23 2341 116/64 98  F (36.7  C) Oral 72 17 96 % --   02/12/23 1543 108/53 98.4  F (36.9  C) Oral 86 18 94 % --   02/12/23 1300 -- -- -- -- -- -- 107 kg (236 lb)         PHYSICAL EXAM:  GEN: No acute distress, comfortable  ABD: Soft, nondistended, incisions all clean and intact.  Output by Drain (mL) 02/11/23 0700 - 02/11/23 1459 02/11/23 1500 - 02/11/23 2259 02/11/23 2300 - 02/12/23 0659 02/12/23 0700 - 02/12/23 1459 02/12/23 1500 - 02/12/23 2259 02/12/23 2300 - 02/13/23 0659 02/13/23 0700 - 02/13/23 0908   Open Drain Ventral Abdomen              EXT:No cyanosis, edema or obvious abnormalities    02/12 0700 - 02/13 0659  In: 120 [P.O.:120]  Out: -     No results displayed because visit has over 200 results.   Recent Results (from the past 24  hour(s))   Glucose by meter    Collection Time: 02/12/23  1:36 PM   Result Value Ref Range    GLUCOSE BY METER POCT 192 (H) 70 - 99 mg/dL   Glucose by meter    Collection Time: 02/12/23  4:27 PM   Result Value Ref Range    GLUCOSE BY METER POCT 158 (H) 70 - 99 mg/dL   Glucose by meter    Collection Time: 02/12/23  8:25 PM   Result Value Ref Range    GLUCOSE BY METER POCT 195 (H) 70 - 99 mg/dL   CBC with platelets    Collection Time: 02/13/23  6:17 AM   Result Value Ref Range    WBC Count 10.5 4.0 - 11.0 10e3/uL    RBC Count 3.29 (L) 3.80 - 5.20 10e6/uL    Hemoglobin 9.3 (L) 11.7 - 15.7 g/dL    Hematocrit 31.6 (L) 35.0 - 47.0 %    MCV 96 78 - 100 fL    MCH 28.3 26.5 - 33.0 pg    MCHC 29.4 (L) 31.5 - 36.5 g/dL    RDW 15.1 (H) 10.0 - 15.0 %    Platelet Count 426 150 - 450 10e3/uL   Basic metabolic panel    Collection Time: 02/13/23  6:17 AM   Result Value Ref Range    Sodium 137 136 - 145 mmol/L    Potassium 3.9 3.4 - 5.3 mmol/L    Chloride 100 98 - 107 mmol/L    Carbon Dioxide (CO2) 29 22 - 29 mmol/L    Anion Gap 8 7 - 15 mmol/L    Urea Nitrogen 14.0 8.0 - 23.0 mg/dL    Creatinine 0.58 0.51 - 0.95 mg/dL    Calcium 8.1 (L) 8.8 - 10.2 mg/dL    Glucose 158 (H) 70 - 99 mg/dL    GFR Estimate >90 >60 mL/min/1.73m2   Glucose by meter    Collection Time: 02/13/23  7:20 AM   Result Value Ref Range    GLUCOSE BY METER POCT 156 (H) 70 - 99 mg/dL               Wilfredo Palencia MD

## 2023-02-13 NOTE — PROGRESS NOTES
"INFECTIOUS DISEASE FOLLOW UP NOTE      ASSESSMENT:  1. Intra-abdominal infection presenting with perforated stercolic ulcer of sigmoid colon, underwent laparoscopic sigmoid colectomy with anastomosis, washout 1/29. Findings of diffuse feculent peritonitis. Ongoing pain and leukocytosis. Draining clear fluid from wound -- creatinine level not suggestive of urine leak based on estimate. Bowel function returning to normal, but wbc remains high and increasing. No improvement with change in antibiotics. Repeat CT without significant findings for infection.  Leukocytosis resolved and fever improving since addition of vancomycin.  2. Bacteremia with clostridium species, Collinsella aerofaciens, bacteroides vulgatus, and Eubacterium spp. Likely secondary to perforation. Susceptible to meropenem and metronidazole   3. DM  4. Hypothyroidism. Mildly elevated TSH prior to admission, on treatment  5. Left great toe infection for many months. Has seen podiatry and primary clinic for this. Recent doxycycline. Had MRI negative for osteomyelitis in September. 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   7. Cholelithiasis      PLAN:  -Continue vancomycin, meropenem and micafungin. Duration of therapy is 5-7 days after source control. Given improvement in wbc, would recommend antibiotics for another 2-3 days     Lexx Zuniga MD  [facility]  Direct messaging: eMaze[employer] Paging  On-Call ID provider: [phone], option: 9    ______________________________________________________________________    SUBJECTIVE / INTERVAL HISTORY:   No acute events.  at bedside. TCU placement in progress.  Eating. Having BMs    OBJECTIVE:  /58 (BP Location: Right arm)   Pulse 86   Temp 98.8  F (37.1  C) (Oral)   Resp 18   Ht 1.651 m (5' 5\")   Wt 107 kg (236 lb)   SpO2 (!) 83%   BMI 39.27 kg/m       GEN: No acute distress.   RESPIRATORY:  Clear " bilaterally   CARDIOVASCULAR:  Regular, no murmur  ABDOMEN:  Soft, generalized tenderness. Incision site intact without drainage  EXTREMITIES: mild edema  SKIN/HAIR/NAILS:  No rashes. Erythema on multiple toes, without lesions or tenderness  Psych: normal affect   IV: peripheral        Antibiotics:  Meropenem 2/4-  Micafungin 2/4-  Vancomycin 2/8-    Previous:  pip/tazo 1/29-2/4  Vancomycin 1/29 x1      Pertinent labs:    Recent Labs   Lab 02/13/23 0617 02/12/23 0641 02/11/23 0634   WBC 10.5 10.9 11.3*   HGB 9.3* 9.9* 9.6*   HCT 31.6* 33.8* 32.1*    463* 393        Recent Labs   Lab 02/13/23 0617 02/12/23 0641 02/11/23 0634    140 139   CO2 29 32* 28   BUN 14.0 13.4 10.8      No results found for: CRP      Lab Results   Component Value Date    ALT 14 02/08/2023    AST 29 02/08/2023    ALKPHOS 142 (H) 02/08/2023         MICROBIOLOGY DATA:  1/29 blood culture: C.clostridioforme and B.vulgatus  1/29 blood culture: Eubacterium spp and Collinsella aerofaciens      RADIOLOGY:  CT Chest/Abdomen/Pelvis w Contrast    Result Date: 2/7/2023  EXAM: CT CHEST/ABDOMEN/PELVIS W CONTRAST LOCATION: [facility] DATE/TIME: 2/7/2023 8:21 PM INDICATION: ongoing leukocytosis. new fever. 1/29 sigmoidectomy with wash out. COMPARISON: CT of the abdomen and pelvis 2/4/2023 TECHNIQUE: CT scan of the chest, abdomen, and pelvis was performed following injection of IV contrast. Multiplanar reformats were obtained. Dose reduction techniques were used. CONTRAST: 100 mL Isovue-370 FINDINGS: LUNGS AND PLEURA: Small bilateral pleural effusions layer into the posterior costophrenic sulci. Bands of atelectasis along the posterior pleura of the lower lobes. No lung edema or consolidation. Trachea and central airways are patent and normal caliber. MEDIASTINUM: Cardiac chambers are normal in size. No pericardial effusion. Normal caliber thoracic aorta and main pulmonary artery. Arch, proximal great vessel and  descending aorta atheromatous calcification is patchy. There are no enlarged mediastinal or hilar lymph nodes. Esophagus is decompressed. A few small lower paraesophageal varices are present. CORONARY ARTERY CALCIFICATION: Mild to moderate, three-vessel disease. HEPATOBILIARY: Slight geographic liver attenuation but no focal enhancement or parenchymal lesion. There is a calcified stone layering in the gallbladder. No gallbladder wall thickening or pericholecystic inflammation. No bile duct enlargement. PANCREAS: Normal. SPLEEN: Normal. ADRENAL GLANDS: Normal. KIDNEYS/BLADDER: Trace amount of air in the nondependent bladder from recent instrumentation. Bladder wall is smooth with uniform thickness. Kidneys are normal in size with symmetric enhancement and normal cortex thickness. No nephrolithiasis or hydronephrosis. BOWEL: Nondistended stomach. Proximal small bowel in the left upper quadrant is mildly dilated and fluid-filled, however no clear caliber transition and this is unchanged from 2/4/2023. There is hazy attenuation in the mesentery consistent with edema. Minimal perihepatic and perisplenic ascites. Similar ascites layering in the left lower quadrant with small amount of anterior linear enhancement (series 3, image 205). Status post sigmoid colon resection with colocolonic anastomosis in the upper left pelvis. There are multiple diverticula arising from the ascending colon. No pneumatosis or pneumoperitoneum. LYMPH NODES: Mesenteric, aortocaval and iliac chain lymph nodes are normal by size criteria. VASCULATURE: Diffuse atheromatous calcification of the infrarenal abdominal aorta and common iliac arteries. No aneurysm. PELVIC ORGANS: Post hysterectomy. MUSCULOSKELETAL: Grade 1 degenerative anterolisthesis of L4 on L5 and lumbar facet arthropathy. Vacuum disc phenomenon from L1-2 to L3-L4. Thoracic vertebra are maintained in height with small diffuse osteophytes and mild disc space narrowing. No aggressive  or destructive bone lesions. Infraumbilical skin staples are present from recent laparotomy. No body wall hernia. Mild body wall anasarca.     IMPRESSION: 1.  Status post segmental sigmoid colon resection with colocolonic anastomosis in the upper left pelvis. No findings to suggest a surgical complication. 2.  Diverticula arising from the ascending colon but no acute diverticulitis. 3.  Unchanged ascites and mesenteric edema. No drainable intra-abdominal fluid collection. 4.  Minimal pleural effusions and atelectasis in the posterior sulci. 5.  Cholelithiasis.     Echocardiogram Complete    Result Date: 2023  [id number]^TERRENCE^[place] [address]  Name: SRIDHAR ALONSO MRN: [id number] : 1945 Study Date: 2023 12:44 PM Age: 77 yrs Gender: Female Patient Location: [facility] Reason For Study: CHF Ordering Physician: PAYAL OCAMPO Performed By: KATHARINE  BSA: 2.1 m2 Height: 65 in Weight: 220 lb HR: 87 BP: 138/63 mmHg ______________________________________________________________________________ Procedure Complete Echo Adult. ______________________________________________________________________________ Interpretation Summary  1. Normal left ventricular size and systolic performance with a visually estimated ejection fraction of 60%. 2. No significant valvular heart disease is identified on this study. 3. Normal right ventricular size with borderline reduction right ventricular systolic performance. 4. There is mild biatrial enlargement. 5. There is a very small pericardial effusion. There is no evidence of significant intraventricular dependence/tamponade physiology. ______________________________________________________________________________ Left ventricle: Normal left ventricular size and systolic performance with a visually estimated ejection fraction of 60%. There is normal regional wall motion. Left ventricular wall thickness is normal.   Assessment of LV Diastolic Function: The cumulative findings suggest impaired diastolic filling [The septal e' velocity is < 7 cm/s & lateral e' velocity is < 10 cm/s. The average E/e' is >14. TR velocity is 2.8 m/s. Left atrial volume index is greater than 34 mL/mÂ ].  Assessment of left atrial pressure (LAP): The cumulative findings suggest moderately elevated left atrial pressure (the E/A is > 0.8 and <2.0 plus 2 or 3 of 3 of the following present: Average E/e' > 14, TRvel > 2.8 m/s, and/or LA vol. index > 34 ml/mÂ  ).  Right ventricle: Normal right ventricular size with borderline reduction right ventricular systolic performance.  Left atrium: There is mild left atrial enlargement.  Right atrium: There is mild right atrial enlargement.  IVC: The IVC is borderline dilated.  Aortic valve: The aortic valve is comprised of three cusps. No significant aortic stenosis or aortic insufficiency is detected on this study.  Mitral valve: The mitral valve appears morphologically normal. There is trace mitral insufficiency.  Tricuspid valve: The tricuspid valve is grossly morphologically normal. There is trace tricuspid insufficiency.  Pulmonic valve: The pulmonic valve is grossly morphologically normal.  Thoracic aorta: The aortic root and proximal ascending aorta are of normal dimension.  Pericardium: There is a very small pericardial effusion. There is no evidence of significant intraventricular dependence/tamponade physiology. ______________________________________________________________________________ ______________________________________________________________________________ MMode/2D Measurements & Calculations IVSd: 1.3 cm LVIDd: 4.1 cm LVIDs: 2.6 cm LVPWd: 1.2 cm FS: 36.6 % LV mass(C)d: 185.2 grams LV mass(C)dI: 89.9 grams/m2 Ao root diam: 3.2 cm LA dimension: 4.6 cm asc Aorta Diam: 3.4 cm LA/Ao: 1.4 LVOT diam: 2.3 cm LVOT area: 4.1 cm2 LA Volume Indexed (AL/bp): 36.7 ml/m2  RWT: 0.58  Time Measurements MM HR:  87.0 BPM  Doppler Measurements & Calculations MV E max ayden: 123.0 cm/sec MV A max ayden: 124.2 cm/sec MV E/A: 0.99 MV dec time: 0.24 sec Ao V2 max: 189.4 cm/sec Ao max P.0 mmHg Ao V2 mean: 123.9 cm/sec Ao mean P.8 mmHg Ao V2 VTI: 32.7 cm QUAN(I,D): 2.8 cm2 QUAN(V,D): 2.9 cm2 LV V1 max P.9 mmHg LV V1 max: 131.3 cm/sec LV V1 VTI: 22.2 cm SV(LVOT): 92.1 ml SI(LVOT): 44.7 ml/m2 PA acc time: 0.09 sec TR max ayden: 279.6 cm/sec TR max P.3 mmHg AV Ayden Ratio (DI): 0.69 QUAN Index (cm2/m2): 1.4 E/E': 13.6  E/E' avg: 15.6 Lateral E/e': 13.6 Medial E/e': 17.7 Peak E' Ayden: 9.0 cm/sec  ______________________________________________________________________________ Report approved by: Leonid Hamilton 2023 02:47 PM       US Lower Extremity Venous Duplex Bilateral    Result Date: 2/10/2023  EXAM: US LOWER EXTREMITY VENOUS DUPLEX BILATERAL LOCATION: [facility] DATE/TIME: 2/10/2023 10:35 AM INDICATION: Bilateral lower extremity pain, swelling and edema. Fever. COMPARISON: None. TECHNIQUE: Venous Duplex ultrasound of bilateral lower extremities with and without compression, augmentation and duplex. Color flow and spectral Doppler with waveform analysis performed. FINDINGS: Exam includes the common femoral, femoral, popliteal veins as well as segmentally visualized deep calf veins and greater saphenous vein. RIGHT: No deep vein thrombosis. No superficial thrombophlebitis. Anechoic avascular cyst in the right popliteal fossa measuring 4.3 x 3.1 x 0.7 cm. LEFT: No deep vein thrombosis. No superficial thrombophlebitis. Anechoic avascular cyst in the left popliteal fossa measuring 3.5 x 2.0 x 0.5 cm     IMPRESSION: 1.  No deep venous thrombosis in the bilateral lower extremities. 2.  Bilateral popliteal fossa Baker's cysts, slightly larger on the right.    XR Chest Port 1 View    Result Date: 2023  EXAM: XR CHEST PORT 1 VIEW LOCATION: [facility] DATE/TIME:  2/8/2023 7:42 PM INDICATION: fever, new cough COMPARISON: 4/28/2021     IMPRESSION: Lung volumes are lower on this portable study. Borderline cardiomegaly unchanged. Pulmonary vessels normal. Questionable subtle right infrahilar infiltrate though this may relate to the lower lung volumes. Lungs otherwise clear.    XR Ankle Port Left G/E 3 Views    Result Date: 2/5/2023  EXAM: XR ANKLE PORT LEFT G/E 3 VIEWS LOCATION: [facility] DATE/TIME: 2/5/2023 2:22 PM INDICATION: left ankle pain, no trauma COMPARISON: None.     IMPRESSION: Bones are demineralized. Mild tibiotalar joint degenerative change. No evidence of an acute fracture. Tiny bone fragment adjacent to the medial talus and mild bony proliferation along the tip of the lateral malleolus is likely chronic. Ankle mortise is intact. Calcaneal heel spur.    CT Abdomen Pelvis w Contrast    Result Date: 2/4/2023  EXAM: CT ABDOMEN PELVIS W CONTRAST LOCATION: [facility] DATE/TIME: 2/4/2023 12:08 AM INDICATION: 1 29 sigmoidectomy with increasing wbc COMPARISON: CT a abdomen/pelvis 01/29/2023 TECHNIQUE: CT scan of the abdomen and pelvis was performed following injection of IV contrast. Multiplanar reformats were obtained. Dose reduction techniques were used. CONTRAST: isovue 370 100ml FINDINGS: LOWER CHEST: Trace bilateral pleural effusions right greater than left with compressive atelectasis. HEPATOBILIARY: Diffuse hepatic steatosis. Cholelithiasis with mild gallbladder distention which may be related to fasting. No biliary ductal dilatation. PANCREAS: Normal. SPLEEN: Normal. ADRENAL GLANDS: Normal. KIDNEYS/BLADDER: No hydronephrosis. Box catheter within a decompressed bladder. BOWEL: Recent sigmoidectomy. Colonic diverticulosis. Trace abdominopelvic free ascites. Additionally, there is some loculated ascites in the anterior abdomen with partial rim enhancement and several foci of gas within, which suggests a degree of  peritonitis. No drainable fluid collection currently. Right anterior approach drain terminates in the left pelvis. LYMPH NODES: Normal. VASCULATURE: Atherosclerotic calcifications of the aortoiliac vessels without evidence of aneurysmal dilatation. PELVIC ORGANS: Hysterectomy. MUSCULOSKELETAL: Body wall edema. Skin staples with recent ventral abdominal incision. Small fat-containing umbilical hernia. Multilevel degenerative changes of the thoracolumbar spine. No acute osseous abnormality.     IMPRESSION: 1.  Recent sigmoidectomy. Trace abdominopelvic free ascites. Additionally, there is some loculated ascites in the anterior abdomen with partial rim enhancement and several foci of gas within, which suggests a degree of peritonitis. No drainable fluid collection currently. 2.  Trace bilateral pleural effusions right greater than left with compressive atelectasis. 3.  Body wall edema. 4.  Diffuse hepatic steatosis. 5.  Cholelithiasis with mild gallbladder distention which may be related to fasting.    CT Abdomen Pelvis w Contrast    Result Date: 1/25/2023  EXAM: CT ABDOMEN PELVIS W CONTRAST LOCATION: [facility] DATE/TIME: 1/25/2023 11:28 PM INDICATION: abdominal pain worst in lower quadrants, leukocytosis eval for pathology COMPARISON: 11/26/2022 TECHNIQUE: CT scan of the abdomen and pelvis was performed following injection of IV contrast. Multiplanar reformats were obtained. Dose reduction techniques were used. CONTRAST: isovue 370 100ml FINDINGS: LOWER CHEST: Small left Bochdalek hernia. Basilar atelectasis. HEPATOBILIARY: Cholelithiasis and hepatic steatosis. PANCREAS: Normal. SPLEEN: Normal. ADRENAL GLANDS: Normal. KIDNEYS/BLADDER: Normal. BOWEL: Small bowel is normal caliber. Large amount of colonic stool. Sigmoid colonic wall thickening with adjacent inflammation is again seen. The inflamed segment is distended with stool. Small amount of adjacent free fluid. Caliber change with collapse   of the rectosigmoid junction and rectum. Diverticulosis.  LYMPH NODES: Subcentimeter retroperitoneal lymph nodes. VASCULATURE: Atherosclerotic vascular calcification. PELVIC ORGANS: Absent uterus. MUSCULOSKELETAL: Degenerative change osseous structures. Mild compression L4. Slight anterolisthesis L4 on L5.     IMPRESSION: 1.  Sigmoid colitis is again seen. Collapse of the rectosigmoid junction and rectum distal to the inflamed segment of colon. Underlying stricture not excluded. 2.  Small amount of adjacent free fluid. 3.  Cholelithiasis.    CTA Abdomen Pelvis with Contrast    Result Date: 1/29/2023  EXAM: CTA ABDOMEN PELVIS WITH CONTRAST LOCATION: [facility] DATE/TIME: 1/29/2023 6:48 AM INDICATION: recurrent colitis, distended abdomen pain out of proportion. COMPARISON: There are study dated 1/25/ TECHNIQUE: CT angiogram abdomen pelvis during arterial phase of injection of IV contrast. 2D and 3D MIP reconstructions were performed by the CT technologist. Dose reduction techniques were used. CONTRAST: isovue 370 100ml FINDINGS: ANGIOGRAM ABDOMEN/PELVIS: The abdominal aorta is normal in size and caliber throughout with scattered atherosclerotic calcifications noted. The iliac and femoral vessels are normal in size and caliber and well-opacified. Celiac and SMA and TONY arise normally and are well opacified at this time LOWER CHEST: Lung bases are clear. Heart is enlarged, similar prior exam. HEPATOBILIARY: Diffuse hepatic steatosis. No significant mass. No bile duct dilatation. Calcified gallstones are again seen within the gallbladder. PANCREAS: No significant mass, duct dilatation, or inflammatory change. SPLEEN: Normal size. ADRENAL GLANDS: No significant nodules. KIDNEYS/BLADDER: No significant mass, stone, or hydronephrosis. BOWEL: Mural thickening and pericolonic inflammation of the sigmoid colon is again noted, similar in appearance to prior exam with increased inflammation seen in the  proximal sigmoid colon. No pericolonic abscess seen at this time. Stool is seen throughout the inflamed sigmoid colon as well as scattered throughout the large bowel without evidence of large bowel obstruction. Adjacent to the proximal sigmoid colon is free fluid with foci of gas which are extraluminal and new from the prior study (image 1:15, series 5). Additional foci of extraluminal gas are seen within the mesenteric fat anteriorly in the upper abdomen (image 57, series 5). There are scattered diverticula seen throughout the colon. Air-fluid levels are seen scattered through multiple loops of small bowel with mild dilatation seen in the proximal jejunum in the upper abdomen, these mildly dilated loops of small bowel decompressed gradually distally without a clear transition point. LYMPH NODES: Increased Number of pericolonic lymph nodes are again seen adjacent to the sigmoid colon. PELVIC ORGANS: No pelvic masses. MUSCULOSKELETAL: Unchanged     IMPRESSION: 1.  Mural thickening of the sigmoid colon with pericolonic inflammation and stranding, slightly worsened from prior exam with punctate foci of gas seen adjacent to the inflamed colon as well as tracking in the anterior abdominal wall concerning for sigmoid colonic perforation. No organized intra-abdominal fluid collection such as abscess is seen at this time. Given the long-standing inflammation in this region an underlying neoplastic process cannot be excluded. 2.  Scattered air-fluid levels and mildly dilated loops of proximal jejunum which decompresses gradually without a transition point consistent favored to reflect reactive ileus 3.  Hepatic steatosis 4.  Cholelithiasis [Critical Result: Sigmoid colonic inflammation with areas of extraluminal gas, new from 1/25/2023 concerning for sigmoid colonic perforation] Finding was identified on 1/29/2023 6:56 AM. DR. Snell was contacted by me on 1/29/2023 7:08 AM and verbalized understanding of the critical  result.     CT Chest w/o Contrast    Result Date: 1/29/2023  EXAM: CT CHEST WITHOUT CONTRAST LOCATION: [facility] DATE/TIME: 01/29/2023, 6:46 AM INDICATION: Fever and cough. COMPARISON: 11/26/2022 - CT chest, abdomen and pelvis. TECHNIQUE: Note that this study was performed in conjunction with a CT scan of the abdomen and pelvis. Refer to a separate report for findings from that study. CT chest without IV contrast. Multiplanar reformats were obtained. Dose reduction techniques were used. CONTRAST: None. FINDINGS: LUNGS AND PLEURA: Minimal emphysematous changes in the lungs. Slight interstitial thickening in bilateral lung bases. A few curvilinear opacities in the periphery of both lungs likely represent atelectasis and/or scarring. The lungs are otherwise clear. Small left Bochdalek hernia containing fat MEDIASTINUM/AXILLAE: Atherosclerotic calcification in the thoracic aorta. CORONARY ARTERY CALCIFICATION: Present. MUSCULOSKELETAL: No acute findings. UPPER ABDOMEN: A few foci of extraluminal gas are present in the upper abdomen.     IMPRESSION: 1.  Slight interstitial thickening in bilateral lung bases. This is nonspecific, but most likely relates to interstitial pulmonary edema. 2.  No other findings suspicious for acute pulmonary disease. 3.  A few foci of extraluminal gas scattered within the nondependent aspect of the upper abdomen. In the absence of recent surgery, this is consistent with a bowel perforation. Refer to the report from the CT scan of the abdomen and pelvis performed in conjunction with this study for additional details. The findings were called to Dr. Snell by Dr. Anderson on 01/29/2023 at 0700 hours.      Attestation:  I have reviewed today's Medications, Vital Signs, and Labs. Cultures and previous notes were reviewed and summarized above.

## 2023-02-13 NOTE — PLAN OF CARE
Problem: Plan of Care - These are the overarching goals to be used throughout the patient stay.    Goal: Plan of Care Review  Description: The Plan of Care Review/Shift note should be completed every shift.  The Outcome Evaluation is a brief statement about your assessment that the patient is improving, declining, or no change.  This information will be displayed automatically on your shift note.  Outcome: Progressing   Goal Outcome Evaluation:   Patient reporting frustrated with progression of health.  Some mild depression reported.  Patient needs a lot of encouragement to increase activity.    Up to chair most of am.  Ambulated x2.  Assist of 2 with walker and gait belt.  Difficulty changing positions but steady gait when up.      Moderate abdominal pain noted. Tylenol did not relieve discomfort and oxycodone given x1.  Patient reported improved pain and denied need of further interventions.      Discussed with patient getting up to use commode and bathroom.  Surgery doesn't want patient being incontinent in bed to limit mobility.    Appetite poor.  Patient reported lack of appetite and not liking food contributing to poor intake.

## 2023-02-13 NOTE — PLAN OF CARE
Problem: Plan of Care - These are the overarching goals to be used throughout the patient stay.    Goal: Readiness for Transition of Care  Outcome: Progressing     Problem: Bowel Resection  Goal: Fluid and Electrolyte Balance  Outcome: Progressing     Problem: Bowel Resection  Goal: Effective Urinary Elimination  Outcome: Progressing     Goal Outcome Evaluation:       VSS overnight. Pain controlled with PRN pain medication. Pt. Up to commode per ordered but needed a lot of encouragement to participate. Alert and oriented but forgetful.  Frequently asked about breakfast.

## 2023-02-13 NOTE — PROGRESS NOTES
General Surgery Progress Note    POST OP DAY  # 15 sigmoid colectomy for Stericolic ulcer perforation with diffuse peritoneal contamination.    Subjective:   Unmotivated but up sitting in a chair.   She is still on NC O2     Vitals:    02/12/23 1300 02/12/23 1543 02/12/23 2341 02/13/23 0740   BP:  108/53 116/64 139/65   BP Location:  Right arm Right arm Right arm   Pulse:  86 72 78   Resp:  18 17 18   Temp:  98.4  F (36.9  C) 98  F (36.7  C) 99.4  F (37.4  C)   TempSrc:  Oral Oral Oral   SpO2:  94% 96% 97%   Weight: 107 kg (236 lb)      Height:           Physical Exam:  Lungs:  CTA  CV:       RRR  Ab:       Soft, + BS,     Recent Labs   Lab 02/13/23  0617   WBC 10.5   HGB 9.3*   HCT 31.6*          Recent Labs   Lab 02/13/23  0617 02/10/23  0701 02/08/23  0646      < > 138   CO2 29   < > 31*   BUN 14.0   < > 9.9   ALBUMIN  --   --  2.3*   ALKPHOS  --   --  142*   ALT  --   --  14   AST  --   --  29    < > = values in this interval not displayed.       Assessment:  PT is working with this pt to encourage some movement.  She has a regular diet but is not eating.  WBC continues to slowly trend down.    Plan: Continue care,  Rehab as best possible.    Italo Figueroa MD  [facility]  [phone]

## 2023-02-14 ENCOUNTER — APPOINTMENT (OUTPATIENT)
Dept: OCCUPATIONAL THERAPY | Facility: HOSPITAL | Age: 78
DRG: 853 | End: 2023-02-14
Payer: COMMERCIAL

## 2023-02-14 ENCOUNTER — APPOINTMENT (OUTPATIENT)
Dept: PHYSICAL THERAPY | Facility: HOSPITAL | Age: 78
DRG: 853 | End: 2023-02-14
Payer: COMMERCIAL

## 2023-02-14 LAB
ANION GAP SERPL CALCULATED.3IONS-SCNC: 8 MMOL/L (ref 7–15)
BACTERIA BLD CULT: NO GROWTH
BACTERIA BLD CULT: NO GROWTH
BUN SERPL-MCNC: 14 MG/DL (ref 8–23)
CALCIUM SERPL-MCNC: 8.4 MG/DL (ref 8.8–10.2)
CHLORIDE SERPL-SCNC: 99 MMOL/L (ref 98–107)
CREAT SERPL-MCNC: 0.58 MG/DL (ref 0.51–0.95)
DEPRECATED HCO3 PLAS-SCNC: 30 MMOL/L (ref 22–29)
ERYTHROCYTE [DISTWIDTH] IN BLOOD BY AUTOMATED COUNT: 15 % (ref 10–15)
GFR SERPL CREATININE-BSD FRML MDRD: >90 ML/MIN/1.73M2
GLUCOSE BLDC GLUCOMTR-MCNC: 141 MG/DL (ref 70–99)
GLUCOSE BLDC GLUCOMTR-MCNC: 155 MG/DL (ref 70–99)
GLUCOSE BLDC GLUCOMTR-MCNC: 166 MG/DL (ref 70–99)
GLUCOSE BLDC GLUCOMTR-MCNC: 184 MG/DL (ref 70–99)
GLUCOSE BLDC GLUCOMTR-MCNC: 233 MG/DL (ref 70–99)
GLUCOSE SERPL-MCNC: 147 MG/DL (ref 70–99)
HCT VFR BLD AUTO: 31 % (ref 35–47)
HGB BLD-MCNC: 9.1 G/DL (ref 11.7–15.7)
MCH RBC QN AUTO: 28 PG (ref 26.5–33)
MCHC RBC AUTO-ENTMCNC: 29.4 G/DL (ref 31.5–36.5)
MCV RBC AUTO: 95 FL (ref 78–100)
PLATELET # BLD AUTO: 437 10E3/UL (ref 150–450)
POTASSIUM SERPL-SCNC: 3.9 MMOL/L (ref 3.4–5.3)
RBC # BLD AUTO: 3.25 10E6/UL (ref 3.8–5.2)
SODIUM SERPL-SCNC: 137 MMOL/L (ref 136–145)
VANCOMYCIN SERPL-MCNC: 18.5 UG/ML
WBC # BLD AUTO: 10.3 10E3/UL (ref 4–11)

## 2023-02-14 PROCEDURE — 99222 1ST HOSP IP/OBS MODERATE 55: CPT | Performed by: NURSE PRACTITIONER

## 2023-02-14 PROCEDURE — 99207 PR CDG-CUT & PASTE-POTENTIAL IMPACT ON LEVEL: CPT | Performed by: INTERNAL MEDICINE

## 2023-02-14 PROCEDURE — 250N000011 HC RX IP 250 OP 636: Performed by: INTERNAL MEDICINE

## 2023-02-14 PROCEDURE — 80202 ASSAY OF VANCOMYCIN: CPT | Performed by: INTERNAL MEDICINE

## 2023-02-14 PROCEDURE — 250N000013 HC RX MED GY IP 250 OP 250 PS 637: Performed by: INTERNAL MEDICINE

## 2023-02-14 PROCEDURE — 250N000013 HC RX MED GY IP 250 OP 250 PS 637: Performed by: SURGERY

## 2023-02-14 PROCEDURE — 250N000011 HC RX IP 250 OP 636: Performed by: SURGERY

## 2023-02-14 PROCEDURE — 99232 SBSQ HOSP IP/OBS MODERATE 35: CPT | Performed by: INTERNAL MEDICINE

## 2023-02-14 PROCEDURE — 250N000013 HC RX MED GY IP 250 OP 250 PS 637: Performed by: HOSPITALIST

## 2023-02-14 PROCEDURE — 120N000001 HC R&B MED SURG/OB

## 2023-02-14 PROCEDURE — 97116 GAIT TRAINING THERAPY: CPT | Mod: GP

## 2023-02-14 PROCEDURE — 85027 COMPLETE CBC AUTOMATED: CPT | Performed by: INTERNAL MEDICINE

## 2023-02-14 PROCEDURE — 99024 POSTOP FOLLOW-UP VISIT: CPT | Performed by: PHYSICIAN ASSISTANT

## 2023-02-14 PROCEDURE — 97535 SELF CARE MNGMENT TRAINING: CPT | Mod: GO

## 2023-02-14 PROCEDURE — 97110 THERAPEUTIC EXERCISES: CPT | Mod: GO

## 2023-02-14 PROCEDURE — 36415 COLL VENOUS BLD VENIPUNCTURE: CPT | Performed by: INTERNAL MEDICINE

## 2023-02-14 PROCEDURE — 258N000003 HC RX IP 258 OP 636: Performed by: INTERNAL MEDICINE

## 2023-02-14 PROCEDURE — 80048 BASIC METABOLIC PNL TOTAL CA: CPT | Performed by: INTERNAL MEDICINE

## 2023-02-14 PROCEDURE — 97530 THERAPEUTIC ACTIVITIES: CPT | Mod: GP

## 2023-02-14 RX ADMIN — MEROPENEM 1 G: 1 INJECTION INTRAVENOUS at 08:28

## 2023-02-14 RX ADMIN — VENLAFAXINE HYDROCHLORIDE 75 MG: 75 CAPSULE, EXTENDED RELEASE ORAL at 08:26

## 2023-02-14 RX ADMIN — MEROPENEM 1 G: 1 INJECTION INTRAVENOUS at 16:52

## 2023-02-14 RX ADMIN — ACETAMINOPHEN 650 MG: 325 TABLET ORAL at 08:26

## 2023-02-14 RX ADMIN — LEVOTHYROXINE SODIUM 137 MCG: 0.11 TABLET ORAL at 06:00

## 2023-02-14 RX ADMIN — MICAFUNGIN SODIUM 100 MG: 50 INJECTION, POWDER, LYOPHILIZED, FOR SOLUTION INTRAVENOUS at 17:54

## 2023-02-14 RX ADMIN — OXYCODONE HYDROCHLORIDE 2.5 MG: 5 TABLET ORAL at 06:11

## 2023-02-14 RX ADMIN — AMLODIPINE BESYLATE 2.5 MG: 2.5 TABLET ORAL at 08:26

## 2023-02-14 RX ADMIN — VANCOMYCIN HYDROCHLORIDE 1000 MG: 1 INJECTION, SOLUTION INTRAVENOUS at 20:56

## 2023-02-14 RX ADMIN — ROSUVASTATIN CALCIUM 20 MG: 10 TABLET, FILM COATED ORAL at 20:56

## 2023-02-14 RX ADMIN — BENZOCAINE 6 MG-MENTHOL 10 MG LOZENGES 1 LOZENGE: at 18:01

## 2023-02-14 RX ADMIN — BENZOCAINE 6 MG-MENTHOL 10 MG LOZENGES 1 LOZENGE: at 10:29

## 2023-02-14 RX ADMIN — ASPIRIN 81 MG: 81 TABLET, COATED ORAL at 08:27

## 2023-02-14 RX ADMIN — ACETAMINOPHEN 650 MG: 325 TABLET ORAL at 20:56

## 2023-02-14 RX ADMIN — MEROPENEM 1 G: 1 INJECTION INTRAVENOUS at 00:09

## 2023-02-14 RX ADMIN — ACETAMINOPHEN 650 MG: 325 TABLET ORAL at 14:30

## 2023-02-14 RX ADMIN — ENOXAPARIN SODIUM 40 MG: 40 INJECTION SUBCUTANEOUS at 08:27

## 2023-02-14 RX ADMIN — VANCOMYCIN HYDROCHLORIDE 1000 MG: 1 INJECTION, SOLUTION INTRAVENOUS at 06:14

## 2023-02-14 RX ADMIN — BENZOCAINE 6 MG-MENTHOL 10 MG LOZENGES 1 LOZENGE: at 21:05

## 2023-02-14 RX ADMIN — BENZOCAINE 6 MG-MENTHOL 10 MG LOZENGES 1 LOZENGE: at 06:13

## 2023-02-14 RX ADMIN — FUROSEMIDE 20 MG: 10 INJECTION, SOLUTION INTRAMUSCULAR; INTRAVENOUS at 08:27

## 2023-02-14 ASSESSMENT — ACTIVITIES OF DAILY LIVING (ADL)
ADLS_ACUITY_SCORE: 35
ADLS_ACUITY_SCORE: 37
ADLS_ACUITY_SCORE: 35
ADLS_ACUITY_SCORE: 37
ADLS_ACUITY_SCORE: 38
ADLS_ACUITY_SCORE: 37

## 2023-02-14 NOTE — PROGRESS NOTES
"INFECTIOUS DISEASE FOLLOW UP NOTE      ASSESSMENT:  1. Intra-abdominal infection presenting with perforated stercolic ulcer of sigmoid colon, underwent laparoscopic sigmoid colectomy with anastomosis, washout 1/29. Findings of diffuse feculent peritonitis. Ongoing pain and leukocytosis. Draining clear fluid from wound -- creatinine level not suggestive of urine leak based on estimate. Bowel function returning to normal, but wbc remains high and increasing. No improvement with change in antibiotics. Repeat CT without significant findings for infection.  Leukocytosis resolved and fever improving since addition of vancomycin.  2. Bacteremia with clostridium species, Collinsella aerofaciens, bacteroides vulgatus, and Eubacterium spp. Likely secondary to perforation. Susceptible to meropenem and metronidazole   3. DM  4. Hypothyroidism. Mildly elevated TSH prior to admission, on treatment  5. Left great toe infection for many months. Has seen podiatry and primary clinic for this. Recent doxycycline. Had MRI negative for osteomyelitis in September. No signs of lower ext infection currently  6. Pent[employer]al per chart refusing blood products.   7. Cholelithiasis      PLAN:  -Continue vancomycin, meropenem and micafungin. Duration of therapy is 5-7 days after source control. Given improvement in wbc, would recommend antibiotics for another 1-2 days  -no PICC line since patient may finish antibiotics before discharge     Lexx Zuniga MD  [facility]  Direct messaging: [employer] Paging  On-Call ID provider: [phone], option: 9    ______________________________________________________________________    SUBJECTIVE / INTERVAL HISTORY:   No events. Walked with physical therapy. Family at bedside.  OBJECTIVE:  BP (!) 141/65 (BP Location: Right arm)   Pulse 81   Temp 98.2  F (36.8  C) (Oral)   Resp 20   Ht 1.651 m (5' 5\")   Wt 107 kg (236 lb)   SpO2 95%   BMI 39.27 kg/m       GEN: No " acute distress.   RESPIRATORY:  Clear bilaterally   CARDIOVASCULAR:  Regular, no murmur  ABDOMEN:  Soft, generalized tenderness unchanged. Incision site intact without drainage  EXTREMITIES: mild edema  SKIN/HAIR/NAILS:  No rashes. Erythema on multiple toes, without lesions or tenderness  Psych: normal affect   IV: peripheral        Antibiotics:  Meropenem 2/4-  Micafungin 2/4-  Vancomycin 2/8-    Previous:  pip/tazo 1/29-2/4  Vancomycin 1/29 x1      Pertinent labs:    Recent Labs   Lab 02/14/23 0612 02/13/23 0617 02/12/23 0641   WBC 10.3 10.5 10.9   HGB 9.1* 9.3* 9.9*   HCT 31.0* 31.6* 33.8*    426 463*        Recent Labs   Lab 02/14/23 0612 02/13/23 0617 02/12/23 0641    137 140   CO2 30* 29 32*   BUN 14.0 14.0 13.4      No results found for: CRP      Lab Results   Component Value Date    ALT 14 02/08/2023    AST 29 02/08/2023    ALKPHOS 142 (H) 02/08/2023         MICROBIOLOGY DATA:  1/29 blood culture: C.clostridioforme and B.vulgatus  1/29 blood culture: Eubacterium spp and Collinsella aerofaciens      RADIOLOGY:  CT Chest/Abdomen/Pelvis w Contrast    Result Date: 2/7/2023  EXAM: CT CHEST/ABDOMEN/PELVIS W CONTRAST LOCATION: [facility] DATE/TIME: 2/7/2023 8:21 PM INDICATION: ongoing leukocytosis. new fever. 1/29 sigmoidectomy with wash out. COMPARISON: CT of the abdomen and pelvis 2/4/2023 TECHNIQUE: CT scan of the chest, abdomen, and pelvis was performed following injection of IV contrast. Multiplanar reformats were obtained. Dose reduction techniques were used. CONTRAST: 100 mL Isovue-370 FINDINGS: LUNGS AND PLEURA: Small bilateral pleural effusions layer into the posterior costophrenic sulci. Bands of atelectasis along the posterior pleura of the lower lobes. No lung edema or consolidation. Trachea and central airways are patent and normal caliber. MEDIASTINUM: Cardiac chambers are normal in size. No pericardial effusion. Normal caliber thoracic aorta and main  pulmonary artery. Arch, proximal great vessel and descending aorta atheromatous calcification is patchy. There are no enlarged mediastinal or hilar lymph nodes. Esophagus is decompressed. A few small lower paraesophageal varices are present. CORONARY ARTERY CALCIFICATION: Mild to moderate, three-vessel disease. HEPATOBILIARY: Slight geographic liver attenuation but no focal enhancement or parenchymal lesion. There is a calcified stone layering in the gallbladder. No gallbladder wall thickening or pericholecystic inflammation. No bile duct enlargement. PANCREAS: Normal. SPLEEN: Normal. ADRENAL GLANDS: Normal. KIDNEYS/BLADDER: Trace amount of air in the nondependent bladder from recent instrumentation. Bladder wall is smooth with uniform thickness. Kidneys are normal in size with symmetric enhancement and normal cortex thickness. No nephrolithiasis or hydronephrosis. BOWEL: Nondistended stomach. Proximal small bowel in the left upper quadrant is mildly dilated and fluid-filled, however no clear caliber transition and this is unchanged from 2/4/2023. There is hazy attenuation in the mesentery consistent with edema. Minimal perihepatic and perisplenic ascites. Similar ascites layering in the left lower quadrant with small amount of anterior linear enhancement (series 3, image 205). Status post sigmoid colon resection with colocolonic anastomosis in the upper left pelvis. There are multiple diverticula arising from the ascending colon. No pneumatosis or pneumoperitoneum. LYMPH NODES: Mesenteric, aortocaval and iliac chain lymph nodes are normal by size criteria. VASCULATURE: Diffuse atheromatous calcification of the infrarenal abdominal aorta and common iliac arteries. No aneurysm. PELVIC ORGANS: Post hysterectomy. MUSCULOSKELETAL: Grade 1 degenerative anterolisthesis of L4 on L5 and lumbar facet arthropathy. Vacuum disc phenomenon from L1-2 to L3-L4. Thoracic vertebra are maintained in height with small diffuse  osteophytes and mild disc space narrowing. No aggressive or destructive bone lesions. Infraumbilical skin staples are present from recent laparotomy. No body wall hernia. Mild body wall anasarca.     IMPRESSION: 1.  Status post segmental sigmoid colon resection with colocolonic anastomosis in the upper left pelvis. No findings to suggest a surgical complication. 2.  Diverticula arising from the ascending colon but no acute diverticulitis. 3.  Unchanged ascites and mesenteric edema. No drainable intra-abdominal fluid collection. 4.  Minimal pleural effusions and atelectasis in the posterior sulci. 5.  Cholelithiasis.     Echocardiogram Complete    Result Date: 2023  [id number]^TERRENCE^[place] [address]  Name: SRIDHAR ALONSO MRN: [id number] : 1945 Study Date: 2023 12:44 PM Age: 77 yrs Gender: Female Patient Location: [facility] Reason For Study: CHF Ordering Physician: PAYAL OCAMPO Performed By: KATHARINE  BSA: 2.1 m2 Height: 65 in Weight: 220 lb HR: 87 BP: 138/63 mmHg ______________________________________________________________________________ Procedure Complete Echo Adult. ______________________________________________________________________________ Interpretation Summary  1. Normal left ventricular size and systolic performance with a visually estimated ejection fraction of 60%. 2. No significant valvular heart disease is identified on this study. 3. Normal right ventricular size with borderline reduction right ventricular systolic performance. 4. There is mild biatrial enlargement. 5. There is a very small pericardial effusion. There is no evidence of significant intraventricular dependence/tamponade physiology. ______________________________________________________________________________ Left ventricle: Normal left ventricular size and systolic performance with a visually estimated ejection fraction of 60%. There is normal regional  wall motion. Left ventricular wall thickness is normal.  Assessment of LV Diastolic Function: The cumulative findings suggest impaired diastolic filling [The septal e' velocity is < 7 cm/s & lateral e' velocity is < 10 cm/s. The average E/e' is >14. TR velocity is 2.8 m/s. Left atrial volume index is greater than 34 mL/mÂ ].  Assessment of left atrial pressure (LAP): The cumulative findings suggest moderately elevated left atrial pressure (the E/A is > 0.8 and <2.0 plus 2 or 3 of 3 of the following present: Average E/e' > 14, TRvel > 2.8 m/s, and/or LA vol. index > 34 ml/mÂ  ).  Right ventricle: Normal right ventricular size with borderline reduction right ventricular systolic performance.  Left atrium: There is mild left atrial enlargement.  Right atrium: There is mild right atrial enlargement.  IVC: The IVC is borderline dilated.  Aortic valve: The aortic valve is comprised of three cusps. No significant aortic stenosis or aortic insufficiency is detected on this study.  Mitral valve: The mitral valve appears morphologically normal. There is trace mitral insufficiency.  Tricuspid valve: The tricuspid valve is grossly morphologically normal. There is trace tricuspid insufficiency.  Pulmonic valve: The pulmonic valve is grossly morphologically normal.  Thoracic aorta: The aortic root and proximal ascending aorta are of normal dimension.  Pericardium: There is a very small pericardial effusion. There is no evidence of significant intraventricular dependence/tamponade physiology. ______________________________________________________________________________ ______________________________________________________________________________ MMode/2D Measurements & Calculations IVSd: 1.3 cm LVIDd: 4.1 cm LVIDs: 2.6 cm LVPWd: 1.2 cm FS: 36.6 % LV mass(C)d: 185.2 grams LV mass(C)dI: 89.9 grams/m2 Ao root diam: 3.2 cm LA dimension: 4.6 cm asc Aorta Diam: 3.4 cm LA/Ao: 1.4 LVOT diam: 2.3 cm LVOT area: 4.1 cm2 LA Volume Indexed  (AL/bp): 36.7 ml/m2  RWT: 0.58  Time Measurements MM HR: 87.0 BPM  Doppler Measurements & Calculations MV E max ayden: 123.0 cm/sec MV A max ayden: 124.2 cm/sec MV E/A: 0.99 MV dec time: 0.24 sec Ao V2 max: 189.4 cm/sec Ao max P.0 mmHg Ao V2 mean: 123.9 cm/sec Ao mean P.8 mmHg Ao V2 VTI: 32.7 cm QUAN(I,D): 2.8 cm2 QUAN(V,D): 2.9 cm2 LV V1 max P.9 mmHg LV V1 max: 131.3 cm/sec LV V1 VTI: 22.2 cm SV(LVOT): 92.1 ml SI(LVOT): 44.7 ml/m2 PA acc time: 0.09 sec TR max ayden: 279.6 cm/sec TR max P.3 mmHg AV Ayden Ratio (DI): 0.69 QUAN Index (cm2/m2): 1.4 E/E': 13.6  E/E' avg: 15.6 Lateral E/e': 13.6 Medial E/e': 17.7 Peak E' Ayden: 9.0 cm/sec  ______________________________________________________________________________ Report approved by: Leonid Hamilton 2023 02:47 PM       US Lower Extremity Venous Duplex Bilateral    Result Date: 2/10/2023  EXAM: US LOWER EXTREMITY VENOUS DUPLEX BILATERAL LOCATION: [facility] DATE/TIME: 2/10/2023 10:35 AM INDICATION: Bilateral lower extremity pain, swelling and edema. Fever. COMPARISON: None. TECHNIQUE: Venous Duplex ultrasound of bilateral lower extremities with and without compression, augmentation and duplex. Color flow and spectral Doppler with waveform analysis performed. FINDINGS: Exam includes the common femoral, femoral, popliteal veins as well as segmentally visualized deep calf veins and greater saphenous vein. RIGHT: No deep vein thrombosis. No superficial thrombophlebitis. Anechoic avascular cyst in the right popliteal fossa measuring 4.3 x 3.1 x 0.7 cm. LEFT: No deep vein thrombosis. No superficial thrombophlebitis. Anechoic avascular cyst in the left popliteal fossa measuring 3.5 x 2.0 x 0.5 cm     IMPRESSION: 1.  No deep venous thrombosis in the bilateral lower extremities. 2.  Bilateral popliteal fossa Baker's cysts, slightly larger on the right.    XR Chest Port 1 View    Result Date: 2023  EXAM: XR CHEST PORT 1 VIEW  LOCATION: [facility] DATE/TIME: 2/8/2023 7:42 PM INDICATION: fever, new cough COMPARISON: 4/28/2021     IMPRESSION: Lung volumes are lower on this portable study. Borderline cardiomegaly unchanged. Pulmonary vessels normal. Questionable subtle right infrahilar infiltrate though this may relate to the lower lung volumes. Lungs otherwise clear.    XR Ankle Port Left G/E 3 Views    Result Date: 2/5/2023  EXAM: XR ANKLE PORT LEFT G/E 3 VIEWS LOCATION: [facility] DATE/TIME: 2/5/2023 2:22 PM INDICATION: left ankle pain, no trauma COMPARISON: None.     IMPRESSION: Bones are demineralized. Mild tibiotalar joint degenerative change. No evidence of an acute fracture. Tiny bone fragment adjacent to the medial talus and mild bony proliferation along the tip of the lateral malleolus is likely chronic. Ankle mortise is intact. Calcaneal heel spur.    CT Abdomen Pelvis w Contrast    Result Date: 2/4/2023  EXAM: CT ABDOMEN PELVIS W CONTRAST LOCATION: [facility] DATE/TIME: 2/4/2023 12:08 AM INDICATION: 1 29 sigmoidectomy with increasing wbc COMPARISON: CT a abdomen/pelvis 01/29/2023 TECHNIQUE: CT scan of the abdomen and pelvis was performed following injection of IV contrast. Multiplanar reformats were obtained. Dose reduction techniques were used. CONTRAST: isovue 370 100ml FINDINGS: LOWER CHEST: Trace bilateral pleural effusions right greater than left with compressive atelectasis. HEPATOBILIARY: Diffuse hepatic steatosis. Cholelithiasis with mild gallbladder distention which may be related to fasting. No biliary ductal dilatation. PANCREAS: Normal. SPLEEN: Normal. ADRENAL GLANDS: Normal. KIDNEYS/BLADDER: No hydronephrosis. Box catheter within a decompressed bladder. BOWEL: Recent sigmoidectomy. Colonic diverticulosis. Trace abdominopelvic free ascites. Additionally, there is some loculated ascites in the anterior abdomen with partial rim enhancement and  several foci of gas within, which suggests a degree of peritonitis. No drainable fluid collection currently. Right anterior approach drain terminates in the left pelvis. LYMPH NODES: Normal. VASCULATURE: Atherosclerotic calcifications of the aortoiliac vessels without evidence of aneurysmal dilatation. PELVIC ORGANS: Hysterectomy. MUSCULOSKELETAL: Body wall edema. Skin staples with recent ventral abdominal incision. Small fat-containing umbilical hernia. Multilevel degenerative changes of the thoracolumbar spine. No acute osseous abnormality.     IMPRESSION: 1.  Recent sigmoidectomy. Trace abdominopelvic free ascites. Additionally, there is some loculated ascites in the anterior abdomen with partial rim enhancement and several foci of gas within, which suggests a degree of peritonitis. No drainable fluid collection currently. 2.  Trace bilateral pleural effusions right greater than left with compressive atelectasis. 3.  Body wall edema. 4.  Diffuse hepatic steatosis. 5.  Cholelithiasis with mild gallbladder distention which may be related to fasting.    CT Abdomen Pelvis w Contrast    Result Date: 1/25/2023  EXAM: CT ABDOMEN PELVIS W CONTRAST LOCATION: [facility] DATE/TIME: 1/25/2023 11:28 PM INDICATION: abdominal pain worst in lower quadrants, leukocytosis eval for pathology COMPARISON: 11/26/2022 TECHNIQUE: CT scan of the abdomen and pelvis was performed following injection of IV contrast. Multiplanar reformats were obtained. Dose reduction techniques were used. CONTRAST: isovue 370 100ml FINDINGS: LOWER CHEST: Small left Bochdalek hernia. Basilar atelectasis. HEPATOBILIARY: Cholelithiasis and hepatic steatosis. PANCREAS: Normal. SPLEEN: Normal. ADRENAL GLANDS: Normal. KIDNEYS/BLADDER: Normal. BOWEL: Small bowel is normal caliber. Large amount of colonic stool. Sigmoid colonic wall thickening with adjacent inflammation is again seen. The inflamed segment is distended with stool. Small  amount of adjacent free fluid. Caliber change with collapse  of the rectosigmoid junction and rectum. Diverticulosis.  LYMPH NODES: Subcentimeter retroperitoneal lymph nodes. VASCULATURE: Atherosclerotic vascular calcification. PELVIC ORGANS: Absent uterus. MUSCULOSKELETAL: Degenerative change osseous structures. Mild compression L4. Slight anterolisthesis L4 on L5.     IMPRESSION: 1.  Sigmoid colitis is again seen. Collapse of the rectosigmoid junction and rectum distal to the inflamed segment of colon. Underlying stricture not excluded. 2.  Small amount of adjacent free fluid. 3.  Cholelithiasis.    CTA Abdomen Pelvis with Contrast    Result Date: 1/29/2023  EXAM: CTA ABDOMEN PELVIS WITH CONTRAST LOCATION: [facility] DATE/TIME: 1/29/2023 6:48 AM INDICATION: recurrent colitis, distended abdomen pain out of proportion. COMPARISON: There are study dated 1/25/ TECHNIQUE: CT angiogram abdomen pelvis during arterial phase of injection of IV contrast. 2D and 3D MIP reconstructions were performed by the CT technologist. Dose reduction techniques were used. CONTRAST: isovue 370 100ml FINDINGS: ANGIOGRAM ABDOMEN/PELVIS: The abdominal aorta is normal in size and caliber throughout with scattered atherosclerotic calcifications noted. The iliac and femoral vessels are normal in size and caliber and well-opacified. Celiac and SMA and TONY arise normally and are well opacified at this time LOWER CHEST: Lung bases are clear. Heart is enlarged, similar prior exam. HEPATOBILIARY: Diffuse hepatic steatosis. No significant mass. No bile duct dilatation. Calcified gallstones are again seen within the gallbladder. PANCREAS: No significant mass, duct dilatation, or inflammatory change. SPLEEN: Normal size. ADRENAL GLANDS: No significant nodules. KIDNEYS/BLADDER: No significant mass, stone, or hydronephrosis. BOWEL: Mural thickening and pericolonic inflammation of the sigmoid colon is again noted, similar in  appearance to prior exam with increased inflammation seen in the proximal sigmoid colon. No pericolonic abscess seen at this time. Stool is seen throughout the inflamed sigmoid colon as well as scattered throughout the large bowel without evidence of large bowel obstruction. Adjacent to the proximal sigmoid colon is free fluid with foci of gas which are extraluminal and new from the prior study (image 1:15, series 5). Additional foci of extraluminal gas are seen within the mesenteric fat anteriorly in the upper abdomen (image 57, series 5). There are scattered diverticula seen throughout the colon. Air-fluid levels are seen scattered through multiple loops of small bowel with mild dilatation seen in the proximal jejunum in the upper abdomen, these mildly dilated loops of small bowel decompressed gradually distally without a clear transition point. LYMPH NODES: Increased Number of pericolonic lymph nodes are again seen adjacent to the sigmoid colon. PELVIC ORGANS: No pelvic masses. MUSCULOSKELETAL: Unchanged     IMPRESSION: 1.  Mural thickening of the sigmoid colon with pericolonic inflammation and stranding, slightly worsened from prior exam with punctate foci of gas seen adjacent to the inflamed colon as well as tracking in the anterior abdominal wall concerning for sigmoid colonic perforation. No organized intra-abdominal fluid collection such as abscess is seen at this time. Given the long-standing inflammation in this region an underlying neoplastic process cannot be excluded. 2.  Scattered air-fluid levels and mildly dilated loops of proximal jejunum which decompresses gradually without a transition point consistent favored to reflect reactive ileus 3.  Hepatic steatosis 4.  Cholelithiasis [Critical Result: Sigmoid colonic inflammation with areas of extraluminal gas, new from 1/25/2023 concerning for sigmoid colonic perforation] Finding was identified on 1/29/2023 6:56 AM. DR. Snell was contacted by me on  1/29/2023 7:08 AM and verbalized understanding of the critical result.     CT Chest w/o Contrast    Result Date: 1/29/2023  EXAM: CT CHEST WITHOUT CONTRAST LOCATION: [facility] DATE/TIME: 01/29/2023, 6:46 AM INDICATION: Fever and cough. COMPARISON: 11/26/2022 - CT chest, abdomen and pelvis. TECHNIQUE: Note that this study was performed in conjunction with a CT scan of the abdomen and pelvis. Refer to a separate report for findings from that study. CT chest without IV contrast. Multiplanar reformats were obtained. Dose reduction techniques were used. CONTRAST: None. FINDINGS: LUNGS AND PLEURA: Minimal emphysematous changes in the lungs. Slight interstitial thickening in bilateral lung bases. A few curvilinear opacities in the periphery of both lungs likely represent atelectasis and/or scarring. The lungs are otherwise clear. Small left Bochdalek hernia containing fat MEDIASTINUM/AXILLAE: Atherosclerotic calcification in the thoracic aorta. CORONARY ARTERY CALCIFICATION: Present. MUSCULOSKELETAL: No acute findings. UPPER ABDOMEN: A few foci of extraluminal gas are present in the upper abdomen.     IMPRESSION: 1.  Slight interstitial thickening in bilateral lung bases. This is nonspecific, but most likely relates to interstitial pulmonary edema. 2.  No other findings suspicious for acute pulmonary disease. 3.  A few foci of extraluminal gas scattered within the nondependent aspect of the upper abdomen. In the absence of recent surgery, this is consistent with a bowel perforation. Refer to the report from the CT scan of the abdomen and pelvis performed in conjunction with this study for additional details. The findings were called to Dr. Snell by Dr. Anderson on 01/29/2023 at 0700 hours.      Attestation:  I have reviewed today's Medications, Vital Signs, and Labs. Cultures and previous notes were reviewed and summarized above.

## 2023-02-14 NOTE — PLAN OF CARE
Problem: Risk for Delirium  Goal: Improved Behavioral Control  Intervention: Minimize Safety Risk  Recent Flowsheet Documentation  Taken 2/14/2023 0826 by Kiera Jarrett RN  Enhanced Safety Measures: chair alarm set  Goal: Improved Attention and Thought Clarity  Intervention: Maximize Cognitive Function  Recent Flowsheet Documentation  Taken 2/14/2023 0826 by Kiera Jarrett RN  Reorientation Measures: clock in view     Problem: Bowel Resection  Goal: Acceptable Pain Control  Intervention: Prevent or Manage Pain     Problem: Hypertension Comorbidity  Goal: Blood Pressure in Desired Range  Intervention: Maintain Blood Pressure Managemen     Problem: Pain Acute  Goal: Optimal Pain Control and Function  Intervention: Prevent or Manage Pain  Recent Flowsheet Documentation  Taken 2/14/2023 0826 by Kiera Jarrett RN  Medication Review/Management: medications reviewed   Goal Outcome Evaluation:  Patient alert and oriented. Afebrile,.  Up in the chair for meals and ambulated about 50 feet with PT on the hallways. Appetite  fair tolerating well. Abdominal discomfort rated pain 2/10 schedule tylenol given with relief.

## 2023-02-14 NOTE — PROGRESS NOTES
[facility]  [facility]  Admission date: 1/29/2023    Summary:    77F a/w sepsis due to bowel perforation and found to have impacted stool in sigmoid colon with associated perforation and feculent peritonitis.       Assessment/Plan    #Perforation of Colonic Stercolic Ulcer   #Sepsis  #Bacteremia with clostridium species, Collinsella aerofaciens, bacteroides vulgatus, and Eubacterium spp  -- S/P Sigmoid Colectomy 1/29/30. Diffuse feculent peritonitis noted intra-op  --Repeat CT without drainable fluid collections  --completes course of merrem, micafungin and vanco 02/16.      #HTN -- Amlodipine with hold parameters. Losartan on hold     #DM type 2 - lantus had been on hold.  Resume at 10units, sliding scale insulin.     #Fluid overload - still quite volume overloaded.  Resume IV diuresis.      Refusal of blood transfusions as patient is Zoroastrian     KARYN (obstructive sleep apnea)     Depression: On effexor at home. Patient looks more depressed and withdrawn.  requests psychiatry consult and effexor  Dose has been increased       Checklist:  Code Status: Full Code    Diet: Snacks/Supplements Adult: Banatrol Plus; Between Meals  Low Fiber Diet  Snacks/Supplements Adult: Other; L: cherry gel+, D: chocolate magic cup; With Meals     DVT px:  Enoxaparin (Lovenox) SQ    Disposition and Discharge Planning  Auto-populated from discharge tab:     Expected Discharge Date: 02/17/2023    Discharge Delays: Placement - TCU  IV Medication - consider oral or Home Infusion    Discharge Comments: IV antibiotic and Lasix         System Identified Risk Variables  Auto-populated based on system request - if relevant they will be addressed above:  Clinically Significant Risk Factors              # Hypoalbuminemia: Lowest albumin = 2.2 g/dL at 2/5/2023  6:41 AM, will monitor as appropriate          # DMII: A1C = 8.1 % (Ref range: <5.7 %) within past 3 months   # Obesity: Estimated body  "mass index is 39.27 kg/m  as calculated from the following:    Height as of this encounter: 1.651 m (5' 5\").    Weight as of this encounter: 107 kg (236 lb).              Interval Events/Subjective/Notable results:    No complaints. Feels puffy  CBC, BMP, BGs reviewed  Weights previously listed as 220, currently 230s      Objective    Vital signs in last 24 hours  Temp:  [98.1  F (36.7  C)-98.8  F (37.1  C)] 98.2  F (36.8  C)  Pulse:  [81-86] 81  Resp:  [18-20] 20  BP: (127-141)/(58-65) 141/65  FiO2 (%):  [2 %] 2 %  SpO2:  [83 %-95 %] 95 % O2 Device: None (Room air)    Weight:   236 lbs 0 oz  Body mass index is 39.27 kg/m .    Intake/Output last 3 shifts  I/O last 3 completed shifts:  In: 880 [P.O.:880]  Out: -     Physical Exam  General:  Alert, cooperative, no distress    Neurologic:  oriented, facial symmetry preserved, fluent speech.   Psych: calm  HEENT:  Anicteric, MMM  CV: RRR no MRG, normal S1 and S2, + LE edema  Lungs: CTAB.  Easyrespirations  Abd: softly distended, NT  Skin: no rashes or jaundice noted on exposed skin.    Box Catheter: Not present  Lines: None          Medical Decision Making               Leon Dejesus MD, [facility]  Internal Medicine Hospitalist  "

## 2023-02-14 NOTE — PROGRESS NOTES
ASSESSMENT:  1. Perforation of colon (H)    2. Refusal of blood transfusions as patient is Restorationist        Suzy Gómez is a 77 year old female who is s/p laparoscopic sigmoid colectomy and washout on 1/29/2023.  Her leukocytosis remains normal.  From a surgical standpoint okay for discharge to TCU for further rehab.      PLAN:  -Appreciate ID input and antibiotics per ID  -Continue with physical therapy and working on strengthening and movement.  -Staples to remain for a week and can be removed in a week.  We can schedule patient as a visit in a week for postop care.    SUBJECTIVE:   She is currently having physical therapy.  No complaints.      Patient Vitals for the past 24 hrs:   BP Temp Temp src Pulse Resp SpO2   02/14/23 0742 (!) 141/65 98.2  F (36.8  C) Oral 81 20 95 %   02/13/23 2326 127/59 98.1  F (36.7  C) Oral 83 18 95 %   02/13/23 1530 -- -- -- -- -- 95 %   02/13/23 1527 131/58 98.8  F (37.1  C) Oral 86 18 (!) 83 %         PHYSICAL EXAM:  GEN: No acute distress, comfortable    ABD: Soft and incisions are clean and dry    Output by Drain (mL) 02/12/23 0700 - 02/12/23 1459 02/12/23 1500 - 02/12/23 2259 02/12/23 2300 - 02/13/23 0659 02/13/23 0700 - 02/13/23 1459 02/13/23 1500 - 02/13/23 2259 02/13/23 2300 - 02/14/23 0659 02/14/23 0700 - 02/14/23 0942   Patient has no LDAs of requested type attached.      EXT:No cyanosis, edema or obvious abnormalities    02/13 0700 - 02/14 0659  In: 880 [P.O.:880]  Out: -     No results displayed because visit has over 200 results.   Recent Results (from the past 24 hour(s))   Glucose by meter    Collection Time: 02/13/23 11:28 AM   Result Value Ref Range    GLUCOSE BY METER POCT 167 (H) 70 - 99 mg/dL   Glucose by meter    Collection Time: 02/13/23  4:48 PM   Result Value Ref Range    GLUCOSE BY METER POCT 162 (H) 70 - 99 mg/dL   Glucose by meter    Collection Time: 02/13/23  8:44 PM   Result Value Ref Range    GLUCOSE BY METER POCT 199 (H) 70 - 99 mg/dL   CBC  with platelets    Collection Time: 02/14/23  6:12 AM   Result Value Ref Range    WBC Count 10.3 4.0 - 11.0 10e3/uL    RBC Count 3.25 (L) 3.80 - 5.20 10e6/uL    Hemoglobin 9.1 (L) 11.7 - 15.7 g/dL    Hematocrit 31.0 (L) 35.0 - 47.0 %    MCV 95 78 - 100 fL    MCH 28.0 26.5 - 33.0 pg    MCHC 29.4 (L) 31.5 - 36.5 g/dL    RDW 15.0 10.0 - 15.0 %    Platelet Count 437 150 - 450 10e3/uL   Basic metabolic panel    Collection Time: 02/14/23  6:12 AM   Result Value Ref Range    Sodium 137 136 - 145 mmol/L    Potassium 3.9 3.4 - 5.3 mmol/L    Chloride 99 98 - 107 mmol/L    Carbon Dioxide (CO2) 30 (H) 22 - 29 mmol/L    Anion Gap 8 7 - 15 mmol/L    Urea Nitrogen 14.0 8.0 - 23.0 mg/dL    Creatinine 0.58 0.51 - 0.95 mg/dL    Calcium 8.4 (L) 8.8 - 10.2 mg/dL    Glucose 147 (H) 70 - 99 mg/dL    GFR Estimate >90 >60 mL/min/1.73m2   Vancomycin level    Collection Time: 02/14/23  6:12 AM   Result Value Ref Range    Vancomycin 18.5   ug/mL   Glucose by meter    Collection Time: 02/14/23  7:46 AM   Result Value Ref Range    GLUCOSE BY METER POCT 184 (H) 70 - 99 mg/dL               FARHANA Rosa  [facility]  [address] [place], [place] [id number]  Phone- [phone]  Fax- [phone]

## 2023-02-14 NOTE — PROGRESS NOTES
Daily Progress Note        CODE STATUS:  Full Code    02/09/23  Assessment/Plan:  77 year old female who was admitted on 1/29/2023 with bowel perforation and found to have impacted stool in sigmoid colon with associated perforation and feculent peritonitis.      Perforation of Colonic Stercolic Ulcer   -- S/P Sigmoid Colectomy 1/29/30. Diffuse feculent peritonitis noted intra-op  -- On IV Meropenum and Micafungin per ID. IV vancomycin added 2/8/23  -- Appreciate ID consult  -- Follow up CT abd/pelvis on 2/7/23 showed unchanged ascites and mesenteric edema. No drainable intra-abdominal fluid collection     Sepsis   -- WBC up, temp 102.8, , but BP stable  -- Patient is very edematous. Stopped IVF 2/9/23. Cont IV lasix  -- Bacteremia with clostridium species, Collinsella aerofaciens, bacteroides vulgatus, and Eubacterium spp  -- Clinically better. Leucocytosis improved. CRP coming down  -- ID recommending antibiotics for 1-2 more days    HTN (hypertension)  -- Amlodipine with hold parameters. Losartan on hold    DM type 2, Hgb A1C 7.6 on 11/23/22  -- Cont lantus 30 units and sliding scale insulin. Will adjust as needed     Leg swelling  Fluid overload  -- I/O charting is incorrect. Stopped IVF. Cont lasix. Monitor renal function  -- US legs negative for DVT    Refusal of blood transfusions as patient is [employer]     KARYN (obstructive sleep apnea)     Depression:  -- On effexor at home. Patient looks more depressed and withdrawn.  requested psychiatry consult. Appreciate psych consult    DVT Prophylaxis: Enoxaparin (Lovenox) SQ  Code Status: Full Code      Disposition; TCU when ok with ID and surgery, likely in next couple of days.   Barrier to discharge; Clinical progress     LOS: 11 days     Subjective:  Interval History: Patient continues to report sore throat. I could see some ulcers (?apthous ulcers) on the posterior pharynx. Otherwise, doing better in general.     Review of Systems:   As  mentioned in subjective.    Patient Active Problem List   Diagnosis     Non-specific colitis     Chest pain, unspecified type     Abnormal laboratory test result     Angiodysplasia of colon     Cataract     Colon adenoma     Depression     GERD (gastroesophageal reflux disease)     HTN (hypertension)     Hypercalcemia     Hyperlipidemia     Hyperparathyroidism (H)     Hypervitaminosis D     Microalbuminuria     Multinodular goiter     OA (osteoarthritis)     Obesity, morbid (H)     Postablative hypothyroidism     DM type 2, Hgb A1C 7.6 on 11/23/22     Perforation of Colonic Stericolic Ulcer -- S/P Sigmoid Colectomy 1/29/30     Refusal of blood transfusions as patient is Gnosticism     KARYN (obstructive sleep apnea)     Bacteremia due to Clostridium and Bacteroides -- 1/29/23       Scheduled Meds:    acetaminophen  650 mg Oral TID     amLODIPine  2.5 mg Oral Daily     aspirin  81 mg Oral Daily     enoxaparin ANTICOAGULANT  40 mg Subcutaneous Q24H     furosemide  20 mg Intravenous Daily     insulin aspart  1-10 Units Subcutaneous TID AC     insulin aspart  1-7 Units Subcutaneous At Bedtime     [Held by provider] insulin glargine  30 Units Subcutaneous At Bedtime     levothyroxine  137 mcg Oral QAM AC     meropenem  1 g Intravenous Q8H     micafungin  100 mg Intravenous Q24H     rosuvastatin  20 mg Oral At Bedtime     sodium chloride (PF)  3 mL Intracatheter Q8H     sodium chloride (PF)  3 mL Intracatheter Q8H     vancomycin  1,000 mg Intravenous Q12H     [START ON 2/15/2023] venlafaxine  112.5 mg Oral Daily     Continuous Infusions:    PRN Meds:.sore throat, glucose **OR** dextrose **OR** glucagon, hydrOXYzine, lidocaine 4%, lidocaine (buffered or not buffered), melatonin, menthol-zinc oxide, naloxone **OR** naloxone **OR** naloxone **OR** naloxone, ondansetron **OR** ondansetron, oxyCODONE, oxyCODONE IR, phenol MT, prochlorperazine, sodium chloride (PF), sodium chloride (PF)    Objective:  Vital signs in last  24 hours:  Temp:  [98.1  F (36.7  C)-98.8  F (37.1  C)] 98.2  F (36.8  C)  Pulse:  [81-86] 81  Resp:  [18-20] 20  BP: (127-141)/(58-65) 141/65  FiO2 (%):  [2 %] 2 %  SpO2:  [83 %-95 %] 95 %        Intake/Output Summary (Last 24 hours) at 2/9/2023 1537  Last data filed at 2/9/2023 1450  Gross per 24 hour   Intake 2258 ml   Output 275 ml   Net 1983 ml       Physical Exam:    General: Not in obvious distress. Flat afect  HEENT: NC, AT   Chest: Clear to auscultation bilaterally  Heart: S1S2 normal, regular. No M/R/G  Abdomen: Soft. Mild generalized tenderness. Distended.   Extremities: 2+ legs swelling  Neuro: Awake, grossly non-focal      Lab Results:(I have personally reviewed the results)    Recent Results (from the past 24 hour(s))   Glucose by meter    Collection Time: 02/13/23  4:48 PM   Result Value Ref Range    GLUCOSE BY METER POCT 162 (H) 70 - 99 mg/dL   Glucose by meter    Collection Time: 02/13/23  8:44 PM   Result Value Ref Range    GLUCOSE BY METER POCT 199 (H) 70 - 99 mg/dL   CBC with platelets    Collection Time: 02/14/23  6:12 AM   Result Value Ref Range    WBC Count 10.3 4.0 - 11.0 10e3/uL    RBC Count 3.25 (L) 3.80 - 5.20 10e6/uL    Hemoglobin 9.1 (L) 11.7 - 15.7 g/dL    Hematocrit 31.0 (L) 35.0 - 47.0 %    MCV 95 78 - 100 fL    MCH 28.0 26.5 - 33.0 pg    MCHC 29.4 (L) 31.5 - 36.5 g/dL    RDW 15.0 10.0 - 15.0 %    Platelet Count 437 150 - 450 10e3/uL   Basic metabolic panel    Collection Time: 02/14/23  6:12 AM   Result Value Ref Range    Sodium 137 136 - 145 mmol/L    Potassium 3.9 3.4 - 5.3 mmol/L    Chloride 99 98 - 107 mmol/L    Carbon Dioxide (CO2) 30 (H) 22 - 29 mmol/L    Anion Gap 8 7 - 15 mmol/L    Urea Nitrogen 14.0 8.0 - 23.0 mg/dL    Creatinine 0.58 0.51 - 0.95 mg/dL    Calcium 8.4 (L) 8.8 - 10.2 mg/dL    Glucose 147 (H) 70 - 99 mg/dL    GFR Estimate >90 >60 mL/min/1.73m2   Vancomycin level    Collection Time: 02/14/23  6:12 AM   Result Value Ref Range    Vancomycin 18.5   ug/mL   Glucose  by meter    Collection Time: 02/14/23  7:46 AM   Result Value Ref Range    GLUCOSE BY METER POCT 184 (H) 70 - 99 mg/dL   Glucose by meter    Collection Time: 02/14/23 11:58 AM   Result Value Ref Range    GLUCOSE BY METER POCT 233 (H) 70 - 99 mg/dL       All laboratory and imaging data in the past 24 hours reviewed  Serum Glucose range:   Recent Labs   Lab 02/14/23  1158 02/14/23  0746 02/14/23  0612 02/13/23  2044   * 184* 147* 199*     ABG: No lab results found in last 7 days.  CBC:   Recent Labs   Lab 02/14/23  0612 02/13/23  0617 02/12/23  0641 02/09/23  0642 02/08/23  0646   WBC 10.3 10.5 10.9   < > 25.1*  24.8*   HGB 9.1* 9.3* 9.9*   < > 10.9*   HCT 31.0* 31.6* 33.8*   < > 36.3   MCV 95 96 96   < > 93    426 463*   < > 449   NEUTROPHIL  --   --   --   --  92   LYMPH  --   --   --   --  3   MONOCYTE  --   --   --   --  4   EOSINOPHIL  --   --   --   --  0    < > = values in this interval not displayed.     Chemistry:   Recent Labs   Lab 02/14/23  0612 02/13/23 0617 02/12/23  0641 02/09/23  0642 02/08/23  0646    137 140   < > 138   POTASSIUM 3.9 3.9 3.7   < > 3.6   CHLORIDE 99 100 101   < > 100   CO2 30* 29 32*   < > 31*   BUN 14.0 14.0 13.4   < > 9.9   CR 0.58 0.58 0.62   < > 0.60   GFRESTIMATED >90 >90 >90   < > >90   ALTON 8.4* 8.1* 8.2*   < > 8.3*   PROTTOTAL  --   --   --   --  5.4*   ALBUMIN  --   --   --   --  2.3*   AST  --   --   --   --  29   ALT  --   --   --   --  14   ALKPHOS  --   --   --   --  142*   BILITOTAL  --   --   --   --  0.3    < > = values in this interval not displayed.     Coags:  No results for input(s): INR, PROTIME, PTT in the last 168 hours.    Invalid input(s): APTT  Cardiac Markers:  No results for input(s): CKTOTAL, TROPONINI in the last 168 hours.       CT Chest/Abdomen/Pelvis w Contrast    Result Date: 2/7/2023  EXAM: CT CHEST/ABDOMEN/PELVIS W CONTRAST LOCATION: [facility] DATE/TIME: 2/7/2023 8:21 PM INDICATION: ongoing  leukocytosis. new fever. 1/29 sigmoidectomy with wash out. COMPARISON: CT of the abdomen and pelvis 2/4/2023 TECHNIQUE: CT scan of the chest, abdomen, and pelvis was performed following injection of IV contrast. Multiplanar reformats were obtained. Dose reduction techniques were used. CONTRAST: 100 mL Isovue-370 FINDINGS: LUNGS AND PLEURA: Small bilateral pleural effusions layer into the posterior costophrenic sulci. Bands of atelectasis along the posterior pleura of the lower lobes. No lung edema or consolidation. Trachea and central airways are patent and normal caliber. MEDIASTINUM: Cardiac chambers are normal in size. No pericardial effusion. Normal caliber thoracic aorta and main pulmonary artery. Arch, proximal great vessel and descending aorta atheromatous calcification is patchy. There are no enlarged mediastinal or hilar lymph nodes. Esophagus is decompressed. A few small lower paraesophageal varices are present. CORONARY ARTERY CALCIFICATION: Mild to moderate, three-vessel disease. HEPATOBILIARY: Slight geographic liver attenuation but no focal enhancement or parenchymal lesion. There is a calcified stone layering in the gallbladder. No gallbladder wall thickening or pericholecystic inflammation. No bile duct enlargement. PANCREAS: Normal. SPLEEN: Normal. ADRENAL GLANDS: Normal. KIDNEYS/BLADDER: Trace amount of air in the nondependent bladder from recent instrumentation. Bladder wall is smooth with uniform thickness. Kidneys are normal in size with symmetric enhancement and normal cortex thickness. No nephrolithiasis or hydronephrosis. BOWEL: Nondistended stomach. Proximal small bowel in the left upper quadrant is mildly dilated and fluid-filled, however no clear caliber transition and this is unchanged from 2/4/2023. There is hazy attenuation in the mesentery consistent with edema. Minimal perihepatic and perisplenic ascites. Similar ascites layering in the left lower quadrant with small amount of  anterior linear enhancement (series 3, image 205). Status post sigmoid colon resection with colocolonic anastomosis in the upper left pelvis. There are multiple diverticula arising from the ascending colon. No pneumatosis or pneumoperitoneum. LYMPH NODES: Mesenteric, aortocaval and iliac chain lymph nodes are normal by size criteria. VASCULATURE: Diffuse atheromatous calcification of the infrarenal abdominal aorta and common iliac arteries. No aneurysm. PELVIC ORGANS: Post hysterectomy. MUSCULOSKELETAL: Grade 1 degenerative anterolisthesis of L4 on L5 and lumbar facet arthropathy. Vacuum disc phenomenon from L1-2 to L3-L4. Thoracic vertebra are maintained in height with small diffuse osteophytes and mild disc space narrowing. No aggressive or destructive bone lesions. Infraumbilical skin staples are present from recent laparotomy. No body wall hernia. Mild body wall anasarca.     IMPRESSION: 1.  Status post segmental sigmoid colon resection with colocolonic anastomosis in the upper left pelvis. No findings to suggest a surgical complication. 2.  Diverticula arising from the ascending colon but no acute diverticulitis. 3.  Unchanged ascites and mesenteric edema. No drainable intra-abdominal fluid collection. 4.  Minimal pleural effusions and atelectasis in the posterior sulci. 5.  Cholelithiasis.     Echocardiogram Complete    Result Date: 2023  [id number]^TERRENCE^[place] [address]  Name: SRIDHAR ALONSO MRN: [id number] : 1945 Study Date: 2023 12:44 PM Age: 77 yrs Gender: Female Patient Location: [facility] Reason For Study: CHF Ordering Physician: PAYAL OCAMPO Performed By: KATHARINE  BSA: 2.1 m2 Height: 65 in Weight: 220 lb HR: 87 BP: 138/63 mmHg ______________________________________________________________________________ Procedure Complete Echo Adult. ______________________________________________________________________________  Interpretation Summary  1. Normal left ventricular size and systolic performance with a visually estimated ejection fraction of 60%. 2. No significant valvular heart disease is identified on this study. 3. Normal right ventricular size with borderline reduction right ventricular systolic performance. 4. There is mild biatrial enlargement. 5. There is a very small pericardial effusion. There is no evidence of significant intraventricular dependence/tamponade physiology. ______________________________________________________________________________ Left ventricle: Normal left ventricular size and systolic performance with a visually estimated ejection fraction of 60%. There is normal regional wall motion. Left ventricular wall thickness is normal.  Assessment of LV Diastolic Function: The cumulative findings suggest impaired diastolic filling [The septal e' velocity is < 7 cm/s & lateral e' velocity is < 10 cm/s. The average E/e' is >14. TR velocity is 2.8 m/s. Left atrial volume index is greater than 34 mL/mÂ ].  Assessment of left atrial pressure (LAP): The cumulative findings suggest moderately elevated left atrial pressure (the E/A is > 0.8 and <2.0 plus 2 or 3 of 3 of the following present: Average E/e' > 14, TRvel > 2.8 m/s, and/or LA vol. index > 34 ml/mÂ  ).  Right ventricle: Normal right ventricular size with borderline reduction right ventricular systolic performance.  Left atrium: There is mild left atrial enlargement.  Right atrium: There is mild right atrial enlargement.  IVC: The IVC is borderline dilated.  Aortic valve: The aortic valve is comprised of three cusps. No significant aortic stenosis or aortic insufficiency is detected on this study.  Mitral valve: The mitral valve appears morphologically normal. There is trace mitral insufficiency.  Tricuspid valve: The tricuspid valve is grossly morphologically normal. There is trace tricuspid insufficiency.  Pulmonic valve: The pulmonic valve is grossly  morphologically normal.  Thoracic aorta: The aortic root and proximal ascending aorta are of normal dimension.  Pericardium: There is a very small pericardial effusion. There is no evidence of significant intraventricular dependence/tamponade physiology. ______________________________________________________________________________ ______________________________________________________________________________ MMode/2D Measurements & Calculations IVSd: 1.3 cm LVIDd: 4.1 cm LVIDs: 2.6 cm LVPWd: 1.2 cm FS: 36.6 % LV mass(C)d: 185.2 grams LV mass(C)dI: 89.9 grams/m2 Ao root diam: 3.2 cm LA dimension: 4.6 cm asc Aorta Diam: 3.4 cm LA/Ao: 1.4 LVOT diam: 2.3 cm LVOT area: 4.1 cm2 LA Volume Indexed (AL/bp): 36.7 ml/m2  RWT: 0.58  Time Measurements MM HR: 87.0 BPM  Doppler Measurements & Calculations MV E max ayden: 123.0 cm/sec MV A max ayden: 124.2 cm/sec MV E/A: 0.99 MV dec time: 0.24 sec Ao V2 max: 189.4 cm/sec Ao max P.0 mmHg Ao V2 mean: 123.9 cm/sec Ao mean P.8 mmHg Ao V2 VTI: 32.7 cm QUAN(I,D): 2.8 cm2 QUAN(V,D): 2.9 cm2 LV V1 max P.9 mmHg LV V1 max: 131.3 cm/sec LV V1 VTI: 22.2 cm SV(LVOT): 92.1 ml SI(LVOT): 44.7 ml/m2 PA acc time: 0.09 sec TR max ayden: 279.6 cm/sec TR max P.3 mmHg AV Ayden Ratio (DI): 0.69 QUAN Index (cm2/m2): 1.4 E/E': 13.6  E/E' avg: 15.6 Lateral E/e': 13.6 Medial E/e': 17.7 Peak E' Ayden: 9.0 cm/sec  ______________________________________________________________________________ Report approved by: Leonid Hamilton 2023 02:47 PM       XR Chest Port 1 View    Result Date: 2023  EXAM: XR CHEST PORT 1 VIEW LOCATION: [facility] DATE/TIME: 2023 7:42 PM INDICATION: fever, new cough COMPARISON: 2021     IMPRESSION: Lung volumes are lower on this portable study. Borderline cardiomegaly unchanged. Pulmonary vessels normal. Questionable subtle right infrahilar infiltrate though this may relate to the lower lung volumes. Lungs otherwise clear.    XR  Ankle Port Left G/E 3 Views    Result Date: 2/5/2023  EXAM: XR ANKLE PORT LEFT G/E 3 VIEWS LOCATION: [facility] DATE/TIME: 2/5/2023 2:22 PM INDICATION: left ankle pain, no trauma COMPARISON: None.     IMPRESSION: Bones are demineralized. Mild tibiotalar joint degenerative change. No evidence of an acute fracture. Tiny bone fragment adjacent to the medial talus and mild bony proliferation along the tip of the lateral malleolus is likely chronic. Ankle mortise is intact. Calcaneal heel spur.    CT Abdomen Pelvis w Contrast    Result Date: 2/4/2023  EXAM: CT ABDOMEN PELVIS W CONTRAST LOCATION: [facility] DATE/TIME: 2/4/2023 12:08 AM INDICATION: 1 29 sigmoidectomy with increasing wbc COMPARISON: CT a abdomen/pelvis 01/29/2023 TECHNIQUE: CT scan of the abdomen and pelvis was performed following injection of IV contrast. Multiplanar reformats were obtained. Dose reduction techniques were used. CONTRAST: isovue 370 100ml FINDINGS: LOWER CHEST: Trace bilateral pleural effusions right greater than left with compressive atelectasis. HEPATOBILIARY: Diffuse hepatic steatosis. Cholelithiasis with mild gallbladder distention which may be related to fasting. No biliary ductal dilatation. PANCREAS: Normal. SPLEEN: Normal. ADRENAL GLANDS: Normal. KIDNEYS/BLADDER: No hydronephrosis. Box catheter within a decompressed bladder. BOWEL: Recent sigmoidectomy. Colonic diverticulosis. Trace abdominopelvic free ascites. Additionally, there is some loculated ascites in the anterior abdomen with partial rim enhancement and several foci of gas within, which suggests a degree of peritonitis. No drainable fluid collection currently. Right anterior approach drain terminates in the left pelvis. LYMPH NODES: Normal. VASCULATURE: Atherosclerotic calcifications of the aortoiliac vessels without evidence of aneurysmal dilatation. PELVIC ORGANS: Hysterectomy. MUSCULOSKELETAL: Body wall edema. Skin  staples with recent ventral abdominal incision. Small fat-containing umbilical hernia. Multilevel degenerative changes of the thoracolumbar spine. No acute osseous abnormality.     IMPRESSION: 1.  Recent sigmoidectomy. Trace abdominopelvic free ascites. Additionally, there is some loculated ascites in the anterior abdomen with partial rim enhancement and several foci of gas within, which suggests a degree of peritonitis. No drainable fluid collection currently. 2.  Trace bilateral pleural effusions right greater than left with compressive atelectasis. 3.  Body wall edema. 4.  Diffuse hepatic steatosis. 5.  Cholelithiasis with mild gallbladder distention which may be related to fasting.    CT Abdomen Pelvis w Contrast    Result Date: 1/25/2023  EXAM: CT ABDOMEN PELVIS W CONTRAST LOCATION: [facility] DATE/TIME: 1/25/2023 11:28 PM INDICATION: abdominal pain worst in lower quadrants, leukocytosis eval for pathology COMPARISON: 11/26/2022 TECHNIQUE: CT scan of the abdomen and pelvis was performed following injection of IV contrast. Multiplanar reformats were obtained. Dose reduction techniques were used. CONTRAST: isovue 370 100ml FINDINGS: LOWER CHEST: Small left Bochdalek hernia. Basilar atelectasis. HEPATOBILIARY: Cholelithiasis and hepatic steatosis. PANCREAS: Normal. SPLEEN: Normal. ADRENAL GLANDS: Normal. KIDNEYS/BLADDER: Normal. BOWEL: Small bowel is normal caliber. Large amount of colonic stool. Sigmoid colonic wall thickening with adjacent inflammation is again seen. The inflamed segment is distended with stool. Small amount of adjacent free fluid. Caliber change with collapse  of the rectosigmoid junction and rectum. Diverticulosis.  LYMPH NODES: Subcentimeter retroperitoneal lymph nodes. VASCULATURE: Atherosclerotic vascular calcification. PELVIC ORGANS: Absent uterus. MUSCULOSKELETAL: Degenerative change osseous structures. Mild compression L4. Slight anterolisthesis L4 on L5.      IMPRESSION: 1.  Sigmoid colitis is again seen. Collapse of the rectosigmoid junction and rectum distal to the inflamed segment of colon. Underlying stricture not excluded. 2.  Small amount of adjacent free fluid. 3.  Cholelithiasis.    CTA Abdomen Pelvis with Contrast    Result Date: 1/29/2023  EXAM: CTA ABDOMEN PELVIS WITH CONTRAST LOCATION: [facility] DATE/TIME: 1/29/2023 6:48 AM INDICATION: recurrent colitis, distended abdomen pain out of proportion. COMPARISON: There are study dated 1/25/ TECHNIQUE: CT angiogram abdomen pelvis during arterial phase of injection of IV contrast. 2D and 3D MIP reconstructions were performed by the CT technologist. Dose reduction techniques were used. CONTRAST: isovue 370 100ml FINDINGS: ANGIOGRAM ABDOMEN/PELVIS: The abdominal aorta is normal in size and caliber throughout with scattered atherosclerotic calcifications noted. The iliac and femoral vessels are normal in size and caliber and well-opacified. Celiac and SMA and TONY arise normally and are well opacified at this time LOWER CHEST: Lung bases are clear. Heart is enlarged, similar prior exam. HEPATOBILIARY: Diffuse hepatic steatosis. No significant mass. No bile duct dilatation. Calcified gallstones are again seen within the gallbladder. PANCREAS: No significant mass, duct dilatation, or inflammatory change. SPLEEN: Normal size. ADRENAL GLANDS: No significant nodules. KIDNEYS/BLADDER: No significant mass, stone, or hydronephrosis. BOWEL: Mural thickening and pericolonic inflammation of the sigmoid colon is again noted, similar in appearance to prior exam with increased inflammation seen in the proximal sigmoid colon. No pericolonic abscess seen at this time. Stool is seen throughout the inflamed sigmoid colon as well as scattered throughout the large bowel without evidence of large bowel obstruction. Adjacent to the proximal sigmoid colon is free fluid with foci of gas which are extraluminal and  new from the prior study (image 1:15, series 5). Additional foci of extraluminal gas are seen within the mesenteric fat anteriorly in the upper abdomen (image 57, series 5). There are scattered diverticula seen throughout the colon. Air-fluid levels are seen scattered through multiple loops of small bowel with mild dilatation seen in the proximal jejunum in the upper abdomen, these mildly dilated loops of small bowel decompressed gradually distally without a clear transition point. LYMPH NODES: Increased Number of pericolonic lymph nodes are again seen adjacent to the sigmoid colon. PELVIC ORGANS: No pelvic masses. MUSCULOSKELETAL: Unchanged     IMPRESSION: 1.  Mural thickening of the sigmoid colon with pericolonic inflammation and stranding, slightly worsened from prior exam with punctate foci of gas seen adjacent to the inflamed colon as well as tracking in the anterior abdominal wall concerning for sigmoid colonic perforation. No organized intra-abdominal fluid collection such as abscess is seen at this time. Given the long-standing inflammation in this region an underlying neoplastic process cannot be excluded. 2.  Scattered air-fluid levels and mildly dilated loops of proximal jejunum which decompresses gradually without a transition point consistent favored to reflect reactive ileus 3.  Hepatic steatosis 4.  Cholelithiasis [Critical Result: Sigmoid colonic inflammation with areas of extraluminal gas, new from 1/25/2023 concerning for sigmoid colonic perforation] Finding was identified on 1/29/2023 6:56 AM. DR. Snell was contacted by me on 1/29/2023 7:08 AM and verbalized understanding of the critical result.     CT Chest w/o Contrast    Result Date: 1/29/2023  EXAM: CT CHEST WITHOUT CONTRAST LOCATION: [facility] DATE/TIME: 01/29/2023, 6:46 AM INDICATION: Fever and cough. COMPARISON: 11/26/2022 - CT chest, abdomen and pelvis. TECHNIQUE: Note that this study was performed in  conjunction with a CT scan of the abdomen and pelvis. Refer to a separate report for findings from that study. CT chest without IV contrast. Multiplanar reformats were obtained. Dose reduction techniques were used. CONTRAST: None. FINDINGS: LUNGS AND PLEURA: Minimal emphysematous changes in the lungs. Slight interstitial thickening in bilateral lung bases. A few curvilinear opacities in the periphery of both lungs likely represent atelectasis and/or scarring. The lungs are otherwise clear. Small left Bochdalek hernia containing fat MEDIASTINUM/AXILLAE: Atherosclerotic calcification in the thoracic aorta. CORONARY ARTERY CALCIFICATION: Present. MUSCULOSKELETAL: No acute findings. UPPER ABDOMEN: A few foci of extraluminal gas are present in the upper abdomen.     IMPRESSION: 1.  Slight interstitial thickening in bilateral lung bases. This is nonspecific, but most likely relates to interstitial pulmonary edema. 2.  No other findings suspicious for acute pulmonary disease. 3.  A few foci of extraluminal gas scattered within the nondependent aspect of the upper abdomen. In the absence of recent surgery, this is consistent with a bowel perforation. Refer to the report from the CT scan of the abdomen and pelvis performed in conjunction with this study for additional details. The findings were called to Dr. Snell by Dr. Anderson on 01/29/2023 at 0700 hours.       Latest radiology report personally reviewed.    Note created using dragon voice recognition software so sounds alike errors may have escaped editing.      02/14/2023   Matthew Anna MD  Hospitalist, [facility]  Pager: [phone]

## 2023-02-14 NOTE — CONSULTS
Triage and Transition - Consult and Liaison     Suzy Gómez  February 14, 2023    Session start: 1120  Session end: 1130  Session duration in minutes: 10 minutes  CPT utilized: Non-billable  Patient was seen virtually ([employer] cart or other teleconferencing device).  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      Maintain current transition plan.     LM for psychiatric medication providers to comment on Effexor.     No further psychotherapy check ins are recommended due to limited participation.     Reason for consult: Suzy is 77 year old White  female . Psychiatry consult was requested due to concern for depression per spouse and requesting medication review and recommendation. Patient was seen by [facility] Consult & Liaison team.     Presenting problem: Suzy Gómez is a 77 year old female who is s/p laparoscopic sigmoid colectomy and washout on 1/29/2023.      Session Summary: Writer attempted to meet Ms. Gómez today. She was minimal in her responses. She reports no symptoms of depression and anxiety. Denies SI, HI, NSSIB. She denies symptoms of fara or psychosis. She does not want any nonpharmacological interventions however is open to talking to psychiatry about increasing her dosage on Effexor.  Denies pain or other things that could impact her presentation. Notes that she overall does not feel well.  She notes her spouse is concerned about her depression.     Mental Status Exam   Affect: Flat  Appearance: Other: Hospital    Attention Span/Concentration: Other: Variable    Eye Contact: Variable  Fund of Knowledge: Appropriate   Language /Speech Content: Fluent  Language /Speech Volume: Normal    Language /Speech Rate/Productions: Normal   Recent Memory: Intact  Remote Memory: Intact  Mood: Sad   Orientation:   Person: Yes   Place: Yes  Time of Day: Yes   Date: Yes   Situation (Do they understand why they are here?): Yes   Psychomotor Behavior: Normal   Thought Content: Clear  Thought Form: Intact    Current medications:   Current Facility-Administered Medications   Medication     acetaminophen (TYLENOL) tablet 650 mg     amLODIPine (NORVASC) tablet 2.5 mg     aspirin EC tablet 81 mg     benzocaine-menthol (CHLORASEPTIC) 6-10 MG lozenge 1 lozenge     glucose gel 15-30 g    Or     dextrose 50 % injection 25-50 mL    Or     glucagon injection 1 mg     enoxaparin ANTICOAGULANT (LOVENOX) injection 40 mg     furosemide (LASIX) injection 20 mg     hydrOXYzine (ATARAX) tablet 10 mg     insulin aspart (NovoLOG) injection (RAPID ACTING)     insulin aspart (NovoLOG) injection (RAPID ACTING)     [Held by provider] insulin glargine (LANTUS PEN) injection 30 Units     levothyroxine (SYNTHROID/LEVOTHROID) tablet 137 mcg     lidocaine (LMX4) cream     lidocaine 1 % 0.1-1 mL     melatonin tablet 1 mg     menthol-zinc oxide (CALMOSEPTINE) 0.44-20.6 % ointment OINT     meropenem (MERREM) 1 g vial to attach to  mL bag     micafungin (MYCAMINE) 100 mg in sodium chloride 0.9 % 100 mL intermittent infusion     naloxone (NARCAN) injection 0.2 mg    Or     naloxone (NARCAN) injection 0.4 mg    Or     naloxone (NARCAN) injection 0.2 mg    Or     naloxone (NARCAN) injection 0.4 mg     ondansetron (ZOFRAN ODT) ODT tab 4 mg    Or     ondansetron (ZOFRAN) injection 4 mg     oxyCODONE (ROXICODONE) tablet 5 mg     oxyCODONE IR (ROXICODONE) half-tab 2.5 mg     phenol (CHLORASEPTIC) 1.4 % spray 1 mL     prochlorperazine (COMPAZINE) injection 5 mg     rosuvastatin (CRESTOR) tablet 20 mg     sodium chloride (PF) 0.9% PF flush 3 mL     sodium chloride (PF) 0.9% PF flush 3 mL     sodium chloride (PF) 0.9% PF flush 3 mL     sodium  chloride (PF) 0.9% PF flush 3 mL     vancomycin (VANCOCIN) 1000 mg in dextrose 5% 200 mL PREMIX     [START ON 2/15/2023] venlafaxine (EFFEXOR XR) 24 hr capsule 112.5 mg     Therapeutic intervention and progress:  Therapeutic intervention consisted of active listening. Patient is limited in making progress towards treatment goals as evidenced by limited participation and minimal responses. She denies exacerbation of mental health or decline in mental health.     Collateral information:   Reviewed chart and coordinated with psychiatric medication provider as she wishes to talk about her Effexor.      JEMAL PANCHAL MSW, [facility]  Triage and Transition - Consult and Liaison   [phone]

## 2023-02-14 NOTE — PLAN OF CARE
Problem: Risk for Delirium  Goal: Optimal Coping  Outcome: Progressing  Goal: Improved Behavioral Control  Outcome: Progressing  Intervention: Minimize Safety Risk  Recent Flowsheet Documentation  Taken 2/13/2023 1545 by Lissy Hermosillo RN  Enhanced Safety Measures: bed alarm set  Goal: Improved Attention and Thought Clarity  Outcome: Progressing  Intervention: Maximize Cognitive Function  Recent Flowsheet Documentation  Taken 2/13/2023 1545 by Lissy Hermosillo RN  Sensory Stimulation Regulation: care clustered  Reorientation Measures:   calendar in view   clock in view   familiar social contact encouraged   reorientation provided     Problem: Bowel Resection  Goal: Effective Urinary Elimination  Outcome: Progressing     Problem: Diabetes Comorbidity  Goal: Blood Glucose Level Within Targeted Range  Outcome: Progressing     Problem: Hypertension Comorbidity  Goal: Blood Pressure in Desired Range  Outcome: Progressing  Intervention: Maintain Blood Pressure Management  Recent Flowsheet Documentation  Taken 2/13/2023 1545 by Lissy Hermosillo RN  Medication Review/Management: medications reviewed   Goal Outcome Evaluation:  Pt has been refusing to sit in chair and walk even with frequent re approach coupled with emphasis on the benefits of moving post surgery. Incontinent of urine and stool. Scheduled iv abx given. Throat pain managed with scheduled TY and PRN oxycodone with lozenge.

## 2023-02-14 NOTE — PROGRESS NOTES
Care Management Follow Up    Length of Stay (days): 16    Expected Discharge Date:  Pending     Concerns to be Addressed:  IV diuresing, TCU acceptance,  Sepsis      Patient plan of care discussed at interdisciplinary rounds: Yes    Anticipated Discharge Disposition: Transitional Care     Anticipated Discharge Services:  TCU    Anticipated Discharge DME:   No new DME    Patient/family educated on Medicare website which has current facility and service quality ratings: yes  Education Provided on the Discharge Plan:  yes  Patient/Family in Agreement with the Plan:  yes    Referrals Placed by CM/SW:  TCU         Additional Information:  Patient admitted with bowel perforation and found to have impacted stool in sigmoid colon with associated perforation and feculent peritonitis. S/P Sigmoid Colectomy 1/29/30. Psychiatry consult.     Surgery: Staples to remain for a week and can be removed in a week.  We can schedule patient as a visit in a week for postop care.    ID recommends: Continue vancomycin, meropenem and micafungin. Duration of therapy is 5-7 days after source control. Given improvement in wbc, would recommend antibiotics for another 2-3 days. PICC line has not been placed as of 2/14.    Therapy: Min assist 1 sit/stand. Min assist 1 amb. using fww/chair follow. Low activity tolerance. Decreased sp02 on RA with walking. Recommendation is for TCU.    Social History:  Patient lives at home with her spouse Awais and has 4WW at home (although she generally doesn't use it).  Awais has been assisting more in home recently. Daughter Sheba is main family contact.     TCU referrals pending. PAS needed. Anticipating need for [facility] to transport.         Lorri Childers RN

## 2023-02-14 NOTE — PHARMACY-VANCOMYCIN DOSING SERVICE
Pharmacy Vancomycin Note  Date of Service 2023  Patient's  1945   77 year old, female    Indication: Intra-abdominal infection  Day of Therapy: 7  Current vancomycin regimen:  1000 mg IV q12h  Current vancomycin monitoring method: AUC  Current vancomycin therapeutic monitoring goal: 400-600 mg*h/L    InsightRX Prediction of Current Vancomycin Regimen  Regimen: 1000 mg IV every 12 hours.  Start time: 09:08 on 2023  Exposure target: AUC24 (range)400-600 mg/L.hr   AUC24,ss: 496 mg/L.hr  Probability of AUC24 > 400: 93 %  Ctrough,ss: 16.9 mg/L  Probability of Ctrough,ss > 20: 17 %  Probability of nephrotoxicity (Lodise GERMAN ): 13 %    Current estimated CrCl = Estimated Creatinine Clearance: 101.9 mL/min (based on SCr of 0.58 mg/dL).    Creatinine for last 3 days  2023:  6:41 AM Creatinine 0.62 mg/dL  2023:  6:17 AM Creatinine 0.58 mg/dL  2023:  6:12 AM Creatinine 0.58 mg/dL    Recent Vancomycin Levels (past 3 days)  2023:  6:12 AM Vancomycin 18.5 ug/mL    Vancomycin IV Administrations (past 72 hours)                   vancomycin (VANCOCIN) 1000 mg in dextrose 5% 200 mL PREMIX (mg) 1,000 mg New Bag 23 0614     1,000 mg New Bag 23 1910     1,000 mg New Bag  0423     1,000 mg New Bag 23 1620     1,000 mg New Bag  0454     1,000 mg New Bag 23 1452                Nephrotoxins and other renal medications (From now, onward)    Start     Dose/Rate Route Frequency Ordered Stop    23 0900  furosemide (LASIX) injection 20 mg         20 mg  over 1-3 Minutes Intravenous DAILY 23 1937      23 0000  vancomycin (VANCOCIN) 1000 mg in dextrose 5% 200 mL PREMIX         1,000 mg  200 mL/hr over 1 Hours Intravenous EVERY 12 HOURS 23 1528               Contrast Orders - past 72 hours (72h ago, onward)    None          Interpretation of levels and current regimen:  Vancomycin level is reflective of -600    Has serum creatinine changed  greater than 50% in last 72 hours: No    Urine output:  unable to determine    Renal Function: Stable    Plan:  1. Continue Current Dose  2. Vancomycin monitoring method: AUC  3. Vancomycin therapeutic monitoring goal: 400-600 mg*h/L  4. Pharmacy will check vancomycin levels as appropriate in 3-5 Days.  5. Serum creatinine levels will be ordered daily for the first week of therapy and at least twice weekly for subsequent weeks.    Juan M Jackson RPH

## 2023-02-14 NOTE — CONSULTS
"  INITIAL PSYCHIATRIC CONSULTATION                  REASON FOR REQUEST: [facility] to provide therapeutic support/check-in for depression. Would like anti-depressants to assist with depression as well      ASSESSMENT/RECOMMENDATIONS/PLAN :   Adjustment reaction with anxiety and depression in the setting of prolonged hospitalization.  Depression with anxiety, exacerbated due to stressors of hospital and medical treatment.  Situational depression.  Mood changes due to a prolonged hospitalization.    Recommendations:  Increasing helplessness and hopelessness affecting patient's ability to participate in rehabilitation process thus would benefit from increment in her venlafaxine.  Increase venlafaxine 112.5 mg daily.  Chloraseptic spray PRN for throat pain.       MENTAL STATUS EXAMINATION:   General Appearance: Not in acute distress, seated in chair.  Behavior: Good eye contact, no bizarre ideations  Speech: Clear, slow.  Thought Process: Slow  Thought content: No evidence of hallucinations, delusions or paranoia.    Thought Formation: Associations are connected  Judgment: Fair  Insight : Fair  Attention : Adequate  Memory: Sufficient, aware of her hospitalization and environment  Fund Of Knowledge: Average  Affect: Neutral  Mood: Sad  Alert : Awake  Suicidal ideation: Absent  Homicidal ideation: Absent  Orientation: X 3  Comprehension: Sufficient  Generative thought content: Adequate.  Responds to short direct questions adequately  Language: Intact  Gait and Ambulation: Gait and ambulation at baseline.    Musculoskeletal: No tonal abnormalities      BP (!) 141/65 (BP Location: Right arm)   Pulse 81   Temp 98.2  F (36.8  C) (Oral)   Resp 20   Ht 1.651 m (5' 5\")   Wt 107 kg (236 lb)   SpO2 95%   BMI 39.27 kg/m        HISTORY OF PRESENT ILLNESS:   Presenting history to include: Per [facility]:   Suzy Gómez is a 77 year old female admitted on 1/29/2023. She has history of hypertension, hyperlipidemia, type 2 " "diabetes, and hypothyroidism.  Patient presented with abdominal pain and distention for the past 2 to 3 days. Of note, patient presented to on 11/26/2022 with abdominal pain, where she was diagnosed with acute distal colitis.  At that time she left AMA before evaluation could be finished.  She presented again on 1/25/2023 for abdominal pain. She was again diagnosed with colitis and was discharged home. She presented today with acute severe abdominal pain and distention. No BM for 1 week. No nausea or vomiting. She reported fevers and chills. No chest pain or sob.    In the ED, she had CT abdomen/pelvis which showed punctate foci of gas seen adjacent to the inflamed colon as well as tracking in the anterior abdominal wall concerning for sigmoid colonic perforation.   She was given Vanc/Zosyn. 1L of IV fluids. Pain medications. Gen Surgery was contacted.     Upon assessment patient was noted to be seated in chair, eating breakfast.  She ate minimal amount of food, reporting that she did not feel like eating due to \"throat hurts\".  She was willing to drink boost or other soft foods as it did not bother her throat that much.  She is participating in rehab and walking with assist.  She reports of feeling fatigued, tired, and exhausted at times.  Expressing feelings of helplessness and frustration due to not getting better.  Denies any apathy, anhedonia, lack of motivation or lack of interest.  She denies any thoughts of self-harm or suicidality.  She is on venlafaxine 75 mg which she has taken for more than 12 months with good effect.  She is not concerned about her sleep regulation at this time.  Appetite remains poor.   She did not know the name of the medication that she took for depression however was amenable to having it tweaked or changed a bit to help her mood.  Primarily she wants her throat pain to go away so she could eat more food.  Denies any incidents of hallucinations, psychosis or delusions.  Denies any " "symptoms suggestive of fara or hypomania.  Nursing notes reviewed.  She is lacking full participation in rehab and other cares, at times refusing to sit in chair and walk despite reminders.  She has been reporting frustration with her feeling sick and progression of health.    Review of Systems:As per HPI. Remainders of 12 point review of systems negative.  Psychiatric ROS:  Patient is not providing any concerns for review of systems.      PFSH reviewed  and not pertinent to chief complaint/reason for visit  BP (!) 141/65 (BP Location: Right arm)   Pulse 81   Temp 98.2  F (36.8  C) (Oral)   Resp 20   Ht 1.651 m (5' 5\")   Wt 107 kg (236 lb)   SpO2 95%   BMI 39.27 kg/m    No results found for: AMPHET, PCP, BARBIT, OXYCODONE, THC, ETOH  @24HOURRESULTS@  Recent Results (from the past 72 hour(s))   Glucose by meter    Collection Time: 02/11/23  8:23 AM   Result Value Ref Range    GLUCOSE BY METER POCT 130 (H) 70 - 99 mg/dL   Glucose by meter    Collection Time: 02/11/23 12:30 PM   Result Value Ref Range    GLUCOSE BY METER POCT 147 (H) 70 - 99 mg/dL   Glucose by meter    Collection Time: 02/11/23  4:21 PM   Result Value Ref Range    GLUCOSE BY METER POCT 164 (H) 70 - 99 mg/dL   Glucose by meter    Collection Time: 02/11/23  8:24 PM   Result Value Ref Range    GLUCOSE BY METER POCT 170 (H) 70 - 99 mg/dL   CBC with platelets    Collection Time: 02/12/23  6:41 AM   Result Value Ref Range    WBC Count 10.9 4.0 - 11.0 10e3/uL    RBC Count 3.54 (L) 3.80 - 5.20 10e6/uL    Hemoglobin 9.9 (L) 11.7 - 15.7 g/dL    Hematocrit 33.8 (L) 35.0 - 47.0 %    MCV 96 78 - 100 fL    MCH 28.0 26.5 - 33.0 pg    MCHC 29.3 (L) 31.5 - 36.5 g/dL    RDW 15.1 (H) 10.0 - 15.0 %    Platelet Count 463 (H) 150 - 450 10e3/uL   Basic metabolic panel    Collection Time: 02/12/23  6:41 AM   Result Value Ref Range    Sodium 140 136 - 145 mmol/L    Potassium 3.7 3.4 - 5.3 mmol/L    Chloride 101 98 - 107 mmol/L    Carbon Dioxide (CO2) 32 (H) 22 - 29 " mmol/L    Anion Gap 7 7 - 15 mmol/L    Urea Nitrogen 13.4 8.0 - 23.0 mg/dL    Creatinine 0.62 0.51 - 0.95 mg/dL    Calcium 8.2 (L) 8.8 - 10.2 mg/dL    Glucose 170 (H) 70 - 99 mg/dL    GFR Estimate >90 >60 mL/min/1.73m2   Glucose by meter    Collection Time: 02/12/23  8:43 AM   Result Value Ref Range    GLUCOSE BY METER POCT 154 (H) 70 - 99 mg/dL   Glucose by meter    Collection Time: 02/12/23  1:36 PM   Result Value Ref Range    GLUCOSE BY METER POCT 192 (H) 70 - 99 mg/dL   Glucose by meter    Collection Time: 02/12/23  4:27 PM   Result Value Ref Range    GLUCOSE BY METER POCT 158 (H) 70 - 99 mg/dL   Glucose by meter    Collection Time: 02/12/23  8:25 PM   Result Value Ref Range    GLUCOSE BY METER POCT 195 (H) 70 - 99 mg/dL   CBC with platelets    Collection Time: 02/13/23  6:17 AM   Result Value Ref Range    WBC Count 10.5 4.0 - 11.0 10e3/uL    RBC Count 3.29 (L) 3.80 - 5.20 10e6/uL    Hemoglobin 9.3 (L) 11.7 - 15.7 g/dL    Hematocrit 31.6 (L) 35.0 - 47.0 %    MCV 96 78 - 100 fL    MCH 28.3 26.5 - 33.0 pg    MCHC 29.4 (L) 31.5 - 36.5 g/dL    RDW 15.1 (H) 10.0 - 15.0 %    Platelet Count 426 150 - 450 10e3/uL   Basic metabolic panel    Collection Time: 02/13/23  6:17 AM   Result Value Ref Range    Sodium 137 136 - 145 mmol/L    Potassium 3.9 3.4 - 5.3 mmol/L    Chloride 100 98 - 107 mmol/L    Carbon Dioxide (CO2) 29 22 - 29 mmol/L    Anion Gap 8 7 - 15 mmol/L    Urea Nitrogen 14.0 8.0 - 23.0 mg/dL    Creatinine 0.58 0.51 - 0.95 mg/dL    Calcium 8.1 (L) 8.8 - 10.2 mg/dL    Glucose 158 (H) 70 - 99 mg/dL    GFR Estimate >90 >60 mL/min/1.73m2   Glucose by meter    Collection Time: 02/13/23  7:20 AM   Result Value Ref Range    GLUCOSE BY METER POCT 156 (H) 70 - 99 mg/dL   Glucose by meter    Collection Time: 02/13/23 11:28 AM   Result Value Ref Range    GLUCOSE BY METER POCT 167 (H) 70 - 99 mg/dL   Glucose by meter    Collection Time: 02/13/23  4:48 PM   Result Value Ref Range    GLUCOSE BY METER POCT 162 (H) 70 - 99  mg/dL   Glucose by meter    Collection Time: 02/13/23  8:44 PM   Result Value Ref Range    GLUCOSE BY METER POCT 199 (H) 70 - 99 mg/dL   CBC with platelets    Collection Time: 02/14/23  6:12 AM   Result Value Ref Range    WBC Count 10.3 4.0 - 11.0 10e3/uL    RBC Count 3.25 (L) 3.80 - 5.20 10e6/uL    Hemoglobin 9.1 (L) 11.7 - 15.7 g/dL    Hematocrit 31.0 (L) 35.0 - 47.0 %    MCV 95 78 - 100 fL    MCH 28.0 26.5 - 33.0 pg    MCHC 29.4 (L) 31.5 - 36.5 g/dL    RDW 15.0 10.0 - 15.0 %    Platelet Count 437 150 - 450 10e3/uL   Basic metabolic panel    Collection Time: 02/14/23  6:12 AM   Result Value Ref Range    Sodium 137 136 - 145 mmol/L    Potassium 3.9 3.4 - 5.3 mmol/L    Chloride 99 98 - 107 mmol/L    Carbon Dioxide (CO2) 30 (H) 22 - 29 mmol/L    Anion Gap 8 7 - 15 mmol/L    Urea Nitrogen 14.0 8.0 - 23.0 mg/dL    Creatinine 0.58 0.51 - 0.95 mg/dL    Calcium 8.4 (L) 8.8 - 10.2 mg/dL    Glucose 147 (H) 70 - 99 mg/dL    GFR Estimate >90 >60 mL/min/1.73m2   Vancomycin level    Collection Time: 02/14/23  6:12 AM   Result Value Ref Range    Vancomycin 18.5   ug/mL   Glucose by meter    Collection Time: 02/14/23  7:46 AM   Result Value Ref Range    GLUCOSE BY METER POCT 184 (H) 70 - 99 mg/dL       PMH:   Past Medical History:   Diagnosis Date     Dyslipidemia      Heart disease      Hypertension      KARYN (obstructive sleep apnea)      Thyroid disease      Type 2 diabetes mellitus (H)            Current Medications:Scheduled Meds:    acetaminophen  650 mg Oral TID     amLODIPine  2.5 mg Oral Daily     aspirin  81 mg Oral Daily     enoxaparin ANTICOAGULANT  40 mg Subcutaneous Q24H     furosemide  20 mg Intravenous Daily     insulin aspart  1-10 Units Subcutaneous TID AC     insulin aspart  1-7 Units Subcutaneous At Bedtime     [Held by provider] insulin glargine  30 Units Subcutaneous At Bedtime     levothyroxine  137 mcg Oral QAM AC     meropenem  1 g Intravenous Q8H     micafungin  100 mg Intravenous Q24H     rosuvastatin   20 mg Oral At Bedtime     sodium chloride (PF)  3 mL Intracatheter Q8H     sodium chloride (PF)  3 mL Intracatheter Q8H     vancomycin  1,000 mg Intravenous Q12H     venlafaxine  75 mg Oral Daily     Continuous Infusions:  PRN Meds:.sore throat, glucose **OR** dextrose **OR** glucagon, hydrOXYzine, lidocaine 4%, lidocaine (buffered or not buffered), melatonin, menthol-zinc oxide, naloxone **OR** naloxone **OR** naloxone **OR** naloxone, ondansetron **OR** ondansetron, oxyCODONE, oxyCODONE IR, prochlorperazine, sodium chloride (PF), sodium chloride (PF)                Family History: PERSONALLY REVIEWED.History reviewed. No pertinent family history.  Pertinent Family hx not pertinent to Chief Complaint or reason for visit.     Social History:  PERSONALLY REVIEWED.  Social History     Socioeconomic History     Marital status:      Spouse name: Not on file     Number of children: Not on file     Years of education: Not on file     Highest education level: Not on file   Occupational History     Not on file   Tobacco Use     Smoking status: Former     Types: Cigarettes     Quit date:      Years since quittin.1     Smokeless tobacco: Never   Vaping Use     Vaping Use: Never used   Substance and Sexual Activity     Alcohol use: Not Currently     Drug use: Never     Sexual activity: Not on file   Other Topics Concern     Not on file   Social History Narrative     Not on file     Social Determinants of Health     Financial Resource Strain: Not on file   Food Insecurity: Not on file   Transportation Needs: Not on file   Physical Activity: Not on file   Stress: Not on file   Social Connections: Not on file   Intimate Partner Violence: Not on file   Housing Stability: Not on file    not pertinent to Chief Complaint or reason for visit.             Allergies as of 2014 Reviewed     Review of Systems:As per HPI. Remainders of 12 point review of systems negative.    Review of Pertinent Laboratory:       PERSONALLY REVIEWED.    Physical Exam: Temp:  [98.1  F (36.7  C)-98.8  F (37.1  C)] 98.2  F (36.8  C)  Pulse:  [81-86] 81  Resp:  [18-20] 20  BP: (127-141)/(58-65) 141/65  FiO2 (%):  [2 %] 2 %  SpO2:  [83 %-95 %] 95 %   Vitals: reviewed in chart     Physical exam as per medical team: reviewed in chart      diagnoses, risk and benefits of medications discussed with staff. Care coordination with care management team.   Thank you for this consultation.       Molly Dela Cruz; NP  [facility] Addiction [facility]        This note was created with the help of Dragon dictation system. Grammatical and typing errors are not intentional.

## 2023-02-15 ENCOUNTER — APPOINTMENT (OUTPATIENT)
Dept: OCCUPATIONAL THERAPY | Facility: HOSPITAL | Age: 78
DRG: 853 | End: 2023-02-15
Payer: COMMERCIAL

## 2023-02-15 ENCOUNTER — APPOINTMENT (OUTPATIENT)
Dept: PHYSICAL THERAPY | Facility: HOSPITAL | Age: 78
DRG: 853 | End: 2023-02-15
Payer: COMMERCIAL

## 2023-02-15 LAB
ANION GAP SERPL CALCULATED.3IONS-SCNC: 6 MMOL/L (ref 7–15)
BUN SERPL-MCNC: 12.7 MG/DL (ref 8–23)
CALCIUM SERPL-MCNC: 8.6 MG/DL (ref 8.8–10.2)
CHLORIDE SERPL-SCNC: 101 MMOL/L (ref 98–107)
CREAT SERPL-MCNC: 0.56 MG/DL (ref 0.51–0.95)
DEPRECATED HCO3 PLAS-SCNC: 31 MMOL/L (ref 22–29)
ERYTHROCYTE [DISTWIDTH] IN BLOOD BY AUTOMATED COUNT: 14.9 % (ref 10–15)
GFR SERPL CREATININE-BSD FRML MDRD: >90 ML/MIN/1.73M2
GLUCOSE BLDC GLUCOMTR-MCNC: 139 MG/DL (ref 70–99)
GLUCOSE BLDC GLUCOMTR-MCNC: 164 MG/DL (ref 70–99)
GLUCOSE BLDC GLUCOMTR-MCNC: 199 MG/DL (ref 70–99)
GLUCOSE BLDC GLUCOMTR-MCNC: 240 MG/DL (ref 70–99)
GLUCOSE SERPL-MCNC: 142 MG/DL (ref 70–99)
HCT VFR BLD AUTO: 28.7 % (ref 35–47)
HGB BLD-MCNC: 8.5 G/DL (ref 11.7–15.7)
MCH RBC QN AUTO: 28 PG (ref 26.5–33)
MCHC RBC AUTO-ENTMCNC: 29.6 G/DL (ref 31.5–36.5)
MCV RBC AUTO: 94 FL (ref 78–100)
PLATELET # BLD AUTO: 420 10E3/UL (ref 150–450)
POTASSIUM SERPL-SCNC: 3.9 MMOL/L (ref 3.4–5.3)
RBC # BLD AUTO: 3.04 10E6/UL (ref 3.8–5.2)
SODIUM SERPL-SCNC: 138 MMOL/L (ref 136–145)
WBC # BLD AUTO: 10.5 10E3/UL (ref 4–11)

## 2023-02-15 PROCEDURE — 250N000011 HC RX IP 250 OP 636: Performed by: INTERNAL MEDICINE

## 2023-02-15 PROCEDURE — 97116 GAIT TRAINING THERAPY: CPT | Mod: GP

## 2023-02-15 PROCEDURE — 250N000013 HC RX MED GY IP 250 OP 250 PS 637: Performed by: HOSPITALIST

## 2023-02-15 PROCEDURE — 36415 COLL VENOUS BLD VENIPUNCTURE: CPT | Performed by: INTERNAL MEDICINE

## 2023-02-15 PROCEDURE — 85027 COMPLETE CBC AUTOMATED: CPT | Performed by: INTERNAL MEDICINE

## 2023-02-15 PROCEDURE — 97535 SELF CARE MNGMENT TRAINING: CPT | Mod: GO

## 2023-02-15 PROCEDURE — 97530 THERAPEUTIC ACTIVITIES: CPT | Mod: GO

## 2023-02-15 PROCEDURE — 80048 BASIC METABOLIC PNL TOTAL CA: CPT | Performed by: INTERNAL MEDICINE

## 2023-02-15 PROCEDURE — 250N000013 HC RX MED GY IP 250 OP 250 PS 637: Performed by: SURGERY

## 2023-02-15 PROCEDURE — 250N000013 HC RX MED GY IP 250 OP 250 PS 637: Performed by: INTERNAL MEDICINE

## 2023-02-15 PROCEDURE — 99232 SBSQ HOSP IP/OBS MODERATE 35: CPT | Performed by: INTERNAL MEDICINE

## 2023-02-15 PROCEDURE — 250N000011 HC RX IP 250 OP 636: Performed by: SURGERY

## 2023-02-15 PROCEDURE — 120N000001 HC R&B MED SURG/OB

## 2023-02-15 PROCEDURE — 99232 SBSQ HOSP IP/OBS MODERATE 35: CPT | Performed by: HOSPITALIST

## 2023-02-15 PROCEDURE — 258N000003 HC RX IP 258 OP 636: Performed by: INTERNAL MEDICINE

## 2023-02-15 PROCEDURE — 250N000011 HC RX IP 250 OP 636: Performed by: HOSPITALIST

## 2023-02-15 PROCEDURE — 99232 SBSQ HOSP IP/OBS MODERATE 35: CPT | Performed by: NURSE PRACTITIONER

## 2023-02-15 PROCEDURE — 97530 THERAPEUTIC ACTIVITIES: CPT | Mod: GP

## 2023-02-15 PROCEDURE — 250N000013 HC RX MED GY IP 250 OP 250 PS 637: Performed by: NURSE PRACTITIONER

## 2023-02-15 RX ORDER — VENLAFAXINE HYDROCHLORIDE 75 MG/1
150 CAPSULE, EXTENDED RELEASE ORAL
Status: DISCONTINUED | OUTPATIENT
Start: 2023-02-19 | End: 2023-02-18 | Stop reason: HOSPADM

## 2023-02-15 RX ORDER — FUROSEMIDE 10 MG/ML
40 INJECTION INTRAMUSCULAR; INTRAVENOUS 2 TIMES DAILY
Status: DISCONTINUED | OUTPATIENT
Start: 2023-02-15 | End: 2023-02-16

## 2023-02-15 RX ADMIN — MEROPENEM 1 G: 1 INJECTION INTRAVENOUS at 00:02

## 2023-02-15 RX ADMIN — LEVOTHYROXINE SODIUM 137 MCG: 0.11 TABLET ORAL at 06:29

## 2023-02-15 RX ADMIN — MEROPENEM 1 G: 1 INJECTION INTRAVENOUS at 08:37

## 2023-02-15 RX ADMIN — FUROSEMIDE 40 MG: 10 INJECTION, SOLUTION INTRAMUSCULAR; INTRAVENOUS at 20:40

## 2023-02-15 RX ADMIN — VANCOMYCIN HYDROCHLORIDE 1000 MG: 1 INJECTION, SOLUTION INTRAVENOUS at 19:25

## 2023-02-15 RX ADMIN — VANCOMYCIN HYDROCHLORIDE 1000 MG: 1 INJECTION, SOLUTION INTRAVENOUS at 06:29

## 2023-02-15 RX ADMIN — ENOXAPARIN SODIUM 40 MG: 40 INJECTION SUBCUTANEOUS at 08:43

## 2023-02-15 RX ADMIN — ACETAMINOPHEN 650 MG: 325 TABLET ORAL at 14:32

## 2023-02-15 RX ADMIN — BENZOCAINE 6 MG-MENTHOL 10 MG LOZENGES 1 LOZENGE: at 09:07

## 2023-02-15 RX ADMIN — MICAFUNGIN SODIUM 100 MG: 50 INJECTION, POWDER, LYOPHILIZED, FOR SOLUTION INTRAVENOUS at 17:34

## 2023-02-15 RX ADMIN — ROSUVASTATIN CALCIUM 20 MG: 10 TABLET, FILM COATED ORAL at 20:40

## 2023-02-15 RX ADMIN — BENZOCAINE 6 MG-MENTHOL 10 MG LOZENGES 1 LOZENGE: at 19:01

## 2023-02-15 RX ADMIN — BENZOCAINE 6 MG-MENTHOL 10 MG LOZENGES 1 LOZENGE: at 14:33

## 2023-02-15 RX ADMIN — Medication 5 MG: at 20:40

## 2023-02-15 RX ADMIN — ACETAMINOPHEN 650 MG: 325 TABLET ORAL at 08:44

## 2023-02-15 RX ADMIN — ACETAMINOPHEN 650 MG: 325 TABLET ORAL at 20:40

## 2023-02-15 RX ADMIN — OXYCODONE HYDROCHLORIDE 5 MG: 5 TABLET ORAL at 06:58

## 2023-02-15 RX ADMIN — VENLAFAXINE HYDROCHLORIDE 112.5 MG: 75 CAPSULE, EXTENDED RELEASE ORAL at 08:44

## 2023-02-15 RX ADMIN — FUROSEMIDE 20 MG: 10 INJECTION, SOLUTION INTRAMUSCULAR; INTRAVENOUS at 08:36

## 2023-02-15 RX ADMIN — ASPIRIN 81 MG: 81 TABLET, COATED ORAL at 08:44

## 2023-02-15 RX ADMIN — FUROSEMIDE 40 MG: 10 INJECTION, SOLUTION INTRAMUSCULAR; INTRAVENOUS at 15:42

## 2023-02-15 RX ADMIN — MEROPENEM 1 G: 1 INJECTION INTRAVENOUS at 15:36

## 2023-02-15 RX ADMIN — AMLODIPINE BESYLATE 2.5 MG: 2.5 TABLET ORAL at 08:45

## 2023-02-15 ASSESSMENT — ACTIVITIES OF DAILY LIVING (ADL)
ADLS_ACUITY_SCORE: 38
ADLS_ACUITY_SCORE: 37
ADLS_ACUITY_SCORE: 38
ADLS_ACUITY_SCORE: 37

## 2023-02-15 NOTE — PROGRESS NOTES
INFECTIOUS DISEASE FOLLOW UP NOTE      ASSESSMENT:  1. Intra-abdominal infection presenting with perforated stercolic ulcer of sigmoid colon, underwent laparoscopic sigmoid colectomy with anastomosis, washout 1/29. Findings of diffuse feculent peritonitis. Ongoing pain and leukocytosis. Draining clear fluid from wound -- creatinine level not suggestive of urine leak based on estimate. Bowel function returning to normal, but wbc remains high and increasing. No improvement with change in antibiotics. Repeat CT without significant findings for infection.  Leukocytosis resolved and fever improving since addition of vancomycin.  2. Bacteremia with clostridium species, Collinsella aerofaciens, bacteroides vulgatus, and Eubacterium spp. Likely secondary to perforation. Susceptible to meropenem and metronidazole   3. DM  4. Hypothyroidism. Mildly elevated TSH prior to admission, on treatment  5. Left great toe infection for many months. Has seen podiatry and primary clinic for this. Recent doxycycline. Had MRI negative for osteomyelitis in September. No signs of lower ext infection currently  6. [facility] per chart refusing blood products.   7. Cholelithiasis      PLAN:  -Continue vancomycin, meropenem and micafungin through tomorrow. Orders updated  -no PICC line since patient may finish antibiotics before discharge     I will sign-off at this time. Please do not hesitate to call if there are any further questions or if there is a change in the patient's clinical status.       Lexx Zuniga MD  [facility]  Direct messaging: [facility] Paging  On-Call ID provider: [phone], option: [phone]    ______________________________________________________________________    SUBJECTIVE / INTERVAL HISTORY:   No events. Family at bedside. Working with therapy. Sitting up in chair. Seen by psychiatry.    OBJECTIVE:  /63 (BP Location: Left arm)   Pulse 78   Temp 98  F (36.7  C) (Oral)   Resp 15   " Ht 1.651 m (5' 5\")   Wt 107 kg (236 lb)   SpO2 95%   BMI 39.27 kg/m       GEN: No acute distress.   RESPIRATORY:  Normal resp pattern  CARDIOVASCULAR:  Regular, no murmur  EXTREMITIES: mild edema  SKIN/HAIR/NAILS:  No rashes.   Psych: normal affect   IV: peripheral        Antibiotics:  Meropenem 2/4-  Micafungin 2/4-  Vancomycin 2/8-    Previous:  pip/tazo 1/29-2/4  Vancomycin 1/29 x1      Pertinent labs:    Recent Labs   Lab 02/15/23  0656 02/14/23 0612 02/13/23 0617   WBC 10.5 10.3 10.5   HGB 8.5* 9.1* 9.3*   HCT 28.7* 31.0* 31.6*    437 426        Recent Labs   Lab 02/15/23  0656 02/14/23  0612 02/13/23  0617    137 137   CO2 31* 30* 29   BUN 12.7 14.0 14.0      No results found for: CRP      Lab Results   Component Value Date    ALT 14 02/08/2023    AST 29 02/08/2023    ALKPHOS 142 (H) 02/08/2023         MICROBIOLOGY DATA:  1/29 blood culture: C.clostridioforme and B.vulgatus  1/29 blood culture: Eubacterium spp and Collinsella aerofaciens      RADIOLOGY:  CT Chest/Abdomen/Pelvis w Contrast    Result Date: 2/7/2023  EXAM: CT CHEST/ABDOMEN/PELVIS W CONTRAST LOCATION: [facility] DATE/TIME: 2/7/2023 8:21 PM INDICATION: ongoing leukocytosis. new fever. 1/29 sigmoidectomy with wash out. COMPARISON: CT of the abdomen and pelvis 2/4/2023 TECHNIQUE: CT scan of the chest, abdomen, and pelvis was performed following injection of IV contrast. Multiplanar reformats were obtained. Dose reduction techniques were used. CONTRAST: 100 mL Isovue-370 FINDINGS: LUNGS AND PLEURA: Small bilateral pleural effusions layer into the posterior costophrenic sulci. Bands of atelectasis along the posterior pleura of the lower lobes. No lung edema or consolidation. Trachea and central airways are patent and normal caliber. MEDIASTINUM: Cardiac chambers are normal in size. No pericardial effusion. Normal caliber thoracic aorta and main pulmonary artery. Arch, proximal great vessel and descending " aorta atheromatous calcification is patchy. There are no enlarged mediastinal or hilar lymph nodes. Esophagus is decompressed. A few small lower paraesophageal varices are present. CORONARY ARTERY CALCIFICATION: Mild to moderate, three-vessel disease. HEPATOBILIARY: Slight geographic liver attenuation but no focal enhancement or parenchymal lesion. There is a calcified stone layering in the gallbladder. No gallbladder wall thickening or pericholecystic inflammation. No bile duct enlargement. PANCREAS: Normal. SPLEEN: Normal. ADRENAL GLANDS: Normal. KIDNEYS/BLADDER: Trace amount of air in the nondependent bladder from recent instrumentation. Bladder wall is smooth with uniform thickness. Kidneys are normal in size with symmetric enhancement and normal cortex thickness. No nephrolithiasis or hydronephrosis. BOWEL: Nondistended stomach. Proximal small bowel in the left upper quadrant is mildly dilated and fluid-filled, however no clear caliber transition and this is unchanged from 2/4/2023. There is hazy attenuation in the mesentery consistent with edema. Minimal perihepatic and perisplenic ascites. Similar ascites layering in the left lower quadrant with small amount of anterior linear enhancement (series 3, image 205). Status post sigmoid colon resection with colocolonic anastomosis in the upper left pelvis. There are multiple diverticula arising from the ascending colon. No pneumatosis or pneumoperitoneum. LYMPH NODES: Mesenteric, aortocaval and iliac chain lymph nodes are normal by size criteria. VASCULATURE: Diffuse atheromatous calcification of the infrarenal abdominal aorta and common iliac arteries. No aneurysm. PELVIC ORGANS: Post hysterectomy. MUSCULOSKELETAL: Grade 1 degenerative anterolisthesis of L4 on L5 and lumbar facet arthropathy. Vacuum disc phenomenon from L1-2 to L3-L4. Thoracic vertebra are maintained in height with small diffuse osteophytes and mild disc space narrowing. No aggressive or  destructive bone lesions. Infraumbilical skin staples are present from recent laparotomy. No body wall hernia. Mild body wall anasarca.     IMPRESSION: 1.  Status post segmental sigmoid colon resection with colocolonic anastomosis in the upper left pelvis. No findings to suggest a surgical complication. 2.  Diverticula arising from the ascending colon but no acute diverticulitis. 3.  Unchanged ascites and mesenteric edema. No drainable intra-abdominal fluid collection. 4.  Minimal pleural effusions and atelectasis in the posterior sulci. 5.  Cholelithiasis.     Echocardiogram Complete    Result Date: 2023  [id number]^TERRENCE^[place] [address]  Name: SRIDHAR ALONSO MRN: [id number] : 1945 Study Date: 2023 12:44 PM Age: 77 yrs Gender: Female Patient Location: [facility] Reason For Study: CHF Ordering Physician: PAYAL OCAMPO Performed By: KATHARINE  BSA: 2.1 m2 Height: 65 in Weight: 220 lb HR: 87 BP: 138/63 mmHg ______________________________________________________________________________ Procedure Complete Echo Adult. ______________________________________________________________________________ Interpretation Summary  1. Normal left ventricular size and systolic performance with a visually estimated ejection fraction of 60%. 2. No significant valvular heart disease is identified on this study. 3. Normal right ventricular size with borderline reduction right ventricular systolic performance. 4. There is mild biatrial enlargement. 5. There is a very small pericardial effusion. There is no evidence of significant intraventricular dependence/tamponade physiology. ______________________________________________________________________________ Left ventricle: Normal left ventricular size and systolic performance with a visually estimated ejection fraction of 60%. There is normal regional wall motion. Left ventricular wall thickness is normal.   Assessment of LV Diastolic Function: The cumulative findings suggest impaired diastolic filling [The septal e' velocity is < 7 cm/s & lateral e' velocity is < 10 cm/s. The average E/e' is >14. TR velocity is 2.8 m/s. Left atrial volume index is greater than 34 mL/mÂ ].  Assessment of left atrial pressure (LAP): The cumulative findings suggest moderately elevated left atrial pressure (the E/A is > 0.8 and <2.0 plus 2 or 3 of 3 of the following present: Average E/e' > 14, TRvel > 2.8 m/s, and/or LA vol. index > 34 ml/mÂ  ).  Right ventricle: Normal right ventricular size with borderline reduction right ventricular systolic performance.  Left atrium: There is mild left atrial enlargement.  Right atrium: There is mild right atrial enlargement.  IVC: The IVC is borderline dilated.  Aortic valve: The aortic valve is comprised of three cusps. No significant aortic stenosis or aortic insufficiency is detected on this study.  Mitral valve: The mitral valve appears morphologically normal. There is trace mitral insufficiency.  Tricuspid valve: The tricuspid valve is grossly morphologically normal. There is trace tricuspid insufficiency.  Pulmonic valve: The pulmonic valve is grossly morphologically normal.  Thoracic aorta: The aortic root and proximal ascending aorta are of normal dimension.  Pericardium: There is a very small pericardial effusion. There is no evidence of significant intraventricular dependence/tamponade physiology. ______________________________________________________________________________ ______________________________________________________________________________ MMode/2D Measurements & Calculations IVSd: 1.3 cm LVIDd: 4.1 cm LVIDs: 2.6 cm LVPWd: 1.2 cm FS: 36.6 % LV mass(C)d: 185.2 grams LV mass(C)dI: 89.9 grams/m2 Ao root diam: 3.2 cm LA dimension: 4.6 cm asc Aorta Diam: 3.4 cm LA/Ao: 1.4 LVOT diam: 2.3 cm LVOT area: 4.1 cm2 LA Volume Indexed (AL/bp): 36.7 ml/m2  RWT: 0.58  Time Measurements MM HR:  87.0 BPM  Doppler Measurements & Calculations MV E max ayden: 123.0 cm/sec MV A max ayden: 124.2 cm/sec MV E/A: 0.99 MV dec time: 0.24 sec Ao V2 max: 189.4 cm/sec Ao max P.0 mmHg Ao V2 mean: 123.9 cm/sec Ao mean P.8 mmHg Ao V2 VTI: 32.7 cm QUAN(I,D): 2.8 cm2 QUAN(V,D): 2.9 cm2 LV V1 max P.9 mmHg LV V1 max: 131.3 cm/sec LV V1 VTI: 22.2 cm SV(LVOT): 92.1 ml SI(LVOT): 44.7 ml/m2 PA acc time: 0.09 sec TR max ayden: 279.6 cm/sec TR max P.3 mmHg AV Ayden Ratio (DI): 0.69 QUAN Index (cm2/m2): 1.4 E/E': 13.6  E/E' avg: 15.6 Lateral E/e': 13.6 Medial E/e': 17.7 Peak E' Ayden: 9.0 cm/sec  ______________________________________________________________________________ Report approved by: Leonid Hamilton 2023 02:47 PM       US Lower Extremity Venous Duplex Bilateral    Result Date: 2/10/2023  EXAM: US LOWER EXTREMITY VENOUS DUPLEX BILATERAL LOCATION: [facility] DATE/TIME: 2/10/2023 10:35 AM INDICATION: Bilateral lower extremity pain, swelling and edema. Fever. COMPARISON: None. TECHNIQUE: Venous Duplex ultrasound of bilateral lower extremities with and without compression, augmentation and duplex. Color flow and spectral Doppler with waveform analysis performed. FINDINGS: Exam includes the common femoral, femoral, popliteal veins as well as segmentally visualized deep calf veins and greater saphenous vein. RIGHT: No deep vein thrombosis. No superficial thrombophlebitis. Anechoic avascular cyst in the right popliteal fossa measuring 4.3 x 3.1 x 0.7 cm. LEFT: No deep vein thrombosis. No superficial thrombophlebitis. Anechoic avascular cyst in the left popliteal fossa measuring 3.5 x 2.0 x 0.5 cm     IMPRESSION: 1.  No deep venous thrombosis in the bilateral lower extremities. 2.  Bilateral popliteal fossa Baker's cysts, slightly larger on the right.    XR Chest Port 1 View    Result Date: 2023  EXAM: XR CHEST PORT 1 VIEW LOCATION: [facility] DATE/TIME:  2/8/2023 7:42 PM INDICATION: fever, new cough COMPARISON: 4/28/2021     IMPRESSION: Lung volumes are lower on this portable study. Borderline cardiomegaly unchanged. Pulmonary vessels normal. Questionable subtle right infrahilar infiltrate though this may relate to the lower lung volumes. Lungs otherwise clear.    XR Ankle Port Left G/E 3 Views    Result Date: 2/5/2023  EXAM: XR ANKLE PORT LEFT G/E 3 VIEWS LOCATION: [facility] DATE/TIME: 2/5/2023 2:22 PM INDICATION: left ankle pain, no trauma COMPARISON: None.     IMPRESSION: Bones are demineralized. Mild tibiotalar joint degenerative change. No evidence of an acute fracture. Tiny bone fragment adjacent to the medial talus and mild bony proliferation along the tip of the lateral malleolus is likely chronic. Ankle mortise is intact. Calcaneal heel spur.    CT Abdomen Pelvis w Contrast    Result Date: 2/4/2023  EXAM: CT ABDOMEN PELVIS W CONTRAST LOCATION: [facility] DATE/TIME: 2/4/2023 12:08 AM INDICATION: 1 29 sigmoidectomy with increasing wbc COMPARISON: CT a abdomen/pelvis 01/29/2023 TECHNIQUE: CT scan of the abdomen and pelvis was performed following injection of IV contrast. Multiplanar reformats were obtained. Dose reduction techniques were used. CONTRAST: isovue 370 100ml FINDINGS: LOWER CHEST: Trace bilateral pleural effusions right greater than left with compressive atelectasis. HEPATOBILIARY: Diffuse hepatic steatosis. Cholelithiasis with mild gallbladder distention which may be related to fasting. No biliary ductal dilatation. PANCREAS: Normal. SPLEEN: Normal. ADRENAL GLANDS: Normal. KIDNEYS/BLADDER: No hydronephrosis. Box catheter within a decompressed bladder. BOWEL: Recent sigmoidectomy. Colonic diverticulosis. Trace abdominopelvic free ascites. Additionally, there is some loculated ascites in the anterior abdomen with partial rim enhancement and several foci of gas within, which suggests a degree of  peritonitis. No drainable fluid collection currently. Right anterior approach drain terminates in the left pelvis. LYMPH NODES: Normal. VASCULATURE: Atherosclerotic calcifications of the aortoiliac vessels without evidence of aneurysmal dilatation. PELVIC ORGANS: Hysterectomy. MUSCULOSKELETAL: Body wall edema. Skin staples with recent ventral abdominal incision. Small fat-containing umbilical hernia. Multilevel degenerative changes of the thoracolumbar spine. No acute osseous abnormality.     IMPRESSION: 1.  Recent sigmoidectomy. Trace abdominopelvic free ascites. Additionally, there is some loculated ascites in the anterior abdomen with partial rim enhancement and several foci of gas within, which suggests a degree of peritonitis. No drainable fluid collection currently. 2.  Trace bilateral pleural effusions right greater than left with compressive atelectasis. 3.  Body wall edema. 4.  Diffuse hepatic steatosis. 5.  Cholelithiasis with mild gallbladder distention which may be related to fasting.    CT Abdomen Pelvis w Contrast    Result Date: 1/25/2023  EXAM: CT ABDOMEN PELVIS W CONTRAST LOCATION: [facility] DATE/TIME: 1/25/2023 11:28 PM INDICATION: abdominal pain worst in lower quadrants, leukocytosis eval for pathology COMPARISON: 11/26/2022 TECHNIQUE: CT scan of the abdomen and pelvis was performed following injection of IV contrast. Multiplanar reformats were obtained. Dose reduction techniques were used. CONTRAST: isovue 370 100ml FINDINGS: LOWER CHEST: Small left Bochdalek hernia. Basilar atelectasis. HEPATOBILIARY: Cholelithiasis and hepatic steatosis. PANCREAS: Normal. SPLEEN: Normal. ADRENAL GLANDS: Normal. KIDNEYS/BLADDER: Normal. BOWEL: Small bowel is normal caliber. Large amount of colonic stool. Sigmoid colonic wall thickening with adjacent inflammation is again seen. The inflamed segment is distended with stool. Small amount of adjacent free fluid. Caliber change with collapse   of the rectosigmoid junction and rectum. Diverticulosis.  LYMPH NODES: Subcentimeter retroperitoneal lymph nodes. VASCULATURE: Atherosclerotic vascular calcification. PELVIC ORGANS: Absent uterus. MUSCULOSKELETAL: Degenerative change osseous structures. Mild compression L4. Slight anterolisthesis L4 on L5.     IMPRESSION: 1.  Sigmoid colitis is again seen. Collapse of the rectosigmoid junction and rectum distal to the inflamed segment of colon. Underlying stricture not excluded. 2.  Small amount of adjacent free fluid. 3.  Cholelithiasis.    CTA Abdomen Pelvis with Contrast    Result Date: 1/29/2023  EXAM: CTA ABDOMEN PELVIS WITH CONTRAST LOCATION: [facility] DATE/TIME: 1/29/2023 6:48 AM INDICATION: recurrent colitis, distended abdomen pain out of proportion. COMPARISON: There are study dated 1/25/ TECHNIQUE: CT angiogram abdomen pelvis during arterial phase of injection of IV contrast. 2D and 3D MIP reconstructions were performed by the CT technologist. Dose reduction techniques were used. CONTRAST: isovue 370 100ml FINDINGS: ANGIOGRAM ABDOMEN/PELVIS: The abdominal aorta is normal in size and caliber throughout with scattered atherosclerotic calcifications noted. The iliac and femoral vessels are normal in size and caliber and well-opacified. Celiac and SMA and TONY arise normally and are well opacified at this time LOWER CHEST: Lung bases are clear. Heart is enlarged, similar prior exam. HEPATOBILIARY: Diffuse hepatic steatosis. No significant mass. No bile duct dilatation. Calcified gallstones are again seen within the gallbladder. PANCREAS: No significant mass, duct dilatation, or inflammatory change. SPLEEN: Normal size. ADRENAL GLANDS: No significant nodules. KIDNEYS/BLADDER: No significant mass, stone, or hydronephrosis. BOWEL: Mural thickening and pericolonic inflammation of the sigmoid colon is again noted, similar in appearance to prior exam with increased inflammation seen in the  proximal sigmoid colon. No pericolonic abscess seen at this time. Stool is seen throughout the inflamed sigmoid colon as well as scattered throughout the large bowel without evidence of large bowel obstruction. Adjacent to the proximal sigmoid colon is free fluid with foci of gas which are extraluminal and new from the prior study (image 1:15, series 5). Additional foci of extraluminal gas are seen within the mesenteric fat anteriorly in the upper abdomen (image 57, series 5). There are scattered diverticula seen throughout the colon. Air-fluid levels are seen scattered through multiple loops of small bowel with mild dilatation seen in the proximal jejunum in the upper abdomen, these mildly dilated loops of small bowel decompressed gradually distally without a clear transition point. LYMPH NODES: Increased Number of pericolonic lymph nodes are again seen adjacent to the sigmoid colon. PELVIC ORGANS: No pelvic masses. MUSCULOSKELETAL: Unchanged     IMPRESSION: 1.  Mural thickening of the sigmoid colon with pericolonic inflammation and stranding, slightly worsened from prior exam with punctate foci of gas seen adjacent to the inflamed colon as well as tracking in the anterior abdominal wall concerning for sigmoid colonic perforation. No organized intra-abdominal fluid collection such as abscess is seen at this time. Given the long-standing inflammation in this region an underlying neoplastic process cannot be excluded. 2.  Scattered air-fluid levels and mildly dilated loops of proximal jejunum which decompresses gradually without a transition point consistent favored to reflect reactive ileus 3.  Hepatic steatosis 4.  Cholelithiasis [Critical Result: Sigmoid colonic inflammation with areas of extraluminal gas, new from 1/25/2023 concerning for sigmoid colonic perforation] Finding was identified on 1/29/2023 6:56 AM. DR. Snell was contacted by me on 1/29/2023 7:08 AM and verbalized understanding of the critical  result.     CT Chest w/o Contrast    Result Date: 1/29/2023  EXAM: CT CHEST WITHOUT CONTRAST LOCATION: [facility] DATE/TIME: 01/29/2023, 6:46 AM INDICATION: Fever and cough. COMPARISON: 11/26/2022 - CT chest, abdomen and pelvis. TECHNIQUE: Note that this study was performed in conjunction with a CT scan of the abdomen and pelvis. Refer to a separate report for findings from that study. CT chest without IV contrast. Multiplanar reformats were obtained. Dose reduction techniques were used. CONTRAST: None. FINDINGS: LUNGS AND PLEURA: Minimal emphysematous changes in the lungs. Slight interstitial thickening in bilateral lung bases. A few curvilinear opacities in the periphery of both lungs likely represent atelectasis and/or scarring. The lungs are otherwise clear. Small left Bochdalek hernia containing fat MEDIASTINUM/AXILLAE: Atherosclerotic calcification in the thoracic aorta. CORONARY ARTERY CALCIFICATION: Present. MUSCULOSKELETAL: No acute findings. UPPER ABDOMEN: A few foci of extraluminal gas are present in the upper abdomen.     IMPRESSION: 1.  Slight interstitial thickening in bilateral lung bases. This is nonspecific, but most likely relates to interstitial pulmonary edema. 2.  No other findings suspicious for acute pulmonary disease. 3.  A few foci of extraluminal gas scattered within the nondependent aspect of the upper abdomen. In the absence of recent surgery, this is consistent with a bowel perforation. Refer to the report from the CT scan of the abdomen and pelvis performed in conjunction with this study for additional details. The findings were called to Dr. Snell by Dr. Anderson on 01/29/2023 at 0700 hours.      Attestation:  I have reviewed today's Medications, Vital Signs, and Labs. Cultures and previous notes were reviewed and summarized above. Recommendations discussed with primary service.

## 2023-02-15 NOTE — PROGRESS NOTES
ASSESSMENT:  1. Perforation of colon (H)    2. Refusal of blood transfusions as patient is Sabianism        Suzy Gómez is a 77 year old female who is s/p laparoscopic sigmoid colectomy and washout on 1/29/2023.  Her leukocytosis remains normal.  From a surgical standpoint okay for discharge to TCU for further rehab.  No new meaningful contribution to her clinical care from us today.    PLAN:  -Appreciate ID input and antibiotics per ID  -Continue with physical therapy and working on strengthening and movement.  -Staples to remain for a week and can be removed in a week.  We can schedule patient as a visit in a week for postop care.    SUBJECTIVE:   No noteworthy changes over past 24 hours.  Actually got a smile out of her today.       Patient Vitals for the past 24 hrs:   BP Temp Temp src Pulse Resp SpO2   02/15/23 0814 (!) 143/66 97.8  F (36.6  C) Oral 89 22 98 %   02/15/23 0747 (!) (P) 143/66 (P) 97.9  F (36.6  C) (P) Axillary (P) 84 (P) 22 (P) 94 %   02/14/23 2305 138/68 98.7  F (37.1  C) Oral 94 20 98 %   02/14/23 1541 (!) 146/65 99  F (37.2  C) Oral 85 20 93 %         PHYSICAL EXAM:  GEN: No acute distress, comfortable    ABD: Soft and incisions are clean and dry    Output by Drain (mL) 02/13/23 0700 - 02/13/23 1459 02/13/23 1500 - 02/13/23 2259 02/13/23 2300 - 02/14/23 0659 02/14/23 0700 - 02/14/23 1459 02/14/23 1500 - 02/14/23 2259 02/14/23 2300 - 02/15/23 0659 02/15/23 0700 - 02/15/23 1007   Patient has no LDAs of requested type attached.      EXT:No cyanosis, edema or obvious abnormalities    02/14 0700 - 02/15 0659  In: 703 [P.O.:600; I.V.:103]  Out: -     No results displayed because visit has over 200 results.   Recent Results (from the past 24 hour(s))   Glucose by meter    Collection Time: 02/14/23 11:58 AM   Result Value Ref Range    GLUCOSE BY METER POCT 233 (H) 70 - 99 mg/dL   Glucose by meter    Collection Time: 02/14/23  4:45 PM   Result Value Ref Range    GLUCOSE BY METER POCT 166 (H)  70 - 99 mg/dL   Glucose by meter    Collection Time: 02/14/23  5:07 PM   Result Value Ref Range    GLUCOSE BY METER POCT 141 (H) 70 - 99 mg/dL   Glucose by meter    Collection Time: 02/14/23  9:06 PM   Result Value Ref Range    GLUCOSE BY METER POCT 155 (H) 70 - 99 mg/dL   CBC with platelets    Collection Time: 02/15/23  6:56 AM   Result Value Ref Range    WBC Count 10.5 4.0 - 11.0 10e3/uL    RBC Count 3.04 (L) 3.80 - 5.20 10e6/uL    Hemoglobin 8.5 (L) 11.7 - 15.7 g/dL    Hematocrit 28.7 (L) 35.0 - 47.0 %    MCV 94 78 - 100 fL    MCH 28.0 26.5 - 33.0 pg    MCHC 29.6 (L) 31.5 - 36.5 g/dL    RDW 14.9 10.0 - 15.0 %    Platelet Count 420 150 - 450 10e3/uL   Basic metabolic panel    Collection Time: 02/15/23  6:56 AM   Result Value Ref Range    Sodium 138 136 - 145 mmol/L    Potassium 3.9 3.4 - 5.3 mmol/L    Chloride 101 98 - 107 mmol/L    Carbon Dioxide (CO2) 31 (H) 22 - 29 mmol/L    Anion Gap 6 (L) 7 - 15 mmol/L    Urea Nitrogen 12.7 8.0 - 23.0 mg/dL    Creatinine 0.56 0.51 - 0.95 mg/dL    Calcium 8.6 (L) 8.8 - 10.2 mg/dL    Glucose 142 (H) 70 - 99 mg/dL    GFR Estimate >90 >60 mL/min/1.73m2   Glucose by meter    Collection Time: 02/15/23  7:50 AM   Result Value Ref Range    GLUCOSE BY METER POCT 164 (H) 70 - 99 mg/dL               Wilfredo Palencia MD

## 2023-02-15 NOTE — PLAN OF CARE
Goal Outcome Evaluation:      Took over care for this pt at 1900-  Pt lethargic and sleeping between cares while in the room. Stimulated by voice and asking questions. C/o of back pain 2/10 Tylenol given, sore throat, q1 throat lozenge given, and generalized abdominal pain. On 3L nc saturations in the mid 90%. BG . Incontinent of B&B, one soft BM tonight.   Problem: Plan of Care - These are the overarching goals to be used throughout the patient stay.    Goal: Optimal Comfort and Wellbeing  Outcome: Progressing     Problem: Bowel Resection  Goal: Effective Bowel Motility and Elimination  Outcome: Progressing     Problem: Bowel Resection  Goal: Acceptable Pain Control  Intervention: Prevent or Manage Pain  Recent Flowsheet Documentation  Taken 2/14/2023 2050 by Diane Ruiz, RN  Pain Management Interventions: medication (see MAR)     Problem: Bowel Resection  Goal: Effective Oxygenation and Ventilation  Outcome: Progressing         Plan of Care Reviewed With: patient    Overall Patient Progress: no changeOverall Patient Progress: no change

## 2023-02-15 NOTE — PLAN OF CARE
[id number]  Goal Outcome Evaluation:    Plan of Care Reviewed With: patient  Patient seem more alert this morning. Up in the chair for  meals, Ambulated on hallways X 2, with a walker and gait belt.  Lozenges given for sore throat. Rated abdominal pain of 2/10, scheduled tylenol given with good relief.  Oxygen titrated to  2 L NC sating 94 %.

## 2023-02-15 NOTE — PROGRESS NOTES
"Psychiatric Progress Note  Adjustment reaction with anxiety and depression in the setting of prolonged hospitalization.  Depression with anxiety, exacerbated due to stressors of hospital and medical treatment.  Situational depression.  Mood changes due to a prolonged hospitalization.      PLAN/RECOMMENDATIONS:  Venlafaxine 112.5 mg daily x3 days then increase to 150 mg daily thereafter.  Melatonin 5 mg at bedtime.      SUBJECTIVE:  Patient continues to feel tired, exhausted, fatigued, without motivation to eat or engage in activities.  She likes to eat soft and cold food at it did not bother her \"sore throat\".  She feels \"exhausted\".  Denies any hallucinations, delusions apparently.  Denies any thoughts of self-harm.  She wants to get up and walk but \"no energy\".    MENTAL STATUS EXAMINATION:   Appears fatigued, not in acute distress.  Speech: Slow  Thought process: Clear  Thought content: No psychosis hallucinations or delusions.  No suicidality.  Thought formation: No loosening of associations.  Insight: Fair  Judgment: Fair  Fund of knowledge: Average  Memory: Baseline  Affect: Neutral, mood: Anxious  Awake and orientation x3  Comprehension: Sufficient  Language: Intact  Gait and ambulation with assist of 1  Psychomotor slow.  No tonal abnormalities, no muscle rigidity    Vital signs in last 24 hours  Temp:  [97.8  F (36.6  C)-99  F (37.2  C)] 98  F (36.7  C)  Pulse:  [78-94] 78  Resp:  [15-22] 15  BP: (133-146)/(63-68) 133/63  SpO2:  [93 %-98 %] 95 %  Weight:   [unfilled]            "

## 2023-02-15 NOTE — PLAN OF CARE
Goal Outcome Evaluation:      Plan of Care Reviewed With: patient    Overall Patient Progress: improvingOverall Patient Progress: improving    Outcome Evaluation: Pt reports little to no pain. incontinent of stool x2. IV abx another 1-2 days. awaiting TCU placement.

## 2023-02-16 ENCOUNTER — APPOINTMENT (OUTPATIENT)
Dept: OCCUPATIONAL THERAPY | Facility: HOSPITAL | Age: 78
DRG: 853 | End: 2023-02-16
Payer: COMMERCIAL

## 2023-02-16 LAB
ANION GAP SERPL CALCULATED.3IONS-SCNC: 6 MMOL/L (ref 7–15)
BUN SERPL-MCNC: 13.4 MG/DL (ref 8–23)
CALCIUM SERPL-MCNC: 8.6 MG/DL (ref 8.8–10.2)
CHLORIDE SERPL-SCNC: 99 MMOL/L (ref 98–107)
CREAT SERPL-MCNC: 0.58 MG/DL (ref 0.51–0.95)
DEPRECATED HCO3 PLAS-SCNC: 34 MMOL/L (ref 22–29)
ERYTHROCYTE [DISTWIDTH] IN BLOOD BY AUTOMATED COUNT: 15 % (ref 10–15)
GFR SERPL CREATININE-BSD FRML MDRD: >90 ML/MIN/1.73M2
GLUCOSE BLDC GLUCOMTR-MCNC: 137 MG/DL (ref 70–99)
GLUCOSE BLDC GLUCOMTR-MCNC: 139 MG/DL (ref 70–99)
GLUCOSE BLDC GLUCOMTR-MCNC: 143 MG/DL (ref 70–99)
GLUCOSE BLDC GLUCOMTR-MCNC: 190 MG/DL (ref 70–99)
GLUCOSE SERPL-MCNC: 118 MG/DL (ref 70–99)
HCT VFR BLD AUTO: 29 % (ref 35–47)
HGB BLD-MCNC: 8.6 G/DL (ref 11.7–15.7)
MCH RBC QN AUTO: 28 PG (ref 26.5–33)
MCHC RBC AUTO-ENTMCNC: 29.7 G/DL (ref 31.5–36.5)
MCV RBC AUTO: 95 FL (ref 78–100)
PLATELET # BLD AUTO: 433 10E3/UL (ref 150–450)
POTASSIUM SERPL-SCNC: 3.7 MMOL/L (ref 3.4–5.3)
RBC # BLD AUTO: 3.07 10E6/UL (ref 3.8–5.2)
SODIUM SERPL-SCNC: 139 MMOL/L (ref 136–145)
T4 FREE SERPL-MCNC: 0.95 NG/DL (ref 0.9–1.7)
TSH SERPL DL<=0.005 MIU/L-ACNC: 6.47 UIU/ML (ref 0.3–4.2)
VIT B12 SERPL-MCNC: 373 PG/ML (ref 232–1245)
WBC # BLD AUTO: 9.3 10E3/UL (ref 4–11)

## 2023-02-16 PROCEDURE — 250N000013 HC RX MED GY IP 250 OP 250 PS 637: Performed by: NURSE PRACTITIONER

## 2023-02-16 PROCEDURE — 250N000011 HC RX IP 250 OP 636: Performed by: SURGERY

## 2023-02-16 PROCEDURE — 97110 THERAPEUTIC EXERCISES: CPT | Mod: GO

## 2023-02-16 PROCEDURE — 250N000013 HC RX MED GY IP 250 OP 250 PS 637: Performed by: HOSPITALIST

## 2023-02-16 PROCEDURE — 85014 HEMATOCRIT: CPT | Performed by: INTERNAL MEDICINE

## 2023-02-16 PROCEDURE — 84439 ASSAY OF FREE THYROXINE: CPT | Performed by: HOSPITALIST

## 2023-02-16 PROCEDURE — 250N000013 HC RX MED GY IP 250 OP 250 PS 637: Performed by: SURGERY

## 2023-02-16 PROCEDURE — 999N000248 HC STATISTIC IV INSERT WITH US BY RN

## 2023-02-16 PROCEDURE — 99207 PR CDG-CUT & PASTE-POTENTIAL IMPACT ON LEVEL: CPT | Performed by: HOSPITALIST

## 2023-02-16 PROCEDURE — 80048 BASIC METABOLIC PNL TOTAL CA: CPT | Performed by: INTERNAL MEDICINE

## 2023-02-16 PROCEDURE — 82607 VITAMIN B-12: CPT | Performed by: HOSPITALIST

## 2023-02-16 PROCEDURE — 36415 COLL VENOUS BLD VENIPUNCTURE: CPT | Performed by: INTERNAL MEDICINE

## 2023-02-16 PROCEDURE — 99232 SBSQ HOSP IP/OBS MODERATE 35: CPT | Performed by: HOSPITALIST

## 2023-02-16 PROCEDURE — 250N000011 HC RX IP 250 OP 636: Performed by: INTERNAL MEDICINE

## 2023-02-16 PROCEDURE — 97535 SELF CARE MNGMENT TRAINING: CPT | Mod: GO

## 2023-02-16 PROCEDURE — 84443 ASSAY THYROID STIM HORMONE: CPT | Performed by: HOSPITALIST

## 2023-02-16 PROCEDURE — 99232 SBSQ HOSP IP/OBS MODERATE 35: CPT | Performed by: NURSE PRACTITIONER

## 2023-02-16 PROCEDURE — 250N000013 HC RX MED GY IP 250 OP 250 PS 637: Performed by: INTERNAL MEDICINE

## 2023-02-16 PROCEDURE — 120N000001 HC R&B MED SURG/OB

## 2023-02-16 PROCEDURE — 250N000011 HC RX IP 250 OP 636: Performed by: HOSPITALIST

## 2023-02-16 PROCEDURE — 258N000003 HC RX IP 258 OP 636: Performed by: INTERNAL MEDICINE

## 2023-02-16 RX ORDER — FUROSEMIDE 10 MG/ML
40 INJECTION INTRAMUSCULAR; INTRAVENOUS
Status: DISCONTINUED | OUTPATIENT
Start: 2023-02-16 | End: 2023-02-18

## 2023-02-16 RX ADMIN — VANCOMYCIN HYDROCHLORIDE 1000 MG: 1 INJECTION, SOLUTION INTRAVENOUS at 06:47

## 2023-02-16 RX ADMIN — MEROPENEM 1 G: 1 INJECTION INTRAVENOUS at 16:47

## 2023-02-16 RX ADMIN — ACETAMINOPHEN 650 MG: 325 TABLET ORAL at 22:34

## 2023-02-16 RX ADMIN — MEROPENEM 1 G: 1 INJECTION INTRAVENOUS at 10:53

## 2023-02-16 RX ADMIN — ENOXAPARIN SODIUM 40 MG: 40 INJECTION SUBCUTANEOUS at 08:17

## 2023-02-16 RX ADMIN — LEVOTHYROXINE SODIUM 137 MCG: 0.11 TABLET ORAL at 06:47

## 2023-02-16 RX ADMIN — FUROSEMIDE 40 MG: 10 INJECTION, SOLUTION INTRAMUSCULAR; INTRAVENOUS at 10:54

## 2023-02-16 RX ADMIN — FUROSEMIDE 40 MG: 10 INJECTION, SOLUTION INTRAMUSCULAR; INTRAVENOUS at 17:49

## 2023-02-16 RX ADMIN — ROSUVASTATIN CALCIUM 20 MG: 10 TABLET, FILM COATED ORAL at 22:34

## 2023-02-16 RX ADMIN — BENZOCAINE 6 MG-MENTHOL 10 MG LOZENGES 1 LOZENGE: at 06:52

## 2023-02-16 RX ADMIN — ACETAMINOPHEN 650 MG: 325 TABLET ORAL at 08:17

## 2023-02-16 RX ADMIN — ASPIRIN 81 MG: 81 TABLET, COATED ORAL at 08:17

## 2023-02-16 RX ADMIN — AMLODIPINE BESYLATE 2.5 MG: 2.5 TABLET ORAL at 08:17

## 2023-02-16 RX ADMIN — ACETAMINOPHEN 650 MG: 325 TABLET ORAL at 14:51

## 2023-02-16 RX ADMIN — BENZOCAINE 6 MG-MENTHOL 10 MG LOZENGES 1 LOZENGE: at 14:51

## 2023-02-16 RX ADMIN — VANCOMYCIN HYDROCHLORIDE 1000 MG: 1 INJECTION, SOLUTION INTRAVENOUS at 21:32

## 2023-02-16 RX ADMIN — MEROPENEM 1 G: 1 INJECTION INTRAVENOUS at 00:50

## 2023-02-16 RX ADMIN — MICAFUNGIN SODIUM 100 MG: 50 INJECTION, POWDER, LYOPHILIZED, FOR SOLUTION INTRAVENOUS at 17:50

## 2023-02-16 RX ADMIN — Medication 5 MG: at 22:34

## 2023-02-16 RX ADMIN — ANORECTAL OINTMENT: 15.7; .44; 24; 20.6 OINTMENT TOPICAL at 22:42

## 2023-02-16 RX ADMIN — VENLAFAXINE HYDROCHLORIDE 112.5 MG: 75 CAPSULE, EXTENDED RELEASE ORAL at 08:17

## 2023-02-16 ASSESSMENT — ACTIVITIES OF DAILY LIVING (ADL)
ADLS_ACUITY_SCORE: 37
ADLS_ACUITY_SCORE: 43
ADLS_ACUITY_SCORE: 37

## 2023-02-16 NOTE — PROGRESS NOTES
ASSESSMENT:  1. Perforation of colon (H)    2. Refusal of blood transfusions as patient is Jehovah's witness        Suzy Gómez is a 77 year old female who is s/p laparoscopic sigmoid colectomy and washout on 1/29/2023.  Her leukocytosis remains normal. She is able to transfer to TCU from a surgical perspective.     PLAN:  Abx per ID  Staples out in a week; she is scheduled for a post-op on 2/22  Discharge when medically appropriate    SUBJECTIVE:   Pain controlled. Tolerating diet. No new events overnight.       Patient Vitals for the past 24 hrs:   BP Temp Temp src Pulse Resp SpO2 Weight   02/16/23 0738 (!) 145/67 99.1  F (37.3  C) Oral 80 20 93 % --   02/16/23 0645 -- -- -- -- -- -- 110.4 kg (243 lb 6.2 oz)   02/16/23 0338 -- -- -- -- -- 94 % --   02/15/23 2348 132/61 98.9  F (37.2  C) Oral 74 18 96 % --   02/15/23 1945 130/60 99  F (37.2  C) Oral 84 18 96 % --   02/15/23 1527 132/58 98.1  F (36.7  C) Oral 74 17 97 % --   02/15/23 1132 133/63 98  F (36.7  C) Oral 78 15 95 % --         PHYSICAL EXAM:  GEN: No acute distress, comfortable  LUNGS: CTA bilaterally  CV:RRR  ABD:soft, mild tenderness in the mid left abdomen, obese, no peritoneal signs  EXT:No cyanosis, edema or obvious abnormalities    02/15 0700 - 02/16 0659  In: 1703 [P.O.:1400; I.V.:303]  Out: -     No results displayed because visit has over 200 results.             Michela Hoyt, HERBIE CNP

## 2023-02-16 NOTE — PROGRESS NOTES
"Psychiatric Progress Note  Adjustment reaction with anxiety and depression in the setting of prolonged hospitalization.  Depression with anxiety, exacerbated due to stressors of hospital and medical treatment.  Situational depression.  Mood changes due to a prolonged hospitalization.      PLAN/RECOMMENDATIONS:  Venlafaxine 112.5 mg daily x3 days then increase to 150 mg daily thereafter.  Melatonin 5 mg at bedtime.      SUBJECTIVE:  Patient is eating breakfast, and is looking forward to drinking her coffee to.  She feels tired but a little better than yesterday.  Slept well.  Does not want to get out of bed at this time, saying \"it is too early\".  Little more talkative than yesterday.  Denies any hallucinations or nightmares.  Denies any anxiety.    MENTAL STATUS EXAMINATION:   Appears fatigued, not in acute distress.  Pleasant, cooperative, not in acute distress  Speech: Coherent, normal rate and volume  Thought process: Clear  Thought content: No psychosis hallucinations or delusions.  No suicidality.  Thought formation: No loosening of associations.  Insight: Fair  Judgment: Fair  Fund of knowledge: Average  Memory: Baseline  Affect: Neutral, mood: Anxious  Awake and orientation x3  Comprehension: Sufficient  Language: Intact  Gait and ambulation with assist of 1  Psychomotor slow.  No tonal abnormalities, no muscle rigidity                "

## 2023-02-16 NOTE — PROGRESS NOTES
"CLINICAL NUTRITION SERVICES - REASSESSMENT NOTE     Nutrition Prescription    RECOMMENDATIONS FOR MDs/PROVIDERS TO ORDER:    Recommendations already ordered by Registered Dietitian (RD):  Continue gelatein plus and the magic cup.  Will add SB ensure back in    Future/Additional Recommendations:  Monitor po intake, weight, labs, poc     EVALUATION OF THE PROGRESS TOWARD GOALS   Diet: Low fiber  Nutrition Supplements: cherry geltein plus, chocolate magic cup with supper  Intake: pt thinks she's eating better (po intake documented 25-75% of meals) po intake from 2/15=471+ Calories and 21+ g protein (supplements not included as not sure if pt taking) pt states she is taking the chocolate magic cup.  The gelatein plus is sitting at bedside. She doesn't remember if she's tried it.  RN states pt has been refusing the banantrol and it's accumulating in her room (discontinued it)     ANTHROPOMETRICS  Height: 165.1 cm (5' 5\")  Most Recent Weight: 110.4 kg (243 lb 6.2 oz)    Weight History:   Wt Readings from Last 10 Encounters:   02/16/23 110.4 kg (243 lb 6.2 oz)   01/25/23 99.8 kg (220 lb)   11/26/22 99.8 kg (220 lb)     GI CONCERNS  Abdominal discomfort  + BS all quadrants  Last BM 2/16/2023    LABS  Reviewed: Na 139, Glu 118    MEDICATIONS  Reviewed: lasix, novolog, lantus pen, levothyroxine, merrem, mycamine, vancocin    MALNUTRITION:  % Weight Loss:  None noted  % Intake:  </= 50% for >/= 5 days (severe malnutrition)  Subcutaneous Fat Loss:  None observed  Muscle Loss:  None observed  Fluid Retention:  Trace to mild 1+ to 2+    Malnutrition Diagnosis: Moderate malnutrition  In Context of:  Acute illness or injury    CURRENT NUTRITION DIAGNOSIS  Malnutrition related to acute illness/surgery as evidenced by inadequate oral intake < 50% > 5 days and fluid accumulation      INTERVENTIONS  Implementation  Add SB ensure back in (with breakfast trays)  Continue cherry gelatein plus and magic cup  Encouraged po " intake    Goals  Patient to consume % of nutritionally adequate meals three times per day, or the equivalent with supplements/snacks.not met    Monitoring/Evaluation  Progress toward goals will be monitored and evaluated per protocol.

## 2023-02-16 NOTE — PLAN OF CARE
Shift from 0700 to 1930-      Problem: Diabetes Comorbidity  Goal: Blood Glucose Level Within Targeted Range  Outcome: Progressing     Problem: Pain Acute  Goal: Optimal Pain Control and Function  Outcome: Progressing  Intervention: Prevent or Manage Pain  Recent Flowsheet Documentation  Taken 2/16/2023 0832 by Jeni Connolly RN  Sensory Stimulation Regulation: care clustered  Medication Review/Management: medications reviewed     Problem: Infection  Goal: Absence of Infection Signs and Symptoms  Outcome: Progressing     Problem: Fluid Volume Excess  Goal: Fluid Balance  Outcome: Progressing       Goal Outcome Evaluation:    Patient continues to be edematous. Pt on IV lasix. Weight has increased.    Encouraged to turn in bed and get OOB. Patient seeing PT/OT and is very weak when OOB. Up in chair most of the day.    BG were 139,137,& 143.    Denies pain.     IV site infiltrated. Restarted per PICC. Continued on IV abx. See MAR.

## 2023-02-16 NOTE — PLAN OF CARE
Patient Aox4, withdrawn. VSS. Weaned to 1L O2. Denies chest pain and n/v. SOB with exertion. Abdominal pain; reports manageable with scheduled tyl this shift. Up with 1-2 assist, fww and gb. Intermittent incontinence. IV abx. Fall precautions in place, bed alarm on. Using call light appropriately.    Goal Outcome Evaluation:  Problem: Plan of Care - These are the overarching goals to be used throughout the patient stay.    Goal: Absence of Hospital-Acquired Illness or Injury  Intervention: Identify and Manage Fall Risk  Recent Flowsheet Documentation  Taken 2/16/2023 0310 by Tamar Vega, RN  Safety Promotion/Fall Prevention:   assistive device/personal items within reach   bed alarm on   nonskid shoes/slippers when out of bed   fall prevention program maintained   patient and family education  Problem: Bowel Resection  Goal: Effective Oxygenation and Ventilation  Outcome: Progressing  Intervention: Optimize Oxygenation and Ventilation  Recent Flowsheet Documentation  Taken 2/16/2023 0338 by Tamar Vega, RN  Head of Bed (HOB) Positioning: HOB at 15 degrees  Taken 2/15/2023 2040 by Tamar Vega, RN  Head of Bed (HOB) Positioning: HOB at 20-30 degrees

## 2023-02-16 NOTE — PROGRESS NOTES
[facility]  [facility]  Admission date: 1/29/2023    Summary:    77F a/w sepsis due to bowel perforation and found to have impacted stool in sigmoid colon with associated perforation and feculent peritonitis.       Assessment/Plan    #Perforation of Colonic Stercolic Ulcer   #Sepsis  #Bacteremia with clostridium species, Collinsella aerofaciens, bacteroides vulgatus, and Eubacterium spp  --S/P Sigmoid Colectomy 1/29/30. Diffuse feculent peritonitis noted intra-op  --Repeat CT without drainable fluid collections  --completes course of merrem, micafungin and vanco 02/16.      #HTN -- Amlodipine with hold parameters. Losartan has been on hold     #DM type 2 - lantus had been on hold.  Resume at 10units, sliding scale insulin.     #Fluid overload -  still hypervolemic.  Continue IV diuresis.      Refusal of blood transfusions as patient is Confucianism     KARYN (obstructive sleep apnea)     Depression: On effexor at home. Patient looks more depressed and withdrawn.  requests psychiatry consult and effexor  Dose has been increased   -check B12 and TSH    Checklist:  Code Status: Full Code    Diet: Snacks/Supplements Adult: Banatrol Plus; Between Meals  Low Fiber Diet  Snacks/Supplements Adult: Other; L: cherry gel+, D: chocolate magic cup; With Meals     DVT px:  Enoxaparin (Lovenox) SQ    Disposition and Discharge Planning  Auto-populated from discharge tab:    Expected Discharge Date: 02/17/2023    Discharge Delays: Placement - TCU  IV Medication - consider oral or Home Infusion    Discharge Comments: IV antibiotic and Lasix         System Identified Risk Variables  Auto-populated based on system request - if relevant they will be addressed above:  Clinically Significant Risk Factors              # Hypoalbuminemia: Lowest albumin = 2.2 g/dL at 2/5/2023  6:41 AM, will monitor as appropriate          # DMII: A1C = 8.1 % (Ref range: <5.7 %) within past 3 months   #  "Obesity: Estimated body mass index is 39.27 kg/m  as calculated from the following:    Height as of this encounter: 1.651 m (5' 5\").    Weight as of this encounter: 107 kg (236 lb).              Interval Events/Subjective/Notable results:    Many unmeasured urines.  Weaned to 1L O2.    BGs ok  BMP and CBC pending    Objective    Vital signs in last 24 hours  Temp:  [97.8  F (36.6  C)-99  F (37.2  C)] 98.9  F (37.2  C)  Pulse:  [74-89] 74  Resp:  [15-22] 18  BP: (130-143)/(58-66) 132/61  SpO2:  [94 %-98 %] 94 % O2 Device: None (Room air)    Weight:   236 lbs 0 oz  Body mass index is 39.27 kg/m .    Intake/Output last 3 shifts  I/O last 3 completed shifts:  In: 1703 [P.O.:1400; I.V.:303]  Out: -     Physical Exam  General:  Alert, cooperative, no distress    Neurologic:  oriented, facial symmetry preserved, fluent speech.   Psych: calm  HEENT:  Anicteric, MMM  CV: RRR no MRG, normal S1 and S2, +edema  Lungs: CTAB.  Easyrespirations  Abd: softly distended, NT  Skin: no rashes or jaundice noted on exposed skin.    Box Catheter: Not present  Lines: None          Medical Decision Making             Leon Dejesus MD, [facility]  Internal Medicine Hospitalist  "

## 2023-02-16 NOTE — PROGRESS NOTES
Care Management Follow Up    Length of Stay (days): 18      Expected Discharge Date:        Concerns to be Addressed:  IV diuresing, TCU acceptance      Patient plan of care discussed at interdisciplinary rounds: Yes     Anticipated Discharge Disposition: Transitional Care     Anticipated Discharge Services:  TCU     Anticipated Discharge DME:   No new DME     Patient/family educated on Medicare website which has current facility and service quality ratings: yes  Education Provided on the Discharge Plan:  yes  Patient/Family in Agreement with the Plan:  yes     Referrals Placed by CM/SW:  TCU           Additional Information:  Patient admitted with bowel perforation and found to have impacted stool in sigmoid colon with associated perforation and feculent peritonitis. S/P Sigmoid Colectomy 1/29/30. Psychiatry consult.      Surgery: Staples to remain for a week and can be removed in a week.  We can schedule patient as a visit in a week for postop care.     ID recommends: Continue vancomycin, meropenem and micafungin through tomorrow.  No PICC line since patient may finish antibiotics before discharge.      Therapy: min assist sit -stand ,CGA for amb 50 feet with FWW. Recommendation is for TCU.     Social History:  Patient lives at home with her spouse Awais and has 4WW at home (although she generally doesn't use it).  Awais has been assisting more in home recently. Daughter Sheba is main family contact.      TCU referrals pending. Remains on IV Lasix. Patient will need to be off IV diuresing for 24 hours for TCU acceptance.     PAS needed. Anticipating need for [facility] to transport.         Lorri Childers RN

## 2023-02-17 ENCOUNTER — APPOINTMENT (OUTPATIENT)
Dept: PHYSICAL THERAPY | Facility: HOSPITAL | Age: 78
DRG: 853 | End: 2023-02-17
Payer: COMMERCIAL

## 2023-02-17 ENCOUNTER — APPOINTMENT (OUTPATIENT)
Dept: OCCUPATIONAL THERAPY | Facility: HOSPITAL | Age: 78
DRG: 853 | End: 2023-02-17
Payer: COMMERCIAL

## 2023-02-17 LAB
ANION GAP SERPL CALCULATED.3IONS-SCNC: 6 MMOL/L (ref 7–15)
BUN SERPL-MCNC: 13.7 MG/DL (ref 8–23)
CALCIUM SERPL-MCNC: 8.7 MG/DL (ref 8.8–10.2)
CHLORIDE SERPL-SCNC: 98 MMOL/L (ref 98–107)
CREAT SERPL-MCNC: 0.54 MG/DL (ref 0.51–0.95)
DEPRECATED HCO3 PLAS-SCNC: 35 MMOL/L (ref 22–29)
ERYTHROCYTE [DISTWIDTH] IN BLOOD BY AUTOMATED COUNT: 14.9 % (ref 10–15)
GFR SERPL CREATININE-BSD FRML MDRD: >90 ML/MIN/1.73M2
GLUCOSE BLDC GLUCOMTR-MCNC: 112 MG/DL (ref 70–99)
GLUCOSE BLDC GLUCOMTR-MCNC: 154 MG/DL (ref 70–99)
GLUCOSE BLDC GLUCOMTR-MCNC: 176 MG/DL (ref 70–99)
GLUCOSE BLDC GLUCOMTR-MCNC: 251 MG/DL (ref 70–99)
GLUCOSE SERPL-MCNC: 120 MG/DL (ref 70–99)
HCT VFR BLD AUTO: 29.8 % (ref 35–47)
HGB BLD-MCNC: 8.8 G/DL (ref 11.7–15.7)
MCH RBC QN AUTO: 27.4 PG (ref 26.5–33)
MCHC RBC AUTO-ENTMCNC: 29.5 G/DL (ref 31.5–36.5)
MCV RBC AUTO: 93 FL (ref 78–100)
PLATELET # BLD AUTO: 478 10E3/UL (ref 150–450)
POTASSIUM SERPL-SCNC: 3.8 MMOL/L (ref 3.4–5.3)
RBC # BLD AUTO: 3.21 10E6/UL (ref 3.8–5.2)
SODIUM SERPL-SCNC: 139 MMOL/L (ref 136–145)
WBC # BLD AUTO: 8.2 10E3/UL (ref 4–11)

## 2023-02-17 PROCEDURE — 250N000013 HC RX MED GY IP 250 OP 250 PS 637: Performed by: INTERNAL MEDICINE

## 2023-02-17 PROCEDURE — 85027 COMPLETE CBC AUTOMATED: CPT | Performed by: INTERNAL MEDICINE

## 2023-02-17 PROCEDURE — 250N000013 HC RX MED GY IP 250 OP 250 PS 637: Performed by: HOSPITALIST

## 2023-02-17 PROCEDURE — 250N000011 HC RX IP 250 OP 636: Performed by: SURGERY

## 2023-02-17 PROCEDURE — 36415 COLL VENOUS BLD VENIPUNCTURE: CPT | Performed by: INTERNAL MEDICINE

## 2023-02-17 PROCEDURE — 97116 GAIT TRAINING THERAPY: CPT | Mod: GP

## 2023-02-17 PROCEDURE — 80048 BASIC METABOLIC PNL TOTAL CA: CPT | Performed by: INTERNAL MEDICINE

## 2023-02-17 PROCEDURE — 250N000013 HC RX MED GY IP 250 OP 250 PS 637: Performed by: NURSE PRACTITIONER

## 2023-02-17 PROCEDURE — 99233 SBSQ HOSP IP/OBS HIGH 50: CPT | Performed by: EMERGENCY MEDICINE

## 2023-02-17 PROCEDURE — 250N000013 HC RX MED GY IP 250 OP 250 PS 637: Performed by: SURGERY

## 2023-02-17 PROCEDURE — 97530 THERAPEUTIC ACTIVITIES: CPT | Mod: GP

## 2023-02-17 PROCEDURE — 97535 SELF CARE MNGMENT TRAINING: CPT | Mod: GO

## 2023-02-17 PROCEDURE — 250N000011 HC RX IP 250 OP 636: Performed by: HOSPITALIST

## 2023-02-17 PROCEDURE — 120N000001 HC R&B MED SURG/OB

## 2023-02-17 RX ADMIN — ACETAMINOPHEN 650 MG: 325 TABLET ORAL at 15:25

## 2023-02-17 RX ADMIN — LEVOTHYROXINE SODIUM 137 MCG: 0.11 TABLET ORAL at 06:39

## 2023-02-17 RX ADMIN — ROSUVASTATIN CALCIUM 20 MG: 10 TABLET, FILM COATED ORAL at 20:34

## 2023-02-17 RX ADMIN — FUROSEMIDE 40 MG: 10 INJECTION, SOLUTION INTRAMUSCULAR; INTRAVENOUS at 17:21

## 2023-02-17 RX ADMIN — Medication 5 MG: at 20:34

## 2023-02-17 RX ADMIN — ANORECTAL OINTMENT: 15.7; .44; 24; 20.6 OINTMENT TOPICAL at 06:41

## 2023-02-17 RX ADMIN — ENOXAPARIN SODIUM 40 MG: 40 INJECTION SUBCUTANEOUS at 09:38

## 2023-02-17 RX ADMIN — ACETAMINOPHEN 650 MG: 325 TABLET ORAL at 20:34

## 2023-02-17 RX ADMIN — ASPIRIN 81 MG: 81 TABLET, COATED ORAL at 09:39

## 2023-02-17 RX ADMIN — VENLAFAXINE HYDROCHLORIDE 112.5 MG: 75 CAPSULE, EXTENDED RELEASE ORAL at 09:43

## 2023-02-17 RX ADMIN — BENZOCAINE 6 MG-MENTHOL 10 MG LOZENGES 1 LOZENGE: at 09:38

## 2023-02-17 RX ADMIN — AMLODIPINE BESYLATE 2.5 MG: 2.5 TABLET ORAL at 09:39

## 2023-02-17 RX ADMIN — ACETAMINOPHEN 650 MG: 325 TABLET ORAL at 09:39

## 2023-02-17 RX ADMIN — FUROSEMIDE 40 MG: 10 INJECTION, SOLUTION INTRAMUSCULAR; INTRAVENOUS at 09:39

## 2023-02-17 ASSESSMENT — ACTIVITIES OF DAILY LIVING (ADL)
ADLS_ACUITY_SCORE: 43
ADLS_ACUITY_SCORE: 43
ADLS_ACUITY_SCORE: 39
ADLS_ACUITY_SCORE: 43
ADLS_ACUITY_SCORE: 41
ADLS_ACUITY_SCORE: 43
ADLS_ACUITY_SCORE: 43
ADLS_ACUITY_SCORE: 39

## 2023-02-17 NOTE — PROGRESS NOTES
Daily Progress Note    Assessment/Plan:  77F a/w sepsis due to bowel perforation and found to have impacted stool in sigmoid colon with associated perforation and feculent peritonitis.       Assessment/Plan     #Perforation of Colonic Stercolic Ulcer   #Sepsis  #Bacteremia with clostridium species, Collinsella aerofaciens, bacteroides vulgatus, and Eubacterium spp  --S/P Sigmoid Colectomy 1/29/30. Diffuse feculent peritonitis noted intra-op  --Repeat CT without drainable fluid collections  --completed course of merrem, micafungin and vanco 02/16.      #HTN -- Amlodipine with hold parameters. Losartan had been on hold, blood pressures have been climbing, will continue to monitor and consider resumption if indicated.  At home is on losartan 50 mg twice daily.     #DM type 2 - lantus had been on hold.  Resume at 10units, sliding scale insulin.  Most recent A1c between 8 and 9.  Blood sugars currently acceptable.     #Fluid overload -  no current signs of fluid overload.  Initial chest x-ray was clear with no evidence of CHF.  No BNP done.  Patient states she does not feel swollen or edematous.  Lungs are clear and has no lower extremity edema.  Not on diuretics at home and currently on 40 mg IV twice daily.  We will discontinue this and continue to monitor.     Refusal of blood transfusions as patient is Protestant     KARYN (obstructive sleep apnea)     Depression: On effexor at home. Patient looked more depressed and withdrawn.  Seen by psychiatry, on Effexor 412.5 mg daily for 3 days then increase to 150 mg daily thereafter.   -B12 and Free T4 normal     Checklist:  Code Status: Full Code    Diet: Snacks/Supplements Adult: Banatrol Plus; Between Meals  Low Fiber Diet  Snacks/Supplements Adult: Other; L: cherry gel+, D: chocolate magic cup; With Meals     DVT px:  Enoxaparin (Lovenox) SQ    Code status:Full Code        Barriers to Discharge: Diuresis    Disposition: Anticipate discharge to TCU tomorrow with  discontinuation of IV diuresis today    Subjective:  Suzy is new to me today, chart reviewed and discussed with staff.  Her main complaint is fatigue.  She denies any chest pain or shortness of breath, no fevers or chills.  No nausea vomiting or diarrhea.        Current Medications Reviewed via EHR List    Objective:  Vital signs in last 24 hours:  [unfilled]  .prog  Weight:   @THISENCWEIGHTS(1)@  Weight change: -1.6 kg (-3 lb 8.4 oz)  Body mass index is 39.91 kg/m .    Intake/Output last 3 shifts:  I/O last 3 completed shifts:  In: 1200 [P.O.:1200]  Out: -   Intake/Output this shift:  No intake/output data recorded.    Physical Exam:  General: Sitting up in chair, flat affect  CV: Regular rate and rhythm, no peripheral edema  Lungs: Clear to auscultation  Abdomen: Soft, nontender        Imaging:  Personally Reviewed.  CT Chest/Abdomen/Pelvis w Contrast    Result Date: 2/7/2023  EXAM: CT CHEST/ABDOMEN/PELVIS W CONTRAST LOCATION: [facility] DATE/TIME: 2/7/2023 8:21 PM INDICATION: ongoing leukocytosis. new fever. 1/29 sigmoidectomy with wash out. COMPARISON: CT of the abdomen and pelvis 2/4/2023 TECHNIQUE: CT scan of the chest, abdomen, and pelvis was performed following injection of IV contrast. Multiplanar reformats were obtained. Dose reduction techniques were used. CONTRAST: 100 mL Isovue-370 FINDINGS: LUNGS AND PLEURA: Small bilateral pleural effusions layer into the posterior costophrenic sulci. Bands of atelectasis along the posterior pleura of the lower lobes. No lung edema or consolidation. Trachea and central airways are patent and normal caliber. MEDIASTINUM: Cardiac chambers are normal in size. No pericardial effusion. Normal caliber thoracic aorta and main pulmonary artery. Arch, proximal great vessel and descending aorta atheromatous calcification is patchy. There are no enlarged mediastinal or hilar lymph nodes. Esophagus is decompressed. A few small lower paraesophageal  varices are present. CORONARY ARTERY CALCIFICATION: Mild to moderate, three-vessel disease. HEPATOBILIARY: Slight geographic liver attenuation but no focal enhancement or parenchymal lesion. There is a calcified stone layering in the gallbladder. No gallbladder wall thickening or pericholecystic inflammation. No bile duct enlargement. PANCREAS: Normal. SPLEEN: Normal. ADRENAL GLANDS: Normal. KIDNEYS/BLADDER: Trace amount of air in the nondependent bladder from recent instrumentation. Bladder wall is smooth with uniform thickness. Kidneys are normal in size with symmetric enhancement and normal cortex thickness. No nephrolithiasis or hydronephrosis. BOWEL: Nondistended stomach. Proximal small bowel in the left upper quadrant is mildly dilated and fluid-filled, however no clear caliber transition and this is unchanged from 2/4/2023. There is hazy attenuation in the mesentery consistent with edema. Minimal perihepatic and perisplenic ascites. Similar ascites layering in the left lower quadrant with small amount of anterior linear enhancement (series 3, image 205). Status post sigmoid colon resection with colocolonic anastomosis in the upper left pelvis. There are multiple diverticula arising from the ascending colon. No pneumatosis or pneumoperitoneum. LYMPH NODES: Mesenteric, aortocaval and iliac chain lymph nodes are normal by size criteria. VASCULATURE: Diffuse atheromatous calcification of the infrarenal abdominal aorta and common iliac arteries. No aneurysm. PELVIC ORGANS: Post hysterectomy. MUSCULOSKELETAL: Grade 1 degenerative anterolisthesis of L4 on L5 and lumbar facet arthropathy. Vacuum disc phenomenon from L1-2 to L3-L4. Thoracic vertebra are maintained in height with small diffuse osteophytes and mild disc space narrowing. No aggressive or destructive bone lesions. Infraumbilical skin staples are present from recent laparotomy. No body wall hernia. Mild body wall anasarca.     IMPRESSION: 1.  Status post  segmental sigmoid colon resection with colocolonic anastomosis in the upper left pelvis. No findings to suggest a surgical complication. 2.  Diverticula arising from the ascending colon but no acute diverticulitis. 3.  Unchanged ascites and mesenteric edema. No drainable intra-abdominal fluid collection. 4.  Minimal pleural effusions and atelectasis in the posterior sulci. 5.  Cholelithiasis.     Echocardiogram Complete    Result Date: 2023  [id number]^[place] [address]  Name: SRIDHAR ALONSO MRN: [id number] : 1945 Study Date: 2023 12:44 PM Age: 77 yrs Gender: Female Patient Location: [facility] Reason For Study: CHF Ordering Physician: PAYAL OCAMPO Performed By: KATHARINE  BSA: 2.1 m2 Height: 65 in Weight: 220 lb HR: 87 BP: 138/63 mmHg ______________________________________________________________________________ Procedure Complete Echo Adult. ______________________________________________________________________________ Interpretation Summary  1. Normal left ventricular size and systolic performance with a visually estimated ejection fraction of 60%. 2. No significant valvular heart disease is identified on this study. 3. Normal right ventricular size with borderline reduction right ventricular systolic performance. 4. There is mild biatrial enlargement. 5. There is a very small pericardial effusion. There is no evidence of significant intraventricular dependence/tamponade physiology. ______________________________________________________________________________ Left ventricle: Normal left ventricular size and systolic performance with a visually estimated ejection fraction of 60%. There is normal regional wall motion. Left ventricular wall thickness is normal.  Assessment of LV Diastolic Function: The cumulative findings suggest impaired diastolic filling [The septal e' velocity is < 7 cm/s & lateral e' velocity is < 10 cm/s. The  average E/e' is >14. TR velocity is 2.8 m/s. Left atrial volume index is greater than 34 mL/mÂ ].  Assessment of left atrial pressure (LAP): The cumulative findings suggest moderately elevated left atrial pressure (the E/A is > 0.8 and <2.0 plus 2 or 3 of 3 of the following present: Average E/e' > 14, TRvel > 2.8 m/s, and/or LA vol. index > 34 ml/mÂ  ).  Right ventricle: Normal right ventricular size with borderline reduction right ventricular systolic performance.  Left atrium: There is mild left atrial enlargement.  Right atrium: There is mild right atrial enlargement.  IVC: The IVC is borderline dilated.  Aortic valve: The aortic valve is comprised of three cusps. No significant aortic stenosis or aortic insufficiency is detected on this study.  Mitral valve: The mitral valve appears morphologically normal. There is trace mitral insufficiency.  Tricuspid valve: The tricuspid valve is grossly morphologically normal. There is trace tricuspid insufficiency.  Pulmonic valve: The pulmonic valve is grossly morphologically normal.  Thoracic aorta: The aortic root and proximal ascending aorta are of normal dimension.  Pericardium: There is a very small pericardial effusion. There is no evidence of significant intraventricular dependence/tamponade physiology. ______________________________________________________________________________ ______________________________________________________________________________ MMode/2D Measurements & Calculations IVSd: 1.3 cm LVIDd: 4.1 cm LVIDs: 2.6 cm LVPWd: 1.2 cm FS: 36.6 % LV mass(C)d: 185.2 grams LV mass(C)dI: 89.9 grams/m2 Ao root diam: 3.2 cm LA dimension: 4.6 cm asc Aorta Diam: 3.4 cm LA/Ao: 1.4 LVOT diam: 2.3 cm LVOT area: 4.1 cm2 LA Volume Indexed (AL/bp): 36.7 ml/m2  RWT: 0.58  Time Measurements MM HR: 87.0 BPM  Doppler Measurements & Calculations MV E max tino: 123.0 cm/sec MV A max tino: 124.2 cm/sec MV E/A: 0.99 MV dec time: 0.24 sec Ao V2 max: 189.4 cm/sec Ao max PG:  14.0 mmHg Ao V2 mean: 123.9 cm/sec Ao mean P.8 mmHg Ao V2 VTI: 32.7 cm QUAN(I,D): 2.8 cm2 QUAN(V,D): 2.9 cm2 LV V1 max P.9 mmHg LV V1 max: 131.3 cm/sec LV V1 VTI: 22.2 cm SV(LVOT): 92.1 ml SI(LVOT): 44.7 ml/m2 PA acc time: 0.09 sec TR max ayden: 279.6 cm/sec TR max P.3 mmHg AV Ayden Ratio (DI): 0.69 QUAN Index (cm2/m2): 1.4 E/E': 13.6  E/E' avg: 15.6 Lateral E/e': 13.6 Medial E/e': 17.7 Peak E' Ayden: 9.0 cm/sec  ______________________________________________________________________________ Report approved by: Leonid Hamilton 2023 02:47 PM       US Lower Extremity Venous Duplex Bilateral    Result Date: 2/10/2023  EXAM: US LOWER EXTREMITY VENOUS DUPLEX BILATERAL LOCATION: [facility] DATE/TIME: 2/10/2023 10:35 AM INDICATION: Bilateral lower extremity pain, swelling and edema. Fever. COMPARISON: None. TECHNIQUE: Venous Duplex ultrasound of bilateral lower extremities with and without compression, augmentation and duplex. Color flow and spectral Doppler with waveform analysis performed. FINDINGS: Exam includes the common femoral, femoral, popliteal veins as well as segmentally visualized deep calf veins and greater saphenous vein. RIGHT: No deep vein thrombosis. No superficial thrombophlebitis. Anechoic avascular cyst in the right popliteal fossa measuring 4.3 x 3.1 x 0.7 cm. LEFT: No deep vein thrombosis. No superficial thrombophlebitis. Anechoic avascular cyst in the left popliteal fossa measuring 3.5 x 2.0 x 0.5 cm     IMPRESSION: 1.  No deep venous thrombosis in the bilateral lower extremities. 2.  Bilateral popliteal fossa Baker's cysts, slightly larger on the right.    XR Chest Port 1 View    Result Date: 2023  EXAM: XR CHEST PORT 1 VIEW LOCATION: [facility] DATE/TIME: 2023 7:42 PM INDICATION: fever, new cough COMPARISON: 2021     IMPRESSION: Lung volumes are lower on this portable study. Borderline cardiomegaly unchanged. Pulmonary  vessels normal. Questionable subtle right infrahilar infiltrate though this may relate to the lower lung volumes. Lungs otherwise clear.    XR Ankle Port Left G/E 3 Views    Result Date: 2/5/2023  EXAM: XR ANKLE PORT LEFT G/E 3 VIEWS LOCATION: [facility] DATE/TIME: 2/5/2023 2:22 PM INDICATION: left ankle pain, no trauma COMPARISON: None.     IMPRESSION: Bones are demineralized. Mild tibiotalar joint degenerative change. No evidence of an acute fracture. Tiny bone fragment adjacent to the medial talus and mild bony proliferation along the tip of the lateral malleolus is likely chronic. Ankle mortise is intact. Calcaneal heel spur.    CT Abdomen Pelvis w Contrast    Result Date: 2/4/2023  EXAM: CT ABDOMEN PELVIS W CONTRAST LOCATION: [facility] DATE/TIME: 2/4/2023 12:08 AM INDICATION: 1 29 sigmoidectomy with increasing wbc COMPARISON: CT a abdomen/pelvis 01/29/2023 TECHNIQUE: CT scan of the abdomen and pelvis was performed following injection of IV contrast. Multiplanar reformats were obtained. Dose reduction techniques were used. CONTRAST: isovue 370 100ml FINDINGS: LOWER CHEST: Trace bilateral pleural effusions right greater than left with compressive atelectasis. HEPATOBILIARY: Diffuse hepatic steatosis. Cholelithiasis with mild gallbladder distention which may be related to fasting. No biliary ductal dilatation. PANCREAS: Normal. SPLEEN: Normal. ADRENAL GLANDS: Normal. KIDNEYS/BLADDER: No hydronephrosis. Box catheter within a decompressed bladder. BOWEL: Recent sigmoidectomy. Colonic diverticulosis. Trace abdominopelvic free ascites. Additionally, there is some loculated ascites in the anterior abdomen with partial rim enhancement and several foci of gas within, which suggests a degree of peritonitis. No drainable fluid collection currently. Right anterior approach drain terminates in the left pelvis. LYMPH NODES: Normal. VASCULATURE: Atherosclerotic  calcifications of the aortoiliac vessels without evidence of aneurysmal dilatation. PELVIC ORGANS: Hysterectomy. MUSCULOSKELETAL: Body wall edema. Skin staples with recent ventral abdominal incision. Small fat-containing umbilical hernia. Multilevel degenerative changes of the thoracolumbar spine. No acute osseous abnormality.     IMPRESSION: 1.  Recent sigmoidectomy. Trace abdominopelvic free ascites. Additionally, there is some loculated ascites in the anterior abdomen with partial rim enhancement and several foci of gas within, which suggests a degree of peritonitis. No drainable fluid collection currently. 2.  Trace bilateral pleural effusions right greater than left with compressive atelectasis. 3.  Body wall edema. 4.  Diffuse hepatic steatosis. 5.  Cholelithiasis with mild gallbladder distention which may be related to fasting.    CT Abdomen Pelvis w Contrast    Result Date: 1/25/2023  EXAM: CT ABDOMEN PELVIS W CONTRAST LOCATION: [facility] DATE/TIME: 1/25/2023 11:28 PM INDICATION: abdominal pain worst in lower quadrants, leukocytosis eval for pathology COMPARISON: 11/26/2022 TECHNIQUE: CT scan of the abdomen and pelvis was performed following injection of IV contrast. Multiplanar reformats were obtained. Dose reduction techniques were used. CONTRAST: isovue 370 100ml FINDINGS: LOWER CHEST: Small left Bochdalek hernia. Basilar atelectasis. HEPATOBILIARY: Cholelithiasis and hepatic steatosis. PANCREAS: Normal. SPLEEN: Normal. ADRENAL GLANDS: Normal. KIDNEYS/BLADDER: Normal. BOWEL: Small bowel is normal caliber. Large amount of colonic stool. Sigmoid colonic wall thickening with adjacent inflammation is again seen. The inflamed segment is distended with stool. Small amount of adjacent free fluid. Caliber change with collapse  of the rectosigmoid junction and rectum. Diverticulosis.  LYMPH NODES: Subcentimeter retroperitoneal lymph nodes. VASCULATURE: Atherosclerotic vascular  calcification. PELVIC ORGANS: Absent uterus. MUSCULOSKELETAL: Degenerative change osseous structures. Mild compression L4. Slight anterolisthesis L4 on L5.     IMPRESSION: 1.  Sigmoid colitis is again seen. Collapse of the rectosigmoid junction and rectum distal to the inflamed segment of colon. Underlying stricture not excluded. 2.  Small amount of adjacent free fluid. 3.  Cholelithiasis.    CTA Abdomen Pelvis with Contrast    Result Date: 1/29/2023  EXAM: CTA ABDOMEN PELVIS WITH CONTRAST LOCATION: [facility] DATE/TIME: 1/29/2023 6:48 AM INDICATION: recurrent colitis, distended abdomen pain out of proportion. COMPARISON: There are study dated 1/25/ TECHNIQUE: CT angiogram abdomen pelvis during arterial phase of injection of IV contrast. 2D and 3D MIP reconstructions were performed by the CT technologist. Dose reduction techniques were used. CONTRAST: isovue 370 100ml FINDINGS: ANGIOGRAM ABDOMEN/PELVIS: The abdominal aorta is normal in size and caliber throughout with scattered atherosclerotic calcifications noted. The iliac and femoral vessels are normal in size and caliber and well-opacified. Celiac and SMA and TONY arise normally and are well opacified at this time LOWER CHEST: Lung bases are clear. Heart is enlarged, similar prior exam. HEPATOBILIARY: Diffuse hepatic steatosis. No significant mass. No bile duct dilatation. Calcified gallstones are again seen within the gallbladder. PANCREAS: No significant mass, duct dilatation, or inflammatory change. SPLEEN: Normal size. ADRENAL GLANDS: No significant nodules. KIDNEYS/BLADDER: No significant mass, stone, or hydronephrosis. BOWEL: Mural thickening and pericolonic inflammation of the sigmoid colon is again noted, similar in appearance to prior exam with increased inflammation seen in the proximal sigmoid colon. No pericolonic abscess seen at this time. Stool is seen throughout the inflamed sigmoid colon as well as scattered throughout the  large bowel without evidence of large bowel obstruction. Adjacent to the proximal sigmoid colon is free fluid with foci of gas which are extraluminal and new from the prior study (image 1:15, series 5). Additional foci of extraluminal gas are seen within the mesenteric fat anteriorly in the upper abdomen (image 57, series 5). There are scattered diverticula seen throughout the colon. Air-fluid levels are seen scattered through multiple loops of small bowel with mild dilatation seen in the proximal jejunum in the upper abdomen, these mildly dilated loops of small bowel decompressed gradually distally without a clear transition point. LYMPH NODES: Increased Number of pericolonic lymph nodes are again seen adjacent to the sigmoid colon. PELVIC ORGANS: No pelvic masses. MUSCULOSKELETAL: Unchanged     IMPRESSION: 1.  Mural thickening of the sigmoid colon with pericolonic inflammation and stranding, slightly worsened from prior exam with punctate foci of gas seen adjacent to the inflamed colon as well as tracking in the anterior abdominal wall concerning for sigmoid colonic perforation. No organized intra-abdominal fluid collection such as abscess is seen at this time. Given the long-standing inflammation in this region an underlying neoplastic process cannot be excluded. 2.  Scattered air-fluid levels and mildly dilated loops of proximal jejunum which decompresses gradually without a transition point consistent favored to reflect reactive ileus 3.  Hepatic steatosis 4.  Cholelithiasis [Critical Result: Sigmoid colonic inflammation with areas of extraluminal gas, new from 1/25/2023 concerning for sigmoid colonic perforation] Finding was identified on 1/29/2023 6:56 AM. DR. Snell was contacted by me on 1/29/2023 7:08 AM and verbalized understanding of the critical result.     CT Chest w/o Contrast    Result Date: 1/29/2023  EXAM: CT CHEST WITHOUT CONTRAST LOCATION: [facility] DATE/TIME:  01/29/2023, 6:46 AM INDICATION: Fever and cough. COMPARISON: 11/26/2022 - CT chest, abdomen and pelvis. TECHNIQUE: Note that this study was performed in conjunction with a CT scan of the abdomen and pelvis. Refer to a separate report for findings from that study. CT chest without IV contrast. Multiplanar reformats were obtained. Dose reduction techniques were used. CONTRAST: None. FINDINGS: LUNGS AND PLEURA: Minimal emphysematous changes in the lungs. Slight interstitial thickening in bilateral lung bases. A few curvilinear opacities in the periphery of both lungs likely represent atelectasis and/or scarring. The lungs are otherwise clear. Small left Bochdalek hernia containing fat MEDIASTINUM/AXILLAE: Atherosclerotic calcification in the thoracic aorta. CORONARY ARTERY CALCIFICATION: Present. MUSCULOSKELETAL: No acute findings. UPPER ABDOMEN: A few foci of extraluminal gas are present in the upper abdomen.     IMPRESSION: 1.  Slight interstitial thickening in bilateral lung bases. This is nonspecific, but most likely relates to interstitial pulmonary edema. 2.  No other findings suspicious for acute pulmonary disease. 3.  A few foci of extraluminal gas scattered within the nondependent aspect of the upper abdomen. In the absence of recent surgery, this is consistent with a bowel perforation. Refer to the report from the CT scan of the abdomen and pelvis performed in conjunction with this study for additional details. The findings were called to Dr. Snell by Dr. Anderson on 01/29/2023 at 0700 hours.       Lab Results:  Personally Reviewed.   Fingerstick Blood Glucose: @[id number](POCGLUFGR:10)@    Last Hbg A1C: No results found for: HGBA1C   No results found for: INR, PROTIME  Recent Results (from the past 24 hour(s))   Glucose by meter    Collection Time: 02/16/23  8:14 AM   Result Value Ref Range    GLUCOSE BY METER POCT 139 (H) 70 - 99 mg/dL   Glucose by meter    Collection Time: 02/16/23 11:52 AM   Result  Value Ref Range    GLUCOSE BY METER POCT 137 (H) 70 - 99 mg/dL   Glucose by meter    Collection Time: 02/16/23  5:13 PM   Result Value Ref Range    GLUCOSE BY METER POCT 143 (H) 70 - 99 mg/dL   Glucose by meter    Collection Time: 02/16/23 10:04 PM   Result Value Ref Range    GLUCOSE BY METER POCT 190 (H) 70 - 99 mg/dL           Advanced Care Planning    Medical Decision Making 75 minutes including time spent with patient, discussion with staff and reviewing chart and medical decision making/orders            Maurice Philippe MD  Date: 2/17/2023  Time: 7:26 AM  [facility]

## 2023-02-17 NOTE — PLAN OF CARE
Goal Outcome Evaluation:      Plan of Care Reviewed With: patient    Pain controlled with scheduled Tylenol. Sleeping between cares.   Passing flatus. Had a BM yesterday. Tolerating diet.  Diuresing. Unable to track strict output as patient is incontinent, but she has been having wet briefs.  Verbalizes feelings of frustration and sadness regarding prolonged hospital course. Presence provided.    Reviewed plan of care.   Jannet Kay RN  [phone]

## 2023-02-17 NOTE — PROGRESS NOTES
ASSESSMENT:  1. Perforation of colon (H)    2. Refusal of blood transfusions as patient is Latter-day        Szuy Gómez is a 77 year old female who is s/p laparoscopic sigmoid colectomy and washout on 1/29/2023.  Patient no longer needs antibiotics per ID note from 2 days ago.  No surgical barriers to discharge.    PLAN:  Discharge when deemed medically appropriate; disposition per [facility]  Nothing new from a surgical standpoint  Appointment already made for patient to have staples out next week in clinic    SUBJECTIVE:   She is feeling pretty good today.  Patient is tolerating a regular diet.  Pain is controlled      Patient Vitals for the past 24 hrs:   BP Temp Temp src Pulse Resp SpO2 Weight   02/17/23 1113 122/58 99.1  F (37.3  C) Oral 87 18 91 % --   02/17/23 0753 (!) 156/67 98.7  F (37.1  C) Oral 75 18 91 % --   02/17/23 0638 -- -- -- -- -- -- 108.8 kg (239 lb 13.8 oz)   02/16/23 2347 (!) 149/66 98.8  F (37.1  C) Oral 76 18 95 % --   02/16/23 2121 (!) 157/69 98.1  F (36.7  C) Oral 79 19 94 % --   02/16/23 1535 127/62 98.8  F (37.1  C) Oral 79 18 95 % --       PHYSICAL EXAM:  GEN: No acute distress, comfortable  LUNGS: CTA bilaterally  CV:RRR  ABD:soft, nontender today, obese, no peritoneal signs  EXT:No cyanosis, edema or obvious abnormalities    02/16 0700 - 02/17 0659  In: 1200 [P.O.:1200]  Out: -     No results displayed because visit has over 200 results.             Michela Hoyt, HERBIE CNP

## 2023-02-17 NOTE — PROGRESS NOTES
Care Management Follow Up    Length of Stay (days): 19    Expected Discharge Date: 02/18/2023     Concerns to be Addressed:  monitoring on oral Lasix      Patient plan of care discussed at interdisciplinary rounds: Yes     Anticipated Discharge Disposition: Transitional Care     Anticipated Discharge Services:  TCU     Anticipated Discharge DME:   No new DME     Patient/family educated on Medicare website which has current facility and service quality ratings: yes  Education Provided on the Discharge Plan:  yes  Patient/Family in Agreement with the Plan:  yes     Referrals Placed by CM/SW:  TCU     Private pay costs discussed: potential transportation cost        Additional Information:  Patient admitted with bowel perforation and found to have impacted stool in sigmoid colon with associated perforation and feculent peritonitis. S/P Sigmoid Colectomy 1/29/30. Psychiatry consult.      Surgery: Staples to remain for a week and can be removed in a week.  We can schedule patient as a visit in a week for postop care.     ID recommends:  Completed antibiotic therapy.     Therapy: Min assist 1 bed mobility. CGA 1 transfers, ambulation using fww short distances. Progressing in confidence and strength. Recommendation is for TCU.     Social History:  Patient lives at home with her spouse Awais and has 4WW at home (although she generally doesn't use it).  Awais has been assisting more in home recently. Daughter Sheba is main family contact.      Patient accepted at [facility] TCU. PAS completed. Patient and spouse state agreement to [facility]. [facility] transportation scheduled for tomorrow at 2:45pm with O2.        Lorri Childers RN

## 2023-02-17 NOTE — PLAN OF CARE
Shift from 0700 to 1930-      Problem: Plan of Care - These are the overarching goals to be used throughout the patient stay.    Goal: Absence of Hospital-Acquired Illness or Injury  Intervention: Prevent Skin Injury  Recent Flowsheet Documentation  Taken 2/17/2023 1230 by Jeni Connolly RN  Body Position: position changed independently  Taken 2/17/2023 1000 by Jeni Connolly RN  Body Position: position changed independently  Taken 2/17/2023 0939 by Jeni Connolly RN  Body Position: position changed independently  Taken 2/17/2023 0845 by Jeni Connolly RN  Body Position:    log-rolled    turned    left     Problem: Bowel Resection  Goal: Acceptable Pain Control  Outcome: Progressing  Intervention: Prevent or Manage Pain  Recent Flowsheet Documentation  Taken 2/17/2023 1230 by Jeni Connolly RN  Pain Management Interventions: emotional support  Taken 2/17/2023 0939 by Jeni Connolly RN  Pain Management Interventions:    medication (see MAR)    emotional support   Goal Outcome Evaluation:          Patient continued on IV lasix. Incontinent of urine. Reminded to call for hep to void but doesn't. Perineum red. Calmoseptine applied.    Encouraged to turn in bed and get OOB. Patient seeing PT/OT. Up in chair most of the day and evening.     BG were 112, 251, and 154 . Patient on sliding scale and lantus.     Minimal abdominal pain. Pain controlled with tylenol.    Flat affect. Doesn't always open eyes when talking.

## 2023-02-18 ENCOUNTER — APPOINTMENT (OUTPATIENT)
Dept: PHYSICAL THERAPY | Facility: HOSPITAL | Age: 78
DRG: 853 | End: 2023-02-18
Payer: COMMERCIAL

## 2023-02-18 VITALS
HEIGHT: 65 IN | SYSTOLIC BLOOD PRESSURE: 142 MMHG | RESPIRATION RATE: 16 BRPM | HEART RATE: 77 BPM | OXYGEN SATURATION: 93 % | WEIGHT: 239.86 LBS | BODY MASS INDEX: 39.96 KG/M2 | DIASTOLIC BLOOD PRESSURE: 65 MMHG | TEMPERATURE: 98 F

## 2023-02-18 LAB
ANION GAP SERPL CALCULATED.3IONS-SCNC: 9 MMOL/L (ref 7–15)
BUN SERPL-MCNC: 15.3 MG/DL (ref 8–23)
CALCIUM SERPL-MCNC: 8.8 MG/DL (ref 8.8–10.2)
CHLORIDE SERPL-SCNC: 98 MMOL/L (ref 98–107)
CREAT SERPL-MCNC: 0.53 MG/DL (ref 0.51–0.95)
DEPRECATED HCO3 PLAS-SCNC: 34 MMOL/L (ref 22–29)
ERYTHROCYTE [DISTWIDTH] IN BLOOD BY AUTOMATED COUNT: 15 % (ref 10–15)
GFR SERPL CREATININE-BSD FRML MDRD: >90 ML/MIN/1.73M2
GLUCOSE BLDC GLUCOMTR-MCNC: 101 MG/DL (ref 70–99)
GLUCOSE BLDC GLUCOMTR-MCNC: 204 MG/DL (ref 70–99)
GLUCOSE SERPL-MCNC: 163 MG/DL (ref 70–99)
HCT VFR BLD AUTO: 30.4 % (ref 35–47)
HGB BLD-MCNC: 9 G/DL (ref 11.7–15.7)
MCH RBC QN AUTO: 28 PG (ref 26.5–33)
MCHC RBC AUTO-ENTMCNC: 29.6 G/DL (ref 31.5–36.5)
MCV RBC AUTO: 95 FL (ref 78–100)
PLATELET # BLD AUTO: 463 10E3/UL (ref 150–450)
POTASSIUM SERPL-SCNC: 3.9 MMOL/L (ref 3.4–5.3)
RBC # BLD AUTO: 3.21 10E6/UL (ref 3.8–5.2)
SODIUM SERPL-SCNC: 141 MMOL/L (ref 136–145)
WBC # BLD AUTO: 8.8 10E3/UL (ref 4–11)

## 2023-02-18 PROCEDURE — 250N000013 HC RX MED GY IP 250 OP 250 PS 637: Performed by: SURGERY

## 2023-02-18 PROCEDURE — 250N000013 HC RX MED GY IP 250 OP 250 PS 637: Performed by: NURSE PRACTITIONER

## 2023-02-18 PROCEDURE — 250N000013 HC RX MED GY IP 250 OP 250 PS 637: Performed by: INTERNAL MEDICINE

## 2023-02-18 PROCEDURE — 99232 SBSQ HOSP IP/OBS MODERATE 35: CPT | Performed by: EMERGENCY MEDICINE

## 2023-02-18 PROCEDURE — 97110 THERAPEUTIC EXERCISES: CPT | Mod: GP

## 2023-02-18 PROCEDURE — 36415 COLL VENOUS BLD VENIPUNCTURE: CPT | Performed by: INTERNAL MEDICINE

## 2023-02-18 PROCEDURE — 82310 ASSAY OF CALCIUM: CPT | Performed by: INTERNAL MEDICINE

## 2023-02-18 PROCEDURE — 97530 THERAPEUTIC ACTIVITIES: CPT | Mod: GP

## 2023-02-18 PROCEDURE — 250N000013 HC RX MED GY IP 250 OP 250 PS 637: Performed by: HOSPITALIST

## 2023-02-18 PROCEDURE — 250N000011 HC RX IP 250 OP 636: Performed by: SURGERY

## 2023-02-18 PROCEDURE — 85027 COMPLETE CBC AUTOMATED: CPT | Performed by: INTERNAL MEDICINE

## 2023-02-18 RX ORDER — VENLAFAXINE HYDROCHLORIDE 150 MG/1
150 CAPSULE, EXTENDED RELEASE ORAL
Qty: 30 CAPSULE | Refills: 0 | Status: SHIPPED | OUTPATIENT
Start: 2023-02-19

## 2023-02-18 RX ORDER — VENLAFAXINE HYDROCHLORIDE 150 MG/1
150 CAPSULE, EXTENDED RELEASE ORAL
Qty: 30 CAPSULE | Refills: 0 | Status: SHIPPED | OUTPATIENT
Start: 2023-02-19 | End: 2023-02-18

## 2023-02-18 RX ORDER — ACETAMINOPHEN 325 MG/1
650 TABLET ORAL 3 TIMES DAILY
Start: 2023-02-18 | End: 2023-02-26

## 2023-02-18 RX ADMIN — LEVOTHYROXINE SODIUM 137 MCG: 0.11 TABLET ORAL at 09:32

## 2023-02-18 RX ADMIN — ACETAMINOPHEN 650 MG: 325 TABLET ORAL at 13:31

## 2023-02-18 RX ADMIN — VENLAFAXINE HYDROCHLORIDE 112.5 MG: 75 CAPSULE, EXTENDED RELEASE ORAL at 09:17

## 2023-02-18 RX ADMIN — AMLODIPINE BESYLATE 2.5 MG: 2.5 TABLET ORAL at 09:29

## 2023-02-18 RX ADMIN — BENZOCAINE 6 MG-MENTHOL 10 MG LOZENGES 1 LOZENGE: at 09:17

## 2023-02-18 RX ADMIN — ANORECTAL OINTMENT: 15.7; .44; 24; 20.6 OINTMENT TOPICAL at 09:16

## 2023-02-18 RX ADMIN — ASPIRIN 81 MG: 81 TABLET, COATED ORAL at 09:17

## 2023-02-18 RX ADMIN — ENOXAPARIN SODIUM 40 MG: 40 INJECTION SUBCUTANEOUS at 11:52

## 2023-02-18 RX ADMIN — ACETAMINOPHEN 650 MG: 325 TABLET ORAL at 09:17

## 2023-02-18 ASSESSMENT — ACTIVITIES OF DAILY LIVING (ADL)
ADLS_ACUITY_SCORE: 43
ADLS_ACUITY_SCORE: 43
ADLS_ACUITY_SCORE: 45
ADLS_ACUITY_SCORE: 43

## 2023-02-18 NOTE — PLAN OF CARE
"Goal Outcome Evaluation:    Problem: Risk for Delirium  Goal: Optimal Coping  Outcome: Progressing     Problem: Bowel Resection  Goal: Absence of Bleeding  Outcome: Progressing  Goal: Effective Bowel Motility and Elimination  Outcome: Progressing  Goal: Fluid and Electrolyte Balance  Outcome: Progressing     Problem: Pain Acute  Goal: Optimal Pain Control and Function  Outcome: Progressing  Intervention: Develop Pain Management Plan  Recent Flowsheet Documentation  Taken 2/17/2023 2030 by Faustina Gómez RN  Pain Management Interventions: medication (see MAR)  Intervention: Prevent or Manage Pain  Recent Flowsheet Documentation  Taken 2/18/2023 0000 by Faustina Gómez RN  Sensory Stimulation Regulation: care clustered  Medication Review/Management: medications reviewed    Patient denied pain over night. She appears to have slept well. Vital signs stable over night. Incisions are WNL. Continues to have episodes of urinary incontinence.     BP (!) 149/66 (BP Location: Right arm)   Pulse 74   Temp 97.5  F (36.4  C) (Axillary)   Resp 16   Ht 1.651 m (5' 5\")   Wt 108.8 kg (239 lb 13.8 oz)   SpO2 94%   BMI 39.91 kg/m        FAUSTINA GÓMEZ RN         "

## 2023-02-18 NOTE — DISCHARGE SUMMARY
[facility] MEDICINE  DISCHARGE SUMMARY     Primary Care Physician: Ko, [facility]  Admission Date: 1/29/2023   Discharge Provider: Saúl Philippe MD Discharge Date: 2/18/2023   Diet:   Active Diet and Nourishment Order   Procedures     Snacks/Supplements Adult: Other; L: cherry gel+, D: chocolate magic cup; With Meals     Snacks/Supplements Adult: Ensure Enlive; With Meals     Low Fiber Diet     Diet       Code Status: Full Code   Activity: DCACTIVITY: Activity as tolerated        Condition at Discharge: Stable     REASON FOR PRESENTATION(See Admission Note for Details)   Bowel perforation    PRINCIPAL & ACTIVE DISCHARGE DIAGNOSES     Principal Problem:    Perforation of Colonic Stericolic Ulcer -- S/P Sigmoid Colectomy 1/29/30  Active Problems:    HTN (hypertension)    DM type 2, Hgb A1C 7.6 on 11/23/22    Refusal of blood transfusions as patient is [employer]    KARYN (obstructive sleep apnea)    Bacteremia due to Clostridium and Bacteroides -- 1/29/23      PENDING LABS     Unresulted Labs Ordered in the Past 30 Days of this Admission     No orders found from 12/30/2022 to 1/30/2023.            PROCEDURES ( this hospitalization only)      Procedure(s):  SIGMOID COLECTOMY, LAPAROSCOPIC    RECOMMENDATIONS TO OUTPATIENT PROVIDER FOR F/U VISIT     Follow-up Appointments     Follow Up and recommended labs and tests      Follow up with [facility] physician.  The following labs/tests are   recommended: Blood sugar monitoring.                  DISPOSITION     Skilled Nursing Facility    SUMMARY OF HOSPITAL COURSE:      77F a/w sepsis due to bowel perforation and found to have impacted stool in sigmoid colon with associated perforation and feculent peritonitis.       Assessment/Plan     #Perforation of Colonic Stercolic Ulcer   #Sepsis  #Bacteremia with clostridium species, Collinsella aerofaciens, bacteroides vulgatus, and Eubacterium spp  --S/P  Sigmoid Colectomy 1/29/30. Diffuse feculent peritonitis noted intra-op  --Repeat CT without drainable fluid collections  --completed course of merrem, micafungin and vanco 02/16.   -- Cleared for transfer to TCU from a surgical standpoint     #HTN -- Losartan had been on hold,  blood pressures have been as high as 140s systolically, continue to monitor at TCU.   At home is on losartan 50 mg twice daily which was held in the hospital.  Would want to avoid hypotension so we will continue to hold and adjust as indicated at TCU     #DM type 2 -A1c was 8.1 February 5.  This appears at goal for her age.  Her oral hypoglycemics were held here given she was n.p.o. and had been on Lantus in the hospital.  We will stop this and resume most of her home regimen which includes metformin, Tradjenta and Jardiance.  We will continue to hold her home glipizide to avoid hypoglycemia.  Blood sugar should be monitored at TCU and regimen adjusted as needed.      #Fluid overload -  no current signs of fluid overload.  Initial chest x-ray was clear with no evidence of CHF.  No BNP done.  Patient states she does not feel swollen or edematous.  Lungs are clear and has no lower extremity edema.  Not on diuretics at home and was on 40 mg of Lasix IV every 12 hours.  This has been discontinued.  She is still on 1 L of oxygen with O2 sats from 91 to 95%, attempted to wean but still requiring 1 L.  Will discharge on this with further weaning attempts in the future.  Likely combination of obesity/KARYN.     Refusal of blood transfusions as patient is Religious     KARYN (obstructive sleep apnea)-does not appear to be on CPAP.     Depression: On effexor at home. Patient looked more depressed and withdrawn.  Seen by psychiatry, on Effexor increased to 150 mg daily    -B12 and Free T4 normal    Moderate Malnutrition.         Discharge Medications with Med changes:     Current Discharge Medication List      START taking these medications     Details   acetaminophen (TYLENOL) 325 MG tablet Take 2 tablets (650 mg) by mouth 3 times daily    Associated Diagnoses: Perforation of colon (H)         CONTINUE these medications which have CHANGED    Details   venlafaxine (EFFEXOR XR) 150 MG 24 hr capsule Take 1 capsule (150 mg) by mouth daily (with breakfast)  Qty: 30 capsule, Refills: 0    Associated Diagnoses: Depression, unspecified depression type         CONTINUE these medications which have NOT CHANGED    Details   amLODIPine (NORVASC) 2.5 MG tablet Take 2.5 mg by mouth daily      aspirin (ASA) 81 MG EC tablet Take 81 mg by mouth daily      docusate sodium (COLACE) 100 MG capsule Take 1 capsule (100 mg) by mouth 2 times daily  Qty: 30 capsule, Refills: 0      empagliflozin (JARDIANCE) 25 MG TABS tablet Take 25 mg by mouth daily      levothyroxine (SYNTHROID/LEVOTHROID) 137 MCG tablet Take 137 mcg by mouth daily before breakfast      linagliptin (TRADJENTA) 5 MG TABS tablet Take 5 mg by mouth daily      metFORMIN (GLUCOPHAGE XR) 500 MG 24 hr tablet Take 1,000 mg by mouth 2 times daily (with meals)      oxybutynin ER (DITROPAN XL) 10 MG 24 hr tablet Take 20 mg by mouth daily      rosuvastatin (CRESTOR) 20 MG tablet Take 20 mg by mouth At Bedtime         STOP taking these medications       ammonium lactate (AMLACTIN) 12 % external cream Comments:   Reason for Stopping:         glipiZIDE (GLUCOTROL) 10 MG tablet Comments:   Reason for Stopping:         ibuprofen (ADVIL/MOTRIN) 200 MG capsule Comments:   Reason for Stopping:         losartan (COZAAR) 50 MG tablet Comments:   Reason for Stopping:                     Rationale for medication changes:      See summary        Consults       HOSPITALIST IP CONSULT  INFECTIOUS DISEASES IP CONSULT  PHYSICAL THERAPY ADULT IP CONSULT  OCCUPATIONAL THERAPY ADULT IP CONSULT  NUTRITION SERVICES ADULT IP CONSULT  CARE MANAGEMENT / SOCIAL WORK IP CONSULT  PHARMACY TO DOSE [facility]  PSYCHIATRY IP CONSULT  PSYCHIATRY IP  CONSULT  PHYSICAL THERAPY ADULT IP CONSULT  OCCUPATIONAL THERAPY ADULT IP CONSULT    Immunizations given this encounter     Most Recent Immunizations   Administered Date(s) Administered     COVID-19 Vaccine 18+ (Moderna) 04/05/2022     COVID-19 Vaccine Bivalent Booster 12+ (Pfizer) 09/28/2022     Influenza Vaccine 65+ (Fluzone HD) 12/01/2022           Anticoagulation Information      Recent INR results: No results for input(s): INR in the last 168 hours.  Warfarin doses (if applicable) or name of other anticoagulant:       SIGNIFICANT IMAGING FINDINGS     Results for orders placed or performed during the hospital encounter of 01/29/23   CTA Abdomen Pelvis with Contrast   Result Value Ref Range    Radiologist flags (AA)      Sigmoid colonic inflammation with areas of extraluminal gas, new from 1/25/2023 concerning for sigmoid colonic perforation    Impression    IMPRESSION:  1.  Mural thickening of the sigmoid colon with pericolonic inflammation and stranding, slightly worsened from prior exam with punctate foci of gas seen adjacent to the inflamed colon as well as tracking in the anterior abdominal wall concerning for   sigmoid colonic perforation. No organized intra-abdominal fluid collection such as abscess is seen at this time. Given the long-standing inflammation in this region an underlying neoplastic process cannot be excluded.  2.  Scattered air-fluid levels and mildly dilated loops of proximal jejunum which decompresses gradually without a transition point consistent favored to reflect reactive ileus   3.  Hepatic steatosis  4.  Cholelithiasis      [Critical Result: Sigmoid colonic inflammation with areas of extraluminal gas, new from 1/25/2023 concerning for sigmoid colonic perforation]    Finding was identified on 1/29/2023 6:56 AM.     DR. Snell was contacted by me on 1/29/2023 7:08 AM and verbalized understanding of the critical result.    CT Chest w/o Contrast    Impression    IMPRESSION:   1.   Slight interstitial thickening in bilateral lung bases. This is nonspecific, but most likely relates to interstitial pulmonary edema.  2.  No other findings suspicious for acute pulmonary disease.  3.  A few foci of extraluminal gas scattered within the nondependent aspect of the upper abdomen. In the absence of recent surgery, this is consistent with a bowel perforation. Refer to the report from the CT scan of the abdomen and pelvis performed in   conjunction with this study for additional details.    The findings were called to Dr. Snell by Dr. Anderson on 01/29/2023 at 0700 hours.       CT Abdomen Pelvis w Contrast    Impression    IMPRESSION:   1.  Recent sigmoidectomy. Trace abdominopelvic free ascites. Additionally, there is some loculated ascites in the anterior abdomen with partial rim enhancement and several foci of gas within, which suggests a degree of peritonitis. No drainable fluid   collection currently.   2.  Trace bilateral pleural effusions right greater than left with compressive atelectasis.  3.  Body wall edema.  4.  Diffuse hepatic steatosis.  5.  Cholelithiasis with mild gallbladder distention which may be related to fasting.   XR Ankle Port Left G/E 3 Views    Impression    IMPRESSION: Bones are demineralized. Mild tibiotalar joint degenerative change. No evidence of an acute fracture. Tiny bone fragment adjacent to the medial talus and mild bony proliferation along the tip of the lateral malleolus is likely chronic. Ankle   mortise is intact. Calcaneal heel spur.   CT Chest/Abdomen/Pelvis w Contrast    Impression    IMPRESSION:    1.  Status post segmental sigmoid colon resection with colocolonic anastomosis in the upper left pelvis. No findings to suggest a surgical complication.  2.  Diverticula arising from the ascending colon but no acute diverticulitis.  3.  Unchanged ascites and mesenteric edema. No drainable intra-abdominal fluid collection.  4.  Minimal pleural effusions and  atelectasis in the posterior sulci.  5.  Cholelithiasis.     XR Chest Port 1 View    Impression    IMPRESSION: Lung volumes are lower on this portable study. Borderline cardiomegaly unchanged. Pulmonary vessels normal. Questionable subtle right infrahilar infiltrate though this may relate to the lower lung volumes. Lungs otherwise clear.   US Lower Extremity Venous Duplex Bilateral    Impression    IMPRESSION:  1.  No deep venous thrombosis in the bilateral lower extremities.  2.  Bilateral popliteal fossa Baker's cysts, slightly larger on the right.       SIGNIFICANT LABORATORY FINDINGS     Most Recent 3 CBC's:Recent Labs   Lab Test 02/17/23  0820 02/16/23  0717 02/15/23  0656   WBC 8.2 9.3 10.5   HGB 8.8* 8.6* 8.5*   MCV 93 95 94   * [phone]           Discharge Orders        General info for SNF    Length of Stay Estimate: Short Term Care: Estimated # of Days <30  Condition at Discharge: Improving  Level of care:skilled   Rehabilitation Potential: Good  Admission H&P remains valid and up-to-date: Yes  Recent Chemotherapy: N/A  Use Nursing Home Standing Orders: Yes     Mantoux instructions    Give two-step Mantoux (PPD) Per Facility Policy Yes     Follow Up and recommended labs and tests    Follow up with [facility] physician.  The following labs/tests are recommended: Blood sugar monitoring.     Reason for your hospital stay    Bowel perforation     Glucose monitor nursing POCT    Before meals and at bedtime     Activity - Up ad blaire     Full Code     Physical Therapy Adult Consult    Evaluate and treat as clinically indicated.    Reason: Weakness     Occupational Therapy Adult Consult    Evaluate and treat as clinically indicated.    Reason:  weakness     Diet    Follow this diet upon discharge: Orders Placed This Encounter      Snacks/Supplements Adult: Other; L: cherry gel+, D: chocolate magic cup; With Meals      Snacks/Supplements Adult: Ensure Enlive; With Meals      Low Fiber Diet       Examination    Physical Exam   Temp:  [97.5  F (36.4  C)-99.1  F (37.3  C)] 98  F (36.7  C)  Pulse:  [74-87] 77  Resp:  [16-18] 16  BP: (122-149)/(58-66) 142/65  SpO2:  [91 %-95 %] 92 %  Wt Readings from Last 1 Encounters:   02/17/23 108.8 kg (239 lb 13.8 oz)       General: No apparent distress, lying in bed  Psychiatric: Affect appears much brighter today, she is smiling and talkative, eager for transfer to TCU  Lungs: Clear to auscultation  Heart: Regular in rhythm  Abdomen: Soft nontender      Please see EMR for more detailed significant labs, imaging, consultant notes etc.    I, Saúl Philippe MD, personally saw the patient today and spent greater than 30 minutes discharging this patient.    Saúl Philippe MD  [facility]    CC:Clinic, [facility]

## 2023-02-18 NOTE — PROGRESS NOTES
No complaints.  Patient states she is going to TCU today.  Afebrile, vital signs stable.    Physical exam:  Looks comfortable.  Abdomen is obese.  Expected tenderness.  Incisions look good.    White count normal.    Impression: Status post sigmoid colectomy for sigmoid perforation.  The patient is stable.  From our standpoint okay for discharge to TCU.  Arrangements for follow-up have already been made.

## 2023-02-18 NOTE — PLAN OF CARE
Shift 0700 to 1930-      Problem: Pain Acute  Goal: Optimal Pain Control and Function  Outcome: Progressing  Intervention: Prevent or Manage Pain  Recent Flowsheet Documentation  Taken 2/18/2023 0286 by Jeni Connolly RN  Sensory Stimulation Regulation: care clustered  Medication Review/Management: medications reviewed     Problem: Depressive Signs/Symptoms  Goal: Optimized Energy Level (Depressive Signs/Symptoms)  Outcome: Progressing       Goal Outcome Evaluation:    Reminded to call for help to void but doesn't. Perineum red. Calmoseptine applied.    Tried to wean off oxygen; Oxygen sats 86% on room air; placed back on oxygen. Encouraged to use IS but refuses.     Encouraged to turn in bed and get OOB. Patient seeing PT/OT. Up in chair with 2 assist and walker.     BG were 101. Patient on sliding scale and lantus.     Minimal abdominal pain. Pain controlled with tylenol.     Flat affect. Needs encouragement.      Discharged to TCU via stretcher.

## 2023-02-18 NOTE — PLAN OF CARE
Physical Therapy Discharge Summary    Reason for therapy discharge:    Discharged to transitional care facility.    Progress towards therapy goal(s). See goals on Care Plan in [facility] electronic health record for goal details.  Goals partially met.  Barriers to achieving goals:   discharge from facility.    Therapy recommendation(s):    Continued therapy is recommended.  Rationale/Recommendations:  continued progression of mobility, activity tolerance, and independence.

## 2023-02-19 NOTE — PLAN OF CARE
Occupational Therapy Discharge Summary    Reason for therapy discharge:    Discharged to transitional care facility.    Progress towards therapy goal(s). See goals on Care Plan in [facility] electronic health record for goal details.  Goals partially met.  Barriers to achieving goals:   discharge from facility.    Therapy recommendation(s):    Continued therapy is recommended.  Rationale/Recommendations:  d/c to TCU to improve trnf safety/ADL ind. .    Goal Outcome Evaluation:

## 2023-02-20 ENCOUNTER — PATIENT OUTREACH (OUTPATIENT)
Dept: CARE COORDINATION | Facility: CLINIC | Age: 78
End: 2023-02-20
Payer: COMMERCIAL

## 2023-02-20 NOTE — PROGRESS NOTES
[facility] Care Resource [facility]    Background: Transitional Care Management program identified per system criteria and reviewed by [facility] team for possible outreach.    Assessment: Upon chart review, [facility] Team member will not proceed with patient outreach related to this episode of Transitional Care Management program due to reason below:    Non-[facility] TCU: [facility] team member noted patient discharged to TCU/ARU/LTACH. Patient is not established with a [facility] currently supported by [facility] Primary Care-Care Coordination therefore handoff to Primary Care-Care Coordination is not appropriate at this time.    Plan: Transitional Care Management episode addressed appropriately per reason noted above.      Malu Kimball RN  [facility], [facility]    *Connected Care Resource Team does NOT follow patient ongoing. Referrals are identified based on internal discharge reports and the outreach is to ensure patient has an understanding of their discharge instructions.

## 2023-02-21 ENCOUNTER — LAB REQUISITION (OUTPATIENT)
Dept: LAB | Facility: CLINIC | Age: 78
End: 2023-02-21
Payer: COMMERCIAL

## 2023-02-21 ENCOUNTER — TRANSITIONAL CARE UNIT VISIT (OUTPATIENT)
Dept: GERIATRICS | Facility: CLINIC | Age: 78
End: 2023-02-21
Payer: COMMERCIAL

## 2023-02-21 VITALS
RESPIRATION RATE: 20 BRPM | HEIGHT: 65 IN | DIASTOLIC BLOOD PRESSURE: 63 MMHG | OXYGEN SATURATION: 96 % | BODY MASS INDEX: 39.91 KG/M2 | SYSTOLIC BLOOD PRESSURE: 147 MMHG | HEART RATE: 77 BPM | TEMPERATURE: 97.5 F

## 2023-02-21 DIAGNOSIS — R80.9 TYPE 2 DIABETES MELLITUS WITH MICROALBUMINURIA, WITHOUT LONG-TERM CURRENT USE OF INSULIN (H): ICD-10-CM

## 2023-02-21 DIAGNOSIS — I10 PRIMARY HYPERTENSION: Primary | ICD-10-CM

## 2023-02-21 DIAGNOSIS — I10 ESSENTIAL (PRIMARY) HYPERTENSION: ICD-10-CM

## 2023-02-21 DIAGNOSIS — R78.81 BACTEREMIA DUE TO CLOSTRIDIUM SPECIES: ICD-10-CM

## 2023-02-21 DIAGNOSIS — E87.70 HYPERVOLEMIA, UNSPECIFIED HYPERVOLEMIA TYPE: ICD-10-CM

## 2023-02-21 DIAGNOSIS — E11.29 TYPE 2 DIABETES MELLITUS WITH MICROALBUMINURIA, WITHOUT LONG-TERM CURRENT USE OF INSULIN (H): ICD-10-CM

## 2023-02-21 DIAGNOSIS — K63.1 PERFORATION OF COLON (H): ICD-10-CM

## 2023-02-21 DIAGNOSIS — B96.89 BACTEREMIA DUE TO CLOSTRIDIUM SPECIES: ICD-10-CM

## 2023-02-21 DIAGNOSIS — F32.0 CURRENT MILD EPISODE OF MAJOR DEPRESSIVE DISORDER, UNSPECIFIED WHETHER RECURRENT (H): ICD-10-CM

## 2023-02-21 PROCEDURE — 99310 SBSQ NF CARE HIGH MDM 45: CPT | Performed by: NURSE PRACTITIONER

## 2023-02-21 RX ORDER — ACETAMINOPHEN 325 MG/1
650 TABLET ORAL 3 TIMES DAILY
COMMUNITY
Start: 2023-02-21

## 2023-02-21 NOTE — LETTER
2/21/2023        RE: Suzy Gómez  [address]  [place] [address]        [facility]    Code Status:  FULL CODE   Visit Type:   Chief Complaint   Patient presents with     TCU Admission     [facility] 1/29/2023 - 2/18/2023     Facility:  [facility] ([facility]) [[id number]]           HPI: Suzy Gómez is a 77 year old female who I am seeing today for admit to the TCU. Pt recently hospitalized on 1/29/23 due to sepsis due to bowel perforation, also found to have impacted stool in the sigmoid colon with associated with perforation and feculent peritonitis. Past medical history includes HTN, DM type II, KARYN not on CPAP, depression and hypothyroidism. Pt presented to ER with abdominal pain and distention for 2-3 days. Pt had been to the ER on 11/26/22 with abdominal pain where she was diagnosed with acute distal colitis. During that hospitalization she left AMA before complete workup done. She presented again on 1/25/23 with abdominal pain. She was again diagnosed with colitis and discharged home. CT of the abdomen/pelvis showed   showed punctate foci of gas seen adjacent to the inflamed colon as well as tracking in the anterior abdominal wall concerning for sigmoid colonic perforation. Pt underwent sigmoid colectomy on 1/29/23. Pt with diffuse feculent peritonitis intra operatively. Repeat CT with no drain able fluid collections. Pt treated with IV antibiotics. Fluid overload. Pt treated with IV lasix. She was not on diuretics PTA. Hypotensive during hospitalization. BP meds held but resumed at discharge.       Transitional Care Course: Today pt sitting up in wheelchair today. Her  is present on exam. She has a follow up with the surgeon today. She reports some discomfort in her abdomen. Incisions dry and intact with staples. She reports having bowel movements and emptying her bladder. She denies any SOB or CP. She has no LE edema. She reports eating well.       Assessment/Plan:      Bacteremia due to Clostridium and Bacteroides -- 1/29/23  Perforation of Colonic Stericolic Ulcer -- S/P Sigmoid Colectomy 1/29/30  -s/p sigmoid colectomy on 1/29/23.   -Repeat CT without fluid collections.   -Pt completed her IV antibiotics.   -Pain controlled with tylenol.     Primary hypertension  -Norvasc 2.5 daily.   -previously on Losartan but discontinued due to hypotension.   -Monitor bp Q shift.     DM type 2, Hgb A1C 7.6 on 11/23/22  -HgbA1C 8.1.  -Pt treated with Lantus during hospitalization.   -Jardiance 25 mg QAM.   -Metformin 500 mg BID.   -Tradjenta 5 mg Q AM.   -BS checks QID.   -Consistent Carb diet.     Hypervolemia, unspecified hypervolemia type  -No evidence of fluid overload.   -No longer on diuretics.     Current mild episode of major depressive disorder, unspecified whether recurrent (H)  -Continue Venlafaxine.     OK for PT, OT to eval and treat. Follow CBC and BMP.     Active Ambulatory Problems     Diagnosis Date Noted     Non-specific colitis 11/26/2022     Chest pain, unspecified type 11/26/2022     Abnormal laboratory test result 11/03/2020     Angiodysplasia of colon 06/08/2021     Cataract 01/29/2023     Colon adenoma 06/10/2021     Depression 01/29/2023     GERD (gastroesophageal reflux disease) 01/29/2023     HTN (hypertension) 01/29/2023     Hypercalcemia 11/02/2020     Hyperlipidemia 01/29/2023     Hyperparathyroidism (H) 04/08/2021     Hypervitaminosis D 08/09/2016     Microalbuminuria 08/25/2022     Multinodular goiter 01/29/2023     OA (osteoarthritis) 01/29/2023     Obesity, morbid (H) 11/23/2022     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     DM type 2, Hgb A1C 7.6 on 11/23/22 01/29/2023     Perforation of Colonic Stericolic Ulcer -- S/P Sigmoid Colectomy 1/29/30 01/29/2023     Refusal of blood transfusions as patient is Muslim 01/29/2023     KARYN (obstructive sleep apnea) 01/31/2023     Bacteremia due to Clostridium and Bacteroides -- 1/29/23 02/07/2023      Resolved Ambulatory Problems     Diagnosis Date Noted     No Resolved Ambulatory Problems     Past Medical History:   Diagnosis Date     Dyslipidemia      Heart disease      Hypertension      Thyroid disease      Type 2 diabetes mellitus (H)      Allergies   Allergen Reactions     Penicillins Nausea and Vomiting and GI Disturbance     Atorvastatin Muscle Pain (Myalgia)     Augmentin Nausea and Vomiting and Diarrhea     Bupropion Other (See Comments)     Mood swings     Exenatide Rash     Nitrofurantoin Hives and Swelling       All Meds and Allergies reviewed in the record at the facility and is the most up-to-date.    Post Discharge Medication Reconciliation Status: discharge medications reconciled and changed, per note/orders  Current Outpatient Medications   Medication Sig     Acetaminophen 325 MG CHEW Take 650 mg by mouth 3 times daily     amLODIPine (NORVASC) 2.5 MG tablet Take 2.5 mg by mouth daily     aspirin (ASA) 81 MG EC tablet Take 81 mg by mouth daily     docusate sodium (COLACE) 100 MG capsule Take 1 capsule (100 mg) by mouth 2 times daily     empagliflozin (JARDIANCE) 25 MG TABS tablet Take 25 mg by mouth daily     levothyroxine (SYNTHROID/LEVOTHROID) 137 MCG tablet Take 137 mcg by mouth daily before breakfast     linagliptin (TRADJENTA) 5 MG TABS tablet Take 5 mg by mouth daily     metFORMIN (GLUCOPHAGE XR) 500 MG 24 hr tablet Take 1,000 mg by mouth 2 times daily (with meals)     oxybutynin ER (DITROPAN XL) 10 MG 24 hr tablet Take 20 mg by mouth daily     rosuvastatin (CRESTOR) 20 MG tablet Take 20 mg by mouth At Bedtime     venlafaxine (EFFEXOR XR) 150 MG 24 hr capsule Take 1 capsule (150 mg) by mouth daily (with breakfast)     acetaminophen (TYLENOL) 325 MG tablet Take 2 tablets (650 mg) by mouth 3 times daily (Patient not taking: Reported on 2/21/2023)     No current facility-administered medications for this visit.       REVIEW OF SYSTEMS:   10 point review of systems reviewed and pertinent  "positives in the HPI.     PHYSICAL EXAMINATION:  Physical Exam     Vital signs: BP (!) 147/63   Pulse 77   Temp 97.5  F (36.4  C)   Resp 20   Ht 1.651 m (5' 5\")   SpO2 96%   BMI 39.91 kg/m    General: Awake, Alert, oriented x3, sitting up in wheelchair, appropriately, follows simple commands, conversant  HEENT:Pink conjunctiva, anicteric sclerae, dry oral mucosa  NECK: Supple  CVS:  S1  S2, without murmur or gallop.   LUNG: Clear to auscultation, No wheezes, rales or rhonci.  BACK: No kyphosis of the thoracic spine  ABDOMEN: Soft, round, nontender to palpation, with positive bowel sounds. Incisions to abdomen intact with staples.   EXTREMITIES: Good range of motion on both upper and lower extremities, no pedal edema, no calf tenderness  SKIN: Warm and dry  NEUROLOGIC: Intact, pulses palpable  PSYCHIATRIC: Cognition intact      Labs:  All labs reviewed in the nursing home record and [facility]   @  Lab Results   Component Value Date    WBC 8.8 02/18/2023     Lab Results   Component Value Date    RBC 3.21 02/18/2023     Lab Results   Component Value Date    HGB 9.0 02/18/2023     Lab Results   Component Value Date    HCT 30.4 02/18/2023     Lab Results   Component Value Date    MCV 95 02/18/2023     Lab Results   Component Value Date    MCH 28.0 02/18/2023     Lab Results   Component Value Date    MCHC 29.6 02/18/2023     Lab Results   Component Value Date    RDW 15.0 02/18/2023     Lab Results   Component Value Date     02/18/2023        @Last Comprehensive Metabolic Panel:  Sodium   Date Value Ref Range Status   02/18/2023 141 136 - 145 mmol/L Final     Potassium   Date Value Ref Range Status   02/18/2023 3.9 3.4 - 5.3 mmol/L Final     Chloride   Date Value Ref Range Status   02/18/2023 98 98 - 107 mmol/L Final     Carbon Dioxide (CO2)   Date Value Ref Range Status   02/18/2023 34 (H) 22 - 29 mmol/L Final     Anion Gap   Date Value Ref Range Status   02/18/2023 9 7 - 15 mmol/L Final     Glucose   Date Value " Ref Range Status   02/18/2023 163 (H) 70 - 99 mg/dL Final     GLUCOSE BY METER POCT   Date Value Ref Range Status   02/18/2023 204 (H) 70 - 99 mg/dL Final     Urea Nitrogen   Date Value Ref Range Status   02/18/2023 15.3 8.0 - 23.0 mg/dL Final     Creatinine   Date Value Ref Range Status   02/18/2023 0.53 0.51 - 0.95 mg/dL Final     GFR Estimate   Date Value Ref Range Status   02/18/2023 >90 >60 mL/min/1.73m2 Final     Comment:     eGFR calculated using 2021 CKD-EPI equation.     Calcium   Date Value Ref Range Status   02/18/2023 8.8 8.8 - 10.2 mg/dL Final      45 minutes spent for this visit which including prep time reviewing H & P, GI consult, labs, reviewing imaging as well face to face time spent with pt and collaborating with nursing staff.     This note has been dictated using voice recognition software. Any grammatical or context distortions are unintentional and inherent to the software    Electronically signed by: Shayy Carrillo CNP         Sincerely,        Shayy Carrillo NP

## 2023-02-21 NOTE — PROGRESS NOTES
[facility]    Code Status:  FULL CODE   Visit Type:   Chief Complaint   Patient presents with     TCU Admission     [facility] 1/29/2023 - 2/18/2023     Facility:  [facility] ([facility]) [[id number]]           HPI: Suzy Gómez is a 77 year old female who I am seeing today for admit to the TCU. Pt recently hospitalized on 1/29/23 due to sepsis due to bowel perforation, also found to have impacted stool in the sigmoid colon with associated with perforation and feculent peritonitis. Past medical history includes HTN, DM type II, KARYN not on CPAP, depression and hypothyroidism. Pt presented to ER with abdominal pain and distention for 2-3 days. Pt had been to the ER on 11/26/22 with abdominal pain where she was diagnosed with acute distal colitis. During that hospitalization she left AMA before complete workup done. She presented again on 1/25/23 with abdominal pain. She was again diagnosed with colitis and discharged home. CT of the abdomen/pelvis showed   showed punctate foci of gas seen adjacent to the inflamed colon as well as tracking in the anterior abdominal wall concerning for sigmoid colonic perforation. Pt underwent sigmoid colectomy on 1/29/23. Pt with diffuse feculent peritonitis intra operatively. Repeat CT with no drain able fluid collections. Pt treated with IV antibiotics. Fluid overload. Pt treated with IV lasix. She was not on diuretics PTA. Hypotensive during hospitalization. BP meds held but resumed at discharge.       Transitional Care Course: Today pt sitting up in wheelchair today. Her  is present on exam. She has a follow up with the surgeon today. She reports some discomfort in her abdomen. Incisions dry and intact with staples. She reports having bowel movements and emptying her bladder. She denies any SOB or CP. She has no LE edema. She reports eating well.       Assessment/Plan:     Bacteremia due to Clostridium and Bacteroides -- 1/29/23  Perforation of Colonic  Stericolic Ulcer -- S/P Sigmoid Colectomy 1/29/30  -s/p sigmoid colectomy on 1/29/23.   -Repeat CT without fluid collections.   -Pt completed her IV antibiotics.   -Pain controlled with tylenol.     Primary hypertension  -Norvasc 2.5 daily.   -previously on Losartan but discontinued due to hypotension.   -Monitor bp Q shift.     DM type 2, Hgb A1C 7.6 on 11/23/22  -HgbA1C 8.1.  -Pt treated with Lantus during hospitalization.   -Jardiance 25 mg QAM.   -Metformin 500 mg BID.   -Tradjenta 5 mg Q AM.   -BS checks QID.   -Consistent Carb diet.     Hypervolemia, unspecified hypervolemia type  -No evidence of fluid overload.   -No longer on diuretics.     Current mild episode of major depressive disorder, unspecified whether recurrent (H)  -Continue Venlafaxine.     OK for PT, OT to eval and treat. Follow CBC and BMP.     Active Ambulatory Problems     Diagnosis Date Noted     Non-specific colitis 11/26/2022     Chest pain, unspecified type 11/26/2022     Abnormal laboratory test result 11/03/2020     Angiodysplasia of colon 06/08/2021     Cataract 01/29/2023     Colon adenoma 06/10/2021     Depression 01/29/2023     GERD (gastroesophageal reflux disease) 01/29/2023     HTN (hypertension) 01/29/2023     Hypercalcemia 11/02/2020     Hyperlipidemia 01/29/2023     Hyperparathyroidism (H) 04/08/2021     Hypervitaminosis D 08/09/2016     Microalbuminuria 08/25/2022     Multinodular goiter 01/29/2023     OA (osteoarthritis) 01/29/2023     Obesity, morbid (H) 11/23/2022     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     DM type 2, Hgb A1C 7.6 on 11/23/22 01/29/2023     Perforation of Colonic Stericolic Ulcer -- S/P Sigmoid Colectomy 1/29/30 01/29/2023     Refusal of blood transfusions as patient is [place] 01/29/2023     KARYN (obstructive sleep apnea) 01/31/2023     Bacteremia due to Clostridium and Bacteroides -- 1/29/23 02/07/2023     Resolved Ambulatory Problems     Diagnosis Date Noted     No Resolved Ambulatory Problems      Past Medical History:   Diagnosis Date     Dyslipidemia      Heart disease      Hypertension      Thyroid disease      Type 2 diabetes mellitus (H)      Allergies   Allergen Reactions     Penicillins Nausea and Vomiting and GI Disturbance     Atorvastatin Muscle Pain (Myalgia)     Augmentin Nausea and Vomiting and Diarrhea     Bupropion Other (See Comments)     Mood swings     Exenatide Rash     Nitrofurantoin Hives and Swelling       All Meds and Allergies reviewed in the record at the facility and is the most up-to-date.    Post Discharge Medication Reconciliation Status: discharge medications reconciled and changed, per note/orders  Current Outpatient Medications   Medication Sig     Acetaminophen 325 MG CHEW Take 650 mg by mouth 3 times daily     amLODIPine (NORVASC) 2.5 MG tablet Take 2.5 mg by mouth daily     aspirin (ASA) 81 MG EC tablet Take 81 mg by mouth daily     docusate sodium (COLACE) 100 MG capsule Take 1 capsule (100 mg) by mouth 2 times daily     empagliflozin (JARDIANCE) 25 MG TABS tablet Take 25 mg by mouth daily     levothyroxine (SYNTHROID/LEVOTHROID) 137 MCG tablet Take 137 mcg by mouth daily before breakfast     linagliptin (TRADJENTA) 5 MG TABS tablet Take 5 mg by mouth daily     metFORMIN (GLUCOPHAGE XR) 500 MG 24 hr tablet Take 1,000 mg by mouth 2 times daily (with meals)     oxybutynin ER (DITROPAN XL) 10 MG 24 hr tablet Take 20 mg by mouth daily     rosuvastatin (CRESTOR) 20 MG tablet Take 20 mg by mouth At Bedtime     venlafaxine (EFFEXOR XR) 150 MG 24 hr capsule Take 1 capsule (150 mg) by mouth daily (with breakfast)     acetaminophen (TYLENOL) 325 MG tablet Take 2 tablets (650 mg) by mouth 3 times daily (Patient not taking: Reported on 2/21/2023)     No current facility-administered medications for this visit.       REVIEW OF SYSTEMS:   10 point review of systems reviewed and pertinent positives in the HPI.     PHYSICAL EXAMINATION:  Physical Exam     Vital signs: BP (!) 147/63    "Pulse 77   Temp 97.5  F (36.4  C)   Resp 20   Ht 1.651 m (5' 5\")   SpO2 96%   BMI 39.91 kg/m    General: Awake, Alert, oriented x3, sitting up in wheelchair, appropriately, follows simple commands, conversant  HEENT:Pink conjunctiva, anicteric sclerae, dry oral mucosa  NECK: Supple  CVS:  S1  S2, without murmur or gallop.   LUNG: Clear to auscultation, No wheezes, rales or rhonci.  BACK: No kyphosis of the thoracic spine  ABDOMEN: Soft, round, nontender to palpation, with positive bowel sounds. Incisions to abdomen intact with staples.   EXTREMITIES: Good range of motion on both upper and lower extremities, no pedal edema, no calf tenderness  SKIN: Warm and dry  NEUROLOGIC: Intact, pulses palpable  PSYCHIATRIC: Cognition intact      Labs:  All labs reviewed in the nursing home record and [employer]   @  Lab Results   Component Value Date    WBC 8.8 02/18/2023     Lab Results   Component Value Date    RBC 3.21 02/18/2023     Lab Results   Component Value Date    HGB 9.0 02/18/2023     Lab Results   Component Value Date    HCT 30.4 02/18/2023     Lab Results   Component Value Date    MCV 95 02/18/2023     Lab Results   Component Value Date    MCH 28.0 02/18/2023     Lab Results   Component Value Date    MCHC 29.6 02/18/2023     Lab Results   Component Value Date    RDW 15.0 02/18/2023     Lab Results   Component Value Date     02/18/2023        @Last Comprehensive Metabolic Panel:  Sodium   Date Value Ref Range Status   02/18/2023 141 136 - 145 mmol/L Final     Potassium   Date Value Ref Range Status   02/18/2023 3.9 3.4 - 5.3 mmol/L Final     Chloride   Date Value Ref Range Status   02/18/2023 98 98 - 107 mmol/L Final     Carbon Dioxide (CO2)   Date Value Ref Range Status   02/18/2023 34 (H) 22 - 29 mmol/L Final     Anion Gap   Date Value Ref Range Status   02/18/2023 9 7 - 15 mmol/L Final     Glucose   Date Value Ref Range Status   02/18/2023 163 (H) 70 - 99 mg/dL Final     GLUCOSE BY METER POCT   Date Value " Ref Range Status   02/18/2023 204 (H) 70 - 99 mg/dL Final     Urea Nitrogen   Date Value Ref Range Status   02/18/2023 15.3 8.0 - 23.0 mg/dL Final     Creatinine   Date Value Ref Range Status   02/18/2023 0.53 0.51 - 0.95 mg/dL Final     GFR Estimate   Date Value Ref Range Status   02/18/2023 >90 >60 mL/min/1.73m2 Final     Comment:     eGFR calculated using 2021 CKD-EPI equation.     Calcium   Date Value Ref Range Status   02/18/2023 8.8 8.8 - 10.2 mg/dL Final      45 minutes spent for this visit which including prep time reviewing H & P, GI consult, labs, reviewing imaging as well face to face time spent with pt and collaborating with nursing staff.     This note has been dictated using voice recognition software. Any grammatical or context distortions are unintentional and inherent to the software    Electronically signed by: Shayy Carrillo CNP

## 2023-02-22 LAB
ANION GAP SERPL CALCULATED.3IONS-SCNC: 10 MMOL/L (ref 7–15)
BUN SERPL-MCNC: 9.8 MG/DL (ref 8–23)
CALCIUM SERPL-MCNC: 8.9 MG/DL (ref 8.8–10.2)
CHLORIDE SERPL-SCNC: 107 MMOL/L (ref 98–107)
CREAT SERPL-MCNC: 0.63 MG/DL (ref 0.51–0.95)
DEPRECATED HCO3 PLAS-SCNC: 27 MMOL/L (ref 22–29)
ERYTHROCYTE [DISTWIDTH] IN BLOOD BY AUTOMATED COUNT: 15.8 % (ref 10–15)
GFR SERPL CREATININE-BSD FRML MDRD: >90 ML/MIN/1.73M2
GLUCOSE SERPL-MCNC: 105 MG/DL (ref 70–99)
HCT VFR BLD AUTO: 30.9 % (ref 35–47)
HGB BLD-MCNC: 8.7 G/DL (ref 11.7–15.7)
MCH RBC QN AUTO: 27.7 PG (ref 26.5–33)
MCHC RBC AUTO-ENTMCNC: 28.2 G/DL (ref 31.5–36.5)
MCV RBC AUTO: 98 FL (ref 78–100)
PLATELET # BLD AUTO: 474 10E3/UL (ref 150–450)
POTASSIUM SERPL-SCNC: 3.9 MMOL/L (ref 3.4–5.3)
RBC # BLD AUTO: 3.14 10E6/UL (ref 3.8–5.2)
SODIUM SERPL-SCNC: 144 MMOL/L (ref 136–145)
WBC # BLD AUTO: 9.3 10E3/UL (ref 4–11)

## 2023-02-22 PROCEDURE — 85027 COMPLETE CBC AUTOMATED: CPT | Mod: ORL | Performed by: NURSE PRACTITIONER

## 2023-02-22 PROCEDURE — 80048 BASIC METABOLIC PNL TOTAL CA: CPT | Mod: ORL | Performed by: NURSE PRACTITIONER

## 2023-02-22 PROCEDURE — 36415 COLL VENOUS BLD VENIPUNCTURE: CPT | Mod: ORL | Performed by: NURSE PRACTITIONER

## 2023-02-22 PROCEDURE — P9604 ONE-WAY ALLOW PRORATED TRIP: HCPCS | Mod: ORL | Performed by: NURSE PRACTITIONER

## 2023-02-27 ENCOUNTER — OFFICE VISIT (OUTPATIENT)
Dept: SURGERY | Facility: CLINIC | Age: 78
End: 2023-02-27
Payer: COMMERCIAL

## 2023-02-27 DIAGNOSIS — Z48.89 POSTOPERATIVE VISIT: Primary | ICD-10-CM

## 2023-02-27 PROCEDURE — 99024 POSTOP FOLLOW-UP VISIT: CPT | Performed by: PHYSICIAN ASSISTANT

## 2023-02-27 NOTE — LETTER
2/27/2023         RE: Suzy Gómez  [address]  [facility] [address]        Dear Colleague,    Thank you for referring your patient, Suzy Gómez, to the [facility] [place]. Please see a copy of my visit note below.    HPI: Pt is here for follow up open sigmoidectomy with Dr. Palencia on 1/29/23.   she is doing well.  Pain is well controlled.  No difficulties with the surgical wound/wounds.  she is eating well and denies fever and chills. Her strength is not back to baseline yet and she is currently in a TCU for rehabilitation.        There were no vitals taken for this visit.    EXAM:  GENERAL:Appears well  ABDOMEN:  Soft, +BS  SURGICAL WOUNDS:  Incisions healing well, no enduration or drainage; staples removed without incident      Assessment/Plan: Doing well after surgery and should follow up as needed.    HERBIE Briggs [facility] , [facility]           Again, thank you for allowing me to participate in the care of your patient.        Sincerely,        Umang Myles PA-C

## 2023-02-28 ENCOUNTER — TRANSITIONAL CARE UNIT VISIT (OUTPATIENT)
Dept: GERIATRICS | Facility: CLINIC | Age: 78
End: 2023-02-28
Payer: COMMERCIAL

## 2023-02-28 VITALS
DIASTOLIC BLOOD PRESSURE: 80 MMHG | BODY MASS INDEX: 35.62 KG/M2 | TEMPERATURE: 97.3 F | RESPIRATION RATE: 18 BRPM | HEIGHT: 65 IN | OXYGEN SATURATION: 90 % | WEIGHT: 213.8 LBS | SYSTOLIC BLOOD PRESSURE: 164 MMHG | HEART RATE: 93 BPM

## 2023-02-28 DIAGNOSIS — B96.89 BACTEREMIA DUE TO CLOSTRIDIUM SPECIES: Primary | ICD-10-CM

## 2023-02-28 DIAGNOSIS — R14.1 FLATULENCE, ERUCTATION AND GAS PAIN: ICD-10-CM

## 2023-02-28 DIAGNOSIS — R14.3 FLATULENCE, ERUCTATION AND GAS PAIN: ICD-10-CM

## 2023-02-28 DIAGNOSIS — R78.81 BACTEREMIA DUE TO CLOSTRIDIUM SPECIES: Primary | ICD-10-CM

## 2023-02-28 DIAGNOSIS — K63.1 PERFORATION OF COLON (H): ICD-10-CM

## 2023-02-28 DIAGNOSIS — E11.29 TYPE 2 DIABETES MELLITUS WITH MICROALBUMINURIA, WITHOUT LONG-TERM CURRENT USE OF INSULIN (H): ICD-10-CM

## 2023-02-28 DIAGNOSIS — I10 PRIMARY HYPERTENSION: ICD-10-CM

## 2023-02-28 DIAGNOSIS — R80.9 TYPE 2 DIABETES MELLITUS WITH MICROALBUMINURIA, WITHOUT LONG-TERM CURRENT USE OF INSULIN (H): ICD-10-CM

## 2023-02-28 DIAGNOSIS — R14.2 FLATULENCE, ERUCTATION AND GAS PAIN: ICD-10-CM

## 2023-02-28 PROCEDURE — 99309 SBSQ NF CARE MODERATE MDM 30: CPT | Performed by: NURSE PRACTITIONER

## 2023-02-28 RX ORDER — SIMETHICONE 80 MG
80 TABLET,CHEWABLE ORAL 2 TIMES DAILY PRN
COMMUNITY

## 2023-02-28 NOTE — PROGRESS NOTES
[facility] GERIATRIC SERVICES    Code Status:  FULL CODE   Visit Type:   Chief Complaint   Patient presents with     TCU Follow Up     Facility:  [facility] ([facility]) [[id number]]           HPI: Suzy Gómez is a 77 year old female who I am seeing today for follow up on the TCU. Pt recently hospitalized on 1/29/23 due to sepsis due to bowel perforation, also found to have impacted stool in the sigmoid colon with associated with perforation and feculent peritonitis. Past medical history includes HTN, DM type II, KARYN not on CPAP, depression and hypothyroidism. Pt presented to ER with abdominal pain and distention for 2-3 days. Pt had been to the ER on 11/26/22 with abdominal pain where she was diagnosed with acute distal colitis. During that hospitalization she left AMA before complete workup done. She presented again on 1/25/23 with abdominal pain. She was again diagnosed with colitis and discharged home. CT of the abdomen/pelvis showed   showed punctate foci of gas seen adjacent to the inflamed colon as well as tracking in the anterior abdominal wall concerning for sigmoid colonic perforation. Pt underwent sigmoid colectomy on 1/29/23. Pt with diffuse feculent peritonitis intra operatively. Repeat CT with no drain able fluid collections. Pt treated with IV antibiotics. Fluid overload. Pt treated with IV lasix. She was not on diuretics PTA. Hypotensive during hospitalization. BP meds held but resumed at discharge.       Transitional Care Course: Today pt sitting up in bedside chair. She had a follow up with her surgeon yesterday and staples removed from belly incisions. She denies any pain in her belly. Appetite varies. Nursing staff report she spends most of time sleeping in bed or chair. Her abdomen appears slightly distended today, soft. She is having bowel movements daily. Less diarrhea. She is having difficulty passing gas. She denies any SOB or CP. BP satisfactory.     Assessment/Plan: Reviewed with  changes.     Bacteremia due to Clostridium and Bacteroides -- 1/29/23  Perforation of Colonic Stericolic Ulcer -- S/P Sigmoid Colectomy 1/29/30  -s/p sigmoid colectomy on 1/29/23.   -Repeat CT without fluid collections.   -Pt completed her IV antibiotics.   -Pain controlled with tylenol.   -Follow up CBC no leukocytosis.   -Hgb  8.7.     Primary hypertension  -Norvasc 2.5 daily.   -previously on Losartan but discontinued due to hypotension.   -BP appears satisfactory.   -Monitor.     DM type 2, Hgb A1C 7.6 on 11/23/22  -HgbA1C 8.1.  -Pt treated with Lantus during hospitalization.   -Jardiance 25 mg QAM.   -Metformin 500 mg BID.   -Tradjenta 5 mg Q AM.   -BS checks QID.   -Consistent Carb diet.   -Blood sugars satisfactory.     Flatulence  -pt having regular bowel movements.   -No further diarrhea.   -Simethicone 80 mg BID.     Current mild episode of major depressive disorder, unspecified whether recurrent (H)  -Continue Venlafaxine.   -Flat affect.       Active Ambulatory Problems     Diagnosis Date Noted     Non-specific colitis 11/26/2022     Chest pain, unspecified type 11/26/2022     Abnormal laboratory test result 11/03/2020     Angiodysplasia of colon 06/08/2021     Cataract 01/29/2023     Colon adenoma 06/10/2021     Depression 01/29/2023     GERD (gastroesophageal reflux disease) 01/29/2023     HTN (hypertension) 01/29/2023     Hypercalcemia 11/02/2020     Hyperlipidemia 01/29/2023     Hyperparathyroidism (H) 04/08/2021     Hypervitaminosis D 08/09/2016     Microalbuminuria 08/25/2022     Multinodular goiter 01/29/2023     OA (osteoarthritis) 01/29/2023     Obesity, morbid (H) 11/23/2022     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     DM type 2, Hgb A1C 7.6 on 11/23/22 01/29/2023     Perforation of Colonic Stericolic Ulcer -- S/P Sigmoid Colectomy 1/29/30 01/29/2023     Refusal of blood transfusions as patient is [employer] 01/29/2023     KARYN (obstructive sleep apnea) 01/31/2023     Bacteremia due to  Clostridium and Bacteroides -- 1/29/23 02/07/2023     Resolved Ambulatory Problems     Diagnosis Date Noted     No Resolved Ambulatory Problems     Past Medical History:   Diagnosis Date     Dyslipidemia      Heart disease      Hypertension      Thyroid disease      Type 2 diabetes mellitus (H)      Allergies   Allergen Reactions     Penicillins Nausea and Vomiting and GI Disturbance     Atorvastatin Muscle Pain (Myalgia)     Augmentin Nausea and Vomiting and Diarrhea     Bupropion Other (See Comments)     Mood swings     Exenatide Rash     Nitrofurantoin Hives and Swelling       All Meds and Allergies reviewed in the record at the facility and is the most up-to-date.      Current Outpatient Medications   Medication Sig     simethicone (MYLICON) 80 MG chewable tablet Take 80 mg by mouth 2 times daily     Acetaminophen 325 MG CHEW Take 650 mg by mouth 3 times daily     amLODIPine (NORVASC) 2.5 MG tablet Take 2.5 mg by mouth daily     aspirin (ASA) 81 MG EC tablet Take 81 mg by mouth daily (Patient not taking: Reported on 2/27/2023)     docusate sodium (COLACE) 100 MG capsule Take 1 capsule (100 mg) by mouth 2 times daily     empagliflozin (JARDIANCE) 25 MG TABS tablet Take 25 mg by mouth daily     levothyroxine (SYNTHROID/LEVOTHROID) 137 MCG tablet Take 137 mcg by mouth daily before breakfast     linagliptin (TRADJENTA) 5 MG TABS tablet Take 5 mg by mouth daily     metFORMIN (GLUCOPHAGE XR) 500 MG 24 hr tablet Take 1,000 mg by mouth 2 times daily (with meals)     oxybutynin ER (DITROPAN XL) 10 MG 24 hr tablet Take 20 mg by mouth daily     rosuvastatin (CRESTOR) 20 MG tablet Take 20 mg by mouth At Bedtime     venlafaxine (EFFEXOR XR) 150 MG 24 hr capsule Take 1 capsule (150 mg) by mouth daily (with breakfast)     No current facility-administered medications for this visit.       REVIEW OF SYSTEMS:   10 point review of systems reviewed and pertinent positives in the HPI.     PHYSICAL EXAMINATION:  Physical Exam  "    Vital signs: BP (!) 164/80   Pulse 93   Temp 97.3  F (36.3  C)   Resp 18   Ht 1.651 m (5' 5\")   Wt 97 kg (213 lb 12.8 oz)   SpO2 90%   BMI 35.58 kg/m    General: Awake, Alert, oriented x3, sitting up in bedside chair,  conversant  HEENT:Pink conjunctiva,  dry oral mucosa  NECK: Supple  CVS:  S1  S2, without murmur or gallop.   LUNG: Clear to auscultation, No wheezes, rales or rhonci.  BACK: No kyphosis of the thoracic spine  ABDOMEN: Soft, round, slightly distended, nontender to palpation, with positive bowel sounds. Incisions to abdomen intact BROCK.   EXTREMITIES: Good range of motion on both upper and lower extremities, no pedal edema, no calf tenderness  SKIN: Warm and dry  NEUROLOGIC: Intact  PSYCHIATRIC: Flat affect.       Labs:  All labs reviewed in the nursing home record and [facility]   @  Lab Results   Component Value Date    WBC 8.8 02/18/2023     Lab Results   Component Value Date    RBC 3.21 02/18/2023     Lab Results   Component Value Date    HGB 9.0 02/18/2023     Lab Results   Component Value Date    HCT 30.4 02/18/2023     Lab Results   Component Value Date    MCV 95 02/18/2023     Lab Results   Component Value Date    MCH 28.0 02/18/2023     Lab Results   Component Value Date    MCHC 29.6 02/18/2023     Lab Results   Component Value Date    RDW 15.0 02/18/2023     Lab Results   Component Value Date     02/18/2023        @Last Comprehensive Metabolic Panel:  Sodium   Date Value Ref Range Status   02/22/2023 144 136 - 145 mmol/L Final     Potassium   Date Value Ref Range Status   02/22/2023 3.9 3.4 - 5.3 mmol/L Final     Chloride   Date Value Ref Range Status   02/22/2023 107 98 - 107 mmol/L Final     Carbon Dioxide (CO2)   Date Value Ref Range Status   02/22/2023 27 22 - 29 mmol/L Final     Anion Gap   Date Value Ref Range Status   02/22/2023 10 7 - 15 mmol/L Final     Glucose   Date Value Ref Range Status   02/22/2023 105 (H) 70 - 99 mg/dL Final     GLUCOSE BY METER POCT   Date Value Ref " Range Status   02/18/2023 204 (H) 70 - 99 mg/dL Final     Urea Nitrogen   Date Value Ref Range Status   02/22/2023 9.8 8.0 - 23.0 mg/dL Final     Creatinine   Date Value Ref Range Status   02/22/2023 0.63 0.51 - 0.95 mg/dL Final     GFR Estimate   Date Value Ref Range Status   02/22/2023 >90 >60 mL/min/1.73m2 Final     Comment:     eGFR calculated using 2021 CKD-EPI equation.     Calcium   Date Value Ref Range Status   02/22/2023 8.9 8.8 - 10.2 mg/dL Final   .     This note has been dictated using voice recognition software. Any grammatical or context distortions are unintentional and inherent to the software    Electronically signed by: Shayy Carrillo CNP

## 2023-02-28 NOTE — PROGRESS NOTES
HPI: Pt is here for follow up open sigmoidectomy with Dr. Palencia on 1/29/23.   she is doing well.  Pain is well controlled.  No difficulties with the surgical wound/wounds.  she is eating well and denies fever and chills. Her strength is not back to baseline yet and she is currently in a TCU for rehabilitation.        There were no vitals taken for this visit.    EXAM:  GENERAL:Appears well  ABDOMEN:  Soft, +BS  SURGICAL WOUNDS:  Incisions healing well, no enduration or drainage; staples removed without incident      Assessment/Plan: Doing well after surgery and should follow up as needed.    HERBIE Briggs CNP , [facility]

## 2023-02-28 NOTE — LETTER
2/28/2023        RE: Suzy Gómez  [address]  [place] [address]        [facility]    Code Status:  FULL CODE   Visit Type:   Chief Complaint   Patient presents with     TCU Follow Up     Facility:  [facility] ([facility]) [[id number]]           HPI: Suzy Gómez is a 77 year old female who I am seeing today for follow up on the TCU. Pt recently hospitalized on 1/29/23 due to sepsis due to bowel perforation, also found to have impacted stool in the sigmoid colon with associated with perforation and feculent peritonitis. Past medical history includes HTN, DM type II, KARYN not on CPAP, depression and hypothyroidism. Pt presented to ER with abdominal pain and distention for 2-3 days. Pt had been to the ER on 11/26/22 with abdominal pain where she was diagnosed with acute distal colitis. During that hospitalization she left AMA before complete workup done. She presented again on 1/25/23 with abdominal pain. She was again diagnosed with colitis and discharged home. CT of the abdomen/pelvis showed   showed punctate foci of gas seen adjacent to the inflamed colon as well as tracking in the anterior abdominal wall concerning for sigmoid colonic perforation. Pt underwent sigmoid colectomy on 1/29/23. Pt with diffuse feculent peritonitis intra operatively. Repeat CT with no drain able fluid collections. Pt treated with IV antibiotics. Fluid overload. Pt treated with IV lasix. She was not on diuretics PTA. Hypotensive during hospitalization. BP meds held but resumed at discharge.       Transitional Care Course: Today pt sitting up in bedside chair. She had a follow up with her surgeon yesterday and staples removed from belly incisions. She denies any pain in her belly. Appetite varies. Nursing staff report she spends most of time sleeping in bed or chair. Her abdomen appears slightly distended today, soft. She is having bowel movements daily. Less diarrhea. She is having difficulty passing  gas. She denies any SOB or CP. BP satisfactory.     Assessment/Plan: Reviewed with changes.     Bacteremia due to Clostridium and Bacteroides -- 1/29/23  Perforation of Colonic Stericolic Ulcer -- S/P Sigmoid Colectomy 1/29/30  -s/p sigmoid colectomy on 1/29/23.   -Repeat CT without fluid collections.   -Pt completed her IV antibiotics.   -Pain controlled with tylenol.   -Follow up CBC no leukocytosis.   -Hgb  8.7.     Primary hypertension  -Norvasc 2.5 daily.   -previously on Losartan but discontinued due to hypotension.   -BP appears satisfactory.   -Monitor.     DM type 2, Hgb A1C 7.6 on 11/23/22  -HgbA1C 8.1.  -Pt treated with Lantus during hospitalization.   -Jardiance 25 mg QAM.   -Metformin 500 mg BID.   -Tradjenta 5 mg Q AM.   -BS checks QID.   -Consistent Carb diet.   -Blood sugars satisfactory.     Flatulence  -pt having regular bowel movements.   -No further diarrhea.   -Simethicone 80 mg BID.     Current mild episode of major depressive disorder, unspecified whether recurrent (H)  -Continue Venlafaxine.   -Flat affect.       Active Ambulatory Problems     Diagnosis Date Noted     Non-specific colitis 11/26/2022     Chest pain, unspecified type 11/26/2022     Abnormal laboratory test result 11/03/2020     Angiodysplasia of colon 06/08/2021     Cataract 01/29/2023     Colon adenoma 06/10/2021     Depression 01/29/2023     GERD (gastroesophageal reflux disease) 01/29/2023     HTN (hypertension) 01/29/2023     Hypercalcemia 11/02/2020     Hyperlipidemia 01/29/2023     Hyperparathyroidism (H) 04/08/2021     Hypervitaminosis D 08/09/2016     Microalbuminuria 08/25/2022     Multinodular goiter 01/29/2023     OA (osteoarthritis) 01/29/2023     Obesity, morbid (H) 11/23/2022     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     DM type 2, Hgb A1C 7.6 on 11/23/22 01/29/2023     Perforation of Colonic Stericolic Ulcer -- S/P Sigmoid Colectomy 1/29/30 01/29/2023     Refusal of blood transfusions as 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 01/29/2023     KARYN (obstructive sleep apnea) 01/31/2023     Bacteremia due to Clostridium and Bacteroides -- 1/29/23 02/07/2023     Resolved Ambulatory Problems     Diagnosis Date Noted     No Resolved Ambulatory Problems     Past Medical History:   Diagnosis Date     Dyslipidemia      Heart disease      Hypertension      Thyroid disease      Type 2 diabetes mellitus (H)      Allergies   Allergen Reactions     Penicillins Nausea and Vomiting and GI Disturbance     Atorvastatin Muscle Pain (Myalgia)     Augmentin Nausea and Vomiting and Diarrhea     Bupropion Other (See Comments)     Mood swings     Exenatide Rash     Nitrofurantoin Hives and Swelling       All Meds and Allergies reviewed in the record at the facility and is the most up-to-date.      Current Outpatient Medications   Medication Sig     simethicone (MYLICON) 80 MG chewable tablet Take 80 mg by mouth 2 times daily     Acetaminophen 325 MG CHEW Take 650 mg by mouth 3 times daily     amLODIPine (NORVASC) 2.5 MG tablet Take 2.5 mg by mouth daily     aspirin (ASA) 81 MG EC tablet Take 81 mg by mouth daily (Patient not taking: Reported on 2/27/2023)     docusate sodium (COLACE) 100 MG capsule Take 1 capsule (100 mg) by mouth 2 times daily     empagliflozin (JARDIANCE) 25 MG TABS tablet Take 25 mg by mouth daily     levothyroxine (SYNTHROID/LEVOTHROID) 137 MCG tablet Take 137 mcg by mouth daily before breakfast     linagliptin (TRADJENTA) 5 MG TABS tablet Take 5 mg by mouth daily     metFORMIN (GLUCOPHAGE XR) 500 MG 24 hr tablet Take 1,000 mg by mouth 2 times daily (with meals)     oxybutynin ER (DITROPAN XL) 10 MG 24 hr tablet Take 20 mg by mouth daily     rosuvastatin (CRESTOR) 20 MG tablet Take 20 mg by mouth At Bedtime     venlafaxine (EFFEXOR XR) 150 MG 24 hr capsule Take 1 capsule (150 mg) by mouth daily (with breakfast)     No current facility-administered medications for this visit.       REVIEW OF SYSTEMS:   10 point review of systems  "reviewed and pertinent positives in the HPI.     PHYSICAL EXAMINATION:  Physical Exam     Vital signs: BP (!) 164/80   Pulse 93   Temp 97.3  F (36.3  C)   Resp 18   Ht 1.651 m (5' 5\")   Wt 97 kg (213 lb 12.8 oz)   SpO2 90%   BMI 35.58 kg/m    General: Awake, Alert, oriented x3, sitting up in bedside chair,  conversant  HEENT:Pink conjunctiva,  dry oral mucosa  NECK: Supple  CVS:  S1  S2, without murmur or gallop.   LUNG: Clear to auscultation, No wheezes, rales or rhonci.  BACK: No kyphosis of the thoracic spine  ABDOMEN: Soft, round, slightly distended, nontender to palpation, with positive bowel sounds. Incisions to abdomen intact BROCK.   EXTREMITIES: Good range of motion on both upper and lower extremities, no pedal edema, no calf tenderness  SKIN: Warm and dry  NEUROLOGIC: Intact  PSYCHIATRIC: Flat affect.       Labs:  All labs reviewed in the nursing home record and [facility]   @  Lab Results   Component Value Date    WBC 8.8 02/18/2023     Lab Results   Component Value Date    RBC 3.21 02/18/2023     Lab Results   Component Value Date    HGB 9.0 02/18/2023     Lab Results   Component Value Date    HCT 30.4 02/18/2023     Lab Results   Component Value Date    MCV 95 02/18/2023     Lab Results   Component Value Date    MCH 28.0 02/18/2023     Lab Results   Component Value Date    MCHC 29.6 02/18/2023     Lab Results   Component Value Date    RDW 15.0 02/18/2023     Lab Results   Component Value Date     02/18/2023        @Last Comprehensive Metabolic Panel:  Sodium   Date Value Ref Range Status   02/22/2023 144 136 - 145 mmol/L Final     Potassium   Date Value Ref Range Status   02/22/2023 3.9 3.4 - 5.3 mmol/L Final     Chloride   Date Value Ref Range Status   02/22/2023 107 98 - 107 mmol/L Final     Carbon Dioxide (CO2)   Date Value Ref Range Status   02/22/2023 27 22 - 29 mmol/L Final     Anion Gap   Date Value Ref Range Status   02/22/2023 10 7 - 15 mmol/L Final     Glucose   Date Value Ref Range " Status   02/22/2023 105 (H) 70 - 99 mg/dL Final     GLUCOSE BY METER POCT   Date Value Ref Range Status   02/18/2023 204 (H) 70 - 99 mg/dL Final     Urea Nitrogen   Date Value Ref Range Status   02/22/2023 9.8 8.0 - 23.0 mg/dL Final     Creatinine   Date Value Ref Range Status   02/22/2023 0.63 0.51 - 0.95 mg/dL Final     GFR Estimate   Date Value Ref Range Status   02/22/2023 >90 >60 mL/min/1.73m2 Final     Comment:     eGFR calculated using 2021 CKD-EPI equation.     Calcium   Date Value Ref Range Status   02/22/2023 8.9 8.8 - 10.2 mg/dL Final   .     This note has been dictated using voice recognition software. Any grammatical or context distortions are unintentional and inherent to the software    Electronically signed by: Shayy Carrillo, SABINA         Sincerely,        Shayy Carrillo, NP

## 2023-03-01 ENCOUNTER — LAB REQUISITION (OUTPATIENT)
Dept: LAB | Facility: CLINIC | Age: 78
End: 2023-03-01
Payer: COMMERCIAL

## 2023-03-01 DIAGNOSIS — Z48.815 ENCOUNTER FOR SURGICAL AFTERCARE FOLLOWING SURGERY ON THE DIGESTIVE SYSTEM: ICD-10-CM

## 2023-03-02 ENCOUNTER — TRANSITIONAL CARE UNIT VISIT (OUTPATIENT)
Dept: GERIATRICS | Facility: CLINIC | Age: 78
End: 2023-03-02
Payer: COMMERCIAL

## 2023-03-02 ENCOUNTER — LAB REQUISITION (OUTPATIENT)
Dept: LAB | Facility: CLINIC | Age: 78
End: 2023-03-02
Payer: COMMERCIAL

## 2023-03-02 VITALS
TEMPERATURE: 97.6 F | OXYGEN SATURATION: 91 % | DIASTOLIC BLOOD PRESSURE: 76 MMHG | HEART RATE: 97 BPM | BODY MASS INDEX: 35.65 KG/M2 | RESPIRATION RATE: 21 BRPM | WEIGHT: 214 LBS | SYSTOLIC BLOOD PRESSURE: 164 MMHG | HEIGHT: 65 IN

## 2023-03-02 DIAGNOSIS — R14.2 FLATULENCE, ERUCTATION AND GAS PAIN: ICD-10-CM

## 2023-03-02 DIAGNOSIS — R78.81 BACTEREMIA DUE TO CLOSTRIDIUM SPECIES: Primary | ICD-10-CM

## 2023-03-02 DIAGNOSIS — R80.9 TYPE 2 DIABETES MELLITUS WITH MICROALBUMINURIA, WITHOUT LONG-TERM CURRENT USE OF INSULIN (H): ICD-10-CM

## 2023-03-02 DIAGNOSIS — E11.29 TYPE 2 DIABETES MELLITUS WITH MICROALBUMINURIA, WITHOUT LONG-TERM CURRENT USE OF INSULIN (H): ICD-10-CM

## 2023-03-02 DIAGNOSIS — I10 PRIMARY HYPERTENSION: ICD-10-CM

## 2023-03-02 DIAGNOSIS — Z48.815 ENCOUNTER FOR SURGICAL AFTERCARE FOLLOWING SURGERY ON THE DIGESTIVE SYSTEM: ICD-10-CM

## 2023-03-02 DIAGNOSIS — K65.9 PERITONITIS, UNSPECIFIED (H): ICD-10-CM

## 2023-03-02 DIAGNOSIS — K63.1 PERFORATION OF COLON (H): ICD-10-CM

## 2023-03-02 DIAGNOSIS — R65.20 SEVERE SEPSIS WITHOUT SEPTIC SHOCK (CODE) (H): ICD-10-CM

## 2023-03-02 DIAGNOSIS — M19.90 UNSPECIFIED OSTEOARTHRITIS, UNSPECIFIED SITE: ICD-10-CM

## 2023-03-02 DIAGNOSIS — B96.89 BACTEREMIA DUE TO CLOSTRIDIUM SPECIES: Primary | ICD-10-CM

## 2023-03-02 DIAGNOSIS — R14.3 FLATULENCE, ERUCTATION AND GAS PAIN: ICD-10-CM

## 2023-03-02 DIAGNOSIS — R14.1 FLATULENCE, ERUCTATION AND GAS PAIN: ICD-10-CM

## 2023-03-02 DIAGNOSIS — Z53.1 PROCEDURE AND TREATMENT NOT CARRIED OUT BECAUSE OF PATIENT'S DECISION FOR REASONS OF BELIEF AND GROUP PRESSURE: ICD-10-CM

## 2023-03-02 LAB
ANION GAP SERPL CALCULATED.3IONS-SCNC: 14 MMOL/L (ref 7–15)
BUN SERPL-MCNC: 8 MG/DL (ref 8–23)
CALCIUM SERPL-MCNC: 9 MG/DL (ref 8.8–10.2)
CHLORIDE SERPL-SCNC: 105 MMOL/L (ref 98–107)
CREAT SERPL-MCNC: 0.63 MG/DL (ref 0.51–0.95)
DEPRECATED HCO3 PLAS-SCNC: 24 MMOL/L (ref 22–29)
GFR SERPL CREATININE-BSD FRML MDRD: >90 ML/MIN/1.73M2
GLUCOSE SERPL-MCNC: 92 MG/DL (ref 70–99)
HGB BLD-MCNC: 9.1 G/DL (ref 11.7–15.7)
POTASSIUM SERPL-SCNC: 3.8 MMOL/L (ref 3.4–5.3)
SODIUM SERPL-SCNC: 143 MMOL/L (ref 136–145)

## 2023-03-02 PROCEDURE — 99309 SBSQ NF CARE MODERATE MDM 30: CPT | Performed by: NURSE PRACTITIONER

## 2023-03-02 PROCEDURE — 80048 BASIC METABOLIC PNL TOTAL CA: CPT | Mod: ORL | Performed by: FAMILY MEDICINE

## 2023-03-02 PROCEDURE — 36415 COLL VENOUS BLD VENIPUNCTURE: CPT | Mod: ORL | Performed by: FAMILY MEDICINE

## 2023-03-02 PROCEDURE — 85018 HEMOGLOBIN: CPT | Mod: ORL | Performed by: FAMILY MEDICINE

## 2023-03-02 PROCEDURE — P9604 ONE-WAY ALLOW PRORATED TRIP: HCPCS | Mod: ORL | Performed by: FAMILY MEDICINE

## 2023-03-02 NOTE — LETTER
3/2/2023        RE: Suzy Gómez  [address]  [place] [address]        [facility]    Code Status:  FULL CODE   Visit Type:   Chief Complaint   Patient presents with     TCU Follow Up     Facility:  [facility] () [[id number]]           HPI: Suzy Gómez is a 77 year old female who I am seeing today for follow up on the TCU. Pt recently hospitalized on 1/29/23 due to sepsis due to bowel perforation, also found to have impacted stool in the sigmoid colon with associated with perforation and feculent peritonitis. Past medical history includes HTN, DM type II, KARYN not on CPAP, depression and hypothyroidism. Pt presented to ER with abdominal pain and distention for 2-3 days. Pt had been to the ER on 11/26/22 with abdominal pain where she was diagnosed with acute distal colitis. During that hospitalization she left AMA before complete workup done. She presented again on 1/25/23 with abdominal pain. She was again diagnosed with colitis and discharged home. CT of the abdomen/pelvis showed   showed punctate foci of gas seen adjacent to the inflamed colon as well as tracking in the anterior abdominal wall concerning for sigmoid colonic perforation. Pt underwent sigmoid colectomy on 1/29/23. Pt with diffuse feculent peritonitis intra operatively. Repeat CT with no drain able fluid collections. Pt treated with IV antibiotics. Fluid overload. Pt treated with IV lasix. She was not on diuretics PTA. Hypotensive during hospitalization. BP meds held but resumed at discharge.       Transitional Care Course: Today pt sitting up in bedside chair.  Recent bacteremia.  Antibiotics complete.  Perforation of the colon status post sigmoid colectomy.  Patient previously having flatulence.  She was started on simethicone.  Some swelling in her abdomen subsided.  She is having regular bowel movements.  Appetite improving.  No shortness of breath or chest pain.  No edema.      Assessment/Plan:  Reviewed with changes.     Bacteremia due to Clostridium and Bacteroides -- 1/29/23  Perforation of Colonic Stericolic Ulcer -- S/P Sigmoid Colectomy 1/29/30  -s/p sigmoid colectomy on 1/29/23.   -Repeat CT without fluid collections.   -Antibiotics complete.  -Pain controlled with tylenol.   -Follow up CBC no leukocytosis.   -Hgb  8.7.     Primary hypertension  -Norvasc 2.5 daily.   -previously on Losartan but discontinued due to hypotension.   -BP reviewed appears satisfactory.    -Monitor.     DM type 2, Hgb A1C 7.6 on 11/23/22  -HgbA1C 8.1.  -Pt treated with Lantus during hospitalization.   -Jardiance 25 mg QAM.   -Metformin 500 mg BID.   -Tradjenta 5 mg Q AM.   -BS checks QID.  Blood sugars reviewed.  Satisfactory.  -Consistent Carb diet.       Flatulence  -pt having regular bowel movements.   -No further diarrhea.   -Flatulence improved with simethicone.  -Continue simethicone 80 mg BID.   -Early ambulation.      Active Ambulatory Problems     Diagnosis Date Noted     Non-specific colitis 11/26/2022     Chest pain, unspecified type 11/26/2022     Abnormal laboratory test result 11/03/2020     Angiodysplasia of colon 06/08/2021     Cataract 01/29/2023     Colon adenoma 06/10/2021     Depression 01/29/2023     GERD (gastroesophageal reflux disease) 01/29/2023     HTN (hypertension) 01/29/2023     Hypercalcemia 11/02/2020     Hyperlipidemia 01/29/2023     Hyperparathyroidism (H) 04/08/2021     Hypervitaminosis D 08/09/2016     Microalbuminuria 08/25/2022     Multinodular goiter 01/29/2023     OA (osteoarthritis) 01/29/2023     Obesity, morbid (H) 11/23/2022     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     DM type 2, Hgb A1C 7.6 on 11/23/22 01/29/2023     Perforation of Colonic Stericolic Ulcer -- S/P Sigmoid Colectomy 1/29/30 01/29/2023     Refusal of blood transfusions as patient is [employer] 01/29/2023     KARYN (obstructive sleep apnea) 01/31/2023     Bacteremia due to Clostridium and Bacteroides -- 1/29/23  02/07/2023     Resolved Ambulatory Problems     Diagnosis Date Noted     No Resolved Ambulatory Problems     Past Medical History:   Diagnosis Date     Dyslipidemia      Heart disease      Hypertension      Thyroid disease      Type 2 diabetes mellitus (H)      Allergies   Allergen Reactions     Penicillins Nausea and Vomiting and GI Disturbance     Atorvastatin Muscle Pain (Myalgia)     Augmentin Nausea and Vomiting and Diarrhea     Bupropion Other (See Comments)     Mood swings     Exenatide Rash     Nitrofurantoin Hives and Swelling       All Meds and Allergies reviewed in the record at the facility and is the most up-to-date.      Current Outpatient Medications   Medication Sig     Acetaminophen 325 MG CHEW Take 650 mg by mouth 3 times daily     amLODIPine (NORVASC) 2.5 MG tablet Take 2.5 mg by mouth daily     aspirin (ASA) 81 MG EC tablet Take 81 mg by mouth daily (Patient not taking: Reported on 2/27/2023)     docusate sodium (COLACE) 100 MG capsule Take 1 capsule (100 mg) by mouth 2 times daily     empagliflozin (JARDIANCE) 25 MG TABS tablet Take 25 mg by mouth daily     levothyroxine (SYNTHROID/LEVOTHROID) 137 MCG tablet Take 137 mcg by mouth daily before breakfast     linagliptin (TRADJENTA) 5 MG TABS tablet Take 5 mg by mouth daily     metFORMIN (GLUCOPHAGE XR) 500 MG 24 hr tablet Take 1,000 mg by mouth 2 times daily (with meals)     oxybutynin ER (DITROPAN XL) 10 MG 24 hr tablet Take 20 mg by mouth daily     rosuvastatin (CRESTOR) 20 MG tablet Take 20 mg by mouth At Bedtime     simethicone (MYLICON) 80 MG chewable tablet Take 80 mg by mouth 2 times daily     venlafaxine (EFFEXOR XR) 150 MG 24 hr capsule Take 1 capsule (150 mg) by mouth daily (with breakfast)     No current facility-administered medications for this visit.       REVIEW OF SYSTEMS:   10 point review of systems reviewed and pertinent positives in the HPI.     PHYSICAL EXAMINATION:  Physical Exam     Vital signs: BP (!) 164/76   Pulse 97    "Temp 97.6  F (36.4  C)   Resp 21   Ht 1.651 m (5' 5\")   Wt 97.1 kg (214 lb)   SpO2 91%   BMI 35.61 kg/m    General: Awake, Alert,sitting up in bedside chair,  conversant  HEENT:Pink conjunctiva,  dry oral mucosa  NECK: Supple  CVS:  S1  S2, without murmur or gallop.   LUNG: Clear to auscultation, No wheezes, rales or rhonci.  BACK: No kyphosis of the thoracic spine  ABDOMEN: Soft, round, , nontender to palpation, with positive bowel sounds. Incisions to abdomen intact BROCK.   EXTREMITIES: Good range of motion on both upper and lower extremities, no pedal edema, no calf tenderness  SKIN: Warm and dry  NEUROLOGIC: Intact  PSYCHIATRIC: Flat affect.       Labs:  All labs reviewed in the nursing home record and [employer]   @  Lab Results   Component Value Date    WBC 8.8 02/18/2023     Lab Results   Component Value Date    RBC 3.21 02/18/2023     Lab Results   Component Value Date    HGB 9.0 02/18/2023     Lab Results   Component Value Date    HCT 30.4 02/18/2023     Lab Results   Component Value Date    MCV 95 02/18/2023     Lab Results   Component Value Date    MCH 28.0 02/18/2023     Lab Results   Component Value Date    MCHC 29.6 02/18/2023     Lab Results   Component Value Date    RDW 15.0 02/18/2023     Lab Results   Component Value Date     02/18/2023        @Last Comprehensive Metabolic Panel:  Sodium   Date Value Ref Range Status   03/02/2023 143 136 - 145 mmol/L Final     Potassium   Date Value Ref Range Status   03/02/2023 3.8 3.4 - 5.3 mmol/L Final     Chloride   Date Value Ref Range Status   03/02/2023 105 98 - 107 mmol/L Final     Carbon Dioxide (CO2)   Date Value Ref Range Status   03/02/2023 24 22 - 29 mmol/L Final     Anion Gap   Date Value Ref Range Status   03/02/2023 14 7 - 15 mmol/L Final     Glucose   Date Value Ref Range Status   03/02/2023 92 70 - 99 mg/dL Final     GLUCOSE BY METER POCT   Date Value Ref Range Status   02/18/2023 204 (H) 70 - 99 mg/dL Final     Urea Nitrogen   Date Value Ref " Range Status   03/02/2023 8.0 8.0 - 23.0 mg/dL Final     Creatinine   Date Value Ref Range Status   03/02/2023 0.63 0.51 - 0.95 mg/dL Final     GFR Estimate   Date Value Ref Range Status   03/02/2023 >90 >60 mL/min/1.73m2 Final     Comment:     eGFR calculated using 2021 CKD-EPI equation.     Calcium   Date Value Ref Range Status   03/02/2023 9.0 8.8 - 10.2 mg/dL Final   .     This note has been dictated using voice recognition software. Any grammatical or context distortions are unintentional and inherent to the software    Electronically signed by: Shayy Carrillo CNP         Sincerely,        Shayy Carrillo, NP

## 2023-03-03 NOTE — PROGRESS NOTES
[facility]    Code Status:  FULL CODE   Visit Type:   Chief Complaint   Patient presents with     TCU Follow Up     Facility:  [facility] ([facility]) [[id number]]           HPI: Suzy Gómez is a 77 year old female who I am seeing today for follow up on the TCU. Pt recently hospitalized on 1/29/23 due to sepsis due to bowel perforation, also found to have impacted stool in the sigmoid colon with associated with perforation and feculent peritonitis. Past medical history includes HTN, DM type II, KARYN not on CPAP, depression and hypothyroidism. Pt presented to ER with abdominal pain and distention for 2-3 days. Pt had been to the ER on 11/26/22 with abdominal pain where she was diagnosed with acute distal colitis. During that hospitalization she left AMA before complete workup done. She presented again on 1/25/23 with abdominal pain. She was again diagnosed with colitis and discharged home. CT of the abdomen/pelvis showed   showed punctate foci of gas seen adjacent to the inflamed colon as well as tracking in the anterior abdominal wall concerning for sigmoid colonic perforation. Pt underwent sigmoid colectomy on 1/29/23. Pt with diffuse feculent peritonitis intra operatively. Repeat CT with no drain able fluid collections. Pt treated with IV antibiotics. Fluid overload. Pt treated with IV lasix. She was not on diuretics PTA. Hypotensive during hospitalization. BP meds held but resumed at discharge.       Transitional Care Course: Today pt sitting up in bedside chair.  Recent bacteremia.  Antibiotics complete.  Perforation of the colon status post sigmoid colectomy.  Patient previously having flatulence.  She was started on simethicone.  Some swelling in her abdomen subsided.  She is having regular bowel movements.  Appetite improving.  No shortness of breath or chest pain.  No edema.      Assessment/Plan: Reviewed with changes.     Bacteremia due to Clostridium and Bacteroides --  1/29/23  Perforation of Colonic Stericolic Ulcer -- S/P Sigmoid Colectomy 1/29/30  -s/p sigmoid colectomy on 1/29/23.   -Repeat CT without fluid collections.   -Antibiotics complete.  -Pain controlled with tylenol.   -Follow up CBC no leukocytosis.   -Hgb  8.7.     Primary hypertension  -Norvasc 2.5 daily.   -previously on Losartan but discontinued due to hypotension.   -BP reviewed appears satisfactory.    -Monitor.     DM type 2, Hgb A1C 7.6 on 11/23/22  -HgbA1C 8.1.  -Pt treated with Lantus during hospitalization.   -Jardiance 25 mg QAM.   -Metformin 500 mg BID.   -Tradjenta 5 mg Q AM.   -BS checks QID.  Blood sugars reviewed.  Satisfactory.  -Consistent Carb diet.       Flatulence  -pt having regular bowel movements.   -No further diarrhea.   -Flatulence improved with simethicone.  -Continue simethicone 80 mg BID.   -Early ambulation.      Active Ambulatory Problems     Diagnosis Date Noted     Non-specific colitis 11/26/2022     Chest pain, unspecified type 11/26/2022     Abnormal laboratory test result 11/03/2020     Angiodysplasia of colon 06/08/2021     Cataract 01/29/2023     Colon adenoma 06/10/2021     Depression 01/29/2023     GERD (gastroesophageal reflux disease) 01/29/2023     HTN (hypertension) 01/29/2023     Hypercalcemia 11/02/2020     Hyperlipidemia 01/29/2023     Hyperparathyroidism (H) 04/08/2021     Hypervitaminosis D 08/09/2016     Microalbuminuria 08/25/2022     Multinodular goiter 01/29/2023     OA (osteoarthritis) 01/29/2023     Obesity, morbid (H) 11/23/2022     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     DM type 2, Hgb A1C 7.6 on 11/23/22 01/29/2023     Perforation of Colonic Stericolic Ulcer -- S/P Sigmoid Colectomy 1/29/30 01/29/2023     Refusal of blood transfusions as patient is [employer] 01/29/2023     KARYN (obstructive sleep apnea) 01/31/2023     Bacteremia due to Clostridium and Bacteroides -- 1/29/23 02/07/2023     Resolved Ambulatory Problems     Diagnosis Date Noted     No  "Resolved Ambulatory Problems     Past Medical History:   Diagnosis Date     Dyslipidemia      Heart disease      Hypertension      Thyroid disease      Type 2 diabetes mellitus (H)      Allergies   Allergen Reactions     Penicillins Nausea and Vomiting and GI Disturbance     Atorvastatin Muscle Pain (Myalgia)     Augmentin Nausea and Vomiting and Diarrhea     Bupropion Other (See Comments)     Mood swings     Exenatide Rash     Nitrofurantoin Hives and Swelling       All Meds and Allergies reviewed in the record at the facility and is the most up-to-date.      Current Outpatient Medications   Medication Sig     Acetaminophen 325 MG CHEW Take 650 mg by mouth 3 times daily     amLODIPine (NORVASC) 2.5 MG tablet Take 2.5 mg by mouth daily     aspirin (ASA) 81 MG EC tablet Take 81 mg by mouth daily (Patient not taking: Reported on 2/27/2023)     docusate sodium (COLACE) 100 MG capsule Take 1 capsule (100 mg) by mouth 2 times daily     empagliflozin (JARDIANCE) 25 MG TABS tablet Take 25 mg by mouth daily     levothyroxine (SYNTHROID/LEVOTHROID) 137 MCG tablet Take 137 mcg by mouth daily before breakfast     linagliptin (TRADJENTA) 5 MG TABS tablet Take 5 mg by mouth daily     metFORMIN (GLUCOPHAGE XR) 500 MG 24 hr tablet Take 1,000 mg by mouth 2 times daily (with meals)     oxybutynin ER (DITROPAN XL) 10 MG 24 hr tablet Take 20 mg by mouth daily     rosuvastatin (CRESTOR) 20 MG tablet Take 20 mg by mouth At Bedtime     simethicone (MYLICON) 80 MG chewable tablet Take 80 mg by mouth 2 times daily     venlafaxine (EFFEXOR XR) 150 MG 24 hr capsule Take 1 capsule (150 mg) by mouth daily (with breakfast)     No current facility-administered medications for this visit.       REVIEW OF SYSTEMS:   10 point review of systems reviewed and pertinent positives in the HPI.     PHYSICAL EXAMINATION:  Physical Exam     Vital signs: BP (!) 164/76   Pulse 97   Temp 97.6  F (36.4  C)   Resp 21   Ht 1.651 m (5' 5\")   Wt 97.1 kg (214 " lb)   SpO2 91%   BMI 35.61 kg/m    General: Awake, Alert,sitting up in bedside chair,  conversant  HEENT:Pink conjunctiva,  dry oral mucosa  NECK: Supple  CVS:  S1  S2, without murmur or gallop.   LUNG: Clear to auscultation, No wheezes, rales or rhonci.  BACK: No kyphosis of the thoracic spine  ABDOMEN: Soft, round, , nontender to palpation, with positive bowel sounds. Incisions to abdomen intact BROCK.   EXTREMITIES: Good range of motion on both upper and lower extremities, no pedal edema, no calf tenderness  SKIN: Warm and dry  NEUROLOGIC: Intact  PSYCHIATRIC: Flat affect.       Labs:  All labs reviewed in the nursing home record and [facility]   @  Lab Results   Component Value Date    WBC 8.8 02/18/2023     Lab Results   Component Value Date    RBC 3.21 02/18/2023     Lab Results   Component Value Date    HGB 9.0 02/18/2023     Lab Results   Component Value Date    HCT 30.4 02/18/2023     Lab Results   Component Value Date    MCV 95 02/18/2023     Lab Results   Component Value Date    MCH 28.0 02/18/2023     Lab Results   Component Value Date    MCHC 29.6 02/18/2023     Lab Results   Component Value Date    RDW 15.0 02/18/2023     Lab Results   Component Value Date     02/18/2023        @Last Comprehensive Metabolic Panel:  Sodium   Date Value Ref Range Status   03/02/2023 143 136 - 145 mmol/L Final     Potassium   Date Value Ref Range Status   03/02/2023 3.8 3.4 - 5.3 mmol/L Final     Chloride   Date Value Ref Range Status   03/02/2023 105 98 - 107 mmol/L Final     Carbon Dioxide (CO2)   Date Value Ref Range Status   03/02/2023 24 22 - 29 mmol/L Final     Anion Gap   Date Value Ref Range Status   03/02/2023 14 7 - 15 mmol/L Final     Glucose   Date Value Ref Range Status   03/02/2023 92 70 - 99 mg/dL Final     GLUCOSE BY METER POCT   Date Value Ref Range Status   02/18/2023 204 (H) 70 - 99 mg/dL Final     Urea Nitrogen   Date Value Ref Range Status   03/02/2023 8.0 8.0 - 23.0 mg/dL Final     Creatinine   Date  Value Ref Range Status   03/02/2023 0.63 0.51 - 0.95 mg/dL Final     GFR Estimate   Date Value Ref Range Status   03/02/2023 >90 >60 mL/min/1.73m2 Final     Comment:     eGFR calculated using 2021 CKD-EPI equation.     Calcium   Date Value Ref Range Status   03/02/2023 9.0 8.8 - 10.2 mg/dL Final   .     This note has been dictated using voice recognition software. Any grammatical or context distortions are unintentional and inherent to the software    Electronically signed by: Shayy Carrillo, CNP

## 2023-03-07 ENCOUNTER — TRANSITIONAL CARE UNIT VISIT (OUTPATIENT)
Dept: GERIATRICS | Facility: CLINIC | Age: 78
End: 2023-03-07
Payer: COMMERCIAL

## 2023-03-07 VITALS
HEIGHT: 65 IN | WEIGHT: 209.6 LBS | SYSTOLIC BLOOD PRESSURE: 160 MMHG | TEMPERATURE: 98.2 F | HEART RATE: 85 BPM | DIASTOLIC BLOOD PRESSURE: 76 MMHG | OXYGEN SATURATION: 90 % | RESPIRATION RATE: 16 BRPM | BODY MASS INDEX: 34.92 KG/M2

## 2023-03-07 DIAGNOSIS — I10 PRIMARY HYPERTENSION: ICD-10-CM

## 2023-03-07 DIAGNOSIS — R80.9 TYPE 2 DIABETES MELLITUS WITH MICROALBUMINURIA, WITHOUT LONG-TERM CURRENT USE OF INSULIN (H): ICD-10-CM

## 2023-03-07 DIAGNOSIS — R14.1 FLATULENCE, ERUCTATION AND GAS PAIN: ICD-10-CM

## 2023-03-07 DIAGNOSIS — R78.81 BACTEREMIA DUE TO CLOSTRIDIUM SPECIES: Primary | ICD-10-CM

## 2023-03-07 DIAGNOSIS — R14.3 FLATULENCE, ERUCTATION AND GAS PAIN: ICD-10-CM

## 2023-03-07 DIAGNOSIS — K63.1 PERFORATION OF COLON (H): ICD-10-CM

## 2023-03-07 DIAGNOSIS — B96.89 BACTEREMIA DUE TO CLOSTRIDIUM SPECIES: Primary | ICD-10-CM

## 2023-03-07 DIAGNOSIS — E11.29 TYPE 2 DIABETES MELLITUS WITH MICROALBUMINURIA, WITHOUT LONG-TERM CURRENT USE OF INSULIN (H): ICD-10-CM

## 2023-03-07 DIAGNOSIS — R14.2 FLATULENCE, ERUCTATION AND GAS PAIN: ICD-10-CM

## 2023-03-07 PROCEDURE — 99309 SBSQ NF CARE MODERATE MDM 30: CPT | Performed by: NURSE PRACTITIONER

## 2023-03-07 NOTE — LETTER
3/7/2023        RE: Suzy Gómez  [address]  [place] [address]        [facility]    Code Status:  FULL CODE   Visit Type:   Chief Complaint   Patient presents with     TCU Follow Up     Facility:  [facility] [facility] ([facility]) [[id number]]           HPI: Suzy Gómez is a 77 year old female who I am seeing today for follow up on the TCU. Pt recently hospitalized on 1/29/23 due to sepsis due to bowel perforation, also found to have impacted stool in the sigmoid colon with associated with perforation and feculent peritonitis. Past medical history includes HTN, DM type II, KARYN not on CPAP, depression and hypothyroidism. Pt presented to ER with abdominal pain and distention for 2-3 days. Pt had been to the ER on 11/26/22 with abdominal pain where she was diagnosed with acute distal colitis. During that hospitalization she left AMA before complete workup done. She presented again on 1/25/23 with abdominal pain. She was again diagnosed with colitis and discharged home. CT of the abdomen/pelvis showed   showed punctate foci of gas seen adjacent to the inflamed colon as well as tracking in the anterior abdominal wall concerning for sigmoid colonic perforation. Pt underwent sigmoid colectomy on 1/29/23. Pt with diffuse feculent peritonitis intra operatively. Repeat CT with no drain able fluid collections. Pt treated with IV antibiotics. Fluid overload. Pt treated with IV lasix. She was not on diuretics PTA. Hypotensive during hospitalization. BP meds held but resumed at discharge.       Transitional Care Course: Today pt sitting up in bedside chair. Perforation of the colon status post sigmoid colectomy with resulting bacteremia. Antibiotics complete. Today pt more alert, more talkative. She denies any pain in belly. Her appetite is good. She is having regular bowel movements. She denies any SOB or CP.       Assessment/Plan: Reviewed     Bacteremia due to Clostridium and Bacteroides --  1/29/23  Perforation of Colonic Stericolic Ulcer -- S/P Sigmoid Colectomy 1/29/30  -s/p sigmoid colectomy on 1/29/23.   -Repeat CT without fluid collections.   -Antibiotics complete.  -Pain controlled with tylenol.   -Follow up CBC no leukocytosis.   -Hgb  8.7.   -Follow of CBC.     Primary hypertension  -Norvasc 2.5 daily.   -previously on Losartan but discontinued due to hypotension.   -Blood pressure now greatly improved and trending upward.   -Pt previously on Losartan 50 mg BID. Will start with 25 mg daily.   -Monitor.   -Follow up BMP.     DM type 2, Hgb A1C 7.6 on 11/23/22  -HgbA1C 8.1.  -Pt treated with Lantus during hospitalization. Now back on home meds.   -Jardiance 25 mg QAM.   -Metformin 500 mg BID.   -Tradjenta 5 mg Q AM.   -BS checks QID.  Blood sugars reviewed.  Satisfactory.  -Consistent Carb diet.     Flatulence  -Flatulence improved with simethicone.  -Continue simethicone 80 mg BID.   -Early ambulation.      Active Ambulatory Problems     Diagnosis Date Noted     Non-specific colitis 11/26/2022     Chest pain, unspecified type 11/26/2022     Abnormal laboratory test result 11/03/2020     Angiodysplasia of colon 06/08/2021     Cataract 01/29/2023     Colon adenoma 06/10/2021     Depression 01/29/2023     GERD (gastroesophageal reflux disease) 01/29/2023     HTN (hypertension) 01/29/2023     Hypercalcemia 11/02/2020     Hyperlipidemia 01/29/2023     Hyperparathyroidism (H) 04/08/2021     Hypervitaminosis D 08/09/2016     Microalbuminuria 08/25/2022     Multinodular goiter 01/29/2023     OA (osteoarthritis) 01/29/2023     Obesity, morbid (H) 11/23/2022     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     DM type 2, Hgb A1C 7.6 on 11/23/22 01/29/2023     Perforation of Colonic Stericolic Ulcer -- S/P Sigmoid Colectomy 1/29/30 01/29/2023     Refusal of blood transfusions as patient is Caodaism 01/29/2023     KARYN (obstructive sleep apnea) 01/31/2023     Bacteremia due to Clostridium and Bacteroides  -- 1/29/23 02/07/2023     Resolved Ambulatory Problems     Diagnosis Date Noted     No Resolved Ambulatory Problems     Past Medical History:   Diagnosis Date     Dyslipidemia      Heart disease      Hypertension      Thyroid disease      Type 2 diabetes mellitus (H)      Allergies   Allergen Reactions     Penicillins Nausea and Vomiting and GI Disturbance     Atorvastatin Muscle Pain (Myalgia)     Augmentin Nausea and Vomiting and Diarrhea     Bupropion Other (See Comments)     Mood swings     Exenatide Rash     Nitrofurantoin Hives and Swelling       All Meds and Allergies reviewed in the record at the facility and is the most up-to-date.      Current Outpatient Medications   Medication Sig     Acetaminophen 325 MG CHEW Take 650 mg by mouth 3 times daily     amLODIPine (NORVASC) 2.5 MG tablet Take 2.5 mg by mouth daily     aspirin (ASA) 81 MG EC tablet Take 81 mg by mouth daily (Patient not taking: Reported on 2/27/2023)     docusate sodium (COLACE) 100 MG capsule Take 1 capsule (100 mg) by mouth 2 times daily     empagliflozin (JARDIANCE) 25 MG TABS tablet Take 25 mg by mouth daily     levothyroxine (SYNTHROID/LEVOTHROID) 137 MCG tablet Take 137 mcg by mouth daily before breakfast     linagliptin (TRADJENTA) 5 MG TABS tablet Take 5 mg by mouth daily     metFORMIN (GLUCOPHAGE XR) 500 MG 24 hr tablet Take 1,000 mg by mouth 2 times daily (with meals)     oxybutynin ER (DITROPAN XL) 10 MG 24 hr tablet Take 20 mg by mouth daily     rosuvastatin (CRESTOR) 20 MG tablet Take 20 mg by mouth At Bedtime     simethicone (MYLICON) 80 MG chewable tablet Take 80 mg by mouth 2 times daily     venlafaxine (EFFEXOR XR) 150 MG 24 hr capsule Take 1 capsule (150 mg) by mouth daily (with breakfast)     No current facility-administered medications for this visit.       REVIEW OF SYSTEMS:   7 point review of systems reviewed and pertinent positives in the HPI.     PHYSICAL EXAMINATION:  Physical Exam     Vital signs: BP (!) 160/76    "Pulse 85   Temp 98.2  F (36.8  C)   Resp 16   Ht 1.651 m (5' 5\")   Wt 95.1 kg (209 lb 9.6 oz)   SpO2 90%   BMI 34.88 kg/m    General: Awake, Alert,sitting up in bedside chair,  conversant  HEENT:Pink conjunctiva,  dry oral mucosa  NECK: Supple  CVS:  S1  S2, without murmur or gallop.   LUNG: Clear to auscultation, No wheezes, rales or rhonci.  BACK: No kyphosis of the thoracic spine  ABDOMEN: Soft, round, nontender to palpation, with positive bowel sounds. Incisions to abdomen intact BROCK.   EXTREMITIES: Good range of motion on both upper and lower extremities, no pedal edema  NEUROLOGIC: Intact        Labs:  All labs reviewed in the nursing home record and [facility]   @  Lab Results   Component Value Date    WBC 8.8 02/18/2023     Lab Results   Component Value Date    RBC 3.21 02/18/2023     Lab Results   Component Value Date    HGB 9.0 02/18/2023     Lab Results   Component Value Date    HCT 30.4 02/18/2023     Lab Results   Component Value Date    MCV 95 02/18/2023     Lab Results   Component Value Date    MCH 28.0 02/18/2023     Lab Results   Component Value Date    MCHC 29.6 02/18/2023     Lab Results   Component Value Date    RDW 15.0 02/18/2023     Lab Results   Component Value Date     02/18/2023        @Last Comprehensive Metabolic Panel:  Sodium   Date Value Ref Range Status   03/02/2023 143 136 - 145 mmol/L Final     Potassium   Date Value Ref Range Status   03/02/2023 3.8 3.4 - 5.3 mmol/L Final     Chloride   Date Value Ref Range Status   03/02/2023 105 98 - 107 mmol/L Final     Carbon Dioxide (CO2)   Date Value Ref Range Status   03/02/2023 24 22 - 29 mmol/L Final     Anion Gap   Date Value Ref Range Status   03/02/2023 14 7 - 15 mmol/L Final     Glucose   Date Value Ref Range Status   03/02/2023 92 70 - 99 mg/dL Final     GLUCOSE BY METER POCT   Date Value Ref Range Status   02/18/2023 204 (H) 70 - 99 mg/dL Final     Urea Nitrogen   Date Value Ref Range Status   03/02/2023 8.0 8.0 - 23.0 mg/dL " Final     Creatinine   Date Value Ref Range Status   03/02/2023 0.63 0.51 - 0.95 mg/dL Final     GFR Estimate   Date Value Ref Range Status   03/02/2023 >90 >60 mL/min/1.73m2 Final     Comment:     eGFR calculated using 2021 CKD-EPI equation.     Calcium   Date Value Ref Range Status   03/02/2023 9.0 8.8 - 10.2 mg/dL Final   .     This note has been dictated using voice recognition software. Any grammatical or context distortions are unintentional and inherent to the software    Electronically signed by: Shayy Carrillo CNP         Sincerely,        Shayy Carrillo, NP

## 2023-03-08 NOTE — PROGRESS NOTES
[facility]    Code Status:  FULL CODE   Visit Type:   Chief Complaint   Patient presents with     TCU Follow Up     Facility:  [facility] ([facility]) [[id number]]           HPI: Suzy Gómez is a 77 year old female who I am seeing today for follow up on the TCU. Pt recently hospitalized on 1/29/23 due to sepsis due to bowel perforation, also found to have impacted stool in the sigmoid colon with associated with perforation and feculent peritonitis. Past medical history includes HTN, DM type II, KARYN not on CPAP, depression and hypothyroidism. Pt presented to ER with abdominal pain and distention for 2-3 days. Pt had been to the ER on 11/26/22 with abdominal pain where she was diagnosed with acute distal colitis. During that hospitalization she left AMA before complete workup done. She presented again on 1/25/23 with abdominal pain. She was again diagnosed with colitis and discharged home. CT of the abdomen/pelvis showed   showed punctate foci of gas seen adjacent to the inflamed colon as well as tracking in the anterior abdominal wall concerning for sigmoid colonic perforation. Pt underwent sigmoid colectomy on 1/29/23. Pt with diffuse feculent peritonitis intra operatively. Repeat CT with no drain able fluid collections. Pt treated with IV antibiotics. Fluid overload. Pt treated with IV lasix. She was not on diuretics PTA. Hypotensive during hospitalization. BP meds held but resumed at discharge.       Transitional Care Course: Today pt sitting up in bedside chair. Perforation of the colon status post sigmoid colectomy with resulting bacteremia. Antibiotics complete. Today pt more alert, more talkative. She denies any pain in belly. Her appetite is good. She is having regular bowel movements. She denies any SOB or CP.       Assessment/Plan: Reviewed     Bacteremia due to Clostridium and Bacteroides -- 1/29/23  Perforation of Colonic Stericolic Ulcer -- S/P Sigmoid Colectomy 1/29/30  -s/p sigmoid  colectomy on 1/29/23.   -Repeat CT without fluid collections.   -Antibiotics complete.  -Pain controlled with tylenol.   -Follow up CBC no leukocytosis.   -Hgb  8.7.   -Follow of CBC.     Primary hypertension  -Norvasc 2.5 daily.   -previously on Losartan but discontinued due to hypotension.   -Blood pressure now greatly improved and trending upward.   -Pt previously on Losartan 50 mg BID. Will start with 25 mg daily.   -Monitor.   -Follow up BMP.     DM type 2, Hgb A1C 7.6 on 11/23/22  -HgbA1C 8.1.  -Pt treated with Lantus during hospitalization. Now back on home meds.   -Jardiance 25 mg QAM.   -Metformin 500 mg BID.   -Tradjenta 5 mg Q AM.   -BS checks QID.  Blood sugars reviewed.  Satisfactory.  -Consistent Carb diet.     Flatulence  -Flatulence improved with simethicone.  -Continue simethicone 80 mg BID.   -Early ambulation.      Active Ambulatory Problems     Diagnosis Date Noted     Non-specific colitis 11/26/2022     Chest pain, unspecified type 11/26/2022     Abnormal laboratory test result 11/03/2020     Angiodysplasia of colon 06/08/2021     Cataract 01/29/2023     Colon adenoma 06/10/2021     Depression 01/29/2023     GERD (gastroesophageal reflux disease) 01/29/2023     HTN (hypertension) 01/29/2023     Hypercalcemia 11/02/2020     Hyperlipidemia 01/29/2023     Hyperparathyroidism (H) 04/08/2021     Hypervitaminosis D 08/09/2016     Microalbuminuria 08/25/2022     Multinodular goiter 01/29/2023     OA (osteoarthritis) 01/29/2023     Obesity, morbid (H) 11/23/2022     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     DM type 2, Hgb A1C 7.6 on 11/23/22 01/29/2023     Perforation of Colonic Stericolic Ulcer -- S/P Sigmoid Colectomy 1/29/30 01/29/2023     Refusal of blood transfusions as patient is Zoroast[place] 01/29/2023     KARYN (obstructive sleep apnea) 01/31/2023     Bacteremia due to Clostridium and Bacteroides -- 1/29/23 02/07/2023     Resolved Ambulatory Problems     Diagnosis Date Noted     No Resolved  "Ambulatory Problems     Past Medical History:   Diagnosis Date     Dyslipidemia      Heart disease      Hypertension      Thyroid disease      Type 2 diabetes mellitus (H)      Allergies   Allergen Reactions     Penicillins Nausea and Vomiting and GI Disturbance     Atorvastatin Muscle Pain (Myalgia)     Augmentin Nausea and Vomiting and Diarrhea     Bupropion Other (See Comments)     Mood swings     Exenatide Rash     Nitrofurantoin Hives and Swelling       All Meds and Allergies reviewed in the record at the facility and is the most up-to-date.      Current Outpatient Medications   Medication Sig     Acetaminophen 325 MG CHEW Take 650 mg by mouth 3 times daily     amLODIPine (NORVASC) 2.5 MG tablet Take 2.5 mg by mouth daily     aspirin (ASA) 81 MG EC tablet Take 81 mg by mouth daily (Patient not taking: Reported on 2/27/2023)     docusate sodium (COLACE) 100 MG capsule Take 1 capsule (100 mg) by mouth 2 times daily     empagliflozin (JARDIANCE) 25 MG TABS tablet Take 25 mg by mouth daily     levothyroxine (SYNTHROID/LEVOTHROID) 137 MCG tablet Take 137 mcg by mouth daily before breakfast     linagliptin (TRADJENTA) 5 MG TABS tablet Take 5 mg by mouth daily     metFORMIN (GLUCOPHAGE XR) 500 MG 24 hr tablet Take 1,000 mg by mouth 2 times daily (with meals)     oxybutynin ER (DITROPAN XL) 10 MG 24 hr tablet Take 20 mg by mouth daily     rosuvastatin (CRESTOR) 20 MG tablet Take 20 mg by mouth At Bedtime     simethicone (MYLICON) 80 MG chewable tablet Take 80 mg by mouth 2 times daily     venlafaxine (EFFEXOR XR) 150 MG 24 hr capsule Take 1 capsule (150 mg) by mouth daily (with breakfast)     No current facility-administered medications for this visit.       REVIEW OF SYSTEMS:   7 point review of systems reviewed and pertinent positives in the HPI.     PHYSICAL EXAMINATION:  Physical Exam     Vital signs: BP (!) 160/76   Pulse 85   Temp 98.2  F (36.8  C)   Resp 16   Ht 1.651 m (5' 5\")   Wt 95.1 kg (209 lb 9.6 oz)  "  SpO2 90%   BMI 34.88 kg/m    General: Awake, Alert,sitting up in bedside chair,  conversant  HEENT:Pink conjunctiva,  dry oral mucosa  NECK: Supple  CVS:  S1  S2, without murmur or gallop.   LUNG: Clear to auscultation, No wheezes, rales or rhonci.  BACK: No kyphosis of the thoracic spine  ABDOMEN: Soft, round, nontender to palpation, with positive bowel sounds. Incisions to abdomen intact BROCK.   EXTREMITIES: Good range of motion on both upper and lower extremities, no pedal edema  NEUROLOGIC: Intact        Labs:  All labs reviewed in the nursing home record and [facility]   @  Lab Results   Component Value Date    WBC 8.8 02/18/2023     Lab Results   Component Value Date    RBC 3.21 02/18/2023     Lab Results   Component Value Date    HGB 9.0 02/18/2023     Lab Results   Component Value Date    HCT 30.4 02/18/2023     Lab Results   Component Value Date    MCV 95 02/18/2023     Lab Results   Component Value Date    MCH 28.0 02/18/2023     Lab Results   Component Value Date    MCHC 29.6 02/18/2023     Lab Results   Component Value Date    RDW 15.0 02/18/2023     Lab Results   Component Value Date     02/18/2023        @Last Comprehensive Metabolic Panel:  Sodium   Date Value Ref Range Status   03/02/2023 143 136 - 145 mmol/L Final     Potassium   Date Value Ref Range Status   03/02/2023 3.8 3.4 - 5.3 mmol/L Final     Chloride   Date Value Ref Range Status   03/02/2023 105 98 - 107 mmol/L Final     Carbon Dioxide (CO2)   Date Value Ref Range Status   03/02/2023 24 22 - 29 mmol/L Final     Anion Gap   Date Value Ref Range Status   03/02/2023 14 7 - 15 mmol/L Final     Glucose   Date Value Ref Range Status   03/02/2023 92 70 - 99 mg/dL Final     GLUCOSE BY METER POCT   Date Value Ref Range Status   02/18/2023 204 (H) 70 - 99 mg/dL Final     Urea Nitrogen   Date Value Ref Range Status   03/02/2023 8.0 8.0 - 23.0 mg/dL Final     Creatinine   Date Value Ref Range Status   03/02/2023 0.63 0.51 - 0.95 mg/dL Final     GFR  Estimate   Date Value Ref Range Status   03/02/2023 >90 >60 mL/min/1.73m2 Final     Comment:     eGFR calculated using 2021 CKD-EPI equation.     Calcium   Date Value Ref Range Status   03/02/2023 9.0 8.8 - 10.2 mg/dL Final   .     This note has been dictated using voice recognition software. Any grammatical or context distortions are unintentional and inherent to the software    Electronically signed by: Shayy Carrillo CNP

## 2023-03-15 ENCOUNTER — DISCHARGE SUMMARY NURSING HOME (OUTPATIENT)
Dept: GERIATRICS | Facility: CLINIC | Age: 78
End: 2023-03-15
Payer: COMMERCIAL

## 2023-03-15 VITALS
HEART RATE: 97 BPM | BODY MASS INDEX: 33.79 KG/M2 | TEMPERATURE: 97.2 F | HEIGHT: 65 IN | WEIGHT: 202.8 LBS | RESPIRATION RATE: 20 BRPM | OXYGEN SATURATION: 94 % | SYSTOLIC BLOOD PRESSURE: 137 MMHG | DIASTOLIC BLOOD PRESSURE: 75 MMHG

## 2023-03-15 DIAGNOSIS — R78.81 BACTEREMIA DUE TO CLOSTRIDIUM SPECIES: Primary | ICD-10-CM

## 2023-03-15 DIAGNOSIS — K63.1 PERFORATION OF COLON (H): ICD-10-CM

## 2023-03-15 DIAGNOSIS — E11.29 TYPE 2 DIABETES MELLITUS WITH MICROALBUMINURIA, WITHOUT LONG-TERM CURRENT USE OF INSULIN (H): ICD-10-CM

## 2023-03-15 DIAGNOSIS — I10 PRIMARY HYPERTENSION: ICD-10-CM

## 2023-03-15 DIAGNOSIS — E02 SUBCLINICAL IODINE-DEFICIENCY HYPOTHYROIDISM: ICD-10-CM

## 2023-03-15 DIAGNOSIS — F32.5 MAJOR DEPRESSIVE DISORDER IN FULL REMISSION, UNSPECIFIED WHETHER RECURRENT (H): ICD-10-CM

## 2023-03-15 DIAGNOSIS — B96.89 BACTEREMIA DUE TO CLOSTRIDIUM SPECIES: Primary | ICD-10-CM

## 2023-03-15 DIAGNOSIS — R80.9 TYPE 2 DIABETES MELLITUS WITH MICROALBUMINURIA, WITHOUT LONG-TERM CURRENT USE OF INSULIN (H): ICD-10-CM

## 2023-03-15 PROCEDURE — 99316 NF DSCHRG MGMT 30 MIN+: CPT | Performed by: NURSE PRACTITIONER

## 2023-03-15 NOTE — LETTER
3/15/2023        RE: Suzy Gómez  [address]  [place] [address]        [facility]    Code Status:  FULL CODE   Visit Type:   Chief Complaint   Patient presents with     TCU Discharge     Facility:  [facility] ([facility]) [[id number]]           HPI: Suzy Gómez is a 77 year old female who I am seeing today for discharge from the TCU. Pt recently hospitalized on 1/29/23 due to sepsis due to bowel perforation, also found to have impacted stool in the sigmoid colon with associated with perforation and feculent peritonitis. Past medical history includes HTN, DM type II, KARYN not on CPAP, depression and hypothyroidism. Pt presented to ER with abdominal pain and distention for 2-3 days. Pt had been to the ER on 11/26/22 with abdominal pain where she was diagnosed with acute distal colitis. During that hospitalization she left AMA before complete workup done. She presented again on 1/25/23 with abdominal pain. She was again diagnosed with colitis and discharged home. CT of the abdomen/pelvis showed   showed punctate foci of gas seen adjacent to the inflamed colon as well as tracking in the anterior abdominal wall concerning for sigmoid colonic perforation. Pt underwent sigmoid colectomy on 1/29/23. Pt with diffuse feculent peritonitis intra operatively. Repeat CT with no drain able fluid collections. Pt treated with IV antibiotics. Fluid overload. Pt treated with IV lasix. She was not on diuretics PTA. Hypotensive during hospitalization. BP meds held but resumed at discharge.       Transitional Care Course: Today pt sitting up in bedside chair. Her  is present on exam. Recent bacteremia 2/t perforation of the colon. Pt status post sigmoid colectomy. Antibiotics complete. Pt denies any pain in abdomen. She is having regular bowel movements. She denies any SOB or CP.     Assessment/Plan:     Bacteremia due to Clostridium and Bacteroides -- 1/29/23  Perforation of Colonic Stericolic  Ulcer -- S/P Sigmoid Colectomy 1/29/30  -s/p sigmoid colectomy on 1/29/23.   -Repeat CT without fluid collections.   -Antibiotics completed.  -Pain controlled with tylenol.    -Hgb 9.1.     Primary hypertension  -Norvasc 2.5 daily.   -previously on Losartan but discontinued due to hypotension.   -Blood pressure now greatly improved and trending upward.   -Losartan discontinued in hospital due to hypovolemia. Pt restarted on 25 mg daily.   -Will need follow up blood pressure control.       DM type 2, Hgb A1C 7.6 on 11/23/22  -HgbA1C 8.1.  -Pt treated with Lantus during hospitalization. Now back on home meds.   -Jardiance 25 mg QAM.   -Metformin 500 mg BID.   -Tradjenta 5 mg Q AM.   -BS checks QID.  Blood sugars reviewed.  Satisfactory.  -Consistent Carb diet.     Depression   -Venlafaxine 150 daily     hypothyroidism  -Levothyroxine 137 mcg daily    Active Ambulatory Problems     Diagnosis Date Noted     Non-specific colitis 11/26/2022     Chest pain, unspecified type 11/26/2022     Abnormal laboratory test result 11/03/2020     Angiodysplasia of colon 06/08/2021     Cataract 01/29/2023     Colon adenoma 06/10/2021     Depression 01/29/2023     GERD (gastroesophageal reflux disease) 01/29/2023     HTN (hypertension) 01/29/2023     Hypercalcemia 11/02/2020     Hyperlipidemia 01/29/2023     Hyperparathyroidism (H) 04/08/2021     Hypervitaminosis D 08/09/2016     Microalbuminuria 08/25/2022     Multinodular goiter 01/29/2023     OA (osteoarthritis) 01/29/2023     Obesity, morbid (H) 11/23/2022     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     DM type 2, Hgb A1C 7.6 on 11/23/22 01/29/2023     Perforation of Colonic Stericolic Ulcer -- S/P Sigmoid Colectomy 1/29/30 01/29/2023     Refusal of blood transfusions as patient is [employer] 01/29/2023     KARYN (obstructive sleep apnea) 01/31/2023     Bacteremia due to Clostridium and Bacteroides -- 1/29/23 02/07/2023     Resolved Ambulatory Problems     Diagnosis Date Noted  "    No Resolved Ambulatory Problems     Past Medical History:   Diagnosis Date     Dyslipidemia      Heart disease      Hypertension      Thyroid disease      Type 2 diabetes mellitus (H)      Allergies   Allergen Reactions     Penicillins Nausea and Vomiting and GI Disturbance     Atorvastatin Muscle Pain (Myalgia)     Augmentin Nausea and Vomiting and Diarrhea     Bupropion Other (See Comments)     Mood swings     Exenatide Rash     Nitrofurantoin Hives and Swelling       All Meds and Allergies reviewed in the record at the facility and is the most up-to-date.      Current Outpatient Medications   Medication Sig     Acetaminophen 325 MG CHEW Take 650 mg by mouth 3 times daily     amLODIPine (NORVASC) 2.5 MG tablet Take 2.5 mg by mouth daily     aspirin (ASA) 81 MG EC tablet Take 81 mg by mouth daily (Patient not taking: Reported on 2/27/2023)     docusate sodium (COLACE) 100 MG capsule Take 1 capsule (100 mg) by mouth 2 times daily     empagliflozin (JARDIANCE) 25 MG TABS tablet Take 25 mg by mouth daily     levothyroxine (SYNTHROID/LEVOTHROID) 137 MCG tablet Take 137 mcg by mouth daily before breakfast     linagliptin (TRADJENTA) 5 MG TABS tablet Take 5 mg by mouth daily     metFORMIN (GLUCOPHAGE XR) 500 MG 24 hr tablet Take 1,000 mg by mouth 2 times daily (with meals)     oxybutynin ER (DITROPAN XL) 10 MG 24 hr tablet Take 20 mg by mouth daily     rosuvastatin (CRESTOR) 20 MG tablet Take 20 mg by mouth At Bedtime     simethicone (MYLICON) 80 MG chewable tablet Take 80 mg by mouth 2 times daily     venlafaxine (EFFEXOR XR) 150 MG 24 hr capsule Take 1 capsule (150 mg) by mouth daily (with breakfast)     No current facility-administered medications for this visit.       REVIEW OF SYSTEMS:   7 point review of systems reviewed and pertinent positives in the HPI.     PHYSICAL EXAMINATION:  Physical Exam     Vital signs: /75   Pulse 97   Temp 97.2  F (36.2  C)   Resp 20   Ht 1.651 m (5' 5\")   Wt 92 kg (202 " lb 12.8 oz)   SpO2 94%   BMI 33.75 kg/m    General: Awake, Alert,sitting up in bedside chair,  conversant  HEENT:Pink conjunctiva,  dry oral mucosa  NECK: Supple  CVS:  S1  S2, without murmur or gallop.   LUNG: Clear to auscultation, No wheezes, rales or rhonci.  BACK: No kyphosis of the thoracic spine  ABDOMEN: Soft, round, nontender to palpation, with positive bowel sounds. Incisions to abdomen intact BROCK.   EXTREMITIES: Good range of motion on both upper and lower extremities, no pedal edema  NEUROLOGIC: Intact        Labs:  All labs reviewed in the nursing home record and [employer]   @  Lab Results   Component Value Date    WBC 8.8 02/18/2023     Lab Results   Component Value Date    RBC 3.21 02/18/2023     Lab Results   Component Value Date    HGB 9.0 02/18/2023     Lab Results   Component Value Date    HCT 30.4 02/18/2023     Lab Results   Component Value Date    MCV 95 02/18/2023     Lab Results   Component Value Date    MCH 28.0 02/18/2023     Lab Results   Component Value Date    MCHC 29.6 02/18/2023     Lab Results   Component Value Date    RDW 15.0 02/18/2023     Lab Results   Component Value Date     02/18/2023        @Last Comprehensive Metabolic Panel:  Sodium   Date Value Ref Range Status   03/02/2023 143 136 - 145 mmol/L Final     Potassium   Date Value Ref Range Status   03/02/2023 3.8 3.4 - 5.3 mmol/L Final     Chloride   Date Value Ref Range Status   03/02/2023 105 98 - 107 mmol/L Final     Carbon Dioxide (CO2)   Date Value Ref Range Status   03/02/2023 24 22 - 29 mmol/L Final     Anion Gap   Date Value Ref Range Status   03/02/2023 14 7 - 15 mmol/L Final     Glucose   Date Value Ref Range Status   03/02/2023 92 70 - 99 mg/dL Final     GLUCOSE BY METER POCT   Date Value Ref Range Status   02/18/2023 204 (H) 70 - 99 mg/dL Final     Urea Nitrogen   Date Value Ref Range Status   03/02/2023 8.0 8.0 - 23.0 mg/dL Final     Creatinine   Date Value Ref Range Status   03/02/2023 0.63 0.51 - 0.95 mg/dL  Final     GFR Estimate   Date Value Ref Range Status   03/02/2023 >90 >60 mL/min/1.73m2 Final     Comment:     eGFR calculated using 2021 CKD-EPI equation.     Calcium   Date Value Ref Range Status   03/02/2023 9.0 8.8 - 10.2 mg/dL Final     Okay to DC home with current meds and treatments.  Home PT, OT, home health aide and RN for medication management.  Follow-up with a primary care provider in 1 week.  Follow-up outpatient with GI.    DISCHARGE PLAN/FACE TO FACE:  I certify that this patient is under my care and that I, or a nurse practitioner or physician's assistant working with me, had a face-to-face encounter that meets the physician face-to-face encounter requirements with this patient.       I certify that, based on my findings, the following services are medically necessary home health services.    My clinical findings support the need for the above skilled services.    This patient is homebound because: Recent hospitalization for bacteremia secondary to perforation of the colon    The patient is, or has been, under my care and I have initiated the establishment of the plan of care. This patient will be followed by a physician who will periodically review the plan of care.    Time spent for this visit was 35 minutes which included reviewing discharge medications with patient and her , home care services and follow-ups.    This note has been dictated using voice recognition software. Any grammatical or context distortions are unintentional and inherent to the software    Electronically signed by: Shayy Carrillo CNP         Sincerely,        Shayy Carrillo NP

## 2023-03-16 NOTE — PROGRESS NOTES
[facility]    Code Status:  FULL CODE   Visit Type:   Chief Complaint   Patient presents with     TCU Discharge     Facility:  [facility] ([facility]) [[id number]]           HPI: Suzy Gómez is a 77 year old female who I am seeing today for discharge from the TCU. Pt recently hospitalized on 1/29/23 due to sepsis due to bowel perforation, also found to have impacted stool in the sigmoid colon with associated with perforation and feculent peritonitis. Past medical history includes HTN, DM type II, KARYN not on CPAP, depression and hypothyroidism. Pt presented to ER with abdominal pain and distention for 2-3 days. Pt had been to the ER on 11/26/22 with abdominal pain where she was diagnosed with acute distal colitis. During that hospitalization she left AMA before complete workup done. She presented again on 1/25/23 with abdominal pain. She was again diagnosed with colitis and discharged home. CT of the abdomen/pelvis showed   showed punctate foci of gas seen adjacent to the inflamed colon as well as tracking in the anterior abdominal wall concerning for sigmoid colonic perforation. Pt underwent sigmoid colectomy on 1/29/23. Pt with diffuse feculent peritonitis intra operatively. Repeat CT with no drain able fluid collections. Pt treated with IV antibiotics. Fluid overload. Pt treated with IV lasix. She was not on diuretics PTA. Hypotensive during hospitalization. BP meds held but resumed at discharge.       Transitional Care Course: Today pt sitting up in bedside chair. Her  is present on exam. Recent bacteremia 2/t perforation of the colon. Pt status post sigmoid colectomy. Antibiotics complete. Pt denies any pain in abdomen. She is having regular bowel movements. She denies any SOB or CP.     Assessment/Plan:     Bacteremia due to Clostridium and Bacteroides -- 1/29/23  Perforation of Colonic Stericolic Ulcer -- S/P Sigmoid Colectomy 1/29/30  -s/p sigmoid colectomy on 1/29/23.   -Repeat CT  without fluid collections.   -Antibiotics completed.  -Pain controlled with tylenol.    -Hgb 9.1.     Primary hypertension  -Norvasc 2.5 daily.   -previously on Losartan but discontinued due to hypotension.   -Blood pressure now greatly improved and trending upward.   -Losartan discontinued in hospital due to hypovolemia. Pt restarted on 25 mg daily.   -Will need follow up blood pressure control.       DM type 2, Hgb A1C 7.6 on 11/23/22  -HgbA1C 8.1.  -Pt treated with Lantus during hospitalization. Now back on home meds.   -Jardiance 25 mg QAM.   -Metformin 500 mg BID.   -Tradjenta 5 mg Q AM.   -BS checks QID.  Blood sugars reviewed.  Satisfactory.  -Consistent Carb diet.     Depression   -Venlafaxine 150 daily     hypothyroidism  -Levothyroxine 137 mcg daily    Active Ambulatory Problems     Diagnosis Date Noted     Non-specific colitis 11/26/2022     Chest pain, unspecified type 11/26/2022     Abnormal laboratory test result 11/03/2020     Angiodysplasia of colon 06/08/2021     Cataract 01/29/2023     Colon adenoma 06/10/2021     Depression 01/29/2023     GERD (gastroesophageal reflux disease) 01/29/2023     HTN (hypertension) 01/29/2023     Hypercalcemia 11/02/2020     Hyperlipidemia 01/29/2023     Hyperparathyroidism (H) 04/08/2021     Hypervitaminosis D 08/09/2016     Microalbuminuria 08/25/2022     Multinodular goiter 01/29/2023     OA (osteoarthritis) 01/29/2023     Obesity, morbid (H) 11/23/2022     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     DM type 2, Hgb A1C 7.6 on 11/23/22 01/29/2023     Perforation of Colonic Stericolic Ulcer -- S/P Sigmoid Colectomy 1/29/30 01/29/2023     Refusal of blood transfusions as patient is [employer] 01/29/2023     KARYN (obstructive sleep apnea) 01/31/2023     Bacteremia due to Clostridium and Bacteroides -- 1/29/23 02/07/2023     Resolved Ambulatory Problems     Diagnosis Date Noted     No Resolved Ambulatory Problems     Past Medical History:   Diagnosis Date      "Dyslipidemia      Heart disease      Hypertension      Thyroid disease      Type 2 diabetes mellitus (H)      Allergies   Allergen Reactions     Penicillins Nausea and Vomiting and GI Disturbance     Atorvastatin Muscle Pain (Myalgia)     Augmentin Nausea and Vomiting and Diarrhea     Bupropion Other (See Comments)     Mood swings     Exenatide Rash     Nitrofurantoin Hives and Swelling       All Meds and Allergies reviewed in the record at the facility and is the most up-to-date.      Current Outpatient Medications   Medication Sig     Acetaminophen 325 MG CHEW Take 650 mg by mouth 3 times daily     amLODIPine (NORVASC) 2.5 MG tablet Take 2.5 mg by mouth daily     aspirin (ASA) 81 MG EC tablet Take 81 mg by mouth daily (Patient not taking: Reported on 2/27/2023)     docusate sodium (COLACE) 100 MG capsule Take 1 capsule (100 mg) by mouth 2 times daily     empagliflozin (JARDIANCE) 25 MG TABS tablet Take 25 mg by mouth daily     levothyroxine (SYNTHROID/LEVOTHROID) 137 MCG tablet Take 137 mcg by mouth daily before breakfast     linagliptin (TRADJENTA) 5 MG TABS tablet Take 5 mg by mouth daily     metFORMIN (GLUCOPHAGE XR) 500 MG 24 hr tablet Take 1,000 mg by mouth 2 times daily (with meals)     oxybutynin ER (DITROPAN XL) 10 MG 24 hr tablet Take 20 mg by mouth daily     rosuvastatin (CRESTOR) 20 MG tablet Take 20 mg by mouth At Bedtime     simethicone (MYLICON) 80 MG chewable tablet Take 80 mg by mouth 2 times daily     venlafaxine (EFFEXOR XR) 150 MG 24 hr capsule Take 1 capsule (150 mg) by mouth daily (with breakfast)     No current facility-administered medications for this visit.       REVIEW OF SYSTEMS:   7 point review of systems reviewed and pertinent positives in the HPI.     PHYSICAL EXAMINATION:  Physical Exam     Vital signs: /75   Pulse 97   Temp 97.2  F (36.2  C)   Resp 20   Ht 1.651 m (5' 5\")   Wt 92 kg (202 lb 12.8 oz)   SpO2 94%   BMI 33.75 kg/m    General: Awake, Alert,sitting up in " bedside chair,  conversant  HEENT:Pink conjunctiva,  dry oral mucosa  NECK: Supple  CVS:  S1  S2, without murmur or gallop.   LUNG: Clear to auscultation, No wheezes, rales or rhonci.  BACK: No kyphosis of the thoracic spine  ABDOMEN: Soft, round, nontender to palpation, with positive bowel sounds. Incisions to abdomen intact BROCK.   EXTREMITIES: Good range of motion on both upper and lower extremities, no pedal edema  NEUROLOGIC: Intact        Labs:  All labs reviewed in the nursing home record and [facility]   @  Lab Results   Component Value Date    WBC 8.8 02/18/2023     Lab Results   Component Value Date    RBC 3.21 02/18/2023     Lab Results   Component Value Date    HGB 9.0 02/18/2023     Lab Results   Component Value Date    HCT 30.4 02/18/2023     Lab Results   Component Value Date    MCV 95 02/18/2023     Lab Results   Component Value Date    MCH 28.0 02/18/2023     Lab Results   Component Value Date    MCHC 29.6 02/18/2023     Lab Results   Component Value Date    RDW 15.0 02/18/2023     Lab Results   Component Value Date     02/18/2023        @Last Comprehensive Metabolic Panel:  Sodium   Date Value Ref Range Status   03/02/2023 143 136 - 145 mmol/L Final     Potassium   Date Value Ref Range Status   03/02/2023 3.8 3.4 - 5.3 mmol/L Final     Chloride   Date Value Ref Range Status   03/02/2023 105 98 - 107 mmol/L Final     Carbon Dioxide (CO2)   Date Value Ref Range Status   03/02/2023 24 22 - 29 mmol/L Final     Anion Gap   Date Value Ref Range Status   03/02/2023 14 7 - 15 mmol/L Final     Glucose   Date Value Ref Range Status   03/02/2023 92 70 - 99 mg/dL Final     GLUCOSE BY METER POCT   Date Value Ref Range Status   02/18/2023 204 (H) 70 - 99 mg/dL Final     Urea Nitrogen   Date Value Ref Range Status   03/02/2023 8.0 8.0 - 23.0 mg/dL Final     Creatinine   Date Value Ref Range Status   03/02/2023 0.63 0.51 - 0.95 mg/dL Final     GFR Estimate   Date Value Ref Range Status   03/02/2023 >90 >60  mL/min/1.73m2 Final     Comment:     eGFR calculated using 2021 CKD-EPI equation.     Calcium   Date Value Ref Range Status   03/02/2023 9.0 8.8 - 10.2 mg/dL Final     Okay to DC home with current meds and treatments.  Home PT, OT, home health aide and RN for medication management.  Follow-up with a primary care provider in 1 week.  Follow-up outpatient with GI.    DISCHARGE PLAN/FACE TO FACE:  I certify that this patient is under my care and that I, or a nurse practitioner or physician's assistant working with me, had a face-to-face encounter that meets the physician face-to-face encounter requirements with this patient.       I certify that, based on my findings, the following services are medically necessary home health services.    My clinical findings support the need for the above skilled services.    This patient is homebound because: Recent hospitalization for bacteremia secondary to perforation of the colon    The patient is, or has been, under my care and I have initiated the establishment of the plan of care. This patient will be followed by a physician who will periodically review the plan of care.    Time spent for this visit was 35 minutes which included reviewing discharge medications with patient and her , home care services and follow-ups.    This note has been dictated using voice recognition software. Any grammatical or context distortions are unintentional and inherent to the software    Electronically signed by: Shayy Carrillo CNP

## 2023-03-27 ENCOUNTER — HOSPITAL ENCOUNTER (INPATIENT)
Facility: HOSPITAL | Age: 78
LOS: 8 days | Discharge: HOME HEALTH | DRG: 862 | End: 2023-04-05
Attending: EMERGENCY MEDICINE | Admitting: INTERNAL MEDICINE
Payer: COMMERCIAL

## 2023-03-27 ENCOUNTER — APPOINTMENT (OUTPATIENT)
Dept: CT IMAGING | Facility: HOSPITAL | Age: 78
DRG: 862 | End: 2023-03-27
Attending: EMERGENCY MEDICINE
Payer: COMMERCIAL

## 2023-03-27 DIAGNOSIS — T81.43XA INTRA-ABDOMINAL ABSCESS POST-PROCEDURE (H): ICD-10-CM

## 2023-03-27 DIAGNOSIS — K65.1 INTRA-ABDOMINAL ABSCESS POST-PROCEDURE (H): ICD-10-CM

## 2023-03-27 LAB
ALBUMIN SERPL BCG-MCNC: 3.5 G/DL (ref 3.5–5.2)
ALP SERPL-CCNC: 96 U/L (ref 35–104)
ALT SERPL W P-5'-P-CCNC: 8 U/L (ref 10–35)
ANION GAP SERPL CALCULATED.3IONS-SCNC: 12 MMOL/L (ref 7–15)
AST SERPL W P-5'-P-CCNC: 22 U/L (ref 10–35)
BASOPHILS # BLD AUTO: 0.1 10E3/UL (ref 0–0.2)
BASOPHILS NFR BLD AUTO: 0 %
BILIRUB SERPL-MCNC: 0.2 MG/DL
BUN SERPL-MCNC: 19.5 MG/DL (ref 8–23)
CALCIUM SERPL-MCNC: 10 MG/DL (ref 8.8–10.2)
CHLORIDE SERPL-SCNC: 97 MMOL/L (ref 98–107)
CREAT SERPL-MCNC: 0.75 MG/DL (ref 0.51–0.95)
DEPRECATED HCO3 PLAS-SCNC: 25 MMOL/L (ref 22–29)
EOSINOPHIL # BLD AUTO: 0 10E3/UL (ref 0–0.7)
EOSINOPHIL NFR BLD AUTO: 0 %
ERYTHROCYTE [DISTWIDTH] IN BLOOD BY AUTOMATED COUNT: 17.3 % (ref 10–15)
GFR SERPL CREATININE-BSD FRML MDRD: 83 ML/MIN/1.73M2
GLUCOSE SERPL-MCNC: 157 MG/DL (ref 70–99)
HCT VFR BLD AUTO: 33 % (ref 35–47)
HGB BLD-MCNC: 10 G/DL (ref 11.7–15.7)
IMM GRANULOCYTES # BLD: 0 10E3/UL
IMM GRANULOCYTES NFR BLD: 0 %
LACTATE SERPL-SCNC: 0.9 MMOL/L (ref 0.7–2)
LIPASE SERPL-CCNC: 33 U/L (ref 13–60)
LYMPHOCYTES # BLD AUTO: 1.2 10E3/UL (ref 0.8–5.3)
LYMPHOCYTES NFR BLD AUTO: 11 %
MCH RBC QN AUTO: 29 PG (ref 26.5–33)
MCHC RBC AUTO-ENTMCNC: 30.3 G/DL (ref 31.5–36.5)
MCV RBC AUTO: 96 FL (ref 78–100)
MONOCYTES # BLD AUTO: 1.1 10E3/UL (ref 0–1.3)
MONOCYTES NFR BLD AUTO: 10 %
NEUTROPHILS # BLD AUTO: 9.2 10E3/UL (ref 1.6–8.3)
NEUTROPHILS NFR BLD AUTO: 79 %
NRBC # BLD AUTO: 0 10E3/UL
NRBC BLD AUTO-RTO: 0 /100
PLATELET # BLD AUTO: 379 10E3/UL (ref 150–450)
POTASSIUM SERPL-SCNC: 4.4 MMOL/L (ref 3.4–5.3)
PROT SERPL-MCNC: 7.1 G/DL (ref 6.4–8.3)
RBC # BLD AUTO: 3.45 10E6/UL (ref 3.8–5.2)
SODIUM SERPL-SCNC: 134 MMOL/L (ref 136–145)
WBC # BLD AUTO: 11.6 10E3/UL (ref 4–11)

## 2023-03-27 PROCEDURE — C9803 HOPD COVID-19 SPEC COLLECT: HCPCS

## 2023-03-27 PROCEDURE — 250N000011 HC RX IP 250 OP 636: Performed by: EMERGENCY MEDICINE

## 2023-03-27 PROCEDURE — 96374 THER/PROPH/DIAG INJ IV PUSH: CPT | Mod: 59

## 2023-03-27 PROCEDURE — 258N000003 HC RX IP 258 OP 636: Performed by: EMERGENCY MEDICINE

## 2023-03-27 PROCEDURE — 83690 ASSAY OF LIPASE: CPT | Performed by: EMERGENCY MEDICINE

## 2023-03-27 PROCEDURE — 83605 ASSAY OF LACTIC ACID: CPT | Performed by: EMERGENCY MEDICINE

## 2023-03-27 PROCEDURE — 85025 COMPLETE CBC W/AUTO DIFF WBC: CPT | Performed by: EMERGENCY MEDICINE

## 2023-03-27 PROCEDURE — 36415 COLL VENOUS BLD VENIPUNCTURE: CPT | Performed by: EMERGENCY MEDICINE

## 2023-03-27 PROCEDURE — 80053 COMPREHEN METABOLIC PANEL: CPT | Performed by: EMERGENCY MEDICINE

## 2023-03-27 PROCEDURE — 96361 HYDRATE IV INFUSION ADD-ON: CPT

## 2023-03-27 PROCEDURE — 99285 EMERGENCY DEPT VISIT HI MDM: CPT | Mod: 25

## 2023-03-27 PROCEDURE — 74177 CT ABD & PELVIS W/CONTRAST: CPT

## 2023-03-27 RX ORDER — IOPAMIDOL 755 MG/ML
100 INJECTION, SOLUTION INTRAVASCULAR ONCE
Status: COMPLETED | OUTPATIENT
Start: 2023-03-27 | End: 2023-03-27

## 2023-03-27 RX ORDER — KETOROLAC TROMETHAMINE 15 MG/ML
15 INJECTION, SOLUTION INTRAMUSCULAR; INTRAVENOUS ONCE
Status: COMPLETED | OUTPATIENT
Start: 2023-03-27 | End: 2023-03-27

## 2023-03-27 RX ADMIN — IOPAMIDOL 100 ML: 755 INJECTION, SOLUTION INTRAVENOUS at 23:31

## 2023-03-27 RX ADMIN — KETOROLAC TROMETHAMINE 15 MG: 15 INJECTION, SOLUTION INTRAMUSCULAR; INTRAVENOUS at 22:05

## 2023-03-27 RX ADMIN — SODIUM CHLORIDE 500 ML: 9 INJECTION, SOLUTION INTRAVENOUS at 22:04

## 2023-03-27 ASSESSMENT — ENCOUNTER SYMPTOMS
VOMITING: 0
ABDOMINAL PAIN: 1
BACK PAIN: 0
DIARRHEA: 0
CONSTIPATION: 0
ABDOMINAL DISTENTION: 1
NAUSEA: 0

## 2023-03-27 ASSESSMENT — ACTIVITIES OF DAILY LIVING (ADL): ADLS_ACUITY_SCORE: 35

## 2023-03-27 NOTE — ED NOTES
Expected Patient Referral to ED  5:45 PM    Referring Clinic/Provider:  [facility]    Reason for referral/Clinical facts:  Sigmoidectomy by Dr. Palencia on 2/29/23, went to TCU, recently home and had follow up with primary last week.  Pt having ongoing constipation for the past week.  CT abd/pelvis showing increased fluid collection that looks like it might be amenable to CT drainage.  Pt afebrile and not septic appearing, WBC 13.2.  Received IV Ertapenem at .    Recommendations provided:  Send to ED for further evaluation    Caller was informed that this institution does possess the capabilities and/or resources to provide for patient and should be transferred to our facility.    Discussed that if direct admit is sought and any hurdles are encountered, this ED would be happy to see the patient and evaluate.    Informed caller that recommendations provided are recommendations based only on the facts provided and that they responsible to accept or reject the advice, or to seek a formal in person consultation as needed and that this ED will see/treat patient should they arrive.      Carter aLguna MD  [facility] EMERGENCY DEPARTMENT  [address] [place] [id number]  [phone]       Carter Laguna MD  03/27/23 0482

## 2023-03-28 ENCOUNTER — APPOINTMENT (OUTPATIENT)
Dept: PHYSICAL THERAPY | Facility: HOSPITAL | Age: 78
DRG: 862 | End: 2023-03-28
Attending: INTERNAL MEDICINE
Payer: COMMERCIAL

## 2023-03-28 ENCOUNTER — APPOINTMENT (OUTPATIENT)
Dept: CT IMAGING | Facility: HOSPITAL | Age: 78
DRG: 862 | End: 2023-03-28
Attending: RADIOLOGY
Payer: COMMERCIAL

## 2023-03-28 PROBLEM — T81.43XA INTRA-ABDOMINAL ABSCESS POST-PROCEDURE (H): Status: ACTIVE | Noted: 2023-03-28

## 2023-03-28 PROBLEM — K65.1 INTRA-ABDOMINAL ABSCESS POST-PROCEDURE (H): Status: ACTIVE | Noted: 2023-03-28

## 2023-03-28 LAB
ALBUMIN SERPL BCG-MCNC: 3.2 G/DL (ref 3.5–5.2)
ALP SERPL-CCNC: 86 U/L (ref 35–104)
ALT SERPL W P-5'-P-CCNC: 9 U/L (ref 10–35)
ANION GAP SERPL CALCULATED.3IONS-SCNC: 8 MMOL/L (ref 7–15)
APTT PPP: 28 SECONDS (ref 22–38)
AST SERPL W P-5'-P-CCNC: 20 U/L (ref 10–35)
BASOPHILS # BLD AUTO: 0 10E3/UL (ref 0–0.2)
BASOPHILS NFR BLD AUTO: 0 %
BILIRUB SERPL-MCNC: 0.2 MG/DL
BUN SERPL-MCNC: 17.1 MG/DL (ref 8–23)
CALCIUM SERPL-MCNC: 9.1 MG/DL (ref 8.8–10.2)
CHLORIDE SERPL-SCNC: 102 MMOL/L (ref 98–107)
CREAT SERPL-MCNC: 0.68 MG/DL (ref 0.51–0.95)
DEPRECATED HCO3 PLAS-SCNC: 25 MMOL/L (ref 22–29)
EOSINOPHIL # BLD AUTO: 0.1 10E3/UL (ref 0–0.7)
EOSINOPHIL NFR BLD AUTO: 1 %
ERYTHROCYTE [DISTWIDTH] IN BLOOD BY AUTOMATED COUNT: 17.2 % (ref 10–15)
GFR SERPL CREATININE-BSD FRML MDRD: 90 ML/MIN/1.73M2
GLUCOSE BLDC GLUCOMTR-MCNC: 101 MG/DL (ref 70–99)
GLUCOSE BLDC GLUCOMTR-MCNC: 124 MG/DL (ref 70–99)
GLUCOSE BLDC GLUCOMTR-MCNC: 92 MG/DL (ref 70–99)
GLUCOSE SERPL-MCNC: 112 MG/DL (ref 70–99)
HCT VFR BLD AUTO: 32.7 % (ref 35–47)
HGB BLD-MCNC: 9.8 G/DL (ref 11.7–15.7)
IMM GRANULOCYTES # BLD: 0 10E3/UL
IMM GRANULOCYTES NFR BLD: 0 %
INR PPP: 1.15 (ref 0.85–1.15)
LYMPHOCYTES # BLD AUTO: 0.9 10E3/UL (ref 0.8–5.3)
LYMPHOCYTES NFR BLD AUTO: 10 %
MCH RBC QN AUTO: 28.9 PG (ref 26.5–33)
MCHC RBC AUTO-ENTMCNC: 30 G/DL (ref 31.5–36.5)
MCV RBC AUTO: 97 FL (ref 78–100)
MONOCYTES # BLD AUTO: 0.9 10E3/UL (ref 0–1.3)
MONOCYTES NFR BLD AUTO: 11 %
NEUTROPHILS # BLD AUTO: 6.8 10E3/UL (ref 1.6–8.3)
NEUTROPHILS NFR BLD AUTO: 78 %
NRBC # BLD AUTO: 0 10E3/UL
NRBC BLD AUTO-RTO: 0 /100
PLATELET # BLD AUTO: 326 10E3/UL (ref 150–450)
POTASSIUM SERPL-SCNC: 4.1 MMOL/L (ref 3.4–5.3)
PROT SERPL-MCNC: 6.6 G/DL (ref 6.4–8.3)
RBC # BLD AUTO: 3.39 10E6/UL (ref 3.8–5.2)
SARS-COV-2 RNA RESP QL NAA+PROBE: NEGATIVE
SODIUM SERPL-SCNC: 135 MMOL/L (ref 136–145)
WBC # BLD AUTO: 8.6 10E3/UL (ref 4–11)

## 2023-03-28 PROCEDURE — 120N000001 HC R&B MED SURG/OB

## 2023-03-28 PROCEDURE — C1729 CATH, DRAINAGE: HCPCS

## 2023-03-28 PROCEDURE — 49406 IMAGE CATH FLUID PERI/RETRO: CPT

## 2023-03-28 PROCEDURE — 258N000003 HC RX IP 258 OP 636: Performed by: EMERGENCY MEDICINE

## 2023-03-28 PROCEDURE — 99207 PR APP CREDIT; MD BILLING SHARED VISIT: CPT | Performed by: INTERNAL MEDICINE

## 2023-03-28 PROCEDURE — 250N000011 HC RX IP 250 OP 636: Performed by: INTERNAL MEDICINE

## 2023-03-28 PROCEDURE — 87040 BLOOD CULTURE FOR BACTERIA: CPT | Performed by: EMERGENCY MEDICINE

## 2023-03-28 PROCEDURE — 97116 GAIT TRAINING THERAPY: CPT | Mod: GP

## 2023-03-28 PROCEDURE — 99152 MOD SED SAME PHYS/QHP 5/>YRS: CPT

## 2023-03-28 PROCEDURE — 85014 HEMATOCRIT: CPT | Performed by: INTERNAL MEDICINE

## 2023-03-28 PROCEDURE — 250N000013 HC RX MED GY IP 250 OP 250 PS 637: Performed by: INTERNAL MEDICINE

## 2023-03-28 PROCEDURE — 87070 CULTURE OTHR SPECIMN AEROBIC: CPT | Performed by: RADIOLOGY

## 2023-03-28 PROCEDURE — U0003 INFECTIOUS AGENT DETECTION BY NUCLEIC ACID (DNA OR RNA); SEVERE ACUTE RESPIRATORY SYNDROME CORONAVIRUS 2 (SARS-COV-2) (CORONAVIRUS DISEASE [COVID-19]), AMPLIFIED PROBE TECHNIQUE, MAKING USE OF HIGH THROUGHPUT TECHNOLOGIES AS DESCRIBED BY CMS-2020-01-R: HCPCS | Performed by: EMERGENCY MEDICINE

## 2023-03-28 PROCEDURE — 85730 THROMBOPLASTIN TIME PARTIAL: CPT | Performed by: EMERGENCY MEDICINE

## 2023-03-28 PROCEDURE — 250N000011 HC RX IP 250 OP 636: Performed by: RADIOLOGY

## 2023-03-28 PROCEDURE — 87075 CULTR BACTERIA EXCEPT BLOOD: CPT | Performed by: RADIOLOGY

## 2023-03-28 PROCEDURE — 97161 PT EVAL LOW COMPLEX 20 MIN: CPT | Mod: GP

## 2023-03-28 PROCEDURE — 0W9H30Z DRAINAGE OF RETROPERITONEUM WITH DRAINAGE DEVICE, PERCUTANEOUS APPROACH: ICD-10-PCS | Performed by: RADIOLOGY

## 2023-03-28 PROCEDURE — 36415 COLL VENOUS BLD VENIPUNCTURE: CPT | Performed by: INTERNAL MEDICINE

## 2023-03-28 PROCEDURE — 36415 COLL VENOUS BLD VENIPUNCTURE: CPT | Performed by: EMERGENCY MEDICINE

## 2023-03-28 PROCEDURE — 250N000011 HC RX IP 250 OP 636: Performed by: EMERGENCY MEDICINE

## 2023-03-28 PROCEDURE — 80053 COMPREHEN METABOLIC PANEL: CPT | Performed by: INTERNAL MEDICINE

## 2023-03-28 PROCEDURE — 250N000009 HC RX 250: Performed by: RADIOLOGY

## 2023-03-28 PROCEDURE — 99223 1ST HOSP IP/OBS HIGH 75: CPT | Mod: AI | Performed by: INTERNAL MEDICINE

## 2023-03-28 PROCEDURE — 82962 GLUCOSE BLOOD TEST: CPT

## 2023-03-28 PROCEDURE — 85610 PROTHROMBIN TIME: CPT | Performed by: EMERGENCY MEDICINE

## 2023-03-28 PROCEDURE — 258N000003 HC RX IP 258 OP 636: Performed by: INTERNAL MEDICINE

## 2023-03-28 PROCEDURE — 87077 CULTURE AEROBIC IDENTIFY: CPT | Performed by: RADIOLOGY

## 2023-03-28 RX ORDER — DEXTROSE MONOHYDRATE 25 G/50ML
25-50 INJECTION, SOLUTION INTRAVENOUS
Status: DISCONTINUED | OUTPATIENT
Start: 2023-03-28 | End: 2023-04-03

## 2023-03-28 RX ORDER — ONDANSETRON 2 MG/ML
4 INJECTION INTRAMUSCULAR; INTRAVENOUS EVERY 6 HOURS PRN
Status: DISCONTINUED | OUTPATIENT
Start: 2023-03-28 | End: 2023-04-05 | Stop reason: HOSPADM

## 2023-03-28 RX ORDER — MEROPENEM 1 G/1
1 INJECTION, POWDER, FOR SOLUTION INTRAVENOUS ONCE
Status: COMPLETED | OUTPATIENT
Start: 2023-03-28 | End: 2023-03-28

## 2023-03-28 RX ORDER — SODIUM CHLORIDE 9 MG/ML
INJECTION, SOLUTION INTRAVENOUS CONTINUOUS
Status: DISCONTINUED | OUTPATIENT
Start: 2023-03-28 | End: 2023-03-30

## 2023-03-28 RX ORDER — ONDANSETRON 4 MG/1
4 TABLET, ORALLY DISINTEGRATING ORAL EVERY 6 HOURS PRN
Status: DISCONTINUED | OUTPATIENT
Start: 2023-03-28 | End: 2023-04-05 | Stop reason: HOSPADM

## 2023-03-28 RX ORDER — CEFAZOLIN SODIUM 1 G/50ML
2000 SOLUTION INTRAVENOUS ONCE
Status: COMPLETED | OUTPATIENT
Start: 2023-03-28 | End: 2023-03-28

## 2023-03-28 RX ORDER — HYDROMORPHONE HYDROCHLORIDE 1 MG/ML
0.5 INJECTION, SOLUTION INTRAMUSCULAR; INTRAVENOUS; SUBCUTANEOUS
Status: DISCONTINUED | OUTPATIENT
Start: 2023-03-28 | End: 2023-03-30

## 2023-03-28 RX ORDER — NALOXONE HYDROCHLORIDE 0.4 MG/ML
0.4 INJECTION, SOLUTION INTRAMUSCULAR; INTRAVENOUS; SUBCUTANEOUS
Status: DISCONTINUED | OUTPATIENT
Start: 2023-03-28 | End: 2023-04-04

## 2023-03-28 RX ORDER — NALOXONE HYDROCHLORIDE 0.4 MG/ML
0.2 INJECTION, SOLUTION INTRAMUSCULAR; INTRAVENOUS; SUBCUTANEOUS
Status: DISCONTINUED | OUTPATIENT
Start: 2023-03-28 | End: 2023-04-04

## 2023-03-28 RX ORDER — NICOTINE POLACRILEX 4 MG
15-30 LOZENGE BUCCAL
Status: DISCONTINUED | OUTPATIENT
Start: 2023-03-28 | End: 2023-04-03

## 2023-03-28 RX ORDER — HYDROMORPHONE HYDROCHLORIDE 1 MG/ML
0.5 INJECTION, SOLUTION INTRAMUSCULAR; INTRAVENOUS; SUBCUTANEOUS
Status: DISCONTINUED | OUTPATIENT
Start: 2023-03-28 | End: 2023-04-03

## 2023-03-28 RX ORDER — PROCHLORPERAZINE MALEATE 5 MG
5 TABLET ORAL EVERY 6 HOURS PRN
Status: DISCONTINUED | OUTPATIENT
Start: 2023-03-28 | End: 2023-04-05 | Stop reason: HOSPADM

## 2023-03-28 RX ORDER — ACETAMINOPHEN 650 MG/1
650 SUPPOSITORY RECTAL EVERY 6 HOURS PRN
Status: DISCONTINUED | OUTPATIENT
Start: 2023-03-28 | End: 2023-04-02

## 2023-03-28 RX ORDER — HYDROMORPHONE HCL IN WATER/PF 6 MG/30 ML
0.2 PATIENT CONTROLLED ANALGESIA SYRINGE INTRAVENOUS
Status: DISCONTINUED | OUTPATIENT
Start: 2023-03-28 | End: 2023-04-03

## 2023-03-28 RX ORDER — FLUMAZENIL 0.1 MG/ML
0.2 INJECTION, SOLUTION INTRAVENOUS
Status: DISCONTINUED | OUTPATIENT
Start: 2023-03-28 | End: 2023-04-04

## 2023-03-28 RX ORDER — PROCHLORPERAZINE 25 MG
12.5 SUPPOSITORY, RECTAL RECTAL EVERY 12 HOURS PRN
Status: DISCONTINUED | OUTPATIENT
Start: 2023-03-28 | End: 2023-04-05 | Stop reason: HOSPADM

## 2023-03-28 RX ORDER — VANCOMYCIN HYDROCHLORIDE 1 G/200ML
1000 INJECTION, SOLUTION INTRAVENOUS EVERY 12 HOURS
Status: DISCONTINUED | OUTPATIENT
Start: 2023-03-28 | End: 2023-03-28

## 2023-03-28 RX ORDER — FENTANYL CITRATE 50 UG/ML
25-50 INJECTION, SOLUTION INTRAMUSCULAR; INTRAVENOUS EVERY 5 MIN PRN
Status: DISCONTINUED | OUTPATIENT
Start: 2023-03-28 | End: 2023-03-30

## 2023-03-28 RX ORDER — ACETAMINOPHEN 325 MG/1
650 TABLET ORAL EVERY 6 HOURS PRN
Status: DISCONTINUED | OUTPATIENT
Start: 2023-03-28 | End: 2023-04-02

## 2023-03-28 RX ORDER — VANCOMYCIN HYDROCHLORIDE 1 G/200ML
1000 INJECTION, SOLUTION INTRAVENOUS EVERY 12 HOURS
Status: DISCONTINUED | OUTPATIENT
Start: 2023-03-28 | End: 2023-03-31

## 2023-03-28 RX ORDER — LIDOCAINE 40 MG/G
CREAM TOPICAL
Status: DISCONTINUED | OUTPATIENT
Start: 2023-03-28 | End: 2023-04-05 | Stop reason: HOSPADM

## 2023-03-28 RX ORDER — MEROPENEM 1 G/1
1 INJECTION, POWDER, FOR SOLUTION INTRAVENOUS EVERY 8 HOURS
Status: DISCONTINUED | OUTPATIENT
Start: 2023-03-28 | End: 2023-03-31

## 2023-03-28 RX ADMIN — FENTANYL CITRATE 50 MCG: 50 INJECTION, SOLUTION INTRAMUSCULAR; INTRAVENOUS at 09:04

## 2023-03-28 RX ADMIN — ACETAMINOPHEN 650 MG: 325 TABLET ORAL at 19:56

## 2023-03-28 RX ADMIN — MEROPENEM 1 G: 1 INJECTION, POWDER, FOR SOLUTION INTRAVENOUS at 00:56

## 2023-03-28 RX ADMIN — ACETAMINOPHEN 650 MG: 325 TABLET ORAL at 13:37

## 2023-03-28 RX ADMIN — HYDROMORPHONE HYDROCHLORIDE 0.5 MG: 1 INJECTION, SOLUTION INTRAMUSCULAR; INTRAVENOUS; SUBCUTANEOUS at 06:08

## 2023-03-28 RX ADMIN — SODIUM CHLORIDE, PRESERVATIVE FREE: 5 INJECTION INTRAVENOUS at 11:55

## 2023-03-28 RX ADMIN — FAMOTIDINE 20 MG: 10 INJECTION, SOLUTION INTRAVENOUS at 19:56

## 2023-03-28 RX ADMIN — FAMOTIDINE 20 MG: 10 INJECTION, SOLUTION INTRAVENOUS at 09:55

## 2023-03-28 RX ADMIN — ONDANSETRON 4 MG: 2 INJECTION INTRAMUSCULAR; INTRAVENOUS at 06:07

## 2023-03-28 RX ADMIN — VANCOMYCIN HYDROCHLORIDE 1000 MG: 1 INJECTION, SOLUTION INTRAVENOUS at 11:50

## 2023-03-28 RX ADMIN — MEROPENEM 1 G: 1 INJECTION, POWDER, FOR SOLUTION INTRAVENOUS at 09:58

## 2023-03-28 RX ADMIN — MIDAZOLAM 1 MG: 1 INJECTION INTRAMUSCULAR; INTRAVENOUS at 09:04

## 2023-03-28 RX ADMIN — VANCOMYCIN HYDROCHLORIDE 1000 MG: 1 INJECTION, SOLUTION INTRAVENOUS at 23:04

## 2023-03-28 RX ADMIN — LIDOCAINE HYDROCHLORIDE 10 ML: 10 INJECTION, SOLUTION EPIDURAL; INFILTRATION; INTRACAUDAL; PERINEURAL at 09:06

## 2023-03-28 RX ADMIN — MEROPENEM 1 G: 1 INJECTION, POWDER, FOR SOLUTION INTRAVENOUS at 16:51

## 2023-03-28 RX ADMIN — SODIUM CHLORIDE, PRESERVATIVE FREE: 5 INJECTION INTRAVENOUS at 03:49

## 2023-03-28 RX ADMIN — VANCOMYCIN HYDROCHLORIDE 2000 MG: 5 INJECTION, POWDER, LYOPHILIZED, FOR SOLUTION INTRAVENOUS at 01:40

## 2023-03-28 ASSESSMENT — ACTIVITIES OF DAILY LIVING (ADL)
ADLS_ACUITY_SCORE: 35
ADLS_ACUITY_SCORE: 37
ADLS_ACUITY_SCORE: 35
ADLS_ACUITY_SCORE: 37
ADLS_ACUITY_SCORE: 35
ADLS_ACUITY_SCORE: 35
ADLS_ACUITY_SCORE: 37
ADLS_ACUITY_SCORE: 35
ADLS_ACUITY_SCORE: 37
ADLS_ACUITY_SCORE: 35
DEPENDENT_IADLS:: CLEANING;COOKING;LAUNDRY;SHOPPING;TRANSPORTATION

## 2023-03-28 NOTE — H&P
"[facility]    History and Physical - Hospitalist Service       Date of Admission:  3/27/2023    Assessment & Plan      Suzy Gómez is a 77 year old female with medical history of hypertension, dyslipidemia, type 2 diabetes mellitus, recent colonic perforation requiring sigmoidectomy complicated by sepsis and ICU admission, admitted on 3/27/2023 with constipation and abdominal pain.  ED work-up showed leukocytosis with no evidence of sepsis and CT findings of 13.1 cm abscess primarily in located along the left paracolic gutter and iliac fossa.    #Intra-abdominal abscess/postop infection:  - IR consult for abscess drainage.  - Broad-spectrum IV antibiotics.  - Pain management.  - DVT and GI prophylaxis.     Diet: Combination Diet Clear Liquid    DVT Prophylaxis: Pneumatic Compression Devices and Anti-embolisim stockings (TEDs)  Box Catheter: Not present  Lines: None     Cardiac Monitoring: None  Code Status: Full Code      Clinically Significant Risk Factors Present on Admission                      # DMII: A1C = 8.1 % (Ref range: <5.7 %) within past 6 months    # Obesity: Estimated body mass index is 33.61 kg/m  as calculated from the following:    Height as of this encounter: 1.651 m (5' 5\").    Weight as of this encounter: 91.6 kg (202 lb).           Disposition Plan Home with home health services     Expected Discharge Date: 04/03/2023                  Dona Stack MD  Hospitalist Service  [facility]  Securely message with [employer] (more info)  Text page via [employer] Paging/Directory     ______________________________________________________________________    Chief Complaint   Abdominal pain for 1 week    History is obtained from the patient    History of Present Illness   Suzy Gómez is a 77 year old female who presented to the ED with worsening lower abdominal pain for 1 week associated with constipation.  Pain is dull, constant, nonradiating, and with no " definite aggravating or relieving factors.  No fever, nausea, vomiting, chest pain, shortness of breath, urinary symptoms, melena or hematochezia.      Past Medical History    Past Medical History:   Diagnosis Date     Dyslipidemia      Heart disease      Hypertension      KARYN (obstructive sleep apnea)      Thyroid disease      Type 2 diabetes mellitus (H)        Past Surgical History   Past Surgical History:   Procedure Laterality Date     LAPAROSCOPIC ASSISTED COLECTOMY N/A 1/29/2023    Procedure: SIGMOID COLECTOMY, LAPAROSCOPIC;  Surgeon: Wilfredo Palencia MD;  [place] [facility] [place]       Prior to Admission Medications   Prior to Admission Medications   Prescriptions Last Dose Informant Patient Reported? Taking?   Acetaminophen 325 MG CHEW   Yes No   Sig: Take 650 mg by mouth 3 times daily   amLODIPine (NORVASC) 2.5 MG tablet   Yes No   Sig: Take 2.5 mg by mouth daily   aspirin (ASA) 81 MG EC tablet   Yes No   Sig: Take 81 mg by mouth daily   Patient not taking: Reported on 2/27/2023   docusate sodium (COLACE) 100 MG capsule   No No   Sig: Take 1 capsule (100 mg) by mouth 2 times daily   empagliflozin (JARDIANCE) 25 MG TABS tablet   Yes No   Sig: Take 25 mg by mouth daily   levothyroxine (SYNTHROID/LEVOTHROID) 137 MCG tablet   Yes No   Sig: Take 137 mcg by mouth daily before breakfast   linagliptin (TRADJENTA) 5 MG TABS tablet   Yes No   Sig: Take 5 mg by mouth daily   metFORMIN (GLUCOPHAGE XR) 500 MG 24 hr tablet   Yes No   Sig: Take 1,000 mg by mouth 2 times daily (with meals)   oxybutynin ER (DITROPAN XL) 10 MG 24 hr tablet   Yes No   Sig: Take 20 mg by mouth daily   rosuvastatin (CRESTOR) 20 MG tablet   Yes No   Sig: Take 20 mg by mouth At Bedtime   simethicone (MYLICON) 80 MG chewable tablet   Yes No   Sig: Take 80 mg by mouth 2 times daily   venlafaxine (EFFEXOR XR) 150 MG 24 hr capsule   No No   Sig: Take 1 capsule (150 mg) by mouth daily (with breakfast)      Facility-Administered Medications: None         Review of Systems    The 10 point Review of Systems is negative other than noted in the HPI      Physical Exam   Vital Signs: Temp: 98.2  F (36.8  C) Temp src: Oral BP: 128/60 Pulse: 75   Resp: 18 SpO2: 95 % O2 Device: Nasal cannula Oxygen Delivery: 2 LPM  Weight: 202 lbs 0 oz    General Appearance: Obese, in no acute distress.  Eyes: Pupils equal round and reactive to light.  Extraocular movements intact.  Clear conjunctiva.  Anicteric sclerae.  HEENT: Normocephalic and atraumatic.  Ears, nose and pharynx normal.  Neck supple.  No JVD or lymph nodes.  Respiratory: Clear to auscultation with good air entry bilaterally.  Cardiovascular: Regular rate and rhythm.  S1 and S2 normal.  No murmurs, gallops or rubs.  GI: Soft.  RLQ, LLQ and suprapubic tenderness without guarding or rebound tenderness.  Bowel sounds present.  No organomegaly.  Lymph/Hematologic: No palpable lymph nodes.  Genitourinary: No CVA tenderness.  Skin: Good turgor.  Warm and dry.  No rash or wounds.  Musculoskeletal: Range of motion x4.  No edema or tenderness.  Neurologic: Alert, oriented x3.  No focal signs.  Psychiatric: Good eye contact.  Normal affect.  No hallucinations or delusions.    Medical Decision Making     76 MINUTES SPENT BY ME on the date of service doing chart review, history, exam, documentation & further activities per the note.      Data     I have personally reviewed the following data over the past 24 hrs:    11.6 (H)  \   10.0 (L)   / 379     134 (L) 97 (L) 19.5 /  157 (H)   4.4 25 0.75 \       ALT: 8 (L) AST: 22 AP: 96 TBILI: 0.2   ALB: 3.5 TOT PROTEIN: 7.1 LIPASE: 33       Procal: N/A CRP: N/A Lactic Acid: 0.9       INR:  1.15 PTT:  28   D-dimer:  N/A Fibrinogen:  N/A       Imaging results reviewed over the past 24 hrs:   Recent Results (from the past 24 hour(s))   CT Abdomen Pelvis w Contrast    Narrative    EXAM: CT ABDOMEN PELVIS W CONTRAST  LOCATION: [facility]  DATE/TIME: 3/27/2023 11:41  PM    INDICATION: h o colonic perf 1 29 s p colectomy, just d c'd from TCU, return of abd pain R sided.  COMPARISON: Noncontrast CT from 03/27/2023. Contrast enhanced CT from 02/07/2023.  TECHNIQUE: CT scan of the abdomen and pelvis was performed following injection of IV contrast. Multiplanar reformats were obtained. Dose reduction techniques were used.  CONTRAST: ISOVUE 370 100ML    FINDINGS:   LOWER CHEST: Mild cardiomegaly. No basilar infiltrate. Fat-containing left Bochdalek hernia.    HEPATOBILIARY: Cholelithiasis. Nondistended gallbladder. No focal liver lesion. The liver measures 21 cm craniocaudal. There is slight nodularity of the inferior liver surface contour which could reflect early fibrosis.    PANCREAS: Normal.    SPLEEN: Normal.    ADRENAL GLANDS: Normal.    KIDNEYS/BLADDER: Normal.    BOWEL: Again noted is a fluid collection centered within the inferior reaches of the left paracolic gutter continuing into the left iliac fossa. There are locules of gas within this collection and there is some thin peripheral enhancement, consistent   with abscess formation. Although measurement is challenging due to morphology of the collection, this measures at least 13.1 cm in length on coronal image 64, and 7.2 x 4.4 cm in the transverse and AP dimensions on image 106 of series 3. A small amount   of extension continues caudally into the left hemipelvis near to the level of the patient's colorectal anastomotic suture line as seen previously. There is a large amount of stool in the proximal colon. Small bowel caliber within normal limits. No free   air.    LYMPH NODES: Normal.    VASCULATURE: Aortoiliac atherosclerotic calcification.    PELVIC ORGANS: Absent uterus.    MUSCULOSKELETAL: Advanced degenerative disc and facet changes of the lumbar spine with a minimal superior endplate compression deformity at L4. Arthritic change of the hips.      Impression    IMPRESSION:   1.  Findings consistent with a 13.1 cm  abscess primarily situated along the left paracolic gutter and iliac fossa as described above.

## 2023-03-28 NOTE — PROGRESS NOTES
"[facility]    Medicine Progress Note - Hospitalist Service    Date of Admission:  3/27/2023    Assessment & Plan      Suzy Gómez is a 77 year old female with medical history of hypertension, dyslipidemia, type 2 diabetes mellitus, recent colonic perforation requiring sigmoidectomy complicated by sepsis and ICU admission, admitted on 3/27/2023 with constipation and abdominal pain.  ED work-up showed leukocytosis with no evidence of sepsis and CT findings of 13.1 cm abscess primarily in located along the left paracolic gutter and iliac fossa.    Intra-abdominal abscess/postop infection            -13 cm abscess            -IR consulted for abscess drainage with drain            -Broad-spectrum antibiotics pain management            -Status post drain 3/28            -Consult ID for antibiotic recommendations    Status post subtotal colectomy            -January 29, 2023     Diet: Combination Diet Clear Liquid    DVT Prophylaxis: Low Risk/Ambulatory with no VTE prophylaxis indicated  Box Catheter: Not present  Lines: None     Cardiac Monitoring: None  Code Status: Full Code      Clinically Significant Risk Factors Present on Admission           # Hypercalcemia: corrected calcium is >10.1, will monitor as appropriate    # Hypoalbuminemia: Lowest albumin = 3.2 g/dL at 3/28/2023  6:28 AM, will monitor as appropriate         # DMII: A1C = 8.1 % (Ref range: <5.7 %) within past 6 months    # Obesity: Estimated body mass index is 33.61 kg/m  as calculated from the following:    Height as of this encounter: 1.651 m (5' 5\").    Weight as of this encounter: 91.6 kg (202 lb).           Disposition Plan      Expected Discharge Date: 04/03/2023                  Lion Benito MD  Hospitalist Service  [facility]  Securely message with [employer] (more info)  Text page via [facility] Paging/Directory "   ______________________________________________________________________    Interval History   Feels well denies pain nausea vomiting    Physical Exam   Vital Signs: Temp: 98.2  F (36.8  C) Temp src: Oral BP: 133/61 Pulse: 70   Resp: 18 SpO2: 91 % O2 Device: Nasal cannula Oxygen Delivery: 2 LPM  Weight: 202 lbs 0 oz    General Appearance: Obese, in no acute distress.  Eyes: Pupils equal round and reactive to light.  Extraocular movements intact.  Clear conjunctiva.  Anicteric sclerae.  HEENT: Normocephalic and atraumatic.  Ears, nose and pharynx normal.  Neck supple.  No JVD or lymph nodes.  Respiratory: Clear to auscultation with good air entry bilaterally.  Cardiovascular: Regular rate and rhythm.  S1 and S2 normal.  No murmurs, gallops or rubs.  GI: Soft.  RLQ, LLQ and suprapubic tenderness without guarding or rebound tenderness.  Bowel sounds present.  No organomegaly.  Lymph/Hematologic: No palpable lymph nodes.  Genitourinary: No CVA tenderness.  Skin: Good turgor.  Warm and dry.  No rash or wounds.  Musculoskeletal: Range of motion x4.  No edema or tenderness.  Neurologic: Alert, oriented x3.  No focal signs.  Psychiatric: Good eye contact.  Normal affect.  No hallucinations or delusions.    Medical Decision Making       35 MINUTES SPENT BY ME on the date of service doing chart review, history, exam, documentation & further activities per the note.        Data     I have personally reviewed the following data over the past 24 hrs:    8.6  \   9.8 (L)   / 326     135 (L) 102 17.1 /  112 (H)   4.1 25 0.68 \       ALT: 9 (L) AST: 20 AP: 86 TBILI: 0.2   ALB: 3.2 (L) TOT PROTEIN: 6.6 LIPASE: 33       Procal: N/A CRP: N/A Lactic Acid: 0.9       INR:  1.15 PTT:  28   D-dimer:  N/A Fibrinogen:  N/A

## 2023-03-28 NOTE — PHARMACY-VANCOMYCIN DOSING SERVICE
Pharmacy Vancomycin Initial Note  Date of Service 2023  Patient's  1945  77 year old, female    Indication: Abscess and Postoperative Infection    Current estimated CrCl = Estimated Creatinine Clearance: 72.4 mL/min (based on SCr of 0.75 mg/dL).    Creatinine for last 3 days  3/27/2023: 10:02 PM Creatinine 0.75 mg/dL    Recent Vancomycin Level(s) for last 3 days  No results found for requested labs within last 72 hours.      Vancomycin IV Administrations (past 72 hours)                   vancomycin (VANCOCIN) 2,000 mg in sodium chloride 0.9 % 500 mL intermittent infusion (mg) 2,000 mg New Bag 23 0140                Nephrotoxins and other renal medications (From now, onward)    Start     Dose/Rate Route Frequency Ordered Stop    23 0800  vancomycin (VANCOCIN) 1000 mg in dextrose 5% 200 mL PREMIX         1,000 mg  200 mL/hr over 1 Hours Intravenous EVERY 12 HOURS 23 0329      23 0130  vancomycin (VANCOCIN) 2,000 mg in sodium chloride 0.9 % 500 mL intermittent infusion         2,000 mg  over 2 Hours Intravenous ONCE 23 0107            Contrast Orders - past 72 hours (72h ago, onward)    Start     Dose/Rate Route Frequency Stop    23 2330  iopamidol (ISOVUE-370) solution 100 mL         100 mL Intravenous ONCE 23 2331          [employer] Prediction of Planned Initial Vancomycin Regimen  Loading dose: 2000 mg on 3/28/23 at 01:40  Regimen: 1000 mg IV every 12 hours.  Start time: 10:00 on 2023  Exposure target: AUC24 (range)400-600 mg/L.hr   AUC24,ss: 534 mg/L.hr  Probability of AUC24 > 400: 79 %  Ctrough,ss: 17.6 mg/L  Probability of Ctrough,ss > 20: 39 %  Probability of nephrotoxicity (Lodise GERMAN ): 13 %          Plan:  1. Start vancomycin  1000 mg IV q12h. This regimen is timed to start at 11:00, ~9.5 hours after the loading dose. This timing will minimize sub-therapeutic intervals.  2. Vancomycin monitoring method: AUC  3. Vancomycin therapeutic  monitoring goal: 400-600 mg*h/L  4. Pharmacy will check vancomycin levels as appropriate in 1-3 Days.    5. Serum creatinine levels will be ordered a minimum of twice weekly.      Jvoita Wagner RPH

## 2023-03-28 NOTE — PROCEDURES
[facility]    Procedure: Imaging Procedure Note left abdominal drain placement    Date/Time: 3/28/2023 9:36 AM  Performed by: Jacob Yip MD  Authorized by: Jacob Yip MD       UNIVERSAL PROTOCOL   Site Marked: Yes  Prior Images Obtained and Reviewed:  Yes  Required items: Required blood products, implants, devices and special equipment available    Patient identity confirmed:  Verbally with patient  Patient was reevaluated immediately before administering moderate or deep sedation or anesthesia  Confirmation Checklist:  Patient's identity using two indicators, relevant allergies, procedure was appropriate and matched the consent or emergent situation and correct equipment/implants were available  Time out: Immediately prior to the procedure a time out was called    Universal Protocol: the Joint Commission Universal Protocol was followed    Preparation: Patient was prepped and draped in usual sterile fashion      SEDATION  Patient Sedated: Yes    Vital signs: Vital signs monitored during sedation    See dictated procedure note for full details.    PROCEDURE  Describe Procedure: CT guided left abdominal drain placement    10F    80cc pus  Patient Tolerance:  Patient tolerated the procedure well with no immediate complications  Length of time physician/provider present for 1:1 monitoring during sedation: 25

## 2023-03-28 NOTE — PROGRESS NOTES
03/28/23 1300   Appointment Info   Signing Clinician's Name / Credentials (PT) Caridad Stock,PT   Living Environment   People in Home spouse;child(tara), adult   Current Living Arrangements house   Home Accessibility stairs to enter home   Number of Stairs, Main Entrance 1   Stair Railings, Main Entrance none   Transportation Anticipated family or friend will provide   Self-Care   Usual Activity Tolerance good   Equipment Currently Used at Home other (see comments)  (4WW- uses sometimes, able to amb in house no AD)   Fall history within last six months no   Activity/Exercise/Self-Care Comment pt reports I with dressing, since d/c from TCU has not has chance to try bathing, family IADLS   General Information   Onset of Illness/Injury or Date of Surgery 03/27/23   Referring Physician Dr.Theodore Benito   Patient/Family Therapy Goals Statement (PT) return home   Pertinent History of Current Problem (include personal factors and/or comorbidities that impact the POC) admit abdominal pain -intra abdominal abscess s/p drain placement pt has h/o recent colonic perforation s/p surgery postop sepsis, KARYN, HTN , DM   General Observations pt in ER bed, agreeable to PT   Cognition   Affect/Mental Status (Cognition) WFL   Pain Assessment   Patient Currently in Pain Yes, see Vital Sign flowsheet  (on/off inL lower abdomin)   Range of Motion (ROM)   ROM Comment BLE WFL   Strength (Manual Muscle Testing)   Strength Comments BLE WFL   Bed Mobility   Impairments Contributing to Impaired Bed Mobility pain   Assistive Device (Bed Mobility) bed rails;other (see comments)  (HHA, HOB elevated)   Comment, (Bed Mobility) supine>sit mod Ax1   Transfers   Impairments Contributing to Impaired Transfers pain   Comment, (Transfers) sit<>stand CGA with FWW   Gait/Stairs (Locomotion)   [place] Level (Gait) contact guard   Assistive Device (Gait) walker, front-wheeled   Distance in Feet 20   Distance in Feet (Gait) 10'60'   Pattern  (Gait) step-through   Deviations/Abnormal Patterns (Gait) antalgic   Balance   Balance Comments no LOB   Clinical Impression   Criteria for Skilled Therapeutic Intervention Yes, treatment indicated   PT Diagnosis (PT) decreased functional mobility   Influenced by the following impairments pain, fatigue   Functional limitations due to impairments bed mob, transfers and gait   Clinical Presentation (PT Evaluation Complexity) Stable/Uncomplicated   Clinical Presentation Rationale presents as medically diagnosed   Clinical Decision Making (Complexity) low complexity   Planned Therapy Interventions (PT) bed mobility training;gait training;transfer training;stair training   Anticipated Equipment Needs at Discharge (PT) other (see comments)  (pt has 4WW at home)   Risk & Benefits of therapy have been explained evaluation/treatment results reviewed   PT Total Evaluation Time   PT Eval, Low Complexity Minutes (08747) 13   Physical Therapy Goals   PT Frequency Daily   PT Predicted Duration/Target Date for Goal Attainment 04/04/23   PT Goals Bed Mobility;Transfers;Gait;Stairs   PT: Bed Mobility Modified independent   PT: Transfers Modified independent;Bed to/from chair;Sit to/from stand;Assistive device   PT: Gait Modified independent;Greater than 200 feet  (4WW)   PT: Stairs 1 stair;Minimal assist   Interventions   Interventions Quick Adds Gait Training   Gait Training   Gait Training Minutes (03564) 10   Symptoms Noted During/After Treatment (Gait Training) increased pain   Treatment Detail/Skilled Intervention slow steady pace, pt reporting increased pain with activity   [place] Level (Gait Training) contact guard   Physical Assistance Level (Gait Training) 1 person assist   Weight Bearing (Gait Training) full weight-bearing   Assistive Device (Gait Training) rolling walker   Gait Analysis Deviations decreased arnel;decreased step length   Impairments (Gait Analysis/Training) pain   PT Discharge Planning   PT Plan  practise bed mob, bring 4WW  for transfers and gait, when able practise 1 steps no rail for home   PT Discharge Recommendation (DC Rec) home with home care physical therapy;home with assist   PT Rationale for DC Rec pt mod Ax1 bed mob, CGA for transfers and gait w/ FWW, pt limited by pain and fatigue   PT Brief overview of current status pt CGA-modAx1   Total Session Time   Timed Code Treatment Minutes 10   Total Session Time (sum of timed and untimed services) 23

## 2023-03-28 NOTE — PHARMACY-ADMISSION MEDICATION HISTORY
Pharmacy Note - Admission Medication History    Pertinent Provider Information: patient wasn't fully confident about what medications she takes but stated that she takes them as prescribed. She did state that she doesn't take aspirin so it was removed. Losartan and glipizide were stopped last admission, she did think that she was still taking them.     ______________________________________________________________________    Prior To Admission (PTA) med list completed and updated in EMR.       PTA Med List   Medication Sig Last Dose     acetaminophen (TYLENOL) 325 MG tablet Take 650 mg by mouth 3 times daily 3/27/2023     amLODIPine (NORVASC) 2.5 MG tablet Take 2.5 mg by mouth daily 3/27/2023     docusate sodium (COLACE) 100 MG capsule Take 1 capsule (100 mg) by mouth 2 times daily 3/27/2023     empagliflozin (JARDIANCE) 25 MG TABS tablet Take 25 mg by mouth daily 3/27/2023     levothyroxine (SYNTHROID/LEVOTHROID) 137 MCG tablet Take 137 mcg by mouth daily before breakfast 3/27/2023     linagliptin (TRADJENTA) 5 MG TABS tablet Take 5 mg by mouth daily 3/27/2023     metFORMIN (GLUCOPHAGE XR) 500 MG 24 hr tablet Take 1,000 mg by mouth 2 times daily (with meals) 3/27/2023     oxybutynin ER (DITROPAN XL) 10 MG 24 hr tablet Take 20 mg by mouth daily 3/27/2023     rosuvastatin (CRESTOR) 20 MG tablet Take 20 mg by mouth At Bedtime 3/27/2023     simethicone (MYLICON) 80 MG chewable tablet Take 80 mg by mouth 2 times daily as needed Past Week     venlafaxine (EFFEXOR XR) 150 MG 24 hr capsule Take 1 capsule (150 mg) by mouth daily (with breakfast) 3/27/2023       Information source(s): Patient, Hospital records and CareEverywhere/[facility]  Method of interview communication: in-person    Summary of Changes to PTA Med List  New: none  Discontinued: none  Changed: simethicone to PRN    Patient was asked about OTC/herbal products specifically.  PTA med list reflects this.    In the past week, patient estimated taking  medication this percent of the time:  greater than 90%.    Medication Affordability:       Allergies were reviewed, assessed, and updated with the patient.      Patient does not use any multi-dose medications prior to admission.    The information provided in this note is only as accurate as the sources available at the time of the update(s).    Thank you for the opportunity to participate in the care of this patient.    Simona Pat DONATO  3/28/2023 7:31 AM

## 2023-03-28 NOTE — PLAN OF CARE
Problem: Plan of Care - These are the overarching goals to be used throughout the patient stay.    Goal: Plan of Care Review  Outcome: Progressing    Problem: Hypertension Acute  Goal: Blood Pressure Within Desired Range  Outcome: Progressing: /58    Problem: Hyperglycemia  Goal: Blood Glucose Level Within Targeted Range  Outcome: Progressing: . No insulin needed.     Problem: Pain Acute  Goal: Optimal Pain Control and Function  Outcome: Progressing: Pt was given PRN Tylenol around 1330. Has had no PRN pain medication since.    Pt SBA/A1 to bathroom. Pt slow and steady.   DAVIS drain in place. Emptied 30 ml's serosanguinous fluid. To Bulb suction.  PCD's in place.  Pt tolerating a clear liquid diet. Would like to advance for breakfast.

## 2023-03-28 NOTE — CONSULTS
General Surgery Consultation  Suzy Gómez MRN# [id number]   Age/Sex: 77 year old female YOB: 1945     Reason for consult: 1. Intra-abdominal abscess post-procedure            Requesting physician:  [facility] emergency department                   Assessment and Plan:   Assessment:  Intra-abdominal abscess, 3-month status post sigmoid colectomy for a perforated stercoral ulcer.  Low suspicion for an anastomotic leak at this point, given the particularly long delay in development of this abscess.  Would be best managed with percutaneous drainage and antibiotics.    Plan:  1.  IV antibiotics, already initiated on meropenem and vancomycin  2.  We will likely build to transition to oral antibiotics in the next 1 to 2 days          Chief Complaint:     Chief Complaint   Patient presents with     Abdominal Pain        History is obtained from the patient    HPI:   Suzy Gómez is a 77 year old female who presents through the [facility] emergency department with worsening abdominal pain over the past week.  Has had some associated constipation as well.  She is well-known to me from a sigmoid colectomy 3 months ago for a perforated stercoral ulcer.          Past Medical History:     Past Medical History:   Diagnosis Date     Dyslipidemia      Heart disease      Hypertension      KARYN (obstructive sleep apnea)      Thyroid disease      Type 2 diabetes mellitus (H)               Past Surgical History:     Past Surgical History:   Procedure Laterality Date     LAPAROSCOPIC ASSISTED COLECTOMY N/A 1/29/2023    Procedure: SIGMOID COLECTOMY, LAPAROSCOPIC;  Surgeon: Wilfredo Palencia MD;  Location: [facility] OR             Social History:    reports that she quit smoking about 13 years ago. Her smoking use included cigarettes. She has never used smokeless tobacco. She reports that she does not currently use alcohol. She reports that she does not use drugs.           Family History:   No family history on  file.           Allergies:     Allergies   Allergen Reactions     Penicillins Nausea and Vomiting and GI Disturbance     Atorvastatin Muscle Pain (Myalgia)     Augmentin Nausea and Vomiting and Diarrhea     Bupropion Other (See Comments)     Mood swings     Exenatide Rash     Nitrofurantoin Hives and Swelling              Medications:     Prior to Admission medications    Medication Sig Start Date End Date Taking? Authorizing Provider   acetaminophen (TYLENOL) 325 MG tablet Take 650 mg by mouth 3 times daily 2/21/23  Yes Reported, Patient   amLODIPine (NORVASC) 2.5 MG tablet Take 2.5 mg by mouth daily 9/3/22  Yes Reported, Patient   docusate sodium (COLACE) 100 MG capsule Take 1 capsule (100 mg) by mouth 2 times daily 1/26/23  Yes Tiffany Brar MD   empagliflozin (JARDIANCE) 25 MG TABS tablet Take 25 mg by mouth daily   Yes Unknown, Entered By History   levothyroxine (SYNTHROID/LEVOTHROID) 137 MCG tablet Take 137 mcg by mouth daily before breakfast 11/9/22  Yes Reported, Patient   linagliptin (TRADJENTA) 5 MG TABS tablet Take 5 mg by mouth daily 6/4/21  Yes Reported, Patient   metFORMIN (GLUCOPHAGE XR) 500 MG 24 hr tablet Take 1,000 mg by mouth 2 times daily (with meals) 6/4/21  Yes Reported, Patient   oxybutynin ER (DITROPAN XL) 10 MG 24 hr tablet Take 20 mg by mouth daily 8/27/22  Yes Reported, Patient   rosuvastatin (CRESTOR) 20 MG tablet Take 20 mg by mouth At Bedtime 9/16/22  Yes Reported, Patient   simethicone (MYLICON) 80 MG chewable tablet Take 80 mg by mouth 2 times daily as needed   Yes Reported, Patient   venlafaxine (EFFEXOR XR) 150 MG 24 hr capsule Take 1 capsule (150 mg) by mouth daily (with breakfast) 2/19/23  Yes Saúl Philippe MD              Review of Systems:   The Review of Systems is negative other than noted in the HPI            Physical Exam:     Patient Vitals for the past 24 hrs:   BP Temp Temp src Pulse Resp SpO2 Height Weight   03/28/23 1330 (!) 150/65 -- -- 76 -- 92 % -- --  "  03/28/23 1315 (!) 158/69 -- -- 75 -- 92 % -- --   03/28/23 1300 135/61 -- -- 72 -- 97 % -- --   03/28/23 1245 (!) 144/65 -- -- 70 -- 98 % -- --   03/28/23 1230 (!) 143/64 -- -- 71 -- 97 % -- --   03/28/23 1215 (!) 141/66 -- -- 72 -- 99 % -- --   03/28/23 1200 136/55 -- -- 68 -- 93 % -- --   03/28/23 1145 (!) 150/68 -- -- 69 -- 98 % -- --   03/28/23 1130 (!) 149/66 -- -- 66 -- 95 % -- --   03/28/23 1115 (!) 161/68 -- -- 75 -- 95 % -- --   03/28/23 1045 113/56 -- -- 66 -- 96 % -- --   03/28/23 1030 120/59 -- -- 67 -- 96 % -- --   03/28/23 1015 121/59 -- -- 67 -- 96 % -- --   03/28/23 1000 124/58 -- -- 68 -- 95 % -- --   03/28/23 0947 -- -- -- 70 -- 94 % -- --   03/28/23 0945 134/60 97.8  F (36.6  C) Oral 71 -- (!) 88 % -- --   03/28/23 0930 99/50 -- -- 69 14 96 % -- --   03/28/23 0925 98/54 -- -- 69 13 95 % -- --   03/28/23 0920 (!) 89/54 -- -- 69 12 94 % -- --   03/28/23 0915 95/54 -- -- 70 11 95 % -- --   03/28/23 0910 96/55 -- -- 69 11 97 % -- --   03/28/23 0905 107/52 -- -- 69 17 96 % -- --   03/28/23 0900 106/52 -- -- 68 16 97 % -- --   03/28/23 0855 110/54 -- -- 69 10 97 % -- --   03/28/23 0836 109/55 -- -- 69 14 97 % -- --   03/28/23 0830 -- -- -- 70 20 97 % -- --   03/28/23 0430 133/61 -- -- 70 -- 91 % -- --   03/27/23 2245 128/60 -- -- 75 -- 95 % -- --   03/27/23 2234 -- -- -- -- -- 97 % -- --   03/27/23 2216 -- 98.2  F (36.8  C) Oral -- -- -- -- --   03/27/23 2215 132/62 -- -- 85 -- (!) 85 % -- --   03/27/23 1950 128/49 -- -- 89 18 94 % 1.651 m (5' 5\") 91.6 kg (202 lb)          Intake/Output Summary (Last 24 hours) at 3/28/2023 1409  Last data filed at 3/28/2023 1155  Gross per 24 hour   Intake 100 ml   Output 110 ml   Net -10 ml      Constitutional:   awake, alert, cooperative, no apparent distress, and appears stated age       Eyes:   PERRL, conjunctiva/corneas clear, EOM's intact; no scleral edema or icterus noted        ENT:   Normocephalic, without obvious abnormality, atraumatic, Lips, mucosa, and " tongue normal              Lungs:   Normal respiratory effort, no accessory muscle use       Cardiovascular:   Regular rate and rhythm       Abdomen:   Morbidly obese.  Abdomen soft, minimal tenderness.  Purulent output in pigtail drain       Musculoskeletal:   No obvious swelling, bruising or deformity       Skin:   Skin color and texture normal for patient, no rashes or lesions              Data:        CT imaging obtained overnight demonstrates a 13 cm abscess containing fluid and air along the left paracolic gutter extending down to the staple line of the proximal staple line from the anastomosis.  No overt evidence for perforation or anastomotic leak.    Results for orders placed or performed during the hospital encounter of 03/27/23 (from the past 24 hour(s))   CBC with Platelets & Differential    Narrative    The following orders were created for panel order CBC with Platelets & Differential.  Procedure                               Abnormality         Status                     ---------                               -----------         ------                     CBC with platelets and d...[[id number]]  Abnormal            Final result                 Please view results for these tests on the individual orders.   Lactic acid whole blood   Result Value Ref Range    Lactic Acid 0.9 0.7 - 2.0 mmol/L   Comprehensive metabolic panel   Result Value Ref Range    Sodium 134 (L) 136 - 145 mmol/L    Potassium 4.4 3.4 - 5.3 mmol/L    Chloride 97 (L) 98 - 107 mmol/L    Carbon Dioxide (CO2) 25 22 - 29 mmol/L    Anion Gap 12 7 - 15 mmol/L    Urea Nitrogen 19.5 8.0 - 23.0 mg/dL    Creatinine 0.75 0.51 - 0.95 mg/dL    Calcium 10.0 8.8 - 10.2 mg/dL    Glucose 157 (H) 70 - 99 mg/dL    Alkaline Phosphatase 96 35 - 104 U/L    AST 22 10 - 35 U/L    ALT 8 (L) 10 - 35 U/L    Protein Total 7.1 6.4 - 8.3 g/dL    Albumin 3.5 3.5 - 5.2 g/dL    Bilirubin Total 0.2 <=1.2 mg/dL    GFR Estimate 83 >60 mL/min/1.73m2   Lipase   Result Value  Ref Range    Lipase 33 13 - 60 U/L   CBC with platelets and differential   Result Value Ref Range    WBC Count 11.6 (H) 4.0 - 11.0 10e3/uL    RBC Count 3.45 (L) 3.80 - 5.20 10e6/uL    Hemoglobin 10.0 (L) 11.7 - 15.7 g/dL    Hematocrit 33.0 (L) 35.0 - 47.0 %    MCV 96 78 - 100 fL    MCH 29.0 26.5 - 33.0 pg    MCHC 30.3 (L) 31.5 - 36.5 g/dL    RDW 17.3 (H) 10.0 - 15.0 %    Platelet Count 379 150 - 450 10e3/uL    % Neutrophils 79 %    % Lymphocytes 11 %    % Monocytes 10 %    % Eosinophils 0 %    % Basophils 0 %    % Immature Granulocytes 0 %    NRBCs per 100 WBC 0 <1 /100    Absolute Neutrophils 9.2 (H) 1.6 - 8.3 10e3/uL    Absolute Lymphocytes 1.2 0.8 - 5.3 10e3/uL    Absolute Monocytes 1.1 0.0 - 1.3 10e3/uL    Absolute Eosinophils 0.0 0.0 - 0.7 10e3/uL    Absolute Basophils 0.1 0.0 - 0.2 10e3/uL    Absolute Immature Granulocytes 0.0 <=0.4 10e3/uL    Absolute NRBCs 0.0 10e3/uL   CT Abdomen Pelvis w Contrast    Narrative    EXAM: CT ABDOMEN PELVIS W CONTRAST  LOCATION: [facility]  DATE/TIME: 3/27/2023 11:41 PM    INDICATION: h o colonic perf 1 29 s p colectomy, just d c'd from TCU, return of abd pain R sided.  COMPARISON: Noncontrast CT from 03/27/2023. Contrast enhanced CT from 02/07/2023.  TECHNIQUE: CT scan of the abdomen and pelvis was performed following injection of IV contrast. Multiplanar reformats were obtained. Dose reduction techniques were used.  CONTRAST: ISOVUE 370 100ML    FINDINGS:   LOWER CHEST: Mild cardiomegaly. No basilar infiltrate. Fat-containing left Bochdalek hernia.    HEPATOBILIARY: Cholelithiasis. Nondistended gallbladder. No focal liver lesion. The liver measures 21 cm craniocaudal. There is slight nodularity of the inferior liver surface contour which could reflect early fibrosis.    PANCREAS: Normal.    SPLEEN: Normal.    ADRENAL GLANDS: Normal.    KIDNEYS/BLADDER: Normal.    BOWEL: Again noted is a fluid collection centered within the inferior reaches of the left  paracolic gutter continuing into the left iliac fossa. There are locules of gas within this collection and there is some thin peripheral enhancement, consistent   with abscess formation. Although measurement is challenging due to morphology of the collection, this measures at least 13.1 cm in length on coronal image 64, and 7.2 x 4.4 cm in the transverse and AP dimensions on image 106 of series 3. A small amount   of extension continues caudally into the left hemipelvis near to the level of the patient's colorectal anastomotic suture line as seen previously. There is a large amount of stool in the proximal colon. Small bowel caliber within normal limits. No free   air.    LYMPH NODES: Normal.    VASCULATURE: Aortoiliac atherosclerotic calcification.    PELVIC ORGANS: Absent uterus.    MUSCULOSKELETAL: Advanced degenerative disc and facet changes of the lumbar spine with a minimal superior endplate compression deformity at L4. Arthritic change of the hips.      Impression    IMPRESSION:   1.  Findings consistent with a 13.1 cm abscess primarily situated along the left paracolic gutter and iliac fossa as described above.   INR   Result Value Ref Range    INR 1.15 0.85 - 1.15   Partial thromboplastin time   Result Value Ref Range    aPTT 28 22 - 38 Seconds   Asymptomatic COVID-19 Virus (Coronavirus) by PCR Nasopharyngeal    Specimen: Nasopharyngeal; Swab   Result Value Ref Range    SARS CoV2 PCR Negative Negative    Narrative    Testing was performed using the Xpert Xpress SARS-CoV-2 Assay on the Cepheid Gene-Xpert Instrument Systems. Additional information about this Emergency Use Authorization (EUA) assay can be found via the Lab Guide. This test should be ordered for the detection of SARS-CoV-2 in individuals who meet SARS-CoV-2 clinical and/or epidemiological criteria as well as from individuals without symptoms or other reasons to suspect COVID-19. Test performance for asymptomatic patients has only been  established in anterior nasal swab specimens. This test is for in vitro diagnostic use under the FDA EUA for laboratories certified under CLIA to perform high complexity testing. This test has not been FDA cleared or approved. A negative result does not rule out the presence of PCR inhibitors in the specimen or target RNA concentration below the limit of detection for the assay. The possibility of a false negative should be considered if the patient's recent exposure or clinical presentation suggests COVID-19.. This test was validated by the [facility]. This laboratory is certified under the Clinical Laboratory Improvement Amendments (CLIA) as qualified to perform high complexity laboratory testing.     Comprehensive metabolic panel   Result Value Ref Range    Sodium 135 (L) 136 - 145 mmol/L    Potassium 4.1 3.4 - 5.3 mmol/L    Chloride 102 98 - 107 mmol/L    Carbon Dioxide (CO2) 25 22 - 29 mmol/L    Anion Gap 8 7 - 15 mmol/L    Urea Nitrogen 17.1 8.0 - 23.0 mg/dL    Creatinine 0.68 0.51 - 0.95 mg/dL    Calcium 9.1 8.8 - 10.2 mg/dL    Glucose 112 (H) 70 - 99 mg/dL    Alkaline Phosphatase 86 35 - 104 U/L    AST 20 10 - 35 U/L    ALT 9 (L) 10 - 35 U/L    Protein Total 6.6 6.4 - 8.3 g/dL    Albumin 3.2 (L) 3.5 - 5.2 g/dL    Bilirubin Total 0.2 <=1.2 mg/dL    GFR Estimate 90 >60 mL/min/1.73m2   CBC with platelets differential    Narrative    The following orders were created for panel order CBC with platelets differential.  Procedure                               Abnormality         Status                     ---------                               -----------         ------                     CBC with platelets and d...[[id number]]  Abnormal            Final result                 Please view results for these tests on the individual orders.   CBC with platelets and differential   Result Value Ref Range    WBC Count 8.6 4.0 - 11.0 10e3/uL    RBC Count 3.39 (L) 3.80 - 5.20 10e6/uL     Hemoglobin 9.8 (L) 11.7 - 15.7 g/dL    Hematocrit 32.7 (L) 35.0 - 47.0 %    MCV 97 78 - 100 fL    MCH 28.9 26.5 - 33.0 pg    MCHC 30.0 (L) 31.5 - 36.5 g/dL    RDW 17.2 (H) 10.0 - 15.0 %    Platelet Count 326 150 - 450 10e3/uL    % Neutrophils 78 %    % Lymphocytes 10 %    % Monocytes 11 %    % Eosinophils 1 %    % Basophils 0 %    % Immature Granulocytes 0 %    NRBCs per 100 WBC 0 <1 /100    Absolute Neutrophils 6.8 1.6 - 8.3 10e3/uL    Absolute Lymphocytes 0.9 0.8 - 5.3 10e3/uL    Absolute Monocytes 0.9 0.0 - 1.3 10e3/uL    Absolute Eosinophils 0.1 0.0 - 0.7 10e3/uL    Absolute Basophils 0.0 0.0 - 0.2 10e3/uL    Absolute Immature Granulocytes 0.0 <=0.4 10e3/uL    Absolute NRBCs 0.0 10e3/uL   Imaging Procedure Note left abdominal drain placement    Narrative    Jacob Yip MD     3/28/2023  9:38 AM  [facility]    Procedure: Imaging Procedure Note left abdominal drain placement    Date/Time: 3/28/2023 9:36 AM  Performed by: Jacob Yip MD  Authorized by: Jacob Yip MD       UNIVERSAL PROTOCOL   Site Marked: Yes  Prior Images Obtained and Reviewed:  Yes  Required items: Required blood products, implants, devices and special   equipment available    Patient identity confirmed:  Verbally with patient  Patient was reevaluated immediately before administering moderate or deep   sedation or anesthesia  Confirmation Checklist:  Patient's identity using two indicators, relevant   allergies, procedure was appropriate and matched the consent or emergent   situation and correct equipment/implants were available  Time out: Immediately prior to the procedure a time out was called    Universal Protocol: the Joint Commission Universal Protocol was followed    Preparation: Patient was prepped and draped in usual sterile fashion      SEDATION  Patient Sedated: Yes    Vital signs: Vital signs monitored during sedation    See dictated procedure note for full details.    PROCEDURE  Describe  Procedure: CT guided left abdominal drain placement    10F    80cc pus  Patient Tolerance:  Patient tolerated the procedure well with no immediate   complications  Length of time physician/provider present for 1:1 monitoring during   sedation: 25   CT Retroperitoneal Abscess Drainage    Narrative    EXAM:  1. PERCUTANEOUS DRAIN PLACEMENT LEFT LOWER ABDOMEN ABSCESS  2. CT GUIDANCE  3. CONSCIOUS SEDATION  LOCATION: [facility]  DATE/TIME: 3/28/2023 9:48 AM    INDICATION: Left pericolic gutter abscess in the abdomen.  TECHNIQUE: Dose reduction techniques were used.    PROCEDURE: Informed consent obtained. Site marked. Prior images reviewed. Required items made available. Patient identity confirmed verbally and with arm band. Patient reevaluated immediately before administering sedation. Universal protocol was   followed. Time out performed. The site was prepped and draped in sterile fashion. 10 mL of 1% lidocaine was infused into the local soft tissues. Using standard technique and under direct CT guidance, a 10 Citizen of the [place] drainage catheter was inserted into the   fluid collection. After initial aspiration the pocket was irrigated several times with sterile saline to clear.     SPECIMEN: 80 mL of thick pus fluid was aspirated and sent to lab for cultures and Gram stain.    BLOOD LOSS: Minimal.    The patient tolerated the procedure well. No immediate complications.    SEDATION: Versed 1.0 mg. Fentanyl 50 mcg. The procedure was performed with administration intravenous conscious sedation with appropriate preoperative, intraoperative, and postoperative evaluation.    22 minutes of supervised face to face conscious sedation time was provided by a radiology nurse under my direct supervision.      Impression    IMPRESSION:  1.  Successful CT-guided drain placement into left pericolic gutter abdominal abscess.    Reference CPT Codes: 88216, 71644, 11779   Glucose by meter   Result Value Ref Range     GLUCOSE BY METER POCT 101 (H) 70 - 99 mg/dL        Wilfredo Palencia MD

## 2023-03-28 NOTE — PRE-PROCEDURE
GENERAL PRE-PROCEDURE:   Procedure:  Left abdomen drain placement  Date/Time:  3/28/2023 8:02 AM    Written consent obtained?: Yes    Risks and benefits: Risks, benefits and alternatives were discussed    Consent given by:  Patient  Patient states understanding of procedure being performed: Yes    Patient's understanding of procedure matches consent: Yes    Procedure consent matches procedure scheduled: Yes    Expected level of sedation:  Moderate  Appropriately NPO:  Yes  Mallampati  :  Grade 1- soft palate, uvula, tonsillar pillars, and posterior pharyngeal wall visible  Lungs:  Lungs clear with good breath sounds bilaterally  Heart:  Normal heart sounds and rate  History & Physical reviewed:  History and physical reviewed and no updates needed  Statement of review:  I have reviewed the lab findings, diagnostic data, medications, and the plan for sedation

## 2023-03-28 NOTE — ED TRIAGE NOTES
"Patient comes to ED from urgent care [address] ([facility]). Patient stated she had abdominal surgery at the end of February and \"her  wanted her to be seen to make sure everything was okay\". Patient stated she was seen at urgent care \"for some low grade pain. Patient poor historian and not able to recall type of surgery or other details. Stated her  went home due to no space for visitors.      Triage Assessment     Row Name 03/27/23 1954       Triage Assessment (Adult)    Airway WDL WDL       Respiratory WDL    Respiratory WDL WDL       Skin Circulation/Temperature WDL    Skin Circulation/Temperature WDL WDL       Cardiac WDL    Cardiac WDL WDL       Peripheral/Neurovascular WDL    Peripheral Neurovascular WDL WDL       Cognitive/Neuro/Behavioral WDL    Cognitive/Neuro/Behavioral WDL WDL              "

## 2023-03-28 NOTE — PHARMACY-VANCOMYCIN DOSING SERVICE
Pharmacy Vancomycin Initial Note  Date of Service 2023  Patient's  1945  77 year old, female    Indication: Intra-abdominal infection    Current estimated CrCl = Estimated Creatinine Clearance: 72.4 mL/min (based on SCr of 0.75 mg/dL).    Creatinine for last 3 days  3/27/2023: 10:02 PM Creatinine 0.75 mg/dL    Recent Vancomycin Level(s) for last 3 days  No results found for requested labs within last 72 hours.      Vancomycin IV Administrations (past 72 hours)      No vancomycin orders with administrations in past 72 hours.                Nephrotoxins and other renal medications (From now, onward)    None          Contrast Orders - past 72 hours (72h ago, onward)    Start     Dose/Rate Route Frequency Stop    23  iopamidol (ISOVUE-370) solution 100 mL         100 mL Intravenous ONCE 23 2331              Plan:  1. Give vancomycin 2000 mg IV x1 dose in ER.  2. If vancomycin therapy is to be continued as inpatient, please reorder the dosing consult.     Jovita Wagner, [facility]

## 2023-03-28 NOTE — IR NOTE
Bedside report given to Lila Brambila RN, no questions at the end of report. Pt alert and interacting in plan of care. Drain in place.

## 2023-03-28 NOTE — CONSULTS
Care Management Initial Consult       Concerns to be Addressed:   Care post placement of abscess drain in IR. Surgery following. Currently receiving IV Meropenem, Vancomycin and IV fluid support.   Patient plan of care discussed at interdisciplinary rounds: Yes    Anticipated Discharge Disposition:  Discharge goal is home pending response to treatment, medical needs and mobility closer to discharge.      Anticipated Discharge Services:  Pending therapy evaluation and recommendations.   Anticipated Discharge DME:  Per therapy (if indicated). Patient has a 4-wheeled walker at home.     Patient/family educated on Medicare website which has current facility and service quality ratings:   NA  Education Provided on the Discharge Plan:   Per team  Patient/Family in Agreement with the Plan:   Yes    Referrals Placed by CM/SW:   None  Private pay costs discussed: Not applicable     General Information  Assessment completed with: Suzy Pimentel  Type of CM/SW Visit: Initial Assessment    Primary Care Provider verified and updated as needed: Yes   Readmission within the last 30 days:        Reason for Consult: discharge planning  Advance Care Planning:    Copy of Honoring Choices provided to patient.  General Information Comments: Goal is home but CM to follow plan for antibiotics    Communication Assessment  Patient's communication style: spoken language (English or Bilingual)       Cognitive  Cognitive/Neuro/Behavioral: WDL                      Living Environment:   People in home: spouse  Awais  Current living Arrangements: house      Able to return to prior arrangements:    Living Arrangement Comments: Goal is home but would appreciate therapy evaluation and input.    Family/Social Support:  Care provided by: spouse/significant other, child(tara)  Provides care for: no one  Marital Status:     Awais       Description of Support System: Supportive       Current Resources:   Patient receiving home care services:  "Yes  Skilled Home Care Services:  (Patient stated someone \"came out last week and said they would see me for 6 weeks. But I don't know who they were.\")  Community Resources:    Equipment currently used at home: walker, rolling (4WW)  Supplies currently used at home: None (does not use oxygen at baseline)    Employment/Financial:  Employment Status: retired        Financial Concerns: No concerns identified   Referral to Financial Worker: No     Lifestyle & Psychosocial Needs:  Social Determinants of Health     Tobacco Use: Medium Risk     Smoking Tobacco Use: Former     Smokeless Tobacco Use: Never     Passive Exposure: Not on file   Alcohol Use: Not on file   Financial Resource Strain: Not on file   Food Insecurity: Not on file   Transportation Needs: Not on file   Physical Activity: Not on file   Stress: Not on file   Social Connections: Not on file   Intimate Partner Violence: Not on file   Depression: Not on file   Housing Stability: Not on file     Functional Status:  Prior to admission patient needed assistance:   Dependent ADLs:: Ambulation-walker  Dependent IADLs:: Cleaning, Cooking, Laundry, Shopping, Transportation     Mental Health Status:  Mental Health Status: No Current Concerns       Chemical Dependency Status:  Chemical Dependency Status: No Current Concerns           Values/Beliefs:  Spiritual, Cultural Beliefs, Sikh Practices, Values that affect care: no          Values/Beliefs Comment: Ep[employer]opalian    Additional Information:  Writer met with patient to review role of care management services, discuss goals of care and assess need for any possible services at discharge. Patient alert, answering questions appropriately and engaged in the conversation.  Patient is  and her , Awais, and daughter Sheba provide any necessary assistance. She uses a four-wheeled walker for mobility but does not use any home oxygen. She was discharged recently to home from [facility] " "and believes she has home care; stated, \"someone came out to the house last week and said they would be coming to the house for 6 weeks but I don't know who they were.\"  Writer mentioned some local home care agencies and patient remained uncertain. Patient has an [facility] MD so writer called [facility] but they are not open to patient. Patient accepted a copy of Honoring Choices to complete advance directives when she is feeling better.  PT to evaluate and treat.  CM will continue to monitor progression of care, review team recommendations and provide discharge planning assist as needed.    2:05 PM:  Referral made to [facility] liaison to determine if patient is open to [facility] home care services.     Jaan Mcfarlane RN      "

## 2023-03-28 NOTE — ED PROVIDER NOTES
NAME: Suzy Gómez  AGE: 77 year old female  YOB: 1945  MRN: [id number]  EVALUATION DATE & TIME: No admission date for patient encounter.    PCP: [facility] [place]    ED PROVIDER: Delfin Mena M.D.    Chief Complaint   Patient presents with     Abdominal Pain     FINAL IMPRESSION:  1. Intra-abdominal abscess post-procedure      MEDICAL DECISION MAKIN:06 PM I met with the patient, obtained history, performed an initial exam, and discussed options and plan for diagnostics and treatment here in the ED. Patient seen in [facility] due to critical capacity and boarding crisis leaving no ED rooms available.   Patient was clinically assessed and consented to treatment. After assessment, medical decision making and workup were discussed with the patient. The patient was agreeable to plan for testing, workup, and treatment.  Pertinent Labs & Imaging studies reviewed. (See chart for details)    12:23 AM I discussed the patient with Dr. Stack from the hospitalist service who agrees to admit the patient.      12:27 AM I updated the patient with lab and imaging results and discussed the plan for admission    12:41 AM I spoke with Dr. Trujillo with IR who will look at the patient's CT scan tonight and drain the patient's abscess in the morning     12:43 AM I spoke with Dr. Diaz with General Surgery        Medical Decision Making    History:    Supplemental history from: Documented in chart, if applicable    External Record(s) reviewed: Documented in chart, if applicable. and Outpatient Record: Colectomy from 23    Work Up:    Chart documentation includes differential considered and any EKGs or imaging independently interpreted by provider, where specified.    In additional to work up documented, I considered the following work up: Documented in chart, if applicable.    External consultation:    Discussion of management with another provider: Documented in chart, if applicable and  Hospitalist, IR, and General Surgery    Complicating factors:    Care impacted by chronic illness: Diabetes, Hyperlipidemia and Hypertension    Care affected by social determinants of health: N/A    Disposition considerations: Admit.    Suzy Gómez is a 77 year old female who presents with abdominal pain.   Differential diagnosis includes but not limited to intra-abdominal abscess, postsurgical pain, peritonitis, anastomosis perforation, small bowel obstruction.  Patient presenting for abdominal pain.  Patient just had abdominal surgery for perforated colon on 29 January.  She spent over 2 weeks in the hospital for antibiotics secondary to the perforation and fecal matter in the peritoneum.  Patient just got out of the TCU on the 15th of this month.  She does not have a fever and abdomen is more tender in the right upper quadrant which is where she had pain when she had the perforation.  Labs will be obtained and patient be sent for CT scan with contrast to evaluate for any breakdown or repeat perforation or possibly small bowel obstruction.  Patient not nauseous or vomiting and appears otherwise stable.  Heart rate and vital signs otherwise unremarkable.  Labs showed slight leukocytosis but normal lactate and unremarkable metabolic panel.  Patient went for CT scan which showed a large 13 x 7 cm abscess on the left paracolic gutter.  Unclear why her pain is on the right side and this could be postoperative pain or possibly some drainage from the abscess to the right upper quadrant.  Patient will be plan for admission and start broad-spectrum antibiotics with meropenem and vancomycin which is what she was on before.  I did also consider antifungal with micafungin which is what she was on before however will discuss with surgery and likely IR for drainage.  After speaking general surgery recommendations were for drainage and admission to hospitalist.  Hospitalist was agreeable to admission and interventional  "radiology where patient will plan for drainage in the morning.  Patient comfortable at this time and vital signs stable plan for admission and continued treatment for intra-abdominal abscess.    0 minutes of critical care time    MEDICATIONS GIVEN IN THE EMERGENCY:  Medications   HYDROmorphone (PF) (DILAUDID) injection 0.5 mg (has no administration in time range)   vancomycin (VANCOCIN) 2,000 mg in sodium chloride 0.9 % 500 mL intermittent infusion (2,000 mg Intravenous $New Bag 3/28/23 0140)   0.9% sodium chloride BOLUS (0 mLs Intravenous Stopped 3/27/23 2319)   ketorolac (TORADOL) injection 15 mg (15 mg Intravenous $Given 3/27/23 2205)   iopamidol (ISOVUE-370) solution 100 mL (100 mLs Intravenous $Given 3/27/23 2331)   meropenem (MERREM) 1 g vial to attach to  mL bag (0 g Intravenous Stopped 3/28/23 0142)       NEW PRESCRIPTIONS STARTED AT TODAY'S ER VISIT:  New Prescriptions    No medications on file     =================================================================    HPI    Patient information was obtained from: Patient     Use of : N/A       Suzy Gómez is a 77 year old female with a past medical history of colon perforation (s/p colectomy), angiodysplasia of colon, colon adenoma, HTN, HLD, DM type II, and obesity, who presents with abdominal pain     Per chart review: The patient had a sigmoid colectomy on 1/29/23 for a colon perforation. The patient had \"significant colonic distention\" that made the surgery \"markedly more difficult than a typical elective laparoscopic sigmoid colectomy\".     The patient reports right lower quadrant abdominal pain that started today. The patient also feels \"somewhat\" bloated and \"occasionally\" has had pain like this before. The patient notes this is the first time they have had this pain since their colectomy (see chart review). The patient denies radiation of pain, constipation, nausea, vomiting, or diarrhea.    REVIEW OF SYSTEMS   Review of Systems "   Gastrointestinal: Positive for abdominal distention (somewhat) and abdominal pain (RLQ). Negative for constipation, diarrhea, nausea and vomiting.   Musculoskeletal: Negative for back pain.   All other systems reviewed and are negative.     PAST MEDICAL HISTORY:  Past Medical History:   Diagnosis Date     Dyslipidemia      Heart disease      Hypertension      KARYN (obstructive sleep apnea)      Thyroid disease      Type 2 diabetes mellitus (H)        PAST SURGICAL HISTORY:  Past Surgical History:   Procedure Laterality Date     LAPAROSCOPIC ASSISTED COLECTOMY N/A 1/29/2023    Procedure: SIGMOID COLECTOMY, LAPAROSCOPIC;  Surgeon: Wilfredo Palencia MD;  [place] [facility] [place]     CURRENT MEDICATIONS:      Current Facility-Administered Medications:      HYDROmorphone (PF) (DILAUDID) injection 0.5 mg, 0.5 mg, Intravenous, Q15 Min PRN, Delfin Mena MD     vancomycin (VANCOCIN) 2,000 mg in sodium chloride 0.9 % 500 mL intermittent infusion, 2,000 mg, Intravenous, Once, Delfin Mena MD, 2,000 mg at 03/28/23 0140    Current Outpatient Medications:      Acetaminophen 325 MG CHEW, Take 650 mg by mouth 3 times daily, Disp: , Rfl:      amLODIPine (NORVASC) 2.5 MG tablet, Take 2.5 mg by mouth daily, Disp: , Rfl:      aspirin (ASA) 81 MG EC tablet, Take 81 mg by mouth daily (Patient not taking: Reported on 2/27/2023), Disp: , Rfl:      docusate sodium (COLACE) 100 MG capsule, Take 1 capsule (100 mg) by mouth 2 times daily, Disp: 30 capsule, Rfl: 0     empagliflozin (JARDIANCE) 25 MG TABS tablet, Take 25 mg by mouth daily, Disp: , Rfl:      levothyroxine (SYNTHROID/LEVOTHROID) 137 MCG tablet, Take 137 mcg by mouth daily before breakfast, Disp: , Rfl:      linagliptin (TRADJENTA) 5 MG TABS tablet, Take 5 mg by mouth daily, Disp: , Rfl:      metFORMIN (GLUCOPHAGE XR) 500 MG 24 hr tablet, Take 1,000 mg by mouth 2 times daily (with meals), Disp: , Rfl:      oxybutynin ER (DITROPAN XL) 10 MG 24 hr  "tablet, Take 20 mg by mouth daily, Disp: , Rfl:      rosuvastatin (CRESTOR) 20 MG tablet, Take 20 mg by mouth At Bedtime, Disp: , Rfl:      simethicone (MYLICON) 80 MG chewable tablet, Take 80 mg by mouth 2 times daily, Disp: , Rfl:      venlafaxine (EFFEXOR XR) 150 MG 24 hr capsule, Take 1 capsule (150 mg) by mouth daily (with breakfast), Disp: 30 capsule, Rfl: 0    ALLERGIES:  Allergies   Allergen Reactions     Penicillins Nausea and Vomiting and GI Disturbance     Atorvastatin Muscle Pain (Myalgia)     Augmentin Nausea and Vomiting and Diarrhea     Bupropion Other (See Comments)     Mood swings     Exenatide Rash     Nitrofurantoin Hives and Swelling     FAMILY HISTORY:  No family history on file.    SOCIAL HISTORY:   Social History     Socioeconomic History     Marital status:    Tobacco Use     Smoking status: Former     Types: Cigarettes     Quit date:      Years since quittin.2     Smokeless tobacco: Never   Vaping Use     Vaping Use: Never used   Substance and Sexual Activity     Alcohol use: Not Currently     Drug use: Never     PHYSICAL EXAM:    Vitals: /60   Pulse 75   Temp 98.2  F (36.8  C) (Oral)   Resp 18   Ht 1.651 m (5' 5\")   Wt 91.6 kg (202 lb)   SpO2 95%   BMI 33.61 kg/m     Physical Exam  Vitals and nursing note reviewed.   Constitutional:       General: She is not in acute distress.     Appearance: She is well-developed. She is obese. She is not ill-appearing or toxic-appearing.   HENT:      Head: Normocephalic.   Eyes:      General: No scleral icterus.  Cardiovascular:      Rate and Rhythm: Normal rate and regular rhythm.      Heart sounds: Normal heart sounds.   Pulmonary:      Effort: Pulmonary effort is normal. No respiratory distress.      Breath sounds: Normal breath sounds.   Abdominal:      General: Abdomen is flat. Bowel sounds are decreased.      Palpations: Abdomen is soft.      Tenderness: There is abdominal tenderness in the right upper quadrant and " periumbilical area. There is no right CVA tenderness, left CVA tenderness, guarding or rebound.      Hernia: No hernia is present.   Skin:     General: Skin is warm and dry.      Capillary Refill: Capillary refill takes less than 2 seconds.      Coloration: Skin is not cyanotic, jaundiced or pale.   Neurological:      General: No focal deficit present.      Mental Status: She is alert.   Psychiatric:         Behavior: Behavior normal.        LAB:  All pertinent labs reviewed and interpreted.  Labs Ordered and Resulted from Time of ED Arrival to Time of ED Departure   COMPREHENSIVE METABOLIC PANEL - Abnormal       Result Value    Sodium 134 (*)     Potassium 4.4      Chloride 97 (*)     Carbon Dioxide (CO2) 25      Anion Gap 12      Urea Nitrogen 19.5      Creatinine 0.75      Calcium 10.0      Glucose 157 (*)     Alkaline Phosphatase 96      AST 22      ALT 8 (*)     Protein Total 7.1      Albumin 3.5      Bilirubin Total 0.2      GFR Estimate 83     CBC WITH PLATELETS AND DIFFERENTIAL - Abnormal    WBC Count 11.6 (*)     RBC Count 3.45 (*)     Hemoglobin 10.0 (*)     Hematocrit 33.0 (*)     MCV 96      MCH 29.0      MCHC 30.3 (*)     RDW 17.3 (*)     Platelet Count 379      % Neutrophils 79      % Lymphocytes 11      % Monocytes 10      % Eosinophils 0      % Basophils 0      % Immature Granulocytes 0      NRBCs per 100 WBC 0      Absolute Neutrophils 9.2 (*)     Absolute Lymphocytes 1.2      Absolute Monocytes 1.1      Absolute Eosinophils 0.0      Absolute Basophils 0.1      Absolute Immature Granulocytes 0.0      Absolute NRBCs 0.0     LACTIC ACID WHOLE BLOOD - Normal    Lactic Acid 0.9     LIPASE - Normal    Lipase 33     COVID-19 VIRUS (CORONAVIRUS) BY PCR - Normal    SARS CoV2 PCR Negative     INR - Normal    INR 1.15     PARTIAL THROMBOPLASTIN TIME - Normal    aPTT 28     BLOOD CULTURE   BLOOD CULTURE     RADIOLOGY:  CT Abdomen Pelvis w Contrast   Final Result   IMPRESSION:    1.  Findings consistent with a  13.1 cm abscess primarily situated along the left paracolic gutter and iliac fossa as described above.      CT Retroperitoneal Abscess Drainage    (Results Pending)     PROCEDURES:   Procedures     I, Suzy Stewart, am serving as a scribe to document services personally performed by Dr. Delfin Mena  based on my observation and the provider's statements to me. I, Delfin Mena MD attest that Suzy Stewart is acting in a scribe capacity, has observed my performance of the services and has documented them in accordance with my direction.      Delfin Mena M.D.  Emergency Medicine  [facility] Emergency Department     Delfin Mena MD  03/28/23 0314

## 2023-03-28 NOTE — ED NOTES
RN gave an update to Awais the pt  at this time; Pt has been at IR since the beginning of my shift.

## 2023-03-28 NOTE — CONSULTS
INTERVENTIONAL RADIOLOGY    IR consult for left abdominal abscess drain.  78 yo F with chronic fluid in left pericolic gutter post-op, now with robust evidence of overt abscess.    EXAM: CT ABDOMEN PELVIS W CONTRAST  LOCATION: [facility]  DATE/TIME: 3/27/2023 11:41 PM  INDICATION: h o colonic perf 1 29 s p colectomy, just d c'd from TCU, return of abd pain R sided.  COMPARISON: Noncontrast CT from 03/27/2023. Contrast enhanced CT from 02/07/2023.  IMPRESSION:   1.  Findings consistent with a 13.1 cm abscess primarily situated along the left paracolic gutter and iliac fossa as described above.      Plan:  NPO. CT drainage later today.

## 2023-03-29 LAB
GLUCOSE BLDC GLUCOMTR-MCNC: 103 MG/DL (ref 70–99)
GLUCOSE BLDC GLUCOMTR-MCNC: 138 MG/DL (ref 70–99)
GLUCOSE BLDC GLUCOMTR-MCNC: 152 MG/DL (ref 70–99)
GLUCOSE BLDC GLUCOMTR-MCNC: 98 MG/DL (ref 70–99)
HOLD SPECIMEN: NORMAL
VANCOMYCIN SERPL-MCNC: 15.4 UG/ML

## 2023-03-29 PROCEDURE — 99232 SBSQ HOSP IP/OBS MODERATE 35: CPT | Performed by: INTERNAL MEDICINE

## 2023-03-29 PROCEDURE — 80202 ASSAY OF VANCOMYCIN: CPT | Performed by: INTERNAL MEDICINE

## 2023-03-29 PROCEDURE — 99207 PR CDG-CUT & PASTE-POTENTIAL IMPACT ON LEVEL: CPT | Performed by: INTERNAL MEDICINE

## 2023-03-29 PROCEDURE — 250N000011 HC RX IP 250 OP 636: Performed by: INTERNAL MEDICINE

## 2023-03-29 PROCEDURE — 999N000127 HC STATISTIC PERIPHERAL IV START W US GUIDANCE

## 2023-03-29 PROCEDURE — 36415 COLL VENOUS BLD VENIPUNCTURE: CPT | Performed by: INTERNAL MEDICINE

## 2023-03-29 PROCEDURE — 82962 GLUCOSE BLOOD TEST: CPT

## 2023-03-29 PROCEDURE — 258N000003 HC RX IP 258 OP 636: Performed by: INTERNAL MEDICINE

## 2023-03-29 PROCEDURE — 250N000013 HC RX MED GY IP 250 OP 250 PS 637: Performed by: PHYSICIAN ASSISTANT

## 2023-03-29 PROCEDURE — 250N000013 HC RX MED GY IP 250 OP 250 PS 637: Performed by: INTERNAL MEDICINE

## 2023-03-29 PROCEDURE — 120N000001 HC R&B MED SURG/OB

## 2023-03-29 PROCEDURE — 250N000012 HC RX MED GY IP 250 OP 636 PS 637: Performed by: INTERNAL MEDICINE

## 2023-03-29 RX ORDER — POLYETHYLENE GLYCOL 3350 17 G/17G
17 POWDER, FOR SOLUTION ORAL DAILY
Status: DISCONTINUED | OUTPATIENT
Start: 2023-03-29 | End: 2023-04-05 | Stop reason: HOSPADM

## 2023-03-29 RX ORDER — AMOXICILLIN 250 MG
1 CAPSULE ORAL AT BEDTIME
Status: DISCONTINUED | OUTPATIENT
Start: 2023-03-29 | End: 2023-04-01

## 2023-03-29 RX ADMIN — VANCOMYCIN HYDROCHLORIDE 1000 MG: 1 INJECTION, SOLUTION INTRAVENOUS at 10:10

## 2023-03-29 RX ADMIN — VANCOMYCIN HYDROCHLORIDE 1000 MG: 1 INJECTION, SOLUTION INTRAVENOUS at 23:08

## 2023-03-29 RX ADMIN — SENNOSIDES AND DOCUSATE SODIUM 1 TABLET: 50; 8.6 TABLET ORAL at 23:08

## 2023-03-29 RX ADMIN — SODIUM CHLORIDE, PRESERVATIVE FREE: 5 INJECTION INTRAVENOUS at 00:12

## 2023-03-29 RX ADMIN — POLYETHYLENE GLYCOL 3350 17 G: 17 POWDER, FOR SOLUTION ORAL at 12:46

## 2023-03-29 RX ADMIN — SODIUM CHLORIDE, PRESERVATIVE FREE: 5 INJECTION INTRAVENOUS at 09:26

## 2023-03-29 RX ADMIN — INSULIN ASPART 1 UNITS: 100 INJECTION, SOLUTION INTRAVENOUS; SUBCUTANEOUS at 12:44

## 2023-03-29 RX ADMIN — MEROPENEM 1 G: 1 INJECTION, POWDER, FOR SOLUTION INTRAVENOUS at 18:11

## 2023-03-29 RX ADMIN — MEROPENEM 1 G: 1 INJECTION, POWDER, FOR SOLUTION INTRAVENOUS at 08:39

## 2023-03-29 RX ADMIN — MEROPENEM 1 G: 1 INJECTION, POWDER, FOR SOLUTION INTRAVENOUS at 01:03

## 2023-03-29 RX ADMIN — ACETAMINOPHEN 650 MG: 325 TABLET ORAL at 20:47

## 2023-03-29 RX ADMIN — SODIUM CHLORIDE, PRESERVATIVE FREE: 5 INJECTION INTRAVENOUS at 22:08

## 2023-03-29 RX ADMIN — FAMOTIDINE 20 MG: 10 INJECTION, SOLUTION INTRAVENOUS at 09:36

## 2023-03-29 RX ADMIN — ACETAMINOPHEN 650 MG: 325 TABLET ORAL at 08:42

## 2023-03-29 ASSESSMENT — ACTIVITIES OF DAILY LIVING (ADL)
CONCENTRATING,_REMEMBERING_OR_MAKING_DECISIONS_DIFFICULTY: NO
FALL_HISTORY_WITHIN_LAST_SIX_MONTHS: NO
ADLS_ACUITY_SCORE: 37
ADLS_ACUITY_SCORE: 23
ADLS_ACUITY_SCORE: 37
TOILETING_ISSUES: NO
ADLS_ACUITY_SCORE: 40
ADLS_ACUITY_SCORE: 37
EQUIPMENT_CURRENTLY_USED_AT_HOME: CANE, STRAIGHT
DOING_ERRANDS_INDEPENDENTLY_DIFFICULTY: NO
ADLS_ACUITY_SCORE: 37
ADLS_ACUITY_SCORE: 23
ADLS_ACUITY_SCORE: 37
ADLS_ACUITY_SCORE: 42
ADLS_ACUITY_SCORE: 23
DRESSING/BATHING_DIFFICULTY: NO
ADLS_ACUITY_SCORE: 23
DIFFICULTY_EATING/SWALLOWING: NO
WALKING_OR_CLIMBING_STAIRS_DIFFICULTY: NO
CHANGE_IN_FUNCTIONAL_STATUS_SINCE_ONSET_OF_CURRENT_ILLNESS/INJURY: NO
WEAR_GLASSES_OR_BLIND: NO
ADLS_ACUITY_SCORE: 23

## 2023-03-29 NOTE — PLAN OF CARE
Problem: Plan of Care - These are the overarching goals to be used throughout the patient stay.    Goal: Optimal Comfort and Wellbeing  Outcome: Progressing   Goal Outcome Evaluation:    Pt arrived on unit around 14:30  Pt denies pain. Transferred to recliner with stand by assist.  Sitting in recliner.  Pt was oriented to room and call light. Will cont to monitor.

## 2023-03-29 NOTE — PROGRESS NOTES
"[facility]    Medicine Progress Note - Hospitalist Service    Date of Admission:  3/27/2023    Assessment & Plan      Suzy Gómez is a 77 year old female with medical history of hypertension, dyslipidemia, type 2 diabetes mellitus, recent colonic perforation requiring sigmoidectomy complicated by sepsis and ICU admission, admitted on 3/27/2023 with constipation and abdominal pain.  ED work-up showed leukocytosis with no evidence of sepsis and CT findings of 13.1 cm abscess primarily in located along the left paracolic gutter and iliac fossa.    Intra-abdominal abscess/postop infection            -13 cm abscess            -IR consulted for abscess drainage with drain            -Broad-spectrum antibiotics pain management            -Status post drain 3/28            -Consult ID for antibiotic recommendations-pending            - diet per surgery    Status post subtotal colectomy            -January 29, 2023     Diet: Combination Diet Clear Liquid    DVT Prophylaxis: Low Risk/Ambulatory with no VTE prophylaxis indicated  Box Catheter: Not present  Lines: None     Cardiac Monitoring: None  Code Status: Full Code      Clinically Significant Risk Factors           # Hypercalcemia: corrected calcium is >10.1, will monitor as appropriate    # Hypoalbuminemia: Lowest albumin = 3.2 g/dL at 3/28/2023  6:28 AM, will monitor as appropriate           # DMII: A1C = 8.1 % (Ref range: <5.7 %) within past 6 months, PRESENT ON ADMISSION  # Obesity: Estimated body mass index is 33.61 kg/m  as calculated from the following:    Height as of this encounter: 1.651 m (5' 5\").    Weight as of this encounter: 91.6 kg (202 lb)., PRESENT ON ADMISSION         Disposition Plan     Expected Discharge Date: 04/03/2023                  Lion Benito MD  Hospitalist Service  [facility]  Securely message with [employer] (more info)  Text page via [facility] Paging/Directory "   ______________________________________________________________________    Interval History   Feels well denies pain nausea vomiting, frustrated o/w    Physical Exam   Vital Signs: Temp: 97.3  F (36.3  C) Temp src: Oral BP: 136/63 Pulse: 69   Resp: 18 SpO2: 96 % O2 Device: Nasal cannula Oxygen Delivery: 2 LPM  Weight: 202 lbs 0 oz    General Appearance: Obese, in no acute distress.  Eyes: Pupils equal round and reactive to light.  Extraocular movements intact.  Clear conjunctiva.  Anicteric sclerae.  HEENT: Normocephalic and atraumatic.  Ears, nose and pharynx normal.  Neck supple.  No JVD or lymph nodes.  Respiratory: Clear to auscultation with good air entry bilaterally.  Cardiovascular: Regular rate and rhythm.  S1 and S2 normal.  No murmurs, gallops or rubs.  GI: Soft.  + Drain   Lymph/Hematologic: No palpable lymph nodes.  Genitourinary: No CVA tenderness.  Skin: Good turgor.  Warm and dry.  No rash or wounds.  Musculoskeletal: Range of motion x4.  No edema or tenderness.  Neurologic: Alert, oriented x3.  No focal signs.  Psychiatric: Good eye contact.  Normal affect.  No hallucinations or delusions.    Medical Decision Making       35 MINUTES SPENT BY ME on the date of service doing chart review, history, exam, documentation & further activities per the note.        Data

## 2023-03-29 NOTE — PROGRESS NOTES
General Surgery Progress Note:    Hospital Day # 1    ASSESSMENT:   1. Intra-abdominal abscess post-procedure        Suzy Gómez is a 77 year old female with intra-abdominal abscess status post sigmoid colectomy for perforated stercoral ulcer on 1/29/2023.  CT imaging with significant stool burden in ascending and transverse colon  Cultures growing nonlactose fermenting gram-negative bacilli    PLAN:   -Started MiraLAX and Loren-Colace to take daily.  She should probably be on some type of stool softener chronically.  -Avoid narcotics  -She can be advanced to regular diet  -Most likely she can go home tomorrow depending on ID input.  Appreciate ID input.  -Drain per IR  -We will continue to follow    SUBJECTIVE:   Suzy Gómez is feeling about the same.  She denies much pain.  Unclear when she had last bowel movement.    Patient Vitals for the past 24 hrs:   BP Temp Temp src Pulse Resp SpO2   03/29/23 0725 115/81 98.6  F (37  C) Oral 75 16 97 %   03/28/23 2320 136/63 -- -- 69 -- 96 %   03/28/23 2245 132/64 97.3  F (36.3  C) Oral 71 18 91 %   03/28/23 1600 124/58 98  F (36.7  C) Oral 68 15 94 %   03/28/23 1330 (!) 150/65 -- -- 76 -- 92 %   03/28/23 1315 (!) 158/69 -- -- 75 -- 92 %   03/28/23 1300 135/61 -- -- 72 -- 97 %   03/28/23 1245 (!) 144/65 -- -- 70 -- 98 %   03/28/23 1230 (!) 143/64 -- -- 71 -- 97 %   03/28/23 1215 (!) 141/66 -- -- 72 -- 99 %   03/28/23 1200 136/55 -- -- 68 -- 93 %   03/28/23 1145 (!) 150/68 -- -- 69 -- 98 %   03/28/23 1130 (!) 149/66 -- -- 66 -- 95 %   03/28/23 1115 (!) 161/68 -- -- 75 -- 95 %       Physical Exam:  General: NAD, pleasant    ABD: Soft tenderness mid abdomen no rebound or peritoneal signs present      Admission on 03/27/2023   Component Date Value     Lactic Acid 03/27/2023 0.9      Sodium 03/27/2023 134 (L)      Potassium 03/27/2023 4.4      Chloride 03/27/2023 97 (L)      Carbon Dioxide (CO2) 03/27/2023 25      Anion Gap 03/27/2023 12      Urea Nitrogen 03/27/2023 19.5       Creatinine 03/27/2023 0.75      Calcium 03/27/2023 10.0      Glucose 03/27/2023 157 (H)      Alkaline Phosphatase 03/27/2023 96      AST 03/27/2023 22      ALT 03/27/2023 8 (L)      Protein Total 03/27/2023 7.1      Albumin 03/27/2023 3.5      Bilirubin Total 03/27/2023 0.2      GFR Estimate 03/27/2023 83      Lipase 03/27/2023 33      WBC Count 03/27/2023 11.6 (H)      RBC Count 03/27/2023 3.45 (L)      Hemoglobin 03/27/2023 10.0 (L)      Hematocrit 03/27/2023 33.0 (L)      MCV 03/27/2023 96      MCH 03/27/2023 29.0      MCHC 03/27/2023 30.3 (L)      RDW 03/27/2023 17.3 (H)      Platelet Count 03/27/2023 379      % Neutrophils 03/27/2023 79      % Lymphocytes 03/27/2023 11      % Monocytes 03/27/2023 10      % Eosinophils 03/27/2023 0      % Basophils 03/27/2023 0      % Immature Granulocytes 03/27/2023 0      NRBCs per 100 WBC 03/27/2023 0      Absolute Neutrophils 03/27/2023 9.2 (H)      Absolute Lymphocytes 03/27/2023 1.2      Absolute Monocytes 03/27/2023 1.1      Absolute Eosinophils 03/27/2023 0.0      Absolute Basophils 03/27/2023 0.1      Absolute Immature Granul* 03/27/2023 0.0      Absolute NRBCs 03/27/2023 0.0      SARS CoV2 PCR 03/28/2023 Negative      Culture 03/28/2023 No growth after 1 day      Culture 03/28/2023 No growth after 1 day      INR 03/28/2023 1.15      aPTT 03/28/2023 28      Sodium 03/28/2023 135 (L)      Potassium 03/28/2023 4.1      Chloride 03/28/2023 102      Carbon Dioxide (CO2) 03/28/2023 25      Anion Gap 03/28/2023 8      Urea Nitrogen 03/28/2023 17.1      Creatinine 03/28/2023 0.68      Calcium 03/28/2023 9.1      Glucose 03/28/2023 112 (H)      Alkaline Phosphatase 03/28/2023 86      AST 03/28/2023 20      ALT 03/28/2023 9 (L)      Protein Total 03/28/2023 6.6      Albumin 03/28/2023 3.2 (L)      Bilirubin Total 03/28/2023 0.2      GFR Estimate 03/28/2023 90      WBC Count 03/28/2023 8.6      RBC Count 03/28/2023 3.39 (L)      Hemoglobin 03/28/2023 9.8 (L)      Hematocrit  03/28/2023 32.7 (L)      MCV 03/28/2023 97      MCH 03/28/2023 28.9      MCHC 03/28/2023 30.0 (L)      RDW 03/28/2023 17.2 (H)      Platelet Count 03/28/2023 326      % Neutrophils 03/28/2023 78      % Lymphocytes 03/28/2023 10      % Monocytes 03/28/2023 11      % Eosinophils 03/28/2023 1      % Basophils 03/28/2023 0      % Immature Granulocytes 03/28/2023 0      NRBCs per 100 WBC 03/28/2023 0      Absolute Neutrophils 03/28/2023 6.8      Absolute Lymphocytes 03/28/2023 0.9      Absolute Monocytes 03/28/2023 0.9      Absolute Eosinophils 03/28/2023 0.1      Absolute Basophils 03/28/2023 0.0      Absolute Immature Granul* 03/28/2023 0.0      Absolute NRBCs 03/28/2023 0.0      Culture 03/28/2023 Culture in progress      Culture 03/28/2023 3+ Non lactose fermenting gram negative bacilli (A)      GLUCOSE BY METER POCT 03/28/2023 101 (H)      GLUCOSE BY METER POCT 03/28/2023 124 (H)      GLUCOSE BY METER POCT 03/28/2023 92      Vancomycin 03/29/2023 15.4      GLUCOSE BY METER POCT 03/29/2023 103 (H)      Hold Specimen 03/29/2023 FARHANA MckeonC  [facility]  [address] [place], [place] [id number]  Phone- [phone]  Fax- [phone]

## 2023-03-29 NOTE — PHARMACY-VANCOMYCIN DOSING SERVICE
Pharmacy Vancomycin Note  Date of Service 2023  Patient's  1945   77 year old, female    Indication: Abscess and Postoperative Infection  Day of Therapy: 2  Current vancomycin regimen:  1000 mg IV q12h  Current vancomycin monitoring method: AUC  Current vancomycin therapeutic monitoring goal: 400-600 mg*h/L    [employer] Prediction of Current Vancomycin Regimen  Loading dose: 2000 mg on 3/28/23 at 01:40  Regimen: 1000 mg IV every 12 hours.  Start time: 10:00 on 2023  Exposure target: AUC24 (range)400-600 mg/L.hr   AUC24,ss: 534 mg/L.hr  Probability of AUC24 > 400: 79 %  Ctrough,ss: 17.6 mg/L  Probability of Ctrough,ss > 20: 39 %  Probability of nephrotoxicity (Lodise GERMAN ): 13 %    Current estimated CrCl = Estimated Creatinine Clearance: 79.9 mL/min (based on SCr of 0.68 mg/dL).    Creatinine for last 3 days  3/27/2023: 10:02 PM Creatinine 0.75 mg/dL  3/28/2023:  6:28 AM Creatinine 0.68 mg/dL    Recent Vancomycin Levels (past 3 days)  3/29/2023:  7:43 AM Vancomycin 15.4 ug/mL    Vancomycin IV Administrations (past 72 hours)                   vancomycin (VANCOCIN) 1000 mg in dextrose 5% 200 mL PREMIX (mg) 1,000 mg New Bag 23 1010     1,000 mg New Bag 23 2304     1,000 mg New Bag  1150    vancomycin (VANCOCIN) 2,000 mg in sodium chloride 0.9 % 500 mL intermittent infusion (mg) 2,000 mg New Bag 23 0140                Nephrotoxins and other renal medications (From now, onward)    Start     Dose/Rate Route Frequency Ordered Stop    23 1100  vancomycin (VANCOCIN) 1000 mg in dextrose 5% 200 mL PREMIX         1,000 mg  200 mL/hr over 1 Hours Intravenous EVERY 12 HOURS 23 0340               Contrast Orders - past 72 hours (72h ago, onward)    Start     Dose/Rate Route Frequency Stop    23 2330  iopamidol (ISOVUE-370) solution 100 mL         100 mL Intravenous ONCE 23 2331          Interpretation of levels and current regimen:  Vancomycin level is  reflective of -600    Has serum creatinine changed greater than 50% in last 72 hours: No    Urine output:  diminished urine output    Renal Function: Stable    [employer] Prediction of Planned New Vancomycin Regimen  Regimen: 1000 mg IV every 12 hours.  Start time: 22:10 on 03/29/2023  Exposure target: AUC24 (range)400-600 mg/L.hr   AUC24,ss: 474 mg/L.hr  Probability of AUC24 > 400: 79 %  Ctrough,ss: 15.3 mg/L  Probability of Ctrough,ss > 20: 19 %  Probability of nephrotoxicity (Lodise GERMAN 2009): 11 %      Plan:  1. Continue Current Dose  2. Vancomycin monitoring method: AUC  3. Vancomycin therapeutic monitoring goal: 400-600 mg*h/L  4. Pharmacy will check vancomycin levels as appropriate in 1-3 Days.  5. Serum creatinine levels will be ordered daily for the first week of therapy and at least twice weekly for subsequent weeks.    MARIELA BUCKLEY, RPH

## 2023-03-29 NOTE — PLAN OF CARE
Goal Outcome Evaluation:    Problem: Plan of Care - These are the overarching goals to be used throughout the patient stay.    Goal: Optimal Comfort and Wellbeing  Outcome: Progressing     Problem: Pain Acute  Goal: Optimal Pain Control and Function  Outcome: Progressing  Pt A&Ox4, made needs known able to sleep uninterrupted most of shift w/ even unlabored respirations. DAVIS drain w/ 40ml output over 12 hour NOC shift, not including amount that was irrigated. Drsg needing to be changed multiple times due to pt pulling at tape and drsging. IV abx as ordered, NS infusing NOC, VSS, 2L NC to keep O2 mid 90s while asleep. UP A1 walker to bathroom to void, 2 bms NOC.

## 2023-03-30 ENCOUNTER — APPOINTMENT (OUTPATIENT)
Dept: PHYSICAL THERAPY | Facility: HOSPITAL | Age: 78
DRG: 862 | End: 2023-03-30
Payer: COMMERCIAL

## 2023-03-30 LAB
CREAT SERPL-MCNC: 0.49 MG/DL (ref 0.51–0.95)
GFR SERPL CREATININE-BSD FRML MDRD: >90 ML/MIN/1.73M2
GLUCOSE BLDC GLUCOMTR-MCNC: 116 MG/DL (ref 70–99)
GLUCOSE BLDC GLUCOMTR-MCNC: 122 MG/DL (ref 70–99)
GLUCOSE BLDC GLUCOMTR-MCNC: 125 MG/DL (ref 70–99)
GLUCOSE BLDC GLUCOMTR-MCNC: 204 MG/DL (ref 70–99)

## 2023-03-30 PROCEDURE — 250N000013 HC RX MED GY IP 250 OP 250 PS 637: Performed by: INTERNAL MEDICINE

## 2023-03-30 PROCEDURE — 82565 ASSAY OF CREATININE: CPT | Performed by: INTERNAL MEDICINE

## 2023-03-30 PROCEDURE — 97116 GAIT TRAINING THERAPY: CPT | Mod: GP

## 2023-03-30 PROCEDURE — 250N000013 HC RX MED GY IP 250 OP 250 PS 637: Performed by: PHYSICIAN ASSISTANT

## 2023-03-30 PROCEDURE — 99222 1ST HOSP IP/OBS MODERATE 55: CPT | Performed by: INTERNAL MEDICINE

## 2023-03-30 PROCEDURE — 97110 THERAPEUTIC EXERCISES: CPT | Mod: GP

## 2023-03-30 PROCEDURE — 250N000011 HC RX IP 250 OP 636: Performed by: INTERNAL MEDICINE

## 2023-03-30 PROCEDURE — 99232 SBSQ HOSP IP/OBS MODERATE 35: CPT | Performed by: INTERNAL MEDICINE

## 2023-03-30 PROCEDURE — 36415 COLL VENOUS BLD VENIPUNCTURE: CPT | Performed by: INTERNAL MEDICINE

## 2023-03-30 PROCEDURE — 99024 POSTOP FOLLOW-UP VISIT: CPT | Performed by: PHYSICIAN ASSISTANT

## 2023-03-30 PROCEDURE — 120N000001 HC R&B MED SURG/OB

## 2023-03-30 RX ORDER — FAMOTIDINE 20 MG/1
20 TABLET, FILM COATED ORAL 2 TIMES DAILY
Status: DISCONTINUED | OUTPATIENT
Start: 2023-03-30 | End: 2023-04-05 | Stop reason: HOSPADM

## 2023-03-30 RX ORDER — VENLAFAXINE HYDROCHLORIDE 75 MG/1
150 CAPSULE, EXTENDED RELEASE ORAL
Status: DISCONTINUED | OUTPATIENT
Start: 2023-03-31 | End: 2023-04-05 | Stop reason: HOSPADM

## 2023-03-30 RX ORDER — OXYCODONE HYDROCHLORIDE 5 MG/1
5 TABLET ORAL EVERY 4 HOURS PRN
Status: DISCONTINUED | OUTPATIENT
Start: 2023-03-30 | End: 2023-04-03

## 2023-03-30 RX ORDER — AMLODIPINE BESYLATE 2.5 MG/1
2.5 TABLET ORAL DAILY
Status: DISCONTINUED | OUTPATIENT
Start: 2023-03-30 | End: 2023-04-05 | Stop reason: HOSPADM

## 2023-03-30 RX ORDER — ROSUVASTATIN CALCIUM 10 MG/1
20 TABLET, COATED ORAL AT BEDTIME
Status: DISCONTINUED | OUTPATIENT
Start: 2023-03-30 | End: 2023-04-05 | Stop reason: HOSPADM

## 2023-03-30 RX ORDER — OXYBUTYNIN CHLORIDE 10 MG/1
20 TABLET, EXTENDED RELEASE ORAL DAILY
Status: DISCONTINUED | OUTPATIENT
Start: 2023-03-30 | End: 2023-04-03

## 2023-03-30 RX ADMIN — FAMOTIDINE 20 MG: 10 INJECTION, SOLUTION INTRAVENOUS at 08:15

## 2023-03-30 RX ADMIN — AMLODIPINE BESYLATE 2.5 MG: 2.5 TABLET ORAL at 17:22

## 2023-03-30 RX ADMIN — ROSUVASTATIN CALCIUM 20 MG: 10 TABLET, FILM COATED ORAL at 20:11

## 2023-03-30 RX ADMIN — INSULIN ASPART 2 UNITS: 100 INJECTION, SOLUTION INTRAVENOUS; SUBCUTANEOUS at 12:38

## 2023-03-30 RX ADMIN — VANCOMYCIN HYDROCHLORIDE 1000 MG: 1 INJECTION, SOLUTION INTRAVENOUS at 11:00

## 2023-03-30 RX ADMIN — VANCOMYCIN HYDROCHLORIDE 1000 MG: 1 INJECTION, SOLUTION INTRAVENOUS at 23:07

## 2023-03-30 RX ADMIN — FAMOTIDINE 20 MG: 20 TABLET ORAL at 20:11

## 2023-03-30 RX ADMIN — MEROPENEM 1 G: 1 INJECTION, POWDER, FOR SOLUTION INTRAVENOUS at 08:15

## 2023-03-30 RX ADMIN — SENNOSIDES AND DOCUSATE SODIUM 1 TABLET: 50; 8.6 TABLET ORAL at 23:07

## 2023-03-30 RX ADMIN — MEROPENEM 1 G: 1 INJECTION, POWDER, FOR SOLUTION INTRAVENOUS at 17:24

## 2023-03-30 RX ADMIN — MEROPENEM 1 G: 1 INJECTION, POWDER, FOR SOLUTION INTRAVENOUS at 01:17

## 2023-03-30 RX ADMIN — HYDROMORPHONE HYDROCHLORIDE 0.5 MG: 1 INJECTION, SOLUTION INTRAMUSCULAR; INTRAVENOUS; SUBCUTANEOUS at 09:53

## 2023-03-30 RX ADMIN — OXYCODONE HYDROCHLORIDE 5 MG: 5 TABLET ORAL at 20:10

## 2023-03-30 RX ADMIN — POLYETHYLENE GLYCOL 3350 17 G: 17 POWDER, FOR SOLUTION ORAL at 08:15

## 2023-03-30 RX ADMIN — OXYBUTYNIN CHLORIDE 20 MG: 10 TABLET, EXTENDED RELEASE ORAL at 20:10

## 2023-03-30 ASSESSMENT — ACTIVITIES OF DAILY LIVING (ADL)
ADLS_ACUITY_SCORE: 22
ADLS_ACUITY_SCORE: 20
ADLS_ACUITY_SCORE: 22
ADLS_ACUITY_SCORE: 22
ADLS_ACUITY_SCORE: 26
ADLS_ACUITY_SCORE: 23
ADLS_ACUITY_SCORE: 23
ADLS_ACUITY_SCORE: 26
ADLS_ACUITY_SCORE: 20
ADLS_ACUITY_SCORE: 22
ADLS_ACUITY_SCORE: 20
ADLS_ACUITY_SCORE: 23

## 2023-03-30 NOTE — PLAN OF CARE
Problem: Hyperglycemia  Goal: Blood Glucose Level Within Targeted Range  Outcome: Progressing     Problem: Pain Acute  Goal: Optimal Pain Control and Function  Outcome: Progressing  Intervention: Develop Pain Management Plan  Recent Flowsheet Documentation  Taken 3/30/2023 0953 by Josette Forde, RN  Pain Management Interventions: medication (see MAR)  Intervention: Prevent or Manage Pain  Recent Flowsheet Documentation  Taken 3/30/2023 1100 by Josette Forde RN  Sensory Stimulation Regulation:   care clustered   television on  Medication Review/Management: medications reviewed   Goal Outcome Evaluation:    Pt ambulated to the bathroom with walker and 1 assist.  After return from bathroom, pt was moaning and grimacing.  Gave prn Dilaudid 0.5 mg.  DAVIS drain site is slightly redden.  Informed MD.  New dressing applied to DAVIS site.  Pt has good appetite,  eating 100% of meals.  Cont ivf was ok'ed ebonie per MD.

## 2023-03-30 NOTE — PROGRESS NOTES
[facility]    Medicine Progress Note - Hospitalist Service    Date of Admission:  3/27/2023    Assessment & Plan   Suzy Gómez is a 77 year old female with medical history of hypertension, dyslipidemia, type 2 diabetes mellitus, recent colonic perforation requiring sigmoidectomy complicated by feculent peritonitis and sepsis and ICU admission, admitted on 3/27/2023 with constipation and abdominal pain.  ED work-up showed leukocytosis and CT findings of 13.1 cm abscess located along the left paracolic gutter and iliac fossa.    1.  Intra-abdominal abscess/postop infection.              -s/p drain placement with return of 80 ml purulent fluid on 3/28, Cx with 3+ lactose nonfermenting GNB            -Meropenem/Vancomycin, tailor antibiotics based on final Cx results, appreciate ID recs            -continue diet as tolerated (switch to diabetic)             -increase activity, PT/OT    2.   H/o perforated stercolic ulcer of sigmoid colon s/p sigmoidectomy with anastomosis and washout 1/29.  Treated with IV antibiotics.      3.  DM 2 non insulin dependent.  Hold PTA meds, will use ISS as needed.  Consider adding Lantus is BG consistently >180    4.  HTN. Resume amlodipine, consider increasing dose    5.  HLD on statin    6.  Hypothyroidism on Levothyroxine    7.  Acute encephalopathy.  Per  she has never returned to baseline since hospitalization in January.  Supportive management    8.  Obesity Body mass index is 33.61 kg/m .         Diet: Combination Diet Moderate Consistent Carb (60 g CHO per Meal) Diet    DVT Prophylaxis: Enoxaparin (Lovenox) SQ  Box Catheter: Not present  Lines: None     Cardiac Monitoring: None  Code Status: Full Code      Clinically Significant Risk Factors              # Hypoalbuminemia: Lowest albumin = 3.2 g/dL at 3/28/2023  6:28 AM, will monitor as appropriate          # DMII: A1C = 8.1 % (Ref range: <5.7 %) within past 6 months, PRESENT ON ADMISSION  #  "Obesity: Estimated body mass index is 33.61 kg/m  as calculated from the following:    Height as of this encounter: 1.651 m (5' 5\").    Weight as of this encounter: 91.6 kg (202 lb)., PRESENT ON ADMISSION         Disposition Plan     Expected Discharge Date: 04/03/2023                  Enedelia Oden MD  Hospitalist Service  [facility]  Securely message with [employer] (more info)  Text page via [employer] Paging/Directory   ______________________________________________________________________    Interval History   Patient reports increase in pain around the DAVIS drain after walking to the bathroom.    No nausea, good oral intake.   Had BM this morning.     Physical Exam   Vital Signs: Temp: 97.6  F (36.4  C) Temp src: Oral BP: (!) 190/76 Pulse: 76   Resp: 18 SpO2: 93 % O2 Device: None (Room air)    Weight: 202 lbs 0 oz    Gen: NAD, sitting in a recliner  CV: RRR. Normal S1S2  Lungs: Clear  Abdomen: Soft, nondistended, DAVIS drain in the LLQ with mild surrounding erythema.  BS present   Extremities: 1+ left LE edema     Medical Decision Making       40 MINUTES SPENT BY ME on the date of service doing chart review, history, exam, documentation & further activities per the note.  MANAGEMENT DISCUSSED with the following over the past 24 hours: patient, RN, updated  Awais over the phone        Data     I have personally reviewed the following data over the past 24 hrs:    N/A  \   N/A   / N/A     N/A N/A N/A /  204 (H)   N/A N/A 0.49 (L) \       Imaging results reviewed over the past 24 hrs:   No results found for this or any previous visit (from the past 24 hour(s)).  "

## 2023-03-30 NOTE — PLAN OF CARE
"Goal Outcome Evaluation:             VSS. DAVIS irrigated. Pt voiding.  Confusion reported on evening shift and overnight.  IV Fluids.  IV Antibx's.  Care plan reviewed.   Problem: Plan of Care - These are the overarching goals to be used throughout the patient stay.    Goal: Plan of Care Review  Description: The Plan of Care Review/Shift note should be completed every shift.  The Outcome Evaluation is a brief statement about your assessment that the patient is improving, declining, or no change.  This information will be displayed automatically on your shift note.  Outcome: Progressing  Goal: Patient-Specific Goal (Individualized)  Description: You can add care plan individualizations to a care plan. Examples of Individualization might be:  \"Parent requests to be called daily at 9am for status\", \"I have a hard time hearing out of my right ear\", or \"Do not touch me to wake me up as it startles me\".  Outcome: Progressing  Goal: Absence of Hospital-Acquired Illness or Injury  Outcome: Progressing  Intervention: Identify and Manage Fall Risk  Recent Flowsheet Documentation  Taken 3/30/2023 0100 by Janet Casey RN  Safety Promotion/Fall Prevention:   activity supervised   bed alarm on  Intervention: Prevent Skin Injury  Recent Flowsheet Documentation  Taken 3/30/2023 0100 by Janet Casey RN  Body Position: position changed independently  Goal: Optimal Comfort and Wellbeing  Outcome: Progressing  Goal: Readiness for Transition of Care  Outcome: Progressing     Problem: Risk for Delirium  Goal: Optimal Coping  Outcome: Progressing  Goal: Improved Behavioral Control  Outcome: Progressing  Goal: Improved Attention and Thought Clarity  Outcome: Progressing  Intervention: Maximize Cognitive Function  Recent Flowsheet Documentation  Taken 3/30/2023 0100 by Janet Casey RN  Reorientation Measures:   calendar in view   clock in view   reorientation provided  Goal: Improved Sleep  Outcome: " Progressing     Problem: Hyperglycemia  Goal: Blood Glucose Level Within Targeted Range  Outcome: Progressing     Problem: Hypertension Acute  Goal: Blood Pressure Within Desired Range  Outcome: Progressing     Problem: Pain Acute  Goal: Optimal Pain Control and Function  Outcome: Progressing

## 2023-03-30 NOTE — CONSULTS
Consultation - Infectious Disease  Suzy Gómez,  1945, MRN [id number]    Admitting Dx: Intra-abdominal abscess post-procedure [T81.43XA]    PCP: Ko, Carie Casar, [phone]   Code status:  Full Code       Extended Emergency Contact Information  Primary Emergency Contact: Awais Garcia  Address: [address] [place] [address]  Home Phone: [phone]  Mobile Phone: [phone]  Relation: Spouse  Secondary Emergency Contact: Sheba Garcia   [facility]  Home Phone: [phone]  Mobile Phone: [phone]  Relation: Daughter       ASSESSMENT   1. Intra-abdominal abscess. 13 x 7 x 4 cm abscess seen on CT 3/27/23. Drain placed 3/28, 80cc pus removed. Culture prelim non-fermenting GNR.   2. Presented in January with perforated stercolic ulcer of sigmoid colon, underwent laparoscopic sigmoid colectomy with anastomosis, washout . Findings of diffuse feculent peritonitis. Had bacteremia with clostridium species, Collinsella aerofaciens, bacteroides vulgatus, and Eubacterium spp at the time. Treated with IV antibiotics post-op, completed with meropenem (-), micafungin -), vancomycin (-). CT 23 showed no drainable fluid. With feculent peritonitis, there is significant risk for subsequent abscess despite washout, IV antibiotics post-op.    3. DM  4. Previously treated for L great toe infection with antibiotics. MRI 2022 without osteomyelitis.   5. [facility]  6. Cholelithiasis   7. Penicillin/augmentin intolerance.       Principal Problem:    Intra-abdominal abscess post-procedure       PLAN   Meropenem for gram negative  Stop vancomycin soon if no GP resistant to meropenem. Monitor creatinine daily while on this for now.   Will need serial CT/abscessogram to monitor abscess, duration of antibiotics will depend on resolution of abscess on imaging.     Drew Trinh MD  [facility]  [phone]  "clinic  [employer] paging  ______________________________________________________________________        Reason For Consult: Intra-abdominal abscess post-procedure       HPI    We have been requested by Dr. Oden to evaluate Suzy Gómez who is a 77 year old year old female for the above.     She is known to us from hospital stay in February -- our last note from 2/15/23    \"ASSESSMENT:  1. Intra-abdominal infection presenting with perforated stercolic ulcer of sigmoid colon, underwent laparoscopic sigmoid colectomy with anastomosis, washout 1/29. Findings of diffuse feculent peritonitis. Ongoing pain and leukocytosis. Draining clear fluid from wound -- creatinine level not suggestive of urine leak based on estimate. Bowel function returning to normal, but wbc remains high and increasing. No improvement with change in antibiotics. Repeat CT without significant findings for infection.  Leukocytosis resolved and fever improving since addition of vancomycin.  2. Bacteremia with clostridium species, Collinsella aerofaciens, bacteroides vulgatus, and Eubacterium spp. Likely secondary to perforation. Susceptible to meropenem and metronidazole   3. DM  4. Hypothyroidism. Mildly elevated TSH prior to admission, on treatment  5. Left great toe infection for many months. Has seen podiatry and primary clinic for this. Recent doxycycline. Had MRI negative for osteomyelitis in September. 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   7. Cholelithiasis        PLAN:  -Continue vancomycin, meropenem and micafungin through tomorrow. Orders updated  -no PICC line since patient may finish antibiotics before discharge\"    Now presented 3/27/23 with worsening lower abdominal pain for 1 week associated with constipation. CT shows 13 x 7 x 4 cm abscess L paracolic gutter. On 3/28 she underwent placement of percutaneous drain by radiology, yielded 80cc of thick pus. Culture " non-fermenting GNR so far.     She is very fatigued. Pain in abdomen. No fever. Tolerating antibiotics.  Reviewed results, disease process, plan.     L great toe still bothers here -- pain, discoloration.        Medical History  Past Medical History:   Diagnosis Date     Dyslipidemia      Heart disease      Hypertension      KARYN (obstructive sleep apnea)      Thyroid disease      Type 2 diabetes mellitus (H)     Surgical History  She  has a past surgical history that includes Laparoscopic assisted colectomy (N/A, 1/29/2023).   Social History  Reviewed, and she  reports that she quit smoking about 13 years ago. Her smoking use included cigarettes. She has never used smokeless tobacco. She reports that she does not currently use alcohol. She reports that she does not use drugs.   Allergies  Allergies   Allergen Reactions     Penicillins Nausea and Vomiting and GI Disturbance     Atorvastatin Muscle Pain (Myalgia)     Augmentin Nausea and Vomiting and Diarrhea     Bupropion Other (See Comments)     Mood swings     Exenatide Rash     Nitrofurantoin Hives and Swelling    Family History  family history not pertinent to presenting problem.    Psychosocial Needs  Social History     Social History Narrative     Not on file     Additional psychosocial needs reviewed per nursing assessment.       Immunization History   Administered Date(s) Administered     COVID-19 Vaccine 12+ (Pfizer) 03/25/2021, 04/15/2021, 10/29/2021     COVID-19 Vaccine 18+ (Moderna) 04/05/2022     COVID-19 Vaccine Bivalent Booster 12+ (Pfizer) 09/28/2022     FLUAD(HD)65+ QUAD 09/09/2020     Flu 65+ Years 12/04/2018, 10/23/2019     Hepatitis B, Adult 01/30/2017     Influenza (High Dose) 3 valent vaccine 11/23/2015, 10/27/2016     Influenza (IIV3) PF 11/01/2006, 01/13/2014     Influenza Vaccine 65+ (Fluzone HD) 09/23/2021, 09/28/2022, 12/01/2022     Influenza Vaccine >6 months (Alfuria,Fluzone) 11/14/2014     Influenza Vaccine, 6+MO IM (QUADRIVALENT  W/PRESERVATIVES) 10/07/2017     Pneumo Conj 13-V (2010&after) 11/23/2015     Pneumococcal 23 valent 01/01/2006, 01/30/2017, 01/30/2017     TD,PF 7+ (Tenivac) 08/10/2017     Zoster recombinant adjuvanted (SHINGRIX) 10/02/2020, 01/19/2021     Zoster vaccine, live 12/04/2012        Prior to Admission Medications   Medications Prior to Admission   Medication Sig Dispense Refill Last Dose     acetaminophen (TYLENOL) 325 MG tablet Take 650 mg by mouth 3 times daily   3/27/2023     amLODIPine (NORVASC) 2.5 MG tablet Take 2.5 mg by mouth daily   3/27/2023     docusate sodium (COLACE) 100 MG capsule Take 1 capsule (100 mg) by mouth 2 times daily 30 capsule 0 3/27/2023     empagliflozin (JARDIANCE) 25 MG TABS tablet Take 25 mg by mouth daily   3/27/2023     levothyroxine (SYNTHROID/LEVOTHROID) 137 MCG tablet Take 137 mcg by mouth daily before breakfast   3/27/2023     linagliptin (TRADJENTA) 5 MG TABS tablet Take 5 mg by mouth daily   3/27/2023     metFORMIN (GLUCOPHAGE XR) 500 MG 24 hr tablet Take 1,000 mg by mouth 2 times daily (with meals)   3/27/2023     oxybutynin ER (DITROPAN XL) 10 MG 24 hr tablet Take 20 mg by mouth daily   3/27/2023     rosuvastatin (CRESTOR) 20 MG tablet Take 20 mg by mouth At Bedtime   3/27/2023     simethicone (MYLICON) 80 MG chewable tablet Take 80 mg by mouth 2 times daily as needed   Past Week     venlafaxine (EFFEXOR XR) 150 MG 24 hr capsule Take 1 capsule (150 mg) by mouth daily (with breakfast) 30 capsule 0 3/27/2023          Anti-infectives: meropenem 3/28-  Vancomycin 3/28-  Cultures: 3/28 blood negative to date  3/28 abscess culture non-fermenting GNR  Imaging: reviewed images          Review of Systems:  All other systems negative in detail except what is noted above. Physical Exam:  Temp:  [97.6  F (36.4  C)-98.6  F (37  C)] 97.6  F (36.4  C)  Pulse:  [72-80] 76  Resp:  [16-18] 18  BP: (118-190)/(56-76) 190/76  SpO2:  [90 %-96 %] 93 %    GENERAL:  In no acute distress, is alert and  oriented to person, place, time. Up in chair.   EYES: No conjunctival injection, pupils equally reactive to light, extra-ocular movement intact  HEAD, EARS, NOSE, MOUTH, AND THROAT: Nontraumatic, mouth without oral ulcers.   RESPIRATORY: Clear breath sounds to auscultation bilaterally.   CARDIOVASCULAR: Regular rate and rhythm, normal S1 and S2, no murmurs, rubs, or gallops.  ABDOMEN: Soft, tender left side, DAVIS in place with little drainage in bulb.  Normal bowel sounds.  MUSCULOSKELETAL: No synovitis.  SKIN/HAIR/NAILS: No rashes, no signs of peripheral emboli.  NEUROLOGIC: Grossly intact.       Pertinent Labs         Recent Labs   Lab 03/28/23  0628 03/27/23  2202   * 134*   CO2 25 25   BUN 17.1 19.5       Lab Results   Component Value Date    ALT 9 (L) 03/28/2023    AST 20 03/28/2023    ALKPHOS 86 03/28/2023          Pertinent Radiology  Radiology Results: Reviewed  CT Retroperitoneal Abscess Drainage    Result Date: 3/28/2023  EXAM: 1. PERCUTANEOUS DRAIN PLACEMENT LEFT LOWER ABDOMEN ABSCESS 2. CT GUIDANCE 3. CONSCIOUS SEDATION LOCATION: [facility] DATE/TIME: 3/28/2023 9:48 AM INDICATION: Left pericolic gutter abscess in the abdomen. TECHNIQUE: Dose reduction techniques were used. PROCEDURE: Informed consent obtained. Site marked. Prior images reviewed. Required items made available. Patient identity confirmed verbally and with arm band. Patient reevaluated immediately before administering sedation. Universal protocol was followed. Time out performed. The site was prepped and draped in sterile fashion. 10 mL of 1% lidocaine was infused into the local soft tissues. Using standard technique and under direct CT guidance, a 10 [place] drainage catheter was inserted into the fluid collection. After initial aspiration the pocket was irrigated several times with sterile saline to clear. SPECIMEN: 80 mL of thick pus fluid was aspirated and sent to lab for cultures and Gram stain. BLOOD LOSS:  Minimal. The patient tolerated the procedure well. No immediate complications. SEDATION: Versed 1.0 mg. Fentanyl 50 mcg. The procedure was performed with administration intravenous conscious sedation with appropriate preoperative, intraoperative, and postoperative evaluation. 22 minutes of supervised face to face conscious sedation time was provided by a radiology nurse under my direct supervision.     IMPRESSION: 1.  Successful CT-guided drain placement into left pericolic gutter abdominal abscess. Reference CPT Codes: 95909, 28574, 35405    CT Abdomen Pelvis w Contrast    Result Date: 3/27/2023  EXAM: CT ABDOMEN PELVIS W CONTRAST LOCATION: [facility] DATE/TIME: 3/27/2023 11:41 PM INDICATION: h o colonic perf 1 29 s p colectomy, just d c'd from TCU, return of abd pain R sided. COMPARISON: Noncontrast CT from 03/27/2023. Contrast enhanced CT from 02/07/2023. TECHNIQUE: CT scan of the abdomen and pelvis was performed following injection of IV contrast. Multiplanar reformats were obtained. Dose reduction techniques were used. CONTRAST: ISOVUE 370 100ML FINDINGS: LOWER CHEST: Mild cardiomegaly. No basilar infiltrate. Fat-containing left Bochdalek hernia. HEPATOBILIARY: Cholelithiasis. Nondistended gallbladder. No focal liver lesion. The liver measures 21 cm craniocaudal. There is slight nodularity of the inferior liver surface contour which could reflect early fibrosis. PANCREAS: Normal. SPLEEN: Normal. ADRENAL GLANDS: Normal. KIDNEYS/BLADDER: Normal. BOWEL: Again noted is a fluid collection centered within the inferior reaches of the left paracolic gutter continuing into the left iliac fossa. There are locules of gas within this collection and there is some thin peripheral enhancement, consistent with abscess formation. Although measurement is challenging due to morphology of the collection, this measures at least 13.1 cm in length on coronal image 64, and 7.2 x 4.4 cm in the transverse and AP  dimensions on image 106 of series 3. A small amount of extension continues caudally into the left hemipelvis near to the level of the patient's colorectal anastomotic suture line as seen previously. There is a large amount of stool in the proximal colon. Small bowel caliber within normal limits. No free air. LYMPH NODES: Normal. VASCULATURE: Aortoiliac atherosclerotic calcification. PELVIC ORGANS: Absent uterus. MUSCULOSKELETAL: Advanced degenerative disc and facet changes of the lumbar spine with a minimal superior endplate compression deformity at L4. Arthritic change of the hips.     IMPRESSION: 1.  Findings consistent with a 13.1 cm abscess primarily situated along the left paracolic gutter and iliac fossa as described above.

## 2023-03-30 NOTE — PROGRESS NOTES
"General Surgery Progress Note:    Hospital Day # 2    ASSESSMENT:   1. Intra-abdominal abscess post-procedure        Suzy Góemz is a 77 year old female with intra-abdominal abscess status post sigmoid colectomy for perforated stercoral ulcer on 1/29/2023.    PLAN:   -ADAT  -Increase activity as tolerated  -Bowel regimen  -Appreciate ID input  -Drain per IR  -Would be ok with discharge once ABX plan in place    SUBJECTIVE:   Doing ok, pain tolerable, tolerating diet, passing gas and BM charted yesterday, no n/v    Patient Vitals for the past 24 hrs:   BP Temp Temp src Pulse Resp SpO2 Height   03/30/23 0717 (!) 190/76 97.6  F (36.4  C) Oral 76 18 93 % --   03/29/23 2357 (!) 140/63 98.1  F (36.7  C) Oral 72 17 90 % --   03/29/23 2010 (!) 145/65 -- -- 80 17 92 % --   03/29/23 1602 (!) 155/67 -- -- 75 -- -- --   03/29/23 1544 (!) 162/68 98.6  F (37  C) Oral 78 17 96 % --   03/29/23 1430 118/56 97.9  F (36.6  C) Oral 73 16 94 % 1.651 m (5' 5\")       Physical Exam:  General: NAD, pleasant  ABD: Soft, nondistended, mild ttp mid abdomen, drain serous       Admission on 03/27/2023   Component Date Value     Lactic Acid 03/27/2023 0.9      Sodium 03/27/2023 134 (L)      Potassium 03/27/2023 4.4      Chloride 03/27/2023 97 (L)      Carbon Dioxide (CO2) 03/27/2023 25      Anion Gap 03/27/2023 12      Urea Nitrogen 03/27/2023 19.5      Creatinine 03/27/2023 0.75      Calcium 03/27/2023 10.0      Glucose 03/27/2023 157 (H)      Alkaline Phosphatase 03/27/2023 96      AST 03/27/2023 22      ALT 03/27/2023 8 (L)      Protein Total 03/27/2023 7.1      Albumin 03/27/2023 3.5      Bilirubin Total 03/27/2023 0.2      GFR Estimate 03/27/2023 83      Lipase 03/27/2023 33      WBC Count 03/27/2023 11.6 (H)      RBC Count 03/27/2023 3.45 (L)      Hemoglobin 03/27/2023 10.0 (L)      Hematocrit 03/27/2023 33.0 (L)      MCV 03/27/2023 96      MCH 03/27/2023 29.0      MCHC 03/27/2023 30.3 (L)      RDW 03/27/2023 17.3 (H)      Platelet Count " 03/27/2023 379      % Neutrophils 03/27/2023 79      % Lymphocytes 03/27/2023 11      % Monocytes 03/27/2023 10      % Eosinophils 03/27/2023 0      % Basophils 03/27/2023 0      % Immature Granulocytes 03/27/2023 0      NRBCs per 100 WBC 03/27/2023 0      Absolute Neutrophils 03/27/2023 9.2 (H)      Absolute Lymphocytes 03/27/2023 1.2      Absolute Monocytes 03/27/2023 1.1      Absolute Eosinophils 03/27/2023 0.0      Absolute Basophils 03/27/2023 0.1      Absolute Immature Granul* 03/27/2023 0.0      Absolute NRBCs 03/27/2023 0.0      SARS CoV2 PCR 03/28/2023 Negative      Culture 03/28/2023 No growth after 1 day      Culture 03/28/2023 No growth after 1 day      INR 03/28/2023 1.15      aPTT 03/28/2023 28      Sodium 03/28/2023 135 (L)      Potassium 03/28/2023 4.1      Chloride 03/28/2023 102      Carbon Dioxide (CO2) 03/28/2023 25      Anion Gap 03/28/2023 8      Urea Nitrogen 03/28/2023 17.1      Creatinine 03/28/2023 0.68      Calcium 03/28/2023 9.1      Glucose 03/28/2023 112 (H)      Alkaline Phosphatase 03/28/2023 86      AST 03/28/2023 20      ALT 03/28/2023 9 (L)      Protein Total 03/28/2023 6.6      Albumin 03/28/2023 3.2 (L)      Bilirubin Total 03/28/2023 0.2      GFR Estimate 03/28/2023 90      WBC Count 03/28/2023 8.6      RBC Count 03/28/2023 3.39 (L)      Hemoglobin 03/28/2023 9.8 (L)      Hematocrit 03/28/2023 32.7 (L)      MCV 03/28/2023 97      MCH 03/28/2023 28.9      MCHC 03/28/2023 30.0 (L)      RDW 03/28/2023 17.2 (H)      Platelet Count 03/28/2023 326      % Neutrophils 03/28/2023 78      % Lymphocytes 03/28/2023 10      % Monocytes 03/28/2023 11      % Eosinophils 03/28/2023 1      % Basophils 03/28/2023 0      % Immature Granulocytes 03/28/2023 0      NRBCs per 100 WBC 03/28/2023 0      Absolute Neutrophils 03/28/2023 6.8      Absolute Lymphocytes 03/28/2023 0.9      Absolute Monocytes 03/28/2023 0.9      Absolute Eosinophils 03/28/2023 0.1      Absolute Basophils 03/28/2023 0.0       Absolute Immature Granul* 03/28/2023 0.0      Absolute NRBCs 03/28/2023 0.0      Culture 03/28/2023 Culture in progress      Culture 03/28/2023 3+ Non lactose fermenting gram negative bacilli (A)      GLUCOSE BY METER POCT 03/28/2023 101 (H)      GLUCOSE BY METER POCT 03/28/2023 124 (H)      GLUCOSE BY METER POCT 03/28/2023 92      Vancomycin 03/29/2023 15.4      GLUCOSE BY METER POCT 03/29/2023 103 (H)      Hold Specimen 03/29/2023 [facility]         Willie Ware PA-C

## 2023-03-30 NOTE — PLAN OF CARE
Problem: Pain Acute  Goal: Optimal Pain Control and Function  Outcome: Not Progressing  Intervention: Develop Pain Management Plan  Recent Flowsheet Documentation  Taken 3/29/2023 2113 by Luna Mac RN  Pain Management Interventions: medication (see MAR)  Taken 3/29/2023 1550 by Luna Mac, RN  Pain Management Interventions: (secured tubing with tape) other (see comments)  Intervention: Prevent or Manage Pain  Recent Flowsheet Documentation  Taken 3/29/2023 1550 by Luna Mac RN  Sensory Stimulation Regulation:   care clustered   television on  Medication Review/Management: medications reviewed     Problem: Risk for Delirium  Goal: Improved Sleep  Outcome: Not Progressing     Client surgical site became red and started causing pain with movement, resolved minimally with tylenol offered dilaudid client declined. Client is complaining of trouble sleeping offered some suggestions client declined.

## 2023-03-30 NOTE — PROGRESS NOTES
Care Management Follow Up    Length of Stay (days): 2    Expected Discharge Date: 04/03/2023     Concerns to be Addressed:  DAVIS drain, IV abx  Patient plan of care discussed at interdisciplinary rounds: Yes    Anticipated Discharge Disposition:  Home with home care     Anticipated Discharge Services:   PT, SN?, possible IV abx  Anticipated Discharge DME:  unknown    Patient/family educated on Medicare website which has current facility and service quality ratings:  yes  Education Provided on the Discharge Plan:  yes  Patient/Family in Agreement with the Plan:  yes    Referrals Placed by CM/SW:  TBD  Private pay costs discussed: TBD    Additional Information:  Pt is a 77 year old  female with a past medical history of colon adenoma, GERD, HTN, osteoarthritis, SM II perforation of colonic steri colonic ulcer s/p colectomy, KARYN who was admitted from  in [place]  03/28/23 with abdominal pain. And found to have an intra-abdominal abscess.     She does not have a health care directive and does not have a designated health care agent.  Her primary contact is her  Awais    Pt had a DAVIS drain placed on 03/28 and has started on IV abx.  PT saw her on 03/28 and recommends home with assist.  Pt continues to have the DAVIS at this time and ID was consulted today for recommendations for possible home IV antibiotics.  CM will coninue to follow.     Shannon Watson RN

## 2023-03-31 ENCOUNTER — APPOINTMENT (OUTPATIENT)
Dept: PHYSICAL THERAPY | Facility: HOSPITAL | Age: 78
DRG: 862 | End: 2023-03-31
Payer: COMMERCIAL

## 2023-03-31 LAB
ANION GAP SERPL CALCULATED.3IONS-SCNC: 8 MMOL/L (ref 7–15)
BUN SERPL-MCNC: 8.2 MG/DL (ref 8–23)
CALCIUM SERPL-MCNC: 9.7 MG/DL (ref 8.8–10.2)
CHLORIDE SERPL-SCNC: 105 MMOL/L (ref 98–107)
CREAT SERPL-MCNC: 0.49 MG/DL (ref 0.51–0.95)
DEPRECATED HCO3 PLAS-SCNC: 26 MMOL/L (ref 22–29)
ERYTHROCYTE [DISTWIDTH] IN BLOOD BY AUTOMATED COUNT: 17 % (ref 10–15)
GFR SERPL CREATININE-BSD FRML MDRD: >90 ML/MIN/1.73M2
GLUCOSE BLDC GLUCOMTR-MCNC: 106 MG/DL (ref 70–99)
GLUCOSE BLDC GLUCOMTR-MCNC: 125 MG/DL (ref 70–99)
GLUCOSE BLDC GLUCOMTR-MCNC: 134 MG/DL (ref 70–99)
GLUCOSE BLDC GLUCOMTR-MCNC: 146 MG/DL (ref 70–99)
GLUCOSE SERPL-MCNC: 120 MG/DL (ref 70–99)
HCT VFR BLD AUTO: 33.2 % (ref 35–47)
HGB BLD-MCNC: 10 G/DL (ref 11.7–15.7)
MCH RBC QN AUTO: 28.7 PG (ref 26.5–33)
MCHC RBC AUTO-ENTMCNC: 30.1 G/DL (ref 31.5–36.5)
MCV RBC AUTO: 95 FL (ref 78–100)
PLATELET # BLD AUTO: 374 10E3/UL (ref 150–450)
POTASSIUM SERPL-SCNC: 3.8 MMOL/L (ref 3.4–5.3)
RBC # BLD AUTO: 3.49 10E6/UL (ref 3.8–5.2)
SODIUM SERPL-SCNC: 139 MMOL/L (ref 136–145)
WBC # BLD AUTO: 7.3 10E3/UL (ref 4–11)

## 2023-03-31 PROCEDURE — 250N000013 HC RX MED GY IP 250 OP 250 PS 637: Performed by: STUDENT IN AN ORGANIZED HEALTH CARE EDUCATION/TRAINING PROGRAM

## 2023-03-31 PROCEDURE — 97110 THERAPEUTIC EXERCISES: CPT | Mod: GP

## 2023-03-31 PROCEDURE — 99232 SBSQ HOSP IP/OBS MODERATE 35: CPT | Performed by: INTERNAL MEDICINE

## 2023-03-31 PROCEDURE — 250N000011 HC RX IP 250 OP 636: Performed by: INTERNAL MEDICINE

## 2023-03-31 PROCEDURE — 250N000013 HC RX MED GY IP 250 OP 250 PS 637: Performed by: INTERNAL MEDICINE

## 2023-03-31 PROCEDURE — 36415 COLL VENOUS BLD VENIPUNCTURE: CPT | Performed by: INTERNAL MEDICINE

## 2023-03-31 PROCEDURE — 82310 ASSAY OF CALCIUM: CPT | Performed by: INTERNAL MEDICINE

## 2023-03-31 PROCEDURE — 120N000001 HC R&B MED SURG/OB

## 2023-03-31 PROCEDURE — 85027 COMPLETE CBC AUTOMATED: CPT | Performed by: INTERNAL MEDICINE

## 2023-03-31 PROCEDURE — 250N000013 HC RX MED GY IP 250 OP 250 PS 637: Performed by: PHYSICIAN ASSISTANT

## 2023-03-31 PROCEDURE — 97116 GAIT TRAINING THERAPY: CPT | Mod: GP

## 2023-03-31 RX ORDER — BISACODYL 10 MG
10 SUPPOSITORY, RECTAL RECTAL DAILY PRN
Status: DISCONTINUED | OUTPATIENT
Start: 2023-03-31 | End: 2023-04-05 | Stop reason: HOSPADM

## 2023-03-31 RX ORDER — TRAZODONE HYDROCHLORIDE 50 MG/1
50 TABLET, FILM COATED ORAL ONCE
Status: COMPLETED | OUTPATIENT
Start: 2023-03-31 | End: 2023-03-31

## 2023-03-31 RX ORDER — PIPERACILLIN SODIUM, TAZOBACTAM SODIUM 3; .375 G/15ML; G/15ML
3.38 INJECTION, POWDER, LYOPHILIZED, FOR SOLUTION INTRAVENOUS EVERY 8 HOURS
Status: DISCONTINUED | OUTPATIENT
Start: 2023-03-31 | End: 2023-04-03

## 2023-03-31 RX ADMIN — MEROPENEM 1 G: 1 INJECTION, POWDER, FOR SOLUTION INTRAVENOUS at 00:57

## 2023-03-31 RX ADMIN — MEROPENEM 1 G: 1 INJECTION, POWDER, FOR SOLUTION INTRAVENOUS at 08:52

## 2023-03-31 RX ADMIN — OXYBUTYNIN CHLORIDE 20 MG: 10 TABLET, EXTENDED RELEASE ORAL at 08:52

## 2023-03-31 RX ADMIN — SENNOSIDES AND DOCUSATE SODIUM 1 TABLET: 50; 8.6 TABLET ORAL at 20:50

## 2023-03-31 RX ADMIN — VANCOMYCIN HYDROCHLORIDE 1000 MG: 1 INJECTION, SOLUTION INTRAVENOUS at 10:14

## 2023-03-31 RX ADMIN — AMLODIPINE BESYLATE 2.5 MG: 2.5 TABLET ORAL at 08:52

## 2023-03-31 RX ADMIN — Medication 1 MG: at 20:48

## 2023-03-31 RX ADMIN — LEVOTHYROXINE SODIUM 137 MCG: 0.11 TABLET ORAL at 06:23

## 2023-03-31 RX ADMIN — ROSUVASTATIN CALCIUM 20 MG: 10 TABLET, FILM COATED ORAL at 20:47

## 2023-03-31 RX ADMIN — PIPERACILLIN AND TAZOBACTAM 3.38 G: 3; .375 INJECTION, POWDER, LYOPHILIZED, FOR SOLUTION INTRAVENOUS at 17:43

## 2023-03-31 RX ADMIN — ACETAMINOPHEN 650 MG: 325 TABLET ORAL at 10:18

## 2023-03-31 RX ADMIN — OXYCODONE HYDROCHLORIDE 5 MG: 5 TABLET ORAL at 04:15

## 2023-03-31 RX ADMIN — OXYCODONE HYDROCHLORIDE 5 MG: 5 TABLET ORAL at 20:47

## 2023-03-31 RX ADMIN — MAGNESIUM HYDROXIDE 30 ML: 400 SUSPENSION ORAL at 11:57

## 2023-03-31 RX ADMIN — VENLAFAXINE HYDROCHLORIDE 150 MG: 75 CAPSULE, EXTENDED RELEASE ORAL at 08:52

## 2023-03-31 RX ADMIN — INSULIN ASPART 1 UNITS: 100 INJECTION, SOLUTION INTRAVENOUS; SUBCUTANEOUS at 17:46

## 2023-03-31 RX ADMIN — ONDANSETRON 4 MG: 2 INJECTION INTRAMUSCULAR; INTRAVENOUS at 11:57

## 2023-03-31 RX ADMIN — FAMOTIDINE 20 MG: 20 TABLET ORAL at 20:48

## 2023-03-31 RX ADMIN — FAMOTIDINE 20 MG: 20 TABLET ORAL at 08:52

## 2023-03-31 RX ADMIN — POLYETHYLENE GLYCOL 3350 17 G: 17 POWDER, FOR SOLUTION ORAL at 08:52

## 2023-03-31 RX ADMIN — TRAZODONE HYDROCHLORIDE 50 MG: 50 TABLET ORAL at 23:27

## 2023-03-31 ASSESSMENT — ACTIVITIES OF DAILY LIVING (ADL)
ADLS_ACUITY_SCORE: 26
ADLS_ACUITY_SCORE: 22
ADLS_ACUITY_SCORE: 26
ADLS_ACUITY_SCORE: 26

## 2023-03-31 NOTE — PLAN OF CARE
Goal Outcome Evaluation:      Plan of Care Reviewed With: patient      Problem: Plan of Care - These are the overarching goals to be used throughout the patient stay.    Goal: Absence of Hospital-Acquired Illness or Injury  Intervention: Identify and Manage Fall Risk  Recent Flowsheet Documentation  Taken 3/30/2023 1545 by Luna Mac RN  Safety Promotion/Fall Prevention: activity supervised     Problem: Plan of Care - These are the overarching goals to be used throughout the patient stay.    Goal: Optimal Comfort and Wellbeing  Outcome: Progressing  Intervention: Monitor Pain and Promote Comfort  Recent Flowsheet Documentation  Taken 3/30/2023 2102 by Luna Mac RN  Pain Management Interventions: medication (see MAR)  Taken 3/30/2023 1545 by Luna Mac RN  Pain Management Interventions: medication (see MAR)     Problem: Risk for Delirium  Goal: Improved Behavioral Control  Outcome: Progressing  Intervention: Prevent and Manage Agitation  Recent Flowsheet Documentation  Taken 3/30/2023 1545 by Luna Mac RN  Environment Familiarity/Consistency: daily routine followed  Intervention: Minimize Safety Risk  Recent Flowsheet Documentation  Taken 3/30/2023 1545 by Luna Mac RN  Enhanced Safety Measures: bed alarm set

## 2023-03-31 NOTE — PROGRESS NOTES
"INFECTIOUS DISEASE FOLLOW UP NOTE      ASSESSMENT: 1. Intra-abdominal abscess. 13 x 7 x 4 cm abscess seen on CT 3/27/23. Drain placed 3/28, 80cc pus removed. Culture prelim E coli.   2. Presented in January with perforated stercolic ulcer of sigmoid colon, underwent laparoscopic sigmoid colectomy with anastomosis, washout 1/29. Findings of diffuse feculent peritonitis. Had bacteremia with clostridium species, Collinsella aerofaciens, bacteroides vulgatus, and Eubacterium spp at the time. Treated with IV antibiotics post-op, completed with meropenem (2/5-16), micafungin 2/5-16), vancomycin (2/8-16). CT 2/7/23 showed no drainable fluid. With feculent peritonitis, there is significant risk for subsequent abscess despite washout, IV antibiotics post-op.    3. DM  4. Previously treated for L great toe infection with antibiotics. MRI sept 2022 without osteomyelitis.   5. Rastafari  6. Cholelithiasis   7. Penicillin/augmentin intolerance. Tolerated pip/tazo last hospital stay.     PLAN:  Change meropenem to zosyn  Stop vancomycin  Will need serial CT/abscessogram to monitor abscess, duration of antibiotics will depend on resolution of abscess on imaging. generally continue antibiotics while drain is in, then for another 5-7 days beyond drain removal.   If culture grows only E coli +/- anaerobes, could plan cipro plus flagyl PO, provided no major medication interactions.       Drew Trinh MD  [facility]  [phone] [facility] paging    ______________________________________________________________________    SUBJECTIVE / INTERVAL HISTORY: vomited shortly prior to my visit this morning. Abdominal pain ongoing. Temps ok. Discussed results, course, plan.     ROS: All other systems negative except as listed above.        OBJECTIVE:  BP (!) 148/65 (BP Location: Left arm)   Pulse 73   Temp 97.8  F (36.6  C) (Oral)   Resp 22   Ht 1.651 m (5' 5\")   Wt 91.6 kg (202 lb)   SpO2 94%   " BMI 33.61 kg/m         Vital Signs  Temp: 97.8  F (36.6  C)  Temp src: Oral  Resp: 22  Pulse: 73  Pulse Rate Source: Monitor  BP: (!) 148/65  BP Location: Left arm    Temp (24hrs), Av.8  F (36.6  C), Min:97.7  F (36.5  C), Max:97.8  F (36.6  C)      GEN: No acute distress. Needed me to repeat findings a few times.   RESPIRATORY:  Normal breathing pattern.   CARDIOVASCULAR:  Regular rate and rhythm.   ABDOMEN:  Soft, mildly tender. DAVIS with drainage   EXTREMITIES: No edema.  SKIN/HAIR/NAILS:  No rashes  IV: peripheral        Antibiotics:  meropenem 3/28-  Vancomycin 3/28-31    Pertinent labs:    Recent Labs   Lab 23  0551 23  0623  2202   WBC 7.3 8.6 11.6*   HGB 10.0* 9.8* 10.0*   HCT 33.2* 32.7* 33.0*    326 379        Recent Labs   Lab 23  0551 23  0628 23  2202    135* 134*   CO2 26 25 25   BUN 8.2 17.1 19.5         Lab Results   Component Value Date    ALT 9 (L) 2023    AST 20 2023    ALKPHOS 86 2023         MICROBIOLOGY DATA:   3/28 blood negative to date  3/28 abscess culture E coli    RADIOLOGY:  CT Retroperitoneal Abscess Drainage    Result Date: 3/28/2023  EXAM: 1. PERCUTANEOUS DRAIN PLACEMENT LEFT LOWER ABDOMEN ABSCESS 2. CT GUIDANCE 3. CONSCIOUS SEDATION LOCATION: [facility] DATE/TIME: 3/28/2023 9:48 AM INDICATION: Left pericolic gutter abscess in the abdomen. TECHNIQUE: Dose reduction techniques were used. PROCEDURE: Informed consent obtained. Site marked. Prior images reviewed. Required items made available. Patient identity confirmed verbally and with arm band. Patient reevaluated immediately before administering sedation. Universal protocol was followed. Time out performed. The site was prepped and draped in sterile fashion. 10 mL of 1% lidocaine was infused into the local soft tissues. Using standard technique and under direct CT guidance, a 10 Romansh drainage catheter was inserted into the fluid  collection. After initial aspiration the pocket was irrigated several times with sterile saline to clear. SPECIMEN: 80 mL of thick pus fluid was aspirated and sent to lab for cultures and Gram stain. BLOOD LOSS: Minimal. The patient tolerated the procedure well. No immediate complications. SEDATION: Versed 1.0 mg. Fentanyl 50 mcg. The procedure was performed with administration intravenous conscious sedation with appropriate preoperative, intraoperative, and postoperative evaluation. 22 minutes of supervised face to face conscious sedation time was provided by a radiology nurse under my direct supervision.     IMPRESSION: 1.  Successful CT-guided drain placement into left pericolic gutter abdominal abscess. Reference CPT Codes: 65260, 98491, 21915    CT Abdomen Pelvis w Contrast    Result Date: 3/27/2023  EXAM: CT ABDOMEN PELVIS W CONTRAST LOCATION: [facility] DATE/TIME: 3/27/2023 11:41 PM INDICATION: h o colonic perf 1 29 s p colectomy, just d c'd from TCU, return of abd pain R sided. COMPARISON: Noncontrast CT from 03/27/2023. Contrast enhanced CT from 02/07/2023. TECHNIQUE: CT scan of the abdomen and pelvis was performed following injection of IV contrast. Multiplanar reformats were obtained. Dose reduction techniques were used. CONTRAST: ISOVUE 370 100ML FINDINGS: LOWER CHEST: Mild cardiomegaly. No basilar infiltrate. Fat-containing left Bochdalek hernia. HEPATOBILIARY: Cholelithiasis. Nondistended gallbladder. No focal liver lesion. The liver measures 21 cm craniocaudal. There is slight nodularity of the inferior liver surface contour which could reflect early fibrosis. PANCREAS: Normal. SPLEEN: Normal. ADRENAL GLANDS: Normal. KIDNEYS/BLADDER: Normal. BOWEL: Again noted is a fluid collection centered within the inferior reaches of the left paracolic gutter continuing into the left iliac fossa. There are locules of gas within this collection and there is some thin peripheral enhancement,  consistent with abscess formation. Although measurement is challenging due to morphology of the collection, this measures at least 13.1 cm in length on coronal image 64, and 7.2 x 4.4 cm in the transverse and AP dimensions on image 106 of series 3. A small amount of extension continues caudally into the left hemipelvis near to the level of the patient's colorectal anastomotic suture line as seen previously. There is a large amount of stool in the proximal colon. Small bowel caliber within normal limits. No free air. LYMPH NODES: Normal. VASCULATURE: Aortoiliac atherosclerotic calcification. PELVIC ORGANS: Absent uterus. MUSCULOSKELETAL: Advanced degenerative disc and facet changes of the lumbar spine with a minimal superior endplate compression deformity at L4. Arthritic change of the hips.     IMPRESSION: 1.  Findings consistent with a 13.1 cm abscess primarily situated along the left paracolic gutter and iliac fossa as described above.

## 2023-03-31 NOTE — PROGRESS NOTES
[facility]    Medicine Progress Note - Hospitalist Service    Date of Admission:  3/27/2023    Assessment & Plan   Suzy Gómez is a 77 year old female with medical history of hypertension, dyslipidemia, type 2 diabetes mellitus, recent colonic perforation requiring sigmoidectomy complicated by feculent peritonitis and sepsis and ICU admission, admitted on 3/27/2023 with constipation and abdominal pain.  ED work-up showed leukocytosis and CT findings of 13.1 cm abscess located along the left paracolic gutter and iliac fossa.    1.  Intra-abdominal abscess/postop infection.              -s/p drain placement with return of 80 ml purulent fluid on 3/28, Cx with 3+ E.coli and 3+ GNB            -continue Meropenem until final culture results are available, Vancomycin discontinued             -continue diet as tolerated            -increase activity, PT/OT    2.   H/o perforated stercolic ulcer of sigmoid colon s/p sigmoidectomy with anastomosis and washout 1/29.  Treated with IV antibiotics.      3.  DM 2 non insulin dependent.  Hold PTA meds, will use ISS as needed.  Consider adding Lantus is BG consistently >180    4.  HTN. Resume amlodipine, consider increasing dose    5.  HLD on statin    6.  Hypothyroidism on Levothyroxine    7.  Acute encephalopathy.  Per  she has never returned to baseline since hospitalization in January.  Supportive management    8.  Obesity Body mass index is 33.61 kg/m .           Diet: Combination Diet Moderate Consistent Carb (60 g CHO per Meal) Diet    DVT Prophylaxis: Enoxaparin (Lovenox) SQ  Box Catheter: Not present  Lines: None     Cardiac Monitoring: None  Code Status: Full Code      Clinically Significant Risk Factors              # Hypoalbuminemia: Lowest albumin = 3.2 g/dL at 3/28/2023  6:28 AM, will monitor as appropriate          # DMII: A1C = 8.1 % (Ref range: <5.7 %) within past 6 months, PRESENT ON ADMISSION  # Obesity: Estimated body mass  "index is 33.61 kg/m  as calculated from the following:    Height as of this encounter: 1.651 m (5' 5\").    Weight as of this encounter: 91.6 kg (202 lb)., PRESENT ON ADMISSION         Disposition Plan     Expected Discharge Date: 04/03/2023                  Enedelia Oden MD  Hospitalist Service  [facility]  Securely message with M-[employer] (more info)  Text page via [employer] Paging/Directory   ______________________________________________________________________    Interval History   Seems more alert today  Has intermittent abdominal cramping  Less pain around the DAVIS drain site  Had episode of emesis   Had a BM after MOM today    Physical Exam   Vital Signs: Temp: 97.8  F (36.6  C) Temp src: Oral BP: (!) 148/65 Pulse: 73   Resp: 22 SpO2: 94 % O2 Device: None (Room air)    Weight: 202 lbs 0 oz    Gen: NAD, appears more alert  CV: RRR  Lungs: Clear  Abdomen: Soft, mild TTP around the DAVIS drain in the LLQ, BS present   Extremities; Left LE edema    Medical Decision Making       36 MINUTES SPENT BY ME on the date of service doing chart review, history, exam, documentation & further activities per the note.  MANAGEMENT DISCUSSED with the following over the past 24 hours: patient, RN, Trini from Surgery and Dr. Trinh       Data     I have personally reviewed the following data over the past 24 hrs:    7.3  \   10.0 (L)   / 374     139 105 8.2 /  134 (H)   3.8 26 0.49 (L) \       Imaging results reviewed over the past 24 hrs:   No results found for this or any previous visit (from the past 24 hour(s)).  "

## 2023-03-31 NOTE — PROGRESS NOTES
Care Management Follow Up    Length of Stay (days): 3    Expected Discharge Date: 04/03/2023     Concerns to be Addressed:  IV abx  Patient plan of care discussed at interdisciplinary rounds: Yes    Anticipated Discharge Disposition:  home     Anticipated Discharge Services:   PT/OT  Anticipated Discharge DME:  unknown    Patient/family educated on Medicare website which has current facility and service quality ratings:  yes  Education Provided on the Discharge Plan:  yes  Patient/Family in Agreement with the Plan:  Yes    Referrals Placed by CM/SW:  Home care  Private pay costs discussed: NA    Additional Information:  Pt is a 77 year old  female with a past medical history of colon adenoma, GERD, HTN, osteoarthritis, SM II perforation of colonic steri colonic ulcer s/p colectomy, KARYN who was admitted from  in [place]  03/28/23 with abdominal pain. And found to have an intra-abdominal abscess.      Pt continues to have DAVIS drain. Att his time it does not appear that the patient will need IV abx at home, but will be able to switch to an oral. However, awaiting final cultures. CM will continue to follow    Shannon Watson RN

## 2023-03-31 NOTE — PROGRESS NOTES
General Surgery Progress Note:    Hospital Day # 3    ASSESSMENT:   1. Intra-abdominal abscess post-procedure        Suzy Gómez is a 77 year old female with intra-abdominal abscess status post sigmoid colectomy for perforated stercoral ulcer on 1/29/2023.  With new admission hospital day #3 for new intra-abdominal abscess status post IR drain placement  History of chronic constipation  Overall patient doing well but does have some abdominal cramping which I feel is from constipation.  Clinically she looks good.  And culture results showing E. coli sensitivities show resistance to ampicillin and Bactrim.       PLAN:   -Okay from our standpoint to discharge to home as okay by medicine and ID  -Antibiotics per ID and appreciate ID input  -Drain per IR  -No follow-up with us needed and only should follow-up with primary care at this time.  -Did discuss bowel regiment and importance to be on a stool softener continually.  -Bowel regiment to continue and will add milk of magnesia and if needed suppository as well.      SUBJECTIVE:   Suzy Gómez is feeling okay.  She states that she has some abdominal cramping mid abdomen.  She denies any bloating, nausea, fevers, chest pains and has had some bowel movements but she states they are very small and hard.    Patient Vitals for the past 24 hrs:   BP Temp Temp src Pulse Resp SpO2   03/31/23 0727 (!) 148/65 97.8  F (36.6  C) Oral 73 22 94 %   03/30/23 2349 (!) 168/74 97.7  F (36.5  C) Oral 73 18 92 %   03/30/23 2006 (!) 163/70 -- -- 73 -- --   03/30/23 1530 (!) 199/77 98.5  F (36.9  C) Oral 73 18 96 %       Physical Exam:  General: NAD, pleasant    ABD: Soft generalized tenderness mid abdomen without rebound    Admission on 03/27/2023   Component Date Value     Lactic Acid 03/27/2023 0.9      Sodium 03/27/2023 134 (L)      Potassium 03/27/2023 4.4      Chloride 03/27/2023 97 (L)      Carbon Dioxide (CO2) 03/27/2023 25      Anion Gap 03/27/2023 12      Urea Nitrogen  03/27/2023 19.5      Creatinine 03/27/2023 0.75      Calcium 03/27/2023 10.0      Glucose 03/27/2023 157 (H)      Alkaline Phosphatase 03/27/2023 96      AST 03/27/2023 22      ALT 03/27/2023 8 (L)      Protein Total 03/27/2023 7.1      Albumin 03/27/2023 3.5      Bilirubin Total 03/27/2023 0.2      GFR Estimate 03/27/2023 83      Lipase 03/27/2023 33      WBC Count 03/27/2023 11.6 (H)      RBC Count 03/27/2023 3.45 (L)      Hemoglobin 03/27/2023 10.0 (L)      Hematocrit 03/27/2023 33.0 (L)      MCV 03/27/2023 96      MCH 03/27/2023 29.0      MCHC 03/27/2023 30.3 (L)      RDW 03/27/2023 17.3 (H)      Platelet Count 03/27/2023 379      % Neutrophils 03/27/2023 79      % Lymphocytes 03/27/2023 11      % Monocytes 03/27/2023 10      % Eosinophils 03/27/2023 0      % Basophils 03/27/2023 0      % Immature Granulocytes 03/27/2023 0      NRBCs per 100 WBC 03/27/2023 0      Absolute Neutrophils 03/27/2023 9.2 (H)      Absolute Lymphocytes 03/27/2023 1.2      Absolute Monocytes 03/27/2023 1.1      Absolute Eosinophils 03/27/2023 0.0      Absolute Basophils 03/27/2023 0.1      Absolute Immature Granul* 03/27/2023 0.0      Absolute NRBCs 03/27/2023 0.0      SARS CoV2 PCR 03/28/2023 Negative      Culture 03/28/2023 No growth after 2 days      Culture 03/28/2023 No growth after 2 days      INR 03/28/2023 1.15      aPTT 03/28/2023 28      Sodium 03/28/2023 135 (L)      Potassium 03/28/2023 4.1      Chloride 03/28/2023 102      Carbon Dioxide (CO2) 03/28/2023 25      Anion Gap 03/28/2023 8      Urea Nitrogen 03/28/2023 17.1      Creatinine 03/28/2023 0.68      Calcium 03/28/2023 9.1      Glucose 03/28/2023 112 (H)      Alkaline Phosphatase 03/28/2023 86      AST 03/28/2023 20      ALT 03/28/2023 9 (L)      Protein Total 03/28/2023 6.6      Albumin 03/28/2023 3.2 (L)      Bilirubin Total 03/28/2023 0.2      GFR Estimate 03/28/2023 90      WBC Count 03/28/2023 8.6      RBC Count 03/28/2023 3.39 (L)      Hemoglobin 03/28/2023 9.8  (L)      Hematocrit 03/28/2023 32.7 (L)      MCV 03/28/2023 97      MCH 03/28/2023 28.9      MCHC 03/28/2023 30.0 (L)      RDW 03/28/2023 17.2 (H)      Platelet Count 03/28/2023 326      % Neutrophils 03/28/2023 78      % Lymphocytes 03/28/2023 10      % Monocytes 03/28/2023 11      % Eosinophils 03/28/2023 1      % Basophils 03/28/2023 0      % Immature Granulocytes 03/28/2023 0      NRBCs per 100 WBC 03/28/2023 0      Absolute Neutrophils 03/28/2023 6.8      Absolute Lymphocytes 03/28/2023 0.9      Absolute Monocytes 03/28/2023 0.9      Absolute Eosinophils 03/28/2023 0.1      Absolute Basophils 03/28/2023 0.0      Absolute Immature Granul* 03/28/2023 0.0      Absolute NRBCs 03/28/2023 0.0      Culture 03/28/2023 3+ Escherichia coli (A)      Culture 03/28/2023 3+ Gram negative bacilli (A)      Culture 03/28/2023 No anaerobic organisms isolated after 2 days      GLUCOSE BY METER POCT 03/28/2023 101 (H)      GLUCOSE BY METER POCT 03/28/2023 124 (H)      GLUCOSE BY METER POCT 03/28/2023 92      Vancomycin 03/29/2023 15.4      GLUCOSE BY METER POCT 03/29/2023 103 (H)      Hold Specimen 03/29/2023 JIC      GLUCOSE BY METER POCT 03/29/2023 152 (H)      GLUCOSE BY METER POCT 03/29/2023 98      GLUCOSE BY METER POCT 03/29/2023 138 (H)      GLUCOSE BY METER POCT 03/30/2023 125 (H)      GLUCOSE BY METER POCT 03/30/2023 204 (H)      Creatinine 03/30/2023 0.49 (L)      GFR Estimate 03/30/2023 >90      GLUCOSE BY METER POCT 03/30/2023 116 (H)      GLUCOSE BY METER POCT 03/30/2023 122 (H)      WBC Count 03/31/2023 7.3      RBC Count 03/31/2023 3.49 (L)      Hemoglobin 03/31/2023 10.0 (L)      Hematocrit 03/31/2023 33.2 (L)      MCV 03/31/2023 95      MCH 03/31/2023 28.7      MCHC 03/31/2023 30.1 (L)      RDW 03/31/2023 17.0 (H)      Platelet Count 03/31/2023 374      Sodium 03/31/2023 139      Potassium 03/31/2023 3.8      Chloride 03/31/2023 105      Carbon Dioxide (CO2) 03/31/2023 26      Anion Gap 03/31/2023 8      Urea  Nitrogen 03/31/2023 8.2      Creatinine 03/31/2023 0.49 (L)      Calcium 03/31/2023 9.7      Glucose 03/31/2023 120 (H)      GFR Estimate 03/31/2023 >90      GLUCOSE BY METER POCT 03/31/2023 125 (H)         FARHANA Rosa  [facility]  [address] [place] [id number]  Phone- [phone]  Fax- [phone]

## 2023-04-01 ENCOUNTER — APPOINTMENT (OUTPATIENT)
Dept: PHYSICAL THERAPY | Facility: HOSPITAL | Age: 78
DRG: 862 | End: 2023-04-01
Payer: COMMERCIAL

## 2023-04-01 LAB
ANION GAP SERPL CALCULATED.3IONS-SCNC: 9 MMOL/L (ref 7–15)
BACTERIA ABSC ANAEROBE+AEROBE CULT: ABNORMAL
BACTERIA ABSC ANAEROBE+AEROBE CULT: ABNORMAL
BUN SERPL-MCNC: 12.6 MG/DL (ref 8–23)
CALCIUM SERPL-MCNC: 9.7 MG/DL (ref 8.8–10.2)
CHLORIDE SERPL-SCNC: 103 MMOL/L (ref 98–107)
CREAT SERPL-MCNC: 0.58 MG/DL (ref 0.51–0.95)
DEPRECATED HCO3 PLAS-SCNC: 26 MMOL/L (ref 22–29)
GFR SERPL CREATININE-BSD FRML MDRD: >90 ML/MIN/1.73M2
GLUCOSE BLDC GLUCOMTR-MCNC: 114 MG/DL (ref 70–99)
GLUCOSE BLDC GLUCOMTR-MCNC: 119 MG/DL (ref 70–99)
GLUCOSE BLDC GLUCOMTR-MCNC: 132 MG/DL (ref 70–99)
GLUCOSE BLDC GLUCOMTR-MCNC: 162 MG/DL (ref 70–99)
GLUCOSE SERPL-MCNC: 113 MG/DL (ref 70–99)
HOLD SPECIMEN: NORMAL
POTASSIUM SERPL-SCNC: 3.9 MMOL/L (ref 3.4–5.3)
SODIUM SERPL-SCNC: 138 MMOL/L (ref 136–145)

## 2023-04-01 PROCEDURE — 250N000013 HC RX MED GY IP 250 OP 250 PS 637: Performed by: PHYSICIAN ASSISTANT

## 2023-04-01 PROCEDURE — 36415 COLL VENOUS BLD VENIPUNCTURE: CPT | Performed by: INTERNAL MEDICINE

## 2023-04-01 PROCEDURE — 250N000013 HC RX MED GY IP 250 OP 250 PS 637: Performed by: INTERNAL MEDICINE

## 2023-04-01 PROCEDURE — 97110 THERAPEUTIC EXERCISES: CPT | Mod: GP

## 2023-04-01 PROCEDURE — 99232 SBSQ HOSP IP/OBS MODERATE 35: CPT | Performed by: INTERNAL MEDICINE

## 2023-04-01 PROCEDURE — 80048 BASIC METABOLIC PNL TOTAL CA: CPT | Performed by: INTERNAL MEDICINE

## 2023-04-01 PROCEDURE — 250N000011 HC RX IP 250 OP 636: Performed by: INTERNAL MEDICINE

## 2023-04-01 PROCEDURE — 97116 GAIT TRAINING THERAPY: CPT | Mod: GP

## 2023-04-01 PROCEDURE — 120N000001 HC R&B MED SURG/OB

## 2023-04-01 RX ORDER — TRAZODONE HYDROCHLORIDE 50 MG/1
50 TABLET, FILM COATED ORAL ONCE
Status: COMPLETED | OUTPATIENT
Start: 2023-04-01 | End: 2023-04-01

## 2023-04-01 RX ORDER — ENOXAPARIN SODIUM 100 MG/ML
40 INJECTION SUBCUTANEOUS EVERY 24 HOURS
Status: DISCONTINUED | OUTPATIENT
Start: 2023-04-01 | End: 2023-04-05 | Stop reason: HOSPADM

## 2023-04-01 RX ORDER — DEXTROSE MONOHYDRATE 25 G/50ML
25-50 INJECTION, SOLUTION INTRAVENOUS
Status: DISCONTINUED | OUTPATIENT
Start: 2023-04-01 | End: 2023-04-05 | Stop reason: HOSPADM

## 2023-04-01 RX ORDER — NICOTINE POLACRILEX 4 MG
15-30 LOZENGE BUCCAL
Status: DISCONTINUED | OUTPATIENT
Start: 2023-04-01 | End: 2023-04-05 | Stop reason: HOSPADM

## 2023-04-01 RX ADMIN — ENOXAPARIN SODIUM 40 MG: 40 INJECTION SUBCUTANEOUS at 17:01

## 2023-04-01 RX ADMIN — FAMOTIDINE 20 MG: 20 TABLET ORAL at 21:33

## 2023-04-01 RX ADMIN — ROSUVASTATIN CALCIUM 20 MG: 10 TABLET, FILM COATED ORAL at 21:33

## 2023-04-01 RX ADMIN — PIPERACILLIN AND TAZOBACTAM 3.38 G: 3; .375 INJECTION, POWDER, LYOPHILIZED, FOR SOLUTION INTRAVENOUS at 09:34

## 2023-04-01 RX ADMIN — OXYCODONE HYDROCHLORIDE 5 MG: 5 TABLET ORAL at 04:33

## 2023-04-01 RX ADMIN — VENLAFAXINE HYDROCHLORIDE 150 MG: 75 CAPSULE, EXTENDED RELEASE ORAL at 09:34

## 2023-04-01 RX ADMIN — POLYETHYLENE GLYCOL 3350 17 G: 17 POWDER, FOR SOLUTION ORAL at 09:34

## 2023-04-01 RX ADMIN — OXYBUTYNIN CHLORIDE 20 MG: 10 TABLET, EXTENDED RELEASE ORAL at 09:38

## 2023-04-01 RX ADMIN — OXYCODONE HYDROCHLORIDE 5 MG: 5 TABLET ORAL at 17:51

## 2023-04-01 RX ADMIN — OXYCODONE HYDROCHLORIDE 5 MG: 5 TABLET ORAL at 09:49

## 2023-04-01 RX ADMIN — TRAZODONE HYDROCHLORIDE 50 MG: 50 TABLET ORAL at 21:32

## 2023-04-01 RX ADMIN — FAMOTIDINE 20 MG: 20 TABLET ORAL at 09:34

## 2023-04-01 RX ADMIN — PIPERACILLIN AND TAZOBACTAM 3.38 G: 3; .375 INJECTION, POWDER, LYOPHILIZED, FOR SOLUTION INTRAVENOUS at 01:28

## 2023-04-01 RX ADMIN — LEVOTHYROXINE SODIUM 137 MCG: 0.11 TABLET ORAL at 04:32

## 2023-04-01 RX ADMIN — AMLODIPINE BESYLATE 2.5 MG: 2.5 TABLET ORAL at 09:35

## 2023-04-01 RX ADMIN — PIPERACILLIN AND TAZOBACTAM 3.38 G: 3; .375 INJECTION, POWDER, LYOPHILIZED, FOR SOLUTION INTRAVENOUS at 17:01

## 2023-04-01 RX ADMIN — INSULIN ASPART 1 UNITS: 100 INJECTION, SOLUTION INTRAVENOUS; SUBCUTANEOUS at 12:23

## 2023-04-01 ASSESSMENT — ACTIVITIES OF DAILY LIVING (ADL)
ADLS_ACUITY_SCORE: 26
ADLS_ACUITY_SCORE: 22
ADLS_ACUITY_SCORE: 26

## 2023-04-01 NOTE — PLAN OF CARE
Shift from 0700 to 1530-      Problem: Pain Acute  Goal: Optimal Pain Control and Function  4/1/2023 1432 by Jeni Connolly RN  Outcome: Progressing  4/1/2023 1011 by Jeni Connolly RN  Outcome: Progressing  Intervention: Develop Pain Management Plan  Recent Flowsheet Documentation  Taken 4/1/2023 1230 by Jeni Connolly RN  Pain Management Interventions: emotional support  Taken 4/1/2023 0949 by Jeni Connolly RN  Pain Management Interventions:    emotional support    medication (see MAR)  Intervention: Prevent or Manage Pain  Recent Flowsheet Documentation  Taken 4/1/2023 0808 by Jeni Connolly RN  Sensory Stimulation Regulation: auditory stimulation minimized     Problem: Activity Intolerance  Goal: Enhanced Capacity and Energy  4/1/2023 1432 by Jeni Connolly RN  Outcome: Progressing  4/1/2023 1011 by Jeni Connolly RN  Outcome: Progressing  Intervention: Optimize Activity Tolerance  Recent Flowsheet Documentation  Taken 4/1/2023 1230 by Jeni Connolly RN  Activity Management: up in chair  Taken 4/1/2023 0949 by Jeni Connolly RN  Activity Management:    up in chair    ambulated outside room  Taken 4/1/2023 0808 by Jeni Connolly RN  Activity Management: activity adjusted per tolerance   Goal Outcome Evaluation:  Shift from 0700 to 1530-    Patient walked this morning to the desk then sat in chair.     BG were 119 and 162;      Continued on zosyn today.      Patient had moderate left flank pain, resolved with oxycodone. Left DAVIS drain with minimal drainage.    Slept well last night with trazadone.

## 2023-04-01 NOTE — PLAN OF CARE
"Goal Outcome Evaluation:         VSS.  States pain at drain site #3 but pt looks more painful.  Oxycodone and ice.  Rain irrigated.  Pt voiding.  No confusion this shift.   Problem: Plan of Care - These are the overarching goals to be used throughout the patient stay.    Goal: Plan of Care Review  Description: The Plan of Care Review/Shift note should be completed every shift.  The Outcome Evaluation is a brief statement about your assessment that the patient is improving, declining, or no change.  This information will be displayed automatically on your shift note.  Outcome: Progressing  Goal: Patient-Specific Goal (Individualized)  Description: You can add care plan individualizations to a care plan. Examples of Individualization might be:  \"Parent requests to be called daily at 9am for status\", \"I have a hard time hearing out of my right ear\", or \"Do not touch me to wake me up as it startles me\".  Outcome: Progressing  Goal: Absence of Hospital-Acquired Illness or Injury  Outcome: Progressing  Intervention: Identify and Manage Fall Risk  Recent Flowsheet Documentation  Taken 4/1/2023 0100 by Janet Casey RN  Safety Promotion/Fall Prevention:   activity supervised   mobility aid in reach  Intervention: Prevent Skin Injury  Recent Flowsheet Documentation  Taken 4/1/2023 0100 by Janet Casey RN  Body Position: position changed independently  Goal: Optimal Comfort and Wellbeing  Outcome: Progressing  Goal: Readiness for Transition of Care  Outcome: Progressing     Problem: Risk for Delirium  Goal: Optimal Coping  Outcome: Progressing  Goal: Improved Behavioral Control  Outcome: Progressing  Goal: Improved Attention and Thought Clarity  Outcome: Progressing  Goal: Improved Sleep  Outcome: Progressing     Problem: Hyperglycemia  Goal: Blood Glucose Level Within Targeted Range  Outcome: Progressing     Problem: Hypertension Acute  Goal: Blood Pressure Within Desired Range  Outcome: Progressing   "   Problem: Pain Acute  Goal: Optimal Pain Control and Function  Outcome: Progressing     Problem: Activity Intolerance  Goal: Enhanced Capacity and Energy  Outcome: Progressing  Intervention: Optimize Activity Tolerance  Recent Flowsheet Documentation  Taken 4/1/2023 0100 by Janet Casey RN  Activity Management: activity adjusted per tolerance    Care plan reviewed.

## 2023-04-01 NOTE — PLAN OF CARE
Shift from 1500 to 2330-    Problem: Pain Acute  Goal: Optimal Pain Control and Function  3/31/2023 2154 by Jeni Connolly RN  Outcome: Progressing  3/31/2023 1926 by Jeni Connolly RN  Outcome: Progressing  Intervention: Develop Pain Management Plan  Recent Flowsheet Documentation  Taken 3/31/2023 2047 by Jeni Connolly RN  Pain Management Interventions:    emotional support    medication (see MAR)  Taken 3/31/2023 1602 by Jeni Connolly RN  Pain Management Interventions: emotional support  Taken 3/31/2023 1300 by Jeni Connolly RN  Pain Management Interventions: emotional support  Taken 3/31/2023 1018 by Jeni Connolly RN  Pain Management Interventions:    medication (see MAR)    emotional support  Intervention: Prevent or Manage Pain  Recent Flowsheet Documentation  Taken 3/31/2023 1630 by Jeni Connolly RN  Sensory Stimulation Regulation: care clustered  Medication Review/Management: medications reviewed  Taken 3/31/2023 0845 by Jeni Connolly RN  Sensory Stimulation Regulation: care clustered  Medication Review/Management: medications reviewed       Goal Outcome Evaluation:  Patient walked this morning but didn't want to ambulate this evening.     Patient up in the chair most of the evening. Tolerated well.     BG were 146 and 106;     Started on zosyn today and vancomycin discontinued per ID.      Patient had moderate left flank pain at bedtime; resolved with oxycodone.   Wants to have cares clustered tonight so she can sleep. Given melatonin.

## 2023-04-01 NOTE — PROGRESS NOTES
[facility] Infect[facility]  Cultures reviewed  Continue plan    Macie Yin M.D.

## 2023-04-01 NOTE — PROGRESS NOTES
[facility]    Medicine Progress Note - Hospitalist Service    Date of Admission:  3/27/2023    Assessment & Plan   Suzy Gómez is a 77 year old female with medical history of hypertension, dyslipidemia, type 2 diabetes mellitus, recent colonic perforation requiring sigmoidectomy complicated by feculent peritonitis and sepsis and ICU admission, admitted on 3/27/2023 with constipation and abdominal pain.  ED work-up showed leukocytosis and CT findings of 13.1 cm abscess located along the left paracolic gutter and iliac fossa.    1.  Intra-abdominal abscess/postop infection.              -s/p drain placement with return of 80 ml purulent fluid on 3/28, Cx with 3+ E.coli and 3+ GNB            -continue IV Zosyn until final culture results are available, Vancomycin discontinued             -continue diet as tolerated            -increase activity, PT/OT            -teach  DAVIS drain care as anticipate she will discharge home with a drain   Addendum 3:05PM: I spoke to  Awais over the phone. He is legally blind and won't be able to do DAVIS drain care.  I asked RN to teach Suzy stack.  If she's not able to do it, then alternative would be TCU discharge     2.   H/o perforated stercolic ulcer of sigmoid colon s/p sigmoidectomy with anastomosis and washout 1/29.  Treated with IV antibiotics.      3.  DM 2 non insulin dependent.  Hold PTA meds, will use ISS as needed.  Consider adding Lantus is BG consistently >180    4.  HTN. Controlled on amlodipine     5.  HLD on statin    6.  Hypothyroidism on Levothyroxine    7.  Acute encephalopathy.  Per  she has never returned to baseline since hospitalization in January.  Supportive management    8.  Obesity Body mass index is 33.61 kg/m .         Diet: Combination Diet Moderate Consistent Carb (60 g CHO per Meal) Diet    DVT Prophylaxis: Enoxaparin (Lovenox) SQ  Box Catheter: Not present  Lines: None     Cardiac Monitoring: None  Code  "Status: Full Code      Clinically Significant Risk Factors              # Hypoalbuminemia: Lowest albumin = 3.2 g/dL at 3/28/2023  6:28 AM, will monitor as appropriate          # DMII: A1C = 8.1 % (Ref range: <5.7 %) within past 6 months   # Obesity: Estimated body mass index is 33.61 kg/m  as calculated from the following:    Height as of this encounter: 1.651 m (5' 5\").    Weight as of this encounter: 91.6 kg (202 lb).          Disposition Plan      Expected Discharge Date: 04/03/2023                  Enedelia Oden MD  Hospitalist Service  [facility]  Securely message with [employer] (more info)  Text page via [employer] Paging/Directory   ______________________________________________________________________    Interval History   Feels quite well this morning.    Has some discomfort around the DAVIS drain site   No nausea, eating breakfast   Started having loose stool     Physical Exam   Vital Signs: Temp: 98.4  F (36.9  C) Temp src: Oral BP: 135/60 Pulse: 66   Resp: 18 SpO2: 94 % O2 Device: Nasal cannula Oxygen Delivery: 1 LPM  Weight: 202 lbs 0 oz    Gen: NAD, appears more alert  CV: RRR  Lungs: Clear  Abdomen: Soft, mild TTP around the DAVIS drain in the LLQ, BS present   Extremities; Left LE edema    Medical Decision Making       38 MINUTES SPENT BY ME on the date of service doing chart review, history, exam, documentation & further activities per the note.  MANAGEMENT DISCUSSED with the following over the past 24 hours: patient and RN       Data     I have personally reviewed the following data over the past 24 hrs:    N/A  \   N/A   / N/A     138 103 12.6 /  162 (H)   3.9 26 0.58 \       Imaging results reviewed over the past 24 hrs:   No results found for this or any previous visit (from the past 24 hour(s)).  "

## 2023-04-01 NOTE — PROGRESS NOTES
General Surgery Progress Note:    Hospital Day # 4    ASSESSMENT:   1. Intra-abdominal abscess post-procedure        Suzy Gómez is a 77 year old female with intra-abdominal abscess status post sigmoid colectomy for perforated stercoral ulcer on 1/29/2023.  With new admission hospital day #3 for new intra-abdominal abscess status post IR drain placement  History of chronic constipation  Patient doing well  PLAN:   -Discharge per medicine and ID  -Appreciate ID input on antibiotic care  -Drain per IR  -Continue MiraLAX and I discontinued Loren-Colace as she is starting to have loose stools  -Recommend daily MiraLAX   -Follow-up with primary and does not need to follow-up with general surgery      SUBJECTIVE:   Suzy Gómez is feeling okay.  She denies any pain.  She is having loose stools now.  Afebrile.    Patient Vitals for the past 24 hrs:   BP Temp Temp src Pulse Resp SpO2   04/01/23 0815 -- -- -- -- -- 94 %   04/01/23 0808 135/60 98.4  F (36.9  C) Oral 66 18 (!) 88 %   03/31/23 2317 134/63 97.8  F (36.6  C) Oral 66 18 95 %   03/31/23 1545 131/60 97.9  F (36.6  C) Oral 86 18 96 %       Physical Exam:  General: NAD, pleasant    ABD: Soft and nontender with drain being serous      Admission on 03/27/2023   Component Date Value     Lactic Acid 03/27/2023 0.9      Sodium 03/27/2023 134 (L)      Potassium 03/27/2023 4.4      Chloride 03/27/2023 97 (L)      Carbon Dioxide (CO2) 03/27/2023 25      Anion Gap 03/27/2023 12      Urea Nitrogen 03/27/2023 19.5      Creatinine 03/27/2023 0.75      Calcium 03/27/2023 10.0      Glucose 03/27/2023 157 (H)      Alkaline Phosphatase 03/27/2023 96      AST 03/27/2023 22      ALT 03/27/2023 8 (L)      Protein Total 03/27/2023 7.1      Albumin 03/27/2023 3.5      Bilirubin Total 03/27/2023 0.2      GFR Estimate 03/27/2023 83      Lipase 03/27/2023 33      WBC Count 03/27/2023 11.6 (H)      RBC Count 03/27/2023 3.45 (L)      Hemoglobin 03/27/2023 10.0 (L)      Hematocrit 03/27/2023  33.0 (L)      MCV 03/27/2023 96      MCH 03/27/2023 29.0      MCHC 03/27/2023 30.3 (L)      RDW 03/27/2023 17.3 (H)      Platelet Count 03/27/2023 379      % Neutrophils 03/27/2023 79      % Lymphocytes 03/27/2023 11      % Monocytes 03/27/2023 10      % Eosinophils 03/27/2023 0      % Basophils 03/27/2023 0      % Immature Granulocytes 03/27/2023 0      NRBCs per 100 WBC 03/27/2023 0      Absolute Neutrophils 03/27/2023 9.2 (H)      Absolute Lymphocytes 03/27/2023 1.2      Absolute Monocytes 03/27/2023 1.1      Absolute Eosinophils 03/27/2023 0.0      Absolute Basophils 03/27/2023 0.1      Absolute Immature Granul* 03/27/2023 0.0      Absolute NRBCs 03/27/2023 0.0      SARS CoV2 PCR 03/28/2023 Negative      Culture 03/28/2023 No growth after 4 days      Culture 03/28/2023 No growth after 4 days      INR 03/28/2023 1.15      aPTT 03/28/2023 28      Sodium 03/28/2023 135 (L)      Potassium 03/28/2023 4.1      Chloride 03/28/2023 102      Carbon Dioxide (CO2) 03/28/2023 25      Anion Gap 03/28/2023 8      Urea Nitrogen 03/28/2023 17.1      Creatinine 03/28/2023 0.68      Calcium 03/28/2023 9.1      Glucose 03/28/2023 112 (H)      Alkaline Phosphatase 03/28/2023 86      AST 03/28/2023 20      ALT 03/28/2023 9 (L)      Protein Total 03/28/2023 6.6      Albumin 03/28/2023 3.2 (L)      Bilirubin Total 03/28/2023 0.2      GFR Estimate 03/28/2023 90      WBC Count 03/28/2023 8.6      RBC Count 03/28/2023 3.39 (L)      Hemoglobin 03/28/2023 9.8 (L)      Hematocrit 03/28/2023 32.7 (L)      MCV 03/28/2023 97      MCH 03/28/2023 28.9      MCHC 03/28/2023 30.0 (L)      RDW 03/28/2023 17.2 (H)      Platelet Count 03/28/2023 326      % Neutrophils 03/28/2023 78      % Lymphocytes 03/28/2023 10      % Monocytes 03/28/2023 11      % Eosinophils 03/28/2023 1      % Basophils 03/28/2023 0      % Immature Granulocytes 03/28/2023 0      NRBCs per 100 WBC 03/28/2023 0      Absolute Neutrophils 03/28/2023 6.8      Absolute Lymphocytes  03/28/2023 0.9      Absolute Monocytes 03/28/2023 0.9      Absolute Eosinophils 03/28/2023 0.1      Absolute Basophils 03/28/2023 0.0      Absolute Immature Granul* 03/28/2023 0.0      Absolute NRBCs 03/28/2023 0.0      Culture 03/28/2023 3+ Escherichia coli (A)      Culture 03/28/2023 3+ Gram negative bacilli (A)      Culture 03/28/2023 Culture in progress      GLUCOSE BY METER POCT 03/28/2023 101 (H)      GLUCOSE BY METER POCT 03/28/2023 124 (H)      GLUCOSE BY METER POCT 03/28/2023 92      Vancomycin 03/29/2023 15.4      GLUCOSE BY METER POCT 03/29/2023 103 (H)      Hold Specimen 03/29/2023 JIC      GLUCOSE BY METER POCT 03/29/2023 152 (H)      GLUCOSE BY METER POCT 03/29/2023 98      GLUCOSE BY METER POCT 03/29/2023 138 (H)      GLUCOSE BY METER POCT 03/30/2023 125 (H)      GLUCOSE BY METER POCT 03/30/2023 204 (H)      Creatinine 03/30/2023 0.49 (L)      GFR Estimate 03/30/2023 >90      GLUCOSE BY METER POCT 03/30/2023 116 (H)      GLUCOSE BY METER POCT 03/30/2023 122 (H)      WBC Count 03/31/2023 7.3      RBC Count 03/31/2023 3.49 (L)      Hemoglobin 03/31/2023 10.0 (L)      Hematocrit 03/31/2023 33.2 (L)      MCV 03/31/2023 95      MCH 03/31/2023 28.7      MCHC 03/31/2023 30.1 (L)      RDW 03/31/2023 17.0 (H)      Platelet Count 03/31/2023 374      Sodium 03/31/2023 139      Potassium 03/31/2023 3.8      Chloride 03/31/2023 105      Carbon Dioxide (CO2) 03/31/2023 26      Anion Gap 03/31/2023 8      Urea Nitrogen 03/31/2023 8.2      Creatinine 03/31/2023 0.49 (L)      Calcium 03/31/2023 9.7      Glucose 03/31/2023 120 (H)      GFR Estimate 03/31/2023 >90      GLUCOSE BY METER POCT 03/31/2023 125 (H)      GLUCOSE BY METER POCT 03/31/2023 134 (H)      GLUCOSE BY METER POCT 03/31/2023 146 (H)      GLUCOSE BY METER POCT 03/31/2023 106 (H)      Sodium 04/01/2023 138      Potassium 04/01/2023 3.9      Chloride 04/01/2023 103      Carbon Dioxide (CO2) 04/01/2023 26      Anion Gap 04/01/2023 9      Urea Nitrogen  04/01/2023 12.6      Creatinine 04/01/2023 0.58      Calcium 04/01/2023 9.7      Glucose 04/01/2023 113 (H)      GFR Estimate 04/01/2023 >90      Hold Specimen 04/01/2023 JIC      GLUCOSE BY METER POCT 04/01/2023 119 (H)      GLUCOSE BY METER POCT 04/01/2023 162 (H)         FARHANA Rosa  [facility] General Surgery & Bariatric Care  [address] [place] [address]  Phone- [phone]  Fax- [phone]

## 2023-04-02 LAB
ANION GAP SERPL CALCULATED.3IONS-SCNC: 8 MMOL/L (ref 7–15)
BACTERIA BLD CULT: NO GROWTH
BACTERIA BLD CULT: NO GROWTH
BUN SERPL-MCNC: 13.6 MG/DL (ref 8–23)
CALCIUM SERPL-MCNC: 9.2 MG/DL (ref 8.8–10.2)
CHLORIDE SERPL-SCNC: 107 MMOL/L (ref 98–107)
CREAT SERPL-MCNC: 0.67 MG/DL (ref 0.51–0.95)
DEPRECATED HCO3 PLAS-SCNC: 24 MMOL/L (ref 22–29)
GFR SERPL CREATININE-BSD FRML MDRD: >90 ML/MIN/1.73M2
GLUCOSE BLDC GLUCOMTR-MCNC: 108 MG/DL (ref 70–99)
GLUCOSE BLDC GLUCOMTR-MCNC: 128 MG/DL (ref 70–99)
GLUCOSE BLDC GLUCOMTR-MCNC: 134 MG/DL (ref 70–99)
GLUCOSE BLDC GLUCOMTR-MCNC: 89 MG/DL (ref 70–99)
GLUCOSE SERPL-MCNC: 101 MG/DL (ref 70–99)
HOLD SPECIMEN: NORMAL
POTASSIUM SERPL-SCNC: 4.3 MMOL/L (ref 3.4–5.3)
SODIUM SERPL-SCNC: 139 MMOL/L (ref 136–145)

## 2023-04-02 PROCEDURE — 250N000011 HC RX IP 250 OP 636: Performed by: INTERNAL MEDICINE

## 2023-04-02 PROCEDURE — 80048 BASIC METABOLIC PNL TOTAL CA: CPT | Performed by: INTERNAL MEDICINE

## 2023-04-02 PROCEDURE — 250N000013 HC RX MED GY IP 250 OP 250 PS 637: Performed by: INTERNAL MEDICINE

## 2023-04-02 PROCEDURE — 99232 SBSQ HOSP IP/OBS MODERATE 35: CPT | Performed by: INTERNAL MEDICINE

## 2023-04-02 PROCEDURE — 999N000127 HC STATISTIC PERIPHERAL IV START W US GUIDANCE

## 2023-04-02 PROCEDURE — 250N000013 HC RX MED GY IP 250 OP 250 PS 637: Performed by: PHYSICIAN ASSISTANT

## 2023-04-02 PROCEDURE — 36415 COLL VENOUS BLD VENIPUNCTURE: CPT | Performed by: INTERNAL MEDICINE

## 2023-04-02 PROCEDURE — 120N000001 HC R&B MED SURG/OB

## 2023-04-02 RX ORDER — TRAZODONE HYDROCHLORIDE 50 MG/1
50 TABLET, FILM COATED ORAL ONCE
Status: COMPLETED | OUTPATIENT
Start: 2023-04-02 | End: 2023-04-02

## 2023-04-02 RX ORDER — ACETAMINOPHEN 650 MG/1
650 SUPPOSITORY RECTAL EVERY 8 HOURS
Status: DISCONTINUED | OUTPATIENT
Start: 2023-04-02 | End: 2023-04-05 | Stop reason: HOSPADM

## 2023-04-02 RX ORDER — ACETAMINOPHEN 325 MG/1
975 TABLET ORAL EVERY 8 HOURS
Status: DISCONTINUED | OUTPATIENT
Start: 2023-04-02 | End: 2023-04-05 | Stop reason: HOSPADM

## 2023-04-02 RX ADMIN — VENLAFAXINE HYDROCHLORIDE 150 MG: 75 CAPSULE, EXTENDED RELEASE ORAL at 08:49

## 2023-04-02 RX ADMIN — ACETAMINOPHEN 975 MG: 325 TABLET ORAL at 20:19

## 2023-04-02 RX ADMIN — ROSUVASTATIN CALCIUM 20 MG: 10 TABLET, FILM COATED ORAL at 20:20

## 2023-04-02 RX ADMIN — ENOXAPARIN SODIUM 40 MG: 40 INJECTION SUBCUTANEOUS at 16:44

## 2023-04-02 RX ADMIN — FAMOTIDINE 20 MG: 20 TABLET ORAL at 20:20

## 2023-04-02 RX ADMIN — ACETAMINOPHEN 975 MG: 325 TABLET ORAL at 12:14

## 2023-04-02 RX ADMIN — POLYETHYLENE GLYCOL 3350 17 G: 17 POWDER, FOR SOLUTION ORAL at 08:49

## 2023-04-02 RX ADMIN — OXYCODONE HYDROCHLORIDE 5 MG: 5 TABLET ORAL at 08:54

## 2023-04-02 RX ADMIN — PIPERACILLIN AND TAZOBACTAM 3.38 G: 3; .375 INJECTION, POWDER, LYOPHILIZED, FOR SOLUTION INTRAVENOUS at 08:49

## 2023-04-02 RX ADMIN — PIPERACILLIN AND TAZOBACTAM 3.38 G: 3; .375 INJECTION, POWDER, LYOPHILIZED, FOR SOLUTION INTRAVENOUS at 00:54

## 2023-04-02 RX ADMIN — TRAZODONE HYDROCHLORIDE 50 MG: 50 TABLET ORAL at 21:17

## 2023-04-02 RX ADMIN — FAMOTIDINE 20 MG: 20 TABLET ORAL at 08:49

## 2023-04-02 RX ADMIN — AMLODIPINE BESYLATE 2.5 MG: 2.5 TABLET ORAL at 08:49

## 2023-04-02 RX ADMIN — PIPERACILLIN AND TAZOBACTAM 3.38 G: 3; .375 INJECTION, POWDER, LYOPHILIZED, FOR SOLUTION INTRAVENOUS at 16:44

## 2023-04-02 RX ADMIN — OXYBUTYNIN CHLORIDE 20 MG: 10 TABLET, EXTENDED RELEASE ORAL at 08:49

## 2023-04-02 RX ADMIN — LEVOTHYROXINE SODIUM 137 MCG: 0.11 TABLET ORAL at 05:48

## 2023-04-02 ASSESSMENT — ACTIVITIES OF DAILY LIVING (ADL)
ADLS_ACUITY_SCORE: 22

## 2023-04-02 NOTE — PROGRESS NOTES
Care Management Follow Up    Length of Stay (days): 5    Expected Discharge Date: 04/03/2023     Concerns to be Addressed: Discharge Planning      Patient plan of care discussed at interdisciplinary rounds: Yes    Anticipated Discharge Disposition: Home vs. TCU   Anticipated Discharge Services: TBD  Anticipated Discharge DME: TBD    Patient/family educated on Medicare website which has current facility and service quality ratings: Pending  Education Provided on the Discharge Plan: Yes  Patient/Family in Agreement with the Plan: Yes    Referrals Placed by CM/SW: TBD  Private pay costs discussed: Not applicable    Additional Information:  SWCM met with Pt in room regarding discharge planning. Per MD, Pt's  has vision impairment and is unable to assist Pt with maintenance of the DAVIS drains. Nursing has been working with Pt on learning. Pt states that she would like to continue training with nurse today. Depending on how things go, she can then consider home discharge vs. TCU. Discussed plan with bedside nurse. CM will remain available for discharge planning needs.    CATHY Marie

## 2023-04-02 NOTE — PLAN OF CARE
"Goal Outcome Evaluation:         VSS.  Less pain at manas site this shift vs 24hrs ago.  MANAS irrigated. IV antibx's. Pain #3. Pt appears able to sleep.   Problem: Plan of Care - These are the overarching goals to be used throughout the patient stay.    Goal: Plan of Care Review  Description: The Plan of Care Review/Shift note should be completed every shift.  The Outcome Evaluation is a brief statement about your assessment that the patient is improving, declining, or no change.  This information will be displayed automatically on your shift note.  Outcome: Progressing  Goal: Patient-Specific Goal (Individualized)  Description: You can add care plan individualizations to a care plan. Examples of Individualization might be:  \"Parent requests to be called daily at 9am for status\", \"I have a hard time hearing out of my right ear\", or \"Do not touch me to wake me up as it startles me\".  Outcome: Progressing  Goal: Absence of Hospital-Acquired Illness or Injury  Outcome: Progressing  Intervention: Identify and Manage Fall Risk  Recent Flowsheet Documentation  Taken 4/2/2023 0000 by Janet Casey RN  Safety Promotion/Fall Prevention:   activity supervised   mobility aid in reach   nonskid shoes/slippers when out of bed  Intervention: Prevent Skin Injury  Recent Flowsheet Documentation  Taken 4/2/2023 0000 by Janet Casey RN  Body Position: position changed independently  Goal: Optimal Comfort and Wellbeing  Outcome: Progressing  Goal: Readiness for Transition of Care  Outcome: Progressing     Problem: Risk for Delirium  Goal: Optimal Coping  Outcome: Progressing  Goal: Improved Behavioral Control  Outcome: Progressing  Goal: Improved Attention and Thought Clarity  Outcome: Progressing  Goal: Improved Sleep  Outcome: Progressing     Problem: Hyperglycemia  Goal: Blood Glucose Level Within Targeted Range  Outcome: Progressing     Problem: Hypertension Acute  Goal: Blood Pressure Within Desired " Range  Outcome: Progressing     Problem: Pain Acute  Goal: Optimal Pain Control and Function  Outcome: Progressing     Problem: Activity Intolerance  Goal: Enhanced Capacity and Energy  Outcome: Progressing  Intervention: Optimize Activity Tolerance  Recent Flowsheet Documentation  Taken 4/2/2023 0000 by Janet Casey RN  Activity Management: activity adjusted per tolerance   Uneventful shift. Care plan reviewed.

## 2023-04-02 NOTE — PROGRESS NOTES
[facility]    Medicine Progress Note - Hospitalist Service    Date of Admission:  3/27/2023    Assessment & Plan   Suzy Gómez is a 77 year old female with medical history of hypertension, dyslipidemia, type 2 diabetes mellitus, recent colonic perforation requiring sigmoidectomy complicated by feculent peritonitis and sepsis and ICU admission, admitted on 3/27/2023 with constipation and abdominal pain.  ED work-up showed leukocytosis and CT findings of 13.1 cm abscess located along the left paracolic gutter and iliac fossa.    1.  Intra-abdominal abscess/postop infection.              -s/p drain placement with return of 80 ml purulent fluid on 3/28, Cx with 3+ of 2 different strains of E.coli, sensitivities are back - defer to ID antibiotic selection             -currently on IV Zosyn            -continue diet as tolerated            -increase activity, PT/OT            - is legally blind and patient is confused so unable to safely do DAVIS drain cares herself.  She wound also benefit from strength training so discussed recommendations for TCU.  Discussed with  Awais and he is in agreement       2.  Acute encephalopathy.  Per  she has never returned to baseline since hospitalization in January.  Seems more confused today.  Scheduled Tylenol, will try to avoid narcotics.      3.   H/o perforated stercoral ulcer of sigmoid colon s/p sigmoidectomy with anastomosis and washout 1/29.  Treated with IV antibiotics.      4.  DM 2 non insulin dependent.  Hold PTA meds, will use ISS as needed.  Blood sugars have been at goal    5.  HTN. Controlled on amlodipine     6.  HLD on statin    7.  Hypothyroidism on Levothyroxine    8.  Obesity Body mass index is 33.61 kg/m .           Diet: Combination Diet Moderate Consistent Carb (60 g CHO per Meal) Diet    DVT Prophylaxis: Enoxaparin (Lovenox) SQ  Box Catheter: Not present  Lines: None     Cardiac Monitoring: None  Code Status: Full  "Code      Clinically Significant Risk Factors              # Hypoalbuminemia: Lowest albumin = 3.2 g/dL at 3/28/2023  6:28 AM, will monitor as appropriate          # DMII: A1C = 8.1 % (Ref range: <5.7 %) within past 6 months   # Obesity: Estimated body mass index is 33.61 kg/m  as calculated from the following:    Height as of this encounter: 1.651 m (5' 5\").    Weight as of this encounter: 91.6 kg (202 lb).          Disposition Plan     Expected Discharge Date: 04/03/2023                  Enedelia Oden MD  Hospitalist Service  [facility]  Securely message with [employer] (more info)  Text page via [employer] Paging/Directory   ______________________________________________________________________    Interval History   Seems more confused today  Ambulated much longer distance today  Does complain of left lower quadrant pain which increases with activity.  DAVIS output about 45 mL over the past 24 hours  O2 sats 87% on room air this afternoon so was placed on 1 L oxygen      Physical Exam   Vital Signs: Temp: 97.8  F (36.6  C) Temp src: Oral BP: 120/56 Pulse: 72   Resp: 17 SpO2: 94 % O2 Device: Nasal cannula Oxygen Delivery: 1 LPM  Weight: 202 lbs 0 oz    General: No acute distress, sitting in a recliner, sleepy  CV: Regular rate and rhythm.  Normal S1-S2  Lungs: Clear  Abdomen: Soft, mild tenderness in the left lower quadrant, DAVIS drain noted with small amount of serous fluid  Extremities: No significant edema  Neuro: Sleepy, confused    Medical Decision Making       40 MINUTES SPENT BY ME on the date of service doing chart review, history, exam, documentation & further activities per the note.  MANAGEMENT DISCUSSED with the following over the past 24 hours: Patient, nursing staff and        Data     I have personally reviewed the following data over the past 24 hrs:    N/A  \   N/A   / N/A     139 107 13.6 /  108 (H)   4.3 24 0.67 \       Imaging results reviewed over the past 24 hrs: "   No results found for this or any previous visit (from the past 24 hour(s)).

## 2023-04-02 NOTE — PLAN OF CARE
Shift from 1500 to 2330--      Problem: Risk for Delirium  Goal: Improved Sleep  4/1/2023 2114 by Jeni Connolly RN  Outcome: Progressing  4/1/2023 1432 by Jeni Connolly RN  Outcome: Progressing  4/1/2023 1011 by Jeni Connolly RN  Outcome: Progressing     Problem: Pain Acute  Goal: Optimal Pain Control and Function  4/1/2023 2114 by Jeni Connolly RN  Outcome: Progressing  4/1/2023 1432 by Jeni Connolly RN  Outcome: Progressing  4/1/2023 1011 by Jeni Connolly RN  Outcome: Progressing  Intervention: Develop Pain Management Plan  Recent Flowsheet Documentation  Taken 4/1/2023 1627 by Jeni Connolly RN  Pain Management Interventions: emotional support  Taken 4/1/2023 1230 by Jeni Connolly RN  Pain Management Interventions: emotional support  Taken 4/1/2023 0949 by Jeni Connolly RN  Pain Management Interventions:    emotional support    medication (see MAR)  Intervention: Prevent or Manage Pain  Recent Flowsheet Documentation  Taken 4/1/2023 1628 by Jeni Connolly RN  Sensory Stimulation Regulation: auditory stimulation minimized  Taken 4/1/2023 0808 by Jeni Connolly RN  Sensory Stimulation Regulation: auditory stimulation minimized     Problem: Activity Intolerance  Goal: Enhanced Capacity and Energy  4/1/2023 2114 by Jeni Connolly RN  Outcome: Progressing  4/1/2023 1432 by Jeni Connolly RN  Outcome: Progressing  4/1/2023 1011 by Jeni Connolly RN  Outcome: Progressing  Intervention: Optimize Activity Tolerance  Recent Flowsheet Documentation  Taken 4/1/2023 1750 by Jeni Connolly RN  Activity Management:    ambulated outside room    up in chair  Taken 4/1/2023 1628 by Jeni Connolly RN  Activity Management: up in chair  Taken 4/1/2023 1627 by Jeni Connolly RN  Activity Management: up in chair  Taken 4/1/2023 1230 by Jeni Connolly RN  Activity Management: up in chair  Taken 4/1/2023 0949 by Jeni Connolly RN  Activity Management:    up in chair    ambulated outside room  Taken 4/1/2023  0808 by Jeni Connolly, RN  Activity Management: activity adjusted per tolerance       Goal Outcome Evaluation:    Patient walked this evening only MCC to the desk then sat in chair. Increased pain when walks. Given oxycodone prn and pain decreased. Pt continues to state she is have increased pain of right abd/flank.     BG were 132 & 114 ; sliding scale parameters.      Continued on zosyn today.   Left DAVIS drain with minimal drainage.    Requested trazodone for sleep tonight and cluster cares.    Patient shown how to empty DAVIS and how to flush it. Will show again tomorrow.

## 2023-04-02 NOTE — PLAN OF CARE
Shift from 0700 to 1530-      Problem: Hypertension Acute  Goal: Blood Pressure Within Desired Range  4/2/2023 0938 by Jeni Connolly RN  Outcome: Progressing  4/1/2023 2114 by Jeni Connolly RN  Outcome: Progressing  Intervention: Normalize Blood Pressure  Recent Flowsheet Documentation  Taken 4/2/2023 0745 by Jeni Connolly RN  Sensory Stimulation Regulation: auditory stimulation minimized  Medication Review/Management: medications reviewed     Problem: Pain Acute  Goal: Optimal Pain Control and Function  4/2/2023 0938 by Jeni Connolly RN  Outcome: Progressing  4/1/2023 2114 by Jeni Connolly RN  Outcome: Progressing  Intervention: Prevent or Manage Pain  Recent Flowsheet Documentation  Taken 4/2/2023 0745 by Jeni Connolly RN  Sensory Stimulation Regulation: auditory stimulation minimized  Medication Review/Management: medications reviewed     Problem: Activity Intolerance  Goal: Enhanced Capacity and Energy  4/2/2023 0938 by Jeni Connolly RN  Outcome: Progressing  4/1/2023 2114 by Jeni Connolly RN  Outcome: Progressing  Intervention: Optimize Activity Tolerance  Recent Flowsheet Documentation  Taken 4/2/2023 0745 by Jeni Connolly RN  Activity Management: activity adjusted per tolerance  Taken 4/1/2023 2100 by Jeni Connolly RN  Activity Management:    ambulated to bathroom    back to bed       Goal Outcome Evaluation:    Teaching patient to flush DAVIS drain. Difficulty remembering how to turn stopcock.    Patient up in chair most of the morning and afternoon.     BG were 89 and 108.     Continued on zosyn today. PICC placed a new IV site.     Patient had moderate left flank pain, resolved with oxycodone. Left DAVIS drain with minimal drainage.Started on scheduled tylenol.     Slept well last night with trazadone.    Desated on room air. Placed on 1 liter oxygen. MD aware. Encouraging IS every 30min.    Increased confusion in the afternoon. MD at bedside and aware.

## 2023-04-02 NOTE — PROGRESS NOTES
Chart check today as I do not expect patient to still be here.  No significant changes.  Surgery has nothing else to offer.  We will sign off at this time.  Please call if there is any questions or concerns.  Umang PEPPER  [facility] General Surgery & Bariatric Care  [address] [place] [id number]  Phone- [phone]  Fax- [phone]

## 2023-04-03 ENCOUNTER — APPOINTMENT (OUTPATIENT)
Dept: PHYSICAL THERAPY | Facility: HOSPITAL | Age: 78
DRG: 862 | End: 2023-04-03
Payer: COMMERCIAL

## 2023-04-03 LAB
ERYTHROCYTE [DISTWIDTH] IN BLOOD BY AUTOMATED COUNT: 16.4 % (ref 10–15)
GLUCOSE BLDC GLUCOMTR-MCNC: 109 MG/DL (ref 70–99)
GLUCOSE BLDC GLUCOMTR-MCNC: 118 MG/DL (ref 70–99)
GLUCOSE BLDC GLUCOMTR-MCNC: 129 MG/DL (ref 70–99)
GLUCOSE BLDC GLUCOMTR-MCNC: 177 MG/DL (ref 70–99)
HCT VFR BLD AUTO: 30.3 % (ref 35–47)
HGB BLD-MCNC: 9.1 G/DL (ref 11.7–15.7)
HOLD SPECIMEN: NORMAL
MCH RBC QN AUTO: 29 PG (ref 26.5–33)
MCHC RBC AUTO-ENTMCNC: 30 G/DL (ref 31.5–36.5)
MCV RBC AUTO: 97 FL (ref 78–100)
PLATELET # BLD AUTO: 330 10E3/UL (ref 150–450)
RBC # BLD AUTO: 3.14 10E6/UL (ref 3.8–5.2)
WBC # BLD AUTO: 6.5 10E3/UL (ref 4–11)

## 2023-04-03 PROCEDURE — 250N000011 HC RX IP 250 OP 636: Performed by: INTERNAL MEDICINE

## 2023-04-03 PROCEDURE — 36415 COLL VENOUS BLD VENIPUNCTURE: CPT | Performed by: INTERNAL MEDICINE

## 2023-04-03 PROCEDURE — 99232 SBSQ HOSP IP/OBS MODERATE 35: CPT | Performed by: INTERNAL MEDICINE

## 2023-04-03 PROCEDURE — 250N000013 HC RX MED GY IP 250 OP 250 PS 637: Performed by: INTERNAL MEDICINE

## 2023-04-03 PROCEDURE — 250N000013 HC RX MED GY IP 250 OP 250 PS 637: Performed by: PHYSICIAN ASSISTANT

## 2023-04-03 PROCEDURE — 120N000001 HC R&B MED SURG/OB

## 2023-04-03 PROCEDURE — 97116 GAIT TRAINING THERAPY: CPT | Mod: GP

## 2023-04-03 PROCEDURE — 85027 COMPLETE CBC AUTOMATED: CPT | Performed by: INTERNAL MEDICINE

## 2023-04-03 RX ORDER — OXYBUTYNIN CHLORIDE 10 MG/1
10 TABLET, EXTENDED RELEASE ORAL DAILY
Status: DISCONTINUED | OUTPATIENT
Start: 2023-04-03 | End: 2023-04-05 | Stop reason: HOSPADM

## 2023-04-03 RX ADMIN — PIPERACILLIN AND TAZOBACTAM 3.38 G: 3; .375 INJECTION, POWDER, LYOPHILIZED, FOR SOLUTION INTRAVENOUS at 01:35

## 2023-04-03 RX ADMIN — FAMOTIDINE 20 MG: 20 TABLET ORAL at 21:03

## 2023-04-03 RX ADMIN — ROSUVASTATIN CALCIUM 20 MG: 10 TABLET, FILM COATED ORAL at 21:03

## 2023-04-03 RX ADMIN — POLYETHYLENE GLYCOL 3350 17 G: 17 POWDER, FOR SOLUTION ORAL at 09:55

## 2023-04-03 RX ADMIN — OXYBUTYNIN CHLORIDE 10 MG: 10 TABLET, EXTENDED RELEASE ORAL at 10:41

## 2023-04-03 RX ADMIN — INSULIN ASPART 1 UNITS: 100 INJECTION, SOLUTION INTRAVENOUS; SUBCUTANEOUS at 12:03

## 2023-04-03 RX ADMIN — FAMOTIDINE 20 MG: 20 TABLET ORAL at 09:53

## 2023-04-03 RX ADMIN — AMOXICILLIN AND CLAVULANATE POTASSIUM 1 TABLET: 875; 125 TABLET, FILM COATED ORAL at 21:05

## 2023-04-03 RX ADMIN — VENLAFAXINE HYDROCHLORIDE 150 MG: 75 CAPSULE, EXTENDED RELEASE ORAL at 09:53

## 2023-04-03 RX ADMIN — AMOXICILLIN AND CLAVULANATE POTASSIUM 1 TABLET: 875; 125 TABLET, FILM COATED ORAL at 10:10

## 2023-04-03 RX ADMIN — ACETAMINOPHEN 975 MG: 325 TABLET ORAL at 12:03

## 2023-04-03 RX ADMIN — ACETAMINOPHEN 975 MG: 325 TABLET ORAL at 21:03

## 2023-04-03 RX ADMIN — ONDANSETRON 4 MG: 4 TABLET, ORALLY DISINTEGRATING ORAL at 14:10

## 2023-04-03 RX ADMIN — AMLODIPINE BESYLATE 2.5 MG: 2.5 TABLET ORAL at 09:53

## 2023-04-03 RX ADMIN — LEVOTHYROXINE SODIUM 137 MCG: 0.11 TABLET ORAL at 04:36

## 2023-04-03 RX ADMIN — ENOXAPARIN SODIUM 40 MG: 40 INJECTION SUBCUTANEOUS at 17:40

## 2023-04-03 ASSESSMENT — ACTIVITIES OF DAILY LIVING (ADL)
ADLS_ACUITY_SCORE: 22
ADLS_ACUITY_SCORE: 22
ADLS_ACUITY_SCORE: 26
ADLS_ACUITY_SCORE: 22
ADLS_ACUITY_SCORE: 26
ADLS_ACUITY_SCORE: 25
ADLS_ACUITY_SCORE: 22
ADLS_ACUITY_SCORE: 26
ADLS_ACUITY_SCORE: 25
ADLS_ACUITY_SCORE: 28

## 2023-04-03 NOTE — PROGRESS NOTES
[facility]    Medicine Progress Note - Hospitalist Service    Date of Admission:  3/27/2023    Assessment & Plan   Suzy Gómez is a 77 year old female with medical history of hypertension, dyslipidemia, type 2 diabetes mellitus, recent colonic perforation requiring sigmoidectomy complicated by feculent peritonitis and sepsis and ICU admission, admitted on 3/27/2023 with constipation and abdominal pain.  ED work-up showed leukocytosis and CT findings of 13.1 cm abscess located along the left paracolic gutter and iliac fossa.    1.  Intra-abdominal abscess/postop infection.              -s/p drain placement with return of 80 ml purulent fluid on 3/28, Cx with 3+ of 2 different strains of E.coli,             -switched to oral Augmentin today, will need to continue antibiotics until drain is pulled and then for another 5 to 7 days beyond drain removal            -continue diet as tolerated            -increase activity, PT/OT            - is legally blind and patient is confused so unable to safely do DAVIS drain cares herself.  She wound also benefit from strength training so discussed recommendations for TCU.  Discussed with  Awais and he is in agreement       2.  Acute encephalopathy.  Per  she has never returned to baseline since hospitalization in January.  Continues to have intermittent confusion/forgetfulness.   Decrease oxybutynin dose as might be contributing,  Decrease oxycodone dose to 2.5 mg if needed only for severe pain       3.   H/o perforated stercoral ulcer of sigmoid colon s/p sigmoidectomy with anastomosis and washout 1/29.  Treated with IV antibiotics.      4.  DM 2 non insulin dependent.  Hold PTA meds, will use ISS as needed.  Blood sugars have been at goal    5.  HTN. Controlled on amlodipine     6.  HLD on statin    7.  Hypothyroidism on Levothyroxine    8.  Obesity Body mass index is 33.61 kg/m .       Diet: Combination Diet Moderate Consistent Carb  "(60 g CHO per Meal) Diet    DVT Prophylaxis: Enoxaparin (Lovenox) SQ  Box Catheter: Not present  Lines: None     Cardiac Monitoring: None  Code Status: Full Code      Clinically Significant Risk Factors              # Hypoalbuminemia: Lowest albumin = 3.2 g/dL at 3/28/2023  6:28 AM, will monitor as appropriate          # DMII: A1C = 8.1 % (Ref range: <5.7 %) within past 6 months   # Obesity: Estimated body mass index is 33.61 kg/m  as calculated from the following:    Height as of this encounter: 1.651 m (5' 5\").    Weight as of this encounter: 91.6 kg (202 lb).          Disposition Plan      Expected Discharge Date: 04/04/2023                  Enedelia Oden MD  Hospitalist Service  [facility]  Securely message with [id number] (more info)  Text page via [employer] Paging/Directory   ______________________________________________________________________    Interval History   Remains intermittently confused/forgetful  Only mild discomfort in the left lower quadrant  Good oral intake    Physical Exam   Vital Signs: Temp: 98  F (36.7  C) Temp src: Oral BP: 132/87 Pulse: 66   Resp: 18 SpO2: 95 % O2 Device: None (Room air) Oxygen Delivery: 1 LPM  Weight: 202 lbs 0 oz    General: No acute distress  CV: Regular rate and rhythm, normal S1-S2  Lungs: Clear  Abdomen: Soft, nondistended, minimal left lower quadrant tenderness, DAVIS drain noted with serous output  Extremities: 1+ left lower extremity edema    Medical Decision Making       38 MINUTES SPENT BY ME on the date of service doing chart review, history, exam, documentation & further activities per the note.  MANAGEMENT DISCUSSED with the following over the past 24 hours: Patient, nursing staff, RN CM,  Awais over the phone       Data     I have personally reviewed the following data over the past 24 hrs:    6.5  \   9.1 (L)   / 330     N/A N/A N/A /  177 (H)   N/A N/A N/A \       Imaging results reviewed over the past 24 hrs:   No results " found for this or any previous visit (from the past 24 hour(s)).

## 2023-04-03 NOTE — PLAN OF CARE
Problem: Plan of Care - These are the overarching goals to be used throughout the patient stay.    Goal: Plan of Care Review  Description: The Plan of Care Review/Shift note should be completed every shift.  The Outcome Evaluation is a brief statement about your assessment that the patient is improving, declining, or no change.  This information will be displayed automatically on your shift note.  Outcome: Progressing   Goal Outcome Evaluation:       Pt reports a pain 2/10, tylenol given. Pt reported feeling nauseous, zofran PO given. IV on left taken out due to infiltration. IV not reinserted, no need for IV per MD. Left and right lower legs are swollen and left calf is swollen and painful, this is chronic, MD notified. Discharge plan is to send to TCU if possible. IV antibiotics discontinued and PO started. Flushed DAVIS drain with 10 ml of saline, at the end of shift less than 10 ml of drainage out from the drain.       Paulina Zapata RN

## 2023-04-03 NOTE — PLAN OF CARE
"Goal Outcome Evaluation:             VSS.  IV antibx's. DAVIS irrigated and monitoring output.   Tylenol for pain. Voiding.   Intermittent confusion/forgetfulness. Bed alarm activated.  Care plan reviewed.   Problem: Plan of Care - These are the overarching goals to be used throughout the patient stay.    Goal: Plan of Care Review  Description: The Plan of Care Review/Shift note should be completed every shift.  The Outcome Evaluation is a brief statement about your assessment that the patient is improving, declining, or no change.  This information will be displayed automatically on your shift note.  Outcome: Progressing  Goal: Patient-Specific Goal (Individualized)  Description: You can add care plan individualizations to a care plan. Examples of Individualization might be:  \"Parent requests to be called daily at 9am for status\", \"I have a hard time hearing out of my right ear\", or \"Do not touch me to wake me up as it startles me\".  Outcome: Progressing  Goal: Absence of Hospital-Acquired Illness or Injury  Outcome: Progressing  Intervention: Identify and Manage Fall Risk  Recent Flowsheet Documentation  Taken 4/3/2023 0118 by Janet Casey RN  Safety Promotion/Fall Prevention: activity supervised  Intervention: Prevent Skin Injury  Recent Flowsheet Documentation  Taken 4/3/2023 0118 by Janet Casey RN  Body Position: position changed independently  Goal: Optimal Comfort and Wellbeing  Outcome: Progressing  Goal: Readiness for Transition of Care  Outcome: Progressing     Problem: Risk for Delirium  Goal: Optimal Coping  Outcome: Progressing  Goal: Improved Behavioral Control  Outcome: Progressing  Goal: Improved Attention and Thought Clarity  Outcome: Progressing  Goal: Improved Sleep  Outcome: Progressing     Problem: Hyperglycemia  Goal: Blood Glucose Level Within Targeted Range  Outcome: Progressing     Problem: Hypertension Acute  Goal: Blood Pressure Within Desired Range  Outcome: " Progressing     Problem: Pain Acute  Goal: Optimal Pain Control and Function  Outcome: Progressing     Problem: Activity Intolerance  Goal: Enhanced Capacity and Energy  Outcome: Progressing  Intervention: Optimize Activity Tolerance  Recent Flowsheet Documentation  Taken 4/3/2023 0118 by Janet Casey RN  Activity Management: activity adjusted per tolerance

## 2023-04-03 NOTE — PROGRESS NOTES
"INFECTIOUS DISEASE FOLLOW UP NOTE      ASSESSMENT: 1. Intra-abdominal abscess. 13 x 7 x 4 cm abscess seen on CT 3/27/23. Drain placed 3/28, 80cc pus removed. Culture prelim E coli.   2. Presented in January with perforated stercolic ulcer of sigmoid colon, underwent laparoscopic sigmoid colectomy with anastomosis, washout 1/29. Findings of diffuse feculent peritonitis. Had bacteremia with clostridium species, Collinsella aerofaciens, bacteroides vulgatus, and Eubacterium spp at the time. Treated with IV antibiotics post-op, completed with meropenem (2/5-16), micafungin 2/5-16), vancomycin (2/8-16). CT 2/7/23 showed no drainable fluid. With feculent peritonitis, there is significant risk for subsequent abscess despite washout, IV antibiotics post-op.    3. DM  4. Previously treated for L great toe infection with antibiotics. MRI sept 2022 without osteomyelitis.   5. Sikhism  6. Cholelithiasis   7. Penicillin/augmentin intolerance. Tolerated pip/tazo last hospital stay.     PLAN:  Will need serial CT/abscessogram to monitor abscess, duration of antibiotics will depend on resolution of abscess on imaging. generally continue antibiotics while drain is in, then for another 5-7 days beyond drain removal.   We can use augmentin now PO, treat 5 days past drain pull  I would not expect her to need to Follow-up with ID post discharge though.     CARLOS MANUEL Lopez MD  [facility]  [phone] [facility] paging    ______________________________________________________________________    SUBJECTIVE / INTERVAL HISTORY: Sitting in chair.  Drain in.    ROS: All other systems negative except as listed above.        OBJECTIVE:  /87 (BP Location: Left arm)   Pulse 66   Temp 98  F (36.7  C) (Oral)   Resp 18   Ht 1.651 m (5' 5\")   Wt 91.6 kg (202 lb)   SpO2 95%   BMI 33.61 kg/m         Vital Signs  Temp: 97.8  F (36.6  C)  Temp src: Oral  Resp: 22  Pulse: 73  Pulse Rate Source: Monitor  BP: " (!) 148/65  BP Location: Left arm    Temp (24hrs), Av.8  F (36.6  C), Min:97.7  F (36.5  C), Max:97.8  F (36.6  C)      GEN: No acute distress. Needed me to repeat findings a few times.   RESPIRATORY:  Normal breathing pattern.   CARDIOVASCULAR:  Regular rate and rhythm.   ABDOMEN:  Soft, mildly tender. DAVIS with drainage   EXTREMITIES: No edema.  SKIN/HAIR/NAILS:  No rashes  IV: peripheral        Antibiotics:  meropenem 3/28-  Vancomycin 3/28-31    Pertinent labs:    Recent Labs   Lab 23  0633 23  0551 23  0628   WBC 6.5 7.3 8.6   HGB 9.1* 10.0* 9.8*   HCT 30.3* 33.2* 32.7*    374 326        Recent Labs   Lab 23  0710 23  0658 23  0551    138 139   CO2 24 26 26   BUN 13.6 12.6 8.2         Lab Results   Component Value Date    ALT 9 (L) 2023    AST 20 2023    ALKPHOS 86 2023         MICROBIOLOGY DATA:   3/28 blood negative to date  3/28 abscess culture E coli    RADIOLOGY:  CT Retroperitoneal Abscess Drainage    Result Date: 3/28/2023  EXAM: 1. PERCUTANEOUS DRAIN PLACEMENT LEFT LOWER ABDOMEN ABSCESS 2. CT GUIDANCE 3. CONSCIOUS SEDATION LOCATION: [facility] DATE/TIME: 3/28/2023 9:48 AM INDICATION: Left pericolic gutter abscess in the abdomen. TECHNIQUE: Dose reduction techniques were used. PROCEDURE: Informed consent obtained. Site marked. Prior images reviewed. Required items made available. Patient identity confirmed verbally and with arm band. Patient reevaluated immediately before administering sedation. Universal protocol was followed. Time out performed. The site was prepped and draped in sterile fashion. 10 mL of 1% lidocaine was infused into the local soft tissues. Using standard technique and under direct CT guidance, a 10 Slovenian drainage catheter was inserted into the fluid collection. After initial aspiration the pocket was irrigated several times with sterile saline to clear. SPECIMEN: 80 mL of thick pus fluid  was aspirated and sent to lab for cultures and Gram stain. BLOOD LOSS: Minimal. The patient tolerated the procedure well. No immediate complications. SEDATION: Versed 1.0 mg. Fentanyl 50 mcg. The procedure was performed with administration intravenous conscious sedation with appropriate preoperative, intraoperative, and postoperative evaluation. 22 minutes of supervised face to face conscious sedation time was provided by a radiology nurse under my direct supervision.     IMPRESSION: 1.  Successful CT-guided drain placement into left pericolic gutter abdominal abscess. Reference CPT Codes: 45476, 22840, 78425    CT Abdomen Pelvis w Contrast    Result Date: 3/27/2023  EXAM: CT ABDOMEN PELVIS W CONTRAST LOCATION: [facility] DATE/TIME: 3/27/2023 11:41 PM INDICATION: h o colonic perf 1 29 s p colectomy, just d c'd from TCU, return of abd pain R sided. COMPARISON: Noncontrast CT from 03/27/2023. Contrast enhanced CT from 02/07/2023. TECHNIQUE: CT scan of the abdomen and pelvis was performed following injection of IV contrast. Multiplanar reformats were obtained. Dose reduction techniques were used. CONTRAST: ISOVUE 370 100ML FINDINGS: LOWER CHEST: Mild cardiomegaly. No basilar infiltrate. Fat-containing left Bochdalek hernia. HEPATOBILIARY: Cholelithiasis. Nondistended gallbladder. No focal liver lesion. The liver measures 21 cm craniocaudal. There is slight nodularity of the inferior liver surface contour which could reflect early fibrosis. PANCREAS: Normal. SPLEEN: Normal. ADRENAL GLANDS: Normal. KIDNEYS/BLADDER: Normal. BOWEL: Again noted is a fluid collection centered within the inferior reaches of the left paracolic gutter continuing into the left iliac fossa. There are locules of gas within this collection and there is some thin peripheral enhancement, consistent with abscess formation. Although measurement is challenging due to morphology of the collection, this measures at least 13.1 cm  in length on coronal image 64, and 7.2 x 4.4 cm in the transverse and AP dimensions on image 106 of series 3. A small amount of extension continues caudally into the left hemipelvis near to the level of the patient's colorectal anastomotic suture line as seen previously. There is a large amount of stool in the proximal colon. Small bowel caliber within normal limits. No free air. LYMPH NODES: Normal. VASCULATURE: Aortoiliac atherosclerotic calcification. PELVIC ORGANS: Absent uterus. MUSCULOSKELETAL: Advanced degenerative disc and facet changes of the lumbar spine with a minimal superior endplate compression deformity at L4. Arthritic change of the hips.     IMPRESSION: 1.  Findings consistent with a 13.1 cm abscess primarily situated along the left paracolic gutter and iliac fossa as described above.

## 2023-04-03 NOTE — PLAN OF CARE
Shift from 1500 to 2330-      Problem: Activity Intolerance  Goal: Enhanced Capacity and Energy  4/2/2023 2114 by Jeni Connolly RN  Outcome: Progressing  4/2/2023 0938 by Jeni Connolly RN  Outcome: Progressing  Intervention: Optimize Activity Tolerance  Recent Flowsheet Documentation  Taken 4/2/2023 2019 by Jeni Connolly RN  Activity Management:   ambulated to bathroom   back to bed  Taken 4/2/2023 1525 by Jeni Connolly RN  Activity Management: up in chair  Taken 4/2/2023 1523 by Jeni Connolly RN  Activity Management: up in chair  Taken 4/2/2023 1402 by Jeni Connolly RN  Activity Management:   ambulated outside room   up in chair  Taken 4/2/2023 0745 by Jeni Connolly RN  Activity Management: activity adjusted per tolerance     Problem: Risk for Delirium  Goal: Optimal Coping  4/2/2023 2114 by Jeni Connolly RN  Outcome: Progressing  4/2/2023 0938 by Jeni Connolly RN  Outcome: Progressing  Goal: Improved Behavioral Control  4/2/2023 2114 by Jeni Connolly RN  Outcome: Progressing  4/2/2023 0938 by Jeni Connolly RN  Outcome: Progressing  Goal: Improved Attention and Thought Clarity  4/2/2023 2114 by Jeni Connolly RN  Outcome: Progressing  4/2/2023 0938 by Jeni Connolly RN  Outcome: Progressing  Intervention: Maximize Cognitive Function  Recent Flowsheet Documentation  Taken 4/2/2023 1525 by Jeni Connolly RN  Sensory Stimulation Regulation: auditory stimulation minimized  Reorientation Measures: clock in view  Taken 4/2/2023 0745 by Jeni Connolly RN  Sensory Stimulation Regulation: auditory stimulation minimized  Reorientation Measures: clock in view  Goal: Improved Sleep  4/2/2023 2114 by Jeni Connolly RN  Outcome: Progressing  4/2/2023 0938 by Jeni Connolly RN  Outcome: Progressing       Goal Outcome Evaluation:  Patient up in chair most of the evening     BG were 128 and 134.     Continued on zosyn today.     Patient had moderate left flank pain, resolved with scheduled tylenol. Left  DAVIS drain with minimal drainage.

## 2023-04-03 NOTE — PROGRESS NOTES
Care Management Follow Up    Length of Stay (days): 6    Expected Discharge Date: 04/04/2023     Concerns to be Addressed:  Needs home care PT     Patient plan of care discussed at interdisciplinary rounds: Yes    Anticipated Discharge Disposition: Home with home care     Anticipated Discharge Services: Home with home care      Education Provided on the Discharge Plan: Per Care Team   Patient/Family in Agreement with the Plan:  Yes    Referrals Placed by CM/SW:    Private pay costs discussed: Not applicable    Additional Information:  Chart reviewed.    Background:  Patient is . Uses a 4WW. Recent discharge from [facility]. [facility] has accepted for RN. 4/2 PT also recommended if Pt is going home.    CM updates:  Therapy recs home PT, pt is moving around well. Will not qualify for TCU.    RNCM established RN for through [facility], and they will add home PT.      Per ID note speaks of CT with abscessogram to see the abx coarse.    11:00am- RNCM spoke with provider, pts  is legally blind,and pt would not be able to drain DAVIS drain on own. Plus is requiring a little more assistance with the stairs at this time,and feels TCU is appropriate.    Family prefers [facility] TCU.      RNCM sent referral to [facility] TCU pending. May need OT consult as well.       CM will continue to follow plan of care, review recommendations,and assist with any discharge needs anticipated.       Janet Ko RN

## 2023-04-04 ENCOUNTER — APPOINTMENT (OUTPATIENT)
Dept: OCCUPATIONAL THERAPY | Facility: HOSPITAL | Age: 78
DRG: 862 | End: 2023-04-04
Attending: INTERNAL MEDICINE
Payer: COMMERCIAL

## 2023-04-04 ENCOUNTER — APPOINTMENT (OUTPATIENT)
Dept: INTERVENTIONAL RADIOLOGY/VASCULAR | Facility: HOSPITAL | Age: 78
DRG: 862 | End: 2023-04-04
Attending: NURSE PRACTITIONER
Payer: COMMERCIAL

## 2023-04-04 ENCOUNTER — APPOINTMENT (OUTPATIENT)
Dept: PHYSICAL THERAPY | Facility: HOSPITAL | Age: 78
DRG: 862 | End: 2023-04-04
Payer: COMMERCIAL

## 2023-04-04 ENCOUNTER — APPOINTMENT (OUTPATIENT)
Dept: CT IMAGING | Facility: HOSPITAL | Age: 78
DRG: 862 | End: 2023-04-04
Attending: NURSE PRACTITIONER
Payer: COMMERCIAL

## 2023-04-04 LAB
BACTERIA ABSC ANAEROBE+AEROBE CULT: ABNORMAL
CREAT SERPL-MCNC: 0.57 MG/DL (ref 0.51–0.95)
GFR SERPL CREATININE-BSD FRML MDRD: >90 ML/MIN/1.73M2
GLUCOSE BLDC GLUCOMTR-MCNC: 110 MG/DL (ref 70–99)
GLUCOSE BLDC GLUCOMTR-MCNC: 117 MG/DL (ref 70–99)
GLUCOSE BLDC GLUCOMTR-MCNC: 122 MG/DL (ref 70–99)
GLUCOSE BLDC GLUCOMTR-MCNC: 150 MG/DL (ref 70–99)
GLUCOSE BLDC GLUCOMTR-MCNC: 156 MG/DL (ref 70–99)
PLATELET # BLD AUTO: 333 10E3/UL (ref 150–450)

## 2023-04-04 PROCEDURE — 250N000011 HC RX IP 250 OP 636: Performed by: NURSE PRACTITIONER

## 2023-04-04 PROCEDURE — 120N000001 HC R&B MED SURG/OB

## 2023-04-04 PROCEDURE — 255N000002 HC RX 255 OP 636: Performed by: RADIOLOGY

## 2023-04-04 PROCEDURE — 74177 CT ABD & PELVIS W/CONTRAST: CPT

## 2023-04-04 PROCEDURE — 0WPHX0Z REMOVAL OF DRAINAGE DEVICE FROM RETROPERITONEUM, EXTERNAL APPROACH: ICD-10-PCS | Performed by: RADIOLOGY

## 2023-04-04 PROCEDURE — 36415 COLL VENOUS BLD VENIPUNCTURE: CPT | Performed by: INTERNAL MEDICINE

## 2023-04-04 PROCEDURE — 250N000013 HC RX MED GY IP 250 OP 250 PS 637: Performed by: INTERNAL MEDICINE

## 2023-04-04 PROCEDURE — 85049 AUTOMATED PLATELET COUNT: CPT | Performed by: INTERNAL MEDICINE

## 2023-04-04 PROCEDURE — 49424 ASSESS CYST CONTRAST INJECT: CPT

## 2023-04-04 PROCEDURE — 99233 SBSQ HOSP IP/OBS HIGH 50: CPT | Performed by: EMERGENCY MEDICINE

## 2023-04-04 PROCEDURE — 99207 PR CDG-CUT & PASTE-POTENTIAL IMPACT ON LEVEL: CPT | Performed by: EMERGENCY MEDICINE

## 2023-04-04 PROCEDURE — 99232 SBSQ HOSP IP/OBS MODERATE 35: CPT | Mod: GC | Performed by: INTERNAL MEDICINE

## 2023-04-04 PROCEDURE — 82565 ASSAY OF CREATININE: CPT | Performed by: INTERNAL MEDICINE

## 2023-04-04 PROCEDURE — 97110 THERAPEUTIC EXERCISES: CPT | Mod: GP

## 2023-04-04 PROCEDURE — 97116 GAIT TRAINING THERAPY: CPT | Mod: GP

## 2023-04-04 PROCEDURE — 97535 SELF CARE MNGMENT TRAINING: CPT | Mod: GO

## 2023-04-04 PROCEDURE — 250N000013 HC RX MED GY IP 250 OP 250 PS 637: Performed by: PHYSICIAN ASSISTANT

## 2023-04-04 PROCEDURE — 250N000011 HC RX IP 250 OP 636: Performed by: INTERNAL MEDICINE

## 2023-04-04 PROCEDURE — 97165 OT EVAL LOW COMPLEX 30 MIN: CPT | Mod: GO

## 2023-04-04 RX ORDER — NALOXONE HYDROCHLORIDE 0.4 MG/ML
0.2 INJECTION, SOLUTION INTRAMUSCULAR; INTRAVENOUS; SUBCUTANEOUS
Status: DISCONTINUED | OUTPATIENT
Start: 2023-04-04 | End: 2023-04-05 | Stop reason: HOSPADM

## 2023-04-04 RX ORDER — NALOXONE HYDROCHLORIDE 0.4 MG/ML
0.4 INJECTION, SOLUTION INTRAMUSCULAR; INTRAVENOUS; SUBCUTANEOUS
Status: DISCONTINUED | OUTPATIENT
Start: 2023-04-04 | End: 2023-04-05 | Stop reason: HOSPADM

## 2023-04-04 RX ORDER — IOPAMIDOL 755 MG/ML
90 INJECTION, SOLUTION INTRAVASCULAR ONCE
Status: COMPLETED | OUTPATIENT
Start: 2023-04-04 | End: 2023-04-04

## 2023-04-04 RX ORDER — FENTANYL CITRATE 50 UG/ML
25-50 INJECTION, SOLUTION INTRAMUSCULAR; INTRAVENOUS EVERY 5 MIN PRN
Status: DISCONTINUED | OUTPATIENT
Start: 2023-04-04 | End: 2023-04-04

## 2023-04-04 RX ADMIN — ENOXAPARIN SODIUM 40 MG: 40 INJECTION SUBCUTANEOUS at 16:47

## 2023-04-04 RX ADMIN — LEVOTHYROXINE SODIUM 137 MCG: 0.11 TABLET ORAL at 05:57

## 2023-04-04 RX ADMIN — IOHEXOL 5 ML: 350 INJECTION, SOLUTION INTRAVENOUS at 11:27

## 2023-04-04 RX ADMIN — ACETAMINOPHEN 975 MG: 325 TABLET ORAL at 11:55

## 2023-04-04 RX ADMIN — ROSUVASTATIN CALCIUM 20 MG: 10 TABLET, FILM COATED ORAL at 20:39

## 2023-04-04 RX ADMIN — AMLODIPINE BESYLATE 2.5 MG: 2.5 TABLET ORAL at 08:04

## 2023-04-04 RX ADMIN — INSULIN ASPART 1 UNITS: 100 INJECTION, SOLUTION INTRAVENOUS; SUBCUTANEOUS at 16:54

## 2023-04-04 RX ADMIN — AMOXICILLIN AND CLAVULANATE POTASSIUM 1 TABLET: 875; 125 TABLET, FILM COATED ORAL at 20:39

## 2023-04-04 RX ADMIN — IOPAMIDOL 90 ML: 755 INJECTION, SOLUTION INTRAVENOUS at 10:42

## 2023-04-04 RX ADMIN — POLYETHYLENE GLYCOL 3350 17 G: 17 POWDER, FOR SOLUTION ORAL at 08:01

## 2023-04-04 RX ADMIN — FAMOTIDINE 20 MG: 20 TABLET ORAL at 08:03

## 2023-04-04 RX ADMIN — FAMOTIDINE 20 MG: 20 TABLET ORAL at 20:39

## 2023-04-04 RX ADMIN — VENLAFAXINE HYDROCHLORIDE 150 MG: 75 CAPSULE, EXTENDED RELEASE ORAL at 08:03

## 2023-04-04 RX ADMIN — AMOXICILLIN AND CLAVULANATE POTASSIUM 1 TABLET: 875; 125 TABLET, FILM COATED ORAL at 08:04

## 2023-04-04 RX ADMIN — ACETAMINOPHEN 975 MG: 325 TABLET ORAL at 20:39

## 2023-04-04 RX ADMIN — INSULIN ASPART 1 UNITS: 100 INJECTION, SOLUTION INTRAVENOUS; SUBCUTANEOUS at 13:07

## 2023-04-04 RX ADMIN — OXYBUTYNIN CHLORIDE 10 MG: 10 TABLET, EXTENDED RELEASE ORAL at 08:03

## 2023-04-04 ASSESSMENT — ACTIVITIES OF DAILY LIVING (ADL)
ADLS_ACUITY_SCORE: 27
ADLS_ACUITY_SCORE: 25
PREVIOUS_RESPONSIBILITIES: MEAL PREP;HOUSEKEEPING;SHOPPING;MEDICATION MANAGEMENT;FINANCES;DRIVING
ADLS_ACUITY_SCORE: 27
ADLS_ACUITY_SCORE: 25

## 2023-04-04 NOTE — PROGRESS NOTES
Care Management Follow Up    Length of Stay (days): 7    Expected Discharge Date: 04/05/2023     Concerns to be Addressed:       Patient plan of care discussed at interdisciplinary rounds: Yes    Anticipated Discharge Disposition:  Home Care     Anticipated Discharge Services:  RN PT OT  Anticipated Discharge DME:      Patient/family educated on Medicare website which has current facility and service quality ratings:    Education Provided on the Discharge Plan:    Patient/Family in Agreement with the Plan:      Referrals Placed by CM/SW:    Private pay costs discussed: Not applicable    Additional Information:  Patient having abscessogram today with possible removal of drain. Per MD, patient's  states he can help manage drain at home if unable to be removed. Anticipate discharge home with [facility] home care PT/OT/RN tomorrow.     11:55 AM  Drain was removed.       VIANEY ButterfieldW

## 2023-04-04 NOTE — PROGRESS NOTES
INFECTIOUS DISEASE FOLLOW UP NOTE    ID attending.  I agree with this note, confirmed plan and saw the pt.    Dr Lopez       ASSESSMENT: 1. Intra-abdominal abscess. 13 x 7 x 4 cm abscess seen on CT 3/27/23. Drain placed 3/28, 80cc pus removed. Culture prelim E coli.   2. Presented in January with perforated stercolic ulcer of sigmoid colon, underwent laparoscopic sigmoid colectomy with anastomosis, washout 1/29. Findings of diffuse feculent peritonitis. Had bacteremia with clostridium species, Collinsella aerofaciens, bacteroides vulgatus, and Eubacterium spp at the time. Treated with IV antibiotics post-op, completed with meropenem (2/5-16), micafungin 2/5-16), vancomycin (2/8-16). CT 2/7/23 showed no drainable fluid. With feculent peritonitis, there is significant risk for subsequent abscess despite washout, IV antibiotics post-op.    3. DM  4. Previously treated for L great toe infection with antibiotics. MRI sept 2022 without osteomyelitis.   5. Christianity  6. Cholelithiasis   7. Penicillin/augmentin intolerance. Tolerated pip/tazo last hospital stay.     PLAN:  Will need serial CT/abscessogram to monitor abscess, duration of antibiotics will depend on resolution of abscess on imaging. generally continue antibiotics while drain is in, then for another 5-7 days beyond drain removal.   Continue with PO Augmentin, treat 5 days past drain pull, IR to manage timing of pulling drain.   I would not expect her to need to Follow-up with ID post discharge though.     Janet Melendez MD PGY3  [facility]   ______________________________________________________________________    SUBJECTIVE / INTERVAL HISTORY: Just worked with PT, went well, pain is well controlled. States her drain has had minimal output.    ROS: All other systems negative except as listed above.        OBJECTIVE:  BP (!) 173/73 (BP Location: Left arm)   Pulse 72   Temp 97.9  F (36.6  C) (Oral)   Resp 18   Ht 1.651 m (5'  "5\")   Wt 91.6 kg (202 lb)   SpO2 90%   BMI 33.61 kg/m         Vital Signs  Temp: 97.8  F (36.6  C)  Temp src: Oral  Resp: 22  Pulse: 73  Pulse Rate Source: Monitor  BP: (!) 148/65  BP Location: Left arm    Temp (24hrs), Av.8  F (36.6  C), Min:97.7  F (36.5  C), Max:97.8  F (36.6  C)      GEN: No acute distress. Sitting in chair.   RESPIRATORY:  Normal breathing pattern.   CARDIOVASCULAR:  Regular rate and rhythm.   ABDOMEN:  Soft, non-tender. DAVIS with drainage   EXTREMITIES: No edema.  SKIN/HAIR/NAILS:  No rashes  IV: peripheral        Antibiotics:  meropenem 3/28-  Vancomycin 3/28-31  Zosyn 3/31 - 4/3  Augmentin 4/3 -     Pertinent labs:    Recent Labs   Lab 23  0630 23  0633 23  0551   WBC  --  6.5 7.3   HGB  --  9.1* 10.0*   HCT  --  30.3* 33.2*    330 374        Recent Labs   Lab 23  0710 23  0658 23  0551    138 139   CO2 24 26 26   BUN 13.6 12.6 8.2         Lab Results   Component Value Date    ALT 9 (L) 2023    AST 20 2023    ALKPHOS 86 2023         MICROBIOLOGY DATA:   3/28 blood negative to date  3/28 abscess culture E coli    RADIOLOGY:  CT Retroperitoneal Abscess Drainage    Result Date: 3/28/2023  EXAM: 1. PERCUTANEOUS DRAIN PLACEMENT LEFT LOWER ABDOMEN ABSCESS 2. CT GUIDANCE 3. CONSCIOUS SEDATION LOCATION: [facility] DATE/TIME: 3/28/2023 9:48 AM INDICATION: Left pericolic gutter abscess in the abdomen. TECHNIQUE: Dose reduction techniques were used. PROCEDURE: Informed consent obtained. Site marked. Prior images reviewed. Required items made available. Patient identity confirmed verbally and with arm band. Patient reevaluated immediately before administering sedation. Universal protocol was followed. Time out performed. The site was prepped and draped in sterile fashion. 10 mL of 1% lidocaine was infused into the local soft tissues. Using standard technique and under direct CT guidance, a 10 Kittitian drainage " catheter was inserted into the fluid collection. After initial aspiration the pocket was irrigated several times with sterile saline to clear. SPECIMEN: 80 mL of thick pus fluid was aspirated and sent to lab for cultures and Gram stain. BLOOD LOSS: Minimal. The patient tolerated the procedure well. No immediate complications. SEDATION: Versed 1.0 mg. Fentanyl 50 mcg. The procedure was performed with administration intravenous conscious sedation with appropriate preoperative, intraoperative, and postoperative evaluation. 22 minutes of supervised face to face conscious sedation time was provided by a radiology nurse under my direct supervision.     IMPRESSION: 1.  Successful CT-guided drain placement into left pericolic gutter abdominal abscess. Reference CPT Codes: 23657, 96887, 84272    CT Abdomen Pelvis w Contrast    Result Date: 3/27/2023  EXAM: CT ABDOMEN PELVIS W CONTRAST LOCATION: [facility] DATE/TIME: 3/27/2023 11:41 PM INDICATION: h o colonic perf 1 29 s p colectomy, just d c'd from TCU, return of abd pain R sided. COMPARISON: Noncontrast CT from 03/27/2023. Contrast enhanced CT from 02/07/2023. TECHNIQUE: CT scan of the abdomen and pelvis was performed following injection of IV contrast. Multiplanar reformats were obtained. Dose reduction techniques were used. CONTRAST: ISOVUE 370 100ML FINDINGS: LOWER CHEST: Mild cardiomegaly. No basilar infiltrate. Fat-containing left Bochdalek hernia. HEPATOBILIARY: Cholelithiasis. Nondistended gallbladder. No focal liver lesion. The liver measures 21 cm craniocaudal. There is slight nodularity of the inferior liver surface contour which could reflect early fibrosis. PANCREAS: Normal. SPLEEN: Normal. ADRENAL GLANDS: Normal. KIDNEYS/BLADDER: Normal. BOWEL: Again noted is a fluid collection centered within the inferior reaches of the left paracolic gutter continuing into the left iliac fossa. There are locules of gas within this collection and there  is some thin peripheral enhancement, consistent with abscess formation. Although measurement is challenging due to morphology of the collection, this measures at least 13.1 cm in length on coronal image 64, and 7.2 x 4.4 cm in the transverse and AP dimensions on image 106 of series 3. A small amount of extension continues caudally into the left hemipelvis near to the level of the patient's colorectal anastomotic suture line as seen previously. There is a large amount of stool in the proximal colon. Small bowel caliber within normal limits. No free air. LYMPH NODES: Normal. VASCULATURE: Aortoiliac atherosclerotic calcification. PELVIC ORGANS: Absent uterus. MUSCULOSKELETAL: Advanced degenerative disc and facet changes of the lumbar spine with a minimal superior endplate compression deformity at L4. Arthritic change of the hips.     IMPRESSION: 1.  Findings consistent with a 13.1 cm abscess primarily situated along the left paracolic gutter and iliac fossa as described above.

## 2023-04-04 NOTE — PLAN OF CARE
Problem: Plan of Care - These are the overarching goals to be used throughout the patient stay.    Goal: Optimal Comfort and Wellbeing  Outcome: Progressing   Goal Outcome Evaluation:         Pt is alert but confused. . Pt denies pain. Pt has been resting when rounded upon. Waiting for placement. Uneventful shift.  Michela Granados RN

## 2023-04-04 NOTE — PROGRESS NOTES
Interventional Radiology - Pre-Procedure Note:  Inpatient - [facility]  2023     Procedure Requested: abscessogram  Requested by: Bella Licea CNP    Brief HPI: Suzy Gómez is a 77 year old female with recent perforated stercolic ulcer of sigmoid colon; s/p laparoscopic sigmoid colectomy with anastomosis, washout . Findings of diffuse feculent peritonitis. Had bacteremia with clostridium species, Collinsella aerofaciens, bacteroides vulgatus, and Eubacterium spp at the time. Treated with IV antibiotics post-op, completed with meropenem (-), micafungin -), vancomycin (). CT 23 showed no drainable fluid. Now with intra-abdominal abscess initially measuring 13 x 7 x 4 cm seen on CT 3/27/23. 3/28/23 s/p CT-guided drain placement into left pericolic gutter abdominal abscess. Now with no output from abscess drain.      IMAGIN23 CT abd/pel: PENDING    EXAM:  1. PERCUTANEOUS DRAIN PLACEMENT LEFT LOWER ABDOMEN ABSCESS  2. CT GUIDANCE  3. CONSCIOUS SEDATION  LOCATION: [facility]  DATE/TIME: 3/28/2023 9:48 AM     INDICATION: Left pericolic gutter abscess in the abdomen.  TECHNIQUE: Dose reduction techniques were used.     PROCEDURE: Informed consent obtained. Site marked. Prior images reviewed. Required items made available. Patient identity confirmed verbally and with arm band. Patient reevaluated immediately before administering sedation. Universal protocol was   followed. Time out performed. The site was prepped and draped in sterile fashion. 10 mL of 1% lidocaine was infused into the local soft tissues. Using standard technique and under direct CT guidance, a 10 Tongan drainage catheter was inserted into the   fluid collection. After initial aspiration the pocket was irrigated several times with sterile saline to clear.      SPECIMEN: 80 mL of thick pus fluid was aspirated and sent to lab for cultures and Gram stain.     BLOOD  "LOSS: Minimal.     The patient tolerated the procedure well. No immediate complications.     SEDATION: Versed 1.0 mg. Fentanyl 50 mcg. The procedure was performed with administration intravenous conscious sedation with appropriate preoperative, intraoperative, and postoperative evaluation.     22 minutes of supervised face to face conscious sedation time was provided by a radiology nurse under my direct supervision.                                                                      IMPRESSION:  1.  Successful CT-guided drain placement into left pericolic gutter abdominal abscess.       NPO: ate breakfast  ANTICOAGULANTS: lovenox 40mg Q 24H  ANTIBIOTICS: PO augmentin, IV zosyn    ALLERGIES  Allergies   Allergen Reactions     Penicillins Nausea and Vomiting and GI Disturbance     Atorvastatin Muscle Pain (Myalgia)     Augmentin Nausea and Vomiting and Diarrhea     Bupropion Other (See Comments)     Mood swings     Exenatide Rash     Nitrofurantoin Hives and Swelling         LABS:  INR   Date Value Ref Range Status   03/28/2023 1.15 0.85 - 1.15 Final      Hemoglobin   Date Value Ref Range Status   04/03/2023 9.1 (L) 11.7 - 15.7 g/dL Final   ]  Platelet Count   Date Value Ref Range Status   04/04/2023 333 150 - 450 10e3/uL Final     Creatinine   Date Value Ref Range Status   04/04/2023 0.57 0.51 - 0.95 mg/dL Final     Potassium   Date Value Ref Range Status   04/02/2023 4.3 3.4 - 5.3 mmol/L Final         EXAM:  BP (!) 173/73 (BP Location: Left arm)   Pulse 72   Temp 97.9  F (36.6  C) (Oral)   Resp 18   Ht 1.651 m (5' 5\")   Wt 91.6 kg (202 lb)   SpO2 90%   BMI 33.61 kg/m    General:  Stable.  In no acute distress.    Neuro:  A&O x 3. Moves all extremities equally.  Resp:  Lungs clear to auscultation bilaterally.  Cardio:  S1S2 and reg, without murmur, clicks or rubs  Abdomen:  LLQ drain intact to DAVIS bulb; holding suction; scant serous drainage noted; no leakage.     Pre-Sedation Assessment:  Sedation plan based " on assessment: fentanyl only  ASA Classification: Class 3 - SEVERE SYSTEMIC DISEASE, DEFINITE FUNCTIONAL LIMITATIONS.   Code Status: Full Code intra procedure, per discussion with patient.  No blood products.         ASSESSMENT/PLAN:   No drainage output from abscess drain.   CT abd/pel.   Keep NPO.  Abscessogram with possible reposition, exchange, and/or removal of tubing with fentanyl only.    Procedural education reviewed with patient in detail including, but not limited to risks, benefits and alternatives with understanding verbalized by patient.    Total time spent on the date of the encounter: 25 minutes.      HERBIE Cordero CNP  Interventional Radiology

## 2023-04-04 NOTE — PLAN OF CARE
Problem: Plan of Care - These are the overarching goals to be used throughout the patient stay.    Goal: Optimal Comfort and Wellbeing  Outcome: Progressing   Goal Outcome Evaluation:       Pt A&O x4. Pt reports no pain today. Pt had a CT scan and had DAVIS drain removed. Pt is able to ambulate with walker. Pt reports she has normal BM this morning. Discussed possible discharge today.        Paulina Zapata RN

## 2023-04-04 NOTE — PLAN OF CARE
Problem: Plan of Care - These are the overarching goals to be used throughout the patient stay.    Goal: Absence of Hospital-Acquired Illness or Injury  Intervention: Identify and Manage Fall Risk  Recent Flowsheet Documentation  Safety Promotion/Fall Prevention: activity supervised  Intervention: Prevent Skin Injury  Recent Flowsheet Documentation  Body Position: position changed independently   Goal Outcome Evaluation:      Patient had a small emesis while up to bathroom.  Patient stated she experienced no nausea before or after emesis.  TE 75% of dinner and tolerated this well.    Denied pain/discomfort.  Scant amount of serous drainage (drops) out of DAVIS.  Cont to monitor

## 2023-04-04 NOTE — PROGRESS NOTES
Daily Progress Note    Assessment/Plan:  Suzy Gómez is a 77 year old female with medical history of hypertension, dyslipidemia, type 2 diabetes mellitus, recent colonic perforation requiring sigmoidectomy complicated by feculent peritonitis and sepsis and ICU admission, admitted on 3/27/2023 with constipation and abdominal pain.  ED work-up showed leukocytosis and CT findings of 13.1 cm abscess located along the left paracolic gutter and iliac fossa.     1.  Intra-abdominal abscess/postop infection.              -s/p drain placement with return of 80 ml purulent fluid on 3/28, Cx with 3+ of 2 different strains of E.coli,             -switched to oral Augmentin April 3, will need to continue antibiotics until drain is pulled and then for another 5 to 7 days beyond drain removal            -Discussed with IR, will likely do abscessogram today with possible removal of drain            - is legally blind and patient is confused so unable to safely do DAVIS drain cares herself.  She wound also benefit from strength training so discussed recommendations for TCU.  Discussed with  Awais and he is in agreement        2.  Acute encephalopathy.  Per  she has never returned to baseline since hospitalization in January.  Continues to have intermittent confusion/forgetfulness.   Decrease oxybutynin dose, as might be contributing,  Decrease oxycodone dose to 2.5 mg if needed only for severe pain        3.   H/o perforated stercoral ulcer of sigmoid colon s/p sigmoidectomy with anastomosis and washout 1/29.  Treated with IV antibiotics.       4.  DM 2 non insulin dependent.  Hold PTA meds, will use ISS as needed.  Blood sugars have been at goal     5.  HTN. Controlled on amlodipine      6.  HLD on statin     7.  Hypothyroidism on Levothyroxine     8.  Obesity Body mass index is 33.61 kg/m .    Code status:Full Code        Barriers to Discharge: Drain, weakness    Disposition: Discharge home with home care  tomorrow if able to remove drain.  If drain still in place,  Awais thinks with some more education they could be able to care for it at home, especially if home nursing ordered    Subjective:  Suzy is new to me today, chart reviewed and discussed with staff.  I also called and talked with her  Awais.  Suzy  has no new complaints today.  No significant pain, no fevers or chills, no nausea vomiting or diarrhea and appetite appears adequate.        Current Medications Reviewed via EHR List    Objective:  Vital signs in last 24 hours:  [unfilled]  .prog  Weight:   @THISENCWEIGHTS(1)@  Weight change:   Body mass index is 33.61 kg/m .    Intake/Output last 3 shifts:  No intake/output data recorded.  Intake/Output this shift:  No intake/output data recorded.    Physical Exam:  General: No apparent distress, sitting up in chair  CV: Regular rate and rhythm  Lungs: Clear to auscultation  Abdomen: Drain intact, soft nontender        Imaging:  Personally Reviewed.  CT Retroperitoneal Abscess Drainage    Result Date: 3/28/2023  EXAM: 1. PERCUTANEOUS DRAIN PLACEMENT LEFT LOWER ABDOMEN ABSCESS 2. CT GUIDANCE 3. CONSCIOUS SEDATION LOCATION: [facility] DATE/TIME: 3/28/2023 9:48 AM INDICATION: Left pericolic gutter abscess in the abdomen. TECHNIQUE: Dose reduction techniques were used. PROCEDURE: Informed consent obtained. Site marked. Prior images reviewed. Required items made available. Patient identity confirmed verbally and with arm band. Patient reevaluated immediately before administering sedation. Universal protocol was followed. Time out performed. The site was prepped and draped in sterile fashion. 10 mL of 1% lidocaine was infused into the local soft tissues. Using standard technique and under direct CT guidance, a 10 Slovak drainage catheter was inserted into the fluid collection. After initial aspiration the pocket was irrigated several times with sterile saline to clear.  SPECIMEN: 80 mL of thick pus fluid was aspirated and sent to lab for cultures and Gram stain. BLOOD LOSS: Minimal. The patient tolerated the procedure well. No immediate complications. SEDATION: Versed 1.0 mg. Fentanyl 50 mcg. The procedure was performed with administration intravenous conscious sedation with appropriate preoperative, intraoperative, and postoperative evaluation. 22 minutes of supervised face to face conscious sedation time was provided by a radiology nurse under my direct supervision.     IMPRESSION: 1.  Successful CT-guided drain placement into left pericolic gutter abdominal abscess. Reference CPT Codes: 48653, 62719, 07406    CT Abdomen Pelvis w Contrast    Result Date: 3/27/2023  EXAM: CT ABDOMEN PELVIS W CONTRAST LOCATION: [facility] DATE/TIME: 3/27/2023 11:41 PM INDICATION: h o colonic perf 1 29 s p colectomy, just d c'd from TCU, return of abd pain R sided. COMPARISON: Noncontrast CT from 03/27/2023. Contrast enhanced CT from 02/07/2023. TECHNIQUE: CT scan of the abdomen and pelvis was performed following injection of IV contrast. Multiplanar reformats were obtained. Dose reduction techniques were used. CONTRAST: ISOVUE 370 100ML FINDINGS: LOWER CHEST: Mild cardiomegaly. No basilar infiltrate. Fat-containing left Bochdalek hernia. HEPATOBILIARY: Cholelithiasis. Nondistended gallbladder. No focal liver lesion. The liver measures 21 cm craniocaudal. There is slight nodularity of the inferior liver surface contour which could reflect early fibrosis. PANCREAS: Normal. SPLEEN: Normal. ADRENAL GLANDS: Normal. KIDNEYS/BLADDER: Normal. BOWEL: Again noted is a fluid collection centered within the inferior reaches of the left paracolic gutter continuing into the left iliac fossa. There are locules of gas within this collection and there is some thin peripheral enhancement, consistent with abscess formation. Although measurement is challenging due to morphology of the  collection, this measures at least 13.1 cm in length on coronal image 64, and 7.2 x 4.4 cm in the transverse and AP dimensions on image 106 of series 3. A small amount of extension continues caudally into the left hemipelvis near to the level of the patient's colorectal anastomotic suture line as seen previously. There is a large amount of stool in the proximal colon. Small bowel caliber within normal limits. No free air. LYMPH NODES: Normal. VASCULATURE: Aortoiliac atherosclerotic calcification. PELVIC ORGANS: Absent uterus. MUSCULOSKELETAL: Advanced degenerative disc and facet changes of the lumbar spine with a minimal superior endplate compression deformity at L4. Arthritic change of the hips.     IMPRESSION: 1.  Findings consistent with a 13.1 cm abscess primarily situated along the left paracolic gutter and iliac fossa as described above.      Lab Results:  Personally Reviewed.   Fingerstick Blood Glucose: @[id number](POCGLUFGR:10)@    Last Hbg A1C: No results found for: HGBA1C   Lab Results   Component Value Date    INR 1.15 03/28/2023     Recent Results (from the past 24 hour(s))   Glucose by meter    Collection Time: 04/03/23 11:36 AM   Result Value Ref Range    GLUCOSE BY METER POCT 177 (H) 70 - 99 mg/dL   Glucose by meter    Collection Time: 04/03/23  5:03 PM   Result Value Ref Range    GLUCOSE BY METER POCT 118 (H) 70 - 99 mg/dL   Glucose by meter    Collection Time: 04/03/23  9:41 PM   Result Value Ref Range    GLUCOSE BY METER POCT 129 (H) 70 - 99 mg/dL   Glucose by meter    Collection Time: 04/04/23  2:05 AM   Result Value Ref Range    GLUCOSE BY METER POCT 117 (H) 70 - 99 mg/dL   Creatinine    Collection Time: 04/04/23  6:30 AM   Result Value Ref Range    Creatinine 0.57 0.51 - 0.95 mg/dL    GFR Estimate >90 >60 mL/min/1.73m2   Platelet count    Collection Time: 04/04/23  6:30 AM   Result Value Ref Range    Platelet Count 333 150 - 450 10e3/uL   Glucose by meter    Collection Time: 04/04/23  7:47 AM    Result Value Ref Range    GLUCOSE BY METER POCT 122 (H) 70 - 99 mg/dL           Advanced Care Planning    Medical Decision Making       50 MINUTES SPENT BY ME on the date of service doing chart review, history, exam, documentation & further activities per the note.      Maurice Philippe MD  Date: 4/4/2023  Time: 9:47 AM  [facility]  Family Medicine

## 2023-04-04 NOTE — PROGRESS NOTES
04/04/23 0850   Appointment Info   Signing Clinician's Name / Credentials (OT) Kenzie Keenan OTR/L   Living Environment   People in Home spouse;child(tara), adult   Current Living Arrangements house   Home Accessibility stairs to enter home;stairs within home   Number of Stairs, Main Entrance 1   Stair Railings, Main Entrance none   Number of Stairs, Within Home, Primary greater than 10 stairs   Stair Railings, Within Home, Primary railings safe and in good condition   Transportation Anticipated family or friend will provide   Living Environment Comments one step into kitchen and down one flight to basement for laundry but pt doesn't usually go to basement.   does laundy   Self-Care   Usual Activity Tolerance good   Current Activity Tolerance good   Equipment Currently Used at Home cane, straight;walker, rolling   Fall history within last six months no   Activity/Exercise/Self-Care Comment independent with self cares at baseline.  pt has been staying at a TCU but feels close to baseline now.   Instrumental Activities of Daily Living (IADL)   Previous Responsibilities meal prep;housekeeping;shopping;medication management;finances;driving   IADL Comments independent with IADLs at baseline   General Information   Onset of Illness/Injury or Date of Surgery 03/27/23   Referring Physician Enedelia Oden MD   Patient/Family Therapy Goal Statement (OT) pt states she would prefer to go home at [facility] and feels she can manage her drain independently   Additional Occupational Profile Info/Pertinent History of Current Problem 77 year old female with medical history of hypertension, dyslipidemia, type 2 diabetes mellitus, recent colonic perforation requiring sigmoidectomy complicated by feculent peritonitis and sepsis and ICU admission, admitted on 3/27/2023 with constipation and abdominal pain.  ED work-up showed leukocytosis and CT findings of 13.1 cm abscess located along the left paracolic gutter and  iliac fossa.   Existing Precautions/Restrictions fall   Limitations/Impairments safety/cognitive   Cognitive Status Examination   Orientation Status orientation to person, place and time   Follows Commands WFL;delayed response/completion   Strength Comprehensive (MMT)   Comment, General Manual Muscle Testing (MMT) Assessment WFL   Bed Mobility   Bed Mobility supine-sit;sit-supine   Supine-Sit [place] (Bed Mobility) independent   Sit-Supine [place] (Bed Mobility) independent   Transfers   Transfers bed-chair transfer;toilet transfer;shower transfer   Transfer Skill: Bed to Chair/Chair to Bed   Bed-Chair [place] (Transfers) supervision   Assistive Device (Bed-Chair Transfers) rolling walker   Shower Transfer   Type (Shower Transfer) lateral   [place] Level (Shower Transfer) supervision;verbal cues   Assistive Device (Shower Transfer) grab bar, tub rail;walker, standard   Toilet Transfer   Type (Toilet Transfer) sit-stand;stand-sit   [place] Level (Toilet Transfer) supervision;verbal cues   Assistive Device (Toilet Transfer) grab bars/safety frame;walker, standard   Balance   Balance Comments no loss of balance   Activities of Daily Living   BADL Assessment/Intervention lower body dressing   Lower Body Dressing Assessment/Training   Comment, (Lower Body Dressing) donning and doffing socks edge of bed   [place] Level (Lower Body Dressing) independent   Clinical Impression   Criteria for Skilled Therapeutic Interventions Met (OT) Yes, treatment indicated   OT Diagnosis reduced functional moblity and safety, decreased endurance for ADLS   Influenced by the following impairments abdomindal abcess   OT Problem List-Impairments impacting ADL problems related to;activity tolerance impaired;mobility   Assessment of Occupational Performance 1-3 Performance Deficits   Identified Performance Deficits endurance, safety with mobity   Planned Therapy Interventions (OT) ADL retraining;balance  training;bed mobility training;strengthening;transfer training;home program guidelines;progressive activity/exercise   Clinical Decision Making Complexity (OT) low complexity   Anticipated Equipment Needs Upon Discharge (OT) shower chair;reacher   Risk & Benefits of therapy have been explained evaluation/treatment results reviewed;care plan/treatment goals reviewed;participants included;patient;spouse/significant other;risks/benefits reviewed;participants voiced agreement with care plan   OT Total Evaluation Time   OT Eval, Low Complexity Minutes (05990) 14   OT Goals   Therapy Frequency (OT) Daily   OT Predicted Duration/Target Date for Goal Attainment 04/10/23   OT Goals Aerobic Activity;Hygiene/Grooming;Transfers   OT: Hygiene/Grooming modified independent;while standing   OT: Transfer Supervision/stand-by assist;Independent;with assistive device   OT: Perform aerobic activity with stable cardiovascular response intermittent activity;10 minutes  (standing toleranace, UE exercise, endurance actiities)   OT Discharge Planning   OT Plan room moblity and safety, SLUMS?, bring theraband, standing/endurance   OT Discharge Recommendation (DC Rec) home with home care occupational therapy   OT Rationale for DC Rec pt feels she can manage her drain independently.  would benefit from home care for safety teaching, endurance building and assist with bathing   OT Brief overview of current status SBA with cues for safety with moblity and transfers using walker

## 2023-04-04 NOTE — PROCEDURES
IR Procedure Note    Physician: Sarkis Neff MD    Procedure:  Abscessogram     Findings/Plan:  Abscessogram shows no residual abscess pocket. No fistula.  Drain was removed.

## 2023-04-05 ENCOUNTER — APPOINTMENT (OUTPATIENT)
Dept: OCCUPATIONAL THERAPY | Facility: HOSPITAL | Age: 78
DRG: 862 | End: 2023-04-05
Payer: COMMERCIAL

## 2023-04-05 VITALS
BODY MASS INDEX: 33.66 KG/M2 | RESPIRATION RATE: 18 BRPM | HEIGHT: 65 IN | OXYGEN SATURATION: 91 % | HEART RATE: 73 BPM | DIASTOLIC BLOOD PRESSURE: 86 MMHG | SYSTOLIC BLOOD PRESSURE: 150 MMHG | TEMPERATURE: 98.6 F | WEIGHT: 202 LBS

## 2023-04-05 LAB
GLUCOSE BLDC GLUCOMTR-MCNC: 109 MG/DL (ref 70–99)
GLUCOSE BLDC GLUCOMTR-MCNC: 144 MG/DL (ref 70–99)
GLUCOSE BLDC GLUCOMTR-MCNC: 96 MG/DL (ref 70–99)

## 2023-04-05 PROCEDURE — 97110 THERAPEUTIC EXERCISES: CPT | Mod: GO

## 2023-04-05 PROCEDURE — 250N000013 HC RX MED GY IP 250 OP 250 PS 637: Performed by: INTERNAL MEDICINE

## 2023-04-05 PROCEDURE — 250N000013 HC RX MED GY IP 250 OP 250 PS 637: Performed by: PHYSICIAN ASSISTANT

## 2023-04-05 PROCEDURE — 99239 HOSP IP/OBS DSCHRG MGMT >30: CPT | Performed by: EMERGENCY MEDICINE

## 2023-04-05 PROCEDURE — 97535 SELF CARE MNGMENT TRAINING: CPT | Mod: GO

## 2023-04-05 RX ORDER — OXYBUTYNIN CHLORIDE 10 MG/1
10 TABLET, EXTENDED RELEASE ORAL DAILY
Qty: 30 TABLET | Refills: 0 | Status: SHIPPED | OUTPATIENT
Start: 2023-04-05

## 2023-04-05 RX ADMIN — INSULIN ASPART 1 UNITS: 100 INJECTION, SOLUTION INTRAVENOUS; SUBCUTANEOUS at 11:53

## 2023-04-05 RX ADMIN — OXYBUTYNIN CHLORIDE 10 MG: 10 TABLET, EXTENDED RELEASE ORAL at 08:58

## 2023-04-05 RX ADMIN — ACETAMINOPHEN 975 MG: 325 TABLET ORAL at 11:53

## 2023-04-05 RX ADMIN — AMOXICILLIN AND CLAVULANATE POTASSIUM 1 TABLET: 875; 125 TABLET, FILM COATED ORAL at 08:58

## 2023-04-05 RX ADMIN — POLYETHYLENE GLYCOL 3350 17 G: 17 POWDER, FOR SOLUTION ORAL at 08:58

## 2023-04-05 RX ADMIN — VENLAFAXINE HYDROCHLORIDE 150 MG: 75 CAPSULE, EXTENDED RELEASE ORAL at 08:58

## 2023-04-05 RX ADMIN — FAMOTIDINE 20 MG: 20 TABLET ORAL at 08:58

## 2023-04-05 RX ADMIN — LEVOTHYROXINE SODIUM 137 MCG: 0.11 TABLET ORAL at 06:45

## 2023-04-05 RX ADMIN — AMLODIPINE BESYLATE 2.5 MG: 2.5 TABLET ORAL at 08:58

## 2023-04-05 ASSESSMENT — ACTIVITIES OF DAILY LIVING (ADL)
ADLS_ACUITY_SCORE: 25
ADLS_ACUITY_SCORE: 22

## 2023-04-05 NOTE — PLAN OF CARE
Problem: Plan of Care - These are the overarching goals to be used throughout the patient stay.    Goal: Absence of Hospital-Acquired Illness or Injury  Intervention: Identify and Manage Fall Risk  Recent Flowsheet Documentation  Safety Promotion/Fall Prevention: activity supervised   Goal Outcome Evaluation:    No discomfort.  Good appetite. Tolerated dinner.  No nausea.   Up in chair and to bathroom using walker.  Doing well overall.

## 2023-04-05 NOTE — PLAN OF CARE
Problem: Plan of Care - These are the overarching goals to be used throughout the patient stay.    Goal: Readiness for Transition of Care  Outcome: Adequate for Care Transition   Goal Outcome Evaluation:         Patient and daughter received and discussed all discharge paper work and follow up appointments. All belongings packed and sent with toñito Almonte discharge on a wc with family.Meera Riddle RN

## 2023-04-05 NOTE — PROGRESS NOTES
Care Management Discharge Note    Discharge Date: 04/05/2023       Discharge Disposition:  Home with     Discharge Services:  Home RN OT PT HHA    Discharge DME:  Pt uses a 4 wheeled walker at baseline    Discharge Transportation: family or friend will provide    Private pay costs discussed: Not applicable  Education Provided on the Discharge Plan:    Persons Notified of Discharge Plans: patient  Patient/Family in Agreement with the Plan:      Handoff Referral Completed: Yes    Additional Information:  Discharge orders complete. Patient to discharge home with her .   [facility] ([facility]) states they are open to patient for home RN PT OT HHA.   Orders sent to [facility].    Ann-Marie Toney, ADRIANSW

## 2023-04-05 NOTE — DISCHARGE SUMMARY
Occupational Therapy Discharge Summary    Reason for therapy discharge:    home    Progress towards therapy goal(s). See goals on Care Plan in [facility] electronic health record for goal details.  Goals partially met.  Barriers to achieving goals:   discharge from facility.    Therapy recommendation(s):    Continued therapy is recommended.  Rationale/Recommendations:  continue with HHOT.

## 2023-04-05 NOTE — PLAN OF CARE
Physical Therapy Discharge Summary    Reason for therapy discharge:    Discharged to home with home care.    Progress towards therapy goal(s). See goals on Care Plan in [facility] electronic health record for goal details.  Goals partially met.  Barriers to achieving goals:   discharge from facility.    Therapy recommendation(s):    Further recommended therapy is related to documented deficits, and is necessary to maximize functional independence in order for patient to return to prior level of function.      Raven Stewart, WILBERTO 4/5/2023

## 2023-04-05 NOTE — PLAN OF CARE
Problem: Pain Acute  Goal: Optimal Pain Control and Function  Outcome: Progressing   Goal Outcome Evaluation:  Pt denies any pain or discomfort. Pt has been resting when rounded upon. BG 96. Up to the bathroom with sba and a walker. Uneventful shift.  Plan to discharge home with home care.  Michela Granados RN

## 2023-04-05 NOTE — DISCHARGE SUMMARY
[facility] MEDICINE  DISCHARGE SUMMARY     Primary Care Physician: Ko, Carie Bahena  Admission Date: 3/27/2023   Discharge Provider: Saúl Philippe MD Discharge Date: 4/5/2023   Diet:   Active Diet and Nourishment Order   Procedures     Moderate Consistent Carb (60 g CHO per Meal) Diet     Diet       Code Status: Full Code   Activity: DCACTIVITY: Activity as tolerated        Condition at Discharge: Good     REASON FOR PRESENTATION(See Admission Note for Details)   Intra-abdominal abscess    PRINCIPAL & ACTIVE DISCHARGE DIAGNOSES     Principal Problem:    Intra-abdominal abscess post-procedure      PENDING LABS     Unresulted Labs Ordered in the Past 30 Days of this Admission     No orders found from 2/25/2023 to 3/28/2023.            PROCEDURES ( this hospitalization only)          RECOMMENDATIONS TO OUTPATIENT PROVIDER FOR F/U VISIT     Follow-up Appointments     Follow-up and recommended labs and tests       [facility]   within 7 days to evaluate medication change for diabetes.  No follow up   labs or test are needed.             {Additional follow-up instructions/to-do's for PCP    : Assess blood sugar control with diabetic med change    DISPOSITION     Home with home care    SUMMARY OF HOSPITAL COURSE:      Suzy Gómez is a 77 year old female with medical history of hypertension, dyslipidemia, type 2 diabetes mellitus, recent colonic perforation requiring sigmoidectomy complicated by feculent peritonitis and sepsis and ICU admission, admitted on 3/27/2023 with  CT findings of 13.1 cm abscess located along the left paracolic gutter and iliac fossa.     1.  Intra-abdominal abscess/postop infection.              -s/p drain placement with return of 80 ml purulent fluid on 3/28, Cx with 3+ of 2 different strains of E.coli,   Drain pulled April 4 after abscessogram showed no further residual, continue  Augmentin for 5 more days per ID recommendations.                  2.  Acute encephalopathy.  Resolved per  she has never returned to baseline since hospitalization in January.  Continues to have intermittent confusion/forgetfulness, improved since decreasing oxybutynin dose.  We will keep on that dose for now..        3.  DM 2 non insulin dependent.  Diabetic meds were held here and she only only received minimal sliding scale insulin.  Will resume metformin on discharge but hold other meds and request follow-up with primary provider for further management.         Discharge Medications with Med changes:     Current Discharge Medication List      START taking these medications    Details   amoxicillin-clavulanate (AUGMENTIN) 875-125 MG tablet Take 1 tablet by mouth every 12 hours for 5 days  Qty: 10 tablet, Refills: 0    Associated Diagnoses: Intra-abdominal abscess post-procedure         CONTINUE these medications which have CHANGED    Details   oxybutynin ER (DITROPAN XL) 10 MG 24 hr tablet Take 1 tablet (10 mg) by mouth daily  Qty: 30 tablet, Refills: 0    Associated Diagnoses: Intra-abdominal abscess post-procedure         CONTINUE these medications which have NOT CHANGED    Details   acetaminophen (TYLENOL) 325 MG tablet Take 650 mg by mouth 3 times daily      amLODIPine (NORVASC) 2.5 MG tablet Take 2.5 mg by mouth daily      docusate sodium (COLACE) 100 MG capsule Take 1 capsule (100 mg) by mouth 2 times daily  Qty: 30 capsule, Refills: 0      levothyroxine (SYNTHROID/LEVOTHROID) 137 MCG tablet Take 137 mcg by mouth daily before breakfast      metFORMIN (GLUCOPHAGE XR) 500 MG 24 hr tablet Take 1,000 mg by mouth 2 times daily (with meals)      rosuvastatin (CRESTOR) 20 MG tablet Take 20 mg by mouth At Bedtime      simethicone (MYLICON) 80 MG chewable tablet Take 80 mg by mouth 2 times daily as needed      venlafaxine (EFFEXOR XR) 150 MG 24 hr capsule Take 1 capsule (150 mg) by mouth daily (with  breakfast)  Qty: 30 capsule, Refills: 0    Associated Diagnoses: Depression, unspecified depression type         STOP taking these medications       empagliflozin (JARDIANCE) 25 MG TABS tablet Comments:   Reason for Stopping:         linagliptin (TRADJENTA) 5 MG TABS tablet Comments:   Reason for Stopping:                     Rationale for medication changes:      See summary        Consults       PHARMACY TO DOSE VANCO  INTERVENTIONAL RADIOLOGY ADULT/PEDS IP CONSULT  PHARMACY TO DOSE VANCO  SURGERY GENERAL IP CONSULT  CARE MANAGEMENT / SOCIAL WORK IP CONSULT  PHYSICAL THERAPY ADULT IP CONSULT  INFECTIOUS DISEASES IP CONSULT  OCCUPATIONAL THERAPY ADULT IP CONSULT    Immunizations given this encounter     Most Recent Immunizations   Administered Date(s) Administered     COVID-19 Vaccine 12+ (Pfizer) 10/29/2021     COVID-19 Vaccine 18+ (Moderna) 04/05/2022     COVID-19 Vaccine Bivalent Booster 12+ (Pfizer) 09/28/2022     FLUAD(HD)65+ QUAD 09/09/2020     Flu 65+ Years 10/23/2019     Hepatitis B, Adult 01/30/2017     Influenza (High Dose) 3 valent vaccine 10/27/2016     Influenza (IIV3) PF 01/13/2014     Influenza Vaccine 65+ (Fluzone HD) 12/01/2022     Influenza Vaccine >6 months (Alfuria,Fluzone) 11/14/2014     Influenza Vaccine, 6+MO IM (QUADRIVALENT W/PRESERVATIVES) 10/07/2017     Pneumo Conj 13-V (2010&after) 11/23/2015     Pneumococcal 23 valent 01/30/2017, 01/30/2017     TD,PF 7+ (Tenivac) 08/10/2017     Zoster recombinant adjuvanted (SHINGRIX) 01/19/2021     Zoster vaccine, live 12/04/2012           Anticoagulation Information      Recent INR results: No results for input(s): INR in the last 168 hours.  Warfarin doses (if applicable) or name of other anticoagulant:       SIGNIFICANT IMAGING FINDINGS     Results for orders placed or performed during the hospital encounter of 03/27/23   CT Abdomen Pelvis w Contrast    Impression    IMPRESSION:   1.  Findings consistent with a 13.1 cm abscess primarily situated  along the left paracolic gutter and iliac fossa as described above.   CT Retroperitoneal Abscess Drainage    Impression    IMPRESSION:  1.  Successful CT-guided drain placement into left pericolic gutter abdominal abscess.    Reference CPT Codes: 79267, 50000, 08176   CT Abdomen Pelvis w Contrast    Impression    IMPRESSION:   1.  Interval placement of pigtail drain into the left retroperitoneal abscess with marked decrease in size of the abscess cavity no complications are identified. The residual abscess measures 1.8 x 2.1 cm versus 7.2 x 4.4 cm on 03/27/2023.   IR Abscess Tube Check    Impression    IMPRESSION:  Abscessogram demonstrates no residual abscess pocket. No bowel fistula. Drain was removed without complications.           SIGNIFICANT LABORATORY FINDINGS     Most Recent 3 CBC's:Recent Labs   Lab Test 04/04/23  0630 04/03/23  0633 03/31/23  0551 03/28/23  0628   WBC  --  6.5 7.3 8.6   HGB  --  9.1* 10.0* 9.8*   MCV  --  97 95 97    [phone]           Discharge Orders        Home Care Referral      Reason for your hospital stay    infection     Follow-up and recommended labs and tests     Follow up with primary care provider, [facility], within 7 days to evaluate medication change for diabetes.  No follow up labs or test are needed.     Activity    Your activity upon discharge: activity as tolerated     Diet    Follow this diet upon discharge: Orders Placed This Encounter      Moderate Consistent Carb (60 g CHO per Meal) Diet       Examination   Physical Exam   Temp:  [98  F (36.7  C)-98.6  F (37  C)] (P) 98.6  F (37  C)  Pulse:  [68-75] (P) 73  Resp:  [18-20] (P) 18  BP: (148-175)/(67-74) (P) 169/74  SpO2:  [91 %-92 %] (P) 91 %  Wt Readings from Last 1 Encounters:   03/27/23 91.6 kg (202 lb)       General: No apparent distress, comfortable, no complaints, no chest pain or shortness of breath, eager for discharge  Lungs: No wheezing  Heart: Regular rate and rhythm    I  attempted to call her  Awais to update him but his voice mailbox was full      Please see EMR for more detailed significant labs, imaging, consultant notes etc.    I, Saúl Philippe MD, personally saw the patient today and spent greater than 30 minutes discharging this patient.    Saúl Philippe MD  [facility]    CC:Clinic, [facility]

## 2023-04-07 ENCOUNTER — PATIENT OUTREACH (OUTPATIENT)
Dept: CARE COORDINATION | Facility: CLINIC | Age: 78
End: 2023-04-07
Payer: COMMERCIAL

## 2023-04-07 NOTE — PROGRESS NOTES
[facility] Resource Center Contact  [facility]/Voicemail     Clinical Data: Transitional Care Management Outreach     Outreach attempted x 2.  Left message on patient's voicemail, providing [facility]'s 24/7 scheduling and nurse triage phone number [phone] ([phone]) for questions/concerns and/or to schedule an appt with an [facility] provider, if they do not have a PCP.      Plan:  [facility] will do no further outreaches at this time.     BURTON Rushing  [phone]  [facility]    *Connected Care Resource Team does NOT follow patient ongoing. Referrals are identified based on internal discharge reports and the outreach is to ensure patient has an understanding of their discharge instructions.

## 2023-04-21 ENCOUNTER — HOSPITAL ENCOUNTER (EMERGENCY)
Facility: HOSPITAL | Age: 78
End: 2023-04-21
Payer: COMMERCIAL

## 2023-05-02 ENCOUNTER — OFFICE VISIT (OUTPATIENT)
Dept: SURGERY | Facility: CLINIC | Age: 78
End: 2023-05-02
Payer: COMMERCIAL

## 2023-05-02 VITALS — SYSTOLIC BLOOD PRESSURE: 136 MMHG | DIASTOLIC BLOOD PRESSURE: 80 MMHG

## 2023-05-02 DIAGNOSIS — K65.1 ABSCESS OF PERITONEUM (H): Primary | ICD-10-CM

## 2023-05-02 PROCEDURE — 99213 OFFICE O/P EST LOW 20 MIN: CPT | Performed by: SURGERY

## 2023-05-02 RX ORDER — METRONIDAZOLE 500 MG/1
500 TABLET ORAL
COMMUNITY
Start: 2023-04-26 | End: 2023-05-05

## 2023-05-02 RX ORDER — LOSARTAN POTASSIUM 25 MG/1
1 TABLET ORAL
COMMUNITY
Start: 2023-03-30

## 2023-05-02 RX ORDER — CIPROFLOXACIN 750 MG/1
750 TABLET, FILM COATED ORAL
COMMUNITY
Start: 2023-04-26 | End: 2023-05-05

## 2023-05-02 NOTE — PROGRESS NOTES
HPI: Pt is here for follow up of a sigmoidectomy with me on 1/29/23.   Her postoperative course was noteworthy for prolonged hospitalization due to intra-abdominal sepsis and deconditioning.  She was ultimately discharged to a TCU where she was able to recover.  At the time of her 1 month follow-up visit she was doing well.  2 months following surgery, she was readmitted to [facility] due to constipation, and CT imaging which demonstrated a abscess in the left paracolic gutter.  She underwent drain placement for this with successful drainage and evacuation of the abscess, and no identification of any fistulous connection.  The drain was removed and she was discharged home.  She then had a repeat hospitalization at the end of April for the same issue with reaccumulation of left paracolic abscess.  A drain was placed on 22 April, and she is here today for follow-up of that.    Her drain output has been negligible, between 10 and 20 cc each day for the past several days of nonpurulent output.      /80 (BP Location: Right arm, Patient Position: Sitting)     EXAM:  GENERAL:Appears well  ABDOMEN:  Soft, incisions clean and well-healed.  Drain output in the bulb is currently scant, less than 10 cc of serous appearing fluid      Assessment/Plan:   Recurrent intra-abdominal abscess in close proximity to her colorectal anastomosis, suspicious for a enterocutaneous fistula.  I think we will need to repeat a drain study to assess if any communication exists.  There may be merit if the initial abscessogram does not demonstrate a fistula, to inject transrectal contrast to see if this would illuminate a fistulous opening.  IR referral placed.  If no fistula seen with current minimal drain output, I really do not see any reason to keep the drain in place.    Wilfredo Palencia MD, FACS  [phone]  [facility]  General and Bariatric Surgery

## 2023-05-02 NOTE — LETTER
5/2/2023         RE: Suzy Gómez  [address]  [place] [address]        Dear Colleague,    Thank you for referring your patient, Suzy Gómez, to the [facility] SURGERY [facility] AND BARIATRICS CARE [place]. Please see a copy of my visit note below.    HPI: Pt is here for follow up of a sigmoidectomy with me on 1/29/23.   Her postoperative course was noteworthy for prolonged hospitalization due to intra-abdominal sepsis and deconditioning.  She was ultimately discharged to a TCU where she was able to recover.  At the time of her 1 month follow-up visit she was doing well.  2 months following surgery, she was readmitted to [facility] due to constipation, and CT imaging which demonstrated a abscess in the left paracolic gutter.  She underwent drain placement for this with successful drainage and evacuation of the abscess, and no identification of any fistulous connection.  The drain was removed and she was discharged home.  She then had a repeat hospitalization at the end of April for the same issue with reaccumulation of left paracolic abscess.  A drain was placed on 22 April, and she is here today for follow-up of that.    Her drain output has been negligible, between 10 and 20 cc each day for the past several days of nonpurulent output.      /80 (BP Location: Right arm, Patient Position: Sitting)     EXAM:  GENERAL:Appears well  ABDOMEN:  Soft, incisions clean and well-healed.  Drain output in the bulb is currently scant, less than 10 cc of serous appearing fluid      Assessment/Plan:   Recurrent intra-abdominal abscess in close proximity to her colorectal anastomosis, suspicious for a enterocutaneous fistula.  I think we will need to repeat a drain study to assess if any communication exists.  There may be merit if the initial abscessogram does not demonstrate a fistula, to inject transrectal contrast to see if this would illuminate a fistulous opening.  IR referral placed.  If no  fistula seen with current minimal drain output, I really do not see any reason to keep the drain in place.    Wilfredo Palencia MD, FACS  [phone]  [facility] and Bariatric Surgery        Again, thank you for allowing me to participate in the care of your patient.        Sincerely,        Wilfredo Palencia MD

## 2023-05-04 ENCOUNTER — TELEPHONE (OUTPATIENT)
Dept: INTERVENTIONAL RADIOLOGY/VASCULAR | Facility: CLINIC | Age: 78
End: 2023-05-04
Payer: COMMERCIAL

## 2023-05-04 NOTE — H&P
"  Interventional Radiology - Pre-Procedure Note:  Outpatient - [facility]  05/05/2023     Procedure Requested: Abscessogram  Requested by: Talha DYER    History and Physical Reviewed: H&P documented within 30 days (by Cooper DYER on 4/21/2023).  I have personally reviewed the patient's medical history and have updated the medical record as necessary.    Brief HPI: Suzy Gómez is a 77 year old female PMH HTN and DM2, hypothyroid, and emergent sigmoid colectomy 1/29/2023 2/2 perforation of sigmoid colitis.  C/B abscess, s/p previous drain placement and subsequent removal 4/4/2023.    Readmitted 4/21/2023 and new drain placed at [facility] 4/22/2023 per below.      Per review \"Had drain removed 4/4/23. Re-inserted at [place] on 4/22. Follow up CT at [place] demonstrated resolved abscess with drain in adequate position. \"    Presents for abscessogram.  Per chart review, no CT needed.     Culture: None acquired with drain replacement  Output: 5-10 mL/day  Flushing: 10mL NS daily- no leaking or pain with flushing.     Patient had an appointment with Dr. Palencia 5/2/2023 (surgery).  Recommended drain check; IR referral placed.  No follow up planned at this point in time.    IMAGING:    EXAM:   1. PERCUTANEOUS DRAIN PLACEMENT LEFT PARACOLIC GUTTER COLLECTION   2. CT GUIDANCE   3. CONSCIOUS SEDATION   LOCATION: [facility] MEDICAL IMAGING   DATE/TIME: 4/22/2023 2:21 PM CDT     INDICATION: Left paracolic gas collection, plan for image guided drain placement   TECHNIQUE: Dose reduction techniques were used.     PROCEDURE: Informed consent obtained. Site marked. Prior images reviewed. Required items made available. Patient identity confirmed verbally and with arm band. Patient reevaluated immediately before administering sedation. Universal protocol was followed. Time out performed. The site was prepped and draped in sterile fashion. 10 mL of 1% lidocaine was infused into the local soft tissues. Using " standard technique and under direct CT guidance, a 10 Vietnamese drainage catheter was inserted into the fluid collection.       BLOOD LOSS: Minimal.     The patient tolerated the procedure well. No immediate complications.     SEDATION: Versed 1 mg. Fentanyl 50 mcg. The procedure was performed with administration intravenous conscious sedation with appropriate preoperative, intraoperative, and postoperative evaluation.     15 minutes of supervised face to face conscious sedation time was provided by a radiology nurse under my direct supervision.     IMPRESSION:   1.  Successful CT-guided drain placement into a left paracolic gutter gas collection.      NPO: NOT NPO  ANTICOAGULANTS:  Aspirin 81 mg PO QD   ANTIBIOTICS: Antibiotics not clinically indicated for IR procedure, per review of current clinical practice guidelines     ALLERGIES  Allergies   Allergen Reactions     Penicillins Nausea and Vomiting and GI Disturbance     Amoxicillin-Pot Clavulanate Nausea and Vomiting and Diarrhea     Atorvastatin Muscle Pain (Myalgia)     Bupropion Other (See Comments)     Mood swings     Exenatide Rash     Nitrofurantoin Hives and Swelling       LABS:  INR   Date Value Ref Range Status   03/28/2023 1.15 0.85 - 1.15 Final      Hemoglobin   Date Value Ref Range Status   04/03/2023 9.1 (L) 11.7 - 15.7 g/dL Final     Platelet Count   Date Value Ref Range Status   04/04/2023 333 150 - 450 10e3/uL Final     Creatinine   Date Value Ref Range Status   04/04/2023 0.57 0.51 - 0.95 mg/dL Final     Potassium   Date Value Ref Range Status   04/02/2023 4.3 3.4 - 5.3 mmol/L Final         EXAM:  BP (!) 152/67 (BP Location: Left arm)   Pulse 77   Resp 18   SpO2 92%   General:  Stable.  In no acute distress.    Neuro:  A&O x 3. Moves all extremities equally.  Resp:  Lungs clear to auscultation bilaterally.  Cardio:  S1S2 and reg, without murmur, clicks or rubs    Drain in place.  Insertion site c/d/i.  Stay suture in place.  Dressing c/d/i.   Drainage bag/bulb in place, draining scant clear fluid       Pre-Sedation Assessment:  Mallampati Airway Classification:  II - Faucial pillars and soft palate may be seen, but uvula is masked by the base of the tongue  Previous reaction to anesthesia/sedation:  No  Sedation plan based on assessment: FENTANYL ONLY (patient not NPO)  ASA Classification: Class 4 - SEVERE SYSTEMIC DISEASE, ACUTE UNSTABLE PROBLEMS.   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    Total time spent on the date of the encounter: 25 minutes.      ROXY NIEVES NP  Interventional Radiology

## 2023-05-05 ENCOUNTER — HOSPITAL ENCOUNTER (OUTPATIENT)
Dept: INTERVENTIONAL RADIOLOGY/VASCULAR | Facility: HOSPITAL | Age: 78
Discharge: HOME | End: 2023-05-05
Attending: SURGERY | Admitting: RADIOLOGY
Payer: COMMERCIAL

## 2023-05-05 VITALS
RESPIRATION RATE: 18 BRPM | SYSTOLIC BLOOD PRESSURE: 152 MMHG | OXYGEN SATURATION: 92 % | DIASTOLIC BLOOD PRESSURE: 67 MMHG | HEART RATE: 77 BPM

## 2023-05-05 DIAGNOSIS — T81.43XA INTRA-ABDOMINAL ABSCESS POST-PROCEDURE (H): Primary | ICD-10-CM

## 2023-05-05 DIAGNOSIS — K65.1 INTRA-ABDOMINAL ABSCESS POST-PROCEDURE (H): Primary | ICD-10-CM

## 2023-05-05 DIAGNOSIS — K65.1 ABSCESS OF PERITONEUM (H): ICD-10-CM

## 2023-05-05 PROCEDURE — 76080 X-RAY EXAM OF FISTULA: CPT

## 2023-05-05 PROCEDURE — 255N000002 HC RX 255 OP 636: Performed by: SURGERY

## 2023-05-05 RX ORDER — NALOXONE HYDROCHLORIDE 0.4 MG/ML
0.4 INJECTION, SOLUTION INTRAMUSCULAR; INTRAVENOUS; SUBCUTANEOUS
Status: DISCONTINUED | OUTPATIENT
Start: 2023-05-05 | End: 2023-05-06 | Stop reason: HOSPADM

## 2023-05-05 RX ORDER — NALOXONE HYDROCHLORIDE 0.4 MG/ML
0.2 INJECTION, SOLUTION INTRAMUSCULAR; INTRAVENOUS; SUBCUTANEOUS
Status: DISCONTINUED | OUTPATIENT
Start: 2023-05-05 | End: 2023-05-06 | Stop reason: HOSPADM

## 2023-05-05 RX ORDER — ASPIRIN 81 MG/1
81 TABLET, CHEWABLE ORAL DAILY
COMMUNITY

## 2023-05-05 RX ORDER — FENTANYL CITRATE 50 UG/ML
25-50 INJECTION, SOLUTION INTRAMUSCULAR; INTRAVENOUS EVERY 5 MIN PRN
Status: DISCONTINUED | OUTPATIENT
Start: 2023-05-05 | End: 2023-05-06 | Stop reason: HOSPADM

## 2023-05-05 RX ORDER — LIDOCAINE 40 MG/G
CREAM TOPICAL
Status: DISCONTINUED | OUTPATIENT
Start: 2023-05-05 | End: 2023-05-06 | Stop reason: HOSPADM

## 2023-05-05 RX ADMIN — IOHEXOL 5 ML: 350 INJECTION, SOLUTION INTRAVENOUS at 13:30

## 2023-05-05 NOTE — DISCHARGE INSTRUCTIONS
Drain Check/Exchange/Reposition Discharge Instructions:  Please follow the below instructions after your drain check/exchange/reposition:    Care Instructions after drain placement:  - If you received sedation for your procedure, do not drive or operate heavy machinery for the rest of the day.  - Rest after your drain was placed. Avoid strenuous activity and heavy lifting for the next 2 days. Return to your normal activities as you tolerate after the 2 day restriction.  - You may shower; however, you should not submerge site under water like in a tub bath, Jacuzzi or pool. Cover drain exit site with plastic wrap when showering.  - Keep dressing clean and dry as long as drain is in place. If you have a gauze dressing, please change the dressing as needed to keep site clean and dry.  - You may eat and drink as normal.  - You may have discomfort after the procedure near the drain exit site. You may take acetaminophen (Tylenol ) or ibuprofen (Motrin ) as needed for any discomfort.  - Inspect the tube often for kinks.  - If you have a gravity bag, keep the bag below the drain exit site to allow for free flow of drainage by gravity.  - Record your daily drain outputs and amount flushed on your drainage record. Bring your records to your next radiology appointment.  - Empty your drainage bag/bulb daily or when it is approximately half way fluid. Follow below instructions for emptying your bag/bulb:  - Clean hands well with soap and water.  - Place a measuring container near the outlet valve of the drainage bag/bulb.  - If you have a drainage BAG: Twist the blue valve at the bottom of the bag while holding the valve over your measuring cup and open container to empty. Re-twist the valve closed on the drainage bag once complete.  - If you have a drainage BULB: Open the bulb cap (at the top of the bulb). Empty the fluid into the measuring cup. Squeeze bulb and hold flat. While bulb is squeezed, close the cap.  - After  draining fluid, record your drainage output. Bring this record to your next radiology drain follow up appointment.  - Discard drainage into toilet once fluid drained.  ?  Follow-Up:   - [facility] will call you to schedule your next follow-up appointment. Please call [facility] if you are not contacted within the next week at [phone]*.    Please seek medical evaluation for:  - Nausea and/or vomiting.  - Diffuse abdominal pain.  - Fevers (greater than 101 F (38.3C)).    Call [facility] at [phone]*** with tube related questions or concerns:  - Drainage tube falls out, is pulled back or felt to be out of position.   - Unable to flush drainage tube.   - Leakage (or purulent drainage) around drainage tube site.   - Extreme pain at drainage tube site.   - Outputs suddenly stop or significantly reduces.   - Warmth, redness, swelling or tenderness around the drainage tube.       Do not flush drain.

## 2023-05-05 NOTE — PRE-PROCEDURE
GENERAL PRE-PROCEDURE:   Procedure:  Abscessogram  Date/Time:  5/5/2023 11:35 AM    Written consent obtained?: Yes    Risks and benefits: Risks, benefits and alternatives were discussed    Consent given by:  Patient  Patient states understanding of procedure being performed: Yes    Patient's understanding of procedure matches consent: Yes    Procedure consent matches procedure scheduled: Yes    : FENTANYL ONLY.  Appropriately NPO:  Yes  ASA Class:  3  Mallampati  :  Grade 1- soft palate, uvula, tonsillar pillars, and posterior pharyngeal wall visible  Lungs:  Lungs clear with good breath sounds bilaterally  Heart:  Normal heart sounds and rate  History & Physical reviewed:  History and physical reviewed and no updates needed  Statement of review:  I have reviewed the lab findings, diagnostic data, medications, and the plan for sedation

## 2023-05-05 NOTE — IP AVS SNAPSHOT
[facility] Interventional Radiology  [address] [place] [id number]  Phone: [phone]  Fax: [phone]                              After Visit Summary   5/5/2023    Suzy Gómez   MRN: [id number]           After Visit Summary Signature Page    I have received my discharge instructions, and my questions have been answered. I have discussed any challenges I see with this plan with the nurse or doctor.    ..........................................................................................................................................  Patient/Patient Representative Signature      ..........................................................................................................................................  Patient Representative Print Name and Relationship to Patient    ..................................................               ................................................  Date                                   Time    ..........................................................................................................................................  Reviewed by Signature/Title    ...................................................              ..............................................  Date                                               Time    22EPIC Rev 08/18

## 2023-05-10 ENCOUNTER — TELEPHONE (OUTPATIENT)
Dept: INTERVENTIONAL RADIOLOGY/VASCULAR | Facility: CLINIC | Age: 78
End: 2023-05-10
Payer: COMMERCIAL

## 2023-05-10 NOTE — TELEPHONE ENCOUNTER
INTERVENTIONAL RADIOLOGY INSTRUCTIONS     You are scheduled for an upcoming procedure in the   Interventional Radiology Department at [facility].     Date:  Friday May 19     Procedure: Abscess tube change     Address: [facility] [address] [place], [place] [address]     Free parking is available for patients & visitors in Lot A or B.  [address]ot [address] is closest to the main entrance of hospital.    Check in at main entrance WELCOME DESK.        Check into the Radiology Department at: 12:00 pm    Do not eat any food or drink any fluids after: 04:00 am         Two visitors may accompany you to your procedure.      You will need to have someone available to drive you home.      We recommend that you have a responsible adult stay with you for 24 hours after you get home.      Please bring list of current medications to your appointment.      Please take all of your medications as prescribed with a small sip of water, unless you are contacted by a nurse from our department to hold them.    Suzy - Please hold your diabetes medication METFORMIN on the morning of your procedure.    This requirement has been fulfilled.  No further action needed!    Please confirm that you have received these instructions by replying to this [employer] message, or if you have any questions, please call the Interventional Radiology team at [phone].       Thank you!    Valerie BANDA  Interventional Radiology Intake Nurse Coordinator  [phone]

## 2023-05-15 NOTE — TELEPHONE ENCOUNTER
Writer has spoken with suzy regarding planned procedure with IR via telephone.      Suzy acknowledges understanding of pre-procedure instructions.         I have provided suzy with IR number ([phone]) for questions or concerns.    A documented H&P exam is noted in patient EMR with the date 5/4/2023.    Valerie BANDA  Interventional Radiology RN   [phone]

## 2023-05-18 RX ORDER — LIDOCAINE 40 MG/G
CREAM TOPICAL
Status: CANCELLED | OUTPATIENT
Start: 2023-05-18

## 2023-05-19 ENCOUNTER — HOSPITAL ENCOUNTER (OUTPATIENT)
Dept: INTERVENTIONAL RADIOLOGY/VASCULAR | Facility: HOSPITAL | Age: 78
Discharge: HOME | End: 2023-05-19
Attending: NURSE PRACTITIONER | Admitting: RADIOLOGY
Payer: COMMERCIAL

## 2023-05-19 VITALS
HEART RATE: 71 BPM | SYSTOLIC BLOOD PRESSURE: 187 MMHG | OXYGEN SATURATION: 92 % | RESPIRATION RATE: 20 BRPM | TEMPERATURE: 97.7 F | DIASTOLIC BLOOD PRESSURE: 86 MMHG

## 2023-05-19 DIAGNOSIS — T81.43XA INTRA-ABDOMINAL ABSCESS POST-PROCEDURE (H): ICD-10-CM

## 2023-05-19 DIAGNOSIS — K65.1 INTRA-ABDOMINAL ABSCESS POST-PROCEDURE (H): ICD-10-CM

## 2023-05-19 PROCEDURE — 76080 X-RAY EXAM OF FISTULA: CPT

## 2023-05-19 PROCEDURE — 255N000002 HC RX 255 OP 636: Performed by: RADIOLOGY

## 2023-05-19 RX ADMIN — IOHEXOL 5 ML: 350 INJECTION, SOLUTION INTRAVENOUS at 12:52

## 2023-05-19 NOTE — IP AVS SNAPSHOT
[facility] Interventional Radiology  [address] [place] [address]  Phone: [phone]  Fax: [phone]                              After Visit Summary   5/19/2023    Suzy Gómez   MRN: [id number]           After Visit Summary Signature Page    I have received my discharge instructions, and my questions have been answered. I have discussed any challenges I see with this plan with the nurse or doctor.    ..........................................................................................................................................  Patient/Patient Representative Signature      ..........................................................................................................................................  Patient Representative Print Name and Relationship to Patient    ..................................................               ................................................  Date                                   Time    ..........................................................................................................................................  Reviewed by Signature/Title    ...................................................              ..............................................  Date                                               Time    22EPIC Rev 08/18

## 2023-05-19 NOTE — PROGRESS NOTES
Reviewed discharge instructions with pt and . Verbalized understanding. Accompanied pt to waiting room where  was waiting.

## 2023-05-19 NOTE — PROGRESS NOTES
Interventional Radiology - Pre-Procedure Note:  Outpatient - [facility]  05/19/2023     Procedure Requested: abscessogram  Requested by: SUMIT Costa NP    History and Physical Reviewed: H&P documented within 30 days (by Cooper DYER on 4/21/23).  I have personally reviewed the patient's medical history and have updated the medical record as necessary.    Brief HPI: Suzy Gómez is a 77 year old female PMH HTN and DM2, hypothyroid, and emergent sigmoid colectomy 1/29/2023 2/2 perforation of sigmoid colitis.  C/B abscess, s/p previous drain placement and subsequent removal 4/4/2023.     Readmitted 4/21/2023 and new drain placed at  4/22/2023. Most recent abscessogram 5/5 which demonstrated no residual abscess but with fistulous communication to the colon.    Presents today for abscessogram    Flushing: none  OP: <2tsp intermittently per patient report    [facility]  [facility]ATION: [facility]  DATE: 5/5/2023     PROCEDURE: ABSCESSOGRAM     INTERVENTIONAL RADIOLOGIST: Sarkis Neff MD.     INDICATION: Emergent sigmoidectomy with intra-abdominal abscess formation with indwelling drain here for follow-up     FLUOROSCOPIC TIME: 0.5 minutes   NUMBER OF IMAGES: 2  CONTRAST: None.     COMPLICATIONS: No immediate complications.     PROCEDURE:   Contrast was injected through the indwelling left abdominal drain demonstrating the drain to be patent and in good position. There is no residual abscess cavity. However there is fistulous communication to the adjacent colon.                                                                         IMPRESSION:  Abscessogram demonstrates no residual abscess cavity. There is fistulous communication to the adjacent colon. Follow-up abscessogram in 2 weeks.    NPO: midnight  ANTICOAGULANTS: ASA 81mg  ANTIBIOTICS: none indicated for procedure    ALLERGIES  Allergies   Allergen Reactions     Penicillins Nausea and  Vomiting and GI Disturbance     Amoxicillin-Pot Clavulanate Nausea and Vomiting and Diarrhea     Atorvastatin Muscle Pain (Myalgia)     Bupropion Other (See Comments)     Mood swings     Exenatide Rash     Nitrofurantoin Hives and Swelling         LABS:  INR   Date Value Ref Range Status   03/28/2023 1.15 0.85 - 1.15 Final      Hemoglobin   Date Value Ref Range Status   04/03/2023 9.1 (L) 11.7 - 15.7 g/dL Final     Platelet Count   Date Value Ref Range Status   04/04/2023 333 150 - 450 10e3/uL Final     Creatinine   Date Value Ref Range Status   04/04/2023 0.57 0.51 - 0.95 mg/dL Final     Potassium   Date Value Ref Range Status   04/02/2023 4.3 3.4 - 5.3 mmol/L Final         EXAM:  BP (!) 187/86   Pulse 71   Temp 97.7  F (36.5  C)   Resp 20   SpO2 92%   General:  Stable.  In no acute distress.    Neuro:  A&O x 3. Moves all extremities equally.  Resp:  Lungs clear to auscultation bilaterally.  Cardio:  S1S2 and reg, without murmur, clicks or rubs  Abdomen:  LQ drain tubing intact to DAVIS bulb drainage, dressing CDI, no leaking appreciated at insertion site, scant OP noted in DAVIS bulb    Code Status: Full Code intra procedure, per discussion with patient.   Comment(s): of note patient would not want to receive blood products      ASSESSMENT/PLAN:   Abscessogram with potential drain intervention    Procedural education reviewed with patient/family in detail including, but not limited to risks, benefits and alternatives with understanding verbalized by patient/family.    Total time spent on the date of the encounter: 20 minutes.      HERBIE Lara CNP  Interventional Radiology

## 2023-05-19 NOTE — DISCHARGE INSTRUCTIONS
Drain/Tube Removal Discharge Instructions:  Please follow the below instructions following your drain/tube removal.    Care Instructions after drain/tube removal:  - OK to shower after 2 days if scab noted on drain removal site. If no scab recover for 24 hours and check again.  - Avoid stagnant water such as tub baths, Jacuzzis and pools for 10 days after drain/tube removal.  - Keep previous drain/tube exit site covered with dressing of your preference until drainage stops. Change dressings daily and as needed to keep site dry and clean.  - Expect drainage will stop in approximately 3 days time.    Seek medical evaluation for the following:  - Fever (greater than 101 F (38.3C)).  - Purulent (yellow/green/foul smelling) drainage from previous drain exit site.  - Develop pain at or near the previous drain exit site.  - Persistent drainage lasting longer than three days from previous drain exit site.

## 2023-08-16 ENCOUNTER — HOSPITAL ENCOUNTER (EMERGENCY)
Facility: HOSPITAL | Age: 78
Discharge: HOME | End: 2023-08-16
Payer: COMMERCIAL

## 2023-08-16 ENCOUNTER — APPOINTMENT (OUTPATIENT)
Dept: CT IMAGING | Facility: HOSPITAL | Age: 78
End: 2023-08-16
Payer: COMMERCIAL

## 2023-08-16 VITALS
HEIGHT: 65 IN | DIASTOLIC BLOOD PRESSURE: 79 MMHG | HEART RATE: 72 BPM | OXYGEN SATURATION: 92 % | BODY MASS INDEX: 33.66 KG/M2 | RESPIRATION RATE: 19 BRPM | SYSTOLIC BLOOD PRESSURE: 189 MMHG | TEMPERATURE: 97.8 F | WEIGHT: 202 LBS

## 2023-08-16 DIAGNOSIS — R10.9 LEFT SIDED ABDOMINAL PAIN: ICD-10-CM

## 2023-08-16 DIAGNOSIS — K80.20 CALCULUS OF GALLBLADDER WITHOUT CHOLECYSTITIS WITHOUT OBSTRUCTION: ICD-10-CM

## 2023-08-16 LAB
ALBUMIN SERPL BCG-MCNC: 4.1 G/DL (ref 3.5–5.2)
ALBUMIN UR-MCNC: 30 MG/DL
ALP SERPL-CCNC: 75 U/L (ref 35–104)
ALT SERPL W P-5'-P-CCNC: 12 U/L (ref 0–50)
ANION GAP SERPL CALCULATED.3IONS-SCNC: 10 MMOL/L (ref 7–15)
APPEARANCE UR: CLEAR
AST SERPL W P-5'-P-CCNC: 21 U/L (ref 0–45)
BASOPHILS # BLD AUTO: 0 10E3/UL (ref 0–0.2)
BASOPHILS NFR BLD AUTO: 0 %
BILIRUB SERPL-MCNC: 0.2 MG/DL
BILIRUB UR QL STRIP: NEGATIVE
BUN SERPL-MCNC: 19.4 MG/DL (ref 8–23)
CALCIUM SERPL-MCNC: 9.8 MG/DL (ref 8.8–10.2)
CHLORIDE SERPL-SCNC: 107 MMOL/L (ref 98–107)
COLOR UR AUTO: COLORLESS
CREAT SERPL-MCNC: 0.71 MG/DL (ref 0.51–0.95)
DEPRECATED HCO3 PLAS-SCNC: 23 MMOL/L (ref 22–29)
EOSINOPHIL # BLD AUTO: 0.3 10E3/UL (ref 0–0.7)
EOSINOPHIL NFR BLD AUTO: 3 %
ERYTHROCYTE [DISTWIDTH] IN BLOOD BY AUTOMATED COUNT: 15.7 % (ref 10–15)
GFR SERPL CREATININE-BSD FRML MDRD: 88 ML/MIN/1.73M2
GLUCOSE SERPL-MCNC: 127 MG/DL (ref 70–99)
GLUCOSE UR STRIP-MCNC: NEGATIVE MG/DL
HCT VFR BLD AUTO: 36.8 % (ref 35–47)
HGB BLD-MCNC: 11.3 G/DL (ref 11.7–15.7)
HGB UR QL STRIP: NEGATIVE
IMM GRANULOCYTES # BLD: 0 10E3/UL
IMM GRANULOCYTES NFR BLD: 0 %
KETONES UR STRIP-MCNC: NEGATIVE MG/DL
LACTATE SERPL-SCNC: 1 MMOL/L (ref 0.7–2)
LEUKOCYTE ESTERASE UR QL STRIP: ABNORMAL
LIPASE SERPL-CCNC: 45 U/L (ref 13–60)
LYMPHOCYTES # BLD AUTO: 1.3 10E3/UL (ref 0.8–5.3)
LYMPHOCYTES NFR BLD AUTO: 12 %
MCH RBC QN AUTO: 27.5 PG (ref 26.5–33)
MCHC RBC AUTO-ENTMCNC: 30.7 G/DL (ref 31.5–36.5)
MCV RBC AUTO: 90 FL (ref 78–100)
MONOCYTES # BLD AUTO: 0.7 10E3/UL (ref 0–1.3)
MONOCYTES NFR BLD AUTO: 6 %
NEUTROPHILS # BLD AUTO: 8.6 10E3/UL (ref 1.6–8.3)
NEUTROPHILS NFR BLD AUTO: 79 %
NITRATE UR QL: NEGATIVE
NRBC # BLD AUTO: 0 10E3/UL
NRBC BLD AUTO-RTO: 0 /100
PH UR STRIP: 6.5 [PH] (ref 5–7)
PLATELET # BLD AUTO: 272 10E3/UL (ref 150–450)
POTASSIUM SERPL-SCNC: 4 MMOL/L (ref 3.4–5.3)
PROT SERPL-MCNC: 7.1 G/DL (ref 6.4–8.3)
RBC # BLD AUTO: 4.11 10E6/UL (ref 3.8–5.2)
RBC URINE: <1 /HPF
SODIUM SERPL-SCNC: 140 MMOL/L (ref 136–145)
SP GR UR STRIP: 1.03 (ref 1–1.03)
SQUAMOUS EPITHELIAL: 1 /HPF
UROBILINOGEN UR STRIP-MCNC: <2 MG/DL
WBC # BLD AUTO: 10.8 10E3/UL (ref 4–11)
WBC URINE: 3 /HPF

## 2023-08-16 PROCEDURE — 80053 COMPREHEN METABOLIC PANEL: CPT | Performed by: EMERGENCY MEDICINE

## 2023-08-16 PROCEDURE — 36415 COLL VENOUS BLD VENIPUNCTURE: CPT | Performed by: EMERGENCY MEDICINE

## 2023-08-16 PROCEDURE — 81001 URINALYSIS AUTO W/SCOPE: CPT | Performed by: EMERGENCY MEDICINE

## 2023-08-16 PROCEDURE — 83690 ASSAY OF LIPASE: CPT | Performed by: EMERGENCY MEDICINE

## 2023-08-16 PROCEDURE — 99285 EMERGENCY DEPT VISIT HI MDM: CPT | Mod: 25

## 2023-08-16 PROCEDURE — 74177 CT ABD & PELVIS W/CONTRAST: CPT

## 2023-08-16 PROCEDURE — 85025 COMPLETE CBC W/AUTO DIFF WBC: CPT | Performed by: EMERGENCY MEDICINE

## 2023-08-16 PROCEDURE — 250N000011 HC RX IP 250 OP 636

## 2023-08-16 PROCEDURE — 83605 ASSAY OF LACTIC ACID: CPT | Performed by: EMERGENCY MEDICINE

## 2023-08-16 RX ORDER — IOPAMIDOL 755 MG/ML
90 INJECTION, SOLUTION INTRAVASCULAR ONCE
Status: COMPLETED | OUTPATIENT
Start: 2023-08-16 | End: 2023-08-16

## 2023-08-16 RX ADMIN — IOPAMIDOL 90 ML: 755 INJECTION, SOLUTION INTRAVENOUS at 16:42

## 2023-08-16 ASSESSMENT — ENCOUNTER SYMPTOMS
VOMITING: 0
SHORTNESS OF BREATH: 0
ABDOMINAL PAIN: 1
BLOOD IN STOOL: 0
CHILLS: 0
NAUSEA: 0
DIARRHEA: 0
FEVER: 0

## 2023-08-16 ASSESSMENT — ACTIVITIES OF DAILY LIVING (ADL)
ADLS_ACUITY_SCORE: 35
ADLS_ACUITY_SCORE: 35

## 2023-08-16 NOTE — ED TRIAGE NOTES
W/c to triage.  C/o L middle abd pain for 3 days intermittently.  Denies n/v/d.  States had colon surgery in 1/2023.  Decreased appetite.      Triage Assessment       Row Name 08/16/23 [id number]       Triage Assessment (Adult)    Airway WDL WDL       Respiratory WDL    Respiratory WDL WDL       Skin Circulation/Temperature WDL    Skin Circulation/Temperature WDL WDL       Cardiac WDL    Cardiac WDL WDL       Peripheral/Neurovascular WDL    Peripheral Neurovascular WDL WDL       Cognitive/Neuro/Behavioral WDL    Cognitive/Neuro/Behavioral WDL WDL

## 2023-08-16 NOTE — ED PROVIDER NOTES
EMERGENCY DEPARTMENT ENCOUNTER      NAME: Suzy Gómez  AGE: 77 year old female  YOB: 1945  MRN: [id number]  EVALUATION DATE & TIME: No admission date for patient encounter.    PCP: Carie Connell Gibbon    ED PROVIDER: Sarah Darby PA-C      Chief Complaint   Patient presents with    Abdominal Pain         FINAL IMPRESSION:  1. Left sided abdominal pain          ED COURSE & MEDICAL DECISION MAKIN:50 PM Met with patient for initial interview. Plan for care discussed.  4:05 PM Signed patient out to Laurie Hutchins PA-C. Please see her note for final disposition.    Pertinent Labs & Imaging studies reviewed. (See chart for details)  77 year old female with a history of colon adenoma, HLD, DM II, perforated colonic ulcer s/p sigmoid colectomy (2023) presents to the Emergency Department for evaluation of left-sided abdominal pain. Upon exam, patient is afebrile, mildly hypertensive, but  in no acute distress. Mild left mid and lower quadrant tenderness to palpation without guarding, rebound, or peritoneal signs. No CVA tenderness to percussion. Differential diagnosis includes but not limited to diverticulitis, intraabdominal mass/abscess, pancreatitis, gastritis, hepatitis, gallbladder pathology, UTI, bowel spasm, electrolyte abnormalities, anemia, dehydration. Based on patient's presenting symptoms, laboratories and imaging were ordered. Patient declined pain medication upon arrival.     CBC without leukocytosis, stable anemia at 11.3 improved from 9.1 4 month ago. Lactate WNL. Lipase WNL. CMP WNL. UA and CT pending at time of sign out. Please see Laurie Hutchins PA-C.     Medical Decision Making    History:  Supplemental history from: Documented in chart, if applicable  External Record(s) reviewed: Documented in chart, if applicable.    Work Up:  Chart documentation includes differential considered and any EKGs or imaging independently interpreted by provider, where specified.  In  additional to work up documented, I considered the following work up: Documented in chart, if applicable.    External consultation:  Discussion of management with another provider: Documented in chart, if applicable    Complicating factors:  Care impacted by chronic illness: Diabetes and Hypertension  Care affected by social determinants of health: N/A    Disposition considerations: Admission considered. Patient was signed out to the oncoming physician, disposition pending.        MEDICATIONS GIVEN IN THE EMERGENCY:  Medications - No data to display    NEW PRESCRIPTIONS STARTED AT TODAY'S ER VISIT  New Prescriptions    No medications on file          =================================================================    HPI    Patient information was obtained from: Patient     Use of : N/A         Suzy Gómez is a 77 year old female with a pertinent history of HTN, DM2, hypothyroid, hyperparathyroidism, depression and emergent sigmoid colectomy 1/29/23 at [facility] after perforation of a sigmoid colitis. She had subsequent post-op infection/abscess requiring readmission 3/27-4/5/23 for IR drain placement, which was removed 4/4/23 after resolution of fluid collection  who presents to this ED by wheelchair for evaluation of abdominal pain.    Patient reports 3 days of intermittent abdominal pain. She rates the pain a 3-4/10. Patients pain is not worse when eating. Patient reports normal bowel movements, last BM was this morning. Patient has been taking tylenol with some relief. Patient denies chest pain, shortness of breath, fever, nausea, vomiting, diarrhea, blood in stool, rashes, URI symptoms , and urinary symptoms.     REVIEW OF SYSTEMS   Review of Systems   Constitutional:  Negative for chills and fever.   HENT:          Denies URI symptoms    Respiratory:  Negative for shortness of breath.    Cardiovascular:  Negative for chest pain.   Gastrointestinal:  Positive for abdominal pain. Negative for blood  "in stool, diarrhea, nausea and vomiting.   Genitourinary:         Denies urinary symptoms    Skin:  Negative for rash.   All other systems reviewed and are negative.       PAST MEDICAL HISTORY:  Past Medical History:   Diagnosis Date    Dyslipidemia     Heart disease     Hypertension     KARYN (obstructive sleep apnea)     Thyroid disease     Type 2 diabetes mellitus (H)        PAST SURGICAL HISTORY:  Past Surgical History:   Procedure Laterality Date    LAPAROSCOPIC ASSISTED COLECTOMY N/A 2023    Procedure: SIGMOID COLECTOMY, LAPAROSCOPIC;  Surgeon: Wilfredo Palencia MD;  Location: [facility] Main OR           CURRENT MEDICATIONS:    acetaminophen (TYLENOL) 325 MG tablet  amLODIPine (NORVASC) 2.5 MG tablet  aspirin (ASA) 81 MG chewable tablet  docusate sodium (COLACE) 100 MG capsule  levothyroxine (SYNTHROID/LEVOTHROID) 137 MCG tablet  losartan (COZAAR) 25 MG tablet  metFORMIN (GLUCOPHAGE XR) 500 MG 24 hr tablet  oxybutynin ER (DITROPAN XL) 10 MG 24 hr tablet  rosuvastatin (CRESTOR) 20 MG tablet  simethicone (MYLICON) 80 MG chewable tablet  venlafaxine (EFFEXOR XR) 150 MG 24 hr capsule        ALLERGIES:  Allergies   Allergen Reactions    Penicillins Nausea and Vomiting and GI Disturbance    Amoxicillin-Pot Clavulanate Nausea and Vomiting and Diarrhea    Atorvastatin Muscle Pain (Myalgia)    Bupropion Other (See Comments)     Mood swings    Exenatide Rash    Nitrofurantoin Hives and Swelling       FAMILY HISTORY:  No family history on file.    SOCIAL HISTORY:   Social History     Socioeconomic History    Marital status:    Tobacco Use    Smoking status: Former     Types: Cigarettes     Quit date:      Years since quittin.6    Smokeless tobacco: Never   Vaping Use    Vaping Use: Never used   Substance and Sexual Activity    Alcohol use: Not Currently    Drug use: Never       VITALS:  BP (!) 163/73   Pulse 75   Temp 97.8  F (36.6  C) (Oral)   Resp 20   Ht 1.651 m (5' 5\")   Wt 91.6 kg (202 lb)   " SpO2 95%   BMI 33.61 kg/m      PHYSICAL EXAM    Constitutional:  Alert, in no acute distress. Cooperative.  EYES: Conjunctivae clear.   HENT:  Atraumatic, normocephalic.  Respiratory:  Respirations even, unlabored, in no acute respiratory distress. Lungs clear to auscultation bilaterally without wheeze, rhonchi, or rales. No cough. Speaks in full sentences easily.  Cardiovascular:  Regular rate and rhythm, good peripheral perfusion. No peripheral edema. No chest wall tenderness.  GI: Soft, flat, non-distended. Bowel sounds normal. Mild left mid and lower quadrant tenderness to palpation. No guarding, rebound, or other peritoneal signs. No CVA tenderness to percussion.  Musculoskeletal:  No edema. No cyanosis. Range of motion major extremities intact.    Integument: Warm, Dry. No rash to visualized skin.   Neurologic:  Alert & oriented. No focal deficits noted.   Psych: Normal mood and affect.      LAB:  All pertinent labs reviewed and interpreted.  Results for orders placed or performed during the hospital encounter of 08/16/23   Comprehensive metabolic panel   Result Value Ref Range    Sodium 140 136 - 145 mmol/L    Potassium 4.0 3.4 - 5.3 mmol/L    Chloride 107 98 - 107 mmol/L    Carbon Dioxide (CO2) 23 22 - 29 mmol/L    Anion Gap 10 7 - 15 mmol/L    Urea Nitrogen 19.4 8.0 - 23.0 mg/dL    Creatinine 0.71 0.51 - 0.95 mg/dL    Calcium 9.8 8.8 - 10.2 mg/dL    Glucose 127 (H) 70 - 99 mg/dL    Alkaline Phosphatase 75 35 - 104 U/L    AST 21 0 - 45 U/L    ALT 12 0 - 50 U/L    Protein Total 7.1 6.4 - 8.3 g/dL    Albumin 4.1 3.5 - 5.2 g/dL    Bilirubin Total 0.2 <=1.2 mg/dL    GFR Estimate 88 >60 mL/min/1.73m2   Result Value Ref Range    Lipase 45 13 - 60 U/L   Lactic acid whole blood   Result Value Ref Range    Lactic Acid 1.0 0.7 - 2.0 mmol/L   CBC with platelets and differential   Result Value Ref Range    WBC Count 10.8 4.0 - 11.0 10e3/uL    RBC Count 4.11 3.80 - 5.20 10e6/uL    Hemoglobin 11.3 (L) 11.7 - 15.7 g/dL     Hematocrit 36.8 35.0 - 47.0 %    MCV 90 78 - 100 fL    MCH 27.5 26.5 - 33.0 pg    MCHC 30.7 (L) 31.5 - 36.5 g/dL    RDW 15.7 (H) 10.0 - 15.0 %    Platelet Count 272 150 - 450 10e3/uL    % Neutrophils 79 %    % Lymphocytes 12 %    % Monocytes 6 %    % Eosinophils 3 %    % Basophils 0 %    % Immature Granulocytes 0 %    NRBCs per 100 WBC 0 <1 /100    Absolute Neutrophils 8.6 (H) 1.6 - 8.3 10e3/uL    Absolute Lymphocytes 1.3 0.8 - 5.3 10e3/uL    Absolute Monocytes 0.7 0.0 - 1.3 10e3/uL    Absolute Eosinophils 0.3 0.0 - 0.7 10e3/uL    Absolute Basophils 0.0 0.0 - 0.2 10e3/uL    Absolute Immature Granulocytes 0.0 <=0.4 10e3/uL    Absolute NRBCs 0.0 10e3/uL       RADIOLOGY:  Reviewed all pertinent imaging. Please see official radiology report.  CT Abdomen Pelvis w Contrast    (Results Pending)     I, Reilly Ochoa, am serving as a scribe to document services personally performed by Sarah Darby PA-C based on my observation and the provider's statements to me. I, Sarah Darby PA-C, attest that Reilly Ochoa is acting in a scribe capacity, has observed my performance of the services and has documented them in accordance with my direction.    Sarah Darby PA-C  [facility] EMERGENCY DEPARTMENT  [facility] [facility] [id number] [phone]        Sarah Darby PA-C  08/16/23 0828

## 2023-08-16 NOTE — ED NOTES
eMERGENCY dEPARTMENT PROGRESS NOTE         ED COURSE AND MEDICAL DECISION MAKING  Patient was signed out to me by Sarah Darby PA-C at 4:07 PM    7:22 PM Discussed results and plan for discharge    Suzy Gómez is a 77 year old female who presented to the ED for evaluation of LLQ abdominal pain. Signed out to me for follow up on UA, CT and disposition.     D shows cholelithiasis, no other inflammatory changes.  LFTs are unremarkable otherwise, no evidence of obstruction and not consistent with cholecystitis.  Pain is not specifically to the right upper quadrant.  I discussed these results with patient and will place a surgical referral to discuss electively taking it out, but anticipate if it is not causing symptoms they would probably defer surgical removal.    Urine not consistent with infection, no hematuria.    Ultimately Is reassuring.  Suspect this could be gas pain versus other nonemergent etiology.  Lactate negative, no evidence of ischemia and labs otherwise unremarkable.  Pain is controlled without any medicines in the emergency department and patient is appropriate for discharge.  Instructed on close follow-up with PCP and red flag/indications to return to the emergency department.  All questions were answered to the best my ability and patient/significant other are agreeable with plan.      LAB  Pertinent labs results reviewed   Results for orders placed or performed during the hospital encounter of 08/16/23   CT Abdomen Pelvis w Contrast    Impression    IMPRESSION:   1.  No etiology for symptoms evident. Nothing obstructive or inflammatory involving bowel.  2.  Spleen, pancreas and left kidney negative.  3.  Cholelithiasis.   Comprehensive metabolic panel   Result Value Ref Range    Sodium 140 136 - 145 mmol/L    Potassium 4.0 3.4 - 5.3 mmol/L    Chloride 107 98 - 107 mmol/L    Carbon Dioxide (CO2) 23 22 - 29 mmol/L    Anion Gap 10 7 - 15 mmol/L    Urea Nitrogen 19.4 8.0 - 23.0 mg/dL    Creatinine  0.71 0.51 - 0.95 mg/dL    Calcium 9.8 8.8 - 10.2 mg/dL    Glucose 127 (H) 70 - 99 mg/dL    Alkaline Phosphatase 75 35 - 104 U/L    AST 21 0 - 45 U/L    ALT 12 0 - 50 U/L    Protein Total 7.1 6.4 - 8.3 g/dL    Albumin 4.1 3.5 - 5.2 g/dL    Bilirubin Total 0.2 <=1.2 mg/dL    GFR Estimate 88 >60 mL/min/1.73m2   Result Value Ref Range    Lipase 45 13 - 60 U/L   Lactic acid whole blood   Result Value Ref Range    Lactic Acid 1.0 0.7 - 2.0 mmol/L   UA with Microscopic reflex to Culture    Specimen: Urine, Midstream   Result Value Ref Range    Color Urine Colorless Colorless, Straw, Light Yellow, Yellow    Appearance Urine Clear Clear    Glucose Urine Negative Negative mg/dL    Bilirubin Urine Negative Negative    Ketones Urine Negative Negative mg/dL    Specific Gravity Urine 1.031 (H) 1.001 - 1.030    Blood Urine Negative Negative    pH Urine 6.5 5.0 - 7.0    Protein Albumin Urine 30 (A) Negative mg/dL    Urobilinogen Urine <2.0 <2.0 mg/dL    Nitrite Urine Negative Negative    Leukocyte Esterase Urine 25 Robert/uL (A) Negative    RBC Urine <1 <=2 /HPF    WBC Urine 3 <=5 /HPF    Squamous Epithelials Urine 1 <=1 /HPF   CBC with platelets and differential   Result Value Ref Range    WBC Count 10.8 4.0 - 11.0 10e3/uL    RBC Count 4.11 3.80 - 5.20 10e6/uL    Hemoglobin 11.3 (L) 11.7 - 15.7 g/dL    Hematocrit 36.8 35.0 - 47.0 %    MCV 90 78 - 100 fL    MCH 27.5 26.5 - 33.0 pg    MCHC 30.7 (L) 31.5 - 36.5 g/dL    RDW 15.7 (H) 10.0 - 15.0 %    Platelet Count 272 150 - 450 10e3/uL    % Neutrophils 79 %    % Lymphocytes 12 %    % Monocytes 6 %    % Eosinophils 3 %    % Basophils 0 %    % Immature Granulocytes 0 %    NRBCs per 100 WBC 0 <1 /100    Absolute Neutrophils 8.6 (H) 1.6 - 8.3 10e3/uL    Absolute Lymphocytes 1.3 0.8 - 5.3 10e3/uL    Absolute Monocytes 0.7 0.0 - 1.3 10e3/uL    Absolute Eosinophils 0.3 0.0 - 0.7 10e3/uL    Absolute Basophils 0.0 0.0 - 0.2 10e3/uL    Absolute Immature Granulocytes 0.0 <=0.4 10e3/uL    Absolute  NRBCs 0.0 10e3/uL         RADIOLOGY    Pertinent imaging reviewed   Please see official radiology report.  CT Abdomen Pelvis w Contrast   Final Result   IMPRESSION:    1.  No etiology for symptoms evident. Nothing obstructive or inflammatory involving bowel.   2.  Spleen, pancreas and left kidney negative.   3.  Cholelithiasis.          FINAL IMPRESSION    1. Left sided abdominal pain         DISCHARGE PRESCRIPTIONS  New Prescriptions    No medications on file            Laurie Hutchins, FARHANA  08/16/23 1937

## 2023-08-17 NOTE — DISCHARGE INSTRUCTIONS
You can try heat packs, Tylenol and ibuprofen for pain.  You do have gallstones in the gallbladder but this is unlikely contributing to your symptoms based on your examination.    Try to follow-up in clinic in the next 1 to 2 days for recheck.  I am placing a surgical referral for your gallstones.  They should be calling you to set up an appointment.     Return to the emergency department if you develop fevers, worsening or changing abdominal pain, vomiting/not keeping fluids down, bloody stools, or any other concerning symptoms.  We would be happy to see you.

## 2024-10-01 PROBLEM — Z53.1 PROCEDURE AND TREATMENT NOT CARRIED OUT BECAUSE OF PATIENT'S DECISION FOR REASONS OF BELIEF AND GROUP PRESSURE: Status: ACTIVE | Noted: 2023-01-29

## (undated) DEVICE — SYR 30ML LL W/O NDL 302832

## (undated) DEVICE — SPONGE LAP 18X18" X8435

## (undated) DEVICE — ENDO TROCAR SLEEVE KII Z-THREADED 05X100MM CTS02

## (undated) DEVICE — CATH TRAY FOLEY SURESTEP 16FR DRAIN BAG STATOCK A899916

## (undated) DEVICE — BLADE KNIFE SURG 11 371111

## (undated) DEVICE — CLIP APPLIER ENDO 5MM M/L LIGAMAX EL5ML

## (undated) DEVICE — SU SILK 2-0 FS-1 18" 685G

## (undated) DEVICE — PREP CHLORAPREP 26ML TINTED HI-LITE ORANGE 930815

## (undated) DEVICE — ENDO POUCH UNIV RETRIEVAL SYSTEM INZII 10MM CD001

## (undated) DEVICE — DRSG PRIMAPORE 03 1/8X6" 66000318

## (undated) DEVICE — CUSTOM PACK COLON CLOSING SBA5BCCHEA

## (undated) DEVICE — GOWN XLG DISP 9545

## (undated) DEVICE — DRAIN RND 1/4 19FR SIL 0070230

## (undated) DEVICE — ENDO TROCAR FIRST ENTRY KII FIOS Z-THRD 12X100MM CTF73

## (undated) DEVICE — ENDO DISSECTOR BLUNT 05MM  BTD05

## (undated) DEVICE — ENDO TROCAR OPTICAL ACCESS KII Z-THRD 05X100MM CTR03

## (undated) DEVICE — BULB W/BLADDER TUBING STRL DISP

## (undated) DEVICE — DRAPE IOBAN INCISE 23X17" 6650EZ

## (undated) DEVICE — ESU PENCIL SMOKE EVAC W/ROCKER SWITCH 0703-047-000

## (undated) DEVICE — STPL POWERED ECHELON 60MM PSEE60A

## (undated) DEVICE — SUCTION MANIFOLD NEPTUNE 2 SYS 1 PORT 702-025-000

## (undated) DEVICE — SU SILK 3-0 SH CR 8X18" C013D

## (undated) DEVICE — STPL RELOAD REG TISSUE ECHELON 60 X 3.6MM BLUE GST60B

## (undated) DEVICE — DECANTER VIAL 2006S

## (undated) DEVICE — STPL SKIN 35W 6.9MM  PXW35

## (undated) DEVICE — STPL CIRCULAR 31MM CVD CDH31P

## (undated) DEVICE — GLOVE BIOGEL PI SZ 8.0 40880

## (undated) DEVICE — DRSG GAUZE 4X4" 3033

## (undated) DEVICE — BANDAGE ADH LF 1X3 ABN3100A

## (undated) DEVICE — ENDO SHEARS RENEW LAP ENDOCUT SCISSOR TIP 16.5MM 3142

## (undated) DEVICE — TUBING SUCTION MEDI-VAC 1/4"X20' N620A - HE

## (undated) DEVICE — DRSG DRAIN 4X4" 7086

## (undated) DEVICE — TUBING SMOKE EVAC PNEUMOCLEAR HIGH FLOW 0620050250

## (undated) DEVICE — ENDO GELPORT 100/120MM C8XX2

## (undated) DEVICE — ESU LIGASURE MARYLAND LAPAROSCOPIC SLR/DVDR 5MMX37CM LF1937

## (undated) DEVICE — Device

## (undated) DEVICE — DRAPE POUCH INSTRUMENT 3 POCKET 1018L

## (undated) DEVICE — SOL WATER IRRIG 1000ML BOTTLE 2F7114

## (undated) DEVICE — PLATE GROUNDING ADULT W/CORD 9165L

## (undated) DEVICE — TUBING LAP SUCT/IRRIG STRYKER 250070500

## (undated) DEVICE — ESU PENCIL W/HOLSTER E2350H

## (undated) DEVICE — VESSEL LOOP BLUE MINI 30-713

## (undated) DEVICE — CUSTOM PACK LAP CHOLE SBA5BLCHEA

## (undated) RX ORDER — FENTANYL CITRATE-0.9 % NACL/PF 10 MCG/ML
PLASTIC BAG, INJECTION (ML) INTRAVENOUS
Status: DISPENSED
Start: 2023-01-29

## (undated) RX ORDER — FENTANYL CITRATE 50 UG/ML
INJECTION, SOLUTION INTRAMUSCULAR; INTRAVENOUS
Status: DISPENSED
Start: 2023-05-05

## (undated) RX ORDER — PROPOFOL 10 MG/ML
INJECTION, EMULSION INTRAVENOUS
Status: DISPENSED
Start: 2023-01-29

## (undated) RX ORDER — BUPIVACAINE HYDROCHLORIDE 2.5 MG/ML
INJECTION, SOLUTION INFILTRATION; PERINEURAL
Status: DISPENSED
Start: 2023-01-29

## (undated) RX ORDER — FENTANYL CITRATE 50 UG/ML
INJECTION, SOLUTION INTRAMUSCULAR; INTRAVENOUS
Status: DISPENSED
Start: 2023-03-28

## (undated) RX ORDER — LIDOCAINE HYDROCHLORIDE 10 MG/ML
INJECTION, SOLUTION EPIDURAL; INFILTRATION; INTRACAUDAL; PERINEURAL
Status: DISPENSED
Start: 2023-01-29

## (undated) RX ORDER — DEXAMETHASONE SODIUM PHOSPHATE 10 MG/ML
INJECTION, SOLUTION INTRAMUSCULAR; INTRAVENOUS
Status: DISPENSED
Start: 2023-01-29

## (undated) RX ORDER — ONDANSETRON 2 MG/ML
INJECTION INTRAMUSCULAR; INTRAVENOUS
Status: DISPENSED
Start: 2023-01-29

## (undated) RX ORDER — FENTANYL CITRATE 50 UG/ML
INJECTION, SOLUTION INTRAMUSCULAR; INTRAVENOUS
Status: DISPENSED
Start: 2023-01-29